# Patient Record
Sex: FEMALE | Race: WHITE | NOT HISPANIC OR LATINO | Employment: OTHER | URBAN - METROPOLITAN AREA
[De-identification: names, ages, dates, MRNs, and addresses within clinical notes are randomized per-mention and may not be internally consistent; named-entity substitution may affect disease eponyms.]

---

## 2017-06-27 ENCOUNTER — RECORDS - HEALTHEAST (OUTPATIENT)
Dept: LAB | Facility: CLINIC | Age: 72
End: 2017-06-27

## 2017-06-27 LAB
CHOLEST SERPL-MCNC: 183 MG/DL
FASTING STATUS PATIENT QL REPORTED: YES
HDLC SERPL-MCNC: 54 MG/DL
LDLC SERPL CALC-MCNC: 100 MG/DL
TRIGL SERPL-MCNC: 144 MG/DL

## 2018-11-08 ENCOUNTER — TRANSFERRED RECORDS (OUTPATIENT)
Dept: HEALTH INFORMATION MANAGEMENT | Facility: CLINIC | Age: 73
End: 2018-11-08

## 2018-11-21 ENCOUNTER — APPOINTMENT (OUTPATIENT)
Dept: GENERAL RADIOLOGY | Facility: CLINIC | Age: 73
DRG: 326 | End: 2018-11-21
Attending: PHYSICIAN ASSISTANT
Payer: MEDICARE

## 2018-11-21 ENCOUNTER — ANESTHESIA (OUTPATIENT)
Dept: SURGERY | Facility: CLINIC | Age: 73
DRG: 326 | End: 2018-11-21
Payer: MEDICARE

## 2018-11-21 ENCOUNTER — APPOINTMENT (OUTPATIENT)
Dept: ULTRASOUND IMAGING | Facility: CLINIC | Age: 73
DRG: 326 | End: 2018-11-21
Attending: THORACIC SURGERY (CARDIOTHORACIC VASCULAR SURGERY)
Payer: MEDICARE

## 2018-11-21 ENCOUNTER — ANESTHESIA EVENT (OUTPATIENT)
Dept: SURGERY | Facility: CLINIC | Age: 73
DRG: 326 | End: 2018-11-21
Payer: MEDICARE

## 2018-11-21 ENCOUNTER — APPOINTMENT (OUTPATIENT)
Dept: GENERAL RADIOLOGY | Facility: CLINIC | Age: 73
DRG: 326 | End: 2018-11-21
Attending: THORACIC SURGERY (CARDIOTHORACIC VASCULAR SURGERY)
Payer: MEDICARE

## 2018-11-21 ENCOUNTER — HOSPITAL ENCOUNTER (INPATIENT)
Facility: CLINIC | Age: 73
LOS: 16 days | Discharge: NURSING, REHAB OR LONG-TERM CARE | DRG: 326 | End: 2018-12-07
Attending: THORACIC SURGERY (CARDIOTHORACIC VASCULAR SURGERY) | Admitting: THORACIC SURGERY (CARDIOTHORACIC VASCULAR SURGERY)
Payer: MEDICARE

## 2018-11-21 DIAGNOSIS — J96.91 RESPIRATORY FAILURE WITH HYPOXIA, UNSPECIFIED CHRONICITY (H): ICD-10-CM

## 2018-11-21 DIAGNOSIS — E03.9 HYPOTHYROIDISM, UNSPECIFIED TYPE: ICD-10-CM

## 2018-11-21 DIAGNOSIS — I48.0 PAROXYSMAL ATRIAL FIBRILLATION (H): ICD-10-CM

## 2018-11-21 DIAGNOSIS — K22.3 ESOPHAGEAL PERFORATION: Primary | ICD-10-CM

## 2018-11-21 PROBLEM — J96.90 RESPIRATORY FAILURE (H): Status: ACTIVE | Noted: 2018-11-21

## 2018-11-21 LAB
ABO + RH BLD: NORMAL
ABO + RH BLD: NORMAL
ALBUMIN SERPL-MCNC: 2.2 G/DL (ref 3.4–5)
ALBUMIN SERPL-MCNC: 2.2 G/DL (ref 3.4–5)
ALBUMIN UR-MCNC: 100 MG/DL
ALP SERPL-CCNC: 100 U/L (ref 40–150)
ALP SERPL-CCNC: 78 U/L (ref 40–150)
ALT SERPL W P-5'-P-CCNC: 106 U/L (ref 0–50)
ALT SERPL W P-5'-P-CCNC: 35 U/L (ref 0–50)
ANION GAP SERPL CALCULATED.3IONS-SCNC: 8 MMOL/L (ref 3–14)
ANION GAP SERPL CALCULATED.3IONS-SCNC: 8 MMOL/L (ref 3–14)
APPEARANCE UR: ABNORMAL
AST SERPL W P-5'-P-CCNC: 21 U/L (ref 0–45)
AST SERPL W P-5'-P-CCNC: 238 U/L (ref 0–45)
BASE DEFICIT BLDA-SCNC: 0.8 MMOL/L
BASE DEFICIT BLDA-SCNC: 1.9 MMOL/L
BASE DEFICIT BLDA-SCNC: 2.8 MMOL/L
BASE EXCESS BLDA CALC-SCNC: 3.2 MMOL/L
BILIRUB SERPL-MCNC: 1 MG/DL (ref 0.2–1.3)
BILIRUB SERPL-MCNC: 1.3 MG/DL (ref 0.2–1.3)
BILIRUB UR QL STRIP: NEGATIVE
BLD GP AB SCN SERPL QL: NORMAL
BLD PROD TYP BPU: NORMAL
BLD UNIT ID BPU: 0
BLOOD BANK CMNT PATIENT-IMP: NORMAL
BLOOD PRODUCT CODE: NORMAL
BPU ID: NORMAL
BUN SERPL-MCNC: 32 MG/DL (ref 7–30)
BUN SERPL-MCNC: 38 MG/DL (ref 7–30)
CA-I BLD-MCNC: 4.2 MG/DL (ref 4.4–5.2)
CA-I BLD-MCNC: 4.7 MG/DL (ref 4.4–5.2)
CALCIUM SERPL-MCNC: 7.5 MG/DL (ref 8.5–10.1)
CALCIUM SERPL-MCNC: 7.8 MG/DL (ref 8.5–10.1)
CHLORIDE SERPL-SCNC: 109 MMOL/L (ref 94–109)
CHLORIDE SERPL-SCNC: 110 MMOL/L (ref 94–109)
CO2 SERPL-SCNC: 23 MMOL/L (ref 20–32)
CO2 SERPL-SCNC: 28 MMOL/L (ref 20–32)
COLOR UR AUTO: YELLOW
CREAT SERPL-MCNC: 0.93 MG/DL (ref 0.52–1.04)
CREAT SERPL-MCNC: 1.25 MG/DL (ref 0.52–1.04)
ERYTHROCYTE [DISTWIDTH] IN BLOOD BY AUTOMATED COUNT: 15.9 % (ref 10–15)
ERYTHROCYTE [DISTWIDTH] IN BLOOD BY AUTOMATED COUNT: 16.1 % (ref 10–15)
GFR SERPL CREATININE-BSD FRML MDRD: 42 ML/MIN/1.7M2
GFR SERPL CREATININE-BSD FRML MDRD: 59 ML/MIN/1.7M2
GLUCOSE BLD-MCNC: 126 MG/DL (ref 70–99)
GLUCOSE BLD-MCNC: 155 MG/DL (ref 70–99)
GLUCOSE BLDC GLUCOMTR-MCNC: 101 MG/DL (ref 70–99)
GLUCOSE BLDC GLUCOMTR-MCNC: 109 MG/DL (ref 70–99)
GLUCOSE BLDC GLUCOMTR-MCNC: 147 MG/DL (ref 70–99)
GLUCOSE SERPL-MCNC: 148 MG/DL (ref 70–99)
GLUCOSE SERPL-MCNC: 93 MG/DL (ref 70–99)
GLUCOSE UR STRIP-MCNC: NEGATIVE MG/DL
HCO3 BLD-SCNC: 23 MMOL/L (ref 21–28)
HCO3 BLD-SCNC: 23 MMOL/L (ref 21–28)
HCO3 BLD-SCNC: 25 MMOL/L (ref 21–28)
HCO3 BLD-SCNC: 27 MMOL/L (ref 21–28)
HCT VFR BLD AUTO: 27.5 % (ref 35–47)
HCT VFR BLD AUTO: 31.4 % (ref 35–47)
HGB BLD-MCNC: 10 G/DL (ref 11.7–15.7)
HGB BLD-MCNC: 8.8 G/DL (ref 11.7–15.7)
HGB BLD-MCNC: 9.3 G/DL (ref 11.7–15.7)
HGB BLD-MCNC: 9.8 G/DL (ref 11.7–15.7)
HGB UR QL STRIP: ABNORMAL
INR PPP: 1.51 (ref 0.86–1.14)
INR PPP: 1.59 (ref 0.86–1.14)
INTERPRETATION ECG - MUSE: NORMAL
KETONES UR STRIP-MCNC: 10 MG/DL
LACTATE BLD-SCNC: 1.1 MMOL/L (ref 0.7–2)
LACTATE BLD-SCNC: 1.1 MMOL/L (ref 0.7–2)
LEUKOCYTE ESTERASE UR QL STRIP: ABNORMAL
MAGNESIUM SERPL-MCNC: 2.8 MG/DL (ref 1.6–2.3)
MAGNESIUM SERPL-MCNC: 2.9 MG/DL (ref 1.6–2.3)
MAGNESIUM SERPL-MCNC: 3 MG/DL (ref 1.6–2.3)
MCH RBC QN AUTO: 31.7 PG (ref 26.5–33)
MCH RBC QN AUTO: 31.9 PG (ref 26.5–33)
MCHC RBC AUTO-ENTMCNC: 31.8 G/DL (ref 31.5–36.5)
MCHC RBC AUTO-ENTMCNC: 32 G/DL (ref 31.5–36.5)
MCV RBC AUTO: 100 FL (ref 78–100)
MCV RBC AUTO: 99 FL (ref 78–100)
MRSA DNA SPEC QL NAA+PROBE: NEGATIVE
MUCOUS THREADS #/AREA URNS LPF: PRESENT /LPF
NITRATE UR QL: NEGATIVE
NUM BPU REQUESTED: 4
O2/TOTAL GAS SETTING VFR VENT: 60 %
O2/TOTAL GAS SETTING VFR VENT: 66 %
O2/TOTAL GAS SETTING VFR VENT: 90 %
O2/TOTAL GAS SETTING VFR VENT: ABNORMAL %
OXYHGB MFR BLD: 93 % (ref 92–100)
PCO2 BLD: 39 MM HG (ref 35–45)
PCO2 BLD: 40 MM HG (ref 35–45)
PCO2 BLD: 43 MM HG (ref 35–45)
PCO2 BLD: 46 MM HG (ref 35–45)
PH BLD: 7.32 PH (ref 7.35–7.45)
PH BLD: 7.37 PH (ref 7.35–7.45)
PH BLD: 7.38 PH (ref 7.35–7.45)
PH BLD: 7.45 PH (ref 7.35–7.45)
PH UR STRIP: 6 PH (ref 5–7)
PHOSPHATE SERPL-MCNC: 2.8 MG/DL (ref 2.5–4.5)
PHOSPHATE SERPL-MCNC: 3 MG/DL (ref 2.5–4.5)
PLATELET # BLD AUTO: 331 10E9/L (ref 150–450)
PLATELET # BLD AUTO: 375 10E9/L (ref 150–450)
PO2 BLD: 117 MM HG (ref 80–105)
PO2 BLD: 64 MM HG (ref 80–105)
PO2 BLD: 76 MM HG (ref 80–105)
PO2 BLD: 86 MM HG (ref 80–105)
POTASSIUM BLD-SCNC: 3.2 MMOL/L (ref 3.4–5.3)
POTASSIUM BLD-SCNC: 3.9 MMOL/L (ref 3.4–5.3)
POTASSIUM SERPL-SCNC: 3.4 MMOL/L (ref 3.4–5.3)
POTASSIUM SERPL-SCNC: 3.7 MMOL/L (ref 3.4–5.3)
PROT SERPL-MCNC: 5.5 G/DL (ref 6.8–8.8)
PROT SERPL-MCNC: 6.6 G/DL (ref 6.8–8.8)
RBC # BLD AUTO: 2.78 10E12/L (ref 3.8–5.2)
RBC # BLD AUTO: 3.13 10E12/L (ref 3.8–5.2)
RBC #/AREA URNS AUTO: >182 /HPF (ref 0–2)
SODIUM BLD-SCNC: 142 MMOL/L (ref 133–144)
SODIUM BLD-SCNC: 147 MMOL/L (ref 133–144)
SODIUM SERPL-SCNC: 141 MMOL/L (ref 133–144)
SODIUM SERPL-SCNC: 145 MMOL/L (ref 133–144)
SOURCE: ABNORMAL
SP GR UR STRIP: 1.03 (ref 1–1.03)
SPECIMEN EXP DATE BLD: NORMAL
SPECIMEN SOURCE: NORMAL
TRANS CELLS #/AREA URNS HPF: <1 /HPF (ref 0–1)
TRANSFUSION STATUS PATIENT QL: NORMAL
URATE CRY #/AREA URNS HPF: ABNORMAL /HPF
UROBILINOGEN UR STRIP-MCNC: NORMAL MG/DL (ref 0–2)
VANCOMYCIN SERPL-MCNC: 20.3 MG/L
WBC # BLD AUTO: 20.8 10E9/L (ref 4–11)
WBC # BLD AUTO: 28.4 10E9/L (ref 4–11)
WBC #/AREA URNS AUTO: 84 /HPF (ref 0–5)

## 2018-11-21 PROCEDURE — 27210794 ZZH OR GENERAL SUPPLY STERILE: Performed by: THORACIC SURGERY (CARDIOTHORACIC VASCULAR SURGERY)

## 2018-11-21 PROCEDURE — 94002 VENT MGMT INPAT INIT DAY: CPT

## 2018-11-21 PROCEDURE — 84100 ASSAY OF PHOSPHORUS: CPT | Performed by: STUDENT IN AN ORGANIZED HEALTH CARE EDUCATION/TRAINING PROGRAM

## 2018-11-21 PROCEDURE — P9016 RBC LEUKOCYTES REDUCED: HCPCS | Performed by: STUDENT IN AN ORGANIZED HEALTH CARE EDUCATION/TRAINING PROGRAM

## 2018-11-21 PROCEDURE — 83605 ASSAY OF LACTIC ACID: CPT | Performed by: STUDENT IN AN ORGANIZED HEALTH CARE EDUCATION/TRAINING PROGRAM

## 2018-11-21 PROCEDURE — 40000983 ZZH STATISTIC HFNC ADULT NON-CPAP

## 2018-11-21 PROCEDURE — 25000125 ZZHC RX 250: Performed by: NURSE ANESTHETIST, CERTIFIED REGISTERED

## 2018-11-21 PROCEDURE — 40000014 ZZH STATISTIC ARTERIAL MONITORING DAILY

## 2018-11-21 PROCEDURE — 82803 BLOOD GASES ANY COMBINATION: CPT | Performed by: STUDENT IN AN ORGANIZED HEALTH CARE EDUCATION/TRAINING PROGRAM

## 2018-11-21 PROCEDURE — 37000008 ZZH ANESTHESIA TECHNICAL FEE, 1ST 30 MIN: Performed by: THORACIC SURGERY (CARDIOTHORACIC VASCULAR SURGERY)

## 2018-11-21 PROCEDURE — 25000125 ZZHC RX 250

## 2018-11-21 PROCEDURE — 88304 TISSUE EXAM BY PATHOLOGIST: CPT | Performed by: THORACIC SURGERY (CARDIOTHORACIC VASCULAR SURGERY)

## 2018-11-21 PROCEDURE — 25000128 H RX IP 250 OP 636: Performed by: STUDENT IN AN ORGANIZED HEALTH CARE EDUCATION/TRAINING PROGRAM

## 2018-11-21 PROCEDURE — 71045 X-RAY EXAM CHEST 1 VIEW: CPT

## 2018-11-21 PROCEDURE — 84100 ASSAY OF PHOSPHORUS: CPT | Performed by: PHYSICIAN ASSISTANT

## 2018-11-21 PROCEDURE — 82947 ASSAY GLUCOSE BLOOD QUANT: CPT | Performed by: THORACIC SURGERY (CARDIOTHORACIC VASCULAR SURGERY)

## 2018-11-21 PROCEDURE — 0BQT0ZZ REPAIR DIAPHRAGM, OPEN APPROACH: ICD-10-PCS | Performed by: THORACIC SURGERY (CARDIOTHORACIC VASCULAR SURGERY)

## 2018-11-21 PROCEDURE — 0DT40ZZ RESECTION OF ESOPHAGOGASTRIC JUNCTION, OPEN APPROACH: ICD-10-PCS | Performed by: THORACIC SURGERY (CARDIOTHORACIC VASCULAR SURGERY)

## 2018-11-21 PROCEDURE — 0BPT0JZ REMOVAL OF SYNTHETIC SUBSTITUTE FROM DIAPHRAGM, OPEN APPROACH: ICD-10-PCS | Performed by: THORACIC SURGERY (CARDIOTHORACIC VASCULAR SURGERY)

## 2018-11-21 PROCEDURE — 0W9C0ZZ DRAINAGE OF MEDIASTINUM, OPEN APPROACH: ICD-10-PCS | Performed by: THORACIC SURGERY (CARDIOTHORACIC VASCULAR SURGERY)

## 2018-11-21 PROCEDURE — 83605 ASSAY OF LACTIC ACID: CPT | Performed by: PHYSICIAN ASSISTANT

## 2018-11-21 PROCEDURE — 93970 EXTREMITY STUDY: CPT

## 2018-11-21 PROCEDURE — 88305 TISSUE EXAM BY PATHOLOGIST: CPT | Performed by: THORACIC SURGERY (CARDIOTHORACIC VASCULAR SURGERY)

## 2018-11-21 PROCEDURE — 36000062 ZZH SURGERY LEVEL 4 1ST 30 MIN - UMMC: Performed by: THORACIC SURGERY (CARDIOTHORACIC VASCULAR SURGERY)

## 2018-11-21 PROCEDURE — 86901 BLOOD TYPING SEROLOGIC RH(D): CPT | Performed by: STUDENT IN AN ORGANIZED HEALTH CARE EDUCATION/TRAINING PROGRAM

## 2018-11-21 PROCEDURE — 36415 COLL VENOUS BLD VENIPUNCTURE: CPT | Performed by: THORACIC SURGERY (CARDIOTHORACIC VASCULAR SURGERY)

## 2018-11-21 PROCEDURE — 40000275 ZZH STATISTIC RCP TIME EA 10 MIN

## 2018-11-21 PROCEDURE — 82803 BLOOD GASES ANY COMBINATION: CPT | Performed by: THORACIC SURGERY (CARDIOTHORACIC VASCULAR SURGERY)

## 2018-11-21 PROCEDURE — 25000128 H RX IP 250 OP 636: Performed by: SURGERY

## 2018-11-21 PROCEDURE — 83735 ASSAY OF MAGNESIUM: CPT | Performed by: PHYSICIAN ASSISTANT

## 2018-11-21 PROCEDURE — 85027 COMPLETE CBC AUTOMATED: CPT | Performed by: STUDENT IN AN ORGANIZED HEALTH CARE EDUCATION/TRAINING PROGRAM

## 2018-11-21 PROCEDURE — P9041 ALBUMIN (HUMAN),5%, 50ML: HCPCS | Performed by: NURSE ANESTHETIST, CERTIFIED REGISTERED

## 2018-11-21 PROCEDURE — 84295 ASSAY OF SERUM SODIUM: CPT | Performed by: THORACIC SURGERY (CARDIOTHORACIC VASCULAR SURGERY)

## 2018-11-21 PROCEDURE — 86923 COMPATIBILITY TEST ELECTRIC: CPT | Performed by: STUDENT IN AN ORGANIZED HEALTH CARE EDUCATION/TRAINING PROGRAM

## 2018-11-21 PROCEDURE — 25000128 H RX IP 250 OP 636: Performed by: ANESTHESIOLOGY

## 2018-11-21 PROCEDURE — 3E043XZ INTRODUCTION OF VASOPRESSOR INTO CENTRAL VEIN, PERCUTANEOUS APPROACH: ICD-10-PCS | Performed by: THORACIC SURGERY (CARDIOTHORACIC VASCULAR SURGERY)

## 2018-11-21 PROCEDURE — 82330 ASSAY OF CALCIUM: CPT | Performed by: THORACIC SURGERY (CARDIOTHORACIC VASCULAR SURGERY)

## 2018-11-21 PROCEDURE — 25000128 H RX IP 250 OP 636: Performed by: NURSE ANESTHETIST, CERTIFIED REGISTERED

## 2018-11-21 PROCEDURE — C9113 INJ PANTOPRAZOLE SODIUM, VIA: HCPCS | Performed by: PHYSICIAN ASSISTANT

## 2018-11-21 PROCEDURE — 82805 BLOOD GASES W/O2 SATURATION: CPT | Performed by: PHYSICIAN ASSISTANT

## 2018-11-21 PROCEDURE — 82947 ASSAY GLUCOSE BLOOD QUANT: CPT | Performed by: ANESTHESIOLOGY

## 2018-11-21 PROCEDURE — 88307 TISSUE EXAM BY PATHOLOGIST: CPT | Performed by: THORACIC SURGERY (CARDIOTHORACIC VASCULAR SURGERY)

## 2018-11-21 PROCEDURE — 82803 BLOOD GASES ANY COMBINATION: CPT | Performed by: ANESTHESIOLOGY

## 2018-11-21 PROCEDURE — 80202 ASSAY OF VANCOMYCIN: CPT | Performed by: THORACIC SURGERY (CARDIOTHORACIC VASCULAR SURGERY)

## 2018-11-21 PROCEDURE — 84295 ASSAY OF SERUM SODIUM: CPT | Performed by: ANESTHESIOLOGY

## 2018-11-21 PROCEDURE — 37000009 ZZH ANESTHESIA TECHNICAL FEE, EACH ADDTL 15 MIN: Performed by: THORACIC SURGERY (CARDIOTHORACIC VASCULAR SURGERY)

## 2018-11-21 PROCEDURE — 87640 STAPH A DNA AMP PROBE: CPT | Performed by: PHYSICIAN ASSISTANT

## 2018-11-21 PROCEDURE — 0D150Z4 BYPASS ESOPHAGUS TO CUTANEOUS, OPEN APPROACH: ICD-10-PCS | Performed by: THORACIC SURGERY (CARDIOTHORACIC VASCULAR SURGERY)

## 2018-11-21 PROCEDURE — 84132 ASSAY OF SERUM POTASSIUM: CPT | Performed by: ANESTHESIOLOGY

## 2018-11-21 PROCEDURE — 40000986 XR CHEST PORT 1 VW

## 2018-11-21 PROCEDURE — 87641 MR-STAPH DNA AMP PROBE: CPT | Performed by: PHYSICIAN ASSISTANT

## 2018-11-21 PROCEDURE — 83735 ASSAY OF MAGNESIUM: CPT | Performed by: THORACIC SURGERY (CARDIOTHORACIC VASCULAR SURGERY)

## 2018-11-21 PROCEDURE — 86850 RBC ANTIBODY SCREEN: CPT | Performed by: STUDENT IN AN ORGANIZED HEALTH CARE EDUCATION/TRAINING PROGRAM

## 2018-11-21 PROCEDURE — 85610 PROTHROMBIN TIME: CPT | Performed by: STUDENT IN AN ORGANIZED HEALTH CARE EDUCATION/TRAINING PROGRAM

## 2018-11-21 PROCEDURE — 25000128 H RX IP 250 OP 636: Performed by: THORACIC SURGERY (CARDIOTHORACIC VASCULAR SURGERY)

## 2018-11-21 PROCEDURE — 80053 COMPREHEN METABOLIC PANEL: CPT | Performed by: PHYSICIAN ASSISTANT

## 2018-11-21 PROCEDURE — 85610 PROTHROMBIN TIME: CPT | Performed by: PHYSICIAN ASSISTANT

## 2018-11-21 PROCEDURE — 25000125 ZZHC RX 250: Performed by: STUDENT IN AN ORGANIZED HEALTH CARE EDUCATION/TRAINING PROGRAM

## 2018-11-21 PROCEDURE — 0DJ08ZZ INSPECTION OF UPPER INTESTINAL TRACT, VIA NATURAL OR ARTIFICIAL OPENING ENDOSCOPIC: ICD-10-PCS | Performed by: THORACIC SURGERY (CARDIOTHORACIC VASCULAR SURGERY)

## 2018-11-21 PROCEDURE — 84132 ASSAY OF SERUM POTASSIUM: CPT | Performed by: THORACIC SURGERY (CARDIOTHORACIC VASCULAR SURGERY)

## 2018-11-21 PROCEDURE — 86900 BLOOD TYPING SEROLOGIC ABO: CPT | Performed by: STUDENT IN AN ORGANIZED HEALTH CARE EDUCATION/TRAINING PROGRAM

## 2018-11-21 PROCEDURE — 88302 TISSUE EXAM BY PATHOLOGIST: CPT | Performed by: THORACIC SURGERY (CARDIOTHORACIC VASCULAR SURGERY)

## 2018-11-21 PROCEDURE — 40000171 ZZH STATISTIC PRE-PROCEDURE ASSESSMENT III: Performed by: THORACIC SURGERY (CARDIOTHORACIC VASCULAR SURGERY)

## 2018-11-21 PROCEDURE — 20000004 ZZH R&B ICU UMMC

## 2018-11-21 PROCEDURE — 36600 WITHDRAWAL OF ARTERIAL BLOOD: CPT

## 2018-11-21 PROCEDURE — 82330 ASSAY OF CALCIUM: CPT | Performed by: ANESTHESIOLOGY

## 2018-11-21 PROCEDURE — 81001 URINALYSIS AUTO W/SCOPE: CPT | Performed by: STUDENT IN AN ORGANIZED HEALTH CARE EDUCATION/TRAINING PROGRAM

## 2018-11-21 PROCEDURE — 40000141 ZZH STATISTIC PERIPHERAL IV START W/O US GUIDANCE

## 2018-11-21 PROCEDURE — 25000125 ZZHC RX 250: Performed by: ANESTHESIOLOGY

## 2018-11-21 PROCEDURE — 25000128 H RX IP 250 OP 636: Performed by: PHYSICIAN ASSISTANT

## 2018-11-21 PROCEDURE — 0DHA0UZ INSERTION OF FEEDING DEVICE INTO JEJUNUM, OPEN APPROACH: ICD-10-PCS | Performed by: THORACIC SURGERY (CARDIOTHORACIC VASCULAR SURGERY)

## 2018-11-21 PROCEDURE — 40000047 ZZH STATISTIC CTO2 CONT OXYGEN TECH TIME EA 90 MIN

## 2018-11-21 PROCEDURE — 36415 COLL VENOUS BLD VENIPUNCTURE: CPT | Performed by: STUDENT IN AN ORGANIZED HEALTH CARE EDUCATION/TRAINING PROGRAM

## 2018-11-21 PROCEDURE — 80053 COMPREHEN METABOLIC PANEL: CPT | Performed by: STUDENT IN AN ORGANIZED HEALTH CARE EDUCATION/TRAINING PROGRAM

## 2018-11-21 PROCEDURE — 93010 ELECTROCARDIOGRAM REPORT: CPT | Performed by: INTERNAL MEDICINE

## 2018-11-21 PROCEDURE — 85027 COMPLETE CBC AUTOMATED: CPT | Performed by: PHYSICIAN ASSISTANT

## 2018-11-21 PROCEDURE — 00000146 ZZHCL STATISTIC GLUCOSE BY METER IP

## 2018-11-21 PROCEDURE — 93005 ELECTROCARDIOGRAM TRACING: CPT

## 2018-11-21 PROCEDURE — 83735 ASSAY OF MAGNESIUM: CPT | Performed by: STUDENT IN AN ORGANIZED HEALTH CARE EDUCATION/TRAINING PROGRAM

## 2018-11-21 PROCEDURE — 36000064 ZZH SURGERY LEVEL 4 EA 15 ADDTL MIN - UMMC: Performed by: THORACIC SURGERY (CARDIOTHORACIC VASCULAR SURGERY)

## 2018-11-21 PROCEDURE — 99291 CRITICAL CARE FIRST HOUR: CPT | Performed by: PHYSICIAN ASSISTANT

## 2018-11-21 PROCEDURE — 25000128 H RX IP 250 OP 636

## 2018-11-21 RX ORDER — FLUCONAZOLE 2 MG/ML
400 INJECTION, SOLUTION INTRAVENOUS EVERY 24 HOURS
Status: DISCONTINUED | OUTPATIENT
Start: 2018-11-21 | End: 2018-11-21

## 2018-11-21 RX ORDER — POTASSIUM CL/LIDO/0.9 % NACL 10MEQ/0.1L
10 INTRAVENOUS SOLUTION, PIGGYBACK (ML) INTRAVENOUS
Status: DISCONTINUED | OUTPATIENT
Start: 2018-11-21 | End: 2018-11-21

## 2018-11-21 RX ORDER — PIPERACILLIN SODIUM, TAZOBACTAM SODIUM 3; .375 G/15ML; G/15ML
3.38 INJECTION, POWDER, LYOPHILIZED, FOR SOLUTION INTRAVENOUS ONCE
Status: COMPLETED | OUTPATIENT
Start: 2018-11-21 | End: 2018-11-21

## 2018-11-21 RX ORDER — SODIUM CHLORIDE, SODIUM LACTATE, POTASSIUM CHLORIDE, CALCIUM CHLORIDE 600; 310; 30; 20 MG/100ML; MG/100ML; MG/100ML; MG/100ML
INJECTION, SOLUTION INTRAVENOUS CONTINUOUS PRN
Status: DISCONTINUED | OUTPATIENT
Start: 2018-11-21 | End: 2018-11-21

## 2018-11-21 RX ORDER — AMOXICILLIN 250 MG
2 CAPSULE ORAL 2 TIMES DAILY
Status: DISCONTINUED | OUTPATIENT
Start: 2018-11-21 | End: 2018-11-24

## 2018-11-21 RX ORDER — FLUCONAZOLE 2 MG/ML
200 INJECTION, SOLUTION INTRAVENOUS EVERY 24 HOURS
Status: COMPLETED | OUTPATIENT
Start: 2018-11-22 | End: 2018-11-25

## 2018-11-21 RX ORDER — PROPOFOL 10 MG/ML
5-75 INJECTION, EMULSION INTRAVENOUS CONTINUOUS
Status: DISCONTINUED | OUTPATIENT
Start: 2018-11-21 | End: 2018-11-22

## 2018-11-21 RX ORDER — ACETAMINOPHEN 650 MG/1
650 SUPPOSITORY RECTAL EVERY 6 HOURS PRN
Status: DISCONTINUED | OUTPATIENT
Start: 2018-11-21 | End: 2018-11-25

## 2018-11-21 RX ORDER — POTASSIUM CHLORIDE 7.45 MG/ML
10 INJECTION INTRAVENOUS
Status: DISCONTINUED | OUTPATIENT
Start: 2018-11-21 | End: 2018-12-07 | Stop reason: HOSPADM

## 2018-11-21 RX ORDER — POTASSIUM CHLORIDE 29.8 MG/ML
20 INJECTION INTRAVENOUS
Status: DISCONTINUED | OUTPATIENT
Start: 2018-11-21 | End: 2018-12-07 | Stop reason: HOSPADM

## 2018-11-21 RX ORDER — PIPERACILLIN SODIUM, TAZOBACTAM SODIUM 3; .375 G/15ML; G/15ML
3.38 INJECTION, POWDER, LYOPHILIZED, FOR SOLUTION INTRAVENOUS EVERY 6 HOURS
Status: DISCONTINUED | OUTPATIENT
Start: 2018-11-21 | End: 2018-11-21

## 2018-11-21 RX ORDER — PROPOFOL 10 MG/ML
INJECTION, EMULSION INTRAVENOUS PRN
Status: DISCONTINUED | OUTPATIENT
Start: 2018-11-21 | End: 2018-11-21

## 2018-11-21 RX ORDER — SODIUM CHLORIDE 9 MG/ML
INJECTION, SOLUTION INTRAVENOUS
Status: COMPLETED
Start: 2018-11-21 | End: 2018-11-21

## 2018-11-21 RX ORDER — GLYCOPYRROLATE 0.2 MG/ML
INJECTION, SOLUTION INTRAMUSCULAR; INTRAVENOUS PRN
Status: DISCONTINUED | OUTPATIENT
Start: 2018-11-21 | End: 2018-11-21

## 2018-11-21 RX ORDER — PROCHLORPERAZINE MALEATE 5 MG
5 TABLET ORAL EVERY 6 HOURS PRN
Status: DISCONTINUED | OUTPATIENT
Start: 2018-11-21 | End: 2018-11-21

## 2018-11-21 RX ORDER — HALOPERIDOL 5 MG/ML
2 INJECTION INTRAMUSCULAR ONCE
Status: COMPLETED | OUTPATIENT
Start: 2018-11-21 | End: 2018-11-21

## 2018-11-21 RX ORDER — VANCOMYCIN HYDROCHLORIDE 1 G/200ML
1000 INJECTION, SOLUTION INTRAVENOUS ONCE
Status: COMPLETED | OUTPATIENT
Start: 2018-11-21 | End: 2018-11-21

## 2018-11-21 RX ORDER — PROPOFOL 10 MG/ML
10-20 INJECTION, EMULSION INTRAVENOUS EVERY 30 MIN PRN
Status: DISCONTINUED | OUTPATIENT
Start: 2018-11-21 | End: 2018-11-22

## 2018-11-21 RX ORDER — ONDANSETRON 2 MG/ML
4 INJECTION INTRAMUSCULAR; INTRAVENOUS EVERY 6 HOURS PRN
Status: DISCONTINUED | OUTPATIENT
Start: 2018-11-21 | End: 2018-11-23

## 2018-11-21 RX ORDER — METOPROLOL TARTRATE 1 MG/ML
5 INJECTION, SOLUTION INTRAVENOUS EVERY 6 HOURS
Status: DISCONTINUED | OUTPATIENT
Start: 2018-11-21 | End: 2018-11-21

## 2018-11-21 RX ORDER — HYDROMORPHONE HYDROCHLORIDE 1 MG/ML
.3-.5 INJECTION, SOLUTION INTRAMUSCULAR; INTRAVENOUS; SUBCUTANEOUS
Status: DISCONTINUED | OUTPATIENT
Start: 2018-11-21 | End: 2018-11-23

## 2018-11-21 RX ORDER — CEFAZOLIN SODIUM 1 G/3ML
1 INJECTION, POWDER, FOR SOLUTION INTRAMUSCULAR; INTRAVENOUS SEE ADMIN INSTRUCTIONS
Status: DISCONTINUED | OUTPATIENT
Start: 2018-11-21 | End: 2018-11-21 | Stop reason: HOSPADM

## 2018-11-21 RX ORDER — POTASSIUM CHLORIDE 29.8 MG/ML
INJECTION INTRAVENOUS PRN
Status: DISCONTINUED | OUTPATIENT
Start: 2018-11-21 | End: 2018-11-21

## 2018-11-21 RX ORDER — CALCIUM CHLORIDE 100 MG/ML
INJECTION INTRAVENOUS; INTRAVENTRICULAR PRN
Status: DISCONTINUED | OUTPATIENT
Start: 2018-11-21 | End: 2018-11-21

## 2018-11-21 RX ORDER — VANCOMYCIN HYDROCHLORIDE 1 G/200ML
1000 INJECTION, SOLUTION INTRAVENOUS EVERY 12 HOURS
Status: DISCONTINUED | OUTPATIENT
Start: 2018-11-21 | End: 2018-11-21

## 2018-11-21 RX ORDER — PROCHLORPERAZINE 25 MG
12.5 SUPPOSITORY, RECTAL RECTAL EVERY 12 HOURS PRN
Status: DISCONTINUED | OUTPATIENT
Start: 2018-11-21 | End: 2018-11-21

## 2018-11-21 RX ORDER — AMOXICILLIN 250 MG
1 CAPSULE ORAL 2 TIMES DAILY
Status: DISCONTINUED | OUTPATIENT
Start: 2018-11-21 | End: 2018-11-24

## 2018-11-21 RX ORDER — METOCLOPRAMIDE 5 MG/1
5 TABLET ORAL EVERY 6 HOURS PRN
Status: DISCONTINUED | OUTPATIENT
Start: 2018-11-21 | End: 2018-11-21

## 2018-11-21 RX ORDER — FLUCONAZOLE 2 MG/ML
400 INJECTION, SOLUTION INTRAVENOUS ONCE
Status: COMPLETED | OUTPATIENT
Start: 2018-11-21 | End: 2018-11-21

## 2018-11-21 RX ORDER — METOPROLOL TARTRATE 1 MG/ML
5 INJECTION, SOLUTION INTRAVENOUS EVERY 5 MIN PRN
Status: DISCONTINUED | OUTPATIENT
Start: 2018-11-21 | End: 2018-11-22

## 2018-11-21 RX ORDER — POTASSIUM CHLORIDE 750 MG/1
20-40 TABLET, EXTENDED RELEASE ORAL
Status: DISCONTINUED | OUTPATIENT
Start: 2018-11-21 | End: 2018-12-07 | Stop reason: HOSPADM

## 2018-11-21 RX ORDER — SODIUM CHLORIDE 9 MG/ML
INJECTION, SOLUTION INTRAVENOUS CONTINUOUS PRN
Status: DISCONTINUED | OUTPATIENT
Start: 2018-11-21 | End: 2018-11-21

## 2018-11-21 RX ORDER — OXYCODONE HYDROCHLORIDE 5 MG/1
5-10 TABLET ORAL
Status: DISCONTINUED | OUTPATIENT
Start: 2018-11-21 | End: 2018-11-21

## 2018-11-21 RX ORDER — MAGNESIUM SULFATE HEPTAHYDRATE 40 MG/ML
4 INJECTION, SOLUTION INTRAVENOUS EVERY 4 HOURS PRN
Status: DISCONTINUED | OUTPATIENT
Start: 2018-11-21 | End: 2018-12-07 | Stop reason: HOSPADM

## 2018-11-21 RX ORDER — CEFAZOLIN SODIUM 2 G/100ML
2 INJECTION, SOLUTION INTRAVENOUS
Status: DISCONTINUED | OUTPATIENT
Start: 2018-11-21 | End: 2018-11-21 | Stop reason: HOSPADM

## 2018-11-21 RX ORDER — ALBUMIN, HUMAN INJ 5% 5 %
SOLUTION INTRAVENOUS CONTINUOUS PRN
Status: DISCONTINUED | OUTPATIENT
Start: 2018-11-21 | End: 2018-11-21

## 2018-11-21 RX ORDER — NALOXONE HYDROCHLORIDE 0.4 MG/ML
.1-.4 INJECTION, SOLUTION INTRAMUSCULAR; INTRAVENOUS; SUBCUTANEOUS
Status: DISCONTINUED | OUTPATIENT
Start: 2018-11-21 | End: 2018-11-21

## 2018-11-21 RX ORDER — LIDOCAINE HYDROCHLORIDE 20 MG/ML
INJECTION, SOLUTION INFILTRATION; PERINEURAL PRN
Status: DISCONTINUED | OUTPATIENT
Start: 2018-11-21 | End: 2018-11-21

## 2018-11-21 RX ORDER — SODIUM CHLORIDE 9 MG/ML
INJECTION, SOLUTION INTRAVENOUS CONTINUOUS
Status: DISCONTINUED | OUTPATIENT
Start: 2018-11-21 | End: 2018-11-22

## 2018-11-21 RX ORDER — ONDANSETRON 2 MG/ML
4 INJECTION INTRAMUSCULAR; INTRAVENOUS EVERY 6 HOURS PRN
Status: DISCONTINUED | OUTPATIENT
Start: 2018-11-21 | End: 2018-12-07 | Stop reason: HOSPADM

## 2018-11-21 RX ORDER — METOPROLOL TARTRATE 1 MG/ML
5 INJECTION, SOLUTION INTRAVENOUS EVERY 5 MIN PRN
Status: DISCONTINUED | OUTPATIENT
Start: 2018-11-21 | End: 2018-11-21

## 2018-11-21 RX ORDER — FENTANYL CITRATE 50 UG/ML
INJECTION, SOLUTION INTRAMUSCULAR; INTRAVENOUS PRN
Status: DISCONTINUED | OUTPATIENT
Start: 2018-11-21 | End: 2018-11-21

## 2018-11-21 RX ORDER — PIPERACILLIN SODIUM, TAZOBACTAM SODIUM 3; .375 G/15ML; G/15ML
3.38 INJECTION, POWDER, LYOPHILIZED, FOR SOLUTION INTRAVENOUS EVERY 6 HOURS
Status: DISPENSED | OUTPATIENT
Start: 2018-11-21 | End: 2018-11-25

## 2018-11-21 RX ORDER — NALOXONE HYDROCHLORIDE 0.4 MG/ML
.1-.4 INJECTION, SOLUTION INTRAMUSCULAR; INTRAVENOUS; SUBCUTANEOUS
Status: DISCONTINUED | OUTPATIENT
Start: 2018-11-21 | End: 2018-11-23

## 2018-11-21 RX ORDER — ONDANSETRON 4 MG/1
4 TABLET, ORALLY DISINTEGRATING ORAL EVERY 6 HOURS PRN
Status: DISCONTINUED | OUTPATIENT
Start: 2018-11-21 | End: 2018-11-21

## 2018-11-21 RX ORDER — ACETAMINOPHEN 325 MG/1
650 TABLET ORAL EVERY 4 HOURS PRN
Status: DISCONTINUED | OUTPATIENT
Start: 2018-11-21 | End: 2018-11-21

## 2018-11-21 RX ORDER — ONDANSETRON 2 MG/ML
INJECTION INTRAMUSCULAR; INTRAVENOUS PRN
Status: DISCONTINUED | OUTPATIENT
Start: 2018-11-21 | End: 2018-11-21

## 2018-11-21 RX ORDER — POTASSIUM CHLORIDE 1.5 G/1.58G
20-40 POWDER, FOR SOLUTION ORAL
Status: DISCONTINUED | OUTPATIENT
Start: 2018-11-21 | End: 2018-12-07 | Stop reason: HOSPADM

## 2018-11-21 RX ORDER — ONDANSETRON 4 MG/1
4 TABLET, ORALLY DISINTEGRATING ORAL EVERY 6 HOURS PRN
Status: DISCONTINUED | OUTPATIENT
Start: 2018-11-21 | End: 2018-12-07 | Stop reason: HOSPADM

## 2018-11-21 RX ORDER — PROPOFOL 10 MG/ML
INJECTION, EMULSION INTRAVENOUS CONTINUOUS PRN
Status: DISCONTINUED | OUTPATIENT
Start: 2018-11-21 | End: 2018-11-21

## 2018-11-21 RX ORDER — ALBUTEROL SULFATE 90 UG/1
6 AEROSOL, METERED RESPIRATORY (INHALATION) EVERY 4 HOURS PRN
Status: DISCONTINUED | OUTPATIENT
Start: 2018-11-21 | End: 2018-12-07 | Stop reason: HOSPADM

## 2018-11-21 RX ORDER — HALOPERIDOL 5 MG/ML
1 INJECTION INTRAMUSCULAR ONCE
Status: COMPLETED | OUTPATIENT
Start: 2018-11-21 | End: 2018-11-21

## 2018-11-21 RX ORDER — METOCLOPRAMIDE HYDROCHLORIDE 5 MG/ML
5 INJECTION INTRAMUSCULAR; INTRAVENOUS EVERY 6 HOURS PRN
Status: DISCONTINUED | OUTPATIENT
Start: 2018-11-21 | End: 2018-11-21

## 2018-11-21 RX ORDER — METOPROLOL TARTRATE 1 MG/ML
INJECTION, SOLUTION INTRAVENOUS
Status: COMPLETED
Start: 2018-11-21 | End: 2018-11-21

## 2018-11-21 RX ADMIN — AMIODARONE HYDROCHLORIDE 1 MG/MIN: 50 INJECTION, SOLUTION INTRAVENOUS at 08:59

## 2018-11-21 RX ADMIN — PHENYLEPHRINE HYDROCHLORIDE 0.5 MCG/KG/MIN: 10 INJECTION, SOLUTION INTRAMUSCULAR; INTRAVENOUS; SUBCUTANEOUS at 11:58

## 2018-11-21 RX ADMIN — VASOPRESSIN 1 UNITS: 20 INJECTION, SOLUTION INTRAMUSCULAR; SUBCUTANEOUS at 15:17

## 2018-11-21 RX ADMIN — Medication 2 G: at 15:00

## 2018-11-21 RX ADMIN — PROPOFOL 50 MG: 10 INJECTION, EMULSION INTRAVENOUS at 16:47

## 2018-11-21 RX ADMIN — AMIODARONE HYDROCHLORIDE: 50 INJECTION, SOLUTION INTRAVENOUS at 13:15

## 2018-11-21 RX ADMIN — PHENYLEPHRINE HYDROCHLORIDE 200 MCG: 10 INJECTION, SOLUTION INTRAMUSCULAR; INTRAVENOUS; SUBCUTANEOUS at 11:56

## 2018-11-21 RX ADMIN — FENTANYL CITRATE 50 MCG: 50 INJECTION, SOLUTION INTRAMUSCULAR; INTRAVENOUS at 15:03

## 2018-11-21 RX ADMIN — Medication 100 MCG/HR: at 19:00

## 2018-11-21 RX ADMIN — VASOPRESSIN 2 UNITS: 20 INJECTION, SOLUTION INTRAMUSCULAR; SUBCUTANEOUS at 11:31

## 2018-11-21 RX ADMIN — METOPROLOL TARTRATE 5 MG: 1 INJECTION, SOLUTION INTRAVENOUS at 02:52

## 2018-11-21 RX ADMIN — VASOPRESSIN 1 UNITS: 20 INJECTION, SOLUTION INTRAMUSCULAR; SUBCUTANEOUS at 14:00

## 2018-11-21 RX ADMIN — NOREPINEPHRINE BITARTRATE 12.8 MCG: 1 INJECTION INTRAVENOUS at 16:42

## 2018-11-21 RX ADMIN — NOREPINEPHRINE BITARTRATE 0.03 MCG/KG/MIN: 1 INJECTION INTRAVENOUS at 14:42

## 2018-11-21 RX ADMIN — ROCURONIUM BROMIDE 30 MG: 10 INJECTION INTRAVENOUS at 12:25

## 2018-11-21 RX ADMIN — METOPROLOL TARTRATE 5 MG: 1 INJECTION, SOLUTION INTRAVENOUS at 05:20

## 2018-11-21 RX ADMIN — PHENYLEPHRINE HYDROCHLORIDE 200 MCG: 10 INJECTION, SOLUTION INTRAMUSCULAR; INTRAVENOUS; SUBCUTANEOUS at 16:38

## 2018-11-21 RX ADMIN — CALCIUM CHLORIDE 0.5 G: 100 INJECTION, SOLUTION INTRAVENOUS at 14:00

## 2018-11-21 RX ADMIN — PHENYLEPHRINE HYDROCHLORIDE 200 MCG: 10 INJECTION, SOLUTION INTRAMUSCULAR; INTRAVENOUS; SUBCUTANEOUS at 11:28

## 2018-11-21 RX ADMIN — PHENYLEPHRINE HYDROCHLORIDE 200 MCG: 10 INJECTION, SOLUTION INTRAMUSCULAR; INTRAVENOUS; SUBCUTANEOUS at 11:20

## 2018-11-21 RX ADMIN — NOREPINEPHRINE BITARTRATE 12.8 MCG: 1 INJECTION INTRAVENOUS at 16:40

## 2018-11-21 RX ADMIN — VASOPRESSIN 1 UNITS: 20 INJECTION, SOLUTION INTRAMUSCULAR; SUBCUTANEOUS at 14:28

## 2018-11-21 RX ADMIN — Medication 100 MG: at 11:14

## 2018-11-21 RX ADMIN — GLYCOPYRROLATE 0.2 MG: 0.2 INJECTION, SOLUTION INTRAMUSCULAR; INTRAVENOUS at 16:22

## 2018-11-21 RX ADMIN — FENTANYL CITRATE 50 MCG: 50 INJECTION, SOLUTION INTRAMUSCULAR; INTRAVENOUS at 15:07

## 2018-11-21 RX ADMIN — VASOPRESSIN 1 UNITS: 20 INJECTION, SOLUTION INTRAMUSCULAR; SUBCUTANEOUS at 14:19

## 2018-11-21 RX ADMIN — SODIUM CHLORIDE: 9 INJECTION, SOLUTION INTRAVENOUS at 15:49

## 2018-11-21 RX ADMIN — AMIODARONE HYDROCHLORIDE 1 MG/MIN: 50 INJECTION, SOLUTION INTRAVENOUS at 11:00

## 2018-11-21 RX ADMIN — VASOPRESSIN 1 UNITS/HR: 20 INJECTION INTRAVENOUS at 13:40

## 2018-11-21 RX ADMIN — VASOPRESSIN 1 UNITS: 20 INJECTION, SOLUTION INTRAMUSCULAR; SUBCUTANEOUS at 13:12

## 2018-11-21 RX ADMIN — ROCURONIUM BROMIDE 10 MG: 10 INJECTION INTRAVENOUS at 15:47

## 2018-11-21 RX ADMIN — PROPOFOL 130 MG: 10 INJECTION, EMULSION INTRAVENOUS at 11:14

## 2018-11-21 RX ADMIN — HALOPERIDOL LACTATE 2 MG: 5 INJECTION, SOLUTION INTRAMUSCULAR at 05:20

## 2018-11-21 RX ADMIN — PIPERACILLIN SODIUM,TAZOBACTAM SODIUM 3.38 G: 3; .375 INJECTION, POWDER, FOR SOLUTION INTRAVENOUS at 05:19

## 2018-11-21 RX ADMIN — SODIUM CHLORIDE, POTASSIUM CHLORIDE, SODIUM LACTATE AND CALCIUM CHLORIDE: 600; 310; 30; 20 INJECTION, SOLUTION INTRAVENOUS at 13:30

## 2018-11-21 RX ADMIN — ROCURONIUM BROMIDE 20 MG: 10 INJECTION INTRAVENOUS at 14:53

## 2018-11-21 RX ADMIN — SODIUM CHLORIDE, POTASSIUM CHLORIDE, SODIUM LACTATE AND CALCIUM CHLORIDE: 600; 310; 30; 20 INJECTION, SOLUTION INTRAVENOUS at 11:00

## 2018-11-21 RX ADMIN — LIDOCAINE HYDROCHLORIDE 100 MG: 20 INJECTION, SOLUTION INFILTRATION; PERINEURAL at 11:14

## 2018-11-21 RX ADMIN — SODIUM CHLORIDE 500 ML: 9 INJECTION, SOLUTION INTRAVENOUS at 21:35

## 2018-11-21 RX ADMIN — ALBUMIN HUMAN: 0.05 INJECTION, SOLUTION INTRAVENOUS at 14:32

## 2018-11-21 RX ADMIN — ROCURONIUM BROMIDE 20 MG: 10 INJECTION INTRAVENOUS at 13:17

## 2018-11-21 RX ADMIN — PIPERACILLIN SODIUM,TAZOBACTAM SODIUM 3.38 G: 3; .375 INJECTION, POWDER, FOR SOLUTION INTRAVENOUS at 11:52

## 2018-11-21 RX ADMIN — ONDANSETRON 4 MG: 2 INJECTION INTRAMUSCULAR; INTRAVENOUS at 15:54

## 2018-11-21 RX ADMIN — PIPERACILLIN SODIUM,TAZOBACTAM SODIUM 3.38 G: 3; .375 INJECTION, POWDER, FOR SOLUTION INTRAVENOUS at 19:40

## 2018-11-21 RX ADMIN — FLUCONAZOLE 400 MG: 2 INJECTION, SOLUTION INTRAVENOUS at 08:13

## 2018-11-21 RX ADMIN — ROCURONIUM BROMIDE 50 MG: 10 INJECTION INTRAVENOUS at 11:21

## 2018-11-21 RX ADMIN — AMIODARONE HYDROCHLORIDE 0.5 MG/MIN: 50 INJECTION, SOLUTION INTRAVENOUS at 18:00

## 2018-11-21 RX ADMIN — VANCOMYCIN HYDROCHLORIDE 1000 MG: 1 INJECTION, SOLUTION INTRAVENOUS at 06:35

## 2018-11-21 RX ADMIN — FENTANYL CITRATE 100 MCG: 50 INJECTION, SOLUTION INTRAMUSCULAR; INTRAVENOUS at 11:14

## 2018-11-21 RX ADMIN — VASOPRESSIN 2.4 UNITS/HR: 20 INJECTION INTRAVENOUS at 23:17

## 2018-11-21 RX ADMIN — POTASSIUM CHLORIDE 20 MEQ: 29.8 INJECTION, SOLUTION INTRAVENOUS at 21:25

## 2018-11-21 RX ADMIN — POTASSIUM CHLORIDE 20 MEQ: 400 INJECTION, SOLUTION INTRAVENOUS at 14:55

## 2018-11-21 RX ADMIN — PROPOFOL 75 MCG/KG/MIN: 10 INJECTION, EMULSION INTRAVENOUS at 16:47

## 2018-11-21 RX ADMIN — SODIUM CHLORIDE: 9 INJECTION, SOLUTION INTRAVENOUS at 03:06

## 2018-11-21 RX ADMIN — CALCIUM CHLORIDE 0.5 G: 100 INJECTION, SOLUTION INTRAVENOUS at 13:42

## 2018-11-21 RX ADMIN — SODIUM CHLORIDE: 9 INJECTION, SOLUTION INTRAVENOUS at 11:40

## 2018-11-21 RX ADMIN — PANTOPRAZOLE SODIUM 40 MG: 40 INJECTION, POWDER, FOR SOLUTION INTRAVENOUS at 19:33

## 2018-11-21 RX ADMIN — NOREPINEPHRINE BITARTRATE 6.4 MCG: 1 INJECTION INTRAVENOUS at 16:52

## 2018-11-21 RX ADMIN — ROCURONIUM BROMIDE 20 MG: 10 INJECTION INTRAVENOUS at 14:02

## 2018-11-21 RX ADMIN — HALOPERIDOL LACTATE 1 MG: 5 INJECTION, SOLUTION INTRAMUSCULAR at 03:53

## 2018-11-21 RX ADMIN — VASOPRESSIN 1 UNITS: 20 INJECTION, SOLUTION INTRAMUSCULAR; SUBCUTANEOUS at 12:55

## 2018-11-21 RX ADMIN — ALBUMIN HUMAN: 0.05 INJECTION, SOLUTION INTRAVENOUS at 16:30

## 2018-11-21 ASSESSMENT — ACTIVITIES OF DAILY LIVING (ADL)
ADLS_ACUITY_SCORE: 17
ADLS_ACUITY_SCORE: 21
ADLS_ACUITY_SCORE: 21

## 2018-11-21 ASSESSMENT — ENCOUNTER SYMPTOMS: DYSRHYTHMIAS: 1

## 2018-11-21 NOTE — ANESTHESIA PREPROCEDURE EVALUATION
Anesthesia Pre-Procedure Evaluation    Patient: Myrtle Vaz   MRN:     [id number] Gender:   female   Age:    73 year old :      1945        Preoperative Diagnosis: Possible Esophageal Perforation    Procedure(s):  Laparoscopic Redo Hiatal Hernia Repair      No past medical history on file.   No past surgical history on file.       Anesthesia Evaluation     . Pt has had prior anesthetic.     No history of anesthetic complications          ROS/MED HX    ENT/Pulmonary:     (+), . Other pulmonary disease .    Neurologic:  - neg neurologic ROS     Cardiovascular:     (+) ----. : . . . :. dysrhythmias a-fib, .       METS/Exercise Tolerance:     Hematologic:     (+) Anemia, -      Musculoskeletal:         GI/Hepatic:     (+) hiatal hernia,       Renal/Genitourinary:     (+) chronic renal disease, type: ARF,       Endo:         Psychiatric:  - neg psychiatric ROS       Infectious Disease:         Malignancy:         Other:                         PHYSICAL EXAM:   Mental Status/Neuro: Mental status: Disoriented   Airway: Facies: Feasible  Mallampati: Not Assessed  Mouth/Opening: Not Assessed  TM distance: Not Assessed  Neck ROM: Not Assessed   Respiratory: Auscultation: Decreased BS     Resp. Rate: Normal     Resp. Effort: Normal      CV: Rhythm: Regular  Heart: Normal Sounds   Comments:      Dental: Normal                  Lab Results   Component Value Date    WBC 28.4 (H) 2018    HGB 10.0 (L) 2018    HCT 31.4 (L) 2018     2018     (H) 2018    POTASSIUM 3.4 2018    CHLORIDE 109 2018    CO2 28 2018    BUN 38 (H) 2018    CR 1.25 (H) 2018    GLC 93 2018    JOSE 7.8 (L) 2018    PHOS 2.8 2018    MAG 2.9 (H) 2018    ALBUMIN 2.2 (L) 2018    PROTTOTAL 6.6 (L) 2018    ALT 35 2018    AST 21 2018    ALKPHOS 100 2018    BILITOTAL 1.0 2018    INR 1.51 (H) 2018       Preop Vitals  BP Readings  "from Last 3 Encounters:   11/21/18 117/58    Pulse Readings from Last 3 Encounters:   No data found for Pulse      Resp Readings from Last 3 Encounters:   11/21/18 20    SpO2 Readings from Last 3 Encounters:   11/21/18 93%      Temp Readings from Last 1 Encounters:   11/21/18 37.2  C (99  F) (Axillary)    Ht Readings from Last 1 Encounters:   11/21/18 1.651 m (5' 5\")      Wt Readings from Last 1 Encounters:   11/21/18 89.5 kg (197 lb 5 oz)    Estimated body mass index is 32.83 kg/(m^2) as calculated from the following:    Height as of this encounter: 1.651 m (5' 5\").    Weight as of this encounter: 89.5 kg (197 lb 5 oz).     LDA:  Peripheral IV 11/20/18 Right Lower forearm (Active)   Site Assessment WDL 11/21/2018  6:00 AM   Line Status Infusing 11/21/2018  6:00 AM   Phlebitis Scale 0-->no symptoms 11/21/2018  4:00 AM   Infiltration Scale 0 11/21/2018  4:00 AM   Extravasation? No 11/21/2018  4:00 AM   Number of days:1       Peripheral IV 11/21/18 Right;Anterior Lower forearm (Active)   Number of days:0       Chest Tube Left Left Anterior (Active)   Site Assessment UTV 11/21/2018  4:00 AM   Suction -20 cm H2O 11/21/2018  4:00 AM   Chest Tube Airleak No 11/21/2018  4:00 AM   Drainage Description Serosanguinous 11/21/2018  4:00 AM   Dressing Status Normal: Clean, Dry & Intact 11/21/2018  4:00 AM   Dressing Change Due 11/21/18 11/21/2018  4:00 AM   Dressing Intervention Gauze;Transparent 11/21/2018  4:00 AM   Patency Intervention Tip/Tilt 11/21/2018  4:00 AM   Chest Tube Clamps at Bedside present 11/21/2018  4:00 AM   Container Amount 410 11/21/2018  8:49 AM   Output (ml) 40 ml 11/21/2018  8:49 AM   Number of days:1       Chest Tube Right Mediastinal (Active)   Site Assessment UTV 11/21/2018  4:00 AM   Suction -20 cm H2O 11/21/2018  4:00 AM   Chest Tube Airleak No 11/21/2018  4:00 AM   Drainage Description Serosanguinous 11/21/2018  4:00 AM   Dressing Status Normal: Clean, Dry & Intact 11/21/2018  4:00 AM   Dressing " Change Due 11/21/18 11/21/2018  4:00 AM   Dressing Intervention Gauze;Transparent 11/21/2018  4:00 AM   Patency Intervention Tip/Tilt 11/21/2018  4:00 AM   Chest Tube Clamps at Bedside present 11/21/2018  4:00 AM   Container Amount 690 11/21/2018  8:49 AM   Output (ml) 0 ml 11/21/2018  8:49 AM   Number of days:1       Chest Tube Right Pleural (Active)   Site Assessment UTV 11/21/2018  4:00 AM   Suction -20 cm H2O 11/21/2018  4:00 AM   Chest Tube Airleak No 11/21/2018  4:00 AM   Drainage Description Serosanguinous 11/21/2018  4:00 AM   Dressing Status Normal: Clean, Dry & Intact 11/21/2018  4:00 AM   Dressing Change Due 11/21/18 11/21/2018  4:00 AM   Dressing Intervention Gauze;Transparent 11/21/2018  4:00 AM   Patency Intervention Tip/Tilt 11/21/2018  4:00 AM   Chest Tube Clamps at Bedside present 11/21/2018  4:00 AM   Container Amount 875 11/21/2018  8:49 AM   Output (ml) 25 ml 11/21/2018  8:49 AM   Number of days:1       Gastrostomy/Enterostomy Gastrostomy (Active)   Site Description Unable to Visualize 11/21/2018  4:00 AM   Site care cleansed with soap and water;button rotated 1/4 turn 11/21/2018  1:00 AM   Drainage Appearance Brown;Green;Purulent 11/21/2018  4:00 AM   Status - Gastrostomy Open to gravity drainage 11/21/2018  4:00 AM   Dressing Status Normal: Clean, Dry & Intact 11/21/2018  4:00 AM   Dressing Change Due 11/21/18 11/21/2018  4:00 AM   Output (ml) 30 ml 11/21/2018  8:49 AM   Number of days:1       Urethral Catheter (Active)   Tube Description Positional 11/21/2018  4:00 AM   Catheter Care Catheter wipes 11/21/2018  1:00 AM   Collection Container Standard;Patent 11/21/2018  4:00 AM   Securement Method Securing device (Describe) 11/21/2018  4:00 AM   Urine Output 150 mL 11/21/2018  8:00 AM   Number of days:1            Assessment:   ASA SCORE: 3    NPO Status: > 6 hours since completed [employer]   Documentation: H&P complete; Preop Testing complete; Consents complete   Proceeding: Proceed without  further delay  Tobacco Use:  NO Active use of Tobacco/UNKNOWN Tobacco use status     Plan:   Anes. Type:  General   Pre-Induction: Midazolam IV   Induction:  IV (Standard)   Airway: Oral ETT   Access/Monitoring: PIV; 2nd PIV; A-Line   Maintenance: Balanced   Emergence: Procedure Site   Logistics: Observation/Admission     Postop Pain/Sedation Strategy:  Standard-Options: Opioids PRN     PONV Management:  Adult Risk Factors: Female, Non-Smoker, Postop Opioids  Prevention: Ondansetron; Dexamethasone     CONSENT: Direct conversation   Plan and risks discussed with: Patient   Blood Products: Consented (ALL Blood Products)                         Iggy Barrow MD

## 2018-11-21 NOTE — ANESTHESIA POSTPROCEDURE EVALUATION
Anesthesia POST Procedure Evaluation    Patient: Myrtle Vaz   MRN:     [id number] Gender:   female   Age:    73 year old :      1945        Preoperative Diagnosis: Possible Esophageal Perforation    Procedure(s):  Midline laparotomy, Trans-hiatal esophagogasterectomy, gastrostomy, J-tube placement, G-tube placement, bilateral pleural chest tube placement, mediastinal abcess drainage, spit fistula creation, Esophagastrodueodenoscopy  COMBINED ESOPHAGOSCOPY, GASTROSCOPY, DUODENOSCOPY (EGD)   Postop Comments: No value filed.       Anesthesia Type:  General    Reportable Event: YES     PAIN:        Airway/Resp.: Uneventful perioperative course   Sign Out Status: Airway Device presnt     Airway Device: ETT                 Reason: Hemodynamic Instability     CV: Reportable or Non-Standard event     Events: Refractory HypOtension; Dysrrhythmia     Interventions: Pressors; Defibrillation; Antiarrhythmics   Sign Out status: CV Support within EXPECTED Parameters     Disposition:   Sign Out in:  ICU  Disposition:  ICU  Recovery Course: Recovery in ICU     Comments/Narrative:  Patient had persisent intraoperative hypotension requiring multiple vasopressors.  Also had an episode of AFib with RVR (h/o of recent AFib) despite being on amiodarone infusion.  DCCV x4 with eventual spontaneous conversion to NSR.  Patient intubated and sedated; unable to assess pain, PONV, mental status.               Last Anesthesia Record Vitals:  [facility] VITALS  2018 1648 - 2018 1747      2018             Pulse: 71    ART BP: 115/62    Temp: 34.7  C (94.4  F)    SpO2: 100 %    Resp Rate (set): 14    EKG: Sinus rhythm          Transported to ICU on full monitors and controlled ventilation.  Vital signs at time of transfer:  HR 72  /67 ()  SpO2 100%    Supported hemodynamically with 0.05 mcg/kg/min NE and 1.2 units/h vasopressin.  Sedated with propofol 30 mcg/kg/min.  Amiodarone at 1 mg/h.    Full report to ICU  team.      Electronically Signed By: Alejandrina Lees MD, November 21, 2018, 5:47 PM

## 2018-11-21 NOTE — PROVIDER NOTIFICATION
Time of notification: 7:45 AM  Provider notified: Ramandeep Dang  Patient status:  Temp:  [98.2  F (36.8  C)] 98.2  F (36.8  C)  Heart Rate:  [] 101  Resp:  [20] 20  BP: (114-149)/(54-89) 149/71  FiO2 (%):  [80 %] 80 %  SpO2:  [92 %-95 %] 95 %  Orders received:     Patient pulled off NC, rapid desat.  Awaiting orders

## 2018-11-21 NOTE — BRIEF OP NOTE
[facility], [place]    Brief Operative Note    Pre-operative diagnosis: Possible Esophageal Perforation      Recent giant hiatal hernia repair with mesh      Sepsis      New onset A fib      Acute kidney failure   Post-operative diagnosis Distal esophageal perforation      Purulent mediastinitis      Septic shock       Respiratory failure      Acute kidney failure      New onset A fib      Intermittent SVT requiring DCCV x 4     Procedure:   EGD      Laparotomy      Takedown of Nissen fundoplication      Takedown of cruroplasty       Prosthetic mesh explantation      Takedown of gastrostomy tube      Drainage of mediastinal abscess      Excision of hiatal hernia sac      Transhiatal esophagogastrectomy       Closure of esophageal hiatus       Gastrostomy      Jejunostomy      Bilateral pleural tube placement       Left neck incision and cervical esophagostomy             Surgeon: Surgeon(s) and Role:     * Temitope Bullock MD - Primary     * Nicky Chun MD - Resident - Assisting     * Ally Ybarra MD - Assisting  Anesthesia: General   Estimated blood loss: 150 ml  Drains:       32 Fr straight argyle tube to right pleural space, posteroapical.       32 Fr straight argyle tube to left pleural space, posteroapical.       19 Fr brendan drain to posterior mediastinal abscess cavity.       20 Fr BETSEY Gastrostomy tube       16 Fr Jejunostomy tube   Specimens:   ID Type Source Tests Collected by Time Destination   A : DIstal Esophagus and stomach  Tissue Esophagus, Distal SURGICAL PATHOLOGY EXAM Temitope Bullock MD 11/21/2018  2:00 PM    B : Hernia Sac Tissue Other SURGICAL PATHOLOGY EXAM Temitope Bullock MD 11/21/2018  2:21 PM    C : Mesh Other (specify in comments) Other SURGICAL PATHOLOGY EXAM Temitope Bullock MD 11/21/2018  2:36 PM    D : Distal Esophagus  Tissue Esophagus, Distal SURGICAL PATHOLOGY EXAM Temitope Bullock MD 11/21/2018  4:36  PM      Findings:   Distal esophageal perforation at 40 cm from incisors.   Complications: Sustained SVT with hemodynamic instability at the end of the operation requiring DDCV x 4 with conversion to NSR.   Implants: None.

## 2018-11-21 NOTE — PHARMACY-VANCOMYCIN DOSING SERVICE
Pharmacy Vancomycin Initial Note  Date of Service 2018  Patient's  1945  73 year old, female    Indication: Postoperative Infection    Current estimated CrCl = Estimated Creatinine Clearance: 45 mL/min (based on Cr of 1.25).    Creatinine for last 3 days  2018:  1:41 AM Creatinine 1.25 mg/dL    Recent Vancomycin Level(s) for last 3 days  2018:  2:16 AM Vancomycin Level 20.3 mg/L      Vancomycin IV Administrations (past 72 hours)                   vancomycin (VANCOCIN) 1000 mg in dextrose 5% 200 mL PREMIX (mg) 1,000 mg New Bag 18 0635                Nephrotoxins and other renal medications (Future)    Start     Dose/Rate Route Frequency Ordered Stop    18 1100  piperacillin-tazobactam (ZOSYN) 3.375 g vial to attach to  mL bag      3.375 g  over 30 Minutes Intravenous EVERY 6 HOURS 18 0757      18 0502  vancomycin place jon - receiving intermittent dosing      1 each Intravenous SEE ADMIN INSTRUCTIONS 18 0505            Contrast Orders - past 72 hours     None                Plan:  1.  Please note patient was started on vancomycin at outside hospital. She was initiated on vancomycin 1000 mg IV Q12H on . Prior to transfer to [facility], she received two doses ( @ 2200 and  @ 1108). Upon admission here, she also received vancomycin 1000 mg IV today @ 0635 and an order for intermittent vancomycin dosing was placed. Given patients age and renal function, would usually recommend Q24H dosing, but as patient has already received a couple doses, will leave current order for intermittent dosing, and will check a vancomycin level tomorrow with morning labs, which will be ~ 24 hour level to confirm Q24H dosing is appropriate. Based on level, will then consider scheduled dosing.   2.  Goal Trough Level: 10-15 mg/L   3. Serum creatinine levels will be ordered daily for the first week of therapy and at least twice weekly for subsequent weeks.    4.  [place] method utilized to dose vancomycin therapy: Method 2    Chayo Manzo, PharmD, BCPS  Pager [phone]

## 2018-11-21 NOTE — H&P
Thoracic Surgery Admission History and Physical     Myrtle Vaz MRN# [id number]   YOB: 1945 Age: 73 year old      Date of Admission:  11/21/2018    CC: Shortness of breath    Assessment: 73 year old female transferred from [facility] where she presented with respiratory failure thought to be secondary to large hiatal hernia, now with continued respiratory failure s/p laparoscopic hiatal hernia repair with mesh reinforcement, laparoscopic nissen fundoplication, and laparoscopic gastrostomy tube placement, which was complicated by pleural effusions and possible esophageal leak, now s/p placement of chest tubes x3, course of vancomycin and zosyn, and successful extubation. Transferred for definitive management per Dr. Albarado.     On evaluation she is delirious with hallucinations and some aggression. She refuses beside ultrasound and cannot be transported for abdominal x ray. She was given haldol for agitation. Additionally she is in and out of Afib, and has received a total of 10 mg of metoprolol.       Plan:    - Haldol for agitation. Gave 1 mg, now giving another 2 mg, if giving additional dose, look for reversible causes of delirium.   - Continue VPM  - Afib with RVR (short runs), pressures in 130s systolic. Gave 5 mg metoprolol, converted to sinus, now back in A fib, re-dosing with metoprolol.   - If systolics 90-100s, would start amio after 3rd dose metoprolol   - If becomes unstable, cardioversion and ICU transfer  - Continue high flow nasal cannula   - Follow up ABG, hypoxia may be contributing to delirium   - If O2 needs increase, plan to transfer to ICU with low threshold for intubation   - If requires intubation, would need to be RSI as she is high aspiration risk  - Strict NPO  - Keep G tube to gravity  - NS at 100   - Replacing lytes  - Duplex of bilateral lower extremities and abdominal xray when more cooperative  - Currently unable to consent for procedures, daughter states that the  patient has always been her own decision-maker; daughter plans to be at the hospital around 0800 in anticipation of surgery  - Code status: per discussion with daughter, the patient is full code    HPI: Ms Vaz is a 72 yo woman with a longstanding history of cough productive of clear / white sputum who was seen on 11/8 at [facility] for dyspnea.   She was found to have a giant paraesophageal hiatal hernia, and subsequently underwent workup for the same with EGD and esophagram.   She was then taken to the OR on 11/15 for repair of this hernia. Intraoperatively she was found to have a Type 4 hiatal hernia in the posterior mediastinum containing the entirety of the transverse colon, the majority of the omentum and the transverse mesocolon, and the stomach.   Post-operatively she was extubated, however, she developed further respiratory distress, hypoxemia, and leukocytosis, as well as large fluid collection in the area of the hiatal hernia. Imaging revealed a dilated esophagus, extraluminal contrast within the chest, and re-herniation of the fundus of the stomach into the mediastinum.   She was transferred to the [facility] for revision of the repair.     Today she complains of subcostal abdominal pain, however denies chest pain. She is unable to provide detailed history, and is oriented to self only.       Past Medical History:  No past medical history on file.    Past Surgical History:  No past surgical history on file.    Allergies:   Allergies not on file    Medications:    No current facility-administered medications on file prior to encounter.   No current outpatient prescriptions on file prior to encounter.    Medications prior to Admission:  No prescriptions prior to admission.       Social History:  Social History     Social History     Marital status:      Spouse name: N/A     Number of children: N/A     Years of education: N/A     Occupational History     Not on file.     Social  History Main Topics     Smoking status: Not on file     Smokeless tobacco: Not on file     Alcohol use Not on file     Drug use: Not on file     Sexual activity: Not on file     Other Topics Concern     Not on file     Social History Narrative       Family History:  No family history on file.    ROS:  The remainder of the complete ROS was negative unless noted in the HPI.    Exam:  Vital signs:  Temp: 98.2  F (36.8  C) Temp src: Oral BP: 133/63   Heart Rate: 100   SpO2: 93 % O2 Device: High Flow Nasal Cannula (HFNC) Oxygen Delivery: Other (Comments) (30 LPM)        General: Oriented to self only, hallucinations about people on the ceiling  Resp: tachypneic on NFNC at 80% FiO  Cardiac: Paroxysmal Afib  Abdomen: Soft, nontender, nondistended. G tube site c/d/i; chest tube sites (2 right, 1 left) c/d/i with no air leak  Extremities: No LE edema or obvious joint abnormalities  Skin: Warm and dry, some bruising    Labs:  WBC 28.4, Hgb 10, hematocrit 31.4, Cr 1.25, K 3.4, Mg 2.9, INR 1.51    Imaging:  CXR 11/21: Read pending: enlarged cardiac silhouette, right sided opacities  AXR 11/21: not completed  Duplex US Bilateral lower extremities 11/21: not completed    EKG:  Sinus tachycardia     Ramandeep Dang MD       11/21/2018   1:38 AM  General Surgery PGY-1  Pager: [phone]    Discussed with Dr. Adamson on 11/21/2018

## 2018-11-21 NOTE — IP AVS SNAPSHOT
[address] [address]    Phone:  [phone]                                       After Visit Summary   11/21/2018    Myrtle Vaz    MRN: [id number]           After Visit Summary Signature Page     I have received my discharge instructions, and my questions have been answered. I have discussed any challenges I see with this plan with the nurse or doctor.    ..........................................................................................................................................  Patient/Patient Representative Signature      ..........................................................................................................................................  Patient Representative Print Name and Relationship to Patient    ..................................................               ................................................  Date                                   Time    ..........................................................................................................................................  Reviewed by Signature/Title    ...................................................              ..............................................  Date                                               Time          22EPIC Rev 08/18

## 2018-11-21 NOTE — H&P
SURGICAL ICU PROGRESS NOTE  November 21, 2018        Date of Service (when I saw the patient): 11/21/2018    ASSESSMENT:  Myrtle Vaz is a 73 year old female who was admitted on 11/21/2018 to [facility]. She was transferred from [facility] where she presented with respiratory failure thought to be secondary to large hiatal hernia and underwent s/p laparoscopic hiatal hernia repair with mesh reinforcement, laparoscopic nissen fundoplication, and laparoscopic gastrostomy tube placement, which was complicated by pleural effusions and possible esophageal leak,  s/p placement of chest tubes x3. Taken to OR with thoracic and found to have perforation at distal esophagus at  juncton and underwent s/p Midline laparotomy, Trans-hiatal esophagogasterectomy, gastrostomy, J-tube placement, G-tube placement, bilateral pleural chest tube placement, mediastinal abcess drainage, spit fistula creation, Esophagastrodueodenoscopy, intraoperative course complicated by SVT required intraoperative cardioversion x3.      PLAN:    Neurological:  # acute post operative pain   - Monitor neurological status. Notify the MD for any acute changes in exam.  - Pain: Fentanyl infusion   - Sedation: propofolol     Pulmonary:   # acute post operative vent support   - VENT: keep intubated. PST in am when meets criteria   - Recheck ABG   - Ventilatory bundle. HOB elevation   - Supplemental oxygen to keep saturation above 92 %.    Cardiovascular:    # Atrial fibrillation, unstable, intraoperative cardioversion x3   - hx of a-fib previously on metoprolol, now on amiodarone infusion   - hold Metoprolol     # s/p septic shock  - Vasopressin and Norepi gtt     Gastroenterology/Nutrition:  # S/p hiatal hernia complicated by distal esophageal leak now s/p Midline laparotomy, trans-hiatal esophagogasterectomy, gastrostomy, J-tube placement, G-tube placement, bilateral pleural chest tube placement, mediastinal abcess drainage, spit fistula creation,  esophagastrodueodenoscopy   # s/p G tube   # s/P J tube  - NPO--strict   - G and J tubes to be placed to gravity per DR Albarado   - hold feeds per thoracic     Fluids/Electrolytes:   # hypokalemia  - cont for IV fluid hydration  - hold feeds  - electrolyte replacement protocol.     Renal:  # no issues   - Urine output is . Will continue to monitor intake and output continue with lopez for now     Endocrine:  # no issus   - No management indication.  Sliding scale for glucose management if needed.     ID/Antibiotics:  # mediastinal abscess   Continue with Vanco/Zosy and Micafungin  -  Heme:     # Acute blood loss anemia   - Hemoglobin 9.8  Transfuse if hgb <7.0 or signs/symptoms of hypoperfusion. Monitor and trend.     Musculoskeletal:  # weakness and deconditioning of deconditioning   - Physical and occupational therapy consult     General Cares/Prophylaxis:    DVT Prophylaxis: Defer to primary service, hold for now   GI Prophylaxis: Protonix   Restraints: Restraints for medical healing needed: NO    Lines/ tubes/ drains:  -  PIV's  - central line   - ANGELO drain   - chest tube x3   - lopez   - arterial line     Disposition:  - Surgical ICU.     Patient seen, findings and plan discussed with surgical ICU staff, Dr. Nagel .    Billing:  I spent 60 minutes bedside and on the inpatient unit today managing the critical care of Myrtle Vaz in relation to the issues listed in this note.      Siva Kasper  - - - - - - - - - - - - - - - - - - - - - - - - - - - - - - - - - - - - - - - - - - - - - -   HISTORY PRESENTING ILLNESS:  chelita Vaz is a 73 year old female who was admitted on 11/21/2018 to [facility]. She was transferred from [facility] where she presented with respiratory failure thought to be secondary to large hiatal hernia and underwent s/p laparoscopic hiatal hernia repair with mesh reinforcement, laparoscopic nissen fundoplication, and laparoscopic gastrostomy tube placement, which was complicated by pleural  effusions and possible esophageal leak,  s/p placement of chest tubes x3 who is now s/p Midline laparotomy, Trans-hiatal esophagogasterectomy, gastrostomy, J-tube placement, G-tube placement, bilateral pleural chest tube placement, mediastinal abcess drainage, spit fistula creation, Esophagastrodueodenoscopy today with thoracic.     She was reported to have uncontrolled atrial fibrillation with metoprolol. She was noted to be in SVT intraoperatively and required cardioverson x3 with restoratation of HR and Blood pressures. She is admitted to the ICU intubated and sedated. ROS not able to be performed.     REVIEW OF SYSTEMS: 10 point ROS neg other than the symptoms noted above in the HPI.    PAST MEDICAL HISTORY:   History reviewed. No pertinent past medical history.    SURGICAL HISTORY:   History reviewed. No pertinent surgical history. in Epic   - recent laparoscopic Hiatal hernia repair with mesh reinforcement     SOCIAL HISTORY no family available but per [facility].   Social History     Social History     Marital status:      Spouse name: N/A     Number of children: N/A     Years of education: N/A     Social History Main Topics     Smoking status: None     Smokeless tobacco: None     Alcohol use None     Drug use: None     Sexual activity: Not Asked       FAMILY HISTORY: No bleeding/clotting disorders nor problems with anesthesia.     ALLERGIES:   No Known Allergies    MEDICATIONS:  No PTA medications in Epic. Will attempt to locate record via Care everywhere     PHYSICAL EXAMINATION:  Temp:  [98  F (36.7  C)-99  F (37.2  C)] 98.1  F (36.7  C)  Pulse:  [80-90] 80  Heart Rate:  [] 67  Resp:  [14-20] (P) 14  BP: ()/(52-89) 97/52  MAP:  [69 mmHg-95 mmHg] 69 mmHg  Arterial Line BP: ()/(54-67) 94/54  FiO2 (%):  [60 %-100 %] (P) 60 %  SpO2:  [92 %-100 %] 100 %     General: sedated. No response to voice   HEENT: OP clear--ETT present and secured.   NECK: Left upper chest spit fistula--red blood  tinged secretions in bag   Neuro: sedated and intubated   Pulm/Resp: Clear breath sounds bilaterally without rhonchi, crackles or wheezes, breath sounds decreased at bases  Chest: Bilateral chest tubes present. Left chest tube with intermittent air leak--thin serosanguinous fluid drainage, right chest tube with thin serosanguinous fluid.   ANGELO drain with bloody serosanguinous drainage.   CV: RRR, S1, S2.   Monitor:  NSR HR 60's.   Abdomen: Soft, non-distended, non-tender,  no rebound tenderness or guarding, no masses. G tube and J tube currently clamped, some thin serosanguinous drainage around G tube saturating Abd gauze.   : (+) lopez catheter in place, urine yellow and clear  Incisions/Skin: incision dressed. Ecchymotic lesion on thigh bilaterally-- Left > right. Prior right chest tube sites dressed--thin serous oozing on dressing.   Extremities: no peripheral edema, moving all extremities, peripheral pulses intact, calves soft, extremities well perfused. Toes warm and well perfused.     LABS: Reviewed.   Arterial Blood Gases     Recent Labs  Lab 11/21/18  1615 11/21/18  1323 11/21/18  0600   PH 7.32* 7.37 7.45   PCO2 46* 43 40   PO2 117* 76* 64*   HCO3 23 25 27     Complete Blood Count     Recent Labs  Lab 11/21/18  1615 11/21/18  1323 11/21/18  0141   WBC  --   --  28.4*   HGB 9.8* 9.3* 10.0*   PLT  --   --  375     Basic Metabolic Panel    Recent Labs  Lab 11/21/18  1615 11/21/18  1323 11/21/18  0141    147* 145*   POTASSIUM 3.9 3.2* 3.4   CHLORIDE  --   --  109   CO2  --   --  28   BUN  --   --  38*   CR  --   --  1.25*   * 126* 93     Liver Function Tests    Recent Labs  Lab 11/21/18  0141   AST 21   ALT 35   ALKPHOS 100   BILITOTAL 1.0   ALBUMIN 2.2*   INR 1.51*     Pancreatic Enzymes  No lab results found in last 7 days.  Coagulation Profile    Recent Labs  Lab 11/21/18  0141   INR 1.51*       IMAGING:  Recent Results (from the past 24 hour(s))   XR Chest Port 1 View    Narrative    EXAM: XR  CHEST PORT 1 VW  11/21/2018 3:02 AM      HISTORY: shortness of breath;     COMPARISON: None    FINDINGS: Single AP view of the chest. 2 right basilar, 1 left basilar  chest tube. Partially visualized hyperdensities in the abdomen, likely  contrast in the bowel. No pneumothorax.    Moderate left and trace right pleural effusions. Lower right  paravertebral opacity is favored to represent a loculated pleural  effusion. Basilar predominant opacities (left much greater than  right).      Impression    IMPRESSION: Moderate left and small right pleural effusions, with  loculated effusion on the right. Bibasilar opacities (left much  greater than right), likely compression atelectasis with superimposed  pulmonary edema. No pneumothorax. Adequately positioned chest tubes.    I have personally reviewed the examination and initial interpretation  and I agree with the findings.    SHAD ROSARIO MD

## 2018-11-21 NOTE — PROGRESS NOTES
Transfer  Transferred to: OR  Via: stretcher  Reason for transfer: Exploratory laparotomy ordered.   Family: Went down with patient  Belongings: Packed and sent with pt  Chart: Delivered with pt to next unit  Medications: Meds sent to new unit with pt  Report given to: OR nurse  Pt status:   Temp: 98.9  F (37.2  C) Temp src: Oral BP: 135/64 Pulse: 80 Heart Rate: 71 Resp: 19 SpO2: 96 % O2 Device: High Flow Nasal Cannula (HFNC) Oxygen Delivery: 15 LPM

## 2018-11-21 NOTE — PLAN OF CARE
Problem: Patient Care Overview  Goal: Plan of Care/Patient Progress Review  PT / 6B - Cancel.  PT orders received and acknowledged.  Plans for return to OR today.  PT evaluation re-schedule.

## 2018-11-21 NOTE — IP AVS SNAPSHOT
` ` Patient Information     Patient Name Sex     Myrtle Vaz ([id number]) Female 1945       Room Bed    62 6238-01      Patient Demographics     Address Phone    [address]  [facility] [address] [phone] (Home)      Patient Ethnicity & Race     Ethnic Group Patient Race    American White      Emergency Contact(s)     Name Relation Home Work Mobile    GELY MON Daughter [phone]    PHIL VAZ Spouse [phone]        Documents on File        Status Date Received Description       Documents for the Patient    Consent for EHR Access Received 18     Insurance Card       Patient ID       [facility] Specified Other       Privacy Notice - Luther Received 18     Consent for Services - Hospital and Clinic Received 18     HIE Auth Received 18        Documents for the Encounter    CMS IM for Patient Signature Received 18     Photograph   Intraop EGD    Monitoring Device Output  18 INITIAL MONITORING STRIPS    Transfer Form  18 RETURN TRANSFER AGREEMENT    Monitoring Device Output  18 MONITORING STRIPS      Admission Information     Attending Provider Admitting Provider Admission Type Admission Date/Time    Ally Ybarra MD Brunsvold, Melissa Elizabeth, MD Urgent 18  0052    Discharge Date Hospital Service Auth/Cert Status Service Area     Thoracic Surgery Incomplete [facility]    Unit Room/Bed Admission Status       UU [id number] 6238/6238-01 Admission (Confirmed)       Admission     Complaint    paraesophageal hernia, Respiratory failure (H), Possible Esophageal Perforation , Esophageal perforation      Hospital Account     Name Acct ID Class Status Primary Coverage    Myrtle Vaz [id number] Inpatient Open MEDICARE - MEDICARE FOR HB SUPPLEMENT            Guarantor Account (for Hospital Account #[id number])     Name Relation to Pt Service Area Active? Acct Type    Myrtle Vaz Self FCS Yes Personal/Family    Address Phone           [address] [place] [address] [phone](H)              Coverage Information (for Hospital Account #[id number])     1. MEDICARE/MEDICARE FOR HB SUPPLEMENT     F/O Payor/Plan Precert #    MEDICARE/MEDICARE FOR HB SUPPLEMENT     Subscriber Subscriber #    Myrtle Vaz [id number]    Address Phone    ATT[employer] CLAIMS  [address]  [place] [address] [phone]          2. BCBS/BCBS PLATINUM BLUE     F/O Payor/Plan Precert #    BCBS/BCBS PLATINUM BLUE     Subscriber Subscriber #    MilindMyrtle segura [id number]    Address Phone    [address] [address]  [place], [place] [id number] [phone]

## 2018-11-21 NOTE — PROGRESS NOTES
Admission          11/21/2018 12:52 AM  -----------------------------------------------------------  Reason for admission: Evaluate need for surgery  Primary team notified of pt arrival.  Admitted from: [facility]  Via: EMS on 15L oxiplus mask  Accompanied by: Self, spoke with daughter Haydee via phone   Belongings: Placed in closet.   Admission Profile: Unable to complete at this time as patient is only oriented to self.   Teaching: orientation to unit and call light- call light within reach, call don't fall, use of console, meal times, when to call for the RN, and enforced importance of safety   Access: PIV x 2, R and L  Telemetry:Placed on pt  Ht./Wt.: In process  2 RN Skin Assessment Completed By: Claire OCHOA RN and Vanda REAVES RN  Pt status:    Temp:  [98.2  F (36.8  C)] 98.2  F (36.8  C)  Heart Rate:  [100-104] 104  BP: (114-133)/(63-89) 127/81  FiO2 (%):  [80 %] 80 %  SpO2:  [92 %-93 %] 93 %

## 2018-11-21 NOTE — IP AVS SNAPSHOT
MRN:[id number]                      After Visit Summary   11/21/2018    Myrtle Vaz    MRN: [id number]           Thank you!     Thank you for choosing [facility] for your care. Our goal is always to provide you with excellent care. Hearing back from our patients is one way we can continue to improve our services. Please take a few minutes to complete the written survey that you may receive in the mail after you visit with us. Thank you!        Patient Information     Date Of Birth          1945        Designated Caregiver       Most Recent Value    Caregiver    Will someone help with your care after discharge? no      About your hospital stay     You were admitted on:  November 21, 2018 You last received care in the:  [address] [facility]    You were discharged on:  December 7, 2018        Reason for your hospital stay       Esophageal perforation, s/p exploratory laparotomy with pharyngostomy                  Who to Call     For medical emergencies, please call 911.  For non-urgent questions about your medical care, please call your primary care provider or clinic, [phone]  For questions related to your surgery, please call your surgery clinic        Attending Provider     Provider Specialty    Temitope Bullock MD Thoracic Surgery (Cardiothoracic Vascular Surgery)    Denise Nagel MD Surgery    Ally Ybarra MD Thoracic Diseases       Primary Care Provider Office Phone # Fax #    Joey Caballero [phone]      After Care Instructions     Activity - Up ad jose de jesus           Additional Discharge Instructions       THORACIC SURGERY DISCHARGE INSTRUCTIONS    DIET: Clears for comfort, tube feeds per tube feeding instructions    If your plans upon discharge include prolonged periods of sitting (i.e a lengthy car or plane ride), it is highly beneficial to get up and walk at least once per hour to help prevent swelling and blood clots.     You may remove  "chest tube dressing 48 hours after tube removal and bandage the site at your own discretion thereafter.  Small amounts of leakage are normal for 2-3 days after removal.  Feel free to call with questions.    You may get incision wet 2 days after operation. Do not submerge, soak, or scrub incision or swim until seen in follow-up.    Take incentive spirometer home for continued frequent use    Activity as tolerated    Stay hydrated. Take over the counter fiber (metamucil or benefiber) and stool softeners (Miralax, docusate or senna) if becoming constipated.     Call for fever greater than 101.5, chills, increased size of incision, red skin around incision, vision changes, muscle strength changes, sensation changes, shortness of breath, or other concerns.    No driving while taking narcotic pain medication.    Transition to ibuprofen or tylenol/acetaminophen for pain control. Do not take tylenol/acetaminophen and acetaminophen containing narcotic (e.g., percocet or vicodin) at the same time. If you have known ulcer problems, or kidney trouble (elevated creatinine) do not take the ibuprofen.    In emergencies, call 911    For other Questions or Concerns;   A.) During weekday working hours (Monday through Friday 8am to 4:30pm)   call [phone] and ask to speak to a clinical nurse specialist.     B.) At nights (after 4:30pm), on weekends, or if urgent call [phone] and   tell the  \"I would like to page job code [phone], the thoracic surgery   fellow on call, please.\"    G-J TUBE INSTRUCTIONS:    G TUBE: For tube feeds  -You should vent or temporarily put your tube to gravity bag (or basin) drainage if you experience nausea or bloating.  This will help to prevent reflux and vomiting.   If you are uncertain how to do this, please ask your nurse for instruction.    JTUBE: For tube feedings    Skin care:  -Change the gauze dressing around your tube daily.  -Check for redness and swelling in the area where " the tube goes into your skin.   -Keep the skin around your tube dry and with a split gauze under the flange. If the hole where the tube enters your body is draining fluid, you may need to put barrier cream or antibiotic cream on the skin around your tube after you are done cleaning it. Cover the area with bandages, and call your caregiver.   -If there are no stitches holding your tube in place on your skin, gently turn the tube. This may decrease pressure on your skin, and help prevent an infection. Ask your caregiver if you should turn your tube, and how to do it correctly.     Flushes:  -Flush both the G and J tube ports with 30cc of water twice daily to ensure they do not become plugged  -After medications or tube feedings, make sure to flush with water immediately after use to prevent the tube from plugging    J TUBE INSTRUCTIONS:    Flush your feeding tube with 30cc of water;  1. After medication administration through the feeding tube  2. Before and after tube feeds  3. Every 8 hours while awake if you have not used the tube during that time      -If possible take medications by mouth or as solution via j tube (Do not put crushed pills through the tube if possible)     -Change the gauze around your tube daily or more often if soiled    -KEEP A FLEXI-TRACK (or other tube retention device) on your tube at all times to prevent incidental removal.            Advance Diet as Tolerated       Follow this diet upon discharge: Orders Placed This Encounter      Adult Formula Drip Feeding: Continuous Isosource 1.5; Gastrostomy; Goal Rate: 50; mL/hr; Medication - Tube Feeding Flush Frequency: At least 15-30 mL water before and after medication administration and with tube clogging; Amount to Send (Nutritio...      Clear Liquid Diet            Daily weights       Call Provider for weight gain of more than 2 pounds per day or 5 pounds per week.            Fall precautions           General info for SNF       Length of Stay  "Estimate: Short Term Care: Estimated # of Days <30  Condition at Discharge: Stable  Level of care:skilled   Rehabilitation Potential: Good  Admission H&P remains valid and up-to-date: Yes  Recent Chemotherapy: N/A  Use Nursing Home Standing Orders: Yes            Glucose monitor nursing POCT       Before meals and at bedtime            IV access       Peripheral IV.            Intake and output       Every shift            Mantoux instructions       Give two-step Mantoux (PPD) Per Facility Policy Yes            Oxygen - Nasal cannula       4-6 Lpm by nasal cannula to keep O2 sats 92% or greater.            Wound care       Site:   Midline abdominal wound  Instructions:  Staples should be removed at thoracic surgery discretion, steri strips in place to be left until they fall off spontaneously.            Wound care (specify)       Site:   Left chest spit fistula  Instructions:  SPIT FISTULA: Wound care      Order Comments: Plan: Esophagostomy site pouch change twice a week T/F, may empty pouch as often as needed and reattach.    Please check pouch at least once every shift (every 8 hours) and assess for pouch change.  Needed supplies  Supplies Needed  Grazyna 8931 (1pc 44 mm)  Wafer- (# [id number])  High output pouch- (#[id number])  2\" Barrier ring- (#[id number])     1. Gather all supplies and remove old pouch gently.  2. Cleanse peristomal skin with warm water and soft wash cloth or gauze and dry thoroughly.  3. Cut the wafer to fit to stoma or use given pattern and apply centering the stoma.  4. Attach the pouch  5. Massage around it for better adherence                  Follow-up Appointments     Follow Up ([facility])       Follow up with Dr. Ybarra , at [facility], within 1 month  to evaluate after surgery. The following labs/tests are recommended: Chest X-ray, 2 views. Please follow up every 6 months thereafter with CT chest for the next 24 months and then yearly for repeat CT chest.    Appointments on " [place] and/or [facility] (with [facility] or [facility] provider or service). Call [phone] if you haven't heard regarding these appointments within 7 days of discharge.                  Your next 10 appointments already scheduled     Jesse 10, 2019  9:30 AM CST   (Arrive by 9:15 AM)   Return Visit with Ally SAWYER MD   [facility] ([facility])    [address] [place] [address]  [address]  [facility] [address] [phone]              Additional Services     Nutrition Services Adult IP Consult       Reason:  Patient post esophagectomy with spit fistula, on tube feeds            Occupational Therapy Adult Consult       Evaluate and treat as clinically indicated.    Reason:  Weakness/deconditioning            Physical Therapy Adult Consult       Evaluate and treat as clinically indicated.    Reason:  Weakness/deconditioning                  Further instructions from your care team       Isosource 1.5 @ goal 50 ml/hr (1200 ml/day) to provide 1800 kcals (28 kcal/kg/day), 82 g PRO (1.3 g/kg/day), 912 ml free H2O, 211 g CHO and 18 g Fiber daily.  + 2 packets Prosource: 1880 (29), 104 g pro (1.6)  OR   + 4 packets Beneprotein: 1900 (29), 106 g pro (1.6)    If, TCU would like to cycle TF, recommend: 75 ml/hr x 16 hours to provide the same provision as above.     For 100% fluids, recommend: 912 ml free water from TF, 150 ml Q4 ( 900 ml) for a total of 1812 ml free water daily        Pending Results     No orders found from 11/19/2018 to 11/22/2018.            Statement of Approval     Ordered          12/07/18 1215  I have reviewed and agree with all the recommendations and orders detailed in this document.  EFFECTIVE NOW     Approved and electronically signed by:  Gita Hoffman MD             Admission Information     Date & Time Provider Department Dept. Phone    11/21/2018 Ally Ybarra MD [address] [facility] [place] [phone]      Your Vitals Were     Blood Pressure Pulse  "Temperature Respirations Height Weight    124/64 (BP Location: Right arm) 64 98.8  F (37.1  C) (Oral) 18 1.651 m (5' 5\") 93.8 kg (206 lb 11.2 oz)    Pulse Oximetry BMI (Body Mass Index)                95% 34.4 kg/m2          C[facility]aile Information     [facility] lets you send messages to your doctor, view your test results, renew your prescriptions, schedule appointments and more. To sign up, go to [email or web address] . Click on \"Log in\" on the left side of the screen, which will take you to the Welcome page. Then click on \"Sign up Now\" on the right side of the page.     You will be asked to enter the access code listed below, as well as some personal information. Please follow the directions to create your username and password.     Your access code is: [email or web address]  Expires: 3/7/2019  1:33 PM     Your access code will  in 90 days. If you need help or a new code, please call your [facility] clinic or [phone].        Care EveryWhere ID     This is your Care EveryWhere ID. This could be used by other organizations to access your [facility] medical records  [id number]        Equal Access to Services     MUKESH DUKES AH: Neris montgomery Soabiola, waaxda luqadaha, qaybta kaalmada adeegyada, rustam atkinson. So [facility] [phone].    ATENCIÓN: Si habla español, tiene a watkins disposición servicios gratuitos de asistencia lingüística. Llame al [phone].    We comply with applicable federal civil rights laws and [place] laws. We do not discriminate on the basis of race, color, national origin, age, disability, sex, sexual orientation, or gender identity.               Review of your medicines      START taking        Dose / Directions    acetaminophen 32 mg/mL liquid   Commonly known as:  TYLENOL        Dose:  975 mg   30.45 mLs (975 mg) by Per J Tube route every 8 hours   Quantity:  300 mL   Refills:  0       albuterol 108 (90 Base) MCG/ACT inhaler   Commonly known as:  PROAIR " HFA/PROVENTIL HFA/VENTOLIN HFA        Dose:  6 puff   Inhale 6 puffs into the lungs every 4 hours as needed for shortness of breath / dyspnea   Refills:  0       amiodarone 20 mg/mL Susp   Commonly known as:  CORDARONE   Used for:  Paroxysmal atrial fibrillation (H)        Dose:  200 mg   10 mLs (200 mg) by Per J Tube route 2 times daily   Refills:  0       bacitracin 500 UNIT/GM Oint        Apply topically 2 times daily   Refills:  0       bisacodyl 10 MG suppository   Commonly known as:  DULCOLAX        Dose:  10 mg   Place 1 suppository (10 mg) rectally daily as needed for constipation   Quantity:  30 suppository   Refills:  0       gabapentin 250 MG/5ML solution   Commonly known as:  NEURONTIN        Dose:  300 mg   Take 6 mLs (300 mg) by mouth 3 times daily   Quantity:  450 mL   Refills:  0       levothyroxine 25 mcg/mL Susp   Commonly known as:  SYNTHROID   Used for:  Hypothyroidism, unspecified type        Dose:  150 mcg   6 mLs (150 mcg) by Per J Tube route every morning (before breakfast)   Refills:  0       Lidocaine 4 % Patch   Commonly known as:  LIDOCARE        Dose:  1-3 patch   Place 1-3 patches onto the skin every 24 hours   Refills:  0       methocarbamol 500 MG tablet   Commonly known as:  ROBAXIN        Dose:  500 mg   Take 1 tablet (500 mg) by mouth 3 times daily   Quantity:  120 tablet   Refills:  0       multivitamins w/minerals liquid        Dose:  15 mL   15 mLs by Per Feeding Tube route daily   Refills:  0       naloxone 0.4 MG/ML injection   Commonly known as:  NARCAN        Dose:  0.1-0.4 mg   Inject 0.25-1 mLs (0.1-0.4 mg) into the vein as needed for opioid reversal   Quantity:  0.5 mL   Refills:  0       ondansetron 4 MG ODT tab   Commonly known as:  ZOFRAN-ODT        Dose:  4 mg   Take 1 tablet (4 mg) by mouth every 6 hours as needed for nausea or vomiting   Quantity:  120 tablet   Refills:  0       oxyCODONE 5 MG/5ML solution   Commonly known as:  ROXICODONE        Dose:  5-10 mg    Take 5-10 mLs (5-10 mg) by mouth every 4 hours as needed for moderate to severe pain   Quantity:  30 mL   Refills:  0       polyethylene glycol packet   Commonly known as:  MIRALAX/GLYCOLAX        Dose:  17 g   17 g by Per J Tube route 2 times daily as needed for constipation   Quantity:  7 packet   Refills:  0       protein modular        Dose:  2 packet   2 packets by Per Feeding Tube route daily   Refills:  0       QUEtiapine 25 MG tablet   Commonly known as:  SEROquel        Dose:  25 mg   Take 1 tablet (25 mg) by mouth nightly as needed (Agitation)   Quantity:  60 tablet   Refills:  0       sennosides 8.8 MG/5ML syrup   Commonly known as:  SENOKOT        Dose:  10 mL   10 mLs by Per J Tube route nightly as needed for constipation   Quantity:  105 mL   Refills:  0            Where to get your medicines      Some of these will need a paper prescription and others can be bought over the counter. Ask your nurse if you have questions.     Bring a paper prescription for each of these medications     oxyCODONE 5 MG/5ML solution       You don't need a prescription for these medications     acetaminophen 32 mg/mL liquid    albuterol 108 (90 Base) MCG/ACT inhaler    amiodarone 20 mg/mL Susp    bacitracin 500 UNIT/GM Oint    bisacodyl 10 MG suppository    gabapentin 250 MG/5ML solution    levothyroxine 25 mcg/mL Susp    Lidocaine 4 % Patch    methocarbamol 500 MG tablet    multivitamins w/minerals liquid    naloxone 0.4 MG/ML injection    ondansetron 4 MG ODT tab    polyethylene glycol packet    protein modular    QUEtiapine 25 MG tablet    sennosides 8.8 MG/5ML syrup                Protect others around you: Learn how to safely use, store and throw away your medicines at www.disposemymeds.org.        Information about OPIOIDS     PRESCRIPTION OPIOIDS: WHAT YOU NEED TO KNOW   We gave you an opioid (narcotic) pain medicine. It is important to manage your pain, but opioids are not always the best choice. You should first  try all the other options your care team gave you. Take this medicine for as short a time (and as few doses) as possible.    Some activities can increase your pain, such as bandage changes or therapy sessions. It may help to take your pain medicine 30 to 60 minutes before these activities. Reduce your stress by getting enough sleep, working on hobbies you enjoy and practicing relaxation or meditation. Talk to your care team about ways to manage your pain beyond prescription opioids.    These medicines have risks:    DO NOT drive when on new or higher doses of pain medicine. These medicines can affect your alertness and reaction times, and you could be arrested for driving under the influence (DUI). If you need to use opioids long-term, talk to your care team about driving.    DO NOT operate heavy machinery    DO NOT do any other dangerous activities while taking these medicines.    DO NOT drink any alcohol while taking these medicines.     If the opioid prescribed includes acetaminophen, DO NOT take with any other medicines that contain acetaminophen. Read all labels carefully. Look for the word  acetaminophen  or  Tylenol.  Ask your pharmacist if you have questions or are unsure.    You can get addicted to pain medicines, especially if you have a history of addiction (chemical, alcohol or substance dependence). Talk to your care team about ways to reduce this risk.    All opioids tend to cause constipation. Drink plenty of water and eat foods that have a lot of fiber, such as fruits, vegetables, prune juice, apple juice and high-fiber cereal. Take a laxative (Miralax, milk of magnesia, Colace, Senna) if you don t move your bowels at least every other day. Other side effects include upset stomach, sleepiness, dizziness, throwing up, tolerance (needing more of the medicine to have the same effect), physical dependence and slowed breathing.    Store your pills in a secure place, locked if possible. We will not replace  any lost or stolen medicine. If you don t finish your medicine, please throw away (dispose) as directed by your pharmacist. The [place] Pollution Control Agency has more information about safe disposal: https://www.pca.state.[place]/living-green/managing-unwanted-medications             Medication List: This is a list of all your medications and when to take them. Check marks below indicate your daily home schedule. Keep this list as a reference.      Medications           Morning Afternoon Evening Bedtime As Needed    acetaminophen 32 mg/mL liquid   Commonly known as:  TYLENOL   30.45 mLs (975 mg) by Per J Tube route every 8 hours   Last time this was given:  975 mg on 12/7/2018  1:03 PM                                albuterol 108 (90 Base) MCG/ACT inhaler   Commonly known as:  PROAIR HFA/PROVENTIL HFA/VENTOLIN HFA   Inhale 6 puffs into the lungs every 4 hours as needed for shortness of breath / dyspnea                                amiodarone 20 mg/mL Susp   Commonly known as:  CORDARONE   10 mLs (200 mg) by Per J Tube route 2 times daily   Last time this was given:  200 mg on 12/7/2018  7:56 AM                                bacitracin 500 UNIT/GM Oint   Apply topically 2 times daily   Last time this was given:  12/7/2018  7:57 AM                                bisacodyl 10 MG suppository   Commonly known as:  DULCOLAX   Place 1 suppository (10 mg) rectally daily as needed for constipation   Last time this was given:  10 mg on 12/4/2018  2:48 PM                                gabapentin 250 MG/5ML solution   Commonly known as:  NEURONTIN   Take 6 mLs (300 mg) by mouth 3 times daily   Last time this was given:  300 mg on 12/7/2018  1:09 PM                                levothyroxine 25 mcg/mL Susp   Commonly known as:  SYNTHROID   6 mLs (150 mcg) by Per J Tube route every morning (before breakfast)   Last time this was given:  150 mcg on 12/7/2018  7:56 AM                                Lidocaine 4 % Patch    Commonly known as:  LIDOCARE   Place 1-3 patches onto the skin every 24 hours   Last time this was given:  1 patch on 12/7/2018  7:55 AM                                methocarbamol 500 MG tablet   Commonly known as:  ROBAXIN   Take 1 tablet (500 mg) by mouth 3 times daily   Last time this was given:  500 mg on 12/7/2018  1:09 PM                                multivitamins w/minerals liquid   15 mLs by Per Feeding Tube route daily   Last time this was given:  15 mLs on 12/7/2018  7:55 AM                                naloxone 0.4 MG/ML injection   Commonly known as:  NARCAN   Inject 0.25-1 mLs (0.1-0.4 mg) into the vein as needed for opioid reversal                                ondansetron 4 MG ODT tab   Commonly known as:  ZOFRAN-ODT   Take 1 tablet (4 mg) by mouth every 6 hours as needed for nausea or vomiting                                oxyCODONE 5 MG/5ML solution   Commonly known as:  ROXICODONE   Take 5-10 mLs (5-10 mg) by mouth every 4 hours as needed for moderate to severe pain   Last time this was given:  5 mg on 12/6/2018  1:18 PM                                polyethylene glycol packet   Commonly known as:  MIRALAX/GLYCOLAX   17 g by Per J Tube route 2 times daily as needed for constipation   Last time this was given:  17 g on 12/4/2018 11:07 PM                                protein modular   2 packets by Per Feeding Tube route daily   Last time this was given:  2 packets on 12/7/2018  8:03 AM                                QUEtiapine 25 MG tablet   Commonly known as:  SEROquel   Take 1 tablet (25 mg) by mouth nightly as needed (Agitation)   Last time this was given:  50 mg on 11/28/2018 10:31 PM                                sennosides 8.8 MG/5ML syrup   Commonly known as:  SENOKOT   10 mLs by Per J Tube route nightly as needed for constipation   Last time this was given:  10 mLs on 12/4/2018 11:07 PM

## 2018-11-21 NOTE — ANESTHESIA PROCEDURE NOTES
Arterial Line Procedure Note  Staff:     Anesthesiologist:  RAMAN LOMBARDO    Arterial line performed by resident/CRNA in presence of a teaching physician    Location: In OR After Induction  Procedure Start/Stop Times:     patient identified, IV checked, site marked, risks and benefits discussed, informed consent, monitors and equipment checked, pre-op evaluation and at physician/surgeon's request      Correct Patient: Yes      Correct Position: Yes      Correct Site: Yes      Correct Procedure: Yes      Correct Laterality:  Yes    Site Marked:  Yes  Line Placement:     Procedure:  Arterial Line    Insertion Site:  Radial    Insertion laterality:  Left    Skin Prep: Chloraprep      Patient Prep: mask, sterile gloves, hat and hand hygiene      Local skin infiltration:  None    Ultrasound Guided?: No      Catheter size:  20 gauge, 12 cm    Cath secured with: suture      Dressing:  Tegaderm    Complications:  None obvious    Arterial waveform: Yes      IBP within 10% of NIBP: Yes

## 2018-11-21 NOTE — PLAN OF CARE
Problem: Patient Care Overview  Goal: Plan of Care/Patient Progress Review  OT: cancel, pt in OR

## 2018-11-21 NOTE — PLAN OF CARE
Problem: Patient Care Overview  Goal: Plan of Care/Patient Progress Review  Outcome: Declining  Neuro: Oriented to self. Combative and agitated. Attendant in room. Has received 3mg of haldol total.   Cardiac: SR/ST with HR in 90s-110s. Short runs of A-fib with RVR, HR up into 180s. 10mg of IV metoprolol given total. Last run of A-fib noted at 0445. BPs 110-130s/50-70s. Afebrile.   Respiratory: HFNC at 80% FiO2, 30LPM sating > 92%. Desaturates to 70s very quickly with removal of oxygen. ABG gas obtained. Crackles in bases. Does endorse SOB at rest. Infrequent, fair, dry cough.   GI/: Mora catheter with marginal output of lynn colored urine. No BM this shift. +BS but patient unable to report if passing gas.   Diet/Appetite: NPO, no exceptions. Requests for water, swabs offered but patient refused.   Activity: Bed rest.   Pain: Denies. Did mention some abdominal discomfort at one point but has denied since pt's delirium has intensified.   Skin: Blanchable redness on sacrum. Maceration and redness around G-tube site. Bruising on L side outlined.   LDAs: R and L PIV. R PIV infusing NS at 100 ml/hour. G-tube to gravity drainage. R CT x 2 and L CT X 1, all to -20 suction. Mora.     Continue with POC. Notify primary team with changes.   Claire Benitez RN

## 2018-11-21 NOTE — PROGRESS NOTES
Attempted to draw ABG per MD order. Patient refusing and told me to get out of her room. RN bedside. RT will continue to follow.

## 2018-11-21 NOTE — IP AVS SNAPSHOT
Myrtle Vaz #[id number] (CSN:[id number])  (73 year old F)  (Adm: 18)     [id number]               [address] [facility] BANK: [phone]            Patient Demographics     Patient Name Sex          Age SSN Address Phone    Myrtle Vaz Female 1945 (73 year old) [id number] [facility] [id number] [phone] (Home)      Emergency Contact(s)     Name Relation Home Work Mobile    GELY MON Daughter [phone]    PHIL VAZ Spouse [phone]        Admission Information     Attending Provider Admitting Provider Admission Type Admission Date/Time    Ally Ybarra MD Brunsvold, Denise Chandler MD Urgent 18  0052    Discharge Date Hospital Service Auth/Cert Status Service Area     Thoracic Surgery [facility]    Unit Room/Bed Admission Status       UU [id number] Admission (Confirmed)       Admission     Complaint    paraesophageal hernia, Respiratory failure (H), Possible Esophageal Perforation , Esophageal perforation      Hospital Account     Name Acct ID Class Status Primary Coverage    Myrtle Vaz [id number] Inpatient Open MEDICARE - MEDICARE FOR HB SUPPLEMENT            Guarantor Account (for Hospital Account #[id number])     Name Relation to Pt Service Area Active? Acct Type    Myrtle Vaz Self FCS Yes Personal/Family    Address Phone          [address] [place], [place] [address] [phone](H)              Coverage Information (for Hospital Account #[id number])     1. MEDICARE/MEDICARE FOR HB SUPPLEMENT     F/O Payor/Plan Precert #    MEDICARE/MEDICARE FOR HB SUPPLEMENT     Subscriber Subscriber #    Myrtle Vaz [id number]    Address Phone    ATTN CLAIMS  [address]  [facility] [place] [address] [phone]          2. BCBS/BCBS PLATINUM BLUE     F/O Payor/Plan Precert #    BCBS/BCBS PLATINUM BLUE     Subscriber Subscriber #    Myrtle Vaz [id number]    Address Phone    [address] [address]  [place] [address]  "[phone]                                                      INTERAGENCY TRANSFER FORM - PHYSICIAN ORDERS   11/21/2018                       [address] [facility] BANK: [phone]            Attending Provider: Ally Ybarra MD     Allergies:  No Known Allergies    Infection:  None   Service:  THORACIC ARY    Ht:  1.651 m (5' 5\")   Wt:  93.8 kg (206 lb 11.2 oz)   Admission Wt:  89.5 kg (197 lb 5 oz)    BMI:  34.4 kg/m 2   BSA:  2.07 m 2            ED Clinical Impression     Diagnosis Description Comment Added By Time Added    Esophageal perforation [K22.3] Esophageal perforation [K22.3]  Gita Hoffman MD 12/6/2018  4:11 PM    Respiratory failure with hypoxia, unspecified chronicity (H) [J96.91] Respiratory failure with hypoxia, unspecified chronicity (H) [J96.91]  Gita Hoffman MD 12/6/2018  4:12 PM    Paroxysmal atrial fibrillation (H) [I48.0] Paroxysmal atrial fibrillation (H) [I48.0]  Gita Hoffman MD 12/6/2018  4:13 PM    Hypothyroidism, unspecified type [E03.9] Hypothyroidism, unspecified type [E03.9]  Gita Hoffman MD 12/6/2018  4:15 PM      Hospital Problems as of 12/7/2018              Priority Class Noted POA    Respiratory failure (H) Medium  11/21/2018 Yes    Esophageal perforation Medium  11/21/2018 Yes      Non-Hospital Problems as of 12/7/2018     None      Code Status History     Date Active Date Inactive Code Status Order ID Comments User Context    12/6/2018  4:46 PM  Full Code [id number]  Gita Hoffman MD Outpatient    11/21/2018  6:04 PM 12/6/2018  4:46 PM Full Code [id number]  Her-Siva Krause PA-C Inpatient    11/21/2018  1:32 AM 11/21/2018  6:04 PM Full Code [id number] Please discuss with family. Full code documented in [facility] paperwork, however, patient states she has additional directives. Ramandeep Dang MD Inpatient      Current Code Status     Date Active Code Status Order ID Comments User Context       Prior      Summary of Visit     Reason for your hospital stay    "    Esophageal perforation, s/p exploratory laparotomy with pharyngostomy                Medication Review      START taking        Dose / Directions Comments    acetaminophen 32 mg/mL liquid   Commonly known as:  TYLENOL        Dose:  975 mg   30.45 mLs (975 mg) by Per J Tube route every 8 hours   Quantity:  300 mL   Refills:  0        albuterol 108 (90 Base) MCG/ACT inhaler   Commonly known as:  PROAIR HFA/PROVENTIL HFA/VENTOLIN HFA        Dose:  6 puff   Inhale 6 puffs into the lungs every 4 hours as needed for shortness of breath / dyspnea   Refills:  0        amiodarone 20 mg/mL Susp   Commonly known as:  CORDARONE   Used for:  Paroxysmal atrial fibrillation (H)        Dose:  200 mg   10 mLs (200 mg) by Per J Tube route 2 times daily   Refills:  0        bacitracin 500 UNIT/GM Oint        Apply topically 2 times daily   Refills:  0        bisacodyl 10 MG suppository   Commonly known as:  DULCOLAX        Dose:  10 mg   Place 1 suppository (10 mg) rectally daily as needed for constipation   Quantity:  30 suppository   Refills:  0        gabapentin 250 MG/5ML solution   Commonly known as:  NEURONTIN        Dose:  300 mg   Take 6 mLs (300 mg) by mouth 3 times daily   Quantity:  450 mL   Refills:  0        levothyroxine 25 mcg/mL Susp   Commonly known as:  SYNTHROID   Used for:  Hypothyroidism, unspecified type        Dose:  150 mcg   6 mLs (150 mcg) by Per J Tube route every morning (before breakfast)   Refills:  0        Lidocaine 4 % Patch   Commonly known as:  LIDOCARE        Dose:  1-3 patch   Place 1-3 patches onto the skin every 24 hours   Refills:  0        methocarbamol 500 MG tablet   Commonly known as:  ROBAXIN        Dose:  500 mg   Take 1 tablet (500 mg) by mouth 3 times daily   Quantity:  120 tablet   Refills:  0        multivitamins w/minerals liquid        Dose:  15 mL   15 mLs by Per Feeding Tube route daily   Refills:  0        naloxone 0.4 MG/ML injection   Commonly known as:  NARCAN        Dose:   0.1-0.4 mg   Inject 0.25-1 mLs (0.1-0.4 mg) into the vein as needed for opioid reversal   Quantity:  0.5 mL   Refills:  0        ondansetron 4 MG ODT tab   Commonly known as:  ZOFRAN-ODT        Dose:  4 mg   Take 1 tablet (4 mg) by mouth every 6 hours as needed for nausea or vomiting   Quantity:  120 tablet   Refills:  0        oxyCODONE 5 MG/5ML solution   Commonly known as:  ROXICODONE        Dose:  5-10 mg   Take 5-10 mLs (5-10 mg) by mouth every 4 hours as needed for moderate to severe pain   Quantity:  30 mL   Refills:  0        polyethylene glycol packet   Commonly known as:  MIRALAX/GLYCOLAX        Dose:  17 g   17 g by Per J Tube route 2 times daily as needed for constipation   Quantity:  7 packet   Refills:  0        protein modular        Dose:  2 packet   2 packets by Per Feeding Tube route daily   Refills:  0        QUEtiapine 25 MG tablet   Commonly known as:  SEROquel        Dose:  25 mg   Take 1 tablet (25 mg) by mouth nightly as needed (Agitation)   Quantity:  60 tablet   Refills:  0        sennosides 8.8 MG/5ML syrup   Commonly known as:  SENOKOT        Dose:  10 mL   10 mLs by Per J Tube route nightly as needed for constipation   Quantity:  105 mL   Refills:  0                After Care     Activity - Up ad jose de jesus           Additional Discharge Instructions       THORACIC SURGERY DISCHARGE INSTRUCTIONS    DIET: Clears for comfort, tube feeds per tube feeding instructions    If your plans upon discharge include prolonged periods of sitting (i.e a lengthy car or plane ride), it is highly beneficial to get up and walk at least once per hour to help prevent swelling and blood clots.     You may remove chest tube dressing 48 hours after tube removal and bandage the site at your own discretion thereafter.  Small amounts of leakage are normal for 2-3 days after removal.  Feel free to call with questions.    You may get incision wet 2 days after operation. Do not submerge, soak, or scrub incision or swim  "until seen in follow-up.    Take incentive spirometer home for continued frequent use    Activity as tolerated    Stay hydrated. Take over the counter fiber (metamucil or benefiber) and stool softeners (Miralax, docusate or senna) if becoming constipated.     Call for fever greater than 101.5, chills, increased size of incision, red skin around incision, vision changes, muscle strength changes, sensation changes, shortness of breath, or other concerns.    No driving while taking narcotic pain medication.    Transition to ibuprofen or tylenol/acetaminophen for pain control. Do not take tylenol/acetaminophen and acetaminophen containing narcotic (e.g., percocet or vicodin) at the same time. If you have known ulcer problems, or kidney trouble (elevated creatinine) do not take the ibuprofen.    In emergencies, call 911    For other Questions or Concerns;   A.) During weekday working hours (Monday through Friday 8am to 4:30pm)   call [phone] and ask to speak to a clinical nurse specialist.     B.) At nights (after 4:30pm), on weekends, or if urgent call [phone] and   tell the  \"I would like to page job code [phone], the thoracic surgery   fellow on call, please.\"    G-J TUBE INSTRUCTIONS:    G TUBE: For tube feeds  -You should vent or temporarily put your tube to gravity bag (or basin) drainage if you experience nausea or bloating.  This will help to prevent reflux and vomiting.   If you are uncertain how to do this, please ask your nurse for instruction.    HotelogixTUBE: For tube feedings    Skin care:  -Change the gauze dressing around your tube daily.  -Check for redness and swelling in the area where the tube goes into your skin.   -Keep the skin around your tube dry and with a split gauze under the flange. If the hole where the tube enters your body is draining fluid, you may need to put barrier cream or antibiotic cream on the skin around your tube after you are done cleaning it. Cover the area with " bandages, and call your caregiver.   -If there are no stitches holding your tube in place on your skin, gently turn the tube. This may decrease pressure on your skin, and help prevent an infection. Ask your caregiver if you should turn your tube, and how to do it correctly.     Flushes:  -Flush both the G and J tube ports with 30cc of water twice daily to ensure they do not become plugged  -After medications or tube feedings, make sure to flush with water immediately after use to prevent the tube from plugging    J TUBE INSTRUCTIONS:    Flush your feeding tube with 30cc of water;  1. After medication administration through the feeding tube  2. Before and after tube feeds  3. Every 8 hours while awake if you have not used the tube during that time      -If possible take medications by mouth or as solution via j tube (Do not put crushed pills through the tube if possible)     -Change the gauze around your tube daily or more often if soiled    -KEEP A FLEXI-TRACK (or other tube retention device) on your tube at all times to prevent incidental removal.       Advance Diet as Tolerated       Follow this diet upon discharge: Orders Placed This Encounter      Adult Formula Drip Feeding: Continuous Isosource 1.5; Gastrostomy; Goal Rate: 50; mL/hr; Medication - Tube Feeding Flush Frequency: At least 15-30 mL water before and after medication administration and with tube clogging; Amount to Send (Nutritio...      Clear Liquid Diet   Sips for comfort       Daily weights       Call Provider for weight gain of more than 2 pounds per day or 5 pounds per week.       Fall precautions           General info for SNF       Length of Stay Estimate: Short Term Care: Estimated # of Days <30  Condition at Discharge: Stable  Level of care:skilled   Rehabilitation Potential: Good  Admission H&P remains valid and up-to-date: Yes  Recent Chemotherapy: N/A  Use Nursing Home Standing Orders: Yes       Glucose monitor nursing POCT       Before  "meals and at bedtime       IV access       Peripheral IV.       Intake and output       Every shift       Mantoux instructions       Give two-step Mantoux (PPD) Per Facility Policy Yes       Wound care       Site:   Midline abdominal wound  Instructions:  Staples should be removed at thoracic surgery discretion, steri strips in place to be left until they fall off spontaneously.       Wound care (specify)       Site:   Left chest spit fistula  Instructions:  SPIT FISTULA: Wound care      Order Comments: Plan: Esophagostomy site pouch change twice a week T/F, may empty pouch as often as needed and reattach.    Please check pouch at least once every shift (every 8 hours) and assess for pouch change.  Needed supplies  Supplies Needed  Como 8931 (1pc 44 mm)  Wafer- (# [id number])  High output pouch- (#[id number])  2\" Barrier ring- (#[id number])     1. Gather all supplies and remove old pouch gently.  2. Cleanse peristomal skin with warm water and soft wash cloth or gauze and dry thoroughly.  3. Cut the wafer to fit to stoma or use given pattern and apply centering the stoma.  4. Attach the pouch  5. Massage around it for better adherence               Further instructions from your care team       Isosource 1.5 @ goal 50 ml/hr (1200 ml/day) to provide 1800 kcals (28 kcal/kg/day), 82 g PRO (1.3 g/kg/day), 912 ml free H2O, 211 g CHO and 18 g Fiber daily.  + 2 packets Prosource: 1880 (29), 104 g pro (1.6)  OR   + 4 packets Beneprotein: 1900 (29), 106 g pro (1.6)    If, TCU would like to cycle TF, recommend: 75 ml/hr x 16 hours to provide the same provision as above.     For 100% fluids, recommend: 912 ml free water from TF, 150 ml Q4 ( 900 ml) for a total of 1812 ml free water daily        Procedures     Oxygen - Nasal cannula       4-6 Lpm by nasal cannula to keep O2 sats 92% or greater.             Referrals     Nutrition Services Adult IP Consult       Reason:  Patient post esophagectomy with spit fistula, on tube feeds       " "Occupational Therapy Adult Consult       Evaluate and treat as clinically indicated.    Reason:  Weakness/deconditioning       Physical Therapy Adult Consult       Evaluate and treat as clinically indicated.    Reason:  Weakness/deconditioning             Follow-Up Appointment Instructions     Follow Up ([facility])       Follow up with Dr. Ybarra , at [facility], within 1 month  to evaluate after surgery. The following labs/tests are recommended: Chest X-ray, 2 views. Please follow up every 6 months thereafter with CT chest for the next 24 months and then yearly for repeat CT chest.    Appointments on [place] and/or [facility] (with [facility] or [facility] provider or service). Call [phone] if you haven't heard regarding these appointments within 7 days of discharge.             Your next 10 appointments already scheduled     Jesse 10, 2019  9:30 AM CST   (Arrive by 9:15 AM)   Return Visit with Ally SAWYER MD   [facility] ([facility])    [address] [place] [address]  [address] [place] [address] [phone]              Statement of Approval     Ordered          12/07/18 1215  I have reviewed and agree with all the recommendations and orders detailed in this document.  EFFECTIVE NOW     Approved and electronically signed by:  Gita Hoffman MD                                                 INTERAGENCY TRANSFER FORM - NURSING   11/21/2018                       [address] [facility] BANK: [phone]            Attending Provider: Ally Ybarra MD     Allergies:  No Known Allergies    Infection:  None   Service:  THORACIC ARY    Ht:  1.651 m (5' 5\")   Wt:  93.8 kg (206 lb 11.2 oz)   Admission Wt:  89.5 kg (197 lb 5 oz)    BMI:  34.4 kg/m 2   BSA:  2.07 m 2            Advance Directives        Scanned docmt in ACP Activity?           No scanned doc        Immunizations     Name Date      Influenza (High Dose) 3 valent vaccine defer-12/07/18     " Deferral:         ASSESSMENT     Discharge Profile Flowsheet     DISCHARGE NEEDS ASSESSMENT     Inspection of bony prominences  Full 12/07/18 1132    Equipment Currently Used at Home  cane, straight;shower chair;grab bar (reacher) 11/28/18 1552   Full except areas not inspected   Spine;Hip, left;Hip, right;Buttock, left;Buttock, right;Sacrum;Coccyx 12/07/18 0418    Transportation Available  car 11/28/18 1552   Inspection under devices  Full 12/07/18 1132    GASTROINTESTINAL (ADULT,PEDIATRIC,OB)     Not Inspected under devices  IV/art line hub 12/07/18 0418    GI WDL  ex 12/07/18 1132   Skin WDL  ex 12/07/18 1132    Abdominal Appearance  contour irregular;obese 12/07/18 1132   Skin Color/Characteristics  bruised (ecchymotic) 12/07/18 1132    All Quadrants Bowel Sounds  audible and active in all quadrants 12/07/18 1010   Skin Temperature  warm 12/07/18 0418    LUQ Bowel Sounds  -- 12/06/18 2032   Skin Moisture  dry 12/07/18 0418    RUQ Bowel Sounds  -- 12/06/18 2032   Skin Elasticity  quick return to original state 12/07/18 0111    LLQ Bowel Sounds  -- 12/06/18 2032   Skin Integrity  drain/device(s);incision(s) 12/07/18 1132    RLQ Bowel Sounds  -- 12/06/18 2032   SAFETY      Last Bowel Movement  12/07/18 12/07/18 1010   Safety WDL  WDL 12/07/18 1132    GI Signs/Symptoms  diarrhea 12/07/18 1132   Safety Factors  bed in low position;wheels locked;call light in reach;upper side rails raised x 2;ID band on 12/07/18 1132    Passing flatus  yes 12/07/18 1132   Airway Safety Measures  all equipment/monitors on and audible 12/07/18 1010    COMMUNICATION ASSESSMENT     Safety Equipment  oxygen flowmeter 12/07/18 1010    Patient's communication style  spoken language (English or Bilingual) 11/26/18 0725   All Alarms  alarm(s) activated and audible 12/07/18 1010    SKIN                        Assessment WDL (Within Defined Limits) Definitions           Safety WDL     Effective: 09/28/15    Row Information: <b>WDL  "Definition:</b> Bed in low position, wheels locked; call light in reach; upper side rails up x 2; ID band on<br> <font color=\"gray\"><i>Item=AS safety wdl>>List=AS safety wdl>>Version=F14</i></font>      Skin WDL     Effective: 09/28/15    Row Information: <b>WDL Definition:</b> Warm; dry; intact; elastic; without discoloration; pressure points without redness<br> <font color=\"gray\"><i>Item=AS skin wdl>>List=AS skin wdl>>Version=F14</i></font>      Vitals     Vital Signs Flowsheet     QUICK ADDS     Side Effects Monitoring: Respiratory Quality  R 12/06/18 2021    Quick Adds  Measurements 12/05/18 0624   Side Effects Monitoring: Respiratory Depth  N 12/06/18 2021    VITAL SIGNS     Side Effects Monitoring: Sedation Level  1 12/06/18 2021    Temp  98.8  F (37.1  C) 12/07/18 1547   CY COMA SCALE      Temp src  Oral 12/07/18 1547   Best Eye Response  4-->(E4) spontaneous 12/06/18 2021    Resp  18 12/07/18 1547   Best Motor Response  6-->(M6) obeys commands 12/06/18 2021    Pulse  64 12/06/18 1551   Best Verbal Response  5-->(V5) oriented 12/06/18 2021    Heart Rate  72 12/07/18 1547   Cy Coma Scale Score  15 12/06/18 2021    Pulse/Heart Rate Source  Monitor 12/06/18 1551   Assessment Qualifiers  patient not sedated/intubated 11/30/18 1320    BP  124/64 12/07/18 1547   HEIGHT AND WEIGHT      BP Location  Right arm 12/07/18 1547   Height  1.651 m (5' 5\") 11/21/18 0552    OXYGEN THERAPY     Weight  93.8 kg (206 lb 11.2 oz) 12/06/18 0103    SpO2  95 % 12/07/18 1547   Weight Method  Standing scale 12/06/18 0103    O2 Device  Nasal cannula 12/07/18 1547   BSA (Calculated - sq m)  2.03 11/21/18 0552    FiO2 (%)  30 % 12/03/18 0949   BMI (Calculated)  32.9 11/21/18 0552    Oxygen Delivery  5 LPM 12/07/18 1547   POSITIONING      Suction Occurrance  1 11/27/18 1038   Body Position  supine 12/07/18 1132    PAIN/COMFORT     Head of Bed (HOB)  HOB at 60-90 degrees 12/07/18 1132    Patient Currently in Pain  denies 12/07/18 " 1126   Positioning/Transfer Devices  pillows;in use 12/07/18 1132    Preferred Pain Scale  CAPA (Clinically Aligned Pain Assessment) ([facility] Adults Only) 12/06/18 2021   Chair  Upright in chair 12/06/18 1058    Pain Location  Abdomen 12/06/18 2021   DAILY CARE      Pain Orientation  Lower;Mid 12/06/18 2021   Activity Management  activity adjusted per tolerance 12/07/18 1132    Pain Descriptors  Pressure;Discomfort 12/06/18 2021   Activity Assistance Provided  assistance, 1 person 12/07/18 1132    Pain Intervention(s)  Repositioned 12/06/18 2021   Assistive Device Utilized  walker 12/07/18 1132    Response to Interventions  Decrease in pain 12/06/18 2021   ECG      CLINICALLY ALIGNED PAIN ASSESSMENT (CAPA) ([facility] ADULTS ONLY)     ECG Rhythm  Sinus rhythm 12/07/18 1126    Comfort  negligible pain 12/07/18 0402   Ectopy  None 12/06/18 1208    Change in Pain  getting better 12/06/18 2021   Ectopy Frequency  Occasional 11/27/18 1603    Pain Control  partially effective 12/06/18 2021   Lead Monitored  Lead I 12/06/18 1208    Functioning  can do most things, but pain gets in the way of some 12/06/18 2021   Equipment  electrodes changed 11/27/18 1603    Sleep  normal sleep 12/06/18 1208   Rhythm Comment  ST Segment Normal 11/23/18 1848    CRITICAL-CARE PAIN OBSERVATION TOOL (CPOT)     POINT OF CARE TESTING      Facial Expression  0 11/27/18 0909   Puncture Site  fingertip 12/06/18 2021    Body Movements  1 11/27/18 0909   Bedside Glucose (mg/dl )   87 mg/dl 12/06/18 2021    Compliance w/ventilator (intubated patients)  Extubated 11/27/18 0909   DRUG CALCULATION WEIGHT      Vocalization (extubated patients)  Intubated 11/27/18 0909   Drug Calculation Weight  89.5 kg (197 lb 5 oz) 11/21/18 1806    Muscle Tension  0 11/27/18 0909   PAIN ASSESSMENT/NONVERBAL ICU (ADULT)      Total  1 11/27/18 0909   Assessment Indicator  Post intervention 11/30/18 0540    ANALGESIA SIDE EFFECTS  MONITORING                   Patient Lines/Drains/Airways Status    Active LINES/DRAINS/AIRWAYS     Name: Placement date: Placement time: Site: Days: Last dressing change:    Open Drain Left Chest  11/21/18   1628   Chest   15     Gastrostomy/Enterostomy Gastrostomy LUQ 1 20 fr 11/21/18   1458   LUQ   16     Gastrostomy/Enterostomy Jejunostomy LLQ 2 16 fr 11/21/18   1458   LLQ   16     Incision/Surgical Site 11/21/18 Abdomen 11/21/18   1352    16     Incision/Surgical Site 11/21/18 Left Neck 11/21/18   1711    15     Incision/Surgical Site 12/06/18 Left;Upper;Lateral Chest 12/06/18   0800    1     Incision/Surgical Site 12/06/18 Right;Upper;Midline Chest 12/06/18   0800    1             Patient Lines/Drains/Airways Status    Active PICC/CVC     None            Intake/Output Detail Report     Date Intake               Output             Net    Shift P.O. I.V. Other NG/GT IV Piggyback TPN Colloid Enteral Total Urine Emesis/NG output Drains Other Stool Blood Chest Tube Total       Ivis 12/06/18 0700 - 12/06/18 1459 0 0 -- 650 -- -- -- 0 650 -- -- 125 -- -- -- -- 125 525    Noc 12/06/18 1500 - 12/06/18 2359 30 -- -- 520 -- -- -- 100 650 400 -- 120 -- -- -- -- 520 130    Day 12/07/18 0000 - 12/07/18 0659 -- -- -- 315 -- -- -- 350 665 -- -- -- -- -- -- -- -- 665    Ivis 12/07/18 0700 - 12/07/18 1459 100 -- -- 240 -- -- -- 400 740 -- -- 165 -- -- -- -- 165 575    Noc 12/07/18 1500 - 12/07/18 2359 -- -- -- -- -- -- -- 50 50 -- -- -- -- -- -- -- -- 50      Last Void/BM       Most Recent Value    Urine Occurrence 1 at 12/06/2018 1945    Stool Occurrence 1 at 12/06/2018 1700      Case Management/Discharge Planning     Case Management/Discharge Planning Flowsheet     LIVING ENVIRONMENT     MH/BH CAREGIVER      Lives With  alone 11/28/18 1552   Filed Complexity Screen Score  1 11/21/18 0816    Living Arrangements  -- ([facility]) 11/28/18 0742   ABUSE RISK SCREEN      COPING/STRESS     QUESTION TO PATIENT:  Has a member of your  family or a partner(now or in the past) intimidated, hurt, manipulated, or controlled you in any way?  patient declined to answer or is unable to answer 11/26/18 0928    Major Change/Loss/Stressor  hospitalization 11/26/18 0726   QUESTION TO PATIENT: Do you feel safe going back to the place where you are living?  patient declined to answer or is unable to answer 11/26/18 0928    DISCHARGE PLANNING     OBSERVATION: Is there reason to believe there has been maltreatment of a vulnerable adult (ie. Physical/Sexual/Emotional abuse, self neglect, lack of adequate food, shelter, medical care, or financial exploitation)?  no 11/26/18 0928    Transportation Available  car 11/28/18 1552   OTHER      FINAL RESOURCES     Are you depressed or being treated for depression?  No 11/26/18 0926    Equipment Currently Used at Home  cane, straight;shower chair;grab bar (reacher) 11/28/18 1552                       [address] [facility] BANK: [phone]            Medication Administration Report for Myrtle Vaz as of 12/07/18 1614   Legend:    Given Hold Not Given Due Canceled Entry Other Actions    Time Time (Time) Time  Time-Action       Inactive    Active    Linked        Medications 12/01/18 12/02/18 12/03/18 12/04/18 12/05/18 12/06/18 12/07/18    acetaminophen (TYLENOL) solution 975 mg  Dose: 975 mg  Freq: EVERY 8 HOURS Route: PER J TUBE  Start: 12/02/18 1800   Admin Instructions: Maximum acetaminophen dose from all sources= 75 mg/kg/day not to exceed 4 grams/day.    Admin. Amount: 975 mg = 30.45 mL Conc: 32 mg/mL  Last Admin: 12/07/18 1303  Dispense Loc: [facility] ADS 6B1  Volume: 30.4688 mL      1729 (975 mg)-Given        0116 (975 mg)-Given       1214 (975 mg)-Given       1758 (975 mg)-Given        0318 (975 mg)-Given       1259 (975 mg)-Given       2307 (975 mg)-Given        0549 (975 mg)-Given       1119 (975 mg)-Given       1908 (975 mg)-Given        0313 (975 mg)-Given       1212 (975 mg)-Given       1909 (975 mg)-Given         0351 (975 mg)-Given       1303 (975 mg)-Given       [ ] 2000           albuterol (PROAIR HFA/PROVENTIL HFA/VENTOLIN HFA) 108 (90 Base) MCG/ACT inhaler 6 puff  Dose: 6 puff  Freq: EVERY 4 HOURS PRN Route: IN  PRN Reason: shortness of breath / dyspnea  Start: 11/21/18 1803   Admin Instructions: Discontinue after extubation.    Admin. Amount: 6 puff  Dispense Loc: [facility]               amiodarone (CORDARONE) suspension 200 mg  Dose: 200 mg  Freq: 2 TIMES DAILY Route: PER J TUBE  Start: 11/24/18 1030   Admin Instructions: Avoid grapefruit juice during oral amiodarone treatment. Shake well.    Admin. Amount: 200 mg = 10 mL Conc: 20 mg/mL  Last Admin: 12/07/18 0756  Dispense Loc: [facility]  Volume: 10 mL     1013 (200 mg)-Given       2017 (200 mg)-Given        0917 (200 mg)-Given       2213 (200 mg)-Given        0922 (200 mg)-Given       2116 (200 mg)-Given        0859 (200 mg)-Given       2308 (200 mg)-Given        1015 (200 mg)-Given       1908 (200 mg)-Given        0853 (200 mg)-Given       1908 (200 mg)-Given        0756 (200 mg)-Given       [ ] 2000           bacitracin ointment  Freq: 2 TIMES DAILY Route: Top  Start: 11/22/18 0930   Admin Instructions: Apply to neck    Last Admin: 12/07/18 0757  Dispense Loc: [facility] Floor Stock     1015 ( )-Given       2017 ( )-Given        0918 ( )-Given       2222 ( )-Given        0950 ( )-Given       2117 ( )-Given        0911 ( )-Given       2309 ( )-Given        1016 ( )-Given       1909 ( )-Given        1029 ( )-Given       1911 ( )-Given        0757 ( )-Given       [ ] 2000           bisacodyl (DULCOLAX) Suppository 10 mg  Dose: 10 mg  Freq: DAILY PRN Route: RE  PRN Reason: constipation  Start: 11/22/18 1523   Admin. Amount: 1 suppository (1 × 10 mg suppository)  Dispense Loc: [facility] ADS 6B1               dextrose 10 % 1,000 mL infusion  Freq: CONTINUOUS PRN Route: IV  PRN Comment: Hypoglycemia prevention  Start: 12/06/18 1442   Admin Instructions:  For Hypoglycemia Prevention for patients on long-acting subcutaneous basal insulin (Glargine, Detemir, NPH) or continuous insulin infusion. Whenever nutrition support is held or interrupted:   1) Infuse IV D10W at nutrition support rate  2) Notify provider for further instructions    Dispense Loc: [facility] Floor Stock  Volume: 1,000 mL   Mixture Administration Information:   Medication Type Amount   dextrose 10 % SOLN Base 1,000 mL                       dextrose 10 % 1,000 mL infusion  Freq: CONTINUOUS PRN Route: IV  PRN Comment:    Start: 11/24/18 1237   Admin Instructions: For Hypoglycemia Prevention for patients on long-acting subcutaneous basal insulin (Glargine, Degludec, Detemir, NPH) or continuous insulin infusion. Whenever nutrition support is held or interrupted:  1) Infuse IV D10W at nutrition support rate   2) Notify provider for further instructions    Dispense Loc: [facility] Floor Stock  Volume: 1,000 mL   Mixture Administration Information:   Medication Type Amount   dextrose 10 % SOLN Base 1,000 mL                       enoxaparin (LOVENOX) injection 40 mg  Dose: 40 mg  Freq: EVERY 24 HOURS Route: SC  Start: 11/28/18 1400   Admin. Amount: 40 mg = 0.4 mL Conc: 40 mg/0.4 mL  Last Admin: 12/06/18 1608  Dispense Loc: [facility] ADS 6B1  Volume: 0.4 mL     1519 (40 mg)-Given        1449 (40 mg)-Given        1455 (40 mg)-Given        1443 (40 mg)-Given        1706 (40 mg)-Given        1608 (40 mg)-Given        [ ] 1600           gabapentin (NEURONTIN) solution 300 mg  Dose: 300 mg  Freq: 3 TIMES DAILY Route: PO  Start: 12/06/18 0900   Admin. Amount: 300 mg = 6 mL Conc: 50 mg/mL  Last Admin: 12/07/18 1309  Dispense Loc: [facility]  Volume: 6 mL          1028 (300 mg)-Given       1501 (300 mg)-Given       2049 (300 mg)-Given        0755 (300 mg)-Given       1309 (300 mg)-Given       [ ] 2000           glucose gel 15-30 g  Dose: 15-30 g  Freq: EVERY 15 MIN PRN Route: PO  PRN Reason: low blood  sugar  Start: 12/06/18 1701   Admin Instructions: Give 15 g for BG 51 to 69 mg/dL IF patient is conscious and able to swallow. Give 30 g for BG less than or equal to 50 mg/dL IF patient is conscious and able to swallow. Do NOT give glucose gel via enteral tube.  IF patient has enteral tube: give apple juice 120 mL (4 oz or 15 g of CHO) via enteral tube for BG 51 to 69 mg/dL.  Give apple juice 240 mL (8 oz or 30 g of CHO) via enteral tube for BG less than or equal to 50 mg/dL.    ~Oral gel is preferable for conscious and able to swallow patient.   ~IF gel unavailable or patient refuses may provide apple juice 120 mL (4 oz or 15 g of CHO). Document juice on I and O flowsheet.    Admin. Amount: 15-30 g  Dispense Loc: [facility] ADS 6B1  Volume: 75 mL              Or  dextrose 50 % injection 25-50 mL  Dose: 25-50 mL  Freq: EVERY 15 MIN PRN Route: IV  PRN Reason: low blood sugar  Start: 12/06/18 1701   Admin Instructions: Use if have IV access, BG less than 70 mg/dL and meet dose criteria below:  Dose if conscious and alert (or disorientated) and NPO = 25 mL  Dose if unconscious / not alert = 50 mL  Vesicant. For ordered doses up to 25 g, give IV Push undiluted. Give each 5g over 1 minute.    Admin. Amount: 25-50 mL  Dispense Loc: [facility] ADS 6B1  Infused Over: 1-5 Minutes  Volume: 50 mL              Or  glucagon injection 1 mg  Dose: 1 mg  Freq: EVERY 15 MIN PRN Route: SC  PRN Reason: low blood sugar  PRN Comment: May repeat x 1 only  Start: 12/06/18 1701   Admin Instructions: May give SQ or IM. ONLY use glucagon IF patient has NO IV access AND is UNABLE to swallow AND blood glucose is LESS than or EQUAL to 50 mg/dL.  If ordered IV, give IV Push over 1 minute. Reconstitute with 1mL sterile water.    Admin. Amount: 1 mg  Dispense Loc: [facility] ADS 6B1               influenza Vac Split High-Dose (FLUZONE) injection 0.5 mL  Dose: 0.5 mL  Freq: PRIOR TO DISCHARGE Route: IM  Start: 11/27/18 1000   Admin Instructions: Administer  when afebrile (less than 100.4F) x 24 hours, or Prior to Discharge.  If not administering when scheduled , change the due time by following the instructions in the reference link below.  If patient refuses vaccine, chart as Vaccine Refused.    Admin. Amount: 0.5 mL  Dispense Loc: [facility]  Administrations Remainin  Volume: 0.5 mL           (1612)-Vaccine Refused           levothyroxine (SYNTHROID) suspension 150 mcg  Dose: 150 mcg  Freq: EVERY MORNING BEFORE BREAKFAST Route: PER J TUBE  Start: 18   Admin Instructions: Separate oral administration of iron- or calcium-containing products and levothyroxine by at least 4 hours.    Admin. Amount: 150 mcg = 6 mL Conc: 25 mcg/mL  Last Admin: 18  Dispense Loc: [facility]  Volume: 6 mL     1013 (150 mcg)-Given        0916 (150 mcg)-Given        0921 (150 mcg)-Given        0859 (150 mcg)-Given        1015 (150 mcg)-Given        0853 (150 mcg)-Given        0756 (150 mcg)-Given           Lidocaine (LIDOCARE) 4 % Patch 1-3 patch  Dose: 1-3 patch  Freq: EVERY 24 HOURS 0800 Route: TD  Start: 18   Admin Instructions: Apply patch(s) to chest and back . To prevent lidocaine toxicity, patient should be patch free for 12 hrs daily. Patches may be cut to smaller size prior to removing release liner.  NEVER APPLY HEAT OVER PATCH which increases absorption and risk of local anesthetic toxicity. Do not apply over area where liposomal bupivacaine was injected for 96 hours post injection.    Admin. Amount: 1-3 patch  Last Admin: 18  Dispense Loc: [facility] ADS 6B1  Infused Over: 12 Hours     (1017)-Not Given        (0801)-Not Given        (25)-Not Given        (912)-Not Given       (7)-Not Given        (0754)-Not Given        (900)-Not Given        0755 (1 patch)-Given [C]           lidocaine patch in PLACE  Freq: EVERY 8 HOURS Route: TD  Start: 18 08   Admin Instructions: Chart every shift, confirming that  patch is still in place on patient (no barcode scan needed). See patch order for dose information.  NEVER APPLY HEAT OVER PATCH which will increase absorption and may lead to risk of local anesthetic toxicity. Do not apply over area where liposomal bupivacaine injected for 96 hours.    Last Admin: 12/07/18 0757  Dispense Loc: [facility]                          2319 ( )-Negative                                                                  (2334)-Not Given                              (0859)-Not Given                      0757 ( )-Patch in Place       [ ] 1600           lidocaine patch REMOVAL  Freq: EVERY 24 HOURS 2000 Route: TD  Start: 11/28/18 2000   Admin Instructions: Patient should have a 12 hour patch free interval    Dispense Loc: [facility]     2018 ( )-Patch Removed                        (2310)-Not Given                        [ ] 2000           magnesium sulfate 2 g in water intermittent infusion  Dose: 2 g  Freq: DAILY PRN Route: IV  PRN Reason: magnesium supplementation  Start: 11/28/18 0458   Admin Instructions: For Serum Mg++ 1.6 - 2 mg/dL  Give 2 g and recheck magnesium level next AM.    Admin. Amount: 2 g = 50 mL Conc: 0.04 g/mL  Last Admin: 11/28/18 0558  Dispense Loc: [facility]  Infused Over: 60 Minutes  Volume: 50 mL               magnesium sulfate 4 g in 100 mL sterile water (premade)  Dose: 4 g  Freq: EVERY 4 HOURS PRN Route: IV  PRN Reason: magnesium supplementation  Start: 11/21/18 1728   Admin Instructions: For serum Mg++ less than 1.6 mg/dL  Give 4 g and recheck magnesium level 2 hours after dose, and next AM.    Admin. Amount: 4 g = 100 mL Conc: 4 g/100 mL  Dispense Loc: [facility] ADS 6B1  Infused Over: 120 Minutes  Volume: 100 mL               methocarbamol (ROBAXIN) tablet 500 mg  Dose: 500 mg  Freq: 3 TIMES DAILY Route: PO  Start: 11/28/18 0800   Admin. Amount: 1 tablet (1 × 500 mg tablet)  Last Admin: 12/07/18 1309  Dispense Loc: [facility] ADS 6B1      1012 (500 mg)-Given       1518 (500 mg)-Given       2017 (500 mg)-Given        0916 (500 mg)-Given       1449 (500 mg)-Given       2212 (500 mg)-Given        0923 (500 mg)-Given       1455 (500 mg)-Given       2116 (500 mg)-Given        0900 (500 mg)-Given       1435 (500 mg)-Given       2308 (500 mg)-Given        1015 (500 mg)-Given       1705 (500 mg)-Given       1908 (500 mg)-Given        0853 (500 mg)-Given       1501 (500 mg)-Given       1908 (500 mg)-Given        0756 (500 mg)-Given       1309 (500 mg)-Given       [ ] 2000           multivitamins with minerals (CERTAVITE/CEROVITE) liquid 15 mL  Dose: 15 mL  Freq: DAILY Route: PER FEEDING   Start: 11/27/18 0800   Admin. Amount: 15 mL  Last Admin: 12/07/18 0755  Dispense Loc: [facility] ADS 6B1  Volume: 15 mL     1013 (15 mL)-Given        0917 (15 mL)-Given        0922 (15 mL)-Given        0900 (15 mL)-Given        1015 (15 mL)-Given        0853 (15 mL)-Given        0755 (15 mL)-Given           naloxone (NARCAN) injection 0.1-0.4 mg  Dose: 0.1-0.4 mg  Freq: EVERY 2 MIN PRN Route: IV  PRN Reason: opioid reversal  Start: 11/22/18 1042   Admin Instructions: For respiratory rate LESS than or EQUAL to 8.  Partial reversal dose:  0.1 mg titrated q 2 minutes for Analgesia Side Effects Monitoring Sedation Level of 3 (frequently drowsy, arousable, drifts to sleep during conversation).Full reversal dose:  0.4 mg bolus for Analgesia Side Effects Monitoring Sedation Level of 4 (somnolent, minimal or no response to stimulation).  For ordered IV doses 0.1-2mg give IVP. Give each 0.4mg over 15 seconds in emergency situations. For non-emergent situations further dilute in 9mL of NS to facilitate titration of response.    Admin. Amount: 0.1-0.4 mg = 0.25-1 mL Conc: 0.4 mg/mL  Dispense Loc: [facility] ADS 6B1  Volume: 1 mL               ondansetron (ZOFRAN-ODT) ODT tab 4 mg  Dose: 4 mg  Freq: EVERY 6 HOURS PRN Route: PO  PRN Reasons: nausea,vomiting  Start: 11/21/18 1801   Admin  Instructions: This is Step 1 of nausea and vomiting management.  If nausea not resolved in 15 minutes, go to Step 2 prochlorperazine (COMPAZINE). Do not push through foil backing. Peel back foil and gently remove. Place on tongue immediately. Administration with liquid unnecessary  With dry hands, peel back foil backing and gently remove tablet; do not push oral disintegrating tablet through foil backing; administer immediately on tongue and oral disintegrating tablet dissolves in seconds; then swallow with saliva; liquid not required.    Admin. Amount: 1 tablet (1 × 4 mg tablet)  Dispense Loc: [facility] ADS 6B1              Or  ondansetron (ZOFRAN) injection 4 mg  Dose: 4 mg  Freq: EVERY 6 HOURS PRN Route: IV  PRN Reasons: nausea,vomiting  Start: 11/21/18 1801   Admin Instructions: This is Step 1 of nausea and vomiting management.  If nausea not resolved in 15 minutes, go to Step 2 prochlorperazine (COMPAZINE).  Irritant. For ordered IV doses 0.1-4 mg, give IV Push undiluted over 2-5 minutes.    Admin. Amount: 4 mg = 2 mL Conc: 4 mg/2 mL  Dispense Loc: [facility] ADS 6B1  Infused Over: 2-5 Minutes  Volume: 2 mL               oxyCODONE (ROXICODONE) solution 5-10 mg  Dose: 5-10 mg  Freq: EVERY 4 HOURS PRN Route: PO  PRN Reason: moderate to severe pain  Start: 11/28/18 0703   Admin. Amount: 5-10 mg = 5-10 mL Conc: 1 mg/mL  Last Admin: 12/06/18 1318  Dispense Loc: [facility] ADS 6B1  Volume: 10 mL     0040 (10 mg)-Given       0537 (10 mg)-Given       2159 (5 mg)-Given        0903 (5 mg)-Given       1728 (10 mg)-Given       2224 (5 mg)-Given        0316 (5 mg)-Given       1714 (5 mg)-Given       2348 (5 mg)-Given        0507 (5 mg)-Given       0900 (5 mg)-Given       1435 (5 mg)-Given        0028 (5 mg)-Given       0550 (5 mg)-Given       1909 (5 mg)-Given       2343 (5 mg)-Given        0852 (5 mg)-Given       1318 (5 mg)-Given            pink lady enema (COMPOUNDED: docusate, magnesium citrate, mineral oil, sodium  phosphate)  Dose: 286 mL  Freq: DAILY PRN Route: RE  PRN Reason: constipation  Start: 12/04/18 0644   Admin Instructions: If no stool after suppository, try enema.    Admin. Amount: 286 mL  Dispense Loc: [facility]  Volume: 286 mL               polyethylene glycol (MIRALAX/GLYCOLAX) Packet 17 g  Dose: 17 g  Freq: 2 TIMES DAILY PRN Route: PER J TUBE  PRN Reason: constipation  Start: 11/26/18 0803   Admin Instructions: 1 Packet = 17 grams. Mixed prescribed dose in 8 ounces of water. Follow with 8 oz. of water.    Admin. Amount: 17 g  Last Admin: 12/04/18 2307  Dispense Loc: [facility] ADS 6B1        2307 (17 g)-Given              potassium chloride (KLOR-CON) Packet 20-40 mEq  Dose: 20-40 mEq  Freq: EVERY 2 HOURS PRN Route: ORAL OR FEED  PRN Reason: potassium supplementation  Start: 11/21/18 1728   Admin Instructions: Use if unable to tolerate tablets.    If Serum K+ 3.4-4.0, dose = 20 mEq x1. Recheck K+ level the next AM.  If Serum K+ 3.0-3.3, dose = 60 mEq po total dose (40 mEq x 1 followed in 2 hours by 20 mEq X1). Recheck K+ level 4 hours after dose and the next AM.  If Serum K+ 2.5-2.9, dose = 80 mEq po total dose (40 mEq Q2H x2). Recheck K+ level 4 hours after dose and the next AM.  If Serum K+ less than 2.5, See IV order.  Dissolve packet contents in 4-8 ounces of cold water or juice.    Admin. Amount: 20-40 mEq  Last Admin: 12/07/18 1303  Dispense Loc: [facility] ADS 6B1     1014 (20 mEq)-Given        0916 (20 mEq)-Given        1714 (20 mEq)-Given        1438 (20 mEq)-Given         1212 (20 mEq)-Given [C]        1303 (20 mEq)-Given           potassium chloride 10 mEq in 100 mL sterile water intermittent infusion (premix)  Dose: 10 mEq  Freq: EVERY 1 HOUR PRN Route: IV  PRN Reason: potassium supplementation  Start: 11/21/18 1728   Admin Instructions: Infuse via PERIPHERAL LINE or CENTRAL LINE. Use for central line replacement if patient weight less than 65 kg, if patient is on TPN with high potassium  content or if unit does not stock 20 mEq bags.  If Serum K+ 3.4-4.0, dose = 10 mEq/hr x2 doses. Recheck K+ level the next AM.  If Serum K+ 3.0-3.3, dose = 10 mEq/hr x4 doses (40 mEq IV total dose). Recheck K+ level 2 hours after dose and the next AM.  If Serum K+ less than 3.0, dose = 10 mEq/hr x6 doses (60 mEq IV total dose). Recheck K+ level 2 hours after dose and the next AM.    Admin. Amount: 10 mEq = 100 mL Conc: 10 mEq/100 mL  Dispense Loc: [facility] ADS 6B1  Infused Over: 60 Minutes  Volume: 100 mL               potassium chloride 20 mEq in 50 mL intermittent infusion  Dose: 20 mEq  Freq: EVERY 1 HOUR PRN Route: IV  PRN Reason: potassium supplementation  Last Dose: 20 mEq (11/21/18 2125)  Start: 11/21/18 1728   Admin Instructions: Infuse via CENTRAL LINE Only.  May need EKG if less than 65 kg or on TPN - Max rate is 0.3 mEq/kg/hr for patients not on EKG monitoring.    If Serum K+ 3.4-4.0, dose = 20 mEq/hr x1 doses. Recheck K+ level the next AM.  If Serum K+ 3.0-3.3, dose = 20 mEq/hr x2 doses (40 mEq IV total dose).  Recheck K+ level 2 hours after dose and the next AM.  If Serum K+ less than 3.0, dose = 20 mEq/hr x3 doses (60 mEq IV total dose). Recheck K+ level 2 hours after dose and the next AM.    Admin. Amount: 20 mEq = 50 mL Conc: 20 mEq/50 mL  Last Admin: 11/28/18 0449  Dispense Loc: [facility] ADS 6B1  Volume: 50 mL               potassium chloride SA (K-DUR/KLOR-CON M) CR tablet 20-40 mEq  Dose: 20-40 mEq  Freq: EVERY 2 HOURS PRN Route: PO  PRN Reason: potassium supplementation  Start: 11/21/18 1728   Admin Instructions: Use if able to take PO.   If Serum K+ 3.4-4.0, dose = 20 mEq x1. Recheck K+ level the next AM.  If Serum K+ 3.0-3.3, dose = 60 mEq po total dose (40 mEq x1 followed in 2 hours by 20 mEq x1). Recheck K+ level 4 hours after dose and the next AM.  If Serum K+ 2.5-2.9, dose = 80 mEq po total dose (40 mEq Q2H x2). Recheck K+ level 4 hours after dose and the next AM.  If Serum K+ less than 2.5,  See IV order.  DO NOT CRUSH.    Admin. Amount: 2-4 tablet (2-4 × 10 mEq tablet)  Dispense Loc: [facility] ADS 6B1               potassium phosphate 10 mmol in D5W 250 mL intermittent infusion  Dose: 10 mmol  Freq: DAILY PRN Route: IV  PRN Reason: phosphorous supplementation  Start: 11/21/18 1728   Admin Instructions: For serum phosphorus level 2.5-2.7  Do not infuse Phosphorus in the same line as TPN.   Give 10 mmol and recheck phosphorus level the next AM.  Each mmol of phosphate provides 1.47 mEq of Potassium. Multiply the patient's phosphate dose by 1.47 to determine the amount of potassium in this dose.    Admin. Amount: 10 mmol  Last Admin: 11/28/18 0741  Dispense Loc: [facility]  Infused Over: 4 Hours  Volume: 250 mL   Mixture Administration Information:   Medication Type Amount   potassium phosphate 3 MMOLE/ML SOLN Medications 10 mmol   D5W 5 % SOLN Base 250 mL                       potassium phosphate 15 mmol in D5W 250 mL intermittent infusion  Dose: 15 mmol  Freq: DAILY PRN Route: IV  PRN Reason: phosphorous supplementation  Start: 11/21/18 1728   Admin Instructions: For serum phosphorus level 2.0-2.4  Do not infuse Phosphorus in the same line as TPN.   Give 15 mmol and recheck phosphorus level next AM.  Each mmol of phosphate provides 1.47 mEq of Potassium. Multiply the patient's phosphate dose by 1.47 to determine the amount of potassium in this dose.    Admin. Amount: 15 mmol  Last Admin: 11/25/18 0803  Dispense Loc: [facility]  Infused Over: 4 Hours  Volume: 250 mL   Mixture Administration Information:   Medication Type Amount   potassium phosphate 3 MMOLE/ML SOLN Medications 15 mmol   D5W 5 % SOLN Base 250 mL                       potassium phosphate 20 mmol in D5W 500 mL intermittent infusion  Dose: 20 mmol  Freq: EVERY 6 HOURS PRN Route: IV  PRN Reason: phosphorous supplementation  Start: 11/21/18 1728   Admin Instructions: For serum phosphorus level 1.1-1.9  For peripheral  line  Do not infuse Phosphorus in the same line as TPN.   Give 20 mmol and recheck phosphorus level 2 hours after dose and next AM. Repeat if necessary.  Each mmol of phosphate provides 1.47 mEq of Potassium. Multiply the patient's phosphate dose by 1.47 to determine the amount of potassium in this dose.    Admin. Amount: 20 mmol  Dispense Loc: [facility]  Infused Over: 4 Hours  Volume: 500 mL   Mixture Administration Information:   Medication Type Amount   potassium phosphate 3 MMOLE/ML SOLN Medications 20 mmol   D5W 5 % SOLN Base 500 mL                       potassium phosphate 25 mmol in D5W 500 mL intermittent infusion  Dose: 25 mmol  Freq: EVERY 8 HOURS PRN Route: IV  PRN Reason: phosphorous supplementation  Start: 11/21/18 1728   Admin Instructions: For serum phosphorus level less than 1.1  Do not infuse Phosphorus in the same line as TPN.   Give 25 mmol and recheck phosphorus level 2 hours after dose and next AM.  Each mmol of phosphate provides 1.47 mEq of Potassium. Multiply the patient's phosphate dose by 1.47 to determine the amount of potassium in this dose.    Admin. Amount: 25 mmol  Dispense Loc: [facility]  Infused Over: 6 Hours  Volume: 500 mL   Mixture Administration Information:   Medication Type Amount   potassium phosphate 3 MMOLE/ML SOLN Medications 25 mmol   D5W 5 % SOLN Base 500 mL                       protein modular (ProSource No Carb) 2 packet  Dose: 2 packet  Freq: DAILY Route: PER FEEDING   Start: 12/06/18 1445   Admin Instructions: Infuse via syringe down feeding tube. Flush feeding tube with 15-30 mL of water after administration. Do not mix with other medications.  No mixing or dilution is required. SUPPLIED BY NUTRITION DEPARTMENT.    Admin. Amount: 2 packet  Last Admin: 12/07/18 0803  Dispense Loc: [facility] Floor Stock          1910 (2 packet)-Given [C]        0803 (2 packet)-Given           QUEtiapine (SEROquel) tablet 25 mg  Dose: 25 mg  Freq: AT BEDTIME PRN  Route: PO  PRN Comment: Agitation  Start: 18 0833   Admin. Amount: 1 tablet (1 × 25 mg tablet)  Dispense Loc: [facility] ADS 6B1               sennosides (SENOKOT) syrup 10 mL  Dose: 10 mL  Freq: AT BEDTIME PRN Route: PER J TUBE  PRN Reason: constipation  Start: 18 0803   Admin. Amount: 10 mL  Last Admin: 18 230  Dispense Loc: [facility] ADS 6B1  Volume: 10 mL        2307 (10 mL)-Given              sodium chloride (PF) 0.9% PF flush 3 mL  Dose: 3 mL  Freq: EVERY 8 HOURS Route: IK  Start: 18 1515   Admin Instructions: And Q1H PRN, to lock peripheral IV dormant line.    Admin. Amount: 3 mL  Last Admin: 18 075  Dispense Loc: [facility] Floor Stock  Volume: 3 mL     0040 (3 mL)-Given       1016 (3 mL)-Given       1537 (3 mL)-Given        0000 (3 mL)-Given       0919 (3 mL)-Given       1730 (3 mL)-Given        0055 (3 mL)-Given       0923 (3 mL)-Given       1718 (3 mL)-Given       2348 (3 mL)-Given        0913 (3 mL)-Given       1715 (3 mL)-Given       2311 (3 mL)-Given        1016 (3 mL)-Given       1706 (3 mL)-Given       2345 (3 mL)-Given        1028 (3 mL)-Given       1608 (3 mL)-Given       2319 (3 mL)-Given        0757 (3 mL)-Given       (1612)-Not Given          Completed Medications  Medications 18         Dose: 50 mL  Freq: ONCE Route: IV  Start: 18   End: 18   Admin Instructions: Vesicant. For ordered doses up to 25 g, give IV Push undiluted. Give each 5g over 1 minute.    Admin. Amount: 50 mL  Last Admin: 18  Dispense Loc: [facility] ADS 6B1  Infused Over: 1-5 Minutes  Administrations Remainin  Volume: 50 mL          1654 (50 mL)-Given              Dose: 1 tablet  Freq: 2 TIMES DAILY Route: PO  Indications of Use: URINARY TRACT INFECTION  Start: 18 0200   End: 18 101   Admin. Amount: 1 tablet  Last Admin: 18 101  Dispense Loc: [facility] ADS 6B1  Administrations Remainin        0310 (1 tablet)-Given       0923 (1 tablet)-Given       2116 (1 tablet)-Given        0859 (1 tablet)-Given       2309 (1 tablet)-Given        1015 (1 tablet)-Given            Discontinued Medications  Medications 12/01/18 12/02/18 12/03/18 12/04/18 12/05/18 12/06/18 12/07/18         Dose: 2 packet  Freq: 3 TIMES DAILY Route: PER FEEDING   Start: 11/30/18 1400   End: 12/06/18 1444   Admin Instructions: Mix each packet with 60 mL water before administering. SUPPLIED BY NUTRITION DEPARTMENT.    Admin. Amount: 2 packet  Last Admin: 12/06/18 0900  Dispense Loc: [facility] Floor Stock     (1015)-Not Given       1516 (2 packet)-Given       2017 (2 packet)-Given        0918 (2 packet)-Given       1450 (2 packet)-Given       2214 (2 packet)-Given        0925 (2 packet)-Given       1455 (2 packet)-Given       2116 (2 packet)-Given        0916 (2 packet)-Given       1440 (2 packet)-Given       2310 (2 packet)-Given        1017 (2 packet)-Given       1706 (2 packet)-Given       1909 (2 packet)-Given        0900 (2 packet)-Given       1444-Med Discontinued  (1458)-Not Given              Dose: 20 mg  Freq: DAILY Route: IV  Start: 12/04/18 0800   End: 12/05/18 0902   Admin Instructions: For ordered IV doses 1-40 mg, give IV Push undiluted over 1-2 minutes.    Admin. Amount: 20 mg = 2 mL Conc: 10 mg/mL  Last Admin: 12/05/18 1018  Dispense Loc: [facility] ADS 6B1  Infused Over: 1-2 Minutes  Volume: 2 mL        0900 (20 mg)-Given        0902-Med Discontinued  1018 (20 mg)-Given               Dose: 300 mg  Freq: 3 TIMES DAILY Route: PO  Start: 11/29/18 1400   End: 12/06/18 0845   Admin. Amount: 1 capsule (1 × 300 mg capsule)  Last Admin: 12/05/18 1908  Dispense Loc: [facility] ADS 6B1     1014 (300 mg)-Given       1518 (300 mg)-Given       2017 (300 mg)-Given        0917 (300 mg)-Given       1449 (300 mg)-Given       2212 (300 mg)-Given        0923 (300 mg)-Given       1455 (300 mg)-Given       2116 (300 mg)-Given        0900 (300  mg)-Given       1435 (300 mg)-Given       2309 (300 mg)-Given        1015 (300 mg)-Given       1706 (300 mg)-Given       1908 (300 mg)-Given               0845-Med Discontinued          Dose: 0.3-0.5 mg  Freq: EVERY 2 HOURS PRN Route: IV  PRN Reason: moderate to severe pain  Start: 11/28/18 0702   End: 12/05/18 1402   Admin Instructions: For ordered IV doses 0.1-4 mg give IV Push undiluted. Administer each 2mg over 2-5 minutes.    Admin. Amount: 0.3-0.5 mg  Last Admin: 11/30/18 1226  Dispense Loc: [facility] ADS 6B1         1402-Med Discontinued                  INTERAGENCY TRANSFER FORM - NOTES (H&P, Discharge Summary, Consults, Procedures, Therapies)   11/21/2018                       UNIT 6B [facility] BANK: [phone]               History & Physicals      H&P by Siva Kasper PA-C at 11/21/2018  5:29 PM     Author:  Siva Kasper PA-C Service:  Surgical ICU Author Type:  Physician Assistant    Filed:  11/22/2018 12:33 PM Date of Service:  11/21/2018  5:29 PM Creation Time:  11/21/2018  5:29 PM    Status:  Attested :  Siva Kasper PA-C (Physician Assistant)    Cosigner:  Denise Nagel MD at 12/3/2018  4:12 PM        Attestation signed by Denise Nagel MD at 12/3/2018  4:12 PM        I saw and examined this patient.  I agree with the assessment and plan in the note below.    Denise Nagel MD  Associate Professor of Surgery  Pager [phone]                                   SURGICAL ICU PROGRESS NOTE  November 21, 2018        Date of Service (when I saw the patient): 11/21/2018    ASSESSMENT:  Myrtle Vaz is a 73 year old female who was admitted on 11/21/2018[KH1.1] to [facility]. She was transferred from [facility] where she presented with respiratory failure thought to be secondary to large hiatal hernia and underwent s/p laparoscopic hiatal hernia repair with mesh reinforcement, laparoscopic nissen fundoplication, and laparoscopic gastrostomy tube placement,  which was complicated by pleural effusions and possible esophageal leak,  s/p placement of chest tubes x3. Taken to OR with thoracic and found to have perforation at distal esophagus at GE juncton and underwent s/p Midline laparotomy, Trans-hiatal esophagogasterectomy, gastrostomy, J-tube placement, G-tube placement, bilateral pleural chest tube placement, mediastinal abcess drainage, spit fistula creation, Esophagastrodueodenoscopy, intraoperative course complicated by SVT required intraoperative cardioversion x3.      PLAN:    Neurological:  # acute post operative pain   - Monitor neurological status. Notify the MD for any acute changes in exam.  - Pain: Fentanyl infusion   - Sedation: propofolol     Pulmonary:   # acute post operative vent support   - VENT: keep intubated. PST in am when meets criteria   - Recheck ABG   - Ventilatory bundle. HOB elevation   - Supplemental oxygen to keep saturation above 92 %.    Cardiovascular:    # Atrial fibrillation, unstable, intraoperative cardioversion x3   - hx of a-fib previously on metoprolol, now on amiodarone infusion   - hold Metoprolol     # s/p septic shock  - Vasopressin and Norepi gtt     Gastroenterology/Nutrition:  # S/p hiatal hernia complicated by distal esophageal leak now s/p Midline laparotomy, trans-hiatal esophagogasterectomy, gastrostomy, J-tube placement, G-tube placement, bilateral pleural chest tube placement, mediastinal abcess drainage, spit fistula creation, esophagastrodueodenoscopy   # s/p G tube   # s/P J tube  - NPO--strict   - G and J tubes to be placed to gravity per DR Albarado   - hold feeds per thoracic     Fluids/Electrolytes:   # hypokalemia  - cont for IV fluid hydration  - hold feeds  - electrolyte replacement protocol.     Renal:  # no issues   - Urine output is . Will continue to monitor intake and output continue with lopez for now     Endocrine:  # no issus   - No management indication.  Sliding scale for glucose management if needed.      ID/Antibiotics:  # mediastinal abscess   Continue with Vanco/Zosy and Micafungin  -  Heme:     # Acute blood loss anemia   - Hemoglobin 9.8  Transfuse if hgb <7.0 or signs/symptoms of hypoperfusion. Monitor and trend.     Musculoskeletal:  # weakness and deconditioning of deconditioning   - Physical and occupational therapy consult     General Cares/Prophylaxis:    DVT Prophylaxis: Defer to primary service, hold for now   GI Prophylaxis: Protonix   Restraints: Restraints for medical healing needed: NO    Lines/ tubes/ drains:  -  PIV's  - central line   - ANGELO drain   - chest tube x3   - lopez   - arterial line     Disposition:  - Surgical ICU.     Patient seen, findings and plan discussed with surgical ICU staff, Dr. Nagel .    Billing:  I spent 60 minutes bedside and on the inpatient unit today managing the critical care of Myrtle Vaz in relation to the issues listed in this note.[KH1.2]      Siva Kasper[KH1.3]  - - - - - - - - - - - - - - - - - - - - - - - - - - - - - - - - - - - - - - - - - - - - - -   HISTORY PRESENTING ILLNESS:[KH1.2]  chelita Vaz is a 73 year old female who was admitted on 11/21/2018[KH1.1] to [facility]. She was transferred from [facility] where she presented with respiratory failure thought to be secondary to large hiatal hernia and underwent s/p laparoscopic hiatal hernia repair with mesh reinforcement, laparoscopic nissen fundoplication, and laparoscopic gastrostomy tube placement, which was complicated by pleural effusions and possible esophageal leak,  s/p placement of chest tubes x3 who is now s/p Midline laparotomy, Trans-hiatal esophagogasterectomy, gastrostomy, J-tube placement, G-tube placement, bilateral pleural chest tube placement, mediastinal abcess drainage, spit fistula creation, Esophagastrodueodenoscopy today with thoracic.     She was reported to have uncontrolled atrial fibrillation with metoprolol. She was noted to be in SVT intraoperatively and required  cardioverson x3 with restoratation of HR and Blood pressures. She is admitted to the ICU intubated and sedated. ROS not able to be performed.     REVIEW OF SYSTEMS: 10 point ROS neg other than the symptoms noted above in the HPI.    PAST MEDICAL HISTORY:[KH1.2]   History reviewed. No pertinent past medical history.[KH1.3]    SURGICAL HISTORY:[KH1.2]   History reviewed. No pertinent surgical history.[KH1.3] in Epic   - recent laparoscopic Hiatal hernia repair with mesh reinforcement     SOCIAL HISTORY no family available but per [facility].   Social History     Social History     Marital status:      Spouse name: N/A     Number of children: N/A     Years of education: N/A     Social History Main Topics     Smoking status: None     Smokeless tobacco: None     Alcohol use None     Drug use: None     Sexual activity: Not Asked       FAMILY HISTORY: No bleeding/clotting disorders nor problems with anesthesia.     ALLERGIES:[KH1.2]   No Known Allergies[KH1.3]    MEDICATIONS:  No PTA medications in Epic. Will attempt to locate record via Care everywhere     PHYSICAL EXAMINATION:[KH1.2]  Temp:  [98  F (36.7  C)-99  F (37.2  C)] 98.1  F (36.7  C)  Pulse:  [80-90] 80  Heart Rate:  [] 67  Resp:  [14-20] (P) 14  BP: ()/(52-89) 97/52  MAP:  [69 mmHg-95 mmHg] 69 mmHg  Arterial Line BP: ()/(54-67) 94/54  FiO2 (%):  [60 %-100 %] (P) 60 %  SpO2:  [92 %-100 %] 100 %[KH1.3]     General: sedated. No response to voice   HEENT: OP clear--ETT present and secured.   NECK: Left upper chest spit fistula--red blood tinged secretions in bag   Neuro: sedated and intubated   Pulm/Resp: Clear breath sounds bilaterally without rhonchi, crackles or wheezes, breath sounds decreased at bases  Chest: Bilateral chest tubes present. Left chest tube with intermittent air leak--thin serosanguinous fluid drainage, right chest tube with thin serosanguinous fluid.   ANGELO drain with bloody serosanguinous drainage.   CV: RRR, S1, S2.    Monitor:  NSR HR 60's.   Abdomen: Soft, non-distended, non-tender,  no rebound tenderness or guarding, no masses. G tube and J tube currently clamped, some thin serosanguinous drainage around G tube saturating Abd gauze.   : (+) lopez catheter in place, urine yellow and clear  Incisions/Skin: incision dressed. Ecchymotic lesion on thigh bilaterally-- Left > right. Prior right chest tube sites dressed--thin serous oozing on dressing.   Extremities: no peripheral edema, moving all extremities, peripheral pulses intact, calves soft, extremities well perfused. Toes warm and well perfused.     LABS: Reviewed.   Arterial Blood Gases[KH1.2]     Recent Labs  Lab 11/21/18  1615 11/21/18  1323 11/21/18  0600   PH 7.32* 7.37 7.45   PCO2 46* 43 40   PO2 117* 76* 64*   HCO3 23 25 27[KH1.3]     Complete Blood Count[KH1.2]     Recent Labs  Lab 11/21/18  1615 11/21/18  1323 11/21/18  0141   WBC  --   --  28.4*   HGB 9.8* 9.3* 10.0*   PLT  --   --  375[KH1.3]     Basic Metabolic Panel[KH1.2]    Recent Labs  Lab 11/21/18  1615 11/21/18  1323 11/21/18  0141    147* 145*   POTASSIUM 3.9 3.2* 3.4   CHLORIDE  --   --  109   CO2  --   --  28   BUN  --   --  38*   CR  --   --  1.25*   * 126* 93[KH1.3]     Liver Function Tests[KH1.2]    Recent Labs  Lab 11/21/18  0141   AST 21   ALT 35   ALKPHOS 100   BILITOTAL 1.0   ALBUMIN 2.2*   INR 1.51*[KH1.3]     Pancreatic Enzymes[KH1.2]  No lab results found in last 7 days.[KH1.3]  Coagulation Profile[KH1.2]    Recent Labs  Lab 11/21/18  0141   INR 1.51*[KH1.3]       IMAGING:[KH1.2]  Recent Results (from the past 24 hour(s))   XR Chest Port 1 View    Narrative    EXAM: XR CHEST PORT 1 VW  11/21/2018 3:02 AM      HISTORY: shortness of breath;     COMPARISON: None    FINDINGS: Single AP view of the chest. 2 right basilar, 1 left basilar  chest tube. Partially visualized hyperdensities in the abdomen, likely  contrast in the bowel. No pneumothorax.    Moderate left and trace right  pleural effusions. Lower right  paravertebral opacity is favored to represent a loculated pleural  effusion. Basilar predominant opacities (left much greater than  right).      Impression    IMPRESSION: Moderate left and small right pleural effusions, with  loculated effusion on the right. Bibasilar opacities (left much  greater than right), likely compression atelectasis with superimposed  pulmonary edema. No pneumothorax. Adequately positioned chest tubes.    I have personally reviewed the examination and initial interpretation  and I agree with the findings.    SHAD ROSARIO MD[KH1.3]                  Revision History        User Key Date/Time User Provider Type Action    > [N/A] 11/22/2018 12:33 PM Siva Kasper PA-C Physician Assistant Sign     KH1.3 11/21/2018  6:30 PM Siva Kasper PA-C Physician Assistant Sign     KH1.2 11/21/2018  6:05 PM Siva Kasper PA-C Physician Assistant      KH1.1 11/21/2018  5:29 PM Siva Kasper PA-C Physician Assistant             H&P by Ramandeep Dang MD at 11/21/2018  1:37 AM     Author:  Ramandeep Dang MD Service:  Surgery Author Type:  Resident    Filed:  11/21/2018  5:28 AM Date of Service:  11/21/2018  1:37 AM Creation Time:  11/21/2018  1:37 AM    Status:  Attested :  Ramandeep Dang MD (Resident)    Cosigner:  Temitope Bullock MD at 11/25/2018 12:45 PM        Attestation signed by Temitope Bullock MD at 11/25/2018 12:45 PM        STAFF ADDENDUM:  I saw and evaluated Ms. Vaz and agree with the resident s findings and plan of care as documented in the resident s note and edited by me, as applicable.      In summary, Ms. Vaz id a 73 ying old female with history of lap HH repair at OSH transferred for further care. She appears somnolent, delirious and hypoxic which is all concerning despite relative stability. Will take her to the OR urgently for exploration.   I spent a total of 45 minutes with Ms. Vaz, 35 of which were spent in counseling and  coordination of care. The patient had all questions answered and was in agreement with the plan.  Temitope Randle MD                                   Thoracic Surgery Admission History and Physical     Myrtle Vaz MRN# [id number]   YOB: 1945 Age: 73 year old      Date of Admission:  11/21/2018    CC: Shortness of breath    Assessment: 73 year old female[MA1.1] transferred[MA1.2] from [facility][MA1.1] where she presented with respiratory failure thought to be secondary to large hiatal hernia, now with continued[MA1.2] respiratory failure s/p[MA1.1] laparoscopic hiatal hernia repair with mesh reinforcement, laparoscopic nissen fundoplication, and laparoscopic gastrostomy tube placement, which was complicated by pleural effusions and possible esophageal leak, now s/p placement of chest tubes x3, course of vancomycin and zosyn, and successful extubation. Transferred for definitive management per Dr. Albarado.     On evaluation she is delirious with hallucinations and some aggression. She refuses beside ultrasound and cannot be transported for abdominal x ray. She was given haldol for agitation. Additionally she is in and out of Afib, and has received a total of 10 mg of metoprolol.[MA1.2]       Plan:[MA1.1]    - Haldol for agitation. Gave 1 mg,[MA1.3] now giving another 2 mg, if giving additional dose,[MA1.2] look for reversible causes of delirium.   - Continue VPM  - Afib with RVR (short runs), pressures in 130s systolic. Gave[MA1.3] 5[MA1.2] mg metoprolol,[MA1.3] converted to sinus, now back in A fib, re-dosing with metoprolol.[MA1.2]   - If systolics 90-100s, would start amio[MA1.3] after 3rd dose metoprolol[MA1.2]   - If becomes unstable, cardioversion and ICU transfer  - Continue high flow nasal cannula   - Follow up ABG, hypoxia may be contributing to delirium   - If O2 needs increase, plan to transfer to ICU with low threshold for intubation   - If requires intubation, would need to be  RSI as she is high aspiration risk  - Strict NPO  - Keep G tube to gravity  - NS at 100   - Replacing lytes  - Duplex of bilateral lower extremities and abdominal xray when more cooperative  - Currently unable to consent for procedures, daughter states that the patient has always been her own decision-maker; daughter plans to be at the hospital around 0800 in anticipation of surgery  - Code status: per discussion with daughter, the patient is full code[MA1.3]    HPI: Ms Vaz is a 74 yo woman with a longstanding history of cough productive of clear / white sputum who was seen on 11/8 at [facility] for[MA1.1] dyspnea[MA1.2].   She was found to have a giant paraesophageal hiatal hernia, and subsequently underwent workup for the same with EGD and esophagram.   She was then taken to the OR on 11/15 for repair of this hernia. Intraoperatively she was found to have a Type 4 hiatal hernia in the posterior mediastinum containing the entirety of the transverse colon, the majority of the omentum and the transverse mesocolon, and the stomach[MA1.1].   Post-operatively she was extubated, however, she developed further respiratory distress, hypoxemia, and leukocytosis, as well as large fluid collection in the area of the hiatal hernia. Imaging revealed a dilated esophagus, extraluminal contrast within the chest, and re-herniation of the fundus of the stomach into the mediastinum.   She was transferred to the [facility] for revision of the repair.     Today she complains of subcostal abdominal pain, however denies chest pain. She is unable to provide detailed history, and is oriented to self only.[MA1.2]       Past Medical History:[MA1.1]  No past medical history on file.[MA1.4]    Past Surgical History:[MA1.1]  No past surgical history on file.[MA1.4]    Allergies:[MA1.1]   Allergies not on file[MA1.4]    Medications:[MA1.1]    No current facility-administered medications on file prior to encounter.   No  current outpatient prescriptions on file prior to encounter.[MA1.4]    Medications prior to Admission:[MA1.1]  No prescriptions prior to admission.[MA1.5]       Social History:[MA1.1]  Social History     Social History     Marital status:      Spouse name: N/A     Number of children: N/A     Years of education: N/A     Occupational History     Not on file.     Social History Main Topics     Smoking status: Not on file     Smokeless tobacco: Not on file     Alcohol use Not on file     Drug use: Not on file     Sexual activity: Not on file     Other Topics Concern     Not on file     Social History Narrative[MA1.4]       Family History:[MA1.1]  No family history on file.[MA1.4]    ROS:  The remainder of the complete ROS was negative unless noted in the HPI.    Exam:[MA1.1]  Vital signs:[MA1.6]  Temp: 98.2  F (36.8  C) Temp src: Oral BP: 133/63   Heart Rate: 100   SpO2: 93 % O2 Device: High Flow Nasal Cannula (HFNC) Oxygen Delivery: Other (Comments) (30 LPM)[MA1.7]        General:[MA1.1] Oriented to self only, hallucinations about people on the ceiling[MA1.2]  Resp:[MA1.1] tachypneic on NFNC at 80% FiO[MA1.2]  Cardiac:[MA1.1] Paroxysmal Afib[MA1.2]  Abdomen: Soft, nontender, nondistended.[MA1.1] G tube site c/d/i; chest tube sites (2 right, 1 left) c/d/i with no air leak[MA1.2]  Extremities: No LE edema or obvious joint abnormalities  Skin: Warm and dry,[MA1.1] some bruising[MA1.2]    Labs:[MA1.1]  WBC 28.4, Hgb 10, hematocrit 31.4, Cr 1.25, K 3.4, Mg 2.9, INR 1.51[MA1.6]    Imaging:[MA1.1]  CXR 11/21:[MA1.6] Read pending: enlarged cardiac silhouette, right sided opacities[MA1.2]  AXR 11/21:[MA1.6] not completed[MA1.2]  Duplex US Bilateral lower extremities 11/21:[MA1.6] not completed[MA1.2]    EKG:[MA1.1]  Sinus tachycardia[MA1.6]     Ramandeep aDng MD       11/21/2018   1:38 AM  General Surgery PGY-1  Pager: [phone][MA1.1]    D[MA1.6]iscussed with[MA1.1] Dr. Adamson[MA1.6] on[MA1.1]  2018[MA1.6]         Revision History        User Key Date/Time User Provider Type Action    > MA1.5 2018  5:28 AM Ramandeep Dang MD Resident Sign     MA1.2 2018  5:05 AM Ramandeep Dang MD Resident      MA1.3 2018  4:10 AM Ramandeep Dang MD Resident      MA1.7 2018  3:12 AM Ramandeep Dang MD Resident      MA1.4 2018  3:10 AM Ramandeep Dang MD Resident      MA1.6 2018  3:09 AM Ramandeep Dang MD Resident      MA1.1 2018  1:37 AM Ramandeep Dang MD Resident                      Discharge Summaries      Discharge Summaries by iGta Hoffman MD at 2018 12:28 PM     Author:  Gita Hoffman MD Service:  Cardiothoracic Surgery Author Type:  Resident    Filed:  2018  3:32 PM Date of Service:  2018 12:28 PM Creation Time:  2018  9:51 AM    Status:  Cosign Needed :  Gita Hoffman MD (Resident)    Cosign Required:  Yes             NAME:[AK1.1] Myrtle Vaz[AK1.2]   MRN:[AK1.1] [id number][AK1.2]   :[AK1.1] 1945[AK1.2]      DATE OF ADMISSION:[AK1.1] 2018[AK1.2]  DATE OF DISCHARGE:[AK1.1]  2018[BJ1.1]    PREOPERATIVE DIAGNOSES:[AK1.1]   - Possible esophageal perforation  - Recent giant hiatal hernia repair with mesh   - Sepsis   - New onset Afib   - Acute kidney failure     Postoperative diagnoses:  - Distal esophageal perforation  - Purulent mediastinitis  - Septic shock   - Respiratory failure  - Acute kidney failure  - New onset A fib  - Intermittent SVT requiring DCCV x 4[AK1.2]               PROCEDURES PERFORMED:[AK1.1]   - EGD  - Laparotomy  - Takedown of Nissen fundoplication  - Takedown of cruroplasty   - Prosthetic mesh explantation  - Takedown of gastrostomy tube  - Drainage of mediastinal abscess  - Excision of hiatal hernia sac  - Transhiatal esophagogastrectomy   - Closure of esophageal hiatus   - Gastrostomy  - Jejunostomy  - Bilateral pleural tube placement   - Left neck incision and cervical  esophagostomy[AK1.2]     PATHOLOGY RESULTS:[AK1.1]   SPECIMENS:   A: Distal esophagus and stomach   B: Hernia sac   C: Mesh   D: Esophagus, distal     FINAL DIAGNOSIS:   A. DISTAL ESOPHAGUS AND STOMACH, ESOPHAGOGASTRECTOMY:   - Gastro-esophageal perforation site with ischemic and regenerative   changes   - Surgical margins features viable, unremarkable tissue   - Negative for malignancy     B. HERNIA SAC AND STOMACH, HERNIORRHAPHY AND PARTIAL GASTRECTOMY:   - Benign fibroadipose tissue with associated granulation tissue   - Stomach with no significant histologic abnormality   - Negative for malignancy     C. Soft tissue attached to mesh, excision:   - Benign fibroadipose tissue with associated granulation tissue   - Negative for malignancy     D. Esophagus, distal, segmental resection:   - Acute inflammation with focal ulceration   - No fungal organisms identified on routine stains   - Negative for malignancy[AK1.3]     CULTURE RESULTS:[AK1.1]   Urine culture: E. Coli >100,000 col/mL  Blood cx: Negative   Endotracheal sputum: Candida albicans - light growth   C.diff PCR: negative[AK1.3]     INTRAOPERATIVE COMPLICATIONS:[AK1.1] SVT requiring cardioversion x 4 at the end of the case.[AK1.3]     POSTOPERATIVE COMPLICATIONS:[AK1.1] s/p septic shock requiring vasopressin and norepinephrine gtt, respiratory failure, acute kidney failure, new onset SVT.[AK1.3]     DRAINS/TUBES PRESENT AT DISCHARGE:[AK1.1] Spitz fistula,J-tube, G-tube[AK1.3] staples[BJ1.1]      HOSPITAL COURSE:[AK1.1]   She was seen on 11/8 at [facility] for dyspnea. She was found to have a giant paraesophageal hiatal hernia and underwent workup for the same with EGD and esophagram. She was taken to OR on 11/15 for repair of the hernia. Intraoperatively she was found to have a Type 4 hiatal hernia in the posterior mediastinum containing the entirety of the transverse colon, the majority of the omentum and the transverse mesocolon, and the stomach.  She underwent laparoscopic hiatal hernia repair with mesh reinforcement, laparoscopic nissen fundoplication, and laparoscopic gastrostomy tube placement, which was complicated by pleural effusions and possible esophageal leak, with placement of chest tubes x3, course of vancomycin and zosyn. Following extubation she developed further respiratory distress, hypoxemia, and leukocytosis, as well as large fluid collection in the area of the hiatal hernia. Imaging revealed a dilated esophagus, extraluminal contrast within the chest, and re-herniation of the fundus of the stomach into the mediastinum. She was transferred to the [facility] for revision of the repair.[AK1.2]     She[AK1.3] underwent the above-named procedures.[AK1.1] P[AK1.3]ostoperatively was transferred to the[AK1.1] SICU.[AK1.3] The remainder of[AK1.1] her[AK1.3] course was[AK1.1] complicated by the above-named complications/postoperative diagnoses[AK1.3].[AK1.1] Given that she is in discontinuity[BJ1.1] Prior to discharge,[AK1.1] her[AK1.3] pain was controlled well,[AK1.1] she[AK1.3] was[AK1.1] evaluated by physical therapy who determined she is below baseline for mobility and ADLs and would benefit from additonal PT in order to increase strength, balance, activity tolerance and functional mobility with recommended discharge to TCU. She was discharge to[AK1.3] TCU[BJ1.1] in stable condition with spit fistula, J-tube, G-tube[AK1.3], and staples in midline abdominal wound[BJ1.1] in place.[AK1.3] She will also discharge on tube feeds and oxygen (at 4 LPM by nasal canula at the time of discharge).[BJ1.1]      DISCHARGE EXAM:[AK1.1]   Gen: NAD, sleeping comfortably in bed. Wakes for exam  Pulm: Nonlabored breathing on 4 LPM nasal canula  CV: Extremities wwp  Chest: incison with staples and steri strips with slight s/s drainage, c/d/i. Inferior aspect of wound packed with some gauze. Spit fistula with saliva in bag.   Abd: Abdomen soft,  "appropriately tender, G and J tube in place[BJ1.1]       DISCHARGE INSTRUCTIONS:[AK1.1]  THORACIC SURGERY DISCHARGE INSTRUCTIONS    DIET:[BJ1.1] Clears for comfort, tube feeds per tube feeding instructions[BJ1.2]    If your plans upon discharge include prolonged periods of sitting (i.e a lengthy car or plane ride), it is highly beneficial to get up and walk at least once per hour to help prevent swelling and blood clots.     You may remove chest tube dressing 48 hours after tube removal and bandage the site at your own discretion thereafter.  Small amounts of leakage are normal for 2-3 days after removal.  Feel free to call with questions.    You may get incision wet 2 days after operation. Do not submerge, soak, or scrub incision or swim until seen in follow-up.    Take incentive spirometer home for continued frequent use    Activity as tolerated    Stay hydrated. Take over the counter fiber (metamucil or benefiber) and stool softeners (Miralax, docusate or senna) if becoming constipated.     Call for fever greater than 101.5, chills, increased size of incision, red skin around incision, vision changes, muscle strength changes, sensation changes, shortness of breath, or other concerns.    No driving while taking narcotic pain medication.    Transition to ibuprofen or tylenol/acetaminophen for pain control. Do not take tylenol/acetaminophen and acetaminophen containing narcotic (e.g., percocet or vicodin) at the same time. If you have known ulcer problems, or kidney trouble (elevated creatinine) do not take the ibuprofen.    In emergencies, call 911    For other Questions or Concerns;   A.) During weekday working hours (Monday through Friday 8am to 4:30pm)   call [phone] and ask to speak to a clinical nurse specialist.     B.) At nights (after 4:30pm), on weekends, or if urgent call [phone] and   tell the  \"I would like to page job code [id number], the thoracic surgery   fellow on call, " "please.\"    G-J TUBE INSTRUCTIONS:    G TUBE: For tube feeds  -You should vent or temporarily put your tube to gravity bag (or basin) drainage if you experience nausea or bloating.  This will help to prevent reflux and vomiting.   If you are uncertain how to do this, please ask your nurse for instruction.    JTUBE: For tube feedings    Skin care:  -Change the gauze dressing around your tube daily.  -Check for redness and swelling in the area where the tube goes into your skin.   -Keep the skin around your tube dry and with a split gauze under the flange. If the hole where the tube enters your body is draining fluid, you may need to put barrier cream or antibiotic cream on the skin around your tube after you are done cleaning it. Cover the area with bandages, and call your caregiver.   -If there are no stitches holding your tube in place on your skin, gently turn the tube. This may decrease pressure on your skin, and help prevent an infection. Ask your caregiver if you should turn your tube, and how to do it correctly.     Flushes:  -Flush both the G and J tube ports with 30cc of water twice daily to ensure they do not become plugged  -After medications or tube feedings, make sure to flush with water immediately after use to prevent the tube from plugging    J TUBE INSTRUCTIONS:    Flush your feeding tube with 30cc of water;  1. After medication administration through the feeding tube  2. Before and after tube feeds  3. Every 8 hours while awake if you have not used the tube during that time      -If possible take medications by mouth or as solution via j tube (Do not put crushed pills through the tube if possible)     -Change the gauze around your tube daily or more often if soiled    -KEEP A FLEXI-TRACK (or other tube retention device) on your tube at all times to prevent incidental removal.    SPIT FISTULA INSTRUCTIONS:  Site:   Left chest spit fistula  Instructions:  SPIT FISTULA: Wound care      Order " "Comments: Plan: Esophagostomy site pouch change twice a week T/F, may empty pouch as often as needed and reattach.    Please check pouch at least once every shift (every 8 hours) and assess for pouch change.  Needed supplies  Supplies Needed  Grazyna[BJ1.1] 8931[BJ1.3] ([BJ1.1]1[BJ1.3]pc 44 mm)  Wafer- (# [id number])  High output pouch- (#[id number])  2\" Barrier ring- (#[id number])     1. Gather all supplies and remove old pouch gently.  2. Cleanse peristomal skin with warm water and soft wash cloth or gauze and dry thoroughly.  3. Cut the wafer to fit to stoma or use given pattern and apply centering the stoma.  4. Attach the pouch  5. Massage around it for better adherence    WOUND CARE INSTRUCTIONS  Site:   Midline abdominal wound  Instructions:  Staples should be removed at thoracic surgery discretion, steri strips in place to be left until they fall off spontaneously.      FOLLOW UP:  Follow up with Dr. Ybarra , at [facility], within 1 month  to evaluate after surgery. The following labs/tests are recommended:   - Chest X-ray, 2 views.   - Please follow up every 6 months thereafter with CT chest for the next 24 months and then yearly for repeat CT chest.    Appointments on [facility] and/or [facility] (with [facility] or [facility] provider or service). Call [phone] if you haven't heard regarding these appointments within 7 days of discharge.[BJ1.1]      DISCHARGE MEDICATIONS:[AK1.1]   Current Discharge Medication List      START taking these medications    Details   acetaminophen (TYLENOL) 32 mg/mL liquid 30.45 mLs (975 mg) by Per J Tube route every 8 hours  Qty: 300 mL    Associated Diagnoses: Esophageal perforation      albuterol (PROAIR HFA/PROVENTIL HFA/VENTOLIN HFA) 108 (90 Base) MCG/ACT inhaler Inhale 6 puffs into the lungs every 4 hours as needed for shortness of breath / dyspnea    Associated Diagnoses: Respiratory failure with hypoxia, unspecified chronicity (H)      amiodarone (CORDARONE) 20 mg/mL " SUSP 10 mLs (200 mg) by Per J Tube route 2 times daily    Associated Diagnoses: Paroxysmal atrial fibrillation (H)      bacitracin 500 UNIT/GM OINT Apply topically 2 times daily    Associated Diagnoses: Esophageal perforation      bisacodyl (DULCOLAX) 10 MG suppository Place 1 suppository (10 mg) rectally daily as needed for constipation  Qty: 30 suppository    Associated Diagnoses: Esophageal perforation      gabapentin (NEURONTIN) 250 MG/5ML solution Take 6 mLs (300 mg) by mouth 3 times daily  Qty: 450 mL    Associated Diagnoses: Esophageal perforation      levothyroxine (SYNTHROID) 25 mcg/mL SUSP 6 mLs (150 mcg) by Per J Tube route every morning (before breakfast)    Associated Diagnoses: Hypothyroidism, unspecified type      Lidocaine (LIDOCARE) 4 % Patch Place 1-3 patches onto the skin every 24 hours    Associated Diagnoses: Esophageal perforation      methocarbamol (ROBAXIN) 500 MG tablet Take 1 tablet (500 mg) by mouth 3 times daily  Qty: 120 tablet    Associated Diagnoses: Esophageal perforation      multivitamins w/minerals (CERTAVITE) liquid 15 mLs by Per Feeding Tube route daily    Associated Diagnoses: Esophageal perforation      naloxone (NARCAN) 0.4 MG/ML injection Inject 0.25-1 mLs (0.1-0.4 mg) into the vein as needed for opioid reversal  Qty: 0.5 mL    Associated Diagnoses: Esophageal perforation      ondansetron (ZOFRAN-ODT) 4 MG ODT tab Take 1 tablet (4 mg) by mouth every 6 hours as needed for nausea or vomiting  Qty: 120 tablet    Associated Diagnoses: Esophageal perforation      oxyCODONE (ROXICODONE) 5 MG/5ML solution Take 5-10 mLs (5-10 mg) by mouth every 4 hours as needed for moderate to severe pain  Qty: 30 mL, Refills: 0    Associated Diagnoses: Esophageal perforation      polyethylene glycol (MIRALAX/GLYCOLAX) packet 17 g by Per J Tube route 2 times daily as needed for constipation  Qty: 7 packet    Associated Diagnoses: Esophageal perforation      protein modular (PROSOURCE NO CARB) 2  packets by Per Feeding Tube route daily    Associated Diagnoses: Esophageal perforation      QUEtiapine (SEROQUEL) 25 MG tablet Take 1 tablet (25 mg) by mouth nightly as needed (Agitation)  Qty: 60 tablet    Associated Diagnoses: Esophageal perforation      sennosides (SENOKOT) 8.8 MG/5ML syrup 10 mLs by Per J Tube route nightly as needed for constipation  Qty: 105 mL    Associated Diagnoses: Esophageal perforation[BJ1.2]               Patient was seen and discussed with the thoracic surgery fellow, Dr. Simons, on the day of discharged. He communicated the findings and the plan to the attending physician, who was in agreement.    Gita Hoffman MD  General Surgery, PGY-1[BJ1.1]           Revision History        User Key Date/Time User Provider Type Action    > BJ1.2 12/7/2018  3:32 PM Gita Hoffman MD Resident Sign     BJ1.3 12/7/2018  2:23 PM Gita Hoffman MD Resident Sign     [N/A] 12/7/2018 12:28 PM Gita Hoffman MD Resident Sign     BJ1.1 12/7/2018 12:27 PM Gita Hoffman MD Resident Sign     AK1.3 12/5/2018  4:42 PM Eliseo Andrade Medical Student Share     AK1.2 12/5/2018 10:15 AM Eliseo Andrade Medical Student Share     AK1.1 12/5/2018  9:51 AM Eliseo Andrade Medical Student                      Consult Notes      Consults by Niels Mcdaniels MD at 12/3/2018  4:23 PM     Author:  Niels Mcdaniels MD Service:  Neurology Author Type:  Resident    Filed:  12/3/2018  4:48 PM Date of Service:  12/3/2018  4:23 PM Creation Time:  12/3/2018  4:23 PM    Status:  Attested :  Niels Mcdaniels MD (Resident)    Cosigner:  Cliff Brandt MD at 12/5/2018  3:26 PM        Attestation signed by Cliff Brandt MD at 12/5/2018  3:26 PM        Attestation:  Physician Attestation   I, Cliff Brandt, saw this patient with the resident and agree with the resident/fellow's findings and plan of care as documented in the note.      I personally reviewed vital signs, medications,  labs and imaging.    Key findings: patient symptoms and clinical presentation not consistent with a stroke. 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 Observe.     Cliff Brandt MD  Date of Service (when I saw the patient): 12/3/18                               [facility] [place]      Neurology Stroke Consult    Patient Name: Myrtle Vaz  : 1945 MRN#: [id number]    STROKE DATA    Stroke Code:  Stroke code not activated.  Time patient seen:[MG1.1]  2018[MG1.2] 1640  Last known normal (pt's baseline):[MG1.1]  2018[MG1.2] today 1530    TPA treatment:  Not given due to minor / isolated / quickly resolving stroke symptoms.     National Institutes of Health Stroke Scale (at presentation)  NIHSS done at:  time patient seen      Score    Level of consciousness:  (0)   Alert, keenly responsive     LOC questions:  (0)   Answers both questions correctly    LOC commands:  (0)   Performs both tasks correctly    Best gaze:  (0)   Normal    Visual:  (0)   No visual loss    Facial palsy:  (0)   Normal symmetrical movements    Motor arm (left):  (1)   Drift, questionable though    Motor arm (right):  (0)   No drift    Motor leg (left):  (0)   No drift    Motor leg (right):  (0)   No drift    Limb ataxia:  (0)   Absent    Sensory:  (0)   Normal- no sensory loss    Best language:  (0)   Normal- no aphasia    Dysarthria:  (0)   Normal    Extinction and inattention:  (0)   No abnormality        NIHSS Total Score:  1           ASSESSMENT & RECOMMENDATIONS     73F patient w hospitalized on  with s/p transhiatal esophagectomy with spit fistula, G tube, J tube on  for perforated esophagus after type 4 peraesophageal hernia repair, associated w complicated course including atrial fibrillation, septic shock, hypokalemia, mediastinal abscess, weakness and deconditioning, Acute blood loss anemia. Stroke team consulted for concern of LUE drift started today on  1530. It seems the patient has segmental myoclonus secondary to metabolic/infection(UTI, elevated wbc) process. unlikely to to be stroke. Will keep watching from neuro stands point.        Impression: metabolic myoclonus    Recommendations:  -treat infection (UTI).   -stroke team will continue to follow  -no need for imaging now.     The patient will be managed by the primary  team and  followed by the Stroke Consult service.    HPI  73F patient w hospitalized on 11/21 with s/p transhiatal esophagectomy with spit fistula, G tube, J tube on 11/21 for perforated esophagus after type 4 peraesophageal hernia repair, associated w complicated course including atrial fibrillation, septic shock, hypokalemia, mediastinal abscess, weakness and deconditioning, Acute blood loss anemia. Stroke team consulted for concern of LUE drift started today on 1530.     Upon seeing the patient, she has myoclonus/astrexis/may be drift(hard to tell as she has myoclonus). She did not notice new weakness/numbness or tingling in her hands or legs. Reviewing her chart, she has elevated wbc, UA strongly +ve for UTI. The patient hemodynamics are stable.       Pertinent Past Medical/Surgical History[MG1.1]  History reviewed. No pertinent past medical history.    Past Surgical History:   Procedure Laterality Date     ESOPHAGOSCOPY, GASTROSCOPY, DUODENOSCOPY (EGD), COMBINED N/A 11/21/2018    Procedure: COMBINED ESOPHAGOSCOPY, GASTROSCOPY, DUODENOSCOPY (EGD);  Surgeon: Temitope Bullock MD;  Location: [facility] OR     LAPAROSCOPIC HERNIORRHAPHY HIATAL N/A 11/21/2018    Procedure: Midline laparotomy, Trans-hiatal esophagogasterectomy, gastrostomy, J-tube placement, G-tube placement, bilateral pleural chest tube placement, mediastinal abcess drainage, spit fistula creation, Esophagastrodueodenoscopy;  Surgeon: Temitope Bullock MD;  Location: [facility] OR[MG1.2]       Medications: I have reviewed this patient's current medications.    Allergies: All  allergies reviewed and addressed.    Family History: This patient has no significant family history.    Social History: I have reviewed this patient's social history.      ROS:  The 10 point Review of Systems is negative other than noted in the HPI    PHYSICAL EXAMINATION  Vital Signs:  B/P: 104/58,  T: 98.6,  P: 88,  R: 18    General:  patient lying in bed without any acute distress    HEENT:  normocephalic/atraumatic  Cardio:  RRR  Pulmonary:  no respiratory distress  Abdomen:  soft, non-tender  Extremities:  no edema  Skin:  intact     Neurologic  Mental Status:  fully alert, attentive and oriented, follows commands, speech clear and fluent  Cranial Nerves:  visual fields intact, PERRL, EOMI with normal smooth pursuit, facial sensation intact and symmetric, facial movements symmetric, hearing not formally tested but intact to conversation, palate elevation symmetric and uvula midline, no dysarthria, shoulder shrug strong bilaterally, tongue protrusion midline  Motor:  no abnormal movements, normal tone throughout, normal muscle bulk, normal and symmetric rapid finger tapping, able to move all limbs spontaneously, strength 5/5 throughout upper and lower extremities  Reflexes:  2+ and symmetric throughout  Sensory:  intact/symmetric to light touch and pin prick throughout upper and lower extremities  Coordination:  FNF and HS intact without dysmetria  Station/Gait:  unable to test    Labs  Labs and Imaging reviewed and used in developing the plan; pertinent results included.[MG1.1]     Lab Results   Component Value Date    GLC 98 12/03/2018[MG1.2]       The patient was discussed with the Fellow, Dr. Lafleur.  The staff is Dr. Brandt.    Niels Mcdaniels  Neurology resident   Pager: [phone][MG1.1]       Revision History        User Key Date/Time User Provider Type Action    > MG1.2 12/3/2018  4:48 PM Niels Mcdaniels MD Resident Sign     MG1.1 12/3/2018  4:23 PM Niels Mcdaniels MD Resident             Consults by  Ousmane Ramos MD at 11/27/2018  5:00 PM     Author:  Ousmane Ramos MD Service:  Anesthesiology Author Type:  Resident    Filed:  11/27/2018  6:28 PM Date of Service:  11/27/2018  5:00 PM Creation Time:  11/27/2018  6:25 PM    Status:  Attested :  Ousmane Ramos MD (Resident)    Cosigner:  Gustavo Land MD at 11/28/2018  6:43 AM         Consult Orders:    1. [facility] Adult IP Consult: Patient to be seen: Routine - within 24 hours; Call back #: [id number] SICU ASCOM PHONE; Post operative pain. Evaluate for block.; Consultant may enter orders: Yes [[id number]] ordered by Tami Matos MD at 11/27/18 1632           Attestation signed by Gustavo Land MD at 11/28/2018  6:43 AM        Physician Attestation   I agree with the information in this note.    Gustavo Land                                   Enter note in consult tab; click [facility] Consult box to link w/ order  [facility] Consultation  DATE OF CONSULT VISIT: 11/27/2018    REASON FOR PAIN CONSULTATION: subacute pain      Please See detailed consultation note dated 11/27/2018 by Dr. Martinez regarding the plan for Ms. Vaz's pain management. We will plan to follow this patient while she remains in the hospital and will re-evaluate her needs daily.     Thank you for involving our team in the care of this patient.     Plan discussed with Dr. Gustavo Land, staff Anesthesiologist.     Ousmane Ramos MD  [facility]  [phone][SN1.1]     Revision History        User Key Date/Time User Provider Type Action    > SN1.1 11/27/2018  6:28 PM Ousmane Ramos MD Resident Sign                     Progress Notes - Physician (Notes for yesterday and today)      Progress Notes by Sia Gamino MD at 12/7/2018  9:43 AM     Author:  Sia Gamino MD Service:  Thoracic Surgery Author Type:  Resident    Filed:  12/7/2018  9:50 AM Date of Service:  12/7/2018  9:43 AM Creation Time:  12/7/2018  9:43 AM     Status:  Signed :  Sia Gamino MD (Resident)         Thoracic Surgery Progress Note    Pt: Myrtle Vaz  MRN: [id number][JH1.1]   12/07/2018[JH1.2]    S: No acute events overnight. Pain severe but stable from previous, controlled with medications. Sleeping comfortably. Voiding independently. +BMx4. Tolerated bolus tube feeds, no n/v.[JH1.1]     Temp:  [97.6  F (36.4  C)-98.3  F (36.8  C)] 98.1  F (36.7  C)  Pulse:  [64] 64  Heart Rate:  [59-77] 77  Resp:  [16-20] 16  BP: (108-124)/(52-72) 124/54  SpO2:  [90 %-95 %] 92 %    I/O last 3 completed shifts:  In: 1965 [P.O.:30; NG/GT:1485]  Out: 645 [Urine:400; Drains:245]    Resp: 16[JH1.2]    Physical Exam:  Gen: NAD, sleeping comfortably in bed. Wakes for exam  Pulm: Nonlabored breathing on 4 LPM nasal canula  CV: Extremities wwp  Chest: incison with staples and steri strips with slight s/s drainage, c/d/i. Spit fistula with saliva in bag.   Abd: Abdomen soft, appropriately tender, G and J tube in place    Labs:[JH1.1]     Recent Labs  Lab 12/07/18  0441 12/06/18  0458 12/05/18  0522   WBC 7.9 9.5 9.2   HGB 8.5* 8.9* 8.5*   * 887* 879*       Recent Labs  Lab 12/07/18  0441 12/06/18  0458 12/05/18  0522 12/04/18  0441    140 142 144   POTASSIUM 3.9 3.9 4.0 3.7   CHLORIDE 103 102 104 104   CO2 29 30 35* 32   BUN 37* 43* 46* 50*   CR 0.82 0.96 0.96 0.99   * 92 85 105*   JOSE 7.4* 7.8* 7.5* 7.4*   MAG 2.3  --  2.4* 2.3   PHOS 2.6  --  4.2 4.4[JH1.2]     CT chest 12/6: IMPRESSION:   1. No significant change in small bilateral pleural effusions with  subjacent atelectasis. Fluid seen extending along the posterior  mediastinum on prior has resolved.  2. Resolved pneumothoraces since prior.  3. Layering sludge and/or gallstones in the gallbladder.    AM CXR pending     A/P: 73F s/p transhiatal esophagectomy with spit fistula, G tube, J tube on 11/21 for perforated esophagus after type 4 peraesophageal hernia repair.  Leukocytosis from unknown etiology,  normalized. Patient feeling well today aside from pain, AVSS, labs stable.     -Potentially will need tap of pleural effusion  -Wean O2 as tolerated  -PO pain meds   -Bolus tube feeds, managed per nutrition - continue tube feeds through G tube  -1 L/day free water divided q4h  -encourage ambulation  -Social work putting in referral for TCU that will accept spit fistula, discharge today/tomorrow pending TCU bed availability    Plan to be discussed with staff.  Sia Gamino MD  Surgery PGY1[JH1.1]      Revision History        User Key Date/Time User Provider Type Action    > JH1.2 12/7/2018  9:50 AM Sia Gamino MD Resident Sign     JH1.1 12/7/2018  9:43 AM Sia Gamino MD Resident             Progress Notes by Gita Hoffman MD at 12/6/2018  6:31 AM     Author:  Gita Hoffman MD Service:  Thoracic Surgery Author Type:  Resident    Filed:  12/6/2018 11:28 AM Date of Service:  12/6/2018  6:31 AM Creation Time:  12/6/2018  6:31 AM    Status:  Attested :  Gita Hoffman MD (Resident)    Cosigner:  Temitope Bullock MD at 12/7/2018  1:20 AM        Attestation signed by Temitope Bullock MD at 12/7/2018  1:20 AM        STAFF ADDENDUM:  I saw and evaluated Ms. Vaz and agree with the resident s findings and plan of care as documented in the resident s note and edited by me, as applicable.      Doing well. Chest CT showed minimal bilateral effusions, no need for thoracentesis. Pending TCU placement for strengthening. Continue TF at goal and PT.   Temitope Randle mD                                   Thoracic Surgery Progress Note    Pt: Myrtle Vaz  MRN: [id number][JH1.1]   12/06/2018[JH1.2]    S: No acute events overnight. Pain[JH1.1] severe but stable from previous, controlled with medications[BJ1.1].[JH1.1] Did not sleep well.[BJ1.1] Voiding independently. +BMx2.[JH1.1] Tolerated bolus tube feeds changed yesterday without issue. Concerns from RN about excessive gaping of chest tube wounds.  No additional concerns.[BJ1.1]    Temp:  [97.4  F (36.3  C)-99.1  F (37.3  C)] 97.9  F (36.6  C)  Heart Rate:  [59-80] 71  Resp:  [18] 18  BP: (108-124)/(58-74) 123/64  SpO2:  [92 %-100 %] 92 %    I/O last 3 completed shifts:  In: 2140 [P.O.:120; NG/GT:910]  Out: 2150 [Urine:1150; Drains:550; Other:450]    Resp: 18[JH1.2]    Physical Exam:  Gen: NAD, sleeping comfortably in bed. Wakes for exam  Pulm: Nonlabored breathing on 4 LPM nasal canula  CV: Regular rate and rhythm.  Chest: incison c/d/i[JH1.1] with staples in place[BJ1.1], spit fistula with saliva in bag, drain dressing with minimal s/s drainage[JH1.1]. Steri strips placed on chest tube wound at bedside[BJ1.1]  Abd: Abdomen soft, appropriately tender, G and J tube in place    Labs:[JH1.1]     Recent Labs  Lab 12/06/18  0458 12/05/18  0522 12/04/18  0441   WBC 9.5 9.2 11.3*   HGB 8.9* 8.5* 8.2*   * 879* 948*[JH1.2]       Recent Labs  Lab 12/06/18  0458 12/05/18  0522 12/04/18  0441 12/03/18  0451    142 144 142   POTASSIUM 3.9 4.0 3.7 3.6   CHLORIDE 102 104 104 104   CO2 30 35* 32 32   BUN 43* 46* 50* 46*   CR 0.96 0.96 0.99 0.98   GLC 92 85 105* 98   JOSE 7.8* 7.5* 7.4* 7.5*   MAG  --  2.4* 2.3 2.2   PHOS  --  4.2 4.4 4.1[JH1.3]     AM XR chest 2 views:  IMPRESSION:  Bibasilar pleural effusions appear more loculated today with  associated bibasilar atelectasis versus consolidation    AM CT Chest w/o contrast pending.[BJ1.1]      A/P: 73F s/p transhiatal esophagectomy with spit fistula, G tube, J tube on 11/21 for perforated esophagus after type 4 peraesophageal hernia repair.  Leukocytosis from unknown etiology[JH1.1], inormalized over the past 2 days[BJ1.1]. Patient feeling well today aside from pain, AVSS, labs stable.[JH1.1] Going for CT chest today to evaluate pleural effusion.    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 Simons[BJ1.1], will discuss with staff.[JH1.1]    Gita Hoffman MD  General Surgery PGY-1[BJ1.1]     Revision History        User Key Date/Time User Provider Type Action    > BJ1.1 2018 11:28 AM Gita Hoffman MD Resident Sign     JH1.3 2018  6:33 AM Sia Gamino MD Resident Share     JH1.2 2018  6:32 AM Sia Gamino MD Resident      JH1.1 2018  6:31 AM Sia Gamino MD Resident                      Procedure Notes      Procedures by Nicky Chun MD at 2018  6:15 PM     Author:  Nicky Chun MD Service:  Thoracic Surgery Author Type:  Resident    Filed:  2018  6:21 PM Date of Service:  2018  6:15 PM Creation Time:  2018  6:15 PM    Status:  Signed :  Nicky Chun MD (Resident)     Procedures:    1. BRONCHOSCOPY [[id number] (Custom)]               THORACIC SURGERY BEDSIDE PROCEDURE   NAME:  Myrtle Vaz   MRN:  [id number]   :  1945     Diagnosis:  Respiratory failure   Procedure: Flexible bronchoscopy     Specimen: none    Premedication: Pt intubated and sedated prior to procedure  Procedure Meds: 1% topical lidocaine solution at the kelin    Procedure: The patient was positioned supine. The bronchoscope was passed into the distal trachea via ET tube without difficulty.  Bilateral tracheobronchial trees were inspected closely to the level of the subsegmental bronchi. Secretions were found to be  more prominent in LLL. These were evacuated without difficulty. The procedure was completed and the patient tolerated the procedure well without  complications.      Dr. Per Funes was present throughout the entire procedure.      Nicky Park MD PGY3  General Surgery[SS1.1]               Revision History        User Key Date/Time User Provider Type Action    > SS1.1 11/26/2018  6:21 PM Nicky Chun MD Resident Sign            Procedures by George Mcmillan MD at 11/24/2018  2:33 PM     Author:  George Mcmillan MD Service:  Surgical ICU Author Type:  Resident    Filed:  11/25/2018 11:44 AM Date of Service:  11/24/2018  2:33 PM Creation Time:  11/24/2018  2:33 PM    Status:  Cosign Needed :  George Mcmillan MD (Resident)    Cosign Required:  Yes         Procedures:    1. BRONCHOSCOPY [[id number] (Custom)]                 ICU BRONCHOSCOPY    Procedure(s):    Therapeutic suctioniong    Indication:  Hypoxemia    Procedure Details:     The bronchoscope was inserted through the mouth via ETT.    Airway Examination:  A complete airway examination was performed from the distal trachea to the subsegmental level in each lobe of both lungs.  Pertinent findings include: Thick secretions left greater than right                                                                                                                                                                              Therapeutic suctioning was performed.  Specimens were not sent to the lab.    Any disposable equipment was visually inspected and deemed to be intact immediately post procedure.        ==========================================================    Attending of Record: Dr. Albarado    Trainee(s) Present: Dr. Guidry, Dr. Mcmillan    Medications:    8 ml 4% lidocaine (intratracheal)   2  mg of IV versed  100 mcg of fentanyl    Sedation Time: Total sedation time was 15 minutes of continuous bedside 1:1 monitoring.     Time Out:  Performed by verifying the correct patient, correct procedure, and ensuring that all equipment / support staff are present.      The patient's medical record has been reviewed.  The indication for the procedure was reviewed.  The necessary history and physical examination was performed and reviewed.  The risks, benefits and alternatives of the procedure were discussed with the the patient's representative in detail and questions were answered to the best of my ability.  Verbal consent was obtained by Dr. Albarado.  Written consent was not obtained.  This procedure was  emergent / urgent.  The proposed procedure and the patient's identification were verified prior to the procedure by the physician and the nurse, respiratory therapist.    Bronchoscopy Risk Assessment Guidelines:      A. Patient symptoms to consider when assessing pulmonary TB risk are:    I. Cough greater than 3 weeks; and fever, hemoptysis, pleuritic chest    pain, weight loss greater than 10 lbs, night sweats, fatigue, infiltrates on    upper lobes or superior segments of lower lobes, cavitation on chest    x-ray.   B. Patient risk factors to consider when assessing pulmonary TB risk are:    I. Exposure to known TB case, foreign-born persons (within 5 years of    arrival to [place]), residence in a crowded setting (correctional facility,     long-term care center, etc.), persons with HIV or immunosuppression.    A Tuberculosis risk assessment was performed:   This patient has NO KNOWN RISK of Tuberculosis (proceed with bronchoscopy)    The procedure was performed in a negative airflow room: Yes    The patient was assessed for the adequacy for the procedure and to receive medications.     Mental Status:  RASS -1  Airway examination:  Intubated  Pulmonary:  Decreased breathsounds in bilateral bases  CV:  RRR, no murmurs or gallops      Immediately before administration of medications the patient was re-assessed for adequacy to receive sedatives including the heart rate, respiratory rate, mental status, oxygen saturation, blood pressure and adequacy of pulmonary ventilation. These  "same parameters were continuously monitored throughout the procedure.      George Mcmillan  Anesthesiology Resident, PGY-2   [facility]   [phone]  11/24/20182:38 PM[FJ1.1]         Revision History        User Key Date/Time User Provider Type Action    > [N/A] 11/25/2018 11:44 AM George Mcmillan MD Resident Sign     FJ1.1 11/24/2018  2:38 PM George Mcmillan MD Resident Sign                  Progress Notes - Therapies (Notes from 12/04/18 through 12/07/18)     No notes of this type exist for this encounter.                                          INTERAGENCY TRANSFER FORM - LAB / IMAGING / EKG / EMG RESULTS   11/21/2018                       [address] [facility] BANK: [phone]            Unresulted Labs (24h ago through future)    Start       Ordered    12/03/18 0400  Comprehensive metabolic panel  (Every Monday)  EVERY MONDAY,   Routine      11/29/18 2135    12/03/18 0400  INR  (Every Monday)  EVERY MONDAY,   Routine      11/29/18 2135    12/03/18 0400  Magnesium  (Every Monday)  EVERY MONDAY,   Routine      11/29/18 2135    12/03/18 0400  Phosphorus  (Every Monday)  EVERY MONDAY,   Routine      11/29/18 2135    12/03/18 0400  Prealbumin  (Every Monday)  EVERY MONDAY,   Routine      11/29/18 2135    11/30/18 0400  Magnesium  (EVERY WED/FRI  - AM Draw)  EVERY W,FR,   Routine      11/29/18 2135    11/30/18 0400  Phosphorus  (EVERY WED/FRI  - AM Draw)  EVERY W,FR,   Routine      11/29/18 2135    Unscheduled  Blood gas arterial with oxyhemoglobin (1200)  CONDITIONAL (SPECIFY),   Routine      11/29/18 2135    Unscheduled  Magnesium  (Magnesium Replacement - Adult - \"High\" - Replacement for all levels less than or equal to 2 mg/dL)  CONDITIONAL (SPECIFY),   Routine     Comments:  Obtain Magnesium Level for these conditions:  *IF no magnesium result within 24 hrs before initiation of order set, draw magnesium level with next lab collect.    *2 HOURS AFTER last magnesium replacement " "dose when magnesium replacement given for level less than 1.6  *Next morning after magnesium dose.     Repeat Magnesium Replacement if necessary.    11/29/18 2135    Unscheduled  Phosphorus  (POTASSIUM Phosphate - \"High\" - Replacement for all levels less than 2.8 mg/dL )  CONDITIONAL (SPECIFY),   Routine     Comments:  Obtain Phosphorus Level for these conditions:  *IF no phosphorus result within 24 hrs before initiation of order set, draw phosphorus level with next lab collect.    *2 HOURS AFTER last phosphorus replacement dose when phosphorus replacement given for level less than 2.0  *Next morning after phosphorus dose.     Repeat Phosphorus Replacement if necessary.    11/29/18 2135    Unscheduled  Potassium  (Potassium Replacement - \"High\" - Replacement for all levels less than 4.1 mmol/L - UU,UR,UA,RH,SH,PH,WY )  CONDITIONAL (SPECIFY),   Routine     Comments:  Obtain Potassium Level for these conditions:  *IF no potassium result within 24 hrs before initiation of order set, draw potassium level with next lab collect.    *2 HOURS AFTER last IV potassium replacement dose and 4 hours after an oral replacement dose when potassium replacement given for level less than 3.4.  *Next morning after potassium dose.     Repeat Potassium Replacement if necessary.    11/29/18 2135         Lab Results - 3 Days      Magnesium [[id number]]  Resulted: 12/07/18 0543, Result status: Final result    Ordering provider: Denise Nagel MD  12/06/18 2200 Resulting lab: [facility]    Specimen Information    Type Source Collected On   Blood  12/07/18 0441          Components       Value Reference Range Flag Lab   Magnesium 2.3 1.6 - 2.3 mg/dL  51            Phosphorus [[id number]]  Resulted: 12/07/18 0543, Result status: Final result    Ordering provider: Denise Nagel MD  12/06/18 2200 Resulting lab: [facility]    Specimen Information    Type " Source Collected On   Blood  12/07/18 0441          Components       Value Reference Range Flag Lab   Phosphorus 2.6 2.5 - 4.5 mg/dL  51            Basic metabolic panel [[id number]] (Abnormal)  Resulted: 12/07/18 0543, Result status: Final result    Ordering provider: Sia Gamino MD  12/06/18 2200 Resulting lab: [facility]    Specimen Information    Type Source Collected On   Blood  12/07/18 0441          Components       Value Reference Range Flag Lab   Sodium 140 133 - 144 mmol/L  51   Potassium 3.9 3.4 - 5.3 mmol/L  51   Chloride 103 94 - 109 mmol/L  51   Carbon Dioxide 29 20 - 32 mmol/L  51   Anion Gap 9 3 - 14 mmol/L  51   Glucose 138 70 - 99 mg/dL H 51   Urea Nitrogen 37 7 - 30 mg/dL H 51   Creatinine 0.82 0.52 - 1.04 mg/dL  51   GFR Estimate 69 >60 mL/min/1.7m2  51   Comment:  Non  GFR Calc   GFR Estimate If Black 83 >60 mL/min/1.7m2  51   Comment:  African American GFR Calc   Calcium 7.4 8.5 - 10.1 mg/dL L 51            CBC with platelets [[id number]] (Abnormal)  Resulted: 12/07/18 0530, Result status: Final result    Ordering provider: Gita Hoffman MD  12/06/18 2200 Resulting lab: [facility]    Specimen Information    Type Source Collected On   Blood  12/07/18 0441          Components       Value Reference Range Flag Lab   WBC 7.9 4.0 - 11.0 10e9/L  51   RBC Count 2.67 3.8 - 5.2 10e12/L L 51   Hemoglobin 8.5 11.7 - 15.7 g/dL L 51   Hematocrit 28.2 35.0 - 47.0 % L 51    78 - 100 fl H 51   MCH 31.8 26.5 - 33.0 pg  51   MCHC 30.1 31.5 - 36.5 g/dL L 51   RDW 20.9 10.0 - 15.0 % H 51   Platelet Count 843 150 - 450 10e9/L H 51            Glucose by meter [[id number]]  Resulted: 12/06/18 2350, Result status: Final result    Ordering provider: Denise Nagel MD  12/06/18 6528 Resulting lab: POINT OF CARE TEST, GLUCOSE    Specimen Information    Type Source Collected On     12/06/18 5618          Components        Value Reference Range Flag Lab   Glucose 94 70 - 99 mg/dL  170            Glucose by meter [[id number]]  Resulted: 12/06/18 2007, Result status: Final result    Ordering provider: Denise Nagel MD  12/06/18 1956 Resulting lab: POINT OF CARE TEST, GLUCOSE    Specimen Information    Type Source Collected On     12/06/18 1956          Components       Value Reference Range Flag Lab   Glucose 87 70 - 99 mg/dL  170            Glucose by meter [[id number]] (Abnormal)  Resulted: 12/06/18 1727, Result status: Final result    Ordering provider: Denise Nagel MD  12/06/18 1715 Resulting lab: POINT OF CARE TEST, GLUCOSE    Specimen Information    Type Source Collected On     12/06/18 1715          Components       Value Reference Range Flag Lab   Glucose 150 70 - 99 mg/dL H 170            Glucose by meter [[id number]] (Abnormal)  Resulted: 12/06/18 1651, Result status: Final result    Ordering provider: Denise Nagel MD  12/06/18 1639 Resulting lab: POINT OF CARE TEST, GLUCOSE    Specimen Information    Type Source Collected On     12/06/18 1639          Components       Value Reference Range Flag Lab   Glucose 63 70 - 99 mg/dL L 170            Glucose by meter [[id number]] (Abnormal)  Resulted: 12/06/18 0933, Result status: Final result    Ordering provider: Denise Nagel MD  12/06/18 0921 Resulting lab: POINT OF CARE TEST, GLUCOSE    Specimen Information    Type Source Collected On     12/06/18 0921          Components       Value Reference Range Flag Lab   Glucose 112 70 - 99 mg/dL H 170            Basic metabolic panel [[id number]] (Abnormal)  Resulted: 12/06/18 0556, Result status: Final result    Ordering provider: Sia Gamino MD  12/05/18 2200 Resulting lab: [facility]    Specimen Information    Type Source Collected On   Blood  12/06/18 0458          Components       Value Reference Range Flag Lab   Sodium 140 133 - 144  mmol/L  51   Potassium 3.9 3.4 - 5.3 mmol/L  51   Chloride 102 94 - 109 mmol/L  51   Carbon Dioxide 30 20 - 32 mmol/L  51   Anion Gap 8 3 - 14 mmol/L  51   Glucose 92 70 - 99 mg/dL  51   Urea Nitrogen 43 7 - 30 mg/dL H 51   Creatinine 0.96 0.52 - 1.04 mg/dL  51   GFR Estimate 57 >60 mL/min/1.7m2 L 51   Comment:  Non  GFR Calc   GFR Estimate If Black 69 >60 mL/min/1.7m2  51   Comment:  African American GFR Calc   Calcium 7.8 8.5 - 10.1 mg/dL L 51            CBC with platelets [[id number]] (Abnormal)  Resulted: 12/06/18 0540, Result status: Final result    Ordering provider: Gita Hoffman MD  12/05/18 2200 Resulting lab: [facility]    Specimen Information    Type Source Collected On   Blood  12/06/18 0458          Components       Value Reference Range Flag Lab   WBC 9.5 4.0 - 11.0 10e9/L  51   RBC Count 2.82 3.8 - 5.2 10e12/L L 51   Hemoglobin 8.9 11.7 - 15.7 g/dL L 51   Hematocrit 29.7 35.0 - 47.0 % L 51    78 - 100 fl H 51   MCH 31.6 26.5 - 33.0 pg  51   MCHC 30.0 31.5 - 36.5 g/dL L 51   RDW 20.3 10.0 - 15.0 % H 51   Platelet Count 887 150 - 450 10e9/L H 51            Magnesium [[id number]] (Abnormal)  Resulted: 12/05/18 0605, Result status: Final result    Ordering provider: Denise Nagel MD  12/04/18 2200 Resulting lab: [facility]    Specimen Information    Type Source Collected On   Blood  12/05/18 0522          Components       Value Reference Range Flag Lab   Magnesium 2.4 1.6 - 2.3 mg/dL H 51            Phosphorus [[id number]]  Resulted: 12/05/18 0605, Result status: Final result    Ordering provider: Denise Nagel MD  12/04/18 2200 Resulting lab: [facility]    Specimen Information    Type Source Collected On   Blood  12/05/18 0522          Components       Value Reference Range Flag Lab   Phosphorus 4.2 2.5 - 4.5 mg/dL  51            Basic metabolic panel  [[id number]] (Abnormal)  Resulted: 12/05/18 0605, Result status: Final result    Ordering provider: Sia Gamino MD  12/04/18 2200 Resulting lab: [facility]    Specimen Information    Type Source Collected On   Blood  12/05/18 0522          Components       Value Reference Range Flag Lab   Sodium 142 133 - 144 mmol/L  51   Potassium 4.0 3.4 - 5.3 mmol/L  51   Chloride 104 94 - 109 mmol/L  51   Carbon Dioxide 35 20 - 32 mmol/L H 51   Anion Gap 4 3 - 14 mmol/L  51   Glucose 85 70 - 99 mg/dL  51   Urea Nitrogen 46 7 - 30 mg/dL H 51   Creatinine 0.96 0.52 - 1.04 mg/dL  51   GFR Estimate 57 >60 mL/min/1.7m2 L 51   Comment:  Non  GFR Calc   GFR Estimate If Black 69 >60 mL/min/1.7m2  51   Comment:  African American GFR Calc   Calcium 7.5 8.5 - 10.1 mg/dL L 51            CBC with platelets [[id number]] (Abnormal)  Resulted: 12/05/18 0546, Result status: Final result    Ordering provider: Gita Hoffman MD  12/04/18 2200 Resulting lab: [facility]    Specimen Information    Type Source Collected On   Blood  12/05/18 0522          Components       Value Reference Range Flag Lab   WBC 9.2 4.0 - 11.0 10e9/L  51   RBC Count 2.71 3.8 - 5.2 10e12/L L 51   Hemoglobin 8.5 11.7 - 15.7 g/dL L 51   Hematocrit 28.6 35.0 - 47.0 % L 51    78 - 100 fl H 51   MCH 31.4 26.5 - 33.0 pg  51   MCHC 29.7 31.5 - 36.5 g/dL L 51   RDW 20.3 10.0 - 15.0 % H 51   Platelet Count 879 150 - 450 10e9/L H 51            Urine Culture Aerobic Bacterial [[id number]] (Abnormal)  Resulted: 12/04/18 2043, Result status: Final result    Ordering provider: Denise Nagel MD  12/02/18 2326 Resulting lab: [facility]    Specimen Information    Type Source Collected On   Midstream Urine  12/02/18 2326          Components       Value Reference Range Flag Lab   Specimen Description Midstream Urine      Special Requests Specimen received in  preservative   75   Culture Micro --  A 75   Result:         >100,000 colonies/mL  Escherichia coli              Magnesium [[id number]]  Resulted: 12/04/18 1324, Result status: Final result    Ordering provider: Sia Gamino MD  12/04/18 0441 Resulting lab: [facility]    Specimen Information    Type Source Collected On     12/04/18 0441          Components       Value Reference Range Flag Lab   Magnesium 2.3 1.6 - 2.3 mg/dL  51            Phosphorus [[id number]]  Resulted: 12/04/18 1324, Result status: Final result    Ordering provider: Sia Gamino MD  12/04/18 0441 Resulting lab: [facility]    Specimen Information    Type Source Collected On     12/04/18 0441          Components       Value Reference Range Flag Lab   Phosphorus 4.4 2.5 - 4.5 mg/dL  51            Basic metabolic panel [[id number]] (Abnormal)  Resulted: 12/04/18 0529, Result status: Final result    Ordering provider: Sia Gamino MD  12/03/18 2200 Resulting lab: [facility]    Specimen Information    Type Source Collected On   Blood  12/04/18 0441          Components       Value Reference Range Flag Lab   Sodium 144 133 - 144 mmol/L  51   Potassium 3.7 3.4 - 5.3 mmol/L  51   Chloride 104 94 - 109 mmol/L  51   Carbon Dioxide 32 20 - 32 mmol/L  51   Anion Gap 7 3 - 14 mmol/L  51   Glucose 105 70 - 99 mg/dL H 51   Urea Nitrogen 50 7 - 30 mg/dL H 51   Creatinine 0.99 0.52 - 1.04 mg/dL  51   GFR Estimate 55 >60 mL/min/1.7m2 L 51   Comment:  Non  GFR Calc   GFR Estimate If Black 67 >60 mL/min/1.7m2  51   Comment:  African American GFR Calc   Calcium 7.4 8.5 - 10.1 mg/dL L 51            CBC with platelets [[id number]] (Abnormal)  Resulted: 12/04/18 0510, Result status: Final result    Ordering provider: Gita Hoffman MD  12/03/18 2200 Resulting lab: [facility]    Specimen Information    Type Source Collected On    Blood  12/04/18 0441          Components       Value Reference Range Flag Lab   WBC 11.3 4.0 - 11.0 10e9/L H 51   RBC Count 2.61 3.8 - 5.2 10e12/L L 51   Hemoglobin 8.2 11.7 - 15.7 g/dL L 51   Hematocrit 27.2 35.0 - 47.0 % L 51    78 - 100 fl H 51   MCH 31.4 26.5 - 33.0 pg  51   MCHC 30.1 31.5 - 36.5 g/dL L 51   RDW 19.9 10.0 - 15.0 % H 51   Platelet Count 948 150 - 450 10e9/L H 51            Testing Performed By     Lab - Abbreviation Name Director Address Valid Date Range    51 - Unknown [facility] Unknown [address] [address] 12/31/14 1010 - Present    75 - Unknown [facility] Unknown [address] [facility] [address] 01/15/15 1019 - Present    170 - Unknown POINT OF CARE TEST, GLUCOSE Unknown Unknown 10/31/11 1114 - Present               Imaging Results - 3 Days      XR Chest 2 Views [[id number]]  Resulted: 12/07/18 1210, Result status: Final result    Ordering provider: Sia Gamino MD  12/07/18 0008 Resulted by: Addie Adams MD Dolan, Robert J, MD    Performed: 12/07/18 0842 - 12/07/18 0848 Resulting lab: RADIOLOGY RESULTS    Narrative:       EXAM:  XR CHEST 2 VW    INDICATION: chest tube interval eval;     COMPARISON:  Chest CT dated 12/6/2018, x-ray 12/6/2018    FINDINGS:  PA and lateral views of the chest. Changes from esophagogastrectomy  with clips projecting over the left upper chest. Clips also projecting  over the right upper quadrant. Colon underneath of the right  hemidiaphragm. Bilateral pleural effusion with loculated component and  associated compressive atelectasis. Trachea is midline.  Cardiomediastinal silhouette is stable.      Impression:       IMPRESSION:  Bibasilar pleural effusions with loculated component and associated  bibasilar atelectasis versus consolidation.    I have personally reviewed the examination and initial interpretation  and I agree with the findings.    ADDIE ADAMS MD       CT Chest w/o Contrast [[id number]]  Resulted: 12/06/18 1434, Result status: Final result    Ordering provider: Guero Sebastian PA-C  12/06/18 1009 Resulted by: Addie Stuart MD Smith, Christopher Aaron, DO    Performed: 12/06/18 1050 - 12/06/18 1105 Resulting lab: RADIOLOGY RESULTS    Narrative:       EXAMINATION: CT CHEST W/O CONTRAST  12/6/2018 11:05 AM      CLINICAL HISTORY: Bilateral pleural effusions.    COMPARISON: CT chest/abdomen/pelvis 11/26/2018.    TECHNIQUE: CT imaging obtained through the chest without intravenous  contrast. Coronal and axial MIP reformatted images obtained.    FINDINGS:  Endotracheal tube is been removed. Tracheal bronchial tree is  otherwise clear. Pneumothoraces seen on prior examination have  resolved. Small bilateral pleural effusions with subjacent compressive  atelectasis not significantly changed. Mild paraseptal emphysema of  the upper lobes.    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 Fluid seen extending along the  posterior mediastinum on prior examination has resolved.    Heart size is not enlarged. Moderate coronary artery calcium. Mild  calcifications of the aortic valve. No pericardial effusion. Right IJ  catheter has been removed.    Bones and soft tissues: No suspicious bone findings.     Partially imaged upper abdomen: Limited. Partially visualized  percutaneous nephrostomy tube in the stomach. Layering debris  gallbladder. Concentrated foci of oral contrast seen in the large  bowel. Extensive fatty contents seen in the transverse and right  hemicolon. Splenectomy.      Impression:       IMPRESSION:   1. No significant change in small bilateral pleural effusions with  subjacent atelectasis. Fluid seen extending along the posterior  mediastinum on prior has resolved.  2. Resolved pneumothoraces since prior.  3. Layering sludge and/or gallstones in the gallbladder.    I have personally  reviewed the examination and initial interpretation  and I agree with the findings.    ADDIE STUART MD      XR Chest 2 Views [[id number]]  Resulted: 12/06/18 1124, Result status: Final result    Ordering provider: Sia Gamino MD  12/06/18 0011 Resulted by: Addie Stuart MD Dolan, Robert J, MD    Performed: 12/06/18 0956 - 12/06/18 1001 Resulting lab: RADIOLOGY RESULTS    Narrative:       EXAM:  XR CHEST 2 VW    INDICATION: chest tube interval eval;     COMPARISON:  12/5/2018    FINDINGS:  PA and lateral views of the chest. Changes from esophagogastrectomy  with clips projecting over the area spit fistula. Clips also project  in the abdomen with barium densities projecting over the hepatic  flexure of the colon. Bibasilar pleural effusions with some loculated  component and with associated opacities. Trachea is midline      Impression:       IMPRESSION:  Bibasilar pleural effusions appear more loculated today with  associated bibasilar atelectasis versus consolidation    I have personally reviewed the examination and initial interpretation  and I agree with the findings.    ADDIE STUART MD      XR Chest 2 Views [[id number]]  Resulted: 12/05/18 0913, Result status: Final result    Ordering provider: Sia Gamino MD  12/05/18 0012 Resulted by: Madai Matthew MD    Performed: 12/05/18 0838 - 12/05/18 0849 Resulting lab: RADIOLOGY RESULTS    Narrative:       Exam: XR CHEST 2 VW, 12/5/2018 8:49 AM    Indication: chest tube interval eval     Comparison: Chest x-ray 12/4/2018    Findings:   Low lung volumes. Heart size unchanged. Stable small bilateral pleural  effusions with bibasilar opacities. No pneumothorax.      Impression:       Impression: Stable small bilateral pleural effusions with bibasilar  atelectasis versus consolidation.    MADAI MATTHEW MD      XR Chest 2 Views [[id number]]  Resulted: 12/04/18 1312, Result status: Final result    Ordering provider: Sia Gamino MD  12/04/18 0009 Resulted  by: Addie Stuart MD Dolan, Robert J, MD    Performed: 18 0820 - 18 0839 Resulting lab: RADIOLOGY RESULTS    Narrative:       EXAM:  XR CHEST 2 VW    INDICATION: chest tube interval eval;  history of laparoscopic hiatal  hernia repair on 2018 and partial esophagogastrectomy and spit  fistula creation    COMPARISON:  20/3/2018, 2018, CT 2020    FINDINGS:  PA and lateral views of the chest. No chest tube is seen on this  imaging. Staple line over the left upper neck with spit fistula.  Cardiomediastinal silhouette is stable in appearance. Small bibasilar  pleural effusions with loculated component and adjacent streaky  opacities. No pneumothorax. Upper abdomen is unremarkable. Small  amount of barium within the hepatic flexure of the colon.      Impression:       IMPRESSION:  Stable appearance of mild to moderate left-sided pleural effusion with  associated atelectasis. Right streaky atelectasis as well.    I have personally reviewed the examination and initial interpretation  and I agree with the findings.    ADDIE STUART MD      Testing Performed By     Lab - Abbreviation Name Director Address Valid Date Range    104 - Rad Rslts RADIOLOGY RESULTS Unknown Unknown 05 1553 - Present               ECG/EMG Results      ECHO COMPLETE WITH OPTISON [[id number]]  Resulted: 18 1541, Result status: Edited Result - FINAL    Ordering provider: Jose Matos MD  18 1046 Resulted by: Gladys Torres MD    Performed: 18 1557 - 18 1605 Resulting lab: RADIOLOGY RESULTS    Narrative:       [id number]  [id number]  [id number]  [id number]^LORRAINE^JOSE^JEAN PAUL           [facility]  Echocardiography Laboratory  [address] [place] [id number]     Name: CANDICE LOYA  MRN: [id number]  : 1945  Study Date: 2018 03:41 PM  Age: 73 yrs  Gender: Female  Patient Location: [facility]  Reason For Study: Afib  Ordering Physician: LORRAINE  SADE  Referring Physician: KEYONA MCKENZIE  Performed By: Ayo Albarado [facility]     BSA: 2.0 m2  Height: 65 in  Weight: 204 lb  BP: 105/60 mmHg  _____________________________________________________________________________  __        Procedure  Complete Portable Echo Adult. Contrast Optison. Technically difficult study.  Poor acoustic windows. Optison (NDC #4570-5827-98) given intravenously.  Patient was given 6 ml mixture of 3 ml Optison and 6 ml saline. 3 ml wasted.  _____________________________________________________________________________  __        Interpretation Summary  Left ventricular function, chamber size, wall motion, and wall thickness are  normal.The EF is 60-65%  Limited views of the right ventricle that appears with normal function.  No significant valve dysfunction detected.  _____________________________________________________________________________  __        Left Ventricle  Left ventricular function, chamber size, wall motion, and wall thickness are  normal.The EF is 60-65%. Grade I or early diastolic dysfunction.     Right Ventricle  The right ventricle is normal size. Global right ventricular function is  normal. The right ventricle is not well visualized.     Atria  Both atria appear normal.     Mitral Valve  Trace mitral insufficiency is present.        Aortic Valve  Mild aortic valve sclerosis is present. No aortic regurgitation is present.     Tricuspid Valve  Trace tricuspid insufficiency is present. The peak velocity of the tricuspid  regurgitant jet is not obtainable. Pulmonary artery systolic pressure cannot  be assessed.     Pulmonic Valve  Trace pulmonic insufficiency is present.     Vessels  The aorta root is normal. Ascending aorta with normal diameter at 3.2 cm.  Unable to assess mean RA pressure given the patient is on a ventilator.     Pericardium  No pericardial effusion is present.      _____________________________________________________________________________  __  MMode/2D Measurements & Calculations  Ao root diam: 3.5 cm  asc Aorta Diam: 3.2 cm  LVOT diam: 1.8 cm  LVOT area: 2.5 cm2           Doppler Measurements & Calculations  MV E max lucrecia: 53.4 cm/sec  MV A max lucrecia: 111.0 cm/sec  MV E/A: 0.48  MV dec slope: 237.0 cm/sec2  E/E' av.5  Lateral E/e': 8.9  Medial E/e': 10.0     _____________________________________________________________________________  __           Report approved by: Karan Rodríguez 2018 08:37 PM       1    Type Source Collected On     18 1541          View Image (below)        Echocardiogram Complete [[id number]]  Resulted: 18 1558, Result status: In process    Ordering provider: Tami Matos MD  18 1046 Performed: 18 1557 - 18 1557    Resulting lab: RADIANT                   Encounter-Level Documents:     There are no encounter-level documents.      Order-Level Documents:     There are no order-level documents.

## 2018-11-21 NOTE — OR NURSING
Pt has three family members in preop with her, pt is very agitated, noncooperative, confused intermittently, chest tubes x3 patent no s/s leak all to -20 cm suction, L pleural w/440c drainage noted into collection chamber; mediatinal CT w/ 700cc fluid noted in collection chamber; R CT noted w/ 910cc fluid noted in collection chamber, clamps placed at bedside, )2 presently 96% on high flow nasal cannula, Amiodarone @ lmg/min(60ml/hr)

## 2018-11-21 NOTE — ANESTHESIA CARE TRANSFER NOTE
Patient: Myrtle Vaz    Procedure(s):  Midline laparotomy, Trans-hiatal esophagogasterectomy, gastrostomy, J-tube placement, G-tube placement, bilateral pleural chest tube placement, mediastinal abcess drainage, spit fistula creation, Esophagastrodueodenoscopy  COMBINED ESOPHAGOSCOPY, GASTROSCOPY, DUODENOSCOPY (EGD)    Diagnosis: Possible Esophageal Perforation   Diagnosis Additional Information: No value filed.    Anesthesia Type:   No value filed.     Note:  Airway :ETT and Ventilator  Patient transferred to:ICU  Comments: To ICU, placed on vent, Lines and gtts reviewed with RN, report givenICU Handoff: Call for PAUSE to initiate/utilize ICU HANDOFF, Identified Patient, Identified Responsible Provider, Reviewed the Pertinent Medical History, Discussed Surgical Course, Reviewed Intra-OP Anesthesia Management and Issues during Anesthesia, Set Expectations for Post Procedure Period and Allowed Opportunity for Questions and Acknowledgement of Understanding      Vitals: (Last set prior to Anesthesia Care Transfer)    CRNA VITALS  11/21/2018 1648 - 11/21/2018 1732      11/21/2018             EKG: Sinus rhythm                Electronically Signed By: NARCISO Pemberton CRNA  November 21, 2018  5:32 PM

## 2018-11-21 NOTE — OR NURSING
Report to SUSI Terrazas, pt lying on bed, family at bedside, VSS, waiting to go in for her procedure, pedro UE restraints in place, HOB elevated appz 15 degress, no resp distress noted at this time

## 2018-11-22 ENCOUNTER — APPOINTMENT (OUTPATIENT)
Dept: GENERAL RADIOLOGY | Facility: CLINIC | Age: 73
DRG: 326 | End: 2018-11-22
Attending: PHYSICIAN ASSISTANT
Payer: MEDICARE

## 2018-11-22 ENCOUNTER — APPOINTMENT (OUTPATIENT)
Dept: PHYSICAL THERAPY | Facility: CLINIC | Age: 73
DRG: 326 | End: 2018-11-22
Attending: THORACIC SURGERY (CARDIOTHORACIC VASCULAR SURGERY)
Payer: MEDICARE

## 2018-11-22 LAB
ANION GAP SERPL CALCULATED.3IONS-SCNC: 6 MMOL/L (ref 3–14)
ANION GAP SERPL CALCULATED.3IONS-SCNC: 7 MMOL/L (ref 3–14)
BASE DEFICIT BLDA-SCNC: 0.8 MMOL/L
BUN SERPL-MCNC: 31 MG/DL (ref 7–30)
BUN SERPL-MCNC: 33 MG/DL (ref 7–30)
CALCIUM SERPL-MCNC: 7.4 MG/DL (ref 8.5–10.1)
CALCIUM SERPL-MCNC: 7.4 MG/DL (ref 8.5–10.1)
CHLORIDE SERPL-SCNC: 112 MMOL/L (ref 94–109)
CHLORIDE SERPL-SCNC: 114 MMOL/L (ref 94–109)
CO2 SERPL-SCNC: 24 MMOL/L (ref 20–32)
CO2 SERPL-SCNC: 25 MMOL/L (ref 20–32)
CREAT SERPL-MCNC: 1.03 MG/DL (ref 0.52–1.04)
CREAT SERPL-MCNC: 1.1 MG/DL (ref 0.52–1.04)
ERYTHROCYTE [DISTWIDTH] IN BLOOD BY AUTOMATED COUNT: 15.8 % (ref 10–15)
ERYTHROCYTE [DISTWIDTH] IN BLOOD BY AUTOMATED COUNT: 15.8 % (ref 10–15)
GFR SERPL CREATININE-BSD FRML MDRD: 49 ML/MIN/1.7M2
GFR SERPL CREATININE-BSD FRML MDRD: 53 ML/MIN/1.7M2
GLUCOSE BLDC GLUCOMTR-MCNC: 116 MG/DL (ref 70–99)
GLUCOSE SERPL-MCNC: 100 MG/DL (ref 70–99)
GLUCOSE SERPL-MCNC: 128 MG/DL (ref 70–99)
HCO3 BLD-SCNC: 25 MMOL/L (ref 21–28)
HCT VFR BLD AUTO: 26.4 % (ref 35–47)
HCT VFR BLD AUTO: 28.5 % (ref 35–47)
HGB BLD-MCNC: 8.4 G/DL (ref 11.7–15.7)
HGB BLD-MCNC: 9.1 G/DL (ref 11.7–15.7)
INR PPP: 1.57 (ref 0.86–1.14)
MAGNESIUM SERPL-MCNC: 2.4 MG/DL (ref 1.6–2.3)
MAGNESIUM SERPL-MCNC: 2.4 MG/DL (ref 1.6–2.3)
MCH RBC QN AUTO: 31.1 PG (ref 26.5–33)
MCH RBC QN AUTO: 31.4 PG (ref 26.5–33)
MCHC RBC AUTO-ENTMCNC: 31.8 G/DL (ref 31.5–36.5)
MCHC RBC AUTO-ENTMCNC: 31.9 G/DL (ref 31.5–36.5)
MCV RBC AUTO: 98 FL (ref 78–100)
MCV RBC AUTO: 98 FL (ref 78–100)
O2/TOTAL GAS SETTING VFR VENT: ABNORMAL %
PCO2 BLD: 45 MM HG (ref 35–45)
PH BLD: 7.35 PH (ref 7.35–7.45)
PHOSPHATE SERPL-MCNC: 2.3 MG/DL (ref 2.5–4.5)
PHOSPHATE SERPL-MCNC: 2.8 MG/DL (ref 2.5–4.5)
PLATELET # BLD AUTO: 381 10E9/L (ref 150–450)
PLATELET # BLD AUTO: 387 10E9/L (ref 150–450)
PO2 BLD: 78 MM HG (ref 80–105)
POTASSIUM SERPL-SCNC: 3.9 MMOL/L (ref 3.4–5.3)
POTASSIUM SERPL-SCNC: 4.1 MMOL/L (ref 3.4–5.3)
RBC # BLD AUTO: 2.7 10E12/L (ref 3.8–5.2)
RBC # BLD AUTO: 2.9 10E12/L (ref 3.8–5.2)
SODIUM SERPL-SCNC: 143 MMOL/L (ref 133–144)
SODIUM SERPL-SCNC: 146 MMOL/L (ref 133–144)
VANCOMYCIN SERPL-MCNC: 11.2 MG/L
WBC # BLD AUTO: 19.7 10E9/L (ref 4–11)
WBC # BLD AUTO: 21.3 10E9/L (ref 4–11)

## 2018-11-22 PROCEDURE — C9113 INJ PANTOPRAZOLE SODIUM, VIA: HCPCS | Performed by: PHYSICIAN ASSISTANT

## 2018-11-22 PROCEDURE — 83735 ASSAY OF MAGNESIUM: CPT | Performed by: PHYSICIAN ASSISTANT

## 2018-11-22 PROCEDURE — 71045 X-RAY EXAM CHEST 1 VIEW: CPT

## 2018-11-22 PROCEDURE — 80202 ASSAY OF VANCOMYCIN: CPT | Performed by: STUDENT IN AN ORGANIZED HEALTH CARE EDUCATION/TRAINING PROGRAM

## 2018-11-22 PROCEDURE — 84100 ASSAY OF PHOSPHORUS: CPT | Performed by: STUDENT IN AN ORGANIZED HEALTH CARE EDUCATION/TRAINING PROGRAM

## 2018-11-22 PROCEDURE — 40000014 ZZH STATISTIC ARTERIAL MONITORING DAILY

## 2018-11-22 PROCEDURE — 85027 COMPLETE CBC AUTOMATED: CPT | Performed by: PHYSICIAN ASSISTANT

## 2018-11-22 PROCEDURE — 97530 THERAPEUTIC ACTIVITIES: CPT | Mod: GP

## 2018-11-22 PROCEDURE — 25000128 H RX IP 250 OP 636: Performed by: STUDENT IN AN ORGANIZED HEALTH CARE EDUCATION/TRAINING PROGRAM

## 2018-11-22 PROCEDURE — 25000128 H RX IP 250 OP 636

## 2018-11-22 PROCEDURE — 40000193 ZZH STATISTIC PT WARD VISIT

## 2018-11-22 PROCEDURE — 20000004 ZZH R&B ICU UMMC

## 2018-11-22 PROCEDURE — 25000128 H RX IP 250 OP 636: Performed by: THORACIC SURGERY (CARDIOTHORACIC VASCULAR SURGERY)

## 2018-11-22 PROCEDURE — 99291 CRITICAL CARE FIRST HOUR: CPT | Performed by: PHYSICIAN ASSISTANT

## 2018-11-22 PROCEDURE — 94003 VENT MGMT INPAT SUBQ DAY: CPT

## 2018-11-22 PROCEDURE — 85027 COMPLETE CBC AUTOMATED: CPT | Performed by: STUDENT IN AN ORGANIZED HEALTH CARE EDUCATION/TRAINING PROGRAM

## 2018-11-22 PROCEDURE — 25000125 ZZHC RX 250

## 2018-11-22 PROCEDURE — 84100 ASSAY OF PHOSPHORUS: CPT | Performed by: PHYSICIAN ASSISTANT

## 2018-11-22 PROCEDURE — 25000125 ZZHC RX 250: Performed by: PHYSICIAN ASSISTANT

## 2018-11-22 PROCEDURE — 97110 THERAPEUTIC EXERCISES: CPT | Mod: GP

## 2018-11-22 PROCEDURE — A9270 NON-COVERED ITEM OR SERVICE: HCPCS | Performed by: PHYSICIAN ASSISTANT

## 2018-11-22 PROCEDURE — 25000132 ZZH RX MED GY IP 250 OP 250 PS 637: Performed by: PHYSICIAN ASSISTANT

## 2018-11-22 PROCEDURE — 82803 BLOOD GASES ANY COMBINATION: CPT | Performed by: SURGERY

## 2018-11-22 PROCEDURE — 25000128 H RX IP 250 OP 636: Performed by: PHYSICIAN ASSISTANT

## 2018-11-22 PROCEDURE — 40000275 ZZH STATISTIC RCP TIME EA 10 MIN

## 2018-11-22 PROCEDURE — 80048 BASIC METABOLIC PNL TOTAL CA: CPT | Performed by: PHYSICIAN ASSISTANT

## 2018-11-22 PROCEDURE — 97162 PT EVAL MOD COMPLEX 30 MIN: CPT | Mod: GP

## 2018-11-22 PROCEDURE — 00000146 ZZHCL STATISTIC GLUCOSE BY METER IP

## 2018-11-22 PROCEDURE — 25000128 H RX IP 250 OP 636: Performed by: SURGERY

## 2018-11-22 PROCEDURE — 85610 PROTHROMBIN TIME: CPT | Performed by: PHYSICIAN ASSISTANT

## 2018-11-22 PROCEDURE — 83735 ASSAY OF MAGNESIUM: CPT | Performed by: STUDENT IN AN ORGANIZED HEALTH CARE EDUCATION/TRAINING PROGRAM

## 2018-11-22 PROCEDURE — 80048 BASIC METABOLIC PNL TOTAL CA: CPT | Performed by: STUDENT IN AN ORGANIZED HEALTH CARE EDUCATION/TRAINING PROGRAM

## 2018-11-22 PROCEDURE — 93010 ELECTROCARDIOGRAM REPORT: CPT | Performed by: INTERNAL MEDICINE

## 2018-11-22 RX ORDER — PHYTONADIONE 1 MG/.5ML
5 INJECTION, EMULSION INTRAMUSCULAR; INTRAVENOUS; SUBCUTANEOUS ONCE
Status: DISCONTINUED | OUTPATIENT
Start: 2018-11-22 | End: 2018-11-22

## 2018-11-22 RX ORDER — SODIUM CHLORIDE, SODIUM LACTATE, POTASSIUM CHLORIDE, CALCIUM CHLORIDE 600; 310; 30; 20 MG/100ML; MG/100ML; MG/100ML; MG/100ML
INJECTION, SOLUTION INTRAVENOUS
Status: COMPLETED
Start: 2018-11-22 | End: 2018-11-22

## 2018-11-22 RX ORDER — CEFAZOLIN SODIUM 1 G/50ML
1250 SOLUTION INTRAVENOUS EVERY 24 HOURS
Status: DISCONTINUED | OUTPATIENT
Start: 2018-11-22 | End: 2018-11-22

## 2018-11-22 RX ORDER — SODIUM CHLORIDE, SODIUM LACTATE, POTASSIUM CHLORIDE, CALCIUM CHLORIDE 600; 310; 30; 20 MG/100ML; MG/100ML; MG/100ML; MG/100ML
INJECTION, SOLUTION INTRAVENOUS CONTINUOUS
Status: DISCONTINUED | OUTPATIENT
Start: 2018-11-22 | End: 2018-11-28

## 2018-11-22 RX ORDER — ALBUMIN, HUMAN INJ 5% 5 %
12.5 SOLUTION INTRAVENOUS ONCE
Status: DISCONTINUED | OUTPATIENT
Start: 2018-11-22 | End: 2018-11-22

## 2018-11-22 RX ORDER — NALOXONE HYDROCHLORIDE 0.4 MG/ML
.1-.4 INJECTION, SOLUTION INTRAMUSCULAR; INTRAVENOUS; SUBCUTANEOUS
Status: DISCONTINUED | OUTPATIENT
Start: 2018-11-22 | End: 2018-12-07 | Stop reason: HOSPADM

## 2018-11-22 RX ORDER — GINSENG 100 MG
CAPSULE ORAL 2 TIMES DAILY
Status: DISCONTINUED | OUTPATIENT
Start: 2018-11-22 | End: 2018-12-07 | Stop reason: HOSPADM

## 2018-11-22 RX ORDER — BISACODYL 10 MG
10 SUPPOSITORY, RECTAL RECTAL DAILY PRN
Status: DISCONTINUED | OUTPATIENT
Start: 2018-11-22 | End: 2018-12-07 | Stop reason: HOSPADM

## 2018-11-22 RX ORDER — BISACODYL 10 MG
10 SUPPOSITORY, RECTAL RECTAL DAILY
Status: DISCONTINUED | OUTPATIENT
Start: 2018-11-22 | End: 2018-11-29

## 2018-11-22 RX ADMIN — SODIUM CHLORIDE: 9 INJECTION, SOLUTION INTRAVENOUS at 04:06

## 2018-11-22 RX ADMIN — AMIODARONE HYDROCHLORIDE 150 MG: 1.5 INJECTION, SOLUTION INTRAVENOUS at 20:45

## 2018-11-22 RX ADMIN — SODIUM CHLORIDE, POTASSIUM CHLORIDE, SODIUM LACTATE AND CALCIUM CHLORIDE: 600; 310; 30; 20 INJECTION, SOLUTION INTRAVENOUS at 22:43

## 2018-11-22 RX ADMIN — PIPERACILLIN SODIUM,TAZOBACTAM SODIUM 3.38 G: 3; .375 INJECTION, POWDER, FOR SOLUTION INTRAVENOUS at 14:33

## 2018-11-22 RX ADMIN — SODIUM CHLORIDE, POTASSIUM CHLORIDE, SODIUM LACTATE AND CALCIUM CHLORIDE 500 ML: 600; 310; 30; 20 INJECTION, SOLUTION INTRAVENOUS at 06:30

## 2018-11-22 RX ADMIN — FLUCONAZOLE 200 MG: 2 INJECTION, SOLUTION INTRAVENOUS at 10:59

## 2018-11-22 RX ADMIN — PANTOPRAZOLE SODIUM 40 MG: 40 INJECTION, POWDER, FOR SOLUTION INTRAVENOUS at 08:43

## 2018-11-22 RX ADMIN — DOCUSATE SODIUM 286 ML: 50 LIQUID ORAL at 18:43

## 2018-11-22 RX ADMIN — BACITRACIN: 500 OINTMENT TOPICAL at 11:01

## 2018-11-22 RX ADMIN — SODIUM CHLORIDE, POTASSIUM CHLORIDE, SODIUM LACTATE AND CALCIUM CHLORIDE: 600; 310; 30; 20 INJECTION, SOLUTION INTRAVENOUS at 09:46

## 2018-11-22 RX ADMIN — PIPERACILLIN SODIUM,TAZOBACTAM SODIUM 3.38 G: 3; .375 INJECTION, POWDER, FOR SOLUTION INTRAVENOUS at 19:52

## 2018-11-22 RX ADMIN — POTASSIUM CHLORIDE 20 MEQ: 29.8 INJECTION, SOLUTION INTRAVENOUS at 03:49

## 2018-11-22 RX ADMIN — ENOXAPARIN SODIUM 40 MG: 40 INJECTION SUBCUTANEOUS at 14:33

## 2018-11-22 RX ADMIN — Medication 0.3 MG: at 11:46

## 2018-11-22 RX ADMIN — PHYTONADIONE 5 MG: 10 INJECTION, EMULSION INTRAMUSCULAR; INTRAVENOUS; SUBCUTANEOUS at 01:05

## 2018-11-22 RX ADMIN — AMIODARONE HYDROCHLORIDE 0.5 MG/MIN: 50 INJECTION, SOLUTION INTRAVENOUS at 01:39

## 2018-11-22 RX ADMIN — PROPOFOL 10 MCG/KG/MIN: 10 INJECTION, EMULSION INTRAVENOUS at 01:39

## 2018-11-22 RX ADMIN — POTASSIUM PHOSPHATE, MONOBASIC AND POTASSIUM PHOSPHATE, DIBASIC 15 MMOL: 224; 236 INJECTION, SOLUTION INTRAVENOUS at 22:46

## 2018-11-22 RX ADMIN — PIPERACILLIN SODIUM,TAZOBACTAM SODIUM 3.38 G: 3; .375 INJECTION, POWDER, FOR SOLUTION INTRAVENOUS at 01:40

## 2018-11-22 RX ADMIN — Medication: at 15:33

## 2018-11-22 RX ADMIN — BACITRACIN: 500 OINTMENT TOPICAL at 20:00

## 2018-11-22 RX ADMIN — Medication 25 MCG/HR: at 09:45

## 2018-11-22 RX ADMIN — PIPERACILLIN SODIUM,TAZOBACTAM SODIUM 3.38 G: 3; .375 INJECTION, POWDER, FOR SOLUTION INTRAVENOUS at 08:43

## 2018-11-22 RX ADMIN — AMIODARONE HYDROCHLORIDE 1 MG/MIN: 50 INJECTION, SOLUTION INTRAVENOUS at 21:16

## 2018-11-22 RX ADMIN — VANCOMYCIN HYDROCHLORIDE 1250 MG: 10 INJECTION, POWDER, LYOPHILIZED, FOR SOLUTION INTRAVENOUS at 09:42

## 2018-11-22 ASSESSMENT — ACTIVITIES OF DAILY LIVING (ADL)
ADLS_ACUITY_SCORE: 17
ADLS_ACUITY_SCORE: 16
ADLS_ACUITY_SCORE: 17
ADLS_ACUITY_SCORE: 14
ADLS_ACUITY_SCORE: 19
ADLS_ACUITY_SCORE: 17

## 2018-11-22 NOTE — PLAN OF CARE
Problem: Patient Care Overview  Goal: Plan of Care/Patient Progress Review  OT 4C: Evaluation orders received. Cancel today as pt extubated this am, then seeing PT this pm

## 2018-11-22 NOTE — PROGRESS NOTES
" 11/22/18 1553   Quick Adds   Type of Visit Initial PT Evaluation   Living Environment   Lives With alone   Living Arrangements other (see comments)  (Townhouse.)   Home Accessibility stairs to enter home;bed and bath on same level  (Pt has walk-in shower with GB and shower chair.)   Number of Stairs to Enter Home 1  (No HR.)   Number of Stairs Within Home 0   Transportation Available family or friend will provide   Living Environment Comment Pt reports having family in the area that are able to provide PRN assist.   Self-Care   Usual Activity Tolerance moderate   Current Activity Tolerance poor   Regular Exercise no   Equipment Currently Used at Home grab bar;shower chair   Functional Level Prior   Ambulation 0-->independent   Transferring 0-->independent   Toileting 0-->independent   Bathing 1-->assistive equipment   Dressing 0-->independent   Eating 0-->independent   Communication 0-->understands/communicates without difficulty   Swallowing 0-->swallows foods/liquids without difficulty   Fall history within last six months no   Which of the above functional risks had a recent onset or change? ambulation;transferring   Prior Functional Level Comment Pt reports IND PLOF. Pt does not use AD for ambulation. Pt states she typically only ambulates short distances at baseline. Pt utilizes GB and shower chair for bathing. Reports IND with dressing, cooking, and cleaning. Pt reports she was driving prior to admission.   General Information   Onset of Illness/Injury or Date of Surgery - Date 11/21/18   Referring Physician Her-Siva Krause PA-C   Patient/Family Goals Statement Pt does not state a goal during PT session.   Pertinent History of Current Problem (include personal factors and/or comorbidities that impact the POC) Per chart \"Myrtle Vaz is a 73 year old female who was admitted on 11/21/2018 to [facility]. She [facility] where she presented with respiratory failure thought to be secondary to " "large hiatal hernia and underwent s/p laparoscopic hiatal hernia repair with mesh reinforcement, laparoscopic nissen fundoplication, and laparoscopic gastrostomy tube placement, which was complicated by pleural effusions and possible esophageal leak,  s/p placement of chest tubes x3. Taken to OR with thoracic and found to have perforation at distal esophagus at GE juncton and underwent s/p Midline laparotomy, Trans-hiatal esophagogasterectomy, gastrostomy, J-tube placement, G-tube placement, bilateral pleural chest tube placement, mediastinal abcess drainage, spit fistula creation, Esophagastrodueodenoscopy, intraoperative course complicated by SVT required intraoperative cardioversion x3\".   Precautions/Limitations fall precautions;abdominal precautions;oxygen therapy device and L/min  (4 L O2 via NC.)   General Observations Upon arrival, pt supine in bed and agreeable to PT session.   General Info Comments Activity - up ad jose de jesus.   Cognitive Status Examination   Orientation person;time  (Pt able to state year.)   Level of Consciousness alert;confused   Follows Commands and Answers Questions able to follow single-step instructions   Personal Safety and Judgment impaired   Pain Assessment   Patient Currently in Pain Yes, see Vital Sign flowsheet  (Abdominal pain.)   Posture    Posture Forward head position;Protracted shoulders;Kyphosis   Range of Motion (ROM)   ROM Comment B LE ROM WFL based on functional mobility assessment.   Strength   Strength Comments B LE strength grossly at least 3+/5 based on functional mobility assessment. MMT not formally performed 2/2 pain.   Bed Mobility   Bed Mobility Comments Pt completes supine > sit with ModA.    Transfer Skills   Transfer Comments Pt transfers sit <> stand with Phil of 2. Pt performs stand-pivot transfer from EOB > recliner with ModA of 2, pt with difficulty picking up feet and advancing LEs.    Gait   Gait Comments Not assessed.   Balance   Balance Comments Pt " "requires CGA for static sitting balance at EOB. Pt demonstrates static standing balance with Phil of 2, requires cues for upright posture.    Sensory Examination   Sensory Perception Comments Pt reports baseline numbness in B great toes.   General Therapy Interventions   Planned Therapy Interventions balance training;bed mobility training;gait training;ROM;strengthening;transfer training;home program guidelines;progressive activity/exercise   Clinical Impression   Criteria for Skilled Therapeutic Intervention yes, treatment indicated   PT Diagnosis Impaired functional mobility   Influenced by the following impairments LE weakness, impaired balance, decreased activity tolerance, pain, post-op precautions   Functional limitations due to impairments Bed mobility, transfers, gait, stairs   Clinical Presentation Evolving/Changing   Clinical Presentation Rationale Clinical judgement   Clinical Decision Making (Complexity) Moderate complexity   Therapy Frequency` (6x/week)   Predicted Duration of Therapy Intervention (days/wks) 3 weeks   Anticipated Equipment Needs at Discharge (TBD with further therapy)   Anticipated Discharge Disposition Transitional Care Facility   Risk & Benefits of therapy have been explained Yes   Patient, Family & other staff in agreement with plan of care Yes   [facility] [employer] TM \"6 Clicks\"   2016, Trustees of [facility], under license to [employer] (formerly [employer]).  All rights reserved.   6 Clicks Short Forms Basic Mobility Inpatient Short Form   [facility] AM-PAC  \"6 Clicks\" V.2 Basic Mobility Inpatient Short Form   1. Turning from your back to your side while in a flat bed without using bedrails? 2 - A Lot   2. Moving from lying on your back to sitting on the side of a flat bed without using bedrails? 2 - A Lot   3. Moving to and from a bed to a chair (including a wheelchair)? 2 - A Lot   4. Standing up from a chair using your arms (e.g., wheelchair, or bedside chair)? 2 - A Lot   5. To " walk in hospital room? 1 - Total   6. Climbing 3-5 steps with a railing? 1 - Total   Basic Mobility Raw Score (Score out of 24.Lower scores equate to lower levels of function) 10   Total Evaluation Time   Total Evaluation Time (Minutes) 8

## 2018-11-22 NOTE — PLAN OF CARE
Problem: Patient Care Overview  Goal: Plan of Care/Patient Progress Review  Discharge Planner PT  - 4A - PT evaluation completed, treatment initiated.  Patient plan for discharge: Not stated.  Current status: Pt completes supine > sit with ModA. Pt transfers sit <> stand with Phil of 2. Pt demonstrates static standing balance with Phil of 2, requires cues for upright posture. Pt performs stand-pivot transfer from EOB > recliner with ModA of 2, pt with difficulty picking up feet and advancing LEs. Pt limited by fatigue/pain this date. Pt on 4 L O2 via NC with VSS.  Barriers to return to prior living situation: Medical status, post-op precautions, fatigue, pain, level of assist, LE weakness, impaired balance, decreased activity tolerance, cognition, lives alone.  Recommendations for discharge: TCU.  Rationale for recommendations: Current level of function. Pt below baseline and will require continued skilled therapy to maximize IND/safety with functional mobility.

## 2018-11-22 NOTE — PLAN OF CARE
Problem: Surgery Nonspecified (Adult)  Goal: Signs and Symptoms of Listed Potential Problems Will be Absent, Minimized or Managed (Surgery Nonspecified)  Signs and symptoms of listed potential problems will be absent, minimized or managed by discharge/transition of care (reference Surgery Nonspecified (Adult) CPG).   Outcome: Improving  Pt sedated on propofol; titrated down to 10; fentanyl 100mcg/hr; opens eyes to voice; follows commands but slow to respond; pupils 3mm round/reactive/brisk. NSR 70s; amio at 0.5; MAPs >65 on levo at 0.06; vaso off since 1am. Low-grade fever this AM 99 - 100; MD aware. FI02 decreased to 40%; sats 98 - 100%; no ABG needed per team. Scant secretions. RR 14 - 15 on vent. Midline abdominal incision intact; scant drainage outlined. Spit fistula C/D/I; no output. CT x2 with scant serosanguinous output. ANGELO drain - bright red blood 40cc q4h x2; MD at bedside to assess; Hgb stable this AM at 9.1. G and J to gravity; no output. Strict NPO. Mora with ok UOP 40 - 60cc q2h; 500cc LR bolus. K replacement x2. Will continue to assess and notify MD of any changes in exam.

## 2018-11-22 NOTE — PLAN OF CARE
Problem: Patient Care Overview  Goal: Plan of Care/Patient Progress Review  Outcome: No Change  D/I: patient admitted to the Surgical/Neuro ICU around 1730 direct from OR    Neuro- unresponsive, PERRL, no movement to stimuli. Sedated with Propofol.  CV- NSR HR 60s, Amio gtt continues. BP 90s-120s/50s-70s MAPs 60s-80s -Levophed titrated to maintian MAP >65, Vaso at straight rate of 2.4u/hr.  Pulm- lungs clear and diminished bases. Vented via ETT, CMV/500/14/60%/6peep.   GI- G and J tubes both to gravity drainage. NPO.  - lopez in place, 20cc output -continue to monitor.  Skin- abdominal and left neck incisions covered with OR dressing -no drainage noted. Left flank Bruising -marked (present on admission per report).   Gtts- Levophed @ 0.06mcg/kg/min. Vasopressin @ 2.4units/hr. Propofol @ 30mcg/kg/hr. Amio @ 0.5mg/min. NS @ 100cc/hr  Labs- ABG Results 7.38/39/86/23 on 60% fiO2.   Pain- fentanyl gtt started, no nonverbal signs of pain at this time.  Activity- bedrest. PT/OT ordered  Drains- ANGELO with 45cc sanguinous output  -bulb not holding suction (MD aware). Jtube to gravity. Gtube to gravity. Spit fistula with no output. Lopez catheter in place.  Social- daughter and son updated at bedside,  See flow sheets for further details and assessments.  A: stable.  P: continue to monitor, notify MD of significant changes.

## 2018-11-22 NOTE — PROGRESS NOTES
SURGICAL ICU PROGRESS NOTE  November 22, 2018        Date of Service (when I saw the patient): 11/22/2018    ASSESSMENT:  Myrtle Vaz is a 73 year old female who was admitted on 11/21/2018 to [facility]. She was transferred from [facility] where she presented with respiratory failure thought to be secondary to large hiatal hernia and underwent s/p laparoscopic hiatal hernia repair with mesh reinforcement, laparoscopic nissen fundoplication, and laparoscopic gastrostomy tube placement, which was complicated by pleural effusions and possible esophageal leak,  s/p placement of chest tubes x3. Taken to OR with thoracic and found to have perforation at distal esophagus at  juncton and underwent s/p Midline laparotomy, Trans-hiatal esophagogasterectomy, gastrostomy, J-tube placement, G-tube placement, bilateral pleural chest tube placement, mediastinal abcess drainage, spit fistula creation, Esophagastrodueodenoscopy, intraoperative course complicated by SVT required intraoperative cardioversion x3.      CHANGES and MAJOR THINGS TODAY:   - Discuss with primary tube feeding  - Discontinue vancomycin  - extubated after PST trial   -?Restart PTA levothyroxine and use of J tube for medications only     Neurological:  # acute post operative pain   # delirium   - Monitor neurological status. Notify the MD for any acute changes in exam.  - pt reported to have delirium prior to transfer from OSH. Continue efforts with delirium prevention and re-orientation   - Pain:  Will discontinue Fentanyl infusion and start Dilaudid PCA if able to use    - Sedation: propofolol--now off      Pulmonary:   # acute post operative vent support, extubated 11/22/18  - PST this am and pt extubated.   - Supplemental oxygen to keep saturation above 92 %.     Cardiovascular:    # Atrial fibrillation, unstable, intraoperative cardioversion x3   - hx on metoprolol during this admission,-OSH records reviewed. Appears metoprolol not PTA medication,  will discharge infusion   # s/p septic shock-- off Vaso,  Weaning off Norepi   # hx of hypertension- hold home diuretic   # hx of hyperlipidemia- home home statin      Gastroenterology/Nutrition:  # S/p hiatal hernia complicated by distal esophageal leak now s/p Midline laparotomy, trans-hiatal esophagogasterectomy, gastrostomy, J-tube placement, G-tube placement, bilateral pleural chest tube placement, mediastinal abcess drainage, spit fistula creation, esophagastrodueodenoscopy   # s/p G tube   # s/P J tube  - G and J tubes to be placed to gravity per DR Albarado   - hold feeds per thoracic--awaiting staff decision regarding J tube for medications.      Fluids/Electrolytes:   # hypokalemia  - cont for IV fluid hydration  - hold feeds  - electrolyte replacement protocol.      Renal:  # no issues   - Urine output is . Will continue to monitor intake and output continue with lopez for now      Endocrine:  # hypothyroidism   - Home synthroid 150mcg--on hold unitl J tube can be used  - no indication for Sliding scale for glucose management if needed.      ID/Antibiotics:  # mediastinal abscess   Continue with Vanco/Zosy and Micafungin per Dr Albarado   -  Could discontinue  vancomycin     Heme:     # Acute blood loss anemia   #Leukocytosis   - WBC count improving. Now 19.7 ( 28.4)   - Hemoglobin 9.1 today,  Transfuse if hgb <7.0 or signs/symptoms of hypoperfusion. Monitor and trend.      Musculoskeletal:  # weakness and deconditioning of deconditioning   - Physical and occupational therapy consult      General Cares/Prophylaxis:    DVT Prophylaxis: okay for Lovenox today   GI Prophylaxis: Protonix   Restraints: Restraints for medical healing needed: NO     Lines/ tubes/ drains:  -  PIV's  - central line   - ANGELO drain   - chest tube x3   - lopez   - arterial line      Disposition:  - Surgical ICU.      Time spent on this Encounter   Billing:  I spent  45 minutes bedside and on the inpatient unit today managing the care of Myrtle  Milind in relation to the issues listed in this note.      Siva Kasper    ====================================  INTERVAL HISTORY:  Course reviewed. Weaned off pressors overnight. Follows commands to voice when sedation off this am. On repeat assessment during SBT, pt   nearly self extubated when I came to room to assess for readiness to extubate, pulling good VT and RR mid teens to low 20's, pt extubated.  Reports pain controlled, wants to go home. Denies trouble breathing or dyspnea.     OBJECTIVE:   1. VITAL SIGNS:   Temp:  [98  F (36.7  C)-100.2  F (37.9  C)] 99.9  F (37.7  C)  Pulse:  [80-90] 80  Heart Rate:  [63-77] 70  Resp:  [14-19] 14  BP: ()/(52-74) 122/65  MAP:  [60 mmHg-95 mmHg] 75 mmHg  Arterial Line BP: ()/(35-67) 117/53  FiO2 (%):  [40 %-100 %] 40 %  SpO2:  [96 %-100 %] 98 %  Ventilation Mode: CMV/AC  (Continuous Mandatory Ventilation/ Assist Control)  FiO2 (%): 40 %  Rate Set (breaths/minute): 14 breaths/min  Tidal Volume Set (mL): 450 mL  PEEP (cm H2O): 5 cmH2O  Oxygen Concentration (%): 40 %  Resp: 14    2. INTAKE/ OUTPUT:   I/O last 3 completed shifts:  In: 7314.89 [I.V.:6314.89; IV Piggyback:500]  Out: 1184 [Urine:710; Emesis/NG output:30; Drains:195; Blood:150; Chest Tube:99]    3. PHYSICAL EXAMINATION:  General: sitting in bed, awake, alert, follows commands   HEENT: pupils 3mm and equal. OP clear.   Neuro: follows commands correctly. Re-directly, able to tell me year and name (after extubation)   Pulm/Resp: Clear breath sounds bilaterally without rhonchi, crackles or wheeze.  Chest: Right chest tube to suction, no air leak. Left chest with (+) leak.    CV: RRR, S1, S2. Monitor: NSR   Abdomen: Soft, non-distended,  G to gravity with serosanguinous drainage in tubing, nothing in bag, J tube to gravity, no   : (+)  lopez catheter in place, urine yellow and clear  Incisions/Skin: incision dressed, some shadowing on dressing.    MSK/Extremities: no peripheral edema, moving all  extremities, peripheral pulses intact, calves soft and non-tender, extremities well perfused. Brisk cap refill     4. INVESTIGATIONS:   Arterial Blood Gases     Recent Labs  Lab 11/21/18 1831 11/21/18 1615 11/21/18  1323 11/21/18  0600   PH 7.38 7.32* 7.37 7.45   PCO2 39 46* 43 40   PO2 86 117* 76* 64*   HCO3 23 23 25 27     Complete Blood Count     Recent Labs  Lab 11/22/18 0213 11/21/18 1831 11/21/18 1615 11/21/18  1323 11/21/18  0141   WBC 19.7* 20.8*  --   --  28.4*   HGB 9.1* 8.8* 9.8* 9.3* 10.0*    331  --   --  375     Basic Metabolic Panel    Recent Labs  Lab 11/22/18 0213 11/21/18 1831 11/21/18 1615 11/21/18  1323 11/21/18  0141    141 142 147* 145*   POTASSIUM 3.9 3.7 3.9 3.2* 3.4   CHLORIDE 112* 110*  --   --  109   CO2 25 23  --   --  28   BUN 33* 32*  --   --  38*   CR 1.03 0.93  --   --  1.25*   * 148* 155* 126* 93     Liver Function Tests    Recent Labs  Lab 11/22/18 0213 11/21/18 2209 11/21/18 1831 11/21/18  0141   AST  --   --  238* 21   ALT  --   --  106* 35   ALKPHOS  --   --  78 100   BILITOTAL  --   --  1.3 1.0   ALBUMIN  --   --  2.2* 2.2*   INR 1.57* 1.59*  --  1.51*     Pancreatic Enzymes  No lab results found in last 7 days.  Coagulation Profile    Recent Labs  Lab 11/22/18 0213 11/21/18 2209 11/21/18  0141   INR 1.57* 1.59* 1.51*         5. RADIOLOGY:   Recent Results (from the past 24 hour(s))   XR Chest Port 1 View    Narrative    EXAM: XR CHEST PORT 1 VW  11/21/2018 6:36 PM     HISTORY:  ETT and line postioning;     COMPARISON: Chest radiograph dated earlier same day    FINDINGS: A single AP view of the chest is obtained. Endotracheal tube  tip projects over the midthoracic trachea. Postoperative changes of  transhiatal esophagogastrectomy New bilateral predominantly chest  tubes with removal of previously visualized bilateral pigtail chest  tubes. Mediastinal drain. Right IJ central venous catheter tip  projects over the mid SVC. New staples project over  the left lower  neck/upper chest.    The patient is rotated right. Trachea is midline. Cardiac silhouette  is within normal limits. Small bilateral pleural effusions with  loculated appearance on the right, decreased from prior. No  appreciable pneumothorax. Patchy perihilar and left greater than right  basilar opacities. Hyperdensities in the right upper quadrant are  again noted, favored to represent contrast within bowel.       Impression    IMPRESSION:   1. Endotracheal tube tip projects over the midthoracic trachea.  Postoperative changes of esophagogastrectomy with new large bore  bilateral chest tubes and mediastinal drain.  2. Loculated right and small left pleural effusions, decreased from  prior with perihilar and bibasilar opacities, likely atelectasis.    I have personally reviewed the examination and initial interpretation  and I agree with the findings.    SHAD ROSARIO MD   US Lower Extremity Venous Duplex Bilateral    Narrative    EXAMINATION: DOPPLER VENOUS ULTRASOUND OF BILATERAL LOWER EXTREMITIES,  11/21/2018 11:52 PM     COMPARISON: None.    HISTORY: calf swelling, tenderness;     TECHNIQUE:  Gray-scale evaluation with compression, spectral flow and  color Doppler assessment of the deep venous system of both legs from  groin to knee, and then at the ankles.    FINDINGS:  In both lower extremities, the common femoral, femoral, popliteal and  posterior tibial veins demonstrate normal compressibility and blood  flow.      Impression    IMPRESSION:  No evidence of deep venous thrombosis in either lower extremity.    I have personally reviewed the examination and initial interpretation  and I agree with the findings.    CATALINO MCMAHAN MD   XR Chest Port 1 View    Narrative    EXAM: XR CHEST PORT 1 VW  11/22/2018 1:20 AM      HISTORY: ETT positioning and Chest tubes;     COMPARISON: 11/21/2018    FINDINGS: Stable support devices. Density of the right upper quadrant,  likely bowel contrast. Decreased  left pneumothorax. No right  pneumothorax. Decreased left pleural effusion, stable presumed  loculated right lower mediastinal pleural effusion. Improved basilar  predominant airspace opacities.       Impression    IMPRESSION:   1. Decreased left pneumothorax, minimal on the current exam. Stable  support devices including left chest tube.  2. Decreased basilar predominant airspace opacities, which are favored  to be atelectatic in the setting of low lung volumes.  3. Decreased left pleural effusion with stable presumed loculated  right lower mediastinal pleural effusion.    I have personally reviewed the examination and initial interpretation  and I agree with the findings.    CATALINO MCMAHAN MD       =========================================

## 2018-11-22 NOTE — PROGRESS NOTES
[facility], [place]  Procedure Note          Extubation:       Myrtle Vaz  MRN# [id number]   November 22, 2018, 10:45 AM         Patient extubated at: November 22, 2018, 10:45 AM   Supplemental Oxygen: Via nasal cannula at 4 liters per minute   Cough: The cough is strong   Secretion Mode: PRN suction with assistance   Secretion Amount: Small amount, moderately thick and tan in color   Respiratory Exam:: Breath sounds: good aeration     Location: bilaterally   Skin Exam:: Patient color: pink   Patient Status: Currently appears comfortable   Arterial Blood Gasses: pH Arterial (pH)   Date Value   11/21/2018 7.38     pO2 Arterial (mm Hg)   Date Value   11/21/2018 86     pCO2 Arterial (mm Hg)   Date Value   11/21/2018 39     Bicarbonate Arterial (mmol/L)   Date Value   11/21/2018 23            Recorded by Maria Alejandra Lechuga

## 2018-11-22 NOTE — PROVIDER NOTIFICATION
Notified SICU noc of urine output 45cc over last 2 hrs and CVP 6. Intervention to obtain AM labs early. No fluids at this time; ok with 20cc/hr of urine. Will continue to assess.

## 2018-11-22 NOTE — PROVIDER NOTIFICATION
SICU notified of UOP 30cc over last 2 hours; CVP 5; and unable to wean levo below 0.06. Order for LR bolus.

## 2018-11-22 NOTE — PHARMACY-VANCOMYCIN DOSING SERVICE
Pharmacy Vancomycin Note  Date of Service 2018  Patient's  1945   73 year old, female    Indication: Postoperative Infection  Goal Trough Level: 15-20 mg/L  Day of Therapy: 4  Current Vancomycin regimen: intermittent dosing    Current estimated CrCl = Estimated Creatinine Clearance: 55.6 mL/min (based on Cr of 1.03).    Creatinine for last 3 days  2018:  1:41 AM Creatinine 1.25 mg/dL;  6:31 PM Creatinine 0.93 mg/dL  2018:  2:13 AM Creatinine 1.03 mg/dL    Recent Vancomycin Levels (past 3 days)  2018:  2:16 AM Vancomycin Level 20.3 mg/L  2018:  5:22 AM Vancomycin Level 11.2 mg/L    Vancomycin IV Administrations (past 72 hours)                   vancomycin (VANCOCIN) 1000 mg in dextrose 5% 200 mL PREMIX (mg) 1,000 mg New Bag 18 0635                Nephrotoxins and other renal medications (Future)    Start     Dose/Rate Route Frequency Ordered Stop    18 0715  vancomycin (VANCOCIN) 1,250 mg in sodium chloride 0.9 % 250 mL intermittent infusion      1,250 mg  over 90 Minutes Intravenous EVERY 24 HOURS 18 0707      18 1815  norepinephrine (LEVOPHED) 16 mg in D5W 250 mL infusion      0.03-0.4 mcg/kg/min × 89.5 kg (Dosing Weight)  2.5-33.6 mL/hr  Intravenous CONTINUOUS 18 1804      18 1815  vasopressin (VASOSTRICT) 40 Units in D5W 40 mL infusion      2.4 Units/hr  2.4 mL/hr  Intravenous CONTINUOUS 18 1804      18 1100  piperacillin-tazobactam (ZOSYN) 3.375 g vial to attach to  mL bag      3.375 g  over 30 Minutes Intravenous EVERY 6 HOURS 18 0757               Contrast Orders - past 72 hours     None          Interpretation of levels and current regimen:  Trough level is  Subtherapeutic    Has serum creatinine changed > 50% in last 72 hours: No    Urine output:  diminished urine output    Renal Function: Improving    Plan:  1.  Increase Dose to vancomycin 1250mg IV q24 hours  2.  Pharmacy will check trough levels as  appropriate in 1-3 Days.    3. Serum creatinine levels will be ordered daily for the first week of therapy and at least twice weekly for subsequent weeks.      Rúal Lopez, PharmD, BCPS        .

## 2018-11-22 NOTE — PROGRESS NOTES
"Thoracic Surgery Progress Note  11/22/2018    Transferred to SICU from operating room after SVT requiring cardioversion x3. On amiodarone gtt. Overnight on 2 pressors, weaned vaso, now just levo. Extubated this AM! Remains with some agitated delirium. On vanc, diflucan and zosyn.     /65 (BP Location: Right arm)  Pulse 80  Temp 99.7  F (37.6  C)  Resp 14  Ht 1.651 m (5' 5\")  Wt 92.7 kg (204 lb 5.9 oz)  SpO2 94%  BMI 34.01 kg/m2    PE  NAD  Alert, not oriented  4L NC     Labs   WBC 19.7  Hg 9.1  hematocrit 28.5  INR 1.57    Imaging:   CXR 11/22  IMPRESSION:   1. Decreased left pneumothorax, minimal on the current exam. Stable  support devices including left chest tube.  2. Decreased basilar predominant airspace opacities, which are favored  to be atelectatic in the setting of low lung volumes.  3. Decreased left pleural effusion with stable presumed loculated  right lower mediastinal pleural effusion.    A/P: 73F POD1 s/p transhiatal esophagectomy with spit fistula, G tube, J tube for perforated esophagus after type 4 peraesophageal repair. Extubated, on minimal pressors.     -continue amiodarone gtt, pressors as needed  -chest tubes to -20 suction  -ok for lovenox with normalized creatinine  -continue vanc, zosyn and diflucan for upper GI contamination  -G tube and J tube to gravity until return of bowel function  -recommend enema today and tomorrow, large stool burden intraoperatively  -start TPN today  -will plan on swallow study as early as tomorrow so she can have ice chips for comfort    Seen with Dr. Per Funes.    Nicky Park MD PGY3  General Surgery        "

## 2018-11-23 ENCOUNTER — APPOINTMENT (OUTPATIENT)
Dept: GENERAL RADIOLOGY | Facility: CLINIC | Age: 73
DRG: 326 | End: 2018-11-23
Attending: THORACIC SURGERY (CARDIOTHORACIC VASCULAR SURGERY)
Payer: MEDICARE

## 2018-11-23 ENCOUNTER — ANESTHESIA (OUTPATIENT)
Dept: INTENSIVE CARE | Facility: CLINIC | Age: 73
DRG: 326 | End: 2018-11-23
Payer: MEDICARE

## 2018-11-23 ENCOUNTER — APPOINTMENT (OUTPATIENT)
Dept: PHYSICAL THERAPY | Facility: CLINIC | Age: 73
DRG: 326 | End: 2018-11-23
Attending: THORACIC SURGERY (CARDIOTHORACIC VASCULAR SURGERY)
Payer: MEDICARE

## 2018-11-23 ENCOUNTER — APPOINTMENT (OUTPATIENT)
Dept: GENERAL RADIOLOGY | Facility: CLINIC | Age: 73
DRG: 326 | End: 2018-11-23
Attending: PHYSICIAN ASSISTANT
Payer: MEDICARE

## 2018-11-23 ENCOUNTER — APPOINTMENT (OUTPATIENT)
Dept: CARDIOLOGY | Facility: CLINIC | Age: 73
DRG: 326 | End: 2018-11-23
Attending: THORACIC SURGERY (CARDIOTHORACIC VASCULAR SURGERY)
Payer: MEDICARE

## 2018-11-23 ENCOUNTER — ANESTHESIA EVENT (OUTPATIENT)
Dept: INTENSIVE CARE | Facility: CLINIC | Age: 73
DRG: 326 | End: 2018-11-23
Payer: MEDICARE

## 2018-11-23 LAB
ANION GAP SERPL CALCULATED.3IONS-SCNC: 6 MMOL/L (ref 3–14)
ANION GAP SERPL CALCULATED.3IONS-SCNC: 7 MMOL/L (ref 3–14)
BASE DEFICIT BLDA-SCNC: 2.5 MMOL/L
BASE DEFICIT BLDA-SCNC: 3.1 MMOL/L
BUN SERPL-MCNC: 28 MG/DL (ref 7–30)
BUN SERPL-MCNC: 29 MG/DL (ref 7–30)
CA-I BLD-MCNC: 4 MG/DL (ref 4.4–5.2)
CALCIUM SERPL-MCNC: 7.1 MG/DL (ref 8.5–10.1)
CALCIUM SERPL-MCNC: 7.3 MG/DL (ref 8.5–10.1)
CHLORIDE SERPL-SCNC: 113 MMOL/L (ref 94–109)
CHLORIDE SERPL-SCNC: 114 MMOL/L (ref 94–109)
CO2 SERPL-SCNC: 26 MMOL/L (ref 20–32)
CO2 SERPL-SCNC: 26 MMOL/L (ref 20–32)
CREAT SERPL-MCNC: 1.07 MG/DL (ref 0.52–1.04)
CREAT SERPL-MCNC: 1.11 MG/DL (ref 0.52–1.04)
ERYTHROCYTE [DISTWIDTH] IN BLOOD BY AUTOMATED COUNT: 16.1 % (ref 10–15)
GFR SERPL CREATININE-BSD FRML MDRD: 48 ML/MIN/1.7M2
GFR SERPL CREATININE-BSD FRML MDRD: 50 ML/MIN/1.7M2
GLUCOSE BLDC GLUCOMTR-MCNC: 112 MG/DL (ref 70–99)
GLUCOSE SERPL-MCNC: 106 MG/DL (ref 70–99)
GLUCOSE SERPL-MCNC: 115 MG/DL (ref 70–99)
HCO3 BLD-SCNC: 23 MMOL/L (ref 21–28)
HCO3 BLD-SCNC: 24 MMOL/L (ref 21–28)
HCT VFR BLD AUTO: 25.8 % (ref 35–47)
HGB BLD-MCNC: 8.1 G/DL (ref 11.7–15.7)
INR PPP: 1.31 (ref 0.86–1.14)
MAGNESIUM SERPL-MCNC: 2.3 MG/DL (ref 1.6–2.3)
MAGNESIUM SERPL-MCNC: 2.4 MG/DL (ref 1.6–2.3)
MCH RBC QN AUTO: 31.2 PG (ref 26.5–33)
MCHC RBC AUTO-ENTMCNC: 31.4 G/DL (ref 31.5–36.5)
MCV RBC AUTO: 99 FL (ref 78–100)
O2/TOTAL GAS SETTING VFR VENT: 60 %
O2/TOTAL GAS SETTING VFR VENT: ABNORMAL %
OXYHGB MFR BLD: 92 % (ref 92–100)
PCO2 BLD: 45 MM HG (ref 35–45)
PCO2 BLD: 49 MM HG (ref 35–45)
PH BLD: 7.3 PH (ref 7.35–7.45)
PH BLD: 7.31 PH (ref 7.35–7.45)
PHOSPHATE SERPL-MCNC: 3.2 MG/DL (ref 2.5–4.5)
PHOSPHATE SERPL-MCNC: 3.9 MG/DL (ref 2.5–4.5)
PLATELET # BLD AUTO: 385 10E9/L (ref 150–450)
PO2 BLD: 47 MM HG (ref 80–105)
PO2 BLD: 73 MM HG (ref 80–105)
POTASSIUM SERPL-SCNC: 4.1 MMOL/L (ref 3.4–5.3)
POTASSIUM SERPL-SCNC: 4.2 MMOL/L (ref 3.4–5.3)
RBC # BLD AUTO: 2.6 10E12/L (ref 3.8–5.2)
SODIUM SERPL-SCNC: 146 MMOL/L (ref 133–144)
SODIUM SERPL-SCNC: 146 MMOL/L (ref 133–144)
TROPONIN I SERPL-MCNC: <0.015 UG/L (ref 0–0.04)
WBC # BLD AUTO: 20.1 10E9/L (ref 4–11)

## 2018-11-23 PROCEDURE — 71045 X-RAY EXAM CHEST 1 VIEW: CPT

## 2018-11-23 PROCEDURE — 80048 BASIC METABOLIC PNL TOTAL CA: CPT | Performed by: PHYSICIAN ASSISTANT

## 2018-11-23 PROCEDURE — 94002 VENT MGMT INPAT INIT DAY: CPT

## 2018-11-23 PROCEDURE — 40000193 ZZH STATISTIC PT WARD VISIT

## 2018-11-23 PROCEDURE — 25000128 H RX IP 250 OP 636: Performed by: PHYSICIAN ASSISTANT

## 2018-11-23 PROCEDURE — 99291 CRITICAL CARE FIRST HOUR: CPT | Performed by: PHYSICIAN ASSISTANT

## 2018-11-23 PROCEDURE — 40000275 ZZH STATISTIC RCP TIME EA 10 MIN

## 2018-11-23 PROCEDURE — 25000125 ZZHC RX 250: Performed by: STUDENT IN AN ORGANIZED HEALTH CARE EDUCATION/TRAINING PROGRAM

## 2018-11-23 PROCEDURE — 25000128 H RX IP 250 OP 636: Performed by: THORACIC SURGERY (CARDIOTHORACIC VASCULAR SURGERY)

## 2018-11-23 PROCEDURE — 25000125 ZZHC RX 250

## 2018-11-23 PROCEDURE — 25000128 H RX IP 250 OP 636: Performed by: STUDENT IN AN ORGANIZED HEALTH CARE EDUCATION/TRAINING PROGRAM

## 2018-11-23 PROCEDURE — 40000671 ZZH STATISTIC ANESTHESIA CASE

## 2018-11-23 PROCEDURE — 82330 ASSAY OF CALCIUM: CPT | Performed by: STUDENT IN AN ORGANIZED HEALTH CARE EDUCATION/TRAINING PROGRAM

## 2018-11-23 PROCEDURE — 97530 THERAPEUTIC ACTIVITIES: CPT | Mod: GP

## 2018-11-23 PROCEDURE — 25000128 H RX IP 250 OP 636

## 2018-11-23 PROCEDURE — 00000146 ZZHCL STATISTIC GLUCOSE BY METER IP

## 2018-11-23 PROCEDURE — 84100 ASSAY OF PHOSPHORUS: CPT | Performed by: PHYSICIAN ASSISTANT

## 2018-11-23 PROCEDURE — C9113 INJ PANTOPRAZOLE SODIUM, VIA: HCPCS | Performed by: PHYSICIAN ASSISTANT

## 2018-11-23 PROCEDURE — 25000128 H RX IP 250 OP 636: Performed by: SURGERY

## 2018-11-23 PROCEDURE — A9270 NON-COVERED ITEM OR SERVICE: HCPCS | Performed by: STUDENT IN AN ORGANIZED HEALTH CARE EDUCATION/TRAINING PROGRAM

## 2018-11-23 PROCEDURE — 82805 BLOOD GASES W/O2 SATURATION: CPT

## 2018-11-23 PROCEDURE — 93010 ELECTROCARDIOGRAM REPORT: CPT | Performed by: INTERNAL MEDICINE

## 2018-11-23 PROCEDURE — 25000125 ZZHC RX 250: Performed by: NURSE ANESTHETIST, CERTIFIED REGISTERED

## 2018-11-23 PROCEDURE — 93306 TTE W/DOPPLER COMPLETE: CPT | Mod: 26 | Performed by: INTERNAL MEDICINE

## 2018-11-23 PROCEDURE — 5A1945Z RESPIRATORY VENTILATION, 24-96 CONSECUTIVE HOURS: ICD-10-PCS | Performed by: THORACIC SURGERY (CARDIOTHORACIC VASCULAR SURGERY)

## 2018-11-23 PROCEDURE — 93306 TTE W/DOPPLER COMPLETE: CPT

## 2018-11-23 PROCEDURE — 40000014 ZZH STATISTIC ARTERIAL MONITORING DAILY

## 2018-11-23 PROCEDURE — 84484 ASSAY OF TROPONIN QUANT: CPT | Performed by: PHYSICIAN ASSISTANT

## 2018-11-23 PROCEDURE — 85027 COMPLETE CBC AUTOMATED: CPT | Performed by: PHYSICIAN ASSISTANT

## 2018-11-23 PROCEDURE — 20000004 ZZH R&B ICU UMMC

## 2018-11-23 PROCEDURE — 82803 BLOOD GASES ANY COMBINATION: CPT | Performed by: STUDENT IN AN ORGANIZED HEALTH CARE EDUCATION/TRAINING PROGRAM

## 2018-11-23 PROCEDURE — 25000128 H RX IP 250 OP 636: Performed by: NURSE ANESTHETIST, CERTIFIED REGISTERED

## 2018-11-23 PROCEDURE — 25000132 ZZH RX MED GY IP 250 OP 250 PS 637: Performed by: STUDENT IN AN ORGANIZED HEALTH CARE EDUCATION/TRAINING PROGRAM

## 2018-11-23 PROCEDURE — 25500064 ZZH RX 255 OP 636: Performed by: INTERNAL MEDICINE

## 2018-11-23 PROCEDURE — 40000986 XR CHEST PORT 1 VW

## 2018-11-23 PROCEDURE — 25000132 ZZH RX MED GY IP 250 OP 250 PS 637: Performed by: PHYSICIAN ASSISTANT

## 2018-11-23 PROCEDURE — 5A2204Z RESTORATION OF CARDIAC RHYTHM, SINGLE: ICD-10-PCS | Performed by: THORACIC SURGERY (CARDIOTHORACIC VASCULAR SURGERY)

## 2018-11-23 PROCEDURE — A9270 NON-COVERED ITEM OR SERVICE: HCPCS | Performed by: PHYSICIAN ASSISTANT

## 2018-11-23 PROCEDURE — 93005 ELECTROCARDIOGRAM TRACING: CPT

## 2018-11-23 PROCEDURE — 85610 PROTHROMBIN TIME: CPT | Performed by: PHYSICIAN ASSISTANT

## 2018-11-23 PROCEDURE — 83735 ASSAY OF MAGNESIUM: CPT | Performed by: PHYSICIAN ASSISTANT

## 2018-11-23 RX ORDER — PROPOFOL 10 MG/ML
INJECTION, EMULSION INTRAVENOUS PRN
Status: DISCONTINUED | OUTPATIENT
Start: 2018-11-23 | End: 2018-11-23

## 2018-11-23 RX ORDER — SODIUM CHLORIDE 9 MG/ML
INJECTION, SOLUTION INTRAVENOUS
Status: DISCONTINUED
Start: 2018-11-23 | End: 2018-11-30 | Stop reason: HOSPADM

## 2018-11-23 RX ORDER — FENTANYL CITRATE 50 UG/ML
100 INJECTION, SOLUTION INTRAMUSCULAR; INTRAVENOUS
Status: DISCONTINUED | OUTPATIENT
Start: 2018-11-23 | End: 2018-11-23

## 2018-11-23 RX ORDER — FENTANYL CITRATE 50 UG/ML
100 INJECTION, SOLUTION INTRAMUSCULAR; INTRAVENOUS ONCE
Status: COMPLETED | OUTPATIENT
Start: 2018-11-23 | End: 2018-11-23

## 2018-11-23 RX ORDER — FENTANYL CITRATE 50 UG/ML
INJECTION, SOLUTION INTRAMUSCULAR; INTRAVENOUS
Status: COMPLETED
Start: 2018-11-23 | End: 2018-11-23

## 2018-11-23 RX ORDER — FENTANYL CITRATE 50 UG/ML
25-50 INJECTION, SOLUTION INTRAMUSCULAR; INTRAVENOUS
Status: DISCONTINUED | OUTPATIENT
Start: 2018-11-23 | End: 2018-11-27

## 2018-11-23 RX ORDER — MAGNESIUM SULFATE HEPTAHYDRATE 40 MG/ML
2 INJECTION, SOLUTION INTRAVENOUS ONCE
Status: COMPLETED | OUTPATIENT
Start: 2018-11-23 | End: 2018-11-23

## 2018-11-23 RX ORDER — DEXMEDETOMIDINE HYDROCHLORIDE 4 UG/ML
0.2-0.7 INJECTION, SOLUTION INTRAVENOUS CONTINUOUS
Status: DISCONTINUED | OUTPATIENT
Start: 2018-11-23 | End: 2018-11-26

## 2018-11-23 RX ORDER — CALCIUM GLUCONATE 94 MG/ML
2 INJECTION, SOLUTION INTRAVENOUS ONCE
Status: DISCONTINUED | OUTPATIENT
Start: 2018-11-23 | End: 2018-11-23

## 2018-11-23 RX ADMIN — FENTANYL CITRATE 100 MCG: 50 INJECTION, SOLUTION INTRAMUSCULAR; INTRAVENOUS at 13:55

## 2018-11-23 RX ADMIN — ROCURONIUM BROMIDE 100 MG: 10 INJECTION INTRAVENOUS at 13:58

## 2018-11-23 RX ADMIN — FENTANYL CITRATE 100 MCG: 50 INJECTION, SOLUTION INTRAMUSCULAR; INTRAVENOUS at 14:00

## 2018-11-23 RX ADMIN — SENNOSIDES AND DOCUSATE SODIUM 2 TABLET: 8.6; 5 TABLET ORAL at 20:30

## 2018-11-23 RX ADMIN — BACITRACIN: 500 OINTMENT TOPICAL at 21:02

## 2018-11-23 RX ADMIN — Medication 75 MCG/HR: at 14:20

## 2018-11-23 RX ADMIN — HUMAN ALBUMIN MICROSPHERES AND PERFLUTREN 6 ML: 10; .22 INJECTION, SOLUTION INTRAVENOUS at 16:00

## 2018-11-23 RX ADMIN — PANTOPRAZOLE SODIUM 40 MG: 40 INJECTION, POWDER, FOR SOLUTION INTRAVENOUS at 08:54

## 2018-11-23 RX ADMIN — DEXMEDETOMIDINE HYDROCHLORIDE 0.2 MCG/KG/HR: 400 INJECTION INTRAVENOUS at 19:51

## 2018-11-23 RX ADMIN — PIPERACILLIN SODIUM,TAZOBACTAM SODIUM 3.38 G: 3; .375 INJECTION, POWDER, FOR SOLUTION INTRAVENOUS at 08:54

## 2018-11-23 RX ADMIN — AMIODARONE HYDROCHLORIDE 150 MG: 1.5 INJECTION, SOLUTION INTRAVENOUS at 12:56

## 2018-11-23 RX ADMIN — BISACODYL 10 MG: 10 SUPPOSITORY RECTAL at 08:53

## 2018-11-23 RX ADMIN — FLUCONAZOLE 200 MG: 2 INJECTION, SOLUTION INTRAVENOUS at 10:03

## 2018-11-23 RX ADMIN — SODIUM CHLORIDE, POTASSIUM CHLORIDE, SODIUM LACTATE AND CALCIUM CHLORIDE: 600; 310; 30; 20 INJECTION, SOLUTION INTRAVENOUS at 20:08

## 2018-11-23 RX ADMIN — BACITRACIN: 500 OINTMENT TOPICAL at 08:54

## 2018-11-23 RX ADMIN — MAGNESIUM SULFATE 2 G: 2 INJECTION INTRAVENOUS at 13:12

## 2018-11-23 RX ADMIN — FENTANYL CITRATE 100 MCG: 50 INJECTION INTRAMUSCULAR; INTRAVENOUS at 13:55

## 2018-11-23 RX ADMIN — SODIUM CHLORIDE, POTASSIUM CHLORIDE, SODIUM LACTATE AND CALCIUM CHLORIDE 500 ML: 600; 310; 30; 20 INJECTION, SOLUTION INTRAVENOUS at 11:30

## 2018-11-23 RX ADMIN — PROPOFOL 40 MG: 10 INJECTION, EMULSION INTRAVENOUS at 13:58

## 2018-11-23 RX ADMIN — NOREPINEPHRINE BITARTRATE 0.09 MCG/KG/MIN: 1 INJECTION INTRAVENOUS at 21:21

## 2018-11-23 RX ADMIN — ENOXAPARIN SODIUM 40 MG: 40 INJECTION SUBCUTANEOUS at 14:47

## 2018-11-23 RX ADMIN — CALCIUM GLUCONATE 2 G: 98 INJECTION, SOLUTION INTRAVENOUS at 14:00

## 2018-11-23 RX ADMIN — PIPERACILLIN SODIUM,TAZOBACTAM SODIUM 3.38 G: 3; .375 INJECTION, POWDER, FOR SOLUTION INTRAVENOUS at 14:47

## 2018-11-23 RX ADMIN — PIPERACILLIN SODIUM,TAZOBACTAM SODIUM 3.38 G: 3; .375 INJECTION, POWDER, FOR SOLUTION INTRAVENOUS at 20:30

## 2018-11-23 RX ADMIN — PIPERACILLIN SODIUM,TAZOBACTAM SODIUM 3.38 G: 3; .375 INJECTION, POWDER, FOR SOLUTION INTRAVENOUS at 02:21

## 2018-11-23 ASSESSMENT — ACTIVITIES OF DAILY LIVING (ADL)
ADLS_ACUITY_SCORE: 16
ADLS_ACUITY_SCORE: 18
ADLS_ACUITY_SCORE: 16

## 2018-11-23 ASSESSMENT — PAIN DESCRIPTION - DESCRIPTORS
DESCRIPTORS: SHARP
DESCRIPTORS: SHARP

## 2018-11-23 NOTE — PROGRESS NOTES
Night Shift [id number]    D/I:    Neuro- Ox4, but intermittent confusion with conversation.  Dilaudid PCA for pain.    CV-  Around 2030 HR increased to 120's-160's, irregular rhythm noted, MAP decreased to low to mid 50's (still on levophed gtt and levophed gtt increasead),  Dr. Robert Graham notified, 12 lead EKG ordered and performed, labs sent and electrolytes replaced, amiodarone bolus ordered and given, and amiodarone gtt orderd and started.  By 2200 converted to NSR.  Throughout the night occassional to frequently PACs and PVCs noted and all electrolytes are in range.    A:  Good pain control with PCA, patient denied need for more pain meds.  Patient assisted with turning in bed.      Able to titrate levophed gtt down once back in NSR. Janet morgan, following NIBP, and daytime SICU team notified.    All labs reviewed.      P:  Continue to monitor.  Talked with SICU daytime team about using GT and JT today.  Per SICU they will talk with Thoracic team.  If able to use GT and JT then pain meds can be switched to oral and bowel meds can be started.  See flowsheet for details.

## 2018-11-23 NOTE — PLAN OF CARE
Problem: Surgery Nonspecified (Adult)  Goal: Signs and Symptoms of Listed Potential Problems Will be Absent, Minimized or Managed (Surgery Nonspecified)  Signs and symptoms of listed potential problems will be absent, minimized or managed by discharge/transition of care (reference Surgery Nonspecified (Adult) CPG).   Outcome: No Change  See flowsheets and nurse note for details.

## 2018-11-23 NOTE — PROGRESS NOTES
"Thoracic Surgery Progress Note  11/23/2018    Delirium a little better, more awake today. On 4L oxymask with good saturations. Getting bolused for hypotension this afternoon. On levophed. Afib today requiring restarting of amiodarone gtt. NPO. No bowel movement after Pink Lady Enema. Got up to chair.     BP 92/46  Pulse 80  Temp 98.4  F (36.9  C) (Axillary)  Resp 11  Ht 1.651 m (5' 5\")  Wt 92.7 kg (204 lb 5.9 oz)  SpO2 96%  BMI 34.01 kg/m2    ,510 since midnight  ANGELO 485, 125 since midnight  Chest tube left:  PE  NAD  4L oxymask  RRR  Spit fistula viable  J tube and G tube to gravity  Chest tubes to -20 suction, no air leak  ANGELO to bulb suction s/s output    Labs   WBC 20.1 from 21.3  Hg 8.1 from 8.4  Hematocrit 26.4 from 25.8    Imaging:   CXR 11/23   IMPRESSION:   1. Interval extubation. Slight retraction of left chest tube, part of  the side hole of which projects over the chest wall. Suspect trace  left apical pneumothorax, decreased since prior.  2. Stable position of right chest tube and right IJ central venous  catheter.  3. Stable to minimally decreased bibasilar opacities, likely  atelectatic.  4. Trace bilateral pleural effusions.    A/P: 73F POD2 s/p transhiatal esophagectomy with spit fistula, G tube, J tube for perforated esophagus after type 4 peraesophageal hernia repair. Extubated, on minimal pressors.    -appreciate SICU cares  -continue amiodarone gtt, pressors as needed  -follow up ECHO today  -chest tubes to waterseal  -continue zosyn and diflucan for upper GI contamination  -G tube and J tube to gravity   -daily enema for large stool burden   -follow up prealbumin   -follow up swallow study today for ice chips for comfort   -appreciate PT/OT/SW, likely TCU when discharges    Seen with attending Dr. Per Funes.    Nicky Park MD PGY3  General Surgery      Addendum:   Patient went into Vtach and had to be reintubated. Now requiring more pressors. Please keep Zoll pads on pt. " Continue amiodarone.

## 2018-11-23 NOTE — ANESTHESIA PROCEDURE NOTES
ANESTHESIOLOGY RESIDENT/CRNA INTUBATION NOTE  Indication for intubation: respiratory insufficiency, hemodynamic instability, altered level of consciousness, airway protection.  Provider Ordering Intubation: PRAFUL REVELES  History regarding the most recent potassium obtained: Yes  History regarding renal failure obtained: Yes  History of presence or absence of CVA/stroke was obtained: Yes  History of presence or absence of NM disorder obtained: Yes  Post Intubation: No apparent complications, Sedation to be ordered by primary/ICU team, Primary/ICU team to review CXR, Vent settings by primary/ICU team, ETT secured and Report given to primary nurse and/or team

## 2018-11-23 NOTE — ANESTHESIA CARE TRANSFER NOTE
Patient: Myrtle Vaz    * No procedures listed *    Diagnosis: * No pre-op diagnosis entered *  Diagnosis Additional Information: No value filed.    Anesthesia Type:   No value filed.     Note:  Airway :ETT and Ventilator  Patient transferred to:ICU  ICU Handoff: Call for PAUSE to initiate/utilize ICU HANDOFF, Identified Patient, Identified Responsible Provider, Reviewed the Pertinent Medical History, Discussed Surgical Course, Reviewed Intra-OP Anesthesia Management and Issues during Anesthesia, Set Expectations for Post Procedure Period and Allowed Opportunity for Questions and Acknowledgement of Understanding      Vitals: (Last set prior to Anesthesia Care Transfer)    CRNA VITALS  11/23/2018 1337 - 11/23/2018 1407      11/23/2018             ART BP: (!)  88/66    Ht Rate: 78    SpO2: 100 %                Electronically Signed By: NARCISO Sanders CRNA  November 23, 2018  2:07 PM

## 2018-11-23 NOTE — PROGRESS NOTES
D: pt intubated with a 7.0 ETT secured 23 @ the lip  I: pt placed on a drager ventilator with settings per M.D.  A: color change with ETCO2. Bilateral breath sounds  P; cont to monitor

## 2018-11-23 NOTE — PROGRESS NOTES
SURGICAL ICU PROGRESS NOTE  November 23, 2018        Date of Service (when I saw the patient): 11/23/2018    ASSESSMENT:  Myrtle Vaz is a 73 year old female who was admitted on 11/21/2018 to [facility]. She was transferred from [facility] where she presented with respiratory failure thought to be secondary to large hiatal hernia and underwent s/p laparoscopic hiatal hernia repair with mesh reinforcement, laparoscopic nissen fundoplication, and laparoscopic gastrostomy tube placement, which was complicated by pleural effusions and possible esophageal leak,  s/p placement of chest tubes x3. Taken to OR with thoracic and found to have perforation at distal esophagus at  juncton and underwent s/p Midline laparotomy, Trans-hiatal esophagogasterectomy, gastrostomy, J-tube placement, G-tube placement, bilateral pleural chest tube placement, mediastinal abcess drainage, spit fistula creation, Esophagastrodueodenoscopy, intraoperative course complicated by SVT required intraoperative cardioversion x3.      CHANGES and MAJOR THINGS TODAY:   - Bowel regimen--would like to give meds from above if able.   - Discuss use of J tube for feeding   - discontinue PCA dilaudid; transitioning to fentanyl  IV pushes (25-50 mcg) as pt not using appropriately.   - Oral amiodarone if Ok to use J tube; run out of amiodarone.   -  ml IV  bolus   - Stop antibiotics 4 days total   - Echocardiogram     ADDENDUM 14:00: Pt went into A fib with RVR and associated hypotension and AMS. ABG obtained and found to have acidosis and low PO2 Pt cardioverted at beside, sedated with fentanyl. Pt intubated for airway protection given AMS and hypoxia     Neurological:  # acute post operative pain   # delirium   - Monitor neurological status. Notify the MD for any acute changes in exam.  - pt reported to have delirium prior to transfer from OSH. Continue efforts with delirium prevention and re-orientation   - Pain:  Will discontinue Fentanyl  infusion and start Dilaudid PCA if able to use    - Sedation: propofolol--now off      Pulmonary:   # acute post operative vent support, extubated 11/22/18  - PST this am and pt extubated.   - Supplemental oxygen to keep saturation above 92 %.     Cardiovascular:    # Atrial fibrillation, unstable, intraoperative cardioversion x3   - hx on metoprolol during this admission, OSH records reviewed. Appears metoprolol not PTA medication, cont with amiodarone infusion for now   # s/p septic shock-- off Vaso,  Weaning off Norepi   # hx of hypertension- hold home diuretic   # hx of hyperlipidemia- home home statin      Gastroenterology/Nutrition:  # S/p hiatal hernia complicated by distal esophageal leak now s/p Midline laparotomy, trans-hiatal esophagogasterectomy, gastrostomy, J-tube placement, G-tube placement, bilateral pleural chest tube placement, mediastinal abcess drainage, spit fistula creation, esophagastrodueodenoscopy   # s/p G tube   # s/P J tube  - G and J tubes to be placed to gravity per DR Albarado   - hold feeds per thoracic--awaiting staff decision regarding J tube for medications.      Fluids/Electrolytes:   # hypokalemia  - cont for IV fluid hydration  - hold feeds per thoracic   - electrolyte replacement protocol.      Renal:  # HANG,   -  Will continue to monitor intake and output continue with lopez for now      Endocrine:  # hypothyroidism   - Home synthroid 150mcg--on hold unitl J tube can be used  - no indication for sliding scale,  for glucose management if needed.      ID/Antibiotics:  # mediastinal pleural effusion   Continue with Zosyn and  and Micafungin per Dr Albarado   Vancomycin d/phani 11/22/18    Heme:     # Acute blood loss anemia   #Leukocytosis   - WBC count improving. Now 19.7 ( 28.4)   - Hemoglobin 8.1today,  Transfuse if hgb <7.0 or signs/symptoms of hypoperfusion. Monitor and trend.      Musculoskeletal:  # weakness and deconditioning of deconditioning   - Physical and occupational therapy  consult      General Cares/Prophylaxis:    DVT Prophylaxis: Lovenox 40mg subcutaneous    GI Prophylaxis: Protonix   Restraints: Restraints for medical healing needed: NO     Lines/ tubes/ drains:  -  PIV's  - central line   - ANGELO drain   - chest tube x3   - lopez   - arterial line      Disposition:  - Surgical ICU.      Time spent on this Encounter   Billing:  I spent  45 minutes bedside and on the inpatient unit today managing the care of Myrtle Vaz in relation to the issues listed in this note.      Siva Kasper    ====================================  INTERVAL HISTORY:  Course reviewed. Went back into A fib overnight and now back on amiodarone infusion. This am answers orientation correctly and consistently. Follows commands when asked.     OBJECTIVE:   1. VITAL SIGNS:   Temp:  [97.5  F (36.4  C)-98.4  F (36.9  C)] 98.4  F (36.9  C)  Heart Rate:  [] 80  Resp:  [0-32] 32  BP: ()/(46-90) 122/89  MAP:  [54 mmHg-112 mmHg] 82 mmHg  Arterial Line BP: ()/() 96/72  FiO2 (%):  [60 %] 60 %  SpO2:  [86 %-100 %] 100 %  Ventilation Mode: CMV/AC  (Continuous Mandatory Ventilation/ Assist Control)  FiO2 (%): 60 %  Rate Set (breaths/minute): 14 breaths/min  Tidal Volume Set (mL): 400 mL  PEEP (cm H2O): 5 cmH2O  Pressure Support (cm H2O): 7 cmH2O  Oxygen Concentration (%): 100 %  Resp: 32    2. INTAKE/ OUTPUT:   I/O last 3 completed shifts:  In: 2658.47 [I.V.:2658.47]  Out: 1742 [Urine:735; Emesis/NG output:175; Drains:290; Chest Tube:542]    3. PHYSICAL EXAMINATION:  General: sitting in bed, awake, alert, follows commands   HEENT: pupils 3mm and equal. OP clear.   Neuro: follows commands correctly  Pulm/Resp: Clear breath sounds bilaterally without rhonchi, crackles or wheeze.  Chest: Right chest tube to suction, no air leak. Left chest with (+) intermittent air leak.    CV: RRR, S1, S2. Monitor: a-fib intermittent A-fib  Abdomen: Soft, non-distended,  G to gravity with dark bilious  drainage in tubing,  nothing in bag, J tube to gravity.   : (+)  lopez catheter in place, urine yellow and clear  Incisions/Skin: incision without tenderness, swelling or redness.   MSK/Extremities: no peripheral edema, moving all extremities, peripheral pulses intact, calves soft and non-tender, extremities well perfused. Brisk cap refill     4. INVESTIGATIONS:   Arterial Blood Gases     Recent Labs  Lab 11/23/18  1533 11/23/18  1245 11/22/18 2033 11/21/18 1831   PH 7.31* 7.30* 7.35 7.38   PCO2 45 49* 45 39   PO2 73* 47* 78* 86   HCO3 23 24 25 23     Complete Blood Count     Recent Labs  Lab 11/23/18  0341 11/22/18 2033 11/22/18 0213 11/21/18 1831   WBC 20.1* 21.3* 19.7* 20.8*   HGB 8.1* 8.4* 9.1* 8.8*    381 387 331     Basic Metabolic Panel    Recent Labs  Lab 11/23/18  1245 11/23/18 0341 11/22/18 2033 11/22/18 0213   * 146* 146* 143   POTASSIUM 4.1 4.2 4.1 3.9   CHLORIDE 113* 114* 114* 112*   CO2 26 26 24 25   BUN 28 29 31* 33*   CR 1.11* 1.07* 1.10* 1.03   * 115* 100* 128*     Liver Function Tests    Recent Labs  Lab 11/23/18 0341 11/22/18 0213 11/21/18 2209 11/21/18 1831 11/21/18 0141   AST  --   --   --  238* 21   ALT  --   --   --  106* 35   ALKPHOS  --   --   --  78 100   BILITOTAL  --   --   --  1.3 1.0   ALBUMIN  --   --   --  2.2* 2.2*   INR 1.31* 1.57* 1.59*  --  1.51*     Pancreatic Enzymes  No lab results found in last 7 days.  Coagulation Profile    Recent Labs  Lab 11/23/18  0341 11/22/18 0213 11/21/18 2209 11/21/18 0141   INR 1.31* 1.57* 1.59* 1.51*         5. RADIOLOGY:   Recent Results (from the past 24 hour(s))   XR Chest Port 1 View    Narrative    EXAM: XR CHEST PORT 1 VW  11/23/2018 12:50 AM      HISTORY: ETT positioning and Chest tubes;     COMPARISON: 11/22/2018    FINDINGS: Interval extubation. Right IJ approach central venous  catheter with tip over mid SVC. Right basilar chest tube, similar to  prior left chest tube and mediastinal/pericardial drain is similar to  prior.  Possible trace left apical pneumothorax.    The sidehole of left basilar chest tube projects over chest wall. This  has retracted since 11/21/2018.     Bibasilar opacities, minimally increased since prior. Redemonstration  of possible loculated effusion right paramediastinal pleura. Trace  bilateral pleural effusions. Mediastinal versus pericardial drain.      Impression    IMPRESSION:   1. Interval extubation. Slight retraction of left chest tube, part of  the side hole of which projects over the chest wall. Suspect trace  left apical pneumothorax, decreased since prior.  2. Stable position of right chest tube and right IJ central venous  catheter.  3. Stable to minimally decreased bibasilar opacities, likely  atelectatic.  4. Trace bilateral pleural effusions.    Finding of retracted left chest tube communicated to Dr. Robert Graham by  Cholo Burnette on 11/23/2018 at 4:05 AM.      I have personally reviewed the examination and initial interpretation  and I agree with the findings.    MARY HORN MD   XR Chest Port 1 View    Narrative    EXAM:  XR CHEST PORT 1 VW    INDICATION: intubation;  patient with possible esophageal perforation,  giant hiatal hernia status post laparoscopic, purulent likely  mediastinitis    COMPARISON:  Same-day x-ray, 11/22/2018, 11/21/2018    FINDINGS:  AP view of the chest. 2 of the ET tube projects 4 cm from the kelin.  Right IJ catheter projects in distal SVC. Right-sided chest tube in  unchanged position. Left-sided chest tube with sidehole projecting  just inside chest wall. Mediastinal versus pericardial drain. Left  upper chest is obscured by pad, but likely small left-sided  pneumothorax. Bibasilar opacities have increased since prior exam.  Left-sided pleural effusion and basilar opacities have also enlarged  since prior exam. Likely contrast material in bilateral costophrenic  recesses.       Impression    IMPRESSION:  1. Interval intubation with endotracheal tube approximately 4  cm from  the kelin.  2. Stable position of the right sided chest tube. Left-sided chest  tube has been intervally advanced with sidehole now just inside of the  chest wall. Left pneumothorax is poorly visualized due to overlying  pad but pneumothorax is suspected  3. Bibasilar opacities have increased, left greater than right, with  increased left-sided left-sided pleural effusion.    I have personally reviewed the examination and initial interpretation  and I agree with the findings.    YULI ADAMS MD       =========================================

## 2018-11-23 NOTE — PLAN OF CARE
Problem: Patient Care Overview  Goal: Plan of Care/Patient Progress Review  Outcome: Improving  Neuro: Pt alert oriented to self and time. Confused to place and situation. Pt also states she has had hallucinations today. Pain controlled with dilaudid PCA.   Cardio: SR, MAP>65 with levophed, afebrile.  Resp: Extubated this AM to 4L NC. Productive cough, using oral suction.  GI: NPO, spit fistula with minimal output. Constipated- enema ordered.  : Mora with adequate urine output.  Skin: Multiple incision sites CDI, CT dressing changed, ANGELO to bulb suction, J to gravity, G to gravity.  Plan: Continue monitoring, wean pressor as able. Notify MD with changes in patient condition.

## 2018-11-23 NOTE — PLAN OF CARE
Problem: Patient Care Overview  Goal: Plan of Care/Patient Progress Review  SLP: Cancel - SLP orders received. Per RN, pt now intubated and not medically appropriate for swallow evaluation. SLP to f/u for evaluation on 11/24 as appropriate.

## 2018-11-23 NOTE — PLAN OF CARE
Problem: Patient Care Overview  Goal: Plan of Care/Patient Progress Review    OT-4a: Pt intubated this PM, and not appropriate for OT, will hold OT eval and initiate when more able to participate in therapy, PT to follow.

## 2018-11-23 NOTE — OP NOTE
Procedure Date: 11/21/2018      PREOPERATIVE DIAGNOSES:   1.  Possible esophageal perforation.   2.  Giant hiatal hernia, status post laparoscopic repair with mesh.   3.  Sepsis.   4.  New onset atrial fibrillation.   5.  Acute kidney failure.      POSTOPERATIVE DIAGNOSES:   1.  Distal esophageal perforation.   2.  Giant hiatal hernia, status post laparoscopic repair with mesh.   3.  Purulent mediastinitis.   4.  Septic shock.   5.  New onset atrial fibrillation.   6.  Intermittent supraventricular tachycardia.   7.  Acute kidney failure.   8.  Respiratory failure.      PROCEDURES PERFORMED:   1.  Esophagogastroduodenoscopy.   2.  Midline laparotomy.   3.  Takedown of Nissen fundoplication.   4.  Takedown of cruroplasty.   5.  Prosthetic mesh explantation.   6.  Takedown of gastrostomy tube.   7.  Transhiatal drainage of mediastinal abscess.   8.  Excision of hiatal hernia sac.   9.  Transhiatal partial esophagogastrectomy.   10.  Closure of esophageal hiatus.   11.  Gastrostomy tube.   12.  Jejunostomy tube.   13.  Bilateral pleural tube.   14.  Left neck incision and cervical esophagostomy.      ANESTHESIA:  General endotracheal anesthesia with single lumen endotracheal tube.      ATTENDING SURGEON:  Temitope Funes MD      ASSISTANTS:   1.  Ally Ybarra MD.  Given the high complexity of the case and since no qualified resident was available.  Dr. Ybarra's assistance was required during the laparotomy part of this procedure.    2.  Nicky Park MD, general Surgery resident      INDICATIONS:  Myrtle Vaz is a 73-year-old female patient who was transferred from an outside hospital for further care.  She had a laparoscopic repair of a giant hiatal hernia, a prosthetic mesh placement and a fundoplication 6 days ago.  In the postoperative period, she became delirious and hypoxic.  A CT scan was obtained which revealed a very large fluid collection in the left chest due to the concern for possible early  reherniation, the patient was transferred to the hospital for operative intervention due to the concern for possible esophageal perforation, the patient is being brought to the operating room in an urgent fashion.  The need for the procedure, the alternatives, risks and benefits were extensively discussed with the patient's family.  We talked about the best case scenario being just early reherniation requiring a redo hiatal hernia repair, but we also discussed the concern of possible esophageal perforation or necrosis of the distal esophagus or the stomach requiring resection and intestinal discontinuity with a cervical esophagostomy.  We talked about the postoperative course expecting at least 1-2 weeks in the hospital and a few days in the Intensive Care Unit.  We discussed the risk of bleeding, infection, damage to the internal organs as well as the complications from anesthesia including cardiac arrhythmia, thromboembolic events and even death.  Informed consent was obtained and they agreed to proceed.      OPERATIVE FINDINGS:  The patient had a distal esophageal perforation approximately 40 cm from the incisors just above the GE junction.  The proximal resection margin was at 35 cm from the incisors.  Distal resection margin was in the mid gastric body (approximately 15 cm left of stomach).  The stomach was not to be tubularized and the left gastric artery was not divided.  The gastrostomy tube was placed at the tip of the stomach.      DESCRIPTION OF PROCEDURE IN DETAIL:  The patient was taken to the operating room, placed in a supine position.  All pressure points were adequately padded.  General endotracheal anesthesia was induced using RSI for the high risk of aspiration. a right IJ central line, radial arterial line and Mora catheter were placed by Anesthesia for intraoperative monitoring.  The patient was positioned supine with her arms out and a footboard.  She was prepped from the neck down to the mid  thighs using ChloraPrep and draped in a normal sterile fashion.  A formal timeout was carried out confirming the name of the patient and the correct procedure.  She had SCDs in place and functioning prior to induction of anesthesia.  All instruments were in the room, all questions were answered from the staff.      We started by performing an esophagogastroduodenoscopy.  The adult gastroscope was introduced into the oropharynx and with gentle insufflation using CO2 and pressure the scope was advanced through the esophagus into the stomach, navigated through the hiatal hernia and into the duodenum.  We then slowly pulled back the scope, examining the mucosa.  The duodenum appeared normal.  The proximal part of the stomach appeared to have severe ischemic changes.  In the distal part of the esophagus, we found a 1-2 cm esophageal perforation as well as ischemic changes in the esophagus.  The scope was advanced through the esophageal perforation and we encountered the abscess cavity in the left chest.  It appeared as if the patient had perforated into the old hernia sac.  At this point it appeared clear to us that we would need to proceed with a laparotomy, resection of the necrotic portion of the intestine and leave the patient in discontinuity.  The nurses updated the family and we proceeded to perform a midline laparotomy.      A generous midline laparotomy was performed from the xiphoid to just below the umbilicus.  An Omni retractor was brought to the field for wide operative exposure.  The falciform ligament was taken down.  The left lateral lobe of the liver was retracted.  We took down the patient's gastrostomy tube.  On initial inspection, the patient had moderate inflammatory changes at the hiatus, but no evidence of gross contamination or bile enteric fluid in the peritoneal cavity.       The patient had the gastrohepatic ligament already divided as well as the short gastric vessels.  The cruroplasty  appeared to be intact with a Bio-A U-shaped mesh in place.  First, we took down the fundoplication.  Next, the prosthetic mesh was removed from the esophageal hiatus cutting and the stitches.  The patient also had absorbable tacks fixating the mesh. This part of the dissection was extremely challenging given the patient's body habitus, limited exposure and moderate inflammatory changes at the hiatus.  Once we removed the mesh in its entirety, we took down the stitches approximating the howard of the diaphragm. as we performed this, we had a gush of purulent material coming from the posterior mediastinum.  This was suctioned out.  We then spent a fair amount of time draining the purulent material from the posterior mediastinum and identifying the distal portion of the esophagus, which was then bluntly circumferentially dissected out. Again, this part of the operation was very difficult and significantly increased the complexity of the procedure.      We found at the site of the perforation, which appeared to be just above the GE junction.  At this point, I called my partner, Dr. Ybarra to assist me with performing an esophagoscopy to determine esophageal length and determine the proximal margin of resection. We found that the perforation was about 40 cm from the incisors.  Dr. Ybarra then scrubbed in and helped me with the dissection of the hiatus.  First we removed most of the patient's old hernia sac that was still retained in the posterior mediastinum.  Once the hernia sac was reduced, we proceeded to divide the esophagus.  We used a green load of the contour stapler to staple off the esophagus just above the perforation.      Next, we entered the left pleural space through the esophageal hiatus.  We used at least 4 liters of warmed saline mixed with Techni-Care to washout the left pleural space as well as the posterior mediastinum.  Next, we placed bilateral pleural tubes and secured them to the skin.  A 19-[place] citizen Alek  drain was placed in the posterior mediastinum at the site of the mediastinal abscess.  The esophageal hiatus was closed with interrupted horizontal mattress stitches using 0 Ethibond pledgeted.  We ensured that the Alek drain was free after placing the stitches.      Next, we inspected the stomach, it appeared as if the proximal half of the stomach was severely ischemic.  We determined that the distal margin of resection would be in the mid gastric body.  We left approximately 15 cm of stomach behind.  We used multiple green loads of the Merritt stapler to divide the stomach. we did not tubularize the gastric remnant or divide the left gastric artery.  The staple line was oversewn with multiple interrupted 3-0 silk stitches.  The old gastrostomy tube site was also resected using a load of the Merritt stapler and a new gastrostomy site was repositioned at the tip of the gastric remnant.  A pursestring was placed around the gastrostomy tube to avoid leak.  Next, the ligament of Treitz was found, 40 cm distal to this, we placed a jejunostomy tube.  The jejunostomy tube was Witzel'd with a 2 cm tunnel.  Again, we placed a pursestring around the jejunostomy tube to avoid postoperative leak and we placed an anti-torsion stitch.  Hemostasis was then confirmed.  The peritoneal cavity was copiously irrigated with normal saline mixed with Techni-Care and the abdominal viscera was reduced to their normal anatomic position. The laparotomy incision was closed using 0 looped PDS in a running fashion for the fascia and staples for the skin.      After this was completed, we directed our attention towards the neck.  For this part also Dr. Ybarra assisted with the left neck incision.  This was made at the base of the neck, one fingerbreadth above the suprasternal notch.  We divided the platysma.  The sternocleidomastoid was retracted laterally.  We clearly identified the carotid sheath as well as the trachea and the tracheoesophageal  groove.  A Kitner was used to retract the airway to the right.  We clearly identified the recurrent laryngeal nerve, which was preserved throughout the case.  The esophagus was then gently delivered through the wound.  We used a sponge stick to bluntly dissect circumferentially the esophagus.  Once it was freed from the mediastinal attachments, it came through the neck wound without any problems.  We had excellent esophageal length which was well below the angle of Jimi.  The esophagus was then tunneled subcutaneously to a left upper chest incision. the esophageal staple line was then cut and the esophagostomy was matured with multiple interrupted 3-0 Vicryl stitches.  A wound manager was applied to the esophagostomy.  The left neck was copiously irrigated with normal saline.  Staples were used to close the left neck incision.      Throughout the procedure, the patient required minimal to moderate doses of vasopressors using vasopressin and norepinephrine.  At the end of the case, the patient went into sustained supraventricular tachycardia and became hemodynamically unstable with MAPS in the 50s.  She already had Zoll pads on and she underwent synchronized cardioversion x 4 with conversion into normal sinus rhythm.  She became hemodynamically stable again.      All instrument and sponge counts were correct at the end of the procedure.      COMPLICATIONS: Supraventricular tachycardia requiring cardioversion x 4 at the end of the case.      ESTIMATED BLOOD LOSS:  150 mL.      BLOOD PRODUCTS:  None.      URINE OUTPUT:  250 mL.      SPECIMENS:   1.  Distal esophagus.   2.  Esophagogastrectomy.   3.  Hiatal hernia sac.   4.  Prosthetic mesh.      IMPLANTS:  None.      DRAINS:   1.  A 32-[place] straight [place] tube to the right pleural space, posterior apical.   2.  A 32-[place] straight [place] tube to left pleural space, posterior apical.   3.  A 19-[place] Alek drain to posterior mediastinum.   4.  A 20-Kittitian  gastrostomy tube.   5.  A 16-[place] jejunostomy tube.      DISPOSITION:  To surgical intensive care unit in critical condition.         GARCÍA QIU MD             D: 2018   T: 2018   MT: DILSHAD      Name:     CANDICE LOYA   MRN:      [id number]        Account:        [id number]   :      1945           Procedure Date: 2018      Document: [id number]

## 2018-11-23 NOTE — PLAN OF CARE
Problem: Patient Care Overview  Goal: Plan of Care/Patient Progress Review  PT 4A Discharge Planner PT   Patient plan for discharge: Disoriented, does not know  Current status: A&O x2, HR variable with increased frequency of PVC's with standing with max HR 90bpm.  Heavy assist x1 pivot to chair with LE's buckling slightly throughout.    Barriers to return to prior living situation: Dependence for mobility, medical status  Recommendations for discharge: TCU  Rationale for recommendations: Pt with cognitive, strength, balance deficits all limiting functional (I).  Given cardiopulm status requiring reintubation later this date after PT session, anticipate LOS increasing and further deconditioning.  TCU to address these deficits.       Entered by: Johanna Link 11/23/2018 3:41 PM

## 2018-11-23 NOTE — PROGRESS NOTES
CLINICAL NUTRITION SERVICES - ASSESSMENT NOTE     Nutrition Prescription    RECOMMENDATIONS FOR MDs/PROVIDERS TO ORDER:  - Total daily fluids/adjustments per MD   - Nutrition POC? Diet vs Initiate EN via J-tube once medically appropriate/per thoracic.  - Diet per SLP recommendations for safe PO/diet for comfort per consult.     Malnutrition Status:    - Unable to determine due to pt busy with other cares     Recommendations already ordered by Registered Dietitian (RD):  - none currently while NPO and nutrition POC still pending.     Future/Additional Recommendations:  - Initiation of EN via current access/J-tube?    - IF EN initiates, recommend: Impact Peptide @ goal 50 ml/hr (1200 ml/day) to provide 1800 kcals (28 kcal/kg/day), 113 g PRO (1.7 gm/kg/day), 924 ml free H2O, 77 g Fat (50% from MCTs), 168 g CHO and no Fiber daily.  - Initiate @ 10 ml/hr and advance by 10 ml q8hr as tolerated  - Do not start or advance until lytes (Mg++,K+) WNL and phos>1.9   - Recommend 30-60 ml q4hr fluid flushes for tube patency. Additional fluids and/or adjustments per MD.    - Order multivitamin/mineral (15 ml/day via FT) to help ensure micronutrient needs being met with suspected hypermetabolic demands and potential interruptions to TF infusions.  - Order baseline CRP and weekly monitoring thereafter to assess utility of immune-modulating regimen.         REASON FOR ASSESSMENT  Myrtle Vaz is a/an 73 year old female assessed by the dietitian for Nutrition Risk Monitoring: NPO x 2 days in ICU with noted gastrostomy, J-tube placement on 11/21/18.    Medical history: Pt transferred from [facility] where she presented with respiratory failure thought to be secondary to large hiatal hernia and underwent s/p laparoscopic hiatal hernia repair with mesh reinforcement, laparoscopic nissen fundoplication, and laparoscopic gastrostomy tube placement, which was complicated by pleural effusions and possible esophageal leak,  s/p  "placement of chest tubes x3. Taken to OR with thoracic and found to have perforation at distal esophagus at GE juncton and underwent s/p Midline laparotomy, Trans-hiatal esophagogasterectomy, gastrostomy, J-tube placement, G-tube placement, bilateral pleural chest tube placement, mediastinal abcess drainage, spit fistula creation,Esophagastrodueodenoscopy, intraoperative course complicated by SVT required intraoperative cardioversion x3.     NUTRITION HISTORY  Assess as able. Pt with cares upon visit.     CURRENT NUTRITION ORDERS  Diet: NPO  G and J tubes to gravity.   Per chart/MD note: hold feeds per thoracic--awaiting staff decision regarding J tube for medications.       LABS  Labs reviewed  Na+ 146 (H)  Creatinine: 1.07 (H)  Magnesium: 2.4 (H)  ALT: 106 (H)  AST: 238 (H)    MEDICATIONS  Medications reviewed  Dulcolax  Senokot   Lactated ringers @ 75 ml/hr     ANTHROPOMETRICS  Height: 165.1 cm (5' 5\") 65\"  Most Recent Weight: 92.7 kg (204 lb 5.9 oz)    IBW: 57 kg  BMI: Obesity Grade I BMI 30-34.9  Weight History:   Wt Readings from Last 9 Encounters:   11/22/18 92.7 kg (204 lb 5.9 oz)     Dosing Weight: 65 kg (adjusted using driest weight of 90 kg and IBW of 57 kg)     ASSESSED NUTRITION NEEDS  Estimated Energy Needs: 8762-6832 kcals/day (25 -30 kcals/kg)  Justification: maintenance   Estimated Protein Needs:  grams protein/day (1.5 - 2 grams of pro/kg)  Justification: Increased needs   Estimated Fluid Needs: 1913-1708 mL/day (25 - 30 mL/kg)   Justification: Maintenance and Per provider pending fluid status    PHYSICAL FINDINGS  See malnutrition section below.  Gastrostomy and J-tube per chart.      MALNUTRITION  % Intake: Unable to assess  % Weight Loss: Unable to assess  Subcutaneous Fat Loss: Unable to assess  Muscle Loss: Unable to assess  Fluid Accumulation/Edema: Unable to assess  Malnutrition Diagnosis: Unable to determine due to pt busy with other cares     NUTRITION DIAGNOSIS  Inadequate " protein-energy intake related to NPO as evidenced by pt meeting 0% kcal/PRO needs at present.     INTERVENTIONS  Implementation  Nutrition Education: Unable to complete due to pt with cares.   Enteral Nutrition - recommendations made pending pt POC/ability to start feeds per thoracic.      Goals  Diet adv v nutrition support within 2-3 days.     Monitoring/Evaluation  Progress toward goals will be monitored and evaluated per protocol.     Jonathan Buenrostro RD, LD, CNSC  Unit Pager: [phone]

## 2018-11-24 ENCOUNTER — APPOINTMENT (OUTPATIENT)
Dept: GENERAL RADIOLOGY | Facility: CLINIC | Age: 73
DRG: 326 | End: 2018-11-24
Attending: THORACIC SURGERY (CARDIOTHORACIC VASCULAR SURGERY)
Payer: MEDICARE

## 2018-11-24 ENCOUNTER — APPOINTMENT (OUTPATIENT)
Dept: GENERAL RADIOLOGY | Facility: CLINIC | Age: 73
DRG: 326 | End: 2018-11-24
Attending: PHYSICIAN ASSISTANT
Payer: MEDICARE

## 2018-11-24 LAB
ABO + RH BLD: NORMAL
ABO + RH BLD: NORMAL
ANION GAP SERPL CALCULATED.3IONS-SCNC: 9 MMOL/L (ref 3–14)
BASE EXCESS BLDA CALC-SCNC: 0.4 MMOL/L
BLD GP AB SCN SERPL QL: NORMAL
BLOOD BANK CMNT PATIENT-IMP: NORMAL
BUN SERPL-MCNC: 24 MG/DL (ref 7–30)
CALCIUM SERPL-MCNC: 7.8 MG/DL (ref 8.5–10.1)
CHLORIDE SERPL-SCNC: 114 MMOL/L (ref 94–109)
CO2 SERPL-SCNC: 24 MMOL/L (ref 20–32)
CREAT SERPL-MCNC: 0.96 MG/DL (ref 0.52–1.04)
ERYTHROCYTE [DISTWIDTH] IN BLOOD BY AUTOMATED COUNT: 16 % (ref 10–15)
GFR SERPL CREATININE-BSD FRML MDRD: 57 ML/MIN/1.7M2
GLUCOSE SERPL-MCNC: 90 MG/DL (ref 70–99)
HCO3 BLD-SCNC: 26 MMOL/L (ref 21–28)
HCT VFR BLD AUTO: 24.3 % (ref 35–47)
HGB BLD-MCNC: 7.8 G/DL (ref 11.7–15.7)
INR PPP: 1.18 (ref 0.86–1.14)
MAGNESIUM SERPL-MCNC: 2.4 MG/DL (ref 1.6–2.3)
MCH RBC QN AUTO: 31.2 PG (ref 26.5–33)
MCHC RBC AUTO-ENTMCNC: 32.1 G/DL (ref 31.5–36.5)
MCV RBC AUTO: 97 FL (ref 78–100)
O2/TOTAL GAS SETTING VFR VENT: 40 %
OXYHGB MFR BLD: 94 % (ref 92–100)
PCO2 BLD: 44 MM HG (ref 35–45)
PH BLD: 7.38 PH (ref 7.35–7.45)
PHOSPHATE SERPL-MCNC: 3.3 MG/DL (ref 2.5–4.5)
PLATELET # BLD AUTO: 365 10E9/L (ref 150–450)
PO2 BLD: 77 MM HG (ref 80–105)
POTASSIUM SERPL-SCNC: 3.8 MMOL/L (ref 3.4–5.3)
RBC # BLD AUTO: 2.5 10E12/L (ref 3.8–5.2)
SODIUM SERPL-SCNC: 146 MMOL/L (ref 133–144)
SPECIMEN EXP DATE BLD: NORMAL
TROPONIN I SERPL-MCNC: <0.015 UG/L (ref 0–0.04)
WBC # BLD AUTO: 18.9 10E9/L (ref 4–11)

## 2018-11-24 PROCEDURE — 25000132 ZZH RX MED GY IP 250 OP 250 PS 637: Performed by: PHYSICIAN ASSISTANT

## 2018-11-24 PROCEDURE — 84134 ASSAY OF PREALBUMIN: CPT

## 2018-11-24 PROCEDURE — 85610 PROTHROMBIN TIME: CPT

## 2018-11-24 PROCEDURE — 25000125 ZZHC RX 250

## 2018-11-24 PROCEDURE — 85027 COMPLETE CBC AUTOMATED: CPT

## 2018-11-24 PROCEDURE — 80048 BASIC METABOLIC PNL TOTAL CA: CPT

## 2018-11-24 PROCEDURE — 83735 ASSAY OF MAGNESIUM: CPT

## 2018-11-24 PROCEDURE — 84484 ASSAY OF TROPONIN QUANT: CPT

## 2018-11-24 PROCEDURE — 25000125 ZZHC RX 250: Performed by: SURGERY

## 2018-11-24 PROCEDURE — 0B9J8ZZ DRAINAGE OF LEFT LOWER LUNG LOBE, VIA NATURAL OR ARTIFICIAL OPENING ENDOSCOPIC: ICD-10-PCS | Performed by: THORACIC SURGERY (CARDIOTHORACIC VASCULAR SURGERY)

## 2018-11-24 PROCEDURE — 25000128 H RX IP 250 OP 636

## 2018-11-24 PROCEDURE — 99291 CRITICAL CARE FIRST HOUR: CPT | Performed by: PHYSICIAN ASSISTANT

## 2018-11-24 PROCEDURE — 40000141 ZZH STATISTIC PERIPHERAL IV START W/O US GUIDANCE

## 2018-11-24 PROCEDURE — 40000275 ZZH STATISTIC RCP TIME EA 10 MIN

## 2018-11-24 PROCEDURE — 71045 X-RAY EXAM CHEST 1 VIEW: CPT

## 2018-11-24 PROCEDURE — 82805 BLOOD GASES W/O2 SATURATION: CPT

## 2018-11-24 PROCEDURE — 40000986 XR CHEST PORT 1 VW

## 2018-11-24 PROCEDURE — C9113 INJ PANTOPRAZOLE SODIUM, VIA: HCPCS | Performed by: PHYSICIAN ASSISTANT

## 2018-11-24 PROCEDURE — 27210437 ZZH NUTRITION PRODUCT SEMIELEM INTERMED LITER

## 2018-11-24 PROCEDURE — A9270 NON-COVERED ITEM OR SERVICE: HCPCS | Performed by: PHYSICIAN ASSISTANT

## 2018-11-24 PROCEDURE — 84100 ASSAY OF PHOSPHORUS: CPT

## 2018-11-24 PROCEDURE — 86900 BLOOD TYPING SEROLOGIC ABO: CPT | Performed by: SURGERY

## 2018-11-24 PROCEDURE — A9270 NON-COVERED ITEM OR SERVICE: HCPCS

## 2018-11-24 PROCEDURE — 25000128 H RX IP 250 OP 636: Performed by: STUDENT IN AN ORGANIZED HEALTH CARE EDUCATION/TRAINING PROGRAM

## 2018-11-24 PROCEDURE — 25000132 ZZH RX MED GY IP 250 OP 250 PS 637: Performed by: STUDENT IN AN ORGANIZED HEALTH CARE EDUCATION/TRAINING PROGRAM

## 2018-11-24 PROCEDURE — 31622 DX BRONCHOSCOPE/WASH: CPT

## 2018-11-24 PROCEDURE — 86850 RBC ANTIBODY SCREEN: CPT | Performed by: SURGERY

## 2018-11-24 PROCEDURE — 86901 BLOOD TYPING SEROLOGIC RH(D): CPT | Performed by: SURGERY

## 2018-11-24 PROCEDURE — 3E0436Z INTRODUCTION OF NUTRITIONAL SUBSTANCE INTO CENTRAL VEIN, PERCUTANEOUS APPROACH: ICD-10-PCS | Performed by: THORACIC SURGERY (CARDIOTHORACIC VASCULAR SURGERY)

## 2018-11-24 PROCEDURE — 25000128 H RX IP 250 OP 636: Performed by: PHYSICIAN ASSISTANT

## 2018-11-24 PROCEDURE — 94003 VENT MGMT INPAT SUBQ DAY: CPT

## 2018-11-24 PROCEDURE — 40000014 ZZH STATISTIC ARTERIAL MONITORING DAILY

## 2018-11-24 PROCEDURE — 25000132 ZZH RX MED GY IP 250 OP 250 PS 637

## 2018-11-24 PROCEDURE — A9270 NON-COVERED ITEM OR SERVICE: HCPCS | Performed by: STUDENT IN AN ORGANIZED HEALTH CARE EDUCATION/TRAINING PROGRAM

## 2018-11-24 PROCEDURE — 20000004 ZZH R&B ICU UMMC

## 2018-11-24 RX ORDER — LIDOCAINE HYDROCHLORIDE 10 MG/ML
INJECTION, SOLUTION INFILTRATION; PERINEURAL
Status: COMPLETED
Start: 2018-11-24 | End: 2018-11-24

## 2018-11-24 RX ORDER — FENTANYL CITRATE 50 UG/ML
INJECTION, SOLUTION INTRAMUSCULAR; INTRAVENOUS
Status: COMPLETED
Start: 2018-11-24 | End: 2018-11-24

## 2018-11-24 RX ORDER — FENTANYL CITRATE 50 UG/ML
100 INJECTION, SOLUTION INTRAMUSCULAR; INTRAVENOUS ONCE
Status: COMPLETED | OUTPATIENT
Start: 2018-11-24 | End: 2018-11-24

## 2018-11-24 RX ORDER — POLYETHYLENE GLYCOL 3350 17 G/17G
17 POWDER, FOR SOLUTION ORAL DAILY
Status: DISCONTINUED | OUTPATIENT
Start: 2018-11-24 | End: 2018-11-25

## 2018-11-24 RX ADMIN — PIPERACILLIN SODIUM,TAZOBACTAM SODIUM 3.38 G: 3; .375 INJECTION, POWDER, FOR SOLUTION INTRAVENOUS at 14:59

## 2018-11-24 RX ADMIN — MIDAZOLAM 2 MG: 1 INJECTION INTRAMUSCULAR; INTRAVENOUS at 14:44

## 2018-11-24 RX ADMIN — FENTANYL CITRATE 100 MCG: 50 INJECTION INTRAMUSCULAR; INTRAVENOUS at 14:51

## 2018-11-24 RX ADMIN — Medication 100 MCG/HR: at 09:30

## 2018-11-24 RX ADMIN — DOCUSATE SODIUM 100 MG: 50 LIQUID ORAL at 20:06

## 2018-11-24 RX ADMIN — PANTOPRAZOLE SODIUM 40 MG: 40 INJECTION, POWDER, FOR SOLUTION INTRAVENOUS at 08:33

## 2018-11-24 RX ADMIN — POTASSIUM CHLORIDE 20 MEQ: 29.8 INJECTION, SOLUTION INTRAVENOUS at 06:20

## 2018-11-24 RX ADMIN — DEXMEDETOMIDINE HYDROCHLORIDE 0.4 MCG/KG/HR: 400 INJECTION INTRAVENOUS at 18:45

## 2018-11-24 RX ADMIN — DEXMEDETOMIDINE HYDROCHLORIDE 0.5 MCG/KG/HR: 400 INJECTION INTRAVENOUS at 08:34

## 2018-11-24 RX ADMIN — PIPERACILLIN SODIUM,TAZOBACTAM SODIUM 3.38 G: 3; .375 INJECTION, POWDER, FOR SOLUTION INTRAVENOUS at 01:55

## 2018-11-24 RX ADMIN — LEVOTHYROXINE SODIUM 150 MCG: 300 TABLET ORAL at 11:55

## 2018-11-24 RX ADMIN — PIPERACILLIN SODIUM,TAZOBACTAM SODIUM 3.38 G: 3; .375 INJECTION, POWDER, FOR SOLUTION INTRAVENOUS at 08:33

## 2018-11-24 RX ADMIN — Medication 200 MG: at 11:55

## 2018-11-24 RX ADMIN — CALCIUM GLUCONATE: 98 INJECTION, SOLUTION INTRAVENOUS at 20:24

## 2018-11-24 RX ADMIN — SODIUM CHLORIDE, POTASSIUM CHLORIDE, SODIUM LACTATE AND CALCIUM CHLORIDE: 600; 310; 30; 20 INJECTION, SOLUTION INTRAVENOUS at 09:18

## 2018-11-24 RX ADMIN — BACITRACIN: 500 OINTMENT TOPICAL at 20:39

## 2018-11-24 RX ADMIN — Medication 200 MG: at 20:07

## 2018-11-24 RX ADMIN — LIDOCAINE HYDROCHLORIDE 9 ML: 10 INJECTION, SOLUTION INFILTRATION; PERINEURAL at 15:21

## 2018-11-24 RX ADMIN — FENTANYL CITRATE 100 MCG: 50 INJECTION, SOLUTION INTRAMUSCULAR; INTRAVENOUS at 14:51

## 2018-11-24 RX ADMIN — SENNOSIDES AND DOCUSATE SODIUM 1 TABLET: 8.6; 5 TABLET ORAL at 08:34

## 2018-11-24 RX ADMIN — BISACODYL 10 MG: 10 SUPPOSITORY RECTAL at 08:33

## 2018-11-24 RX ADMIN — ENOXAPARIN SODIUM 40 MG: 40 INJECTION SUBCUTANEOUS at 14:59

## 2018-11-24 RX ADMIN — PIPERACILLIN SODIUM,TAZOBACTAM SODIUM 3.38 G: 3; .375 INJECTION, POWDER, FOR SOLUTION INTRAVENOUS at 20:14

## 2018-11-24 RX ADMIN — BACITRACIN: 500 OINTMENT TOPICAL at 09:34

## 2018-11-24 RX ADMIN — FLUCONAZOLE 200 MG: 2 INJECTION, SOLUTION INTRAVENOUS at 09:20

## 2018-11-24 RX ADMIN — SENNOSIDES A AND B 10 ML: 415.36 LIQUID ORAL at 20:06

## 2018-11-24 ASSESSMENT — ACTIVITIES OF DAILY LIVING (ADL)
ADLS_ACUITY_SCORE: 16
ADLS_ACUITY_SCORE: 16
ADLS_ACUITY_SCORE: 18
ADLS_ACUITY_SCORE: 16

## 2018-11-24 NOTE — PLAN OF CARE
Problem: Patient Care Overview  Goal: Plan of Care/Patient Progress Review  SLP: Cancel - Pt remains intubated; not appropriate for ST evaluation. Please reconsult when pt is extubated and medically appropriate. SLP to sign off.

## 2018-11-24 NOTE — PROGRESS NOTES
"Bronchoscopy Risk Assessment Guidelines      A. Patient symptoms to consider when assessing pulmonary TB risk are:    I. Cough greater than 3 weeks; and fever, hemoptysis, pleuritic chest    pain, weight loss greater than 10 lbs, night sweats, fatigue, infiltrates on    upper lobes or superior segments of lower lobes, cavitation on chest    x-ray.   B. Patient risk factors to consider when assessing pulmonary TB risk are:    I. Exposure to known TB case, foreign-born persons (within 5 years of    arrival to [place]), residence in a crowded setting (correctional facility,     long-term care center, etc.), persons with HIV or immunosuppression.    Patients with symptoms and risk factors should generally be considered \"suspect risk\" and bronchoscopies should be performed in airborne precautions.    This patient has NO KNOWN RISK of Tuberculosis (proceed with bronchoscopy)    Specimens sent: no  Complications: None  Scope used: #[id number] Adult  Attending Physician: Dr. Shona PARRISH, RT on 11/24/2018 at 3:41 PM  "

## 2018-11-24 NOTE — PROGRESS NOTES
Thoracic Surgery Progress Note - 11/24/18  Pt: Myrtle Vaz  MRN: [id number]     24 hr evens:   -Went into rapid A fib with RVR and hemodyanmically stable (thought to be related to hypoxia/hypercarbia)   -Required DCCV x 1 (unsuccessful) and spontaneously converted upon bag mask ventilation prior to re-intubation.       Temp:  [97.2  F (36.2  C)-100.5  F (38.1  C)] 97.2  F (36.2  C)  Heart Rate:  [] 52  Resp:  [0-45] 10  BP: ()/(47-97) 99/83  MAP:  [53 mmHg-198 mmHg] 61 mmHg  Arterial Line BP: ()/() 70/53  FiO2 (%):  [40 %-70 %] 45 %  SpO2:  [84 %-100 %] 99 %    I/O last 3 completed shifts:  In: 2506.89 [I.V.:2481.89; NG/GT:25]  Out: 1650 [Urine:885; Emesis/NG output:75; Drains:170; Chest Tube:520]    Ventilation Mode: CMV/AC  (Continuous Mandatory Ventilation/ Assist Control)  FiO2 (%): 45 %  Rate Set (breaths/minute): 15 breaths/min  Tidal Volume Set (mL): 420 mL  PEEP (cm H2O): 5 cmH2O  Oxygen Concentration (%): 45 %  Resp: 10    Physical Exam:  Gen: INtubated and sedated, following commands.  Pulm: Mechanically ventilated. Bilateral breath sounds.  CV: Regular rate and rhythm.  Chest: CT output appears serosanguinous.   - Incision:  C/D/I.  Abd: Abdomen soft, non-tender, non-distended    Labs reviewed in full; available in Epic.  Results for orders placed or performed during the hospital encounter of 11/21/18 (from the past 24 hour(s))   EKG 12-lead, complete   Result Value Ref Range    Interpretation ECG Click View Image link to view waveform and result    XR Chest Port 1 View    Narrative    EXAM:  XR CHEST PORT 1 VW    INDICATION: intubation;  patient with possible esophageal perforation,  giant hiatal hernia status post laparoscopic, purulent likely  mediastinitis    COMPARISON:  Same-day x-ray, 11/22/2018, 11/21/2018    FINDINGS:  AP view of the chest. 2 of the ET tube projects 4 cm from the kelin.  Right IJ catheter projects in distal SVC. Right-sided chest tube in  unchanged  position. Left-sided chest tube with sidehole projecting  just inside chest wall. Mediastinal versus pericardial drain. Left  upper chest is obscured by pad, but likely small left-sided  pneumothorax. Bibasilar opacities have increased since prior exam.  Left-sided pleural effusion and basilar opacities have also enlarged  since prior exam. Likely contrast material in bilateral costophrenic  recesses.       Impression    IMPRESSION:  1. Interval intubation with endotracheal tube approximately 4 cm from  the kelin.  2. Stable position of the right sided chest tube. Left-sided chest  tube has been intervally advanced with sidehole now just inside of the  chest wall. Left pneumothorax is poorly visualized due to overlying  pad but pneumothorax is suspected  3. Bibasilar opacities have increased, left greater than right, with  increased left-sided left-sided pleural effusion.    I have personally reviewed the examination and initial interpretation  and I agree with the findings.    YULI ADAMS MD   Blood gas arterial with oxyhemoglobin (1200)   Result Value Ref Range    pH Arterial 7.31 (L) 7.35 - 7.45 pH    pCO2 Arterial 45 35 - 45 mm Hg    pO2 Arterial 73 (L) 80 - 105 mm Hg    Bicarbonate Arterial 23 21 - 28 mmol/L    FIO2 60     Oxyhemoglobin Arterial 92 92 - 100 %    Base Deficit Art 3.1 mmol/L   ECHO COMPLETE WITH OPTISON    Narrative    [id number]  [id number]  [id number]  [id number]^LORRAINE^JOSE^M           [facility],[place]  Echocardiography Laboratory  [address] [place] [id number]     Name: CANDICE LOYA  MRN: [id number]  : 1945  Study Date: 2018 03:41 PM  Age: 73 yrs  Gender: Female  Patient Location: [facility]  Reason For Study: Afib  Ordering Physician: JOSE PETERS  Referring Physician: KEYONA MCKENZIE  Performed By: Ayo Albarado RDCS     BSA: 2.0 m2  Height: 65 in  Weight: 204 lb  BP: 105/60  mmHg  _____________________________________________________________________________  __        Procedure  Complete Portable Echo Adult. Contrast Optison. Technically difficult study.  Poor acoustic windows. Optison (NDC #9961-3211-10) given intravenously.  Patient was given 6 ml mixture of 3 ml Optison and 6 ml saline. 3 ml wasted.  _____________________________________________________________________________  __        Interpretation Summary  Left ventricular function, chamber size, wall motion, and wall thickness are  normal.The EF is 60-65%  Limited views of the right ventricle that appears with normal function.  No significant valve dysfunction detected.  _____________________________________________________________________________  __        Left Ventricle  Left ventricular function, chamber size, wall motion, and wall thickness are  normal.The EF is 60-65%. Grade I or early diastolic dysfunction.     Right Ventricle  The right ventricle is normal size. Global right ventricular function is  normal. The right ventricle is not well visualized.     Atria  Both atria appear normal.     Mitral Valve  Trace mitral insufficiency is present.        Aortic Valve  Mild aortic valve sclerosis is present. No aortic regurgitation is present.     Tricuspid Valve  Trace tricuspid insufficiency is present. The peak velocity of the tricuspid  regurgitant jet is not obtainable. Pulmonary artery systolic pressure cannot  be assessed.     Pulmonic Valve  Trace pulmonic insufficiency is present.     Vessels  The aorta root is normal. Ascending aorta with normal diameter at 3.2 cm.  Unable to assess mean RA pressure given the patient is on a ventilator.     Pericardium  No pericardial effusion is present.     _____________________________________________________________________________  __  MMode/2D Measurements & Calculations  Ao root diam: 3.5 cm  asc Aorta Diam: 3.2 cm  LVOT diam: 1.8 cm  LVOT area: 2.5 cm2           Doppler  Measurements & Calculations  MV E max lucrecia: 53.4 cm/sec  MV A max lucrecia: 111.0 cm/sec  MV E/A: 0.48  MV dec slope: 237.0 cm/sec2  E/E' av.5  Lateral E/e': 8.9  Medial E/e': 10.0     _____________________________________________________________________________  __           Report approved by: Karan Rodríguez 2018 08:37 PM      XR Chest Port 1 View    Narrative    EXAM: XR CHEST PORT 1 VW  2018 2:08 AM      HISTORY: ETT positioning and Chest tubes;     COMPARISON: 2018    FINDINGS: Endotracheal tube, right IJ central venous catheter, right  chest tube are in stable position. Interval retraction of left chest  tube, with sidehole projecting over the bony chest wall.    Pericardial/mediastinal drain is similar to prior. Radiopacity in the  right upper quadrant, likely bowel contrast.    No pneumothorax.    Improved aeration of the lung bases since prior.      Impression    IMPRESSION:   1. Interval retraction of left chest tube, sidehole projecting over  bony chest wall, stable other support devices.  2. Interval improvement of aeration of the lung bases. No other  significant change.    Findings of retracted left chest tube conveyed to Dr. Latham by Cholo Burnette on 2018 at 3:30 AM.    I have personally reviewed the examination and initial interpretation  and I agree with the findings.    MOON RENTERIA MD   Troponin I   Result Value Ref Range    Troponin I ES <0.015 0.000 - 0.045 ug/L   CBC with platelets   Result Value Ref Range    WBC 18.9 (H) 4.0 - 11.0 10e9/L    RBC Count 2.50 (L) 3.8 - 5.2 10e12/L    Hemoglobin 7.8 (L) 11.7 - 15.7 g/dL    Hematocrit 24.3 (L) 35.0 - 47.0 %    MCV 97 78 - 100 fl    MCH 31.2 26.5 - 33.0 pg    MCHC 32.1 31.5 - 36.5 g/dL    RDW 16.0 (H) 10.0 - 15.0 %    Platelet Count 365 150 - 450 10e9/L   Basic metabolic panel   Result Value Ref Range    Sodium 146 (H) 133 - 144 mmol/L    Potassium 3.8 3.4 - 5.3 mmol/L    Chloride 114 (H) 94 - 109 mmol/L     Carbon Dioxide 24 20 - 32 mmol/L    Anion Gap 9 3 - 14 mmol/L    Glucose 90 70 - 99 mg/dL    Urea Nitrogen 24 7 - 30 mg/dL    Creatinine 0.96 0.52 - 1.04 mg/dL    GFR Estimate 57 (L) >60 mL/min/1.7m2    GFR Estimate If Black 70 >60 mL/min/1.7m2    Calcium 7.8 (L) 8.5 - 10.1 mg/dL   INR   Result Value Ref Range    INR 1.18 (H) 0.86 - 1.14   Type and Screen (AM Draw)   Result Value Ref Range    ABO A     RH(D) Neg     Antibody Screen Neg     Test Valid Only At          [facility]    Specimen Expires 11/27/2018    Magnesium   Result Value Ref Range    Magnesium 2.4 (H) 1.6 - 2.3 mg/dL   Phosphorus   Result Value Ref Range    Phosphorus 3.3 2.5 - 4.5 mg/dL   Blood gas arterial with oxyhemoglobin (1200)   Result Value Ref Range    pH Arterial 7.38 7.35 - 7.45 pH    pCO2 Arterial 44 35 - 45 mm Hg    pO2 Arterial 77 (L) 80 - 105 mm Hg    Bicarbonate Arterial 26 21 - 28 mmol/L    FIO2 40.0     Oxyhemoglobin Arterial 94 92 - 100 %    Base Excess Art 0.4 mmol/L       A/P: 73F POD2 s/p transhiatal esophagectomy with spit fistula, G tube, J tube for perforated esophagus after type 4 peraesophageal hernia repair.      -Acute pain: Fentanyl @ 50 mcg.   -Agitated delirium: Precedex gtt  -Hypoxic hypercarbic respiratory failure: On A/C. Wean as tolerated to CPAP. Bronch today for bibasilar atelectasis on CXR.   -New onset paroxysmal A fib: Now on NSR, low CHADS score. Continue Amiodarone until discharge.   -Septic shock: On Norepinephrine. Wean for MAP>65. Echo without RWMA. Normal RV/LV function (on norepi).   -Purulent mediastinitis after esophageal perforation: On Zosyn Diflucan day 4/7. Will trend CRP. Leukocytosis improving.   -Acute blood loss anemia: Holding on transfusion unless Hb <7 or persistent hypoxia.   -Hypothyroidism: On home levothyroxine  -Fluid overload: Holding diuresis given vasopressor requirement.   -GI prophylaxis: PPI (still some stomach left)  -Bilateral  pleural effusions: Chest tubes to waterseal.    -Severe malnutrition: Start TPN and trickle feeds at 10 ml/hr (no advance). G-tube to gravity.   -Severe constipation: Daily enema.   -ICU status.     Temitope Randle MD

## 2018-11-24 NOTE — PROGRESS NOTES
SURGICAL ICU PROGRESS NOTE  November 24, 2018        Date of Service (when I saw the patient): 11/24/2018    ASSESSMENT:  Myrtle Vaz is a 73 year old female who was admitted on 11/21/2018 to [facility]. She was transferred from [facility] where she presented with respiratory failure thought to be secondary to large hiatal hernia and underwent s/p laparoscopic hiatal hernia repair with mesh reinforcement, laparoscopic nissen fundoplication, and laparoscopic gastrostomy tube placement, which was complicated by pleural effusions and possible esophageal leak,  s/p placement of chest tubes x3. Taken to OR with thoracic and found to have perforation at distal esophagus at  juncton and underwent s/p Midline laparotomy, Trans-hiatal esophagogasterectomy, gastrostomy, J-tube placement, G-tube placement, bilateral pleural chest tube placement, mediastinal abcess drainage, spit fistula creation, Esophagastrodueodenoscopy, intraoperative course complicated by SVT required intraoperative cardioversion x3. Reintubated on 11/23/18 for AMS, A-fib with RVR s/p cardioversiondue to hypoxia     CHANGES and MAJOR THINGS TODAY:   Decrease peep to 5  J tube with trickle feeds and start water flushes   Start TPN   Restart home medications via J tube.   change IV medications to enteral form     PLAN:    Neurological:  # acute post operative pain   # delirium   - Monitor neurological status. Notify the MD for any acute changes in exam.  - pt reported to have delirium prior to transfer from OSH. Continue efforts with delirium prevention and re-orientation   - Pain: fentanyl infusion for pain   - Sedation: precedex infusion       Pulmonary:   # acute hypoxic resp failure. extubated 11/22/18 re intubated 11/23/18  - Re intubated on 11/23.   - decrease PEEP to 5 today. No need to check ABG to titrate Fio2 needs.   - PST today   - Supplemental oxygen to keep saturation above 92 %.      Cardiovascular:    # Atrial fibrillation, unstable,  intraoperative cardioversion x3   - hx on metoprolol during this admission, OSH records reviewed. Appears metoprolol not PTA medication, cont with amiodarone infusion for now   # distubtive shock-- off Vaso, wean Norepi as able.   # hx of hypertension- hold home diuretic   # hx of hyperlipidemia- resume home  statin       Gastroenterology/Nutrition:  # S/p hiatal hernia complicated by distal esophageal leak now s/p Midline laparotomy, trans-hiatal esophagogasterectomy, gastrostomy, J-tube placement, G-tube placement, bilateral pleural chest tube placement, mediastinal abcess drainage, spit fistula creation, esophagastrodueodenoscopy   # s/p G tube   # s/ J tube  # protein-calorie deficit malnutrition   - G  To gravity   - Start feeds via J tube--trickle only.   - start TPN today as pt without nutrition for several days now.       Fluids/Electrolytes:   # hypernatremia  - Na 146-- should improve with enteral free water today.   - cont for IV fluid hydration but will decrease rate   - electrolyte replacement protocol.   - Keep K > 4.0. And Mag > 2.0 given recurrent atrial fibrillation       Renal:  # HANG, resolving   -  Will continue to monitor intake and output continue with lopez for now       Endocrine:  # hypothyroidism   - Home synthroid 150mcg resumed   - no indication for sliding scale,  for glucose management if needed.       ID/Antibiotics:  # mediastinal pleural effusion   Continue with Zosyn and  and Micafungin  x4 daysper Dr Albarado   Vancomycin d/phani 11/22/18     Heme:     # Acute blood loss anemia   #Leukocytosis   - WBC count improving. 18.9  - monitor hgbTransfuse if hgb <7.0 or signs/symptoms of hypoperfusion. Monitor and trend.       Musculoskeletal:  # weakness and deconditioning of deconditioning   - Physical and occupational therapy consult       General Cares/Prophylaxis:    DVT Prophylaxis: Lovenox 40mg subcutaneous    GI Prophylaxis: Protonix   Restraints: Restraints for medical healing needed:  Yes--reaching for ETT tube and other lines       Lines/ tubes/ drains:  - PIV's  - central line   - ANGELO drain x1  - chest tube x2  - lopez   - arterial line       Disposition:  - Surgical ICU.       Time spent on this Encounter   Billing:  I spent  45 minutes bedside and on the inpatient unit today managing the care of Myrtle Vaz in relation to the issues listed in this note.        Siva Kasper     ====================================  INTERVAL HISTORY:  Course reviewed. No acute events overnight. Pt awake this am, propped up in bed attempting to write notes, intermittently reaching for ETT tube, rolls eyes when re-directed. Shrugs shoulder when asked if she felt short of breath. Denies pain.     OBJECTIVE:   1. VITAL SIGNS:   Temp:  [97.2  F (36.2  C)-100.5  F (38.1  C)] 97.2  F (36.2  C)  Heart Rate:  [] 52  Resp:  [0-45] 10  BP: ()/(47-97) 99/83  MAP:  [53 mmHg-198 mmHg] 61 mmHg  Arterial Line BP: ()/() 70/53  FiO2 (%):  [40 %-70 %] 45 %  SpO2:  [84 %-100 %] 99 %  Ventilation Mode: CMV/AC  (Continuous Mandatory Ventilation/ Assist Control)  FiO2 (%): 45 %  Rate Set (breaths/minute): 15 breaths/min  Tidal Volume Set (mL): 420 mL  PEEP (cm H2O): 5 cmH2O  Oxygen Concentration (%): 45 %  Resp: 10    2. INTAKE/ OUTPUT:   I/O last 3 completed shifts:  In: 2506.89 [I.V.:2481.89; NG/GT:25]  Out: 1650 [Urine:885; Emesis/NG output:75; Drains:170; Chest Tube:520]    3. PHYSICAL EXAMINATION:  General: laying in bed, awake, NAD.   HEENT: Pupils equal. ETT present and secured.   Neuro: alert, follows commands. HARRIS.   Pulm/Resp: Clear breath sounds bilaterally without rhonchi, crackles or wheezes. Chest tube x2, serous fluid drainage in tubing.   Chest: spit fitstula present with overlaying ostomy collection bag--pink foamy secretions   CV: RRR, S1, S2. Monitor: NSR to bradycardia   Abdomen: G tube with dark green output, J tube clamped, no TTP on examination   : (+) lopez catheter in place, urine  yellow and clear  Incisions/Skin: skin warm and dry, neck incision dressed. Abdominal/sternal incision without redness warmth or drainage   MSK/Extremities: no peripheral edema, but some trace edema in posterior buttock and thighs.  moving all extremities, peripheral pulses intact, calves soft and non-tender, extremities well perfused.     4. INVESTIGATIONS:   Arterial Blood Gases     Recent Labs  Lab 11/24/18  0344 11/23/18  1533 11/23/18  1245 11/22/18 2033   PH 7.38 7.31* 7.30* 7.35   PCO2 44 45 49* 45   PO2 77* 73* 47* 78*   HCO3 26 23 24 25     Complete Blood Count     Recent Labs  Lab 11/24/18 0340 11/23/18 0341 11/22/18 2033 11/22/18 0213   WBC 18.9* 20.1* 21.3* 19.7*   HGB 7.8* 8.1* 8.4* 9.1*    385 381 387     Basic Metabolic Panel    Recent Labs  Lab 11/24/18  0340 11/23/18  1245 11/23/18 0341 11/22/18 2033   * 146* 146* 146*   POTASSIUM 3.8 4.1 4.2 4.1   CHLORIDE 114* 113* 114* 114*   CO2 24 26 26 24   BUN 24 28 29 31*   CR 0.96 1.11* 1.07* 1.10*   GLC 90 106* 115* 100*     Liver Function Tests    Recent Labs  Lab 11/24/18 0340 11/23/18  0341 11/22/18 0213 11/21/18 2209 11/21/18  1831 11/21/18  0141   AST  --   --   --   --  238* 21   ALT  --   --   --   --  106* 35   ALKPHOS  --   --   --   --  78 100   BILITOTAL  --   --   --   --  1.3 1.0   ALBUMIN  --   --   --   --  2.2* 2.2*   INR 1.18* 1.31* 1.57* 1.59*  --  1.51*     Pancreatic Enzymes  No lab results found in last 7 days.  Coagulation Profile    Recent Labs  Lab 11/24/18  0340 11/23/18  0341 11/22/18  0213 11/21/18  2209   INR 1.18* 1.31* 1.57* 1.59*         5. RADIOLOGY:   Recent Results (from the past 24 hour(s))   XR Chest Port 1 View    Narrative    EXAM:  XR CHEST PORT 1 VW    INDICATION: intubation;  patient with possible esophageal perforation,  giant hiatal hernia status post laparoscopic, purulent likely  mediastinitis    COMPARISON:  Same-day x-ray, 11/22/2018, 11/21/2018    FINDINGS:  AP view of the chest. 2 of  the ET tube projects 4 cm from the kelin.  Right IJ catheter projects in distal SVC. Right-sided chest tube in  unchanged position. Left-sided chest tube with sidehole projecting  just inside chest wall. Mediastinal versus pericardial drain. Left  upper chest is obscured by pad, but likely small left-sided  pneumothorax. Bibasilar opacities have increased since prior exam.  Left-sided pleural effusion and basilar opacities have also enlarged  since prior exam. Likely contrast material in bilateral costophrenic  recesses.       Impression    IMPRESSION:  1. Interval intubation with endotracheal tube approximately 4 cm from  the kelin.  2. Stable position of the right sided chest tube. Left-sided chest  tube has been intervally advanced with sidehole now just inside of the  chest wall. Left pneumothorax is poorly visualized due to overlying  pad but pneumothorax is suspected  3. Bibasilar opacities have increased, left greater than right, with  increased left-sided left-sided pleural effusion.    I have personally reviewed the examination and initial interpretation  and I agree with the findings.    YULI ADAMS MD   XR Chest Port 1 View    Narrative    EXAM: XR CHEST PORT 1 VW  11/24/2018 2:08 AM      HISTORY: ETT positioning and Chest tubes;     COMPARISON: 11/23/2018    FINDINGS: Endotracheal tube, right IJ central venous catheter, right  chest tube are in stable position. Interval retraction of left chest  tube, with sidehole projecting over the bony chest wall.    Pericardial/mediastinal drain is similar to prior. Radiopacity in the  right upper quadrant, likely bowel contrast.    No pneumothorax.    Improved aeration of the lung bases since prior.      Impression    IMPRESSION:   1. Interval retraction of left chest tube, sidehole projecting over  bony chest wall, stable other support devices.  2. Interval improvement of aeration of the lung bases. No other  significant change.    Findings of retracted left  chest tube conveyed to Dr. Latham by Cholo Burnette on 11/24/2018 at 3:30 AM.    I have personally reviewed the examination and initial interpretation  and I agree with the findings.    MOON RENTERIA MD       =========================================

## 2018-11-24 NOTE — PLAN OF CARE
Problem: Patient Care Overview  Goal: Plan of Care/Patient Progress Review  Outcome: No Change  S; Beginning shift agitated with unsecured mitts. MD at bedside. Started on precedex and titrated for comfort. Later pt removed mitts and slapped siderails for oral care request. No attempt to self-extubate and nodded yes to query not to pull if left mitts off. Doesn't use call light when wants sx, just claps hands. Writes appropriate notes. Just smears BM. No audible bowel sounds. Grimaces and splints abd with sx, otherwise rest quietly between cares.  B: S/p GI OR.  A: No change.  R: Titrate drips as needed. Resume mitts if becomes inappropriate. Suction as needed. Consider trickle feeds.

## 2018-11-24 NOTE — PROGRESS NOTES
At approximately 1235 the patient converted out of NSR with frequent PVC's to atrial fibrillation with RVR. Patient's BP initially remained stable, but dropped, shortly, by 30 mmHg and Levophed was subsequently titrated up. SICU team was notified and Amiodarone bolus was given, with no change in HR or BP. SICU team fellow, Bill Cee MD arrived at bedside with resident and PA and decision was made to cardiovert patient. Crash cart was retrieved and patient was connected to defibrillator pads. Patient was given 100mcg of fentanyl for sedation and was cardioverted at 120 joules. Patient initially converted back into NSR, but quickly reconverted back into Afib. SICU team made the decision to intubate and prepare for a second cardioversion. Patient was intubated at approximately 1400 and quickly converted back in to NSR. Decision was made by SICU team not to attempt a second cardioversion and see how the patient does.     Will continue to monitor for safety and comfort.    Milton Gruber RN

## 2018-11-24 NOTE — PLAN OF CARE
Problem: Patient Care Overview  Goal: Plan of Care/Patient Progress Review  Outcome: Improving  Neuro: Pt pupils ERR/slugglish. LUIS A orientation status d/t ETT. RASS -1 to -2 currently sedated on 0.2 of Precedex and 50 mcg/hr of Fentanyl. Pt was extremely agitated this morning and pulling @ ETT and tubes-was put in mitt restraints and she was able to easily get these off, pt was then put in wrist restraints.   Cardiac/Hemodynamics: Pt has remained bradycardic throughout the shift in the 50s, weaning off of Precedex as tolerated. MAP goal > 65, currently on Levophed @0.03, unable to shut off pt has dropped her MAPs to the low 50s every time this was trialed. Pt transitioned to J-tube Amiodarone. A-line waveform is still dampened sx would like to keep this in place until the AM. CVP: 6.  LR running @ 42 mL/hr.  Respiratory: Underwent bronchoscopy today for washings to aid in goal of extubation tomorrow; received 100 mcg of Fent/2mg of Versed. CMV settings: 40%/15/420/5. R & L CTs to waterseal w/ minimal outpt.   GI/: Spit fistula w/ minimal outpt, G-tube to gravity, started TF @ 10 mL/hr w/ no increase into J-tube. Will start TPN tonight.     Plan: Start TPN, continue TF, continue to wean Levophed as able, reassess restraints as able, goal is to extubate tomorrow.     Problem: Restraint for Non-Violent/Non-Self-Destructive Behavior  Goal: Prevent/Manage Potential Problems  Maintain safety of patient and others during period of restraint.  Promote psychological and physical wellbeing.  Prevent injury to skin and involved body parts.  Promote nutrition, hydration, and elimination.   Outcome: No Change  Patient placed in restraints @ 0915 for extreme agitation and pulling @ lines/ETT. Attempted to redirect many times and pt grabbed hold of ETT. Placed in bilateral wrist restraints. Will continue to reassess.

## 2018-11-24 NOTE — PROCEDURES
ICU BRONCHOSCOPY    Procedure(s):    Therapeutic suctioniong    Indication:  Hypoxemia    Procedure Details:     The bronchoscope was inserted through the mouth via ETT.    Airway Examination:  A complete airway examination was performed from the distal trachea to the subsegmental level in each lobe of both lungs.  Pertinent findings include: Thick secretions left greater than right                                                                                                                                                                              Therapeutic suctioning was performed.  Specimens were not sent to the lab.    Any disposable equipment was visually inspected and deemed to be intact immediately post procedure.        ==========================================================    Attending of Record: Dr. Albarado    Trainee(s) Present: Dr. Guidry, Dr. Mcmillan    Medications:    8 ml 4% lidocaine (intratracheal)   2  mg of IV versed  100 mcg of fentanyl    Sedation Time: Total sedation time was 15 minutes of continuous bedside 1:1 monitoring.     Time Out:  Performed by verifying the correct patient, correct procedure, and ensuring that all equipment / support staff are present.     The patient's medical record has been reviewed.  The indication for the procedure was reviewed.  The necessary history and physical examination was performed and reviewed.  The risks, benefits and alternatives of the procedure were discussed with the the patient's representative in detail and questions were answered to the best of my ability.  Verbal consent was obtained by Dr. Albarado.  Written consent was not obtained.  This procedure was  emergent / urgent.  The proposed procedure and the patient's identification were verified prior to the procedure by the physician and the nurse, respiratory therapist.    Bronchoscopy Risk Assessment Guidelines:      A. Patient symptoms to consider when assessing pulmonary TB risk  are:    I. Cough greater than 3 weeks; and fever, hemoptysis, pleuritic chest    pain, weight loss greater than 10 lbs, night sweats, fatigue, infiltrates on    upper lobes or superior segments of lower lobes, cavitation on chest    x-ray.   B. Patient risk factors to consider when assessing pulmonary TB risk are:    I. Exposure to known TB case, foreign-born persons (within 5 years of    arrival to [place]), residence in a crowded setting (correctional facility,     long-term care center, etc.), persons with HIV or immunosuppression.    A Tuberculosis risk assessment was performed:   This patient has NO KNOWN RISK of Tuberculosis (proceed with bronchoscopy)    The procedure was performed in a negative airflow room: Yes    The patient was assessed for the adequacy for the procedure and to receive medications.     Mental Status:  RASS -1  Airway examination:  Intubated  Pulmonary:  Decreased breathsounds in bilateral bases  CV:  RRR, no murmurs or gallops      Immediately before administration of medications the patient was re-assessed for adequacy to receive sedatives including the heart rate, respiratory rate, mental status, oxygen saturation, blood pressure and adequacy of pulmonary ventilation. These same parameters were continuously monitored throughout the procedure.      George Mcmillan  Anesthesiology Resident, PGY-2   [facility]   [phone]  11/24/20182:38 PM

## 2018-11-24 NOTE — PLAN OF CARE
Problem: Patient Care Overview  Goal: Plan of Care/Patient Progress Review  Outcome: Declining  Patient condition declined during today's day shift. See progress note by RN for update.    Neuro: Currently sedated, moving extremities ad jose de jesus, pupils sluggish at 3mm bilaterally, LUIS A remainder of neuro assessment.  CV: NSR with stable BP's.   Pulm: ETT with FiO2 of 70%, lung sounds coarse this afternoon after intubation. FiO2 titrated per ABG PaO2.  GI: Strict NPO per thoracic surgery. BM x1 this morning.  : Mora catheter in place.  IV/Gtt: Right internal jugular CVC. Levophed and fentanyl at charted rates.    Will continue to monitor for safety and comfort.    Milton Gruber RN

## 2018-11-24 NOTE — PROGRESS NOTES
"CLINICAL NUTRITION SERVICES -- Brief Progress Note - F/u for Provider consult:   #(Pharmacy/Nutrition to start & manage TPN) , Indication: Prolonged NPO  #Registered Dietitian to Assess and Order TF per Medical Nutrition Therapy Recommendations:     Nutrition Prescription    RECOMMENDATIONS FOR MDs/PROVIDERS TO ORDER:  --Monitor Lyte with TPN start and advancement , replace as needed   --Avoid IV dextrose from MIVF with start of TPN, decrease or d/c  IVF with TPN start per MD discretion.   --Continue with Trickle TF per MD discretion.      Recommendations already ordered by Registered Dietitian (RD):  1. Sent pharmacy TPN change order for the following:    --Access: PICC  --Dosing wt: 65 kg (adjusted using admit wt of 89.5 kg and IBW of 57 kg)  --Goal PN @ 55 mL/hr (1320 mL total) with 140 grams dextrose, 117 grams AA and 250 mL lipids x 5 days per week  --Refeeding precaution:  If Mg++/K+ /Phos normal, begin PN with dextrose 110 gm /day (GIR:1.2). Once pt receives ~100% of initial continuous PN volume with K+/Mg++/Phos  WNL, advance PN dextrose by 30 gm to goal dextrose of 140 gm/day.      - Goal TPN Provides:1300 kcals/day (20 kcal/kg/day), 1.8 gm PRO/kg/day, 140 gm CHO/day, GIR:1.5 mg/kg/min and 27 % kcals from Fat.      2. Pharmacy please Order a TG prior to starting IV lipids for baseline TG.    3. No changes to current TFs order set.     Future/Additional Recommendations:  - Repeat LFTs/TG every Monday to assess for TPN tolerance  - Monitor daily BUN/Cr to assess for need to decrease AA provision   - Monitor daily wt to assess for volume status.   --Pending ability to advance TFs >> trickle feeds, if need to increase kcal provision, could run lipids x 6 days per week ( 31% fat kcal).        Progress towards previous nutrition POC (see previous 11/23 Assessment for details)     NEW FINDINGS   --Chart review:   Re-Intubated yesterday (11/23)  Per MD note, \" Pt went into A fib with RVR and associated hypotension " "and AMS. ABG obtained and found to have acidosis and low PO2 Pt cardioverted at beside, sedated with fentanyl. Propofol off. Pt intubated for airway protection given AMS and hypoxia\".    Diet: Kept NPO status since admit  ( 4 days NPO).     Enteral Nutrition: Ordered by MD this am ( ): Per previous RD recommendations:  --Dosing Weight: 65 kg (adjusted using admit wt of 89.5 kg and IBW of 57 kg)   --Access: G tube and J tube in place  ( Using J-tube for feeding, G-tube to gravity)  --Formula: Impact peptide 1.5 @ 10 ml/hr trickle feed ( no advancement).    TPN: Plan to start TPN for nutrition maintenance along with trickle TFs per discussion with RN.     -GI: Stool x1 today      -Labs:K+: 3.8, Mg++: 2.4(H), Phos: 3.3  BUN: 24, Cr: 0.96, B (N)   LFT's on admit : : Alk phos: 78(N), ALT: 106 (H), AST: 238(H), TG: N/A  Ketones: 10 ( admit)    Kamila Shields RD/LD  Weekend / holiday Pager [phone]          "

## 2018-11-25 ENCOUNTER — APPOINTMENT (OUTPATIENT)
Dept: GENERAL RADIOLOGY | Facility: CLINIC | Age: 73
DRG: 326 | End: 2018-11-25
Attending: PHYSICIAN ASSISTANT
Payer: MEDICARE

## 2018-11-25 ENCOUNTER — APPOINTMENT (OUTPATIENT)
Dept: PHYSICAL THERAPY | Facility: CLINIC | Age: 73
DRG: 326 | End: 2018-11-25
Attending: THORACIC SURGERY (CARDIOTHORACIC VASCULAR SURGERY)
Payer: MEDICARE

## 2018-11-25 LAB
ALBUMIN SERPL-MCNC: 1.3 G/DL (ref 3.4–5)
ALBUMIN UR-MCNC: 10 MG/DL
ALP SERPL-CCNC: 78 U/L (ref 40–150)
ALT SERPL W P-5'-P-CCNC: 29 U/L (ref 0–50)
ANION GAP SERPL CALCULATED.3IONS-SCNC: 7 MMOL/L (ref 3–14)
APPEARANCE UR: ABNORMAL
AST SERPL W P-5'-P-CCNC: 26 U/L (ref 0–45)
BACTERIA #/AREA URNS HPF: ABNORMAL /HPF
BASE EXCESS BLDA CALC-SCNC: 1.9 MMOL/L
BILIRUB DIRECT SERPL-MCNC: 0.2 MG/DL (ref 0–0.2)
BILIRUB SERPL-MCNC: 0.3 MG/DL (ref 0.2–1.3)
BILIRUB UR QL STRIP: NEGATIVE
BUN SERPL-MCNC: 26 MG/DL (ref 7–30)
CALCIUM SERPL-MCNC: 7.1 MG/DL (ref 8.5–10.1)
CHLORIDE SERPL-SCNC: 116 MMOL/L (ref 94–109)
CO2 SERPL-SCNC: 24 MMOL/L (ref 20–32)
COLOR UR AUTO: YELLOW
CREAT SERPL-MCNC: 1.02 MG/DL (ref 0.52–1.04)
CRP SERPL-MCNC: 160 MG/L (ref 0–8)
ERYTHROCYTE [DISTWIDTH] IN BLOOD BY AUTOMATED COUNT: 15.9 % (ref 10–15)
GFR SERPL CREATININE-BSD FRML MDRD: 53 ML/MIN/1.7M2
GLUCOSE BLDC GLUCOMTR-MCNC: 117 MG/DL (ref 70–99)
GLUCOSE BLDC GLUCOMTR-MCNC: 128 MG/DL (ref 70–99)
GLUCOSE SERPL-MCNC: 139 MG/DL (ref 70–99)
GLUCOSE UR STRIP-MCNC: NEGATIVE MG/DL
GRAM STN SPEC: NORMAL
GRAM STN SPEC: NORMAL
HCO3 BLD-SCNC: 26 MMOL/L (ref 21–28)
HCT VFR BLD AUTO: 23.5 % (ref 35–47)
HGB BLD-MCNC: 7.6 G/DL (ref 11.7–15.7)
HGB UR QL STRIP: NEGATIVE
INR PPP: 1.27 (ref 0.86–1.14)
KETONES UR STRIP-MCNC: NEGATIVE MG/DL
LEUKOCYTE ESTERASE UR QL STRIP: NEGATIVE
MAGNESIUM SERPL-MCNC: 2.1 MG/DL (ref 1.6–2.3)
MCH RBC QN AUTO: 31.4 PG (ref 26.5–33)
MCHC RBC AUTO-ENTMCNC: 32.3 G/DL (ref 31.5–36.5)
MCV RBC AUTO: 97 FL (ref 78–100)
MUCOUS THREADS #/AREA URNS LPF: PRESENT /LPF
NITRATE UR QL: NEGATIVE
O2/TOTAL GAS SETTING VFR VENT: 35 %
OXYHGB MFR BLD: 94 % (ref 92–100)
PCO2 BLD: 38 MM HG (ref 35–45)
PH BLD: 7.45 PH (ref 7.35–7.45)
PH UR STRIP: 5.5 PH (ref 5–7)
PHOSPHATE SERPL-MCNC: 2.4 MG/DL (ref 2.5–4.5)
PLATELET # BLD AUTO: 391 10E9/L (ref 150–450)
PO2 BLD: 85 MM HG (ref 80–105)
POTASSIUM SERPL-SCNC: 3.6 MMOL/L (ref 3.4–5.3)
PROT SERPL-MCNC: 4.7 G/DL (ref 6.8–8.8)
RBC # BLD AUTO: 2.42 10E12/L (ref 3.8–5.2)
RBC #/AREA URNS AUTO: 4 /HPF (ref 0–2)
SODIUM SERPL-SCNC: 147 MMOL/L (ref 133–144)
SOURCE: ABNORMAL
SP GR UR STRIP: 1.02 (ref 1–1.03)
SPECIMEN SOURCE: NORMAL
SQUAMOUS #/AREA URNS AUTO: <1 /HPF (ref 0–1)
UROBILINOGEN UR STRIP-MCNC: NORMAL MG/DL (ref 0–2)
WBC # BLD AUTO: 15.7 10E9/L (ref 4–11)
WBC #/AREA URNS AUTO: 1 /HPF (ref 0–5)

## 2018-11-25 PROCEDURE — 00000146 ZZHCL STATISTIC GLUCOSE BY METER IP

## 2018-11-25 PROCEDURE — A9270 NON-COVERED ITEM OR SERVICE: HCPCS | Performed by: PHYSICIAN ASSISTANT

## 2018-11-25 PROCEDURE — 25000132 ZZH RX MED GY IP 250 OP 250 PS 637

## 2018-11-25 PROCEDURE — A9270 NON-COVERED ITEM OR SERVICE: HCPCS

## 2018-11-25 PROCEDURE — 36415 COLL VENOUS BLD VENIPUNCTURE: CPT | Performed by: PHYSICIAN ASSISTANT

## 2018-11-25 PROCEDURE — 82248 BILIRUBIN DIRECT: CPT | Performed by: SURGERY

## 2018-11-25 PROCEDURE — 86140 C-REACTIVE PROTEIN: CPT | Performed by: SURGERY

## 2018-11-25 PROCEDURE — 25000128 H RX IP 250 OP 636: Performed by: PHYSICIAN ASSISTANT

## 2018-11-25 PROCEDURE — 25000128 H RX IP 250 OP 636: Performed by: STUDENT IN AN ORGANIZED HEALTH CARE EDUCATION/TRAINING PROGRAM

## 2018-11-25 PROCEDURE — 87106 FUNGI IDENTIFICATION YEAST: CPT | Performed by: PHYSICIAN ASSISTANT

## 2018-11-25 PROCEDURE — 87205 SMEAR GRAM STAIN: CPT | Performed by: PHYSICIAN ASSISTANT

## 2018-11-25 PROCEDURE — 85610 PROTHROMBIN TIME: CPT | Performed by: SURGERY

## 2018-11-25 PROCEDURE — 71045 X-RAY EXAM CHEST 1 VIEW: CPT

## 2018-11-25 PROCEDURE — 87070 CULTURE OTHR SPECIMN AEROBIC: CPT | Performed by: PHYSICIAN ASSISTANT

## 2018-11-25 PROCEDURE — 20000004 ZZH R&B ICU UMMC

## 2018-11-25 PROCEDURE — 83735 ASSAY OF MAGNESIUM: CPT | Performed by: SURGERY

## 2018-11-25 PROCEDURE — 85027 COMPLETE CBC AUTOMATED: CPT | Performed by: SURGERY

## 2018-11-25 PROCEDURE — 99291 CRITICAL CARE FIRST HOUR: CPT | Performed by: PHYSICIAN ASSISTANT

## 2018-11-25 PROCEDURE — 40000275 ZZH STATISTIC RCP TIME EA 10 MIN

## 2018-11-25 PROCEDURE — 94003 VENT MGMT INPAT SUBQ DAY: CPT

## 2018-11-25 PROCEDURE — 25000125 ZZHC RX 250

## 2018-11-25 PROCEDURE — 82805 BLOOD GASES W/O2 SATURATION: CPT

## 2018-11-25 PROCEDURE — 40000014 ZZH STATISTIC ARTERIAL MONITORING DAILY

## 2018-11-25 PROCEDURE — 31622 DX BRONCHOSCOPE/WASH: CPT

## 2018-11-25 PROCEDURE — 25000125 ZZHC RX 250: Performed by: PHYSICIAN ASSISTANT

## 2018-11-25 PROCEDURE — 40000556 ZZH STATISTIC PERIPHERAL IV START W US GUIDANCE

## 2018-11-25 PROCEDURE — 25000128 H RX IP 250 OP 636: Performed by: THORACIC SURGERY (CARDIOTHORACIC VASCULAR SURGERY)

## 2018-11-25 PROCEDURE — 97530 THERAPEUTIC ACTIVITIES: CPT | Mod: GP

## 2018-11-25 PROCEDURE — 84100 ASSAY OF PHOSPHORUS: CPT | Performed by: SURGERY

## 2018-11-25 PROCEDURE — 81001 URINALYSIS AUTO W/SCOPE: CPT | Performed by: PHYSICIAN ASSISTANT

## 2018-11-25 PROCEDURE — C9113 INJ PANTOPRAZOLE SODIUM, VIA: HCPCS | Performed by: THORACIC SURGERY (CARDIOTHORACIC VASCULAR SURGERY)

## 2018-11-25 PROCEDURE — 80053 COMPREHEN METABOLIC PANEL: CPT | Performed by: SURGERY

## 2018-11-25 PROCEDURE — 25000125 ZZHC RX 250: Performed by: SURGERY

## 2018-11-25 PROCEDURE — 97110 THERAPEUTIC EXERCISES: CPT | Mod: GP

## 2018-11-25 PROCEDURE — 87040 BLOOD CULTURE FOR BACTERIA: CPT | Performed by: PHYSICIAN ASSISTANT

## 2018-11-25 PROCEDURE — 40000193 ZZH STATISTIC PT WARD VISIT

## 2018-11-25 PROCEDURE — 25000132 ZZH RX MED GY IP 250 OP 250 PS 637: Performed by: PHYSICIAN ASSISTANT

## 2018-11-25 RX ORDER — QUETIAPINE FUMARATE 50 MG/1
50 TABLET, FILM COATED ORAL
Status: DISCONTINUED | OUTPATIENT
Start: 2018-11-25 | End: 2018-11-26

## 2018-11-25 RX ORDER — POLYETHYLENE GLYCOL 3350 17 G/17G
17 POWDER, FOR SOLUTION ORAL 2 TIMES DAILY
Status: DISCONTINUED | OUTPATIENT
Start: 2018-11-25 | End: 2018-11-25

## 2018-11-25 RX ORDER — QUETIAPINE FUMARATE 50 MG/1
50 TABLET, FILM COATED ORAL 2 TIMES DAILY
Status: DISCONTINUED | OUTPATIENT
Start: 2018-11-25 | End: 2018-11-26

## 2018-11-25 RX ORDER — POLYETHYLENE GLYCOL 3350 17 G/17G
17 POWDER, FOR SOLUTION ORAL 2 TIMES DAILY
Status: DISCONTINUED | OUTPATIENT
Start: 2018-11-25 | End: 2018-11-26

## 2018-11-25 RX ADMIN — Medication 75 MCG/HR: at 07:55

## 2018-11-25 RX ADMIN — LEVOTHYROXINE SODIUM 150 MCG: 300 TABLET ORAL at 08:26

## 2018-11-25 RX ADMIN — SENNOSIDES A AND B 10 ML: 415.36 LIQUID ORAL at 20:28

## 2018-11-25 RX ADMIN — POLYETHYLENE GLYCOL 3350 17 G: 17 POWDER, FOR SOLUTION ORAL at 08:26

## 2018-11-25 RX ADMIN — BISACODYL 10 MG: 10 SUPPOSITORY RECTAL at 08:06

## 2018-11-25 RX ADMIN — DOCUSATE SODIUM 100 MG: 50 LIQUID ORAL at 20:26

## 2018-11-25 RX ADMIN — PANTOPRAZOLE SODIUM 40 MG: 40 INJECTION, POWDER, FOR SOLUTION INTRAVENOUS at 07:57

## 2018-11-25 RX ADMIN — PIPERACILLIN SODIUM,TAZOBACTAM SODIUM 3.38 G: 3; .375 INJECTION, POWDER, FOR SOLUTION INTRAVENOUS at 02:09

## 2018-11-25 RX ADMIN — POLYETHYLENE GLYCOL 3350 17 G: 17 POWDER, FOR SOLUTION ORAL at 20:26

## 2018-11-25 RX ADMIN — ACETAMINOPHEN 650 MG: 325 SOLUTION ORAL at 18:51

## 2018-11-25 RX ADMIN — ENOXAPARIN SODIUM 40 MG: 40 INJECTION SUBCUTANEOUS at 15:29

## 2018-11-25 RX ADMIN — FLUCONAZOLE 200 MG: 2 INJECTION, SOLUTION INTRAVENOUS at 10:36

## 2018-11-25 RX ADMIN — BACITRACIN: 500 OINTMENT TOPICAL at 11:07

## 2018-11-25 RX ADMIN — Medication 200 MG: at 08:27

## 2018-11-25 RX ADMIN — SODIUM CHLORIDE, POTASSIUM CHLORIDE, SODIUM LACTATE AND CALCIUM CHLORIDE: 600; 310; 30; 20 INJECTION, SOLUTION INTRAVENOUS at 06:13

## 2018-11-25 RX ADMIN — PIPERACILLIN SODIUM,TAZOBACTAM SODIUM 3.38 G: 3; .375 INJECTION, POWDER, FOR SOLUTION INTRAVENOUS at 15:28

## 2018-11-25 RX ADMIN — SENNOSIDES A AND B 10 ML: 415.36 LIQUID ORAL at 08:27

## 2018-11-25 RX ADMIN — DOCUSATE SODIUM 100 MG: 50 LIQUID ORAL at 08:26

## 2018-11-25 RX ADMIN — DEXMEDETOMIDINE HYDROCHLORIDE 0.7 MCG/KG/HR: 400 INJECTION INTRAVENOUS at 17:49

## 2018-11-25 RX ADMIN — ACETAMINOPHEN 650 MG: 325 SOLUTION ORAL at 10:34

## 2018-11-25 RX ADMIN — Medication 200 MG: at 20:28

## 2018-11-25 RX ADMIN — QUETIAPINE FUMARATE 50 MG: 50 TABLET ORAL at 20:26

## 2018-11-25 RX ADMIN — CALCIUM GLUCONATE: 98 INJECTION, SOLUTION INTRAVENOUS at 20:21

## 2018-11-25 RX ADMIN — POTASSIUM & SODIUM PHOSPHATES POWDER PACK 280-160-250 MG 1 PACKET: 280-160-250 PACK at 10:36

## 2018-11-25 RX ADMIN — PIPERACILLIN SODIUM,TAZOBACTAM SODIUM 3.38 G: 3; .375 INJECTION, POWDER, FOR SOLUTION INTRAVENOUS at 20:24

## 2018-11-25 RX ADMIN — POTASSIUM & SODIUM PHOSPHATES POWDER PACK 280-160-250 MG 1 PACKET: 280-160-250 PACK at 15:40

## 2018-11-25 RX ADMIN — POTASSIUM & SODIUM PHOSPHATES POWDER PACK 280-160-250 MG 1 PACKET: 280-160-250 PACK at 20:26

## 2018-11-25 RX ADMIN — DEXMEDETOMIDINE HYDROCHLORIDE 0.7 MCG/KG/HR: 400 INJECTION INTRAVENOUS at 10:54

## 2018-11-25 RX ADMIN — QUETIAPINE FUMARATE 50 MG: 50 TABLET ORAL at 10:36

## 2018-11-25 RX ADMIN — POTASSIUM CHLORIDE 20 MEQ: 29.8 INJECTION, SOLUTION INTRAVENOUS at 05:18

## 2018-11-25 RX ADMIN — BACITRACIN: 500 OINTMENT TOPICAL at 20:42

## 2018-11-25 RX ADMIN — PIPERACILLIN SODIUM,TAZOBACTAM SODIUM 3.38 G: 3; .375 INJECTION, POWDER, FOR SOLUTION INTRAVENOUS at 07:56

## 2018-11-25 RX ADMIN — DEXMEDETOMIDINE HYDROCHLORIDE 0.6 MCG/KG/HR: 400 INJECTION INTRAVENOUS at 02:38

## 2018-11-25 RX ADMIN — POTASSIUM PHOSPHATE, MONOBASIC AND POTASSIUM PHOSPHATE, DIBASIC 15 MMOL: 224; 236 INJECTION, SOLUTION INTRAVENOUS at 08:03

## 2018-11-25 ASSESSMENT — ACTIVITIES OF DAILY LIVING (ADL)
ADLS_ACUITY_SCORE: 16

## 2018-11-25 NOTE — PLAN OF CARE
Problem: Restraint for Non-Violent/Non-Self-Destructive Behavior  Goal: Prevent/Manage Potential Problems  Maintain safety of patient and others during period of restraint.  Promote psychological and physical wellbeing.  Prevent injury to skin and involved body parts.  Promote nutrition, hydration, and elimination.   Outcome: No Change  Continue soft wrist restraints to prevent from pulling at lines and tubes while intubated and sedated.

## 2018-11-25 NOTE — PLAN OF CARE
Problem: Restraint for Non-Violent/Non-Self-Destructive Behavior  Goal: Prevent/Manage Potential Problems  Maintain safety of patient and others during period of restraint.  Promote psychological and physical wellbeing.  Prevent injury to skin and involved body parts.  Promote nutrition, hydration, and elimination.   Outcome: No Change  When awakened pt points toward ET tube and makes pulling motion. Slaps mattress when told the time and that MDs aren't here yet. With turns attempts to grab and tug at tube. Despite disguising abd tubes with pillows, got restrained hand to G tube and turned stopcock so leaked onto bed. This morning found had succeeded in pulling ANGELO bulb off tubing. Ends cleansed with alcohol before reconnecting. Continue restraints until extubated for safety..

## 2018-11-25 NOTE — PLAN OF CARE
Problem: Patient Care Overview  Goal: Plan of Care/Patient Progress Review  PT 4A Discharge Planner PT   Patient plan for discharge: Pt not alert to answer  Current status: Following 25% commands with heavy stimulation.  Lift bed<chair.  VSS on VC-AC FiO2 35% PEEP 8.  Barriers to return to prior living situation: Dependence for mobility, medical status  Recommendations for discharge: Anticipate rehab, TCU  Rationale for recommendations: Pt is becoming more deconditioned, will be below baseline for mobility and ADL at discharge       Entered by: Johanna Link 11/25/2018 5:03 PM

## 2018-11-25 NOTE — PROGRESS NOTES
Thoracic Surgery Progress Note - 11/25/18  Pt: Myrtle Vaz  MRN: [id number]     24 hr events;   Febrile, pancultured.   Bronchoscopy with mucopurulent secretions and erythematous airway    Temp:  [99.7  F (37.6  C)-102  F (38.9  C)] 99.7  F (37.6  C)  Heart Rate:  [49-69] 58  Resp:  [15-25] 16  BP: ()/(41-97) 104/55  MAP:  [38 mmHg-136 mmHg] 63 mmHg  Arterial Line BP: ()/(6-109) 100/49  FiO2 (%):  [35 %-40 %] 35 %  SpO2:  [87 %-100 %] 94 %    I/O last 3 completed shifts:  In: 3147.27 [I.V.:2244.27; NG/GT:165]  Out: 1690 [Urine:1075; Emesis/NG output:100; Drains:85; Chest Tube:430]    Ventilation Mode: CMV/AC  (Continuous Mandatory Ventilation/ Assist Control)  FiO2 (%): 35 %  Rate Set (breaths/minute): 15 breaths/min  Tidal Volume Set (mL): 420 mL  PEEP (cm H2O): 5 cmH2O  Pressure Support (cm H2O): 7 cmH2O  Oxygen Concentration (%): 35 %  Resp: 16    Physical Exam:  Gen: Intubated and sedated, following commands. Delirious.   Pulm: Mechanically ventilated. Bilateral breath sounds.  CV: Regular rate and rhythm.  Chest: CT output appears serosanguinous.                             - Incision:  C/D/I.  Abd: Abdomen soft, non-tender, non-distended    Labs reviewed in full; available in Epic.  Results for orders placed or performed during the hospital encounter of 11/21/18 (from the past 24 hour(s))   XR Chest Port 1 View    Narrative    XR CHEST PORT 1 VW  11/24/2018 4:07 PM      HISTORY: post bronch cxr;     COMPARISON: Earlier same day    FINDINGS: Semiupright AP view of the chest. Endotracheal tube tip  projects over the midthoracic trachea. Right IJ central venous  catheter tip projects over the mid SVC. Bilateral chest tubes in  place. Pericardial/mediastinal drain in position similar to the  previous study. Trace bilateral apical pneumothoraces are similar to  the previous study. Lung volumes are decreased from previous study  with slightly worsened aeration of the lung bases. No new  consolidation.       Impression    IMPRESSION:   1. Essentially unchanged biapical pneumothoraces with bilateral chest  tubes in place.  2. Other support devices in place as above.  3. Decreased lung volumes with increased bibasilar atelectasis.     I have personally reviewed the examination and initial interpretation  and I agree with the findings.    YULI ADAMS MD   CBC with platelets   Result Value Ref Range    WBC 15.7 (H) 4.0 - 11.0 10e9/L    RBC Count 2.42 (L) 3.8 - 5.2 10e12/L    Hemoglobin 7.6 (L) 11.7 - 15.7 g/dL    Hematocrit 23.5 (L) 35.0 - 47.0 %    MCV 97 78 - 100 fl    MCH 31.4 26.5 - 33.0 pg    MCHC 32.3 31.5 - 36.5 g/dL    RDW 15.9 (H) 10.0 - 15.0 %    Platelet Count 391 150 - 450 10e9/L   INR   Result Value Ref Range    INR 1.27 (H) 0.86 - 1.14   Comprehensive metabolic panel   Result Value Ref Range    Sodium 147 (H) 133 - 144 mmol/L    Potassium 3.6 3.4 - 5.3 mmol/L    Chloride 116 (H) 94 - 109 mmol/L    Carbon Dioxide 24 20 - 32 mmol/L    Anion Gap 7 3 - 14 mmol/L    Glucose 139 (H) 70 - 99 mg/dL    Urea Nitrogen 26 7 - 30 mg/dL    Creatinine 1.02 0.52 - 1.04 mg/dL    GFR Estimate 53 (L) >60 mL/min/1.7m2    GFR Estimate If Black 64 >60 mL/min/1.7m2    Calcium 7.1 (L) 8.5 - 10.1 mg/dL    Bilirubin Total 0.3 0.2 - 1.3 mg/dL    Albumin 1.3 (L) 3.4 - 5.0 g/dL    Protein Total 4.7 (L) 6.8 - 8.8 g/dL    Alkaline Phosphatase 78 40 - 150 U/L    ALT 29 0 - 50 U/L    AST 26 0 - 45 U/L   Magnesium   Result Value Ref Range    Magnesium 2.1 1.6 - 2.3 mg/dL   Phosphorus   Result Value Ref Range    Phosphorus 2.4 (L) 2.5 - 4.5 mg/dL   Bilirubin direct   Result Value Ref Range    Bilirubin Direct 0.2 0.0 - 0.2 mg/dL   CRP inflammation   Result Value Ref Range    CRP Inflammation 160.0 (H) 0.0 - 8.0 mg/L   Blood gas arterial with oxyhemoglobin (1200)   Result Value Ref Range    pH Arterial 7.45 7.35 - 7.45 pH    pCO2 Arterial 38 35 - 45 mm Hg    pO2 Arterial 85 80 - 105 mm Hg    Bicarbonate Arterial 26 21 - 28 mmol/L    FIO2  35.0     Oxyhemoglobin Arterial 94 92 - 100 %    Base Excess Art 1.9 mmol/L   UA with Microscopic reflex to Culture   Result Value Ref Range    Color Urine Yellow     Appearance Urine Slightly Cloudy     Glucose Urine Negative NEG^Negative mg/dL    Bilirubin Urine Negative NEG^Negative    Ketones Urine Negative NEG^Negative mg/dL    Specific Gravity Urine 1.021 1.003 - 1.035    Blood Urine Negative NEG^Negative    pH Urine 5.5 5.0 - 7.0 pH    Protein Albumin Urine 10 (A) NEG^Negative mg/dL    Urobilinogen mg/dL Normal 0.0 - 2.0 mg/dL    Nitrite Urine Negative NEG^Negative    Leukocyte Esterase Urine Negative NEG^Negative    Source Catheterized Urine     WBC Urine 1 0 - 5 /HPF    RBC Urine 4 (H) 0 - 2 /HPF    Bacteria Urine Few (A) NEG^Negative /HPF    Squamous Epithelial /HPF Urine <1 0 - 1 /HPF    Mucous Urine Present (A) NEG^Negative /LPF   Sputum Culture Aerobic Bacterial   Result Value Ref Range    Specimen Description Sputum Endotracheal     Culture Micro PENDING    Gram stain   Result Value Ref Range    Specimen Description Sputum Endotracheal     Gram Stain >25 PMNs/low power field     Gram Stain No organisms seen    XR Chest Port 1 View    Narrative    Exam: Chest x-ray, 1 view, 11/25/2018.    COMPARISON: 11/24/2018.    HISTORY: Chest tubes and endotracheal tube.    FINDINGS: AP view of the chest was obtained. Endotracheal tube with  its tip projecting 4.9 cm above the kelin. Bilateral chest tubes and  mediastinal drain in place. Right IJ central line with its tip  projecting over the low SVC. Skin staples projecting over the left  upper chest. Stable hyperdense opacity projecting over the right upper  abdominal quadrant. Stable cardiomediastinal silhouette. Prominent  aortic knob with calcifications. Bilateral pleural effusions, left  greater than right. Bilateral lower lobes and left retrocardiac  opacities. Pulmonary vascular congestion. No appreciable pneumothorax.      Impression    IMPRESSION:  1.  Small bilateral pleural effusions, left greater than right with  overlying atelectasis versus consolidation.  2. Pulmonary vascular congestion, slightly increased from the prior  study.  3. No appreciable pneumothorax. Support devices as described above.    MOON RENTERIA MD   Blood culture   Result Value Ref Range    Specimen Description Blood Right Hand     Special Requests Received in aerobic bottle only     Culture Micro No growth after 2 hours    Blood culture   Result Value Ref Range    Specimen Description Blood Left Hand     Special Requests Received in aerobic bottle only     Culture Micro No growth after 2 hours    Glucose by meter   Result Value Ref Range    Glucose 128 (H) 70 - 99 mg/dL       A/P: 73F POD2 s/p transhiatal esophagectomy with spit fistula, G tube, J tube for perforated esophagus after type 4 peraesophageal hernia repair.       -Acute pain: Fentanyl gtt.   -Agitated delirium: Precedex gtt. ICU to add seroquel.   -Hypoxic hypercarbic respiratory failure/ possible HAP: On A/C. Wean as tolerated to CPAP. Bronch today to clear secretions.   -New onset paroxysmal A fib: Now on NSR, low CHADS score. Continue Amiodarone until discharge.   -Septic shock: Wean Norepinephrine for MAP>65. Echo without RWMA. Normal RV/LV function.   -Purulent mediastinitis after esophageal perforation: On Zosyn Diflucan day 5/7.  . Leukocytosis improving.   -Acute blood loss anemia: Holding on transfusion unless Hb <7 or persistent hypoxia.   -Hypothyroidism: On home levothyroxine  -Fluid overload: Holding diuresis given vasopressor requirement.   -GI prophylaxis: PPI (still some stomach left)  -Bilateral pleural effusions: Chest tubes to waterseal.   -Severe malnutrition: Continue TPN, advance tube feeds to goal. G-tube to gravity.   -Severe constipation: Miralax BID. Dulcolax supp and enema PRN.  -ICU status.      Temitope Randle MD

## 2018-11-25 NOTE — PROGRESS NOTES
SURGICAL ICU PROGRESS NOTE  November 25, 2018        Date of Service (when I saw the patient): 11/25/2018    ASSESSMENT: Myrtle Vaz is a 73 year old female who was admitted on 11/21/2018 to [facility]. She was transferred from [facility] where she presented with respiratory failure thought to be secondary to large hiatal hernia and underwent s/p laparoscopic hiatal hernia repair with mesh reinforcement, laparoscopic nissen fundoplication, and laparoscopic gastrostomy tube placement, which was complicated by pleural effusions and possible esophageal leak,  s/p placement of chest tubes x3. Taken to OR with thoracic and found to have perforation at distal esophagus at  juncton and underwent s/p Midline laparotomy, Trans-hiatal esophagogasterectomy, gastrostomy, J-tube placement, G-tube placement, bilateral pleural chest tube placement, mediastinal abcess drainage, spit fistula creation, Esophagastrodueodenoscopy, intraoperative course complicated by SVT required intraoperative cardioversion x3. Reintubated on 11/23/18 for AMS, A-fib with RVR s/p cardioversiondue to hypoxia        CHANGES and MAJOR THINGS TODAY:   Add seroquel for agitation   Add scheduled tylenol for pain adjunct   Fever workup for temp of 102F--send BC, urine and sputum   PST as able today to optimize respiratory status for extubation   Increase tube feeds towards goal rate per protocol per discussion with Dr Albarado   Continue with TPN at 1/2 rate to avoid overfeeding (spoke with Pharm D--will decrease dextrose in TPN)   Bronchoscopy today per thoracic     PLAN:  # acute post operative pain   # delirium   - Monitor neurological status. Notify the MD for any acute changes in exam.  - pt reported to have delirium prior to transfer from OSH. Continue efforts with delirium prevention and re-orientation   - add seroquel   - add scheduled tylenol   - Pain: fentanyl infusion for pain   - Sedation: precedex infusion       Pulmonary:   # acute hypoxic resp  failure. extubated 11/22/18 re intubated 11/23/18  - Re intubated on 11/23. Will likley need to extbate to high flow of BIPAP (okay with Dr Albarado if bipap needed--just vent ostomy bag over spit fistula)    - Supplemental oxygen to keep saturation above 92 %.      Cardiovascular:    # Atrial fibrillation, unstable, intraoperative cardioversion x3   - hx on metoprolol during this admission, OSH records reviewed. Appears metoprolol not PTA medication, cont with amiodarone infusion for now   # distubtive shock-- off Vaso, wean Norepi as able.   # hx of hypertension- hold home diuretic while needing pressors   # hx of hyperlipidemia- resume home  statin       Gastroenterology/Nutrition:  # S/p hiatal hernia complicated by distal esophageal leak now s/p Midline laparotomy, trans-hiatal esophagogasterectomy, gastrostomy, J-tube placement, G-tube placement, bilateral pleural chest tube placement, mediastinal abcess drainage, spit fistula creation, esophagastrodueodenoscopy   # s/p G tube   # s/ J tube  # protein-calorie deficit malnutrition   - G  To gravity. Increase feeds to goal today   - 11/25/18-- TPN started, will decrease rate to 1/2 today to avoid overfeeding as we are increasing feeds today   - s/p enemas x3 days now with liquid BM's. Will back off. Daily suppository. Bowel medications: senna/colace/miralax     Fluids/Electrolytes:   # hypernatremia  # hypophosphatemia   - Na 147- -increase free water via J tube   - add schedule neutra phos x24 hours   - TKO IVF.   - electrolyte replacement protocol.   - Keep K > 4.0. And Mag > 2.0 given recurrent atrial fibrillation       Renal:  # HANG, resolving   -  Will continue to monitor intake and output continue with lopez for now       Endocrine:  # hypothyroidism   - Home synthroid 150mcg resumed   - no indication for sliding scale      ID/Antibiotics:  # mediastinal pleural effusion   C-ontinue with Zosyn and  and Micafungin  x4 daysper Dr Albarado   Vancomycin d/phani  11/22/18      Heme:     # Acute blood loss anemia   #Leukocytosis, improving   # fever   - WBC count improving, 15.7K today,   - fever spike this am--send pan cultures. Await bronch specimen from yesterday.   - monitor hgb. Transfuse if hgb <7.0 or signs/symptoms of hypoperfusion. Monitor and trend.       Musculoskeletal:  # weakness and deconditioning of deconditioning   - Physical and occupational therapy consult       General Cares/Prophylaxis:    DVT Prophylaxis: Lovenox 40mg subcutaneous    GI Prophylaxis: Protonix   Restraints: Restraints for medical healing needed: Yes--reaching for ETT tube and other lines       Lines/ tubes/ drains:  - PIV's  - central line   - ANGELO drain x1  - chest tube x2  - lopez   - arterial line       Disposition:  - Surgical ICU.       Time spent on this Encounter   Billing:  I spent  45 minutes bedside and on the inpatient unit today managing the care of Myrtle Vaz in relation to the issues listed in this note.          Siva Kasper PA-C    ====================================  INTERVAL HISTORY:  Course reviewed.  Pt reported to be agitated and restless overnight, pulling at times and disconnecting bulbs from drains. This am, temp to 102F. Patient intermittently restless and agitated at times. Does follow command correctly and consisently when asked.       OBJECTIVE:   1. VITAL SIGNS:   Temp:  [97.2  F (36.2  C)-100.8  F (38.2  C)] 100.8  F (38.2  C)  Heart Rate:  [49-69] 57  Resp:  [9-45] 19  BP: ()/(41-85) 108/53  MAP:  [38 mmHg-136 mmHg] 53 mmHg  Arterial Line BP: ()/() 78/41  FiO2 (%):  [35 %-45 %] 35 %  SpO2:  [84 %-100 %] 96 %  Ventilation Mode: CMV/AC  (Continuous Mandatory Ventilation/ Assist Control)  FiO2 (%): 35 %  Rate Set (breaths/minute): 15 breaths/min  Tidal Volume Set (mL): 420 mL  PEEP (cm H2O): 5 cmH2O  Oxygen Concentration (%): 35 %  Resp: 19    2. INTAKE/ OUTPUT:   I/O last 3 completed shifts:  In: 2697.85 [I.V.:2249.85; NG/GT:165]  Out: 1375  [Urine:840; Emesis/NG output:125; Drains:120; Chest Tube:290]    3. PHYSICAL EXAMINATION:  General: laying in bed, arouses to name when called. Follows commands.intermittently restless at times   HEENT: pupils equal. ETT present and secured.  Left neck incision dressed, staples intact  Neuro: Arouses to name when called. HARRIS. (+) delirium   Pulm/Resp: Clear breath sounds bilaterally without rhonchi, crackles or wheezes, Lungs clear.  Chest tube x2, no air leak--serous fluid noted.   Chest: spit fistula with pink tinged secretions.   CV: RRR, S1, S2, no murmurs, rubs or gallops.   Abdomen: abdominal incision without redness, warmth or drainage. Mediastinal drain with thin pinkish serous drainage. G tube to gravity drainage with dark bilious output. J tube with feeds infusing   : (+) lopez catheter in place, urine yellow and clear  MSK/Extremities: HARRIS.  peripheral edema, moving all extremities, peripheral pulses intact, calves soft and non-tender, extremities well perfused.     4. INVESTIGATIONS:   Arterial Blood Gases     Recent Labs  Lab 11/25/18 0411 11/24/18  0344 11/23/18  1533 11/23/18  1245   PH 7.45 7.38 7.31* 7.30*   PCO2 38 44 45 49*   PO2 85 77* 73* 47*   HCO3 26 26 23 24     Complete Blood Count     Recent Labs  Lab 11/25/18 0411 11/24/18 0340 11/23/18  0341 11/22/18  2033   WBC 15.7* 18.9* 20.1* 21.3*   HGB 7.6* 7.8* 8.1* 8.4*    365 385 381     Basic Metabolic Panel    Recent Labs  Lab 11/25/18 0411 11/24/18  0340 11/23/18  1245 11/23/18  0341   * 146* 146* 146*   POTASSIUM 3.6 3.8 4.1 4.2   CHLORIDE 116* 114* 113* 114*   CO2 24 24 26 26   BUN 26 24 28 29   CR 1.02 0.96 1.11* 1.07*   * 90 106* 115*     Liver Function Tests    Recent Labs  Lab 11/25/18 0411 11/24/18  0340 11/23/18  0341 11/22/18  0213  11/21/18  1831 11/21/18  0141   AST 26  --   --   --   --  238* 21   ALT 29  --   --   --   --  106* 35   ALKPHOS 78  --   --   --   --  78 100   BILITOTAL 0.3  --   --   --   --   1.3 1.0   ALBUMIN 1.3*  --   --   --   --  2.2* 2.2*   INR 1.27* 1.18* 1.31* 1.57*  < >  --  1.51*   < > = values in this interval not displayed.  Pancreatic Enzymes  No lab results found in last 7 days.  Coagulation Profile    Recent Labs  Lab 11/25/18  0411 11/24/18  0340 11/23/18  0341 11/22/18  0213   INR 1.27* 1.18* 1.31* 1.57*         5. RADIOLOGY:   Recent Results (from the past 24 hour(s))   XR Chest Port 1 View    Narrative    XR CHEST PORT 1 VW  11/24/2018 4:07 PM      HISTORY: post bronch cxr;     COMPARISON: Earlier same day    FINDINGS: Semiupright AP view of the chest. Endotracheal tube tip  projects over the midthoracic trachea. Right IJ central venous  catheter tip projects over the mid SVC. Bilateral chest tubes in  place. Pericardial/mediastinal drain in position similar to the  previous study. Trace bilateral apical pneumothoraces are similar to  the previous study. Lung volumes are decreased from previous study  with slightly worsened aeration of the lung bases. No new  consolidation.      Impression    IMPRESSION:   1. Essentially unchanged biapical pneumothoraces with bilateral chest  tubes in place.  2. Other support devices in place as above.  3. Decreased lung volumes with increased bibasilar atelectasis.     I have personally reviewed the examination and initial interpretation  and I agree with the findings.    YULI ADAMS MD       =========================================

## 2018-11-25 NOTE — PROGRESS NOTES
I spoke with pt's daughter, Haydee on the phone regarding the bronchoscopy procedure.  I explained to Haydee that we try our best to obtain consent and talk to family first before doing procedures however it was urgent. Typically we cannot back consent.  I explained to Haydee that we had tried to reach her prior to doing this procedure but the bronchoscopy was emergent and the patient, her mother, needed this procedure to help clear secretions due to low oxygen levels and was she is not able to clear secretions. She expressed that she did not have her cell phone with her for a period of time and had missed our calls.  Patient's daughter expressed understanding of the emergent need for this procedure and voiced appreciation that we able to clear secretions and improve her mother's respiratory status. She expressed that should she not have her phone on her or is unable to answer calls, she is  agreeable to life saving measures and procedures for her mother.

## 2018-11-25 NOTE — PLAN OF CARE
Problem: Patient Care Overview  Goal: Plan of Care/Patient Progress Review  Outcome: Improving  S: Have been able wean levophed some despite needing increase precedex for agitation/attempted tube pulling. Does have low grade T (max 100.8) and WBC remains elevated. Rests between cares, but becomes agitated when awakened. Grimaces and splints abd when repositioned. K+/phos low.  B: Respiratory failure/GI OR.  A: Improving.  R: Wean levo as able. Anticipate need for precedex/fentanyl with pressure support trial or go to rapid extubation. Replace phosphorus as IV compatibilities allow.

## 2018-11-26 ENCOUNTER — APPOINTMENT (OUTPATIENT)
Dept: GENERAL RADIOLOGY | Facility: CLINIC | Age: 73
DRG: 326 | End: 2018-11-26
Attending: PHYSICIAN ASSISTANT
Payer: MEDICARE

## 2018-11-26 ENCOUNTER — APPOINTMENT (OUTPATIENT)
Dept: CT IMAGING | Facility: CLINIC | Age: 73
DRG: 326 | End: 2018-11-26
Attending: NURSE PRACTITIONER
Payer: MEDICARE

## 2018-11-26 LAB
ANION GAP SERPL CALCULATED.3IONS-SCNC: 6 MMOL/L (ref 3–14)
BASE EXCESS BLDA CALC-SCNC: 1.3 MMOL/L
BUN SERPL-MCNC: 30 MG/DL (ref 7–30)
CALCIUM SERPL-MCNC: 6.9 MG/DL (ref 8.5–10.1)
CHLORIDE SERPL-SCNC: 114 MMOL/L (ref 94–109)
CO2 SERPL-SCNC: 26 MMOL/L (ref 20–32)
COPATH REPORT: NORMAL
CREAT SERPL-MCNC: 0.86 MG/DL (ref 0.52–1.04)
ERYTHROCYTE [DISTWIDTH] IN BLOOD BY AUTOMATED COUNT: 16.4 % (ref 10–15)
GFR SERPL CREATININE-BSD FRML MDRD: 65 ML/MIN/1.7M2
GLUCOSE SERPL-MCNC: 137 MG/DL (ref 70–99)
HCO3 BLD-SCNC: 26 MMOL/L (ref 21–28)
HCT VFR BLD AUTO: 24.4 % (ref 35–47)
HGB BLD-MCNC: 7.8 G/DL (ref 11.7–15.7)
INR PPP: 1.22 (ref 0.86–1.14)
INTERPRETATION ECG - MUSE: NORMAL
INTERPRETATION ECG - MUSE: NORMAL
MAGNESIUM SERPL-MCNC: 2.1 MG/DL (ref 1.6–2.3)
MCH RBC QN AUTO: 31.6 PG (ref 26.5–33)
MCHC RBC AUTO-ENTMCNC: 32 G/DL (ref 31.5–36.5)
MCV RBC AUTO: 99 FL (ref 78–100)
O2/TOTAL GAS SETTING VFR VENT: 40 %
OXYHGB MFR BLD: 95 % (ref 92–100)
PCO2 BLD: 38 MM HG (ref 35–45)
PH BLD: 7.44 PH (ref 7.35–7.45)
PHOSPHATE SERPL-MCNC: 3 MG/DL (ref 2.5–4.5)
PLATELET # BLD AUTO: 433 10E9/L (ref 150–450)
PO2 BLD: 78 MM HG (ref 80–105)
POTASSIUM SERPL-SCNC: 3.6 MMOL/L (ref 3.4–5.3)
PREALB SERPL IA-MCNC: 4 MG/DL (ref 15–45)
PREALB SERPL IA-MCNC: 5 MG/DL (ref 15–45)
RBC # BLD AUTO: 2.47 10E12/L (ref 3.8–5.2)
SODIUM SERPL-SCNC: 146 MMOL/L (ref 133–144)
TRIGL SERPL-MCNC: 161 MG/DL
WBC # BLD AUTO: 18.1 10E9/L (ref 4–11)

## 2018-11-26 PROCEDURE — 40000014 ZZH STATISTIC ARTERIAL MONITORING DAILY

## 2018-11-26 PROCEDURE — 27210437 ZZH NUTRITION PRODUCT SEMIELEM INTERMED LITER

## 2018-11-26 PROCEDURE — 25000125 ZZHC RX 250: Performed by: NURSE PRACTITIONER

## 2018-11-26 PROCEDURE — 80048 BASIC METABOLIC PNL TOTAL CA: CPT | Performed by: PHYSICIAN ASSISTANT

## 2018-11-26 PROCEDURE — 40000281 ZZH STATISTIC TRANSPORT TIME EA 15 MIN

## 2018-11-26 PROCEDURE — 25000128 H RX IP 250 OP 636: Performed by: SURGERY

## 2018-11-26 PROCEDURE — 82805 BLOOD GASES W/O2 SATURATION: CPT

## 2018-11-26 PROCEDURE — 25000125 ZZHC RX 250

## 2018-11-26 PROCEDURE — A9270 NON-COVERED ITEM OR SERVICE: HCPCS | Performed by: NURSE PRACTITIONER

## 2018-11-26 PROCEDURE — 85610 PROTHROMBIN TIME: CPT | Performed by: PHYSICIAN ASSISTANT

## 2018-11-26 PROCEDURE — C9113 INJ PANTOPRAZOLE SODIUM, VIA: HCPCS | Performed by: THORACIC SURGERY (CARDIOTHORACIC VASCULAR SURGERY)

## 2018-11-26 PROCEDURE — 84134 ASSAY OF PREALBUMIN: CPT | Performed by: PHYSICIAN ASSISTANT

## 2018-11-26 PROCEDURE — 84100 ASSAY OF PHOSPHORUS: CPT | Performed by: PHYSICIAN ASSISTANT

## 2018-11-26 PROCEDURE — 83735 ASSAY OF MAGNESIUM: CPT | Performed by: PHYSICIAN ASSISTANT

## 2018-11-26 PROCEDURE — 94003 VENT MGMT INPAT SUBQ DAY: CPT

## 2018-11-26 PROCEDURE — 25000128 H RX IP 250 OP 636: Performed by: RADIOLOGY

## 2018-11-26 PROCEDURE — 25000128 H RX IP 250 OP 636: Performed by: THORACIC SURGERY (CARDIOTHORACIC VASCULAR SURGERY)

## 2018-11-26 PROCEDURE — 40000275 ZZH STATISTIC RCP TIME EA 10 MIN

## 2018-11-26 PROCEDURE — 74177 CT ABD & PELVIS W/CONTRAST: CPT

## 2018-11-26 PROCEDURE — A9270 NON-COVERED ITEM OR SERVICE: HCPCS | Performed by: PHYSICIAN ASSISTANT

## 2018-11-26 PROCEDURE — 0B9J8ZZ DRAINAGE OF LEFT LOWER LUNG LOBE, VIA NATURAL OR ARTIFICIAL OPENING ENDOSCOPIC: ICD-10-PCS | Performed by: THORACIC SURGERY (CARDIOTHORACIC VASCULAR SURGERY)

## 2018-11-26 PROCEDURE — 25000128 H RX IP 250 OP 636: Performed by: PHYSICIAN ASSISTANT

## 2018-11-26 PROCEDURE — 20000004 ZZH R&B ICU UMMC

## 2018-11-26 PROCEDURE — 25000132 ZZH RX MED GY IP 250 OP 250 PS 637

## 2018-11-26 PROCEDURE — 25000128 H RX IP 250 OP 636: Performed by: STUDENT IN AN ORGANIZED HEALTH CARE EDUCATION/TRAINING PROGRAM

## 2018-11-26 PROCEDURE — A9270 NON-COVERED ITEM OR SERVICE: HCPCS

## 2018-11-26 PROCEDURE — 27211261 ZZH BRONCHOSCOPE, DISPOSABLE

## 2018-11-26 PROCEDURE — 25000132 ZZH RX MED GY IP 250 OP 250 PS 637: Performed by: NURSE PRACTITIONER

## 2018-11-26 PROCEDURE — 25000132 ZZH RX MED GY IP 250 OP 250 PS 637: Performed by: PHYSICIAN ASSISTANT

## 2018-11-26 PROCEDURE — 71045 X-RAY EXAM CHEST 1 VIEW: CPT

## 2018-11-26 PROCEDURE — 31622 DX BRONCHOSCOPE/WASH: CPT

## 2018-11-26 PROCEDURE — 85027 COMPLETE CBC AUTOMATED: CPT | Performed by: PHYSICIAN ASSISTANT

## 2018-11-26 PROCEDURE — 99291 CRITICAL CARE FIRST HOUR: CPT | Performed by: NURSE PRACTITIONER

## 2018-11-26 PROCEDURE — 84478 ASSAY OF TRIGLYCERIDES: CPT | Performed by: PHYSICIAN ASSISTANT

## 2018-11-26 PROCEDURE — 25000125 ZZHC RX 250: Performed by: RADIOLOGY

## 2018-11-26 PROCEDURE — 25000125 ZZHC RX 250: Performed by: STUDENT IN AN ORGANIZED HEALTH CARE EDUCATION/TRAINING PROGRAM

## 2018-11-26 RX ORDER — POLYETHYLENE GLYCOL 3350 17 G/17G
17 POWDER, FOR SOLUTION ORAL 2 TIMES DAILY PRN
Status: DISCONTINUED | OUTPATIENT
Start: 2018-11-26 | End: 2018-12-07 | Stop reason: HOSPADM

## 2018-11-26 RX ORDER — QUETIAPINE FUMARATE 50 MG/1
50 TABLET, FILM COATED ORAL 2 TIMES DAILY
Status: DISCONTINUED | OUTPATIENT
Start: 2018-11-26 | End: 2018-11-28

## 2018-11-26 RX ORDER — IOPAMIDOL 755 MG/ML
135 INJECTION, SOLUTION INTRAVASCULAR ONCE
Status: COMPLETED | OUTPATIENT
Start: 2018-11-26 | End: 2018-11-26

## 2018-11-26 RX ORDER — LIDOCAINE HYDROCHLORIDE 10 MG/ML
INJECTION, SOLUTION INFILTRATION; PERINEURAL
Status: DISCONTINUED
Start: 2018-11-26 | End: 2018-11-30 | Stop reason: HOSPADM

## 2018-11-26 RX ORDER — DEXMEDETOMIDINE HYDROCHLORIDE 4 UG/ML
.2-1.2 INJECTION, SOLUTION INTRAVENOUS CONTINUOUS
Status: DISCONTINUED | OUTPATIENT
Start: 2018-11-26 | End: 2018-11-27

## 2018-11-26 RX ADMIN — PANTOPRAZOLE SODIUM 40 MG: 40 INJECTION, POWDER, FOR SOLUTION INTRAVENOUS at 07:40

## 2018-11-26 RX ADMIN — DEXMEDETOMIDINE HYDROCHLORIDE 0.9 MCG/KG/HR: 400 INJECTION INTRAVENOUS at 12:32

## 2018-11-26 RX ADMIN — Medication 200 MG: at 07:42

## 2018-11-26 RX ADMIN — QUETIAPINE FUMARATE 50 MG: 50 TABLET ORAL at 15:32

## 2018-11-26 RX ADMIN — ACETAMINOPHEN 650 MG: 325 SOLUTION ORAL at 17:28

## 2018-11-26 RX ADMIN — POTASSIUM CHLORIDE 20 MEQ: 29.8 INJECTION, SOLUTION INTRAVENOUS at 05:18

## 2018-11-26 RX ADMIN — DEXMEDETOMIDINE HYDROCHLORIDE 0.7 MCG/KG/HR: 400 INJECTION INTRAVENOUS at 08:15

## 2018-11-26 RX ADMIN — NOREPINEPHRINE BITARTRATE 0.07 MCG/KG/MIN: 1 INJECTION INTRAVENOUS at 04:32

## 2018-11-26 RX ADMIN — Medication 200 MG: at 20:23

## 2018-11-26 RX ADMIN — DEXMEDETOMIDINE HYDROCHLORIDE 0.9 MCG/KG/HR: 400 INJECTION INTRAVENOUS at 17:28

## 2018-11-26 RX ADMIN — LEVOTHYROXINE SODIUM 150 MCG: 300 TABLET ORAL at 07:42

## 2018-11-26 RX ADMIN — DEXMEDETOMIDINE HYDROCHLORIDE 0.7 MCG/KG/HR: 400 INJECTION INTRAVENOUS at 00:31

## 2018-11-26 RX ADMIN — IOPAMIDOL 135 ML: 755 INJECTION, SOLUTION INTRAVENOUS at 14:23

## 2018-11-26 RX ADMIN — QUETIAPINE FUMARATE 75 MG: 50 TABLET ORAL at 22:10

## 2018-11-26 RX ADMIN — SODIUM CHLORIDE, PRESERVATIVE FREE 84 ML: 5 INJECTION INTRAVENOUS at 14:23

## 2018-11-26 RX ADMIN — DEXMEDETOMIDINE HYDROCHLORIDE 0.7 MCG/KG/HR: 400 INJECTION INTRAVENOUS at 07:56

## 2018-11-26 RX ADMIN — BACITRACIN: 500 OINTMENT TOPICAL at 20:20

## 2018-11-26 RX ADMIN — Medication 75 MCG/HR: at 04:15

## 2018-11-26 RX ADMIN — ACETAMINOPHEN 650 MG: 325 SOLUTION ORAL at 10:15

## 2018-11-26 RX ADMIN — BACITRACIN: 500 OINTMENT TOPICAL at 07:42

## 2018-11-26 RX ADMIN — ENOXAPARIN SODIUM 40 MG: 40 INJECTION SUBCUTANEOUS at 13:51

## 2018-11-26 RX ADMIN — QUETIAPINE FUMARATE 50 MG: 50 TABLET ORAL at 07:40

## 2018-11-26 RX ADMIN — ACETAMINOPHEN 650 MG: 325 SOLUTION ORAL at 02:12

## 2018-11-26 RX ADMIN — DEXMEDETOMIDINE HYDROCHLORIDE 1 MCG/KG/HR: 400 INJECTION INTRAVENOUS at 22:08

## 2018-11-26 RX ADMIN — POTASSIUM & SODIUM PHOSPHATES POWDER PACK 280-160-250 MG 1 PACKET: 280-160-250 PACK at 07:40

## 2018-11-26 ASSESSMENT — ACTIVITIES OF DAILY LIVING (ADL)
ADLS_ACUITY_SCORE: 16
WHICH_OF_THE_ABOVE_FUNCTIONAL_RISKS_HAD_A_RECENT_ONSET_OR_CHANGE?: AMBULATION;TRANSFERRING
SWALLOWING: 0-->SWALLOWS FOODS/LIQUIDS WITHOUT DIFFICULTY
ADLS_ACUITY_SCORE: 18
BATHING: 1-->ASSISTIVE EQUIPMENT
RETIRED_COMMUNICATION: 0-->UNDERSTANDS/COMMUNICATES WITHOUT DIFFICULTY
ADLS_ACUITY_SCORE: 15
TRANSFERRING: 0-->INDEPENDENT
ADLS_ACUITY_SCORE: 16
FALL_HISTORY_WITHIN_LAST_SIX_MONTHS: NO
AMBULATION: 0-->INDEPENDENT
COGNITION: 1 - ATTENTION OR MEMORY DEFICITS
TOILETING: 0-->INDEPENDENT
ADLS_ACUITY_SCORE: 15
ADLS_ACUITY_SCORE: 15
DRESS: 0-->INDEPENDENT
RETIRED_EATING: 0-->INDEPENDENT

## 2018-11-26 NOTE — PLAN OF CARE
Problem: Patient Care Overview  Goal: Plan of Care/Patient Progress Review  Outcome: Declining  S: Requiring higher dose of levophed to maintain MAP tonight. WBC rising. Afebrile with scheduled tylenol. Was minimally arouseable after Seroquel, now opens eyes with slightest noise on max precedex. When off for daily holiday became extremely agitated within a few minutes. Pulling at restraints, pointing towards ET tube, slapping mitts on bed when told tube had to stay in. Pressure supported for 1 hr last evening. Tolerated well. Pt had copious liquid stool continuous 8280-1292. Was unable keep anal area clean long enough to place pouch. Dignishield placed and containing stool.  B: Sepsis/GI OR.  A: Declining.  R: Continue sedation until extubated. Attempt to switch rectal tube to pouch later. Hold bowel meds. Monitor cultures. Check with SICU re antibiotic coverage. Titrate levophed per orders.

## 2018-11-26 NOTE — PROGRESS NOTES
Thoracic Surgery Progress Note - 11/25/18  Pt: Myrtle Vaz  MRN: [id number]     24 hr events;   Afebrile. Sedated, on CPAP. On levo 0.07mcg/kg/min, precedex, fentanyl in AM.     Temp:  [98.6  F (37  C)-99.8  F (37.7  C)] 98.8  F (37.1  C)  Heart Rate:  [54-67] 64  Resp:  [14-22] 15  BP: ()/(41-97) 98/54  MAP:  [46 mmHg-182 mmHg] 57 mmHg  Arterial Line BP: ()/(6-178) 74/48  FiO2 (%):  [35 %-45 %] 45 %  SpO2:  [89 %-100 %] 94 %    I/O last 3 completed shifts:  In: 4401.04 [I.V.:2071.04; NG/GT:800]  Out: 1847 [Urine:1065; Emesis/NG output:30; Drains:127; Stool:150; Chest Tube:475]    Ventilation Mode: CPAP/PS  (Continuous positive airway pressure with Pressure Support)  FiO2 (%): 45 %  Rate Set (breaths/minute): 15 breaths/min  Tidal Volume Set (mL): 420 mL  PEEP (cm H2O): 5 cmH2O  Pressure Support (cm H2O): 7 cmH2O  Oxygen Concentration (%): 40 %  Resp: 15    Physical Exam:  Gen: NAD, sedated.  Pulm: On CPAP, nonlabored breathing   CV: Regular rate and rhythm.  Chest: CT output appears serosanguinous.                             - Incision:  C/D/I.  Abd: Abdomen soft, non-tender, non-distended    Labs reviewed in full; available in Epic.  Results for orders placed or performed during the hospital encounter of 11/21/18 (from the past 24 hour(s))   Glucose by meter   Result Value Ref Range    Glucose 117 (H) 70 - 99 mg/dL   XR Chest Port 1 View    Narrative    Exam: XR CHEST PORT 1 VW, 11/26/2018 1:26 AM    Indication: ETT positioning and Chest tubes;     Comparison: Plain film on 11/24/2016.     Findings:   AP single view of the chest.Tip of the endotracheal tube projects over  the midthoracic trachea. Right-sided IJ tip projects over the mid SVC.  Bilateral chest tubes in place without significant pneumothorax. Trace  right and small to moderate left-sided pleural effusion.  Cardiomediastinal silhouette is unchanged. Pulmonary vasculature is  indistinct. Bibasilar opacity, unchanged. Stable  hyperdensity  projecting over the right upper abdominal quadrant.      Impression    Impression:   1. Bibasilar opacity, atelectasis versus consolidation.  2. Pulmonary edema with trace right and mild left pleural effusion.  3. Bilateral chest tubes in place without significant pneumothorax.    I have personally reviewed the examination and initial interpretation  and I agree with the findings.    MARY HORN MD   Blood gas arterial with oxyhemoglobin (1200)   Result Value Ref Range    pH Arterial 7.44 7.35 - 7.45 pH    pCO2 Arterial 38 35 - 45 mm Hg    pO2 Arterial 78 (L) 80 - 105 mm Hg    Bicarbonate Arterial 26 21 - 28 mmol/L    FIO2 40.0     Oxyhemoglobin Arterial 95 92 - 100 %    Base Excess Art 1.3 mmol/L   CBC with platelets   Result Value Ref Range    WBC 18.1 (H) 4.0 - 11.0 10e9/L    RBC Count 2.47 (L) 3.8 - 5.2 10e12/L    Hemoglobin 7.8 (L) 11.7 - 15.7 g/dL    Hematocrit 24.4 (L) 35.0 - 47.0 %    MCV 99 78 - 100 fl    MCH 31.6 26.5 - 33.0 pg    MCHC 32.0 31.5 - 36.5 g/dL    RDW 16.4 (H) 10.0 - 15.0 %    Platelet Count 433 150 - 450 10e9/L   Basic metabolic panel   Result Value Ref Range    Sodium 146 (H) 133 - 144 mmol/L    Potassium 3.6 3.4 - 5.3 mmol/L    Chloride 114 (H) 94 - 109 mmol/L    Carbon Dioxide 26 20 - 32 mmol/L    Anion Gap 6 3 - 14 mmol/L    Glucose 137 (H) 70 - 99 mg/dL    Urea Nitrogen 30 7 - 30 mg/dL    Creatinine 0.86 0.52 - 1.04 mg/dL    GFR Estimate 65 >60 mL/min/1.7m2    GFR Estimate If Black 79 >60 mL/min/1.7m2    Calcium 6.9 (L) 8.5 - 10.1 mg/dL   INR   Result Value Ref Range    INR 1.22 (H) 0.86 - 1.14   Magnesium   Result Value Ref Range    Magnesium 2.1 1.6 - 2.3 mg/dL   Phosphorus   Result Value Ref Range    Phosphorus 3.0 2.5 - 4.5 mg/dL   Triglycerides   Result Value Ref Range    Triglycerides 161 (H) <150 mg/dL       A/P: 73F s/p transhiatal esophagectomy with spit fistula, G tube, J tube on 11/21 for perforated esophagus after type 4 peraesophageal hernia repair.        -Acute pain: Fentanyl gtt.   -Agitated delirium: Precedex gtt. ICU to add seroquel.   -Hypoxic hypercarbic respiratory failure/ possible HAP: On A/C. Wean as tolerated to CPAP.  -New onset paroxysmal A fib: Now on NSR, low CHADS score. Continue Amiodarone until discharge.   -Septic shock: Wean Norepinephrine for MAP>65. Echo without RWMA. Normal RV/LV function.   -Purulent mediastinitis after esophageal perforation: Completed Zosyn, Diflucan (11/22-25) per SICU. . Leukocytosis 18.1 (15.7)  -Acute blood loss anemia: Holding on transfusion unless Hb <7 or persistent hypoxia.   -Hypothyroidism: On home levothyroxine  -Fluid overload: Holding diuresis given vasopressor requirement.   -GI prophylaxis: PPI (still some stomach left)  -Bilateral pleural effusions: Chest tubes to waterseal.   -Severe malnutrition: TF @ goal 40. Wean TPN. G-tube to gravity.   -Severe constipation: Miralax BID. Dulcolax supp and enema PRN.  -ICU status.      Seen and discussed with Staff, Dr. Per Gamino MD  Surgery PGY1

## 2018-11-26 NOTE — PROGRESS NOTES
NUTRITION SERVICES - BRIEF NOTE    Pt currently on TF advancing towards goal of 50 mL/hr, currently @ 40 mL/hr. Pt's TPN rate was cut in half yesterday and will be run until bag runs out to discontinue later today.    Implementations  -Order multivitamin/mineral (15 ml/day via FT) to help ensure micronutrient needs being met with suspected hypermetabolic demands and potential interruptions to TF infusions now the PN (previously providing micronutrients) will be discontinued today.    Monitoring  Nutrition will continue to follow and monitor per POC (see 11/23 RD note)    Paulina Cash, RD, LD  SICU RD Pgr: [phone]

## 2018-11-26 NOTE — PLAN OF CARE
Problem: Restraint for Non-Violent/Non-Self-Destructive Behavior  Goal: Prevent/Manage Potential Problems  Maintain safety of patient and others during period of restraint.  Promote psychological and physical wellbeing.  Prevent injury to skin and involved body parts.  Promote nutrition, hydration, and elimination.   Outcome: No Change  Flails and tries pull at Et tube when aroused for turns despite max precedex. Continue until extubated or calm.

## 2018-11-26 NOTE — PLAN OF CARE
Problem: Patient Care Overview  Goal: Plan of Care/Patient Progress Review  Outcome: No Change  Neuro: Sedated, follows commands to squeeze hands and wiggles toes, Tmax 102 F axillary (pan cultured)  Resp:Pressure supported for about 2 hours this am, fi02 35%, peep 5, sputum culture sent, bronched, 2 chest tubes to waterseal  Cardio: Sinus merritt to sinus rhythm with PACs, levophed to keep MAPs>65  GI: 1 liquid BM, bowel regimen given, advancing TF now 10 ml q 8 hours, g-tube to gravity, J tube feeds  : Mora, adequate output  Access: PIV, right internal jugular CVC, left arterial line (positional, dampened to appropriate waveforms)   Drips: Levophed (titrating off of cuff pressure), TPN, TKO, precedex, fentanyl, LR  Up to chair with lift for 2 hours  1 ANGELO drain and spit fistula.  P: Continue current cares. Notify SICU with concerns.

## 2018-11-26 NOTE — PLAN OF CARE
Problem: Patient Care Overview  Goal: Plan of Care/Patient Progress Review  Outcome: No Change  Neuro: Sedated, follows commands to squeeze hands and wiggles toes. PERRL wi  Resp: Intubated. Pressure supported for about 8 hours today, tolerated well, fi02 40%, 7/5. Found new large mucus plug, Thoracic completed bedside bronch this evening. Hope to extubate tomorrow.  Cardio: Afebrile, cultures pending from yesterday. Sinus merritt to sinus rhythm, levophed titrated to keep MAPs>65, attempting to wean.  GI: Liquid brown stool, rectal tube in place, leaking at site. Hold all bowel meds. J tube infusing TF @goal of 50ml/hr with Q2hr 40ml FWF. G-tube to gravity, bile, green output.  : Mora, adequate output.  Access: PIV SL, R IJ CVC, left arterial line (positional, dampened)   Drips: Levophed (titrating off of cuff pressure), TPN (when done can discontinue), TKO, precedex, fentanyl, LR.  Up to chair with lift for 2 hours  Skin: Pale. 1 Abd ANGELO drain (dressing changed Q4hrs) and spit fistula. Bilateral x2 chest tubes to water seal. L neck incision staples, small amount serosang drainage. Abd incision stapled, CDI, TIFFANIE. Small tear on coccyx, mepilex placed. Generalized bruising.  Chest and Abd CT completed this afternoon, showing large mucus plug and some abd fluid. No new orders, Thoracic to order for the mucus plug.     P: Wean levophed as able. Seroquel orders slightly changed to help with ICU delirium, assess for mental clearing. Monitor resp status for readiness to extubate. Continue current cares. Notify SICU with concerns.

## 2018-11-26 NOTE — PROGRESS NOTES
SURGICAL ICU PROGRESS NOTE  November 25, 2018        Date of Service (when I saw the patient): 11/26/2018    ASSESSMENT: Myrtle Vaz is a 73 year old female who was admitted on 11/21/2018 to [facility]. She was transferred from [facility] where she presented with respiratory failure thought to be secondary to large hiatal hernia and underwent s/p laparoscopic hiatal hernia repair with mesh reinforcement, laparoscopic nissen fundoplication, and laparoscopic gastrostomy tube placement, which was complicated by pleural effusions and possible esophageal leak,  s/p placement of chest tubes x3. Taken to OR with thoracic and found to have perforation at distal esophagus at  juncton and underwent s/p Midline laparotomy, Trans-hiatal esophagogasterectomy, gastrostomy, J-tube placement, G-tube placement, bilateral pleural chest tube placement, mediastinal abcess drainage, spit fistula creation, Esophagastrodueodenoscopy, intraoperative course complicated by SVT required intraoperative cardioversion x3. Reintubated on 11/23/18 for AMS, A-fib with RVR s/p cardioversiondue to hypoxia        CHANGES and MAJOR THINGS TODAY:   - PST today; extubate if tolerates.   - Wean levophed as tolerated   - Once TF at goal, discontinue TPN once bag is empty   - Holding bowel regimen   - Increase Seroquel dosing   - Repeat CT today   - Increase free water flush     PLAN:  # acute post operative pain   # delirium   - Monitor neurological status. Notify the MD for any acute changes in exam.  - pt reported to have delirium prior to transfer from OSH. Continue efforts with delirium prevention and re-orientation   - Increase to Seroquel 50 mg AM & afternoon. 75 mg at HS   - Scheduled tylenol   - Pain: fentanyl infusion for pain   - Sedation: precedex infusion, wean as able      Pulmonary:   # acute hypoxic resp failure. extubated 11/22/18 re intubated 11/23/18  - Re intubated on 11/23. Issa vargas need to extbate to high flow of BIPAP (okay  with Dr Albarado if bipap needed--just vent ostomy bag over spit fistula)   -PST as able, mentation likely will be the limiting factor    -VAP bundle   -Supplemental oxygen to keep saturation above 92 %.  -Chest tube management per CVTS       Cardiovascular:    # Atrial fibrillation, unstable, intraoperative cardioversion x3   - hx on metoprolol during this admission, OSH records reviewed. Appears metoprolol not PTA medication, cont with amiodarone PO.  -Repeat EKG in AM given increase in Seroquel dosing   # distubtive shock-- off Vaso, wean Norepi as able.   # hx of hypertension- hold home diuretic while needing pressors   # hx of hyperlipidemia- resume home  statin       Gastroenterology/Nutrition:  # S/p hiatal hernia complicated by distal esophageal leak now s/p Midline laparotomy, trans-hiatal esophagogasterectomy, gastrostomy, J-tube placement, G-tube placement, bilateral pleural chest tube placement, mediastinal abcess drainage, spit fistula creation, esophagastrodueodenoscopy   # s/p G tube   # s/ J tube  # protein-calorie deficit malnutrition   - G tube to gravity.   - J-tube- feeds -increase feeds to goal today   - Stop TPN   - s/p enemas x3 days now with liquid BM's. Hold bowel regimen given frequency of stools     Fluids/Electrolytes:   # hypernatremia  # hypophosphatemia   - Na 147- -increase free water via J tube to 40 ml q 2hours   -TKO IVF.   -electrolyte replacement protocol.   -Keep K > 4.0. & Mag > 2.0 given recurrent atrial fibrillation       Renal:  # HANG, resolving   - Will continue to monitor intake and output continue with lopez for now       Endocrine:  # hypothyroidism   -Home synthroid 150mcg resumed   -no indication for sliding scale      ID/Antibiotics:  #Leukocytosis 18.1 today   # mediastinal pleural effusion   Zosyn and  and Micafungin completed 4 day on 11/25  Vancomycin d/phani 11/22/18  -Pan cultured 11/25- NGTD   -Repeat Chest/ABD/Pelvis with contrast today to assess mediastinal  abscess.        Heme:     # Acute blood loss anemia   - monitor hgb. Transfuse if hgb <7.0 or signs/symptoms of hypoperfusion. Monitor and trend.       Musculoskeletal:  # weakness and deconditioning of deconditioning   - Physical and occupational therapy consult       General Cares/Prophylaxis:    DVT Prophylaxis: Lovenox 40mg subcutaneous    GI Prophylaxis: Protonix   Restraints: Restraints for medical healing needed: Yes--reaching for ETT tube and other lines       Lines/ tubes/ drains:  - PIV's  - central line   - ANGELO drain x1  - chest tube x2  - lopez   - arterial line       Disposition:  - Surgical ICU.       Time spent on this Encounter   Billing:  I spent  45 minutes bedside and on the inpatient unit today managing the care of Myrtle Vaz in relation to the issues listed in this note.          Ayo Magana, NP-C     ====================================  INTERVAL HISTORY:  Course reviewed.  Pt reported again to be agitated, restless overnight, and pulling at lines when Precedex off. Rn reported blood tinges, sputum like drainage from incision when turing. On assessment there was no drainage present.       OBJECTIVE:   1. VITAL SIGNS:   Temp:  [98.6  F (37  C)-99.8  F (37.7  C)] 98.8  F (37.1  C)  Heart Rate:  [54-67] 60  Resp:  [14-19] 17  BP: ()/(41-97) 97/49  MAP:  [46 mmHg-182 mmHg] 61 mmHg  Arterial Line BP: ()/(6-178) 81/50  FiO2 (%):  [35 %-45 %] 45 %  SpO2:  [89 %-100 %] 95 %  Ventilation Mode: CMV/AC  (Continuous Mandatory Ventilation/ Assist Control)  FiO2 (%): 45 %  Rate Set (breaths/minute): 15 breaths/min  Tidal Volume Set (mL): 420 mL  PEEP (cm H2O): 5 cmH2O  Pressure Support (cm H2O): 7 cmH2O  Oxygen Concentration (%): 45 %  Resp: 17    2. INTAKE/ OUTPUT:   I/O last 3 completed shifts:  In: 4401.04 [I.V.:2071.04; NG/GT:800]  Out: 1847 [Urine:1065; Emesis/NG output:30; Drains:127; Stool:150; Chest Tube:475]    3. PHYSICAL EXAMINATION:  General: laying in bed, arouses to name when  called. Follows commands. Calm  HEENT: pupils equal. ETT present and secured.  Left neck incision dressed, staples intact  Neuro: Arouses to name when called. HARRIS. (+) delirium   Pulm/Resp: Clear breath sounds bilaterally without rhonchi, crackles or wheezes, Lungs clear.  Chest tube x2, no air leak--seroussang  fluid noted.   Chest: spit fistula with red tinged secretions.   CV: RRR, S1, S2, no murmurs, rubs or gallops.   Abdomen: abdominal incision without redness, warmth or drainage. Mediastinal drain with thin pinkish serous drainage. G tube to gravity drainage with dark bilious output. J tube with feeds infusing   : (+) lopez catheter in place, urine yellow and clear  MSK/Extremities: HARRIS.  peripheral edema, moving all extremities, peripheral pulses intact, calves soft and non-tender, extremities well perfused.     4. INVESTIGATIONS:   Arterial Blood Gases     Recent Labs  Lab 11/26/18 0345 11/25/18 0411 11/24/18  0344 11/23/18  1533   PH 7.44 7.45 7.38 7.31*   PCO2 38 38 44 45   PO2 78* 85 77* 73*   HCO3 26 26 26 23     Complete Blood Count     Recent Labs  Lab 11/26/18 0346 11/25/18  0411 11/24/18  0340 11/23/18  0341   WBC 18.1* 15.7* 18.9* 20.1*   HGB 7.8* 7.6* 7.8* 8.1*    391 365 385     Basic Metabolic Panel    Recent Labs  Lab 11/26/18  0346 11/25/18  0411 11/24/18  0340 11/23/18  1245   * 147* 146* 146*   POTASSIUM 3.6 3.6 3.8 4.1   CHLORIDE 114* 116* 114* 113*   CO2 26 24 24 26   BUN 30 26 24 28   CR 0.86 1.02 0.96 1.11*   * 139* 90 106*     Liver Function Tests    Recent Labs  Lab 11/26/18  0346 11/25/18  0411 11/24/18  0340 11/23/18  0341  11/21/18  1831 11/21/18  0141   AST  --  26  --   --   --  238* 21   ALT  --  29  --   --   --  106* 35   ALKPHOS  --  78  --   --   --  78 100   BILITOTAL  --  0.3  --   --   --  1.3 1.0   ALBUMIN  --  1.3*  --   --   --  2.2* 2.2*   INR 1.22* 1.27* 1.18* 1.31*  < >  --  1.51*   < > = values in this interval not displayed.  Pancreatic  Enzymes  No lab results found in last 7 days.  Coagulation Profile    Recent Labs  Lab 11/26/18  0346 11/25/18  0411 11/24/18  0340 11/23/18  0341   INR 1.22* 1.27* 1.18* 1.31*         5. RADIOLOGY:   Recent Results (from the past 24 hour(s))   XR Chest Port 1 View    Narrative    Exam: Chest x-ray, 1 view, 11/25/2018.    COMPARISON: 11/24/2018.    HISTORY: Chest tubes and endotracheal tube.    FINDINGS: AP view of the chest was obtained. Endotracheal tube with  its tip projecting 4.9 cm above the kelin. Bilateral chest tubes and  mediastinal drain in place. Right IJ central line with its tip  projecting over the low SVC. Skin staples projecting over the left  upper chest. Stable hyperdense opacity projecting over the right upper  abdominal quadrant. Stable cardiomediastinal silhouette. Prominent  aortic knob with calcifications. Bilateral pleural effusions, left  greater than right. Bilateral lower lobes and left retrocardiac  opacities. Pulmonary vascular congestion. No appreciable pneumothorax.      Impression    IMPRESSION:  1. Small bilateral pleural effusions, left greater than right with  overlying atelectasis versus consolidation.  2. Pulmonary vascular congestion, slightly increased from the prior  study.  3. No appreciable pneumothorax. Support devices as described above.    MOON RENTERIA MD   XR Chest Port 1 View    Narrative    Exam: XR CHEST PORT 1 VW, 11/26/2018 1:26 AM    Indication: ETT positioning and Chest tubes;     Comparison: Plain film on 11/24/2016.     Findings:   AP single view of the chest.Tip of the endotracheal tube projects over  the midthoracic trachea. Right-sided IJ tip projects over the mid SVC.  Bilateral chest tubes in place without significant pneumothorax. Trace  right and small to moderate left-sided pleural effusion.  Cardiomediastinal silhouette is unchanged. Pulmonary vasculature is  indistinct. Bibasilar opacity, unchanged. Stable hyperdensity  projecting over the  right upper abdominal quadrant.      Impression    Impression:   1. Bibasilar opacity, atelectasis versus consolidation.  2. Pulmonary edema with trace right and mild left pleural effusion.  3. Bilateral chest tubes in place without significant pneumothorax.    I have personally reviewed the examination and initial interpretation  and I agree with the findings.    MARY HORN MD       =========================================

## 2018-11-27 ENCOUNTER — APPOINTMENT (OUTPATIENT)
Dept: GENERAL RADIOLOGY | Facility: CLINIC | Age: 73
DRG: 326 | End: 2018-11-27
Attending: PHYSICIAN ASSISTANT
Payer: MEDICARE

## 2018-11-27 ENCOUNTER — APPOINTMENT (OUTPATIENT)
Dept: PHYSICAL THERAPY | Facility: CLINIC | Age: 73
DRG: 326 | End: 2018-11-27
Attending: THORACIC SURGERY (CARDIOTHORACIC VASCULAR SURGERY)
Payer: MEDICARE

## 2018-11-27 LAB
ABO + RH BLD: NORMAL
ABO + RH BLD: NORMAL
ANION GAP SERPL CALCULATED.3IONS-SCNC: 8 MMOL/L (ref 3–14)
BACTERIA SPEC CULT: ABNORMAL
BLD GP AB SCN SERPL QL: NORMAL
BLOOD BANK CMNT PATIENT-IMP: NORMAL
BUN SERPL-MCNC: 32 MG/DL (ref 7–30)
C DIFF TOX B STL QL: NEGATIVE
CA-I BLD-MCNC: 4.2 MG/DL (ref 4.4–5.2)
CALCIUM SERPL-MCNC: 7.1 MG/DL (ref 8.5–10.1)
CHLORIDE SERPL-SCNC: 114 MMOL/L (ref 94–109)
CO2 SERPL-SCNC: 23 MMOL/L (ref 20–32)
CREAT SERPL-MCNC: 0.77 MG/DL (ref 0.52–1.04)
ERYTHROCYTE [DISTWIDTH] IN BLOOD BY AUTOMATED COUNT: 17.4 % (ref 10–15)
GFR SERPL CREATININE-BSD FRML MDRD: 73 ML/MIN/1.7M2
GLUCOSE BLDC GLUCOMTR-MCNC: 129 MG/DL (ref 70–99)
GLUCOSE SERPL-MCNC: 130 MG/DL (ref 70–99)
HCT VFR BLD AUTO: 25.1 % (ref 35–47)
HGB BLD-MCNC: 8.1 G/DL (ref 11.7–15.7)
INR PPP: 1.18 (ref 0.86–1.14)
MAGNESIUM SERPL-MCNC: 2.1 MG/DL (ref 1.6–2.3)
MCH RBC QN AUTO: 31.8 PG (ref 26.5–33)
MCHC RBC AUTO-ENTMCNC: 32.3 G/DL (ref 31.5–36.5)
MCV RBC AUTO: 98 FL (ref 78–100)
PHOSPHATE SERPL-MCNC: 2.8 MG/DL (ref 2.5–4.5)
PLATELET # BLD AUTO: 503 10E9/L (ref 150–450)
POTASSIUM SERPL-SCNC: 4 MMOL/L (ref 3.4–5.3)
RBC # BLD AUTO: 2.55 10E12/L (ref 3.8–5.2)
SODIUM SERPL-SCNC: 145 MMOL/L (ref 133–144)
SPECIMEN EXP DATE BLD: NORMAL
SPECIMEN SOURCE: ABNORMAL
SPECIMEN SOURCE: NORMAL
WBC # BLD AUTO: 19.7 10E9/L (ref 4–11)

## 2018-11-27 PROCEDURE — 87493 C DIFF AMPLIFIED PROBE: CPT | Performed by: NURSE PRACTITIONER

## 2018-11-27 PROCEDURE — 94003 VENT MGMT INPAT SUBQ DAY: CPT

## 2018-11-27 PROCEDURE — A9270 NON-COVERED ITEM OR SERVICE: HCPCS | Performed by: NURSE PRACTITIONER

## 2018-11-27 PROCEDURE — 00000146 ZZHCL STATISTIC GLUCOSE BY METER IP

## 2018-11-27 PROCEDURE — 86901 BLOOD TYPING SEROLOGIC RH(D): CPT | Performed by: STUDENT IN AN ORGANIZED HEALTH CARE EDUCATION/TRAINING PROGRAM

## 2018-11-27 PROCEDURE — 71045 X-RAY EXAM CHEST 1 VIEW: CPT

## 2018-11-27 PROCEDURE — 25000125 ZZHC RX 250

## 2018-11-27 PROCEDURE — 93010 ELECTROCARDIOGRAM REPORT: CPT | Performed by: INTERNAL MEDICINE

## 2018-11-27 PROCEDURE — 86900 BLOOD TYPING SEROLOGIC ABO: CPT | Performed by: STUDENT IN AN ORGANIZED HEALTH CARE EDUCATION/TRAINING PROGRAM

## 2018-11-27 PROCEDURE — 99291 CRITICAL CARE FIRST HOUR: CPT | Performed by: NURSE PRACTITIONER

## 2018-11-27 PROCEDURE — 25000128 H RX IP 250 OP 636: Performed by: STUDENT IN AN ORGANIZED HEALTH CARE EDUCATION/TRAINING PROGRAM

## 2018-11-27 PROCEDURE — 86850 RBC ANTIBODY SCREEN: CPT | Performed by: STUDENT IN AN ORGANIZED HEALTH CARE EDUCATION/TRAINING PROGRAM

## 2018-11-27 PROCEDURE — A9270 NON-COVERED ITEM OR SERVICE: HCPCS

## 2018-11-27 PROCEDURE — 25000128 H RX IP 250 OP 636: Performed by: PHYSICIAN ASSISTANT

## 2018-11-27 PROCEDURE — 82330 ASSAY OF CALCIUM: CPT | Performed by: STUDENT IN AN ORGANIZED HEALTH CARE EDUCATION/TRAINING PROGRAM

## 2018-11-27 PROCEDURE — 31622 DX BRONCHOSCOPE/WASH: CPT

## 2018-11-27 PROCEDURE — A9270 NON-COVERED ITEM OR SERVICE: HCPCS | Performed by: PHYSICIAN ASSISTANT

## 2018-11-27 PROCEDURE — 97530 THERAPEUTIC ACTIVITIES: CPT | Mod: GP

## 2018-11-27 PROCEDURE — 20000004 ZZH R&B ICU UMMC

## 2018-11-27 PROCEDURE — 25000128 H RX IP 250 OP 636: Performed by: NURSE PRACTITIONER

## 2018-11-27 PROCEDURE — 25000132 ZZH RX MED GY IP 250 OP 250 PS 637

## 2018-11-27 PROCEDURE — 93005 ELECTROCARDIOGRAM TRACING: CPT

## 2018-11-27 PROCEDURE — 27210437 ZZH NUTRITION PRODUCT SEMIELEM INTERMED LITER

## 2018-11-27 PROCEDURE — 25000132 ZZH RX MED GY IP 250 OP 250 PS 637: Performed by: NURSE PRACTITIONER

## 2018-11-27 PROCEDURE — 40000193 ZZH STATISTIC PT WARD VISIT

## 2018-11-27 PROCEDURE — 83735 ASSAY OF MAGNESIUM: CPT | Performed by: PHYSICIAN ASSISTANT

## 2018-11-27 PROCEDURE — 40000014 ZZH STATISTIC ARTERIAL MONITORING DAILY

## 2018-11-27 PROCEDURE — 85027 COMPLETE CBC AUTOMATED: CPT | Performed by: PHYSICIAN ASSISTANT

## 2018-11-27 PROCEDURE — 40000275 ZZH STATISTIC RCP TIME EA 10 MIN

## 2018-11-27 PROCEDURE — 84100 ASSAY OF PHOSPHORUS: CPT | Performed by: PHYSICIAN ASSISTANT

## 2018-11-27 PROCEDURE — 94640 AIRWAY INHALATION TREATMENT: CPT

## 2018-11-27 PROCEDURE — 85610 PROTHROMBIN TIME: CPT | Performed by: PHYSICIAN ASSISTANT

## 2018-11-27 PROCEDURE — 80048 BASIC METABOLIC PNL TOTAL CA: CPT | Performed by: PHYSICIAN ASSISTANT

## 2018-11-27 PROCEDURE — 25000132 ZZH RX MED GY IP 250 OP 250 PS 637: Performed by: PHYSICIAN ASSISTANT

## 2018-11-27 RX ORDER — HYDROMORPHONE HYDROCHLORIDE 1 MG/ML
.3-.5 INJECTION, SOLUTION INTRAMUSCULAR; INTRAVENOUS; SUBCUTANEOUS
Status: DISCONTINUED | OUTPATIENT
Start: 2018-11-27 | End: 2018-11-27

## 2018-11-27 RX ORDER — FENTANYL CITRATE 50 UG/ML
25-50 INJECTION, SOLUTION INTRAMUSCULAR; INTRAVENOUS
Status: DISCONTINUED | OUTPATIENT
Start: 2018-11-27 | End: 2018-11-28

## 2018-11-27 RX ORDER — ALBUTEROL SULFATE 0.83 MG/ML
SOLUTION RESPIRATORY (INHALATION)
Status: COMPLETED
Start: 2018-11-27 | End: 2018-11-27

## 2018-11-27 RX ORDER — OXYCODONE HCL 5 MG/5 ML
5 SOLUTION, ORAL ORAL EVERY 4 HOURS PRN
Status: DISCONTINUED | OUTPATIENT
Start: 2018-11-27 | End: 2018-11-28

## 2018-11-27 RX ORDER — OXYCODONE HYDROCHLORIDE 5 MG/1
5-10 TABLET ORAL
Status: DISCONTINUED | OUTPATIENT
Start: 2018-11-27 | End: 2018-11-27

## 2018-11-27 RX ORDER — LIDOCAINE HYDROCHLORIDE 10 MG/ML
INJECTION, SOLUTION INFILTRATION; PERINEURAL
Status: COMPLETED
Start: 2018-11-27 | End: 2018-11-27

## 2018-11-27 RX ADMIN — ACETAMINOPHEN 650 MG: 325 SOLUTION ORAL at 02:07

## 2018-11-27 RX ADMIN — Medication 15 ML: at 09:47

## 2018-11-27 RX ADMIN — QUETIAPINE FUMARATE 50 MG: 50 TABLET ORAL at 09:49

## 2018-11-27 RX ADMIN — BACITRACIN: 500 OINTMENT TOPICAL at 09:49

## 2018-11-27 RX ADMIN — ENOXAPARIN SODIUM 40 MG: 40 INJECTION SUBCUTANEOUS at 14:01

## 2018-11-27 RX ADMIN — Medication 200 MG: at 08:00

## 2018-11-27 RX ADMIN — ACETAMINOPHEN 650 MG: 325 SOLUTION ORAL at 09:41

## 2018-11-27 RX ADMIN — QUETIAPINE FUMARATE 75 MG: 50 TABLET ORAL at 22:20

## 2018-11-27 RX ADMIN — Medication 75 MCG/HR: at 00:41

## 2018-11-27 RX ADMIN — OXYCODONE HYDROCHLORIDE 5 MG: 5 SOLUTION ORAL at 20:15

## 2018-11-27 RX ADMIN — FENTANYL CITRATE 25 MCG: 50 INJECTION INTRAMUSCULAR; INTRAVENOUS at 14:35

## 2018-11-27 RX ADMIN — OXYCODONE HYDROCHLORIDE 5 MG: 5 TABLET ORAL at 16:04

## 2018-11-27 RX ADMIN — SODIUM CHLORIDE, POTASSIUM CHLORIDE, SODIUM LACTATE AND CALCIUM CHLORIDE: 600; 310; 30; 20 INJECTION, SOLUTION INTRAVENOUS at 22:20

## 2018-11-27 RX ADMIN — LIDOCAINE HYDROCHLORIDE 5 ML: 10 INJECTION, SOLUTION INFILTRATION; PERINEURAL at 14:02

## 2018-11-27 RX ADMIN — Medication 200 MG: at 20:15

## 2018-11-27 RX ADMIN — ACETAMINOPHEN 650 MG: 325 SOLUTION ORAL at 18:22

## 2018-11-27 RX ADMIN — BACITRACIN: 500 OINTMENT TOPICAL at 20:17

## 2018-11-27 RX ADMIN — ALBUTEROL SULFATE 2.5 MG: 2.5 SOLUTION RESPIRATORY (INHALATION) at 11:36

## 2018-11-27 RX ADMIN — POTASSIUM CHLORIDE 20 MEQ: 29.8 INJECTION, SOLUTION INTRAVENOUS at 06:05

## 2018-11-27 RX ADMIN — Medication 0.3 MG: at 13:29

## 2018-11-27 RX ADMIN — OXYCODONE HYDROCHLORIDE 5 MG: 5 TABLET ORAL at 14:00

## 2018-11-27 RX ADMIN — OXYCODONE HYDROCHLORIDE 5 MG: 5 TABLET ORAL at 11:43

## 2018-11-27 RX ADMIN — QUETIAPINE FUMARATE 50 MG: 50 TABLET ORAL at 15:37

## 2018-11-27 RX ADMIN — DEXMEDETOMIDINE HYDROCHLORIDE 1 MCG/KG/HR: 400 INJECTION INTRAVENOUS at 03:24

## 2018-11-27 RX ADMIN — Medication 0.2 MG: at 13:41

## 2018-11-27 RX ADMIN — LEVOTHYROXINE SODIUM 150 MCG: 300 TABLET ORAL at 09:47

## 2018-11-27 RX ADMIN — FENTANYL CITRATE 25 MCG: 50 INJECTION INTRAMUSCULAR; INTRAVENOUS at 15:20

## 2018-11-27 ASSESSMENT — PAIN DESCRIPTION - DESCRIPTORS
DESCRIPTORS: SORE
DESCRIPTORS: ACHING

## 2018-11-27 ASSESSMENT — ACTIVITIES OF DAILY LIVING (ADL)
ADLS_ACUITY_SCORE: 15

## 2018-11-27 NOTE — PROGRESS NOTES
"Bronchoscopy Risk Assessment Guidelines      A. Patient symptoms to consider when assessing pulmonary TB risk are:    I. Cough greater than 3 weeks; and fever, hemoptysis, pleuritic chest    pain, weight loss greater than 10 lbs, night sweats, fatigue, infiltrates on    upper lobes or superior segments of lower lobes, cavitation on chest    x-ray.   B. Patient risk factors to consider when assessing pulmonary TB risk are:    I. Exposure to known TB case, foreign-born persons (within 5 years of    arrival to [place]), residence in a crowded setting (correctional facility,     long-term care center, etc.), persons with HIV or immunosuppression.    Patients with symptoms and risk factors should generally be considered \"suspect risk\" and bronchoscopies should be performed in airborne precautions.    This patient has NO KNOWN RISK of Tuberculosis (proceed with bronchoscopy)    Specimens sent: no  Complications: None  Scope used: Disposable, Regular  Attending Physician: Dr. Temitope Sebastian, RT on 11/26/2018 at 6:29 PM  "

## 2018-11-27 NOTE — PLAN OF CARE
Problem: Patient Care Overview  Goal: Plan of Care/Patient Progress Review  Discharge Planner PT 4A  Patient plan for discharge: Not discussed this date  Current status: FiO2 35% via HFNC, O2 sats 98%. Pt mod A x 2 for forward trunk lean in recliner, min A for unsupported sitting balance x 2 minutes. Max A x 2 with B knee block for sit>stand from recliner, clearing ~3-4 inches. Limited by pain and fatigue, ceiling lift chair>bed, max A x 2 plus verbal and tactile cueing for participation for rolling side to side in bed.  Barriers to return to prior living situation: Medical status, dec mobility  Recommendations for discharge: TCU  Rationale for recommendations: Below baseline functional mobility, will benefit from skilled OT and PT to regain IND with mobility and ADLs.       Entered by: Tal Cid 11/27/2018 4:08 PM

## 2018-11-27 NOTE — PROCEDURES
THORACIC SURGERY BEDSIDE PROCEDURE   NAME:  Myrtle Vaz   MRN:  [id number]   :  1945     Diagnosis:  Respiratory failure   Procedure: Flexible bronchoscopy     Specimen: none    Premedication: Pt intubated and sedated prior to procedure  Procedure Meds: 1% topical lidocaine solution at the kelin    Procedure: The patient was positioned supine. The bronchoscope was passed into the distal trachea via ET tube without difficulty.  Bilateral tracheobronchial trees were inspected closely to the level of the subsegmental bronchi. Secretions were found to be  more prominent in LLL. These were evacuated without difficulty. The procedure was completed and the patient tolerated the procedure well without complications.      Dr. Per Funes was present throughout the entire procedure.      Nicky Park MD PGY3  General Surgery

## 2018-11-27 NOTE — PROGRESS NOTES
"Bronchoscopy Risk Assessment Guidelines      A. Patient symptoms to consider when assessing pulmonary TB risk are:    I. Cough greater than 3 weeks; and fever, hemoptysis, pleuritic chest    pain, weight loss greater than 10 lbs, night sweats, fatigue, infiltrates on    upper lobes or superior segments of lower lobes, cavitation on chest    x-ray.   B. Patient risk factors to consider when assessing pulmonary TB risk are:    I. Exposure to known TB case, foreign-born persons (within 5 years of    arrival to [place]), residence in a crowded setting (correctional facility,     long-term care center, etc.), persons with HIV or immunosuppression.    Patients with symptoms and risk factors should generally be considered \"suspect risk\" and bronchoscopies should be performed in airborne precautions.    This patient has no known risk of Tuberculosis (proceed with bronchoscopy).    Specimens sent: no  Complications: None  Scope used: Disposable-Regular  Attending Physician: Dr. Temitope Sebastian, RT on 11/27/2018 at 10:33 AM  "

## 2018-11-27 NOTE — PLAN OF CARE
Problem: Patient Care Overview  Goal: Plan of Care/Patient Progress Review  Outcome: No Change    Neuro: Pt alert/lethargic; remains sedated on Precedex and Fentanyl. Pt follows commands and moves all extremities. Pt had periods of anxiety where she was hitting her mitts on the bed rails; Precedex gtt increased which helped relieve anxiety. Remains positive for ICU delirium. Afebrile.     Cardiac: SR. HR 60s-70s. MAPs>65. Remains on Levophed gtt (titrating off cuff pressures).    Respiratory: On CMV settings (RR 15,, PEEP 5) overnight at 40%. Lung sounds clear or coarse. Small to moderate amount of thin, white secretions present in ETT.    GI/: Loose stools continue; rectal tube remains in place with leaking at site. Urine output 50-75ml/hr. J tube infusing with tube feedings at 50l/hr. G tube to gravity with a small amount of green/bile output present.    Incisons/Drains: Left neck incision staples CDI/TIFFANIE. Abdominal incision staples CDI/TIFFANIE. Right abdominal ANGELO with 5-7ml/hr of serosanguinous output present; dressing changed ~Q4hrs d/t leaking at site. Left spit fistula appliance CDI with scant amount of serosanguinous output present. Bilateral chest tubes to water seal with 10-20ml/hr output present.     Lines: R IJx3 with Precedex, Fentanyl, LR, and Levophed infusing. PIVx1. Left arterial line waveform dampened.     No family present at bedside overnight.  Continue to monitor and update MD as needed. Continue plan of care.

## 2018-11-27 NOTE — PROGRESS NOTES
SURGICAL ICU PROGRESS NOTE  November 25, 2018        Date of Service (when I saw the patient): 11/27/2018    ASSESSMENT: Myrtle Vaz is a 73 year old female who was admitted on 11/21/2018 to [facility]. She was transferred from [facility] where she presented with respiratory failure thought to be secondary to large hiatal hernia and underwent s/p laparoscopic hiatal hernia repair with mesh reinforcement, laparoscopic nissen fundoplication, and laparoscopic gastrostomy tube placement, which was complicated by pleural effusions and possible esophageal leak,  s/p placement of chest tubes x3. Taken to OR with thoracic and found to have perforation at distal esophagus at  juncton and underwent s/p Midline laparotomy, Trans-hiatal esophagogasterectomy, gastrostomy, J-tube placement, G-tube placement, bilateral pleural chest tube placement, mediastinal abcess drainage, spit fistula creation, Esophagastrodueodenoscopy, intraoperative course complicated by SVT required intraoperative cardioversion x3. Reintubated on 11/23/18 for AMS, A-fib with RVR s/p cardioversiondue to hypoxia        CHANGES and MAJOR THINGS TODAY:   - Extubate  - Wean levophed as tolerated   - Stop TPN, TF to goal   - Holding bowel regimen       PLAN:  # acute post operative pain   # delirium   - Monitor neurological status. Notify the MD for any acute changes in exam.  - pt reported to have delirium prior to transfer from OSH. Continue efforts with delirium prevention and re-orientation   - Seroquel 50 mg AM & afternoon. 75 mg at HS   - Pain: PRN oxycodone, PRN hydromorphone, tylenol   - Sedation: stop precedex once extubated       Pulmonary:   # acute hypoxic resp failure. extubated 11/22/18 re intubated 11/23/18  -Reintubated on 11/23.  -Extubated 11/37.   -Continue high flow for added PEEP   -Supplemental oxygen to keep saturation above 92 %.  -Chest tube management per CVTS   -Bronch yesterday for LLL mucus plug.       Cardiovascular:    #  Atrial fibrillation, unstable, intraoperative cardioversion x3   - hx on metoprolol during this admission, OSH records reviewed. Appears metoprolol not PTA medication, cont with amiodarone PO.  -Repeat EKG stable. QTc 410  # distubtive shock-- off Vaso, wean Norepi as able. Currently off   # hx of hypertension- hold home diuretic while needing pressors   # hx of hyperlipidemia- resume home  statin       Gastroenterology/Nutrition:  # S/p hiatal hernia complicated by distal esophageal leak now s/p Midline laparotomy, trans-hiatal esophagogasterectomy, gastrostomy, J-tube placement, G-tube placement, bilateral pleural chest tube placement, mediastinal abcess drainage, spit fistula creation, esophagastrodueodenoscopy   # s/p G tube   # s/ J tube  # protein-calorie deficit malnutrition   - G tube to gravity.   - J-tube- feeds  - Stop TPN   - s/p enemas x3 days now with liquid BM's. Hold bowel regimen given frequency of stools       Fluids/Electrolytes:   # hypernatremia- improved   # hypophosphatemia- resolved   -Na 145. Continue free water via J tube to 40 ml q 2hours   -TKO IVF.   -electrolyte replacement protocol.   -Keep K > 4.0. & Mag > 2.0 given recurrent atrial fibrillation       Renal:  # HANG, resolving   - Will continue to monitor intake and output continue with lopez for now       Endocrine:  #hypothyroidism   -Home synthroid 150mcg resumed   -no indication for sliding scale      ID/Antibiotics:  #Leukocytosis 19.1 today   # mediastinal pleural effusion   Zosyn and  and Micafungin completed 4 day on 11/25  Vancomycin d/phani 11/22/18  -Pan cultured 11/25- NGTD   -Repeat Chest/ABD/Pelvis showing fluid collection along esophagectomy site, does not appear infectious  -Thoracic ordered C.diff PCR. Will await results prior to starting ABX+  - If spikes fever again, would pan culture and start ABX       Heme:     # Acute blood loss anemia   - monitor hgb. Transfuse if hgb <7.0 or signs/symptoms of hypoperfusion.  Monitor and trend.       Musculoskeletal:  # weakness and deconditioning of deconditioning   - Physical and occupational therapy consult       General Cares/Prophylaxis:    DVT Prophylaxis: Lovenox 40mg subcutaneous    GI Prophylaxis: Protonix   Restraints: Restraints for medical healing needed: Yes--reaching for ETT tube and other lines       Lines/ tubes/ drains:  - PIV's  - central line   - ANGELO drain x1  - chest tube x2  -G tube  -J tube       Disposition:  - Surgical ICU.       Time spent on this Encounter   Billing:  I spent  45 minutes bedside and on the inpatient unit today managing the care of Myrtle Vaz in relation to the issues listed in this note.          Ayo Magana, NP-CAROLYN     ====================================  INTERVAL HISTORY:  Course reviewed.  Pt reported again to be agitated, restless overnight, and pulling at lines when Precedex off. Rn reported blood tinges, sputum like drainage from incision when turing. On assessment there was no drainage present.       OBJECTIVE:   1. VITAL SIGNS:   Temp:  [96.8  F (36  C)-99.6  F (37.6  C)] 97.9  F (36.6  C)  Heart Rate:  [57-95] 78  Resp:  [11-26] 13  BP: ()/() 134/67  MAP:  [52 mmHg-75 mmHg] 66 mmHg  Arterial Line BP: (64-94)/(45-65) 84/55  FiO2 (%):  [35 %-45 %] 45 %  SpO2:  [86 %-100 %] 94 %  Ventilation Mode: CPAP/PS  (Continuous positive airway pressure with Pressure Support)  FiO2 (%): 45 %  Rate Set (breaths/minute): 15 breaths/min  Tidal Volume Set (mL): 420 mL  PEEP (cm H2O): 5 cmH2O  Pressure Support (cm H2O): 7 cmH2O  Oxygen Concentration (%): 40 %  Resp: 13    2. INTAKE/ OUTPUT:   I/O last 3 completed shifts:  In: 3696.58 [I.V.:1661.58; NG/GT:825]  Out: 2558 [Urine:1600; Emesis/NG output:37; Drains:171; Stool:150; Chest Tube:600]    3. PHYSICAL EXAMINATION:  General: laying in bed, arouses to name when called. Follows commands. Calm  HEENT: pupils equal. ETT present and secured.  Left neck incision dressed, staples intact  Neuro:  Arouses to name when called. HARRIS. (+) delirium   Pulm/Resp: Clear breath sounds bilaterally without rhonchi, crackles or wheezes, Lungs clear.  Chest tube x2, no air leak--seroussang  fluid noted.   Chest: spit fistula with red tinged secretions.   CV: RRR, S1, S2, no murmurs, rubs or gallops.   Abdomen: abdominal incision without redness, warmth or drainage. Mediastinal drain with thin pinkish serous drainage. G tube to gravity drainage with dark bilious output. J tube with feeds infusing   : (+) lopez catheter in place, urine yellow and clear  MSK/Extremities: HARRIS.  peripheral edema, moving all extremities, peripheral pulses intact, calves soft and non-tender, extremities well perfused.     4. INVESTIGATIONS:   Arterial Blood Gases     Recent Labs  Lab 11/26/18 0345 11/25/18 0411 11/24/18  0344 11/23/18  1533   PH 7.44 7.45 7.38 7.31*   PCO2 38 38 44 45   PO2 78* 85 77* 73*   HCO3 26 26 26 23     Complete Blood Count     Recent Labs  Lab 11/27/18 0351 11/26/18 0346 11/25/18  0411 11/24/18  0340   WBC 19.7* 18.1* 15.7* 18.9*   HGB 8.1* 7.8* 7.6* 7.8*   * 433 391 365     Basic Metabolic Panel    Recent Labs  Lab 11/27/18 0351 11/26/18 0346 11/25/18  0411 11/24/18  0340   * 146* 147* 146*   POTASSIUM 4.0 3.6 3.6 3.8   CHLORIDE 114* 114* 116* 114*   CO2 23 26 24 24   BUN 32* 30 26 24   CR 0.77 0.86 1.02 0.96   * 137* 139* 90     Liver Function Tests    Recent Labs  Lab 11/27/18 0351 11/26/18 0346 11/25/18  0411 11/24/18  0340  11/21/18  1831 11/21/18  0141   AST  --   --  26  --   --  238* 21   ALT  --   --  29  --   --  106* 35   ALKPHOS  --   --  78  --   --  78 100   BILITOTAL  --   --  0.3  --   --  1.3 1.0   ALBUMIN  --   --  1.3*  --   --  2.2* 2.2*   INR 1.18* 1.22* 1.27* 1.18*  < >  --  1.51*   < > = values in this interval not displayed.  Pancreatic Enzymes  No lab results found in last 7 days.  Coagulation Profile    Recent Labs  Lab 11/27/18  0351 11/26/18  0346 11/25/18  0411  11/24/18  0340   INR 1.18* 1.22* 1.27* 1.18*         5. RADIOLOGY:   Recent Results (from the past 24 hour(s))   XR Chest Port 1 View    Narrative    Exam: XR CHEST PORT 1 VW, 11/27/2018 5:19 AM    Indication: ETT positioning and Chest tubes;     Comparison: CT CAP 11/26/2018    Findings:   Tip of the endotracheal tube projects over the midthoracic trachea.  Right-sided IJ central venous catheter makes a slight tortuous turn  with its tip projects over the mid SVC. Bilateral chest tube in place.  The sidehole of the left sided chest tube projects between the ribs  and chest wall soft tissue. Postsurgical changes of  esophagogastrectomy. Percutaneous gastrotomy.    There is bibasilar opacity with left greater than right. Mild to  moderate left pleural effusion. Mild bilateral apical pneumothorax,  left greater than right. Transposition of ascending colon lateral to  the hepatic flexure.      Impression    Impression:   1. Small bilateral apical pneumothorax. The right sided chest tube  appears in stable position. The sidehole of the left-sided chest tube  projects at the junction of ribs and chest wall muscle/soft tissue.  2. There is bibasilar opacity with left greater than right,  atelectasis versus infection  3. Mild to moderate size left-sided pleural effusion    I have personally reviewed the examination and initial interpretation  and I agree with the findings.    MARY HORN MD       =========================================

## 2018-11-27 NOTE — PROGRESS NOTES
Pt extubated without problems.  Pt placed on HFNC 40%, flow 30 lpm.  BS decreased on the R but clear t/o.  Pt h as fair NPC.

## 2018-11-27 NOTE — PROGRESS NOTES
Thoracic Surgery Progress Note - 11/25/18  Pt: Myrtle Vaz  MRN: [id number]     24 hr events;   Afebrile.  NAOE.  On pressure support this AM.  Weaning levo.     Temp:  [96.8  F (36  C)-99.6  F (37.6  C)] 97.9  F (36.6  C)  Heart Rate:  [57-95] 68  Resp:  [11-26] 18  BP: ()/() 112/61  MAP:  [52 mmHg-75 mmHg] 66 mmHg  Arterial Line BP: (64-94)/(45-65) 84/55  FiO2 (%):  [35 %-45 %] 35 %  SpO2:  [86 %-100 %] 93 %    I/O last 3 completed shifts:  In: 4026.37 [I.V.:1731.37; NG/GT:705]  Out: 2718 [Urine:1580; Emesis/NG output:32; Drains:176; Stool:350; Chest Tube:580]    Ventilation Mode: CPAP/PS  (Continuous positive airway pressure with Pressure Support)  FiO2 (%): 35 %  Rate Set (breaths/minute): 15 breaths/min  Tidal Volume Set (mL): 420 mL  PEEP (cm H2O): 5 cmH2O  Pressure Support (cm H2O): 7 cmH2O  Oxygen Concentration (%): 40 %  Resp: 18    Physical Exam:  Gen: NAD, sedated.  Pulm: On CPAP, nonlabored breathing   CV: Regular rate and rhythm.  Chest: CT output appears serosanguinous.                             - Incision:  C/D/I.  Abd: Abdomen soft, non-tender, non-distended    Labs reviewed in full; available in Epic.    A/P: 73F s/p transhiatal esophagectomy with spit fistula, G tube, J tube on 11/21 for perforated esophagus after type 4 peraesophageal hernia repair.  WBC uptrending from unknown etiology.      -Send Cdiff this PM  -Continue TFs  -Antibiotics per discussion with thoracic and ICU attendings  -Extubation and remainder of cares per SICU     Guero Sebastian PA-C  P: [phone]

## 2018-11-27 NOTE — PROGRESS NOTES
S: I was asked to visit with the patient regarding her pain control.  She is a 74 y/o F who presented on 11-21-18 for laparotomy, trans hiatal gastrostomy, bilateral chest tube placement, mediastinal abscess washout, and spit fistula creation from perforated esophagus.  She reports that the majority of her pain is located in her back, followed by abdominal, chest, and neck pain along the area of the operation.  She reports that the pain is worse with coughing.  Intraoperatively she had issues with SVT requiring cardioversion.  Post operatively she has had issues with afib with RVR, hypotension, and acute respiratory failure requiring re-intubated.  Extubated today.  Norepi was weaned today.    O: Gen: Resting in bed with high flow nasal cannula in nose.  Awake, oriented.  Resp: Normal RR, but shallow breaths. Coarse lung sounds.  CV: RRR.    Abdomen: Stapled incision line from above umbilicus to mid sternum.  Gastrostomy present over left abdomen/chest.  MSK: Moving all extremities. No edema present.    A/P: 74 y/o F who is POD #6 for laparotomy, trans hiatal gastrostomy, bilateral chest tube placement, mediastinal abscess washout, and spit fistula creation from perforated esophagus who is now in subacute pain.   - Will discuss with RAPS team tomorrow regarding catheter placement, given the affected area it is unlikely we will be able to completely cover with one catheter.  Catheter placement will have to be placed at least 12 hours after last lovenox dose, 2 am or later.   - Would recommend chronic pain consult in am as this is more likely subacute pain rather than acute pain.  - Recommend 975 mg q6h acetaminophen, total of 4 grams daily   - Recommend oxycodone 5-10 mg q4h   - Recommend avoiding usage of fentanyl and transitioning to hydromorphone PRN now that patient is extubated  - Can restart dexmedetomidine up to 0.5 mcg/kg for additional pain control    Patient was discussed with staff anesthesiologist.       Everett Martinez  CA-2/PGY-3

## 2018-11-28 ENCOUNTER — APPOINTMENT (OUTPATIENT)
Dept: SPEECH THERAPY | Facility: CLINIC | Age: 73
DRG: 326 | End: 2018-11-28
Attending: NURSE PRACTITIONER
Payer: MEDICARE

## 2018-11-28 ENCOUNTER — APPOINTMENT (OUTPATIENT)
Dept: OCCUPATIONAL THERAPY | Facility: CLINIC | Age: 73
DRG: 326 | End: 2018-11-28
Attending: THORACIC SURGERY (CARDIOTHORACIC VASCULAR SURGERY)
Payer: MEDICARE

## 2018-11-28 ENCOUNTER — APPOINTMENT (OUTPATIENT)
Dept: PHYSICAL THERAPY | Facility: CLINIC | Age: 73
DRG: 326 | End: 2018-11-28
Attending: THORACIC SURGERY (CARDIOTHORACIC VASCULAR SURGERY)
Payer: MEDICARE

## 2018-11-28 LAB
ANION GAP SERPL CALCULATED.3IONS-SCNC: 5 MMOL/L (ref 3–14)
BASE EXCESS BLDV CALC-SCNC: 2.5 MMOL/L
BUN SERPL-MCNC: 31 MG/DL (ref 7–30)
CALCIUM SERPL-MCNC: 7 MG/DL (ref 8.5–10.1)
CHLORIDE SERPL-SCNC: 112 MMOL/L (ref 94–109)
CO2 SERPL-SCNC: 27 MMOL/L (ref 20–32)
CREAT SERPL-MCNC: 0.75 MG/DL (ref 0.52–1.04)
ERYTHROCYTE [DISTWIDTH] IN BLOOD BY AUTOMATED COUNT: 17.8 % (ref 10–15)
GFR SERPL CREATININE-BSD FRML MDRD: 75 ML/MIN/1.7M2
GLUCOSE SERPL-MCNC: 116 MG/DL (ref 70–99)
HCO3 BLDV-SCNC: 28 MMOL/L (ref 21–28)
HCT VFR BLD AUTO: 23.4 % (ref 35–47)
HGB BLD-MCNC: 7.4 G/DL (ref 11.7–15.7)
INR PPP: 1.17 (ref 0.86–1.14)
INTERPRETATION ECG - MUSE: NORMAL
MAGNESIUM SERPL-MCNC: 2 MG/DL (ref 1.6–2.3)
MCH RBC QN AUTO: 31.8 PG (ref 26.5–33)
MCHC RBC AUTO-ENTMCNC: 31.6 G/DL (ref 31.5–36.5)
MCV RBC AUTO: 100 FL (ref 78–100)
O2/TOTAL GAS SETTING VFR VENT: 50 %
PCO2 BLDV: 47 MM HG (ref 40–50)
PH BLDV: 7.38 PH (ref 7.32–7.43)
PHOSPHATE SERPL-MCNC: 2.6 MG/DL (ref 2.5–4.5)
PLATELET # BLD AUTO: 589 10E9/L (ref 150–450)
PO2 BLDV: 29 MM HG (ref 25–47)
POTASSIUM SERPL-SCNC: 3.9 MMOL/L (ref 3.4–5.3)
RBC # BLD AUTO: 2.33 10E12/L (ref 3.8–5.2)
SODIUM SERPL-SCNC: 144 MMOL/L (ref 133–144)
WBC # BLD AUTO: 18.2 10E9/L (ref 4–11)

## 2018-11-28 PROCEDURE — 94799 UNLISTED PULMONARY SVC/PX: CPT

## 2018-11-28 PROCEDURE — 25000128 H RX IP 250 OP 636: Performed by: NURSE PRACTITIONER

## 2018-11-28 PROCEDURE — 25000128 H RX IP 250 OP 636: Performed by: STUDENT IN AN ORGANIZED HEALTH CARE EDUCATION/TRAINING PROGRAM

## 2018-11-28 PROCEDURE — 25000132 ZZH RX MED GY IP 250 OP 250 PS 637: Performed by: PHYSICIAN ASSISTANT

## 2018-11-28 PROCEDURE — 85610 PROTHROMBIN TIME: CPT | Performed by: PHYSICIAN ASSISTANT

## 2018-11-28 PROCEDURE — A9270 NON-COVERED ITEM OR SERVICE: HCPCS | Performed by: NURSE PRACTITIONER

## 2018-11-28 PROCEDURE — 25000132 ZZH RX MED GY IP 250 OP 250 PS 637

## 2018-11-28 PROCEDURE — 40000225 ZZH STATISTIC SLP WARD VISIT

## 2018-11-28 PROCEDURE — 97530 THERAPEUTIC ACTIVITIES: CPT | Mod: GP

## 2018-11-28 PROCEDURE — 97166 OT EVAL MOD COMPLEX 45 MIN: CPT | Mod: GO | Performed by: OCCUPATIONAL THERAPIST

## 2018-11-28 PROCEDURE — 85027 COMPLETE CBC AUTOMATED: CPT | Performed by: PHYSICIAN ASSISTANT

## 2018-11-28 PROCEDURE — 40000275 ZZH STATISTIC RCP TIME EA 10 MIN

## 2018-11-28 PROCEDURE — 40000133 ZZH STATISTIC OT WARD VISIT: Performed by: OCCUPATIONAL THERAPIST

## 2018-11-28 PROCEDURE — 40000014 ZZH STATISTIC ARTERIAL MONITORING DAILY

## 2018-11-28 PROCEDURE — 80048 BASIC METABOLIC PNL TOTAL CA: CPT | Performed by: PHYSICIAN ASSISTANT

## 2018-11-28 PROCEDURE — 25000132 ZZH RX MED GY IP 250 OP 250 PS 637: Performed by: NURSE PRACTITIONER

## 2018-11-28 PROCEDURE — 25000128 H RX IP 250 OP 636: Performed by: SURGERY

## 2018-11-28 PROCEDURE — 25000128 H RX IP 250 OP 636: Performed by: PHYSICIAN ASSISTANT

## 2018-11-28 PROCEDURE — 84100 ASSAY OF PHOSPHORUS: CPT | Performed by: PHYSICIAN ASSISTANT

## 2018-11-28 PROCEDURE — 40000193 ZZH STATISTIC PT WARD VISIT

## 2018-11-28 PROCEDURE — 27210437 ZZH NUTRITION PRODUCT SEMIELEM INTERMED LITER

## 2018-11-28 PROCEDURE — 25000125 ZZHC RX 250: Performed by: PHYSICIAN ASSISTANT

## 2018-11-28 PROCEDURE — A9270 NON-COVERED ITEM OR SERVICE: HCPCS

## 2018-11-28 PROCEDURE — 83735 ASSAY OF MAGNESIUM: CPT | Performed by: PHYSICIAN ASSISTANT

## 2018-11-28 PROCEDURE — 92526 ORAL FUNCTION THERAPY: CPT | Mod: GN

## 2018-11-28 PROCEDURE — 99291 CRITICAL CARE FIRST HOUR: CPT | Performed by: NURSE PRACTITIONER

## 2018-11-28 PROCEDURE — 92610 EVALUATE SWALLOWING FUNCTION: CPT | Mod: GN

## 2018-11-28 PROCEDURE — A9270 NON-COVERED ITEM OR SERVICE: HCPCS | Performed by: PHYSICIAN ASSISTANT

## 2018-11-28 PROCEDURE — 82803 BLOOD GASES ANY COMBINATION: CPT | Performed by: STUDENT IN AN ORGANIZED HEALTH CARE EDUCATION/TRAINING PROGRAM

## 2018-11-28 PROCEDURE — 20000004 ZZH R&B ICU UMMC

## 2018-11-28 PROCEDURE — 97535 SELF CARE MNGMENT TRAINING: CPT | Mod: GO | Performed by: OCCUPATIONAL THERAPIST

## 2018-11-28 RX ORDER — QUETIAPINE FUMARATE 50 MG/1
50 TABLET, FILM COATED ORAL AT BEDTIME
Status: DISCONTINUED | OUTPATIENT
Start: 2018-11-28 | End: 2018-11-29

## 2018-11-28 RX ORDER — FUROSEMIDE 10 MG/ML
20 INJECTION INTRAMUSCULAR; INTRAVENOUS ONCE
Status: COMPLETED | OUTPATIENT
Start: 2018-11-28 | End: 2018-11-28

## 2018-11-28 RX ORDER — LIDOCAINE 4 G/G
1-3 PATCH TOPICAL
Status: DISCONTINUED | OUTPATIENT
Start: 2018-11-28 | End: 2018-12-07 | Stop reason: HOSPADM

## 2018-11-28 RX ORDER — QUETIAPINE FUMARATE 25 MG/1
25 TABLET, FILM COATED ORAL 2 TIMES DAILY
Status: DISCONTINUED | OUTPATIENT
Start: 2018-11-28 | End: 2018-11-29

## 2018-11-28 RX ORDER — OXYCODONE HCL 5 MG/5 ML
5-10 SOLUTION, ORAL ORAL EVERY 4 HOURS PRN
Status: DISCONTINUED | OUTPATIENT
Start: 2018-11-28 | End: 2018-12-07 | Stop reason: HOSPADM

## 2018-11-28 RX ORDER — GABAPENTIN 100 MG/1
100 CAPSULE ORAL 3 TIMES DAILY
Status: DISCONTINUED | OUTPATIENT
Start: 2018-11-28 | End: 2018-11-29

## 2018-11-28 RX ORDER — METHOCARBAMOL 500 MG/1
500 TABLET, FILM COATED ORAL 3 TIMES DAILY
Status: DISCONTINUED | OUTPATIENT
Start: 2018-11-28 | End: 2018-12-07 | Stop reason: HOSPADM

## 2018-11-28 RX ORDER — HYDROMORPHONE HYDROCHLORIDE 1 MG/ML
.3-.5 INJECTION, SOLUTION INTRAMUSCULAR; INTRAVENOUS; SUBCUTANEOUS
Status: DISCONTINUED | OUTPATIENT
Start: 2018-11-28 | End: 2018-12-05

## 2018-11-28 RX ORDER — MAGNESIUM SULFATE HEPTAHYDRATE 40 MG/ML
2 INJECTION, SOLUTION INTRAVENOUS DAILY PRN
Status: DISCONTINUED | OUTPATIENT
Start: 2018-11-28 | End: 2018-12-07 | Stop reason: HOSPADM

## 2018-11-28 RX ADMIN — Medication 15 ML: at 07:48

## 2018-11-28 RX ADMIN — QUETIAPINE 50 MG: 50 TABLET ORAL at 22:31

## 2018-11-28 RX ADMIN — METHOCARBAMOL TABLETS 500 MG: 500 TABLET, COATED ORAL at 13:37

## 2018-11-28 RX ADMIN — QUETIAPINE FUMARATE 25 MG: 25 TABLET ORAL at 16:40

## 2018-11-28 RX ADMIN — FENTANYL CITRATE 25 MCG: 50 INJECTION INTRAMUSCULAR; INTRAVENOUS at 02:06

## 2018-11-28 RX ADMIN — OXYCODONE HYDROCHLORIDE 5 MG: 5 SOLUTION ORAL at 00:11

## 2018-11-28 RX ADMIN — ACETAMINOPHEN 975 MG: 325 SOLUTION ORAL at 07:48

## 2018-11-28 RX ADMIN — MAGNESIUM SULFATE 2 G: 2 INJECTION INTRAVENOUS at 05:58

## 2018-11-28 RX ADMIN — OXYCODONE HYDROCHLORIDE 10 MG: 5 SOLUTION ORAL at 13:35

## 2018-11-28 RX ADMIN — Medication 0.5 MG: at 07:39

## 2018-11-28 RX ADMIN — Medication 200 MG: at 19:59

## 2018-11-28 RX ADMIN — ACETAMINOPHEN 650 MG: 325 SOLUTION ORAL at 02:04

## 2018-11-28 RX ADMIN — LEVOTHYROXINE SODIUM 150 MCG: 300 TABLET ORAL at 07:50

## 2018-11-28 RX ADMIN — BACITRACIN: 500 OINTMENT TOPICAL at 08:04

## 2018-11-28 RX ADMIN — LIDOCAINE 2 PATCH: 560 PATCH PERCUTANEOUS; TOPICAL; TRANSDERMAL at 08:01

## 2018-11-28 RX ADMIN — METHOCARBAMOL TABLETS 500 MG: 500 TABLET, COATED ORAL at 07:49

## 2018-11-28 RX ADMIN — ENOXAPARIN SODIUM 40 MG: 40 INJECTION SUBCUTANEOUS at 13:37

## 2018-11-28 RX ADMIN — METHOCARBAMOL TABLETS 500 MG: 500 TABLET, COATED ORAL at 19:59

## 2018-11-28 RX ADMIN — Medication 200 MG: at 07:49

## 2018-11-28 RX ADMIN — BACITRACIN: 500 OINTMENT TOPICAL at 20:05

## 2018-11-28 RX ADMIN — GABAPENTIN 100 MG: 100 CAPSULE ORAL at 19:59

## 2018-11-28 RX ADMIN — ACETAMINOPHEN 975 MG: 325 SOLUTION ORAL at 13:37

## 2018-11-28 RX ADMIN — FUROSEMIDE 20 MG: 10 INJECTION, SOLUTION INTRAVENOUS at 11:21

## 2018-11-28 RX ADMIN — OXYCODONE HYDROCHLORIDE 5 MG: 5 SOLUTION ORAL at 04:55

## 2018-11-28 RX ADMIN — ACETAMINOPHEN 975 MG: 325 SOLUTION ORAL at 19:58

## 2018-11-28 RX ADMIN — POTASSIUM PHOSPHATE, MONOBASIC AND POTASSIUM PHOSPHATE, DIBASIC 10 MMOL: 224; 236 INJECTION, SOLUTION INTRAVENOUS at 07:41

## 2018-11-28 RX ADMIN — Medication 0.5 MG: at 15:37

## 2018-11-28 RX ADMIN — POTASSIUM CHLORIDE 20 MEQ: 29.8 INJECTION, SOLUTION INTRAVENOUS at 04:49

## 2018-11-28 RX ADMIN — GABAPENTIN 100 MG: 100 CAPSULE ORAL at 07:48

## 2018-11-28 RX ADMIN — QUETIAPINE FUMARATE 50 MG: 50 TABLET ORAL at 07:48

## 2018-11-28 RX ADMIN — OXYCODONE HYDROCHLORIDE 10 MG: 5 SOLUTION ORAL at 08:57

## 2018-11-28 RX ADMIN — GABAPENTIN 100 MG: 100 CAPSULE ORAL at 13:37

## 2018-11-28 ASSESSMENT — ACTIVITIES OF DAILY LIVING (ADL)
ADLS_ACUITY_SCORE: 17
ADLS_ACUITY_SCORE: 14
ADLS_ACUITY_SCORE: 16
ADLS_ACUITY_SCORE: 14
ADLS_ACUITY_SCORE: 14
ADLS_ACUITY_SCORE: 17

## 2018-11-28 ASSESSMENT — PAIN DESCRIPTION - DESCRIPTORS
DESCRIPTORS: PATIENT UNABLE TO DESCRIBE
DESCRIPTORS: SORE
DESCRIPTORS: ACHING;CONSTANT

## 2018-11-28 NOTE — PLAN OF CARE
Problem: Patient Care Overview  Goal: Plan of Care/Patient Progress Review  Outcome: Improving  D/I/A:    Neuro: Ox4, but still anxious and frustrated. Afebrile. Pain hard to control-back and abdomen. Frustrated See FS  Resp: Extubated around 0930 after bronch. On HFNC 40-50%. Using oral suction. Continue to monitor.   CV: Sinus. Levo off around 1400. BP soft around 1800.-team aware, but increase in SBP and MAP.  GI/: BS+. BM+. Cdiff negative. See FS.   Skin: Intact and blotchy.Turned q2hr. Up to chair.       P: Continue to monitor. Contact SICU with changes.

## 2018-11-28 NOTE — PLAN OF CARE
Problem: Patient Care Overview  Goal: Plan of Care/Patient Progress Review  Discharge Planner SLP   Patient plan for discharge: unknown  Current status: Clinical swallow evaluation completed per MD order. Pt presents with mild oral-pharyngeal dysphagia in the setting of generalized weakness/fatigue, but swallow function grossly intact for ice chips and small sips of thin liquids. No overt s/sx of aspiration observed. From swallow standpoint, ok for ice chips and small sips of thin liquids. Will defer to Thoracic team for appropriateness of PO diet for pleasure and further advance as medically appropriate  Barriers to return to prior living situation: medical complexity  Recommendations for discharge: will defer to medical team  Rationale for recommendations: swallow function not impacting discharge disposition       Entered by: Lilian Zimmerman 11/28/2018 11:00 AM

## 2018-11-28 NOTE — PROGRESS NOTES
SURGICAL ICU PROGRESS NOTE          Date of Service (when I saw the patient): 11/28/2018    ASSESSMENT: Mrytle Vaz is a 73 year old female who was admitted on 11/21/2018 to [facility]. She was transferred from [facility] where she presented with respiratory failure thought to be secondary to large hiatal hernia and underwent s/p laparoscopic hiatal hernia repair with mesh reinforcement, laparoscopic nissen fundoplication, and laparoscopic gastrostomy tube placement, which was complicated by pleural effusions and possible esophageal leak,  s/p placement of chest tubes x3. Taken to OR with thoracic and found to have perforation at distal esophagus at GE juncton and underwent s/p Midline laparotomy, Trans-hiatal esophagogasterectomy, gastrostomy, J-tube placement, G-tube placement, bilateral pleural chest tube placement, mediastinal abcess drainage, spit fistula creation, Esophagastrodueodenoscopy, intraoperative course complicated by SVT required intraoperative cardioversion x3. Reintubated on 11/23/18 for AMS, A-fib with RVR s/p cardioversiondue to hypoxia        CHANGES and MAJOR THINGS TODAY:   - Schedule Tylenol, Robaxin, and lidoderm patches  - Stop fentanyl PRN, switch to IV hydromorphone  - Increase oxycodone dosing   - Decreasing seroquel  - RAPS consult for block placement. Resume Lovenox post block placement or if block not able to be done   - SLP consult, swallow for comfort only    - Aggressive pulmonary hygiene   - IV lasix 20 mg once  - Stopping IVF    PLAN:  # acute post operative pain   # delirium   - Monitor neurological status. Notify the MD for any acute changes in exam.  - pt reported to have delirium prior to transfer from OSH. Continue efforts with delirium prevention and re-orientation   - Seroquel 25 mg AM & afternoon. 50 mg at HS   - Pain: PRN oxycodone, PRN hydromorphone, tylenol. Lidoderm patches, Robaxin          Pulmonary:   # acute hypoxic resp failure. extubated 11/22/18 re intubated  11/23/18  -Reintubated on 11/23.  -Extubated 11/27.   -Continue high flow NC for added PEEP   -Supplemental oxygen to keep saturation above 92 %.  -Chest tube management per CVTS         Cardiovascular:    # Atrial fibrillation, unstable, intraoperative cardioversion x3   - hx on metoprolol during this admission, OSH records reviewed. Appears metoprolol not PTA medication, cont with amiodarone PO.  -Repeat EKG stable. QTc 410  # distubtive shock-- Off all pressors   # hx of hypertension- hold home diuretic, resume as needed. Will give one time dose of 20 mg this AM   # hx of hyperlipidemia- resume home  statin       Gastroenterology/Nutrition:  # S/p hiatal hernia complicated by distal esophageal leak now s/p Midline laparotomy, trans-hiatal esophagogasterectomy, gastrostomy, J-tube placement, G-tube placement, bilateral pleural chest tube placement, mediastinal abcess drainage, spit fistula creation, esophagastrodueodenoscopy   # s/p G tube   # s/ J tube  # protein-calorie deficit malnutrition   - G tube to gravity.   - J-tube- feeds. Now at goal   -SLP consult for swallow eval. Ice chips ok for comfort if passed   - s/p enemas x3 days now with liquid BM's. Hold bowel regimen given frequency of stools. C. Diff negative       Fluids/Electrolytes:   # hypernatremia- improved   # hypophosphatemia- resolved   -Na 137. Continue free water via J tube to 40 ml q 2hours   -TKO IVF.   -electrolyte replacement protocol.   -Keep K > 4.0. & Mag > 2.0 given recurrent atrial fibrillation       Renal:  # HANG, resolving   - Will continue to monitor intake and output continue with lopez for now       Endocrine:  #hypothyroidism   -Home synthroid 150mcg resumed   -no indication for sliding scale      ID/Antibiotics:  #Leukocytosis 18.2 today, downtrending    # mediastinal pleural effusion   Zosyn and  and Micafungin completed 4 day on 11/25  Vancomycin d/phani 11/22/18  -Pan cultured 11/25- growing candida only   -Repeat  Chest/ABD/Pelvis showing fluid collection along esophagectomy site, does not appear infectious  - C.diff negative.   - If spikes fever again, would pan culture and start ABX       Heme:     # Acute blood loss anemia   - monitor hgb. Transfuse if hgb <7.0 or signs/symptoms of hypoperfusion. Monitor and trend.       Musculoskeletal:  # weakness and deconditioning of deconditioning   - Physical and occupational therapy consult       General Cares/Prophylaxis:    DVT Prophylaxis: Lovenox 40mg subcutaneous    GI Prophylaxis: Protonix   Restraints: Restraints for medical healing needed: Yes--reaching for ETT tube and other lines       Lines/ tubes/ drains:  - PIV's  - central line   - ANGELO drain x1  - chest tube x2  -G tube  -J tube       Disposition:  - Surgical ICU.       Time spent on this Encounter   Billing:  I spent  45 minutes bedside and on the inpatient unit today managing the care of Myrtle Vaz in relation to the issues listed in this note.          Ayo Magana, NP-C     ====================================  INTERVAL HISTORY:  Course reviewed. No acute events overnight. Pain continues but discussed plan with pt who is agreeable       OBJECTIVE:   1. VITAL SIGNS:   Temp:  [96.8  F (36  C)-98.6  F (37  C)] 97.2  F (36.2  C)  Heart Rate:  [] 82  Resp:  [8-38] 20  BP: ()/() 132/61  FiO2 (%):  [35 %-50 %] 50 %  SpO2:  [86 %-100 %] 95 %  Ventilation Mode: CPAP/PS  (Continuous positive airway pressure with Pressure Support)  FiO2 (%): 50 %  Rate Set (breaths/minute): 15 breaths/min  Tidal Volume Set (mL): 420 mL  PEEP (cm H2O): 5 cmH2O  Pressure Support (cm H2O): 7 cmH2O  Oxygen Concentration (%): 40 %  Resp: 20    2. INTAKE/ OUTPUT:   I/O last 3 completed shifts:  In: 3242.02 [I.V.:1162.02; NG/GT:880]  Out: 2390 [Urine:1415; Emesis/NG output:110; Drains:275; Chest Tube:590]    3. PHYSICAL EXAMINATION:  General: Awake, alert, NAD   HEENT: pupils equal.   Left neck incision dressed, staples  intact  Neuro: A/O x 4. No focal deficits noted   Pulm/Resp: Clear breath sounds bilaterally without rhonchi, crackles or wheezes, Lungs clear.  Chest tube x2, no air leak--seroussang  fluid noted.   Chest: spit fistula with red tinged secretions.   CV: RRR, S1, S2, no murmurs, rubs or gallops.   Abdomen: abdominal incision without redness, warmth or drainage. Mediastinal drain with thin pinkish serous drainage. G tube to gravity drainage with dark bilious output. J tube with feeds infusing   : (+) lopez catheter in place, urine yellow and clear  MSK/Extremities: HARRIS.  peripheral edema, moving all extremities, peripheral pulses intact, calves soft and non-tender, extremities well perfused.     4. INVESTIGATIONS:   Arterial Blood Gases     Recent Labs  Lab 11/26/18 0345 11/25/18 0411 11/24/18 0344 11/23/18  1533   PH 7.44 7.45 7.38 7.31*   PCO2 38 38 44 45   PO2 78* 85 77* 73*   HCO3 26 26 26 23     Complete Blood Count     Recent Labs  Lab 11/28/18 0338 11/27/18 0351 11/26/18 0346 11/25/18  0411   WBC 18.2* 19.7* 18.1* 15.7*   HGB 7.4* 8.1* 7.8* 7.6*   * 503* 433 391     Basic Metabolic Panel    Recent Labs  Lab 11/28/18 0338 11/27/18 0351 11/26/18 0346 11/25/18  0411    145* 146* 147*   POTASSIUM 3.9 4.0 3.6 3.6   CHLORIDE 112* 114* 114* 116*   CO2 27 23 26 24   BUN 31* 32* 30 26   CR 0.75 0.77 0.86 1.02   * 130* 137* 139*     Liver Function Tests    Recent Labs  Lab 11/28/18 0338 11/27/18 0351 11/26/18 0346 11/25/18  0411  11/21/18  1831   AST  --   --   --  26  --  238*   ALT  --   --   --  29  --  106*   ALKPHOS  --   --   --  78  --  78   BILITOTAL  --   --   --  0.3  --  1.3   ALBUMIN  --   --   --  1.3*  --  2.2*   INR 1.17* 1.18* 1.22* 1.27*  < >  --    < > = values in this interval not displayed.  Pancreatic Enzymes  No lab results found in last 7 days.  Coagulation Profile    Recent Labs  Lab 11/28/18  0338 11/27/18  0351 11/26/18  0346 11/25/18  0411   INR 1.17* 1.18*  1.22* 1.27*           =========================================

## 2018-11-28 NOTE — PROGRESS NOTES
11/28/18 1053   General Information   Onset Date 11/21/18   Start of Care Date 11/28/18   Referring Physician Simón Magana NP   Patient Profile Review/OT: Additional Occupational Profile Info See Profile for full history and prior level of function   Patient/Family Goals Statement Pt wants to drink water   Swallowing Evaluation Bedside swallow evaluation   Behaviorial Observations WFL (within functional limits)  (drowsy, but follows commands appropriately)   Mode of current nutrition NPO   Respiratory Status O2 Supply   Type of O2 supply (high flow nasal cannula)   Comments Orders received for swallow evaluation. Pt admitted on 11/21/2018 to [facility]. She was transferred from [facility] where she presented with respiratory failure thought to be secondary to large hiatal hernia and underwent s/p laparoscopic hiatal hernia repair with mesh reinforcement, laparoscopic nissen fundoplication, and laparoscopic gastrostomy tube placement, which was complicated by pleural effusions and possible esophageal leak,  s/p placement of chest tubes x3. Taken to OR with thoracic and found to have perforation at distal esophagus at GE juncton and underwent s/p Midline laparotomy, Trans-hiatal esophagogasterectomy, gastrostomy, J-tube placement, G-tube placement, bilateral pleural chest tube placement, mediastinal abcess drainage, spit fistula creation, Esophagastrodueodenoscopy, intraoperative course complicated by SVT required intraoperative cardioversion x3. Reintubated on 11/23/18 for AMS, A-fib with RVR s/p cardioversiondue to hypoxia    Clinical Swallow Evaluation   Oral Musculature generally intact   Structural Abnormalities none present   Dentition present and adequate   Mucosal Quality good   Mandibular Strength and Mobility intact   Oral Labial Strength and Mobility WFL   Lingual Strength and Mobility WFL   Velar Elevation intact   Buccal Strength and Mobility intact   Laryngeal Function Cough;Throat  clear;Swallow;Voicing initiated;Dry swallow palpated   Additional Documentation Yes   Swallow Eval   Feeding Assistance set up only required   Clinical Swallow Eval: Thin Liquid Texture Trial   Mode of Presentation, Thin Liquids spoon;cup;self-fed;fed by clinician   Volume of Liquid or Food Presented ice chips x5; small sips of water x4   Oral Phase of Swallow WFL   Pharyngeal Phase of Swallow intact  (no overt s/sx of aspiration observed)   Diagnostic Statement swallow appears functional for thin liquids. Draining through spit fistula appropriately   Swallow Compensations   Swallow Compensations Pacing;Reduce amounts   Esophageal Phase of Swallow   Esophageal comments spit fistula in place   General Therapy Interventions   Planned Therapy Interventions Dysphagia Treatment   Dysphagia treatment Oropharyngeal exercise training;Modified diet education;Instruction of safe swallow strategies   Swallow Eval: Clinical Impressions   Skilled Criteria for Therapy Intervention Skilled criteria met.  Treatment indicated.   Functional Assessment Scale (FAS) 5   Treatment Diagnosis mild dysphagia   Diet texture recommendations Clear liquid  (as appropriate per Thoracic team)   Recommended Feeding/Eating Techniques maintain upright posture during/after eating for 30 mins   Demonstrates Need for Referral to Another Service occupational therapy;physical therapy;respiratory therapy;dietitian   Therapy Frequency 3 times/wk   Predicted Duration of Therapy Intervention (days/wks) 1 week   Anticipated Discharge Disposition (Will defer to PT/OT)   Risks and Benefits of Treatment have been explained. Yes   Patient, family and/or staff in agreement with Plan of Care Yes   Clinical Impression Comments Clinical swallow evaluation completed per MD order. Pt presents with mild oral-pharyngeal dysphagia in the setting of generalized weakness/fatigue, but swallow function grossly intact for ice chips and small sips of thin liquids. No overt s/sx  of aspiration observed. From swallow standpoint, ok for ice chips and small sips of thin liquids. Will defer to Thoracic team for appropriateness of PO diet for pleasure and further advance as medically appropriate   Total Evaluation Time   Total Evaluation Time (Minutes) 10

## 2018-11-28 NOTE — PLAN OF CARE
Problem: Patient Care Overview  Goal: Plan of Care/Patient Progress Review  Discharge Planner PT   Patient plan for discharge: TCU  Current status: patient needing min-mod A x2 sup to sitting EOB and sit <> standing. Patient limited by pain and lightheaded during activity. Attempted stand pivot transfer bed to chair but unable to safely complete d/t lightheadedness. Utilized overhead lift for transfer bed to chair. 60% FiO2 HFNC, desatted to 87-88% x2 during standing but resolves with PLB.  Barriers to return to prior living situation: medical stability, level of assist  Recommendations for discharge: TCU  Rationale for recommendations: pt would benefit from TCU to progress activity tolerance, strength, balance for increased indep with functional mobility       Entered by: Melba Posada 11/28/2018 1:28 PM

## 2018-11-28 NOTE — PROGRESS NOTES
11/28/18 1548   Quick Adds   Type of Visit Initial Occupational Therapy Evaluation   Living Environment   Lives With alone   Living Arrangements ([facility])   Home Accessibility stairs to enter home;grab bars present (bathtub);grab bars present (toilet)  (all needs met on main floor, walk in shower)   Number of Stairs to Enter Home 1   Number of Stairs Within Home 0   Transportation Available car   Living Environment Comment Pt's children live nearby and may be able to assist at d/c   Self-Care   Usual Activity Tolerance moderate   Current Activity Tolerance poor   Regular Exercise no   Equipment Currently Used at Home cane, straight;shower chair;grab bar  (reacher)   Activity/Exercise/Self-Care Comment Is not very active, can ambulate in the grocery store, uses cane occasionally.   Functional Level Prior   Ambulation 0-->independent   Transferring 0-->independent   Toileting 1-->assistive equipment   Bathing 1-->assistive equipment   Dressing 0-->independent   Cognition 1 - attention or memory deficits   Fall history within last six months yes   Number of times patient has fallen within last six months 1  (fell down 2 stairs in the summer)   Prior Functional Level Comment Pt normally I in most ADL/IADLs. She has assist 1x/month to clean the floors. She drives, completes all other housework, but has not been cooking as much as of late after her  passed away in May.    General Information   Referring Physician Her-Siva Krause PA-C   Patient/Family Goals Statement Return to OF   Additional Occupational Profile Info/Pertinent History of Current Problem POD #6 for laparotomy, trans hiatal gastrostomy, bilateral chest tube placement, mediastinal abscess washout, and spit fistula creation from perforated esophagus    Precautions/Limitations fall precautions;abdominal precautions   Cognitive Status Examination   Cognitive Comment Groggy, tangential, distractible. Some STM issues at baseline per pt.    Visual  Perception   Visual Acuity Wears bifocals   Range of Motion (ROM)   ROM Comment WFL for B UEs   Strength   Strength Comments NT   Hand Strength   Hand Strength Comments WFL grossly for B hands   Transfer Skill: Bed to Chair/Chair to Bed   Level of [place]: Bed to Chair dependent (less than 25% patients effort)   Physical Assist/Nonphysical Assist: Bed to Chair set-up required;supervision;verbal cues;nonverbal cues (demo/gestures);2 persons   Assistive Device - Transfer Skill Bed to Chair Chair to Bed Rehab Eval (lift)   Grooming   Level of [place]: Grooming minimum assist (75% patients effort)   Physical Assist/Nonphysical Assist: Grooming set-up required;supervision;verbal cues;nonverbal cues (demo/gestures);1 person assist   Clinical Impression   Criteria for Skilled Therapeutic Interventions Met yes, treatment indicated   OT Diagnosis Decreased I in ADLs due to deconditioning   Assessment of Occupational Performance 5 or more Performance Deficits   Identified Performance Deficits dressing, toileting, bathing, g/h, functional endurance, cognition   Clinical Decision Making (Complexity) Moderate complexity   Therapy Frequency (6x/week)   Predicted Duration of Therapy Intervention (days/wks) 2 weeks   Anticipated Discharge Disposition Transitional Care Facility   Risks and Benefits of Treatment have been explained. Yes   Patient, Family & other staff in agreement with plan of care Yes   Total Evaluation Time   Total Evaluation Time (Minutes) 15

## 2018-11-28 NOTE — CONSULTS
Enter note in consult tab; click Regional Anesthesia Consult box to link w/ order  Regional Anesthesia Pain Service Consultation  DATE OF CONSULT VISIT: 11/27/2018    REASON FOR PAIN CONSULTATION: subacute pain      Please See detailed consultation note dated 11/27/2018 by Dr. Martinez regarding the plan for Ms. Vaz's pain management. We will plan to follow this patient while she remains in the hospital and will re-evaluate her needs daily.     Thank you for involving our team in the care of this patient.     Plan discussed with Dr. Gustavo Land, staff Anesthesiologist.     Ousmane Ramos MD  CA3  [phone]

## 2018-11-28 NOTE — PLAN OF CARE
Problem: Restraint for Non-Violent/Non-Self-Destructive Behavior  Goal: Prevent/Manage Potential Problems  Maintain safety of patient and others during period of restraint.  Promote psychological and physical wellbeing.  Prevent injury to skin and involved body parts.  Promote nutrition, hydration, and elimination.   Outcome: No Change  Pt continues to pull at lines and ETT. Pt unable to be redirected and remains in restraints.

## 2018-11-28 NOTE — PLAN OF CARE
Problem: Patient Care Overview  Goal: Plan of Care/Patient Progress Review  Outcome: No Change    Shift Summary [id number]:    Neuro: Pt A&Ox4. Moves all extremities and follows commands. Pt c/o back and abdominal pain - PRN Oxycodone given Q4hrs with minimal decrease in pain, PRN Fentanyl 25mcg given x1 which was effective in decreasing pain. Hot packs applied to back which was effective in decreasing pain.    Cardiac: SR. HR 70s-90s. MAPs>65.     Respiratory: On high flow nasal cannula at 50%. Lung sounds with coarse bases. Pt reports feeling SOB which has remained unchanged. Pt coughing up a small amount of tan, thick secretions.    GI/: Urine output 50-60ml/hr. J tube infusing with tube feeding at 50ml/hr. G tube to gravity with small amount of green/bile output present. Pt had 1 loose stool. Rectal tube removed at 0600 per order for not enough output.     Incisons/Drains: Abdominal incision staples CDI/TIFFANIE. Left neck incision staples CDI/TIFFANIE. Left spit fistula appliance CDI with scant amount of serosanguinous output present. Right abdominal ANGELO with 10-15ml/hr of serosanguinous output present; dressing changed ~Q8hrs d/t leaking at site. Bilateral chest tubes to water seal with 5-25ml/hr of serous output present from right tube and 5-15ml/hr of serosanguinous output present from left tube.     Lines: R IJx3. PIVx1. LR infusing at 30ml/hr.    No family present at bedside overnight.  Continue to monitor and update MD as needed. Continue plan of care.    Problem: Cardiac Disease Comorbidity  Goal: Cardiac Disease  Patient comorbidity will be monitored for signs and symptoms of Cardiac Disease.  Problems will be absent, minimized or managed by discharge/transition of care.   Outcome: No Change  Pt has remained in sinus rhythm.

## 2018-11-28 NOTE — PROGRESS NOTES
Thoracic Surgery Progress Note    Pt: Myrtle Vaz  MRN: [id number]   11/28/2018      24 hr events;   Off levo. Tolerated extubation, weaned to high flow at 50%. Tolerating tube feeds.  Having bowel function. C diff negative. Remains with elevated white count, afebrile. Not on abx.     Temp:  [97.1  F (36.2  C)-98.6  F (37  C)] 97.7  F (36.5  C)  Heart Rate:  [] 80  Resp:  [8-38] 26  BP: ()/(45-97) 122/78  FiO2 (%):  [40 %-70 %] 50 %  SpO2:  [90 %-99 %] 97 %    I/O last 3 completed shifts:  In: 3242.02 [I.V.:1162.02; NG/GT:880]  Out: 2390 [Urine:1415; Emesis/NG output:110; Drains:275; Chest Tube:590]    Ventilation Mode: CPAP/PS  (Continuous positive airway pressure with Pressure Support)  FiO2 (%): 50 %  Rate Set (breaths/minute): 15 breaths/min  Tidal Volume Set (mL): 420 mL  PEEP (cm H2O): 5 cmH2O  Pressure Support (cm H2O): 7 cmH2O  Oxygen Concentration (%): 40 %  Resp: 26    Physical Exam:  Gen: NAD, sedated.  Pulm: On CPAP, nonlabored breathing   CV: Regular rate and rhythm.  Chest: CT output appears serosanguinous.                             - Incision:  C/D/I.  Abd: Abdomen soft, non-tender, non-distended    Labs reviewed in full; available in Epic.    A/P: 73F s/p transhiatal esophagectomy with spit fistula, G tube, J tube on 11/21 for perforated esophagus after type 4 peraesophageal hernia repair.  Leukocytosis from unknown etiology.    -appreciate SICU cares with high oxygen requirements    -Continue TFs, clears for comfort, ok per speech  -continue chest tubes to waterseal  -Antibiotics per discussion with thoracic and ICU attendings  -encourage ambulation  -ultimately to TCU    Seen with fellow who discussed with staff.     Nicky Park MD PGY3  General Surgery

## 2018-11-28 NOTE — PLAN OF CARE
Problem: Restraint for Non-Violent/Non-Self-Destructive Behavior  Goal: Prevent/Manage Potential Problems  Maintain safety of patient and others during period of restraint.  Promote psychological and physical wellbeing.  Prevent injury to skin and involved body parts.  Promote nutrition, hydration, and elimination.   Outcome: Improving  Pt extubated at 0930. Pt not pulling at lines and drains. Restraints discontinued at 1033.

## 2018-11-28 NOTE — PLAN OF CARE
Problem: Patient Care Overview  Goal: Plan of Care/Patient Progress Review    Discharge Planner OT   Patient plan for discharge: rehab  Current status: Max A for supine>sit, tolerating EOB sitting for 10+ min with CGA, performing simple G/H with min A. Lightheaded in sitting, spO2 stable in mid 90s on HFNC 50% FiO2, and VSS otherwise. Dependent lift to recliner at end of session. Distractible, groggy, and tangential this session. Is not very active at baseline, is normally I in most ADL/IADLs though, and may have some assist from family available at discharge.  Barriers to return to prior living situation: decreased ADL independence and activity tolerance  Recommendations for discharge: TCU  Rationale for recommendations: increase functional endurance and ADL independence at rehab       Entered by: Mayco Gutierrez 11/28/2018 4:02 PM

## 2018-11-29 ENCOUNTER — APPOINTMENT (OUTPATIENT)
Dept: GENERAL RADIOLOGY | Facility: CLINIC | Age: 73
DRG: 326 | End: 2018-11-29
Attending: PHYSICIAN ASSISTANT
Payer: MEDICARE

## 2018-11-29 ENCOUNTER — APPOINTMENT (OUTPATIENT)
Dept: OCCUPATIONAL THERAPY | Facility: CLINIC | Age: 73
DRG: 326 | End: 2018-11-29
Attending: THORACIC SURGERY (CARDIOTHORACIC VASCULAR SURGERY)
Payer: MEDICARE

## 2018-11-29 ENCOUNTER — APPOINTMENT (OUTPATIENT)
Dept: PHYSICAL THERAPY | Facility: CLINIC | Age: 73
DRG: 326 | End: 2018-11-29
Attending: THORACIC SURGERY (CARDIOTHORACIC VASCULAR SURGERY)
Payer: MEDICARE

## 2018-11-29 LAB
ANION GAP SERPL CALCULATED.3IONS-SCNC: 4 MMOL/L (ref 3–14)
BUN SERPL-MCNC: 38 MG/DL (ref 7–30)
CALCIUM SERPL-MCNC: 6.9 MG/DL (ref 8.5–10.1)
CHLORIDE SERPL-SCNC: 112 MMOL/L (ref 94–109)
CO2 SERPL-SCNC: 27 MMOL/L (ref 20–32)
CREAT SERPL-MCNC: 0.81 MG/DL (ref 0.52–1.04)
ERYTHROCYTE [DISTWIDTH] IN BLOOD BY AUTOMATED COUNT: 18.3 % (ref 10–15)
GFR SERPL CREATININE-BSD FRML MDRD: 69 ML/MIN/1.7M2
GLUCOSE SERPL-MCNC: 106 MG/DL (ref 70–99)
HCT VFR BLD AUTO: 24.4 % (ref 35–47)
HGB BLD-MCNC: 7.5 G/DL (ref 11.7–15.7)
INR PPP: 1.12 (ref 0.86–1.14)
MAGNESIUM SERPL-MCNC: 2.4 MG/DL (ref 1.6–2.3)
MCH RBC QN AUTO: 31.3 PG (ref 26.5–33)
MCHC RBC AUTO-ENTMCNC: 30.7 G/DL (ref 31.5–36.5)
MCV RBC AUTO: 102 FL (ref 78–100)
PHOSPHATE SERPL-MCNC: 3.3 MG/DL (ref 2.5–4.5)
PLATELET # BLD AUTO: 691 10E9/L (ref 150–450)
POTASSIUM SERPL-SCNC: 4.2 MMOL/L (ref 3.4–5.3)
RBC # BLD AUTO: 2.4 10E12/L (ref 3.8–5.2)
SODIUM SERPL-SCNC: 143 MMOL/L (ref 133–144)
WBC # BLD AUTO: 15.7 10E9/L (ref 4–11)

## 2018-11-29 PROCEDURE — 99233 SBSQ HOSP IP/OBS HIGH 50: CPT | Performed by: NURSE PRACTITIONER

## 2018-11-29 PROCEDURE — 97530 THERAPEUTIC ACTIVITIES: CPT | Mod: GO

## 2018-11-29 PROCEDURE — 40000133 ZZH STATISTIC OT WARD VISIT

## 2018-11-29 PROCEDURE — A9270 NON-COVERED ITEM OR SERVICE: HCPCS | Performed by: PHYSICIAN ASSISTANT

## 2018-11-29 PROCEDURE — 97110 THERAPEUTIC EXERCISES: CPT | Mod: GO

## 2018-11-29 PROCEDURE — 25000132 ZZH RX MED GY IP 250 OP 250 PS 637: Mod: GY | Performed by: NURSE PRACTITIONER

## 2018-11-29 PROCEDURE — 25000132 ZZH RX MED GY IP 250 OP 250 PS 637: Performed by: PHYSICIAN ASSISTANT

## 2018-11-29 PROCEDURE — 25000128 H RX IP 250 OP 636: Performed by: NURSE PRACTITIONER

## 2018-11-29 PROCEDURE — 83735 ASSAY OF MAGNESIUM: CPT | Performed by: PHYSICIAN ASSISTANT

## 2018-11-29 PROCEDURE — A9270 NON-COVERED ITEM OR SERVICE: HCPCS

## 2018-11-29 PROCEDURE — 85027 COMPLETE CBC AUTOMATED: CPT | Performed by: PHYSICIAN ASSISTANT

## 2018-11-29 PROCEDURE — 20000004 ZZH R&B ICU UMMC

## 2018-11-29 PROCEDURE — 27210437 ZZH NUTRITION PRODUCT SEMIELEM INTERMED LITER

## 2018-11-29 PROCEDURE — 71045 X-RAY EXAM CHEST 1 VIEW: CPT

## 2018-11-29 PROCEDURE — 97530 THERAPEUTIC ACTIVITIES: CPT | Mod: GP

## 2018-11-29 PROCEDURE — 97116 GAIT TRAINING THERAPY: CPT | Mod: GP

## 2018-11-29 PROCEDURE — 40000193 ZZH STATISTIC PT WARD VISIT

## 2018-11-29 PROCEDURE — 25000132 ZZH RX MED GY IP 250 OP 250 PS 637

## 2018-11-29 PROCEDURE — 84100 ASSAY OF PHOSPHORUS: CPT | Performed by: PHYSICIAN ASSISTANT

## 2018-11-29 PROCEDURE — A9270 NON-COVERED ITEM OR SERVICE: HCPCS | Mod: GY | Performed by: NURSE PRACTITIONER

## 2018-11-29 PROCEDURE — 85610 PROTHROMBIN TIME: CPT | Performed by: PHYSICIAN ASSISTANT

## 2018-11-29 PROCEDURE — 40000141 ZZH STATISTIC PERIPHERAL IV START W/O US GUIDANCE

## 2018-11-29 PROCEDURE — 80048 BASIC METABOLIC PNL TOTAL CA: CPT | Performed by: PHYSICIAN ASSISTANT

## 2018-11-29 RX ORDER — QUETIAPINE FUMARATE 25 MG/1
25 TABLET, FILM COATED ORAL
Status: DISCONTINUED | OUTPATIENT
Start: 2018-11-29 | End: 2018-12-07 | Stop reason: HOSPADM

## 2018-11-29 RX ORDER — QUETIAPINE FUMARATE 25 MG/1
25 TABLET, FILM COATED ORAL AT BEDTIME
Status: DISCONTINUED | OUTPATIENT
Start: 2018-11-29 | End: 2018-11-29

## 2018-11-29 RX ORDER — GABAPENTIN 300 MG/1
300 CAPSULE ORAL 3 TIMES DAILY
Status: DISCONTINUED | OUTPATIENT
Start: 2018-11-29 | End: 2018-12-06

## 2018-11-29 RX ORDER — FUROSEMIDE 10 MG/ML
20 INJECTION INTRAMUSCULAR; INTRAVENOUS ONCE
Status: COMPLETED | OUTPATIENT
Start: 2018-11-29 | End: 2018-11-29

## 2018-11-29 RX ORDER — GUAR GUM
1 PACKET (EA) ORAL 4 TIMES DAILY
Status: DISCONTINUED | OUTPATIENT
Start: 2018-11-29 | End: 2018-11-30

## 2018-11-29 RX ADMIN — OXYCODONE HYDROCHLORIDE 10 MG: 5 SOLUTION ORAL at 18:15

## 2018-11-29 RX ADMIN — ACETAMINOPHEN 975 MG: 325 SOLUTION ORAL at 14:07

## 2018-11-29 RX ADMIN — OXYCODONE HYDROCHLORIDE 10 MG: 5 SOLUTION ORAL at 09:36

## 2018-11-29 RX ADMIN — Medication 1 PACKET: at 12:39

## 2018-11-29 RX ADMIN — BACITRACIN: 500 OINTMENT TOPICAL at 20:13

## 2018-11-29 RX ADMIN — GABAPENTIN 300 MG: 300 CAPSULE ORAL at 20:10

## 2018-11-29 RX ADMIN — Medication 1 PACKET: at 16:55

## 2018-11-29 RX ADMIN — Medication 1 PACKET: at 20:13

## 2018-11-29 RX ADMIN — OXYCODONE HYDROCHLORIDE 10 MG: 5 SOLUTION ORAL at 14:08

## 2018-11-29 RX ADMIN — Medication 0.5 MG: at 17:09

## 2018-11-29 RX ADMIN — Medication 0.5 MG: at 12:58

## 2018-11-29 RX ADMIN — ACETAMINOPHEN 975 MG: 325 SOLUTION ORAL at 07:42

## 2018-11-29 RX ADMIN — FUROSEMIDE 20 MG: 10 INJECTION, SOLUTION INTRAVENOUS at 09:20

## 2018-11-29 RX ADMIN — GABAPENTIN 300 MG: 300 CAPSULE ORAL at 14:08

## 2018-11-29 RX ADMIN — METHOCARBAMOL TABLETS 500 MG: 500 TABLET, COATED ORAL at 14:08

## 2018-11-29 RX ADMIN — ENOXAPARIN SODIUM 40 MG: 40 INJECTION SUBCUTANEOUS at 14:08

## 2018-11-29 RX ADMIN — Medication 15 ML: at 07:42

## 2018-11-29 RX ADMIN — Medication 200 MG: at 20:17

## 2018-11-29 RX ADMIN — LIDOCAINE 2 PATCH: 560 PATCH PERCUTANEOUS; TOPICAL; TRANSDERMAL at 07:41

## 2018-11-29 RX ADMIN — Medication 200 MG: at 07:43

## 2018-11-29 RX ADMIN — LEVOTHYROXINE SODIUM 150 MCG: 300 TABLET ORAL at 07:43

## 2018-11-29 RX ADMIN — METHOCARBAMOL TABLETS 500 MG: 500 TABLET, COATED ORAL at 20:10

## 2018-11-29 RX ADMIN — GABAPENTIN 100 MG: 100 CAPSULE ORAL at 07:42

## 2018-11-29 RX ADMIN — BACITRACIN: 500 OINTMENT TOPICAL at 09:43

## 2018-11-29 RX ADMIN — METHOCARBAMOL TABLETS 500 MG: 500 TABLET, COATED ORAL at 07:42

## 2018-11-29 RX ADMIN — OXYCODONE HYDROCHLORIDE 10 MG: 5 SOLUTION ORAL at 00:24

## 2018-11-29 RX ADMIN — ACETAMINOPHEN 975 MG: 325 SOLUTION ORAL at 20:10

## 2018-11-29 RX ADMIN — ACETAMINOPHEN 975 MG: 325 SOLUTION ORAL at 02:28

## 2018-11-29 RX ADMIN — OXYCODONE HYDROCHLORIDE 10 MG: 5 SOLUTION ORAL at 04:26

## 2018-11-29 ASSESSMENT — ACTIVITIES OF DAILY LIVING (ADL)
ADLS_ACUITY_SCORE: 15
ADLS_ACUITY_SCORE: 15
ADLS_ACUITY_SCORE: 17

## 2018-11-29 ASSESSMENT — PAIN DESCRIPTION - DESCRIPTORS: DESCRIPTORS: PRESSURE

## 2018-11-29 NOTE — PLAN OF CARE
Problem: Patient Care Overview  Goal: Plan of Care/Patient Progress Review  Outcome: No Change    Shift Summary [id number]:     Neuro: Pt alert at times and lethargic at times. Pt oriented x4. Pt c/o abdominal pain - PRN Oxycodone 10mg given x2.     Cardiac: SR. HR 60s-70s. MAPs>65.     Respiratory: On high flow nasal cannula at 50%. Lung sounds clear with diminished bases.     GI/: Pt unable to void; at ~2300, pt bladder scanned and straight cath'd for 375ml of urine. Loose stools x2. J-tube to tube feeds at 50ml/hr. G-tube to gravity with minimal output.     Incisons: L neck incision CDI/TIFFANIE. Abdominal incision CDI/TIFFANIE. Left spit fistula appliance CDI with scant amount of serosanguinous output present. Right abdominal ANGELO with minimal serosanguinous drainage present; dressing changed once d/t leaking at site. Bilateral chest tubes to water seal with minimal output present.    Lines: R IJ x3. PIVx1.    Continue to monitor and update MD as needed. Continue plan of care.

## 2018-11-29 NOTE — PROGRESS NOTES
NUTRITION SERVICES - BRIEF NOTE    Per SICU rounds, pt having high stool output. Team would like to start fiber.    Implementations  Order 1 packet Nutrisource fiber QID to provide an additional 12 g soluble fiber    Monitoring  Nutrition will continue to follow and monitor per POC (see 11/23 RD note)    Paulina Cash, RD, LD  SICU RD Pgr: [phone]

## 2018-11-29 NOTE — PROGRESS NOTES
STAFF ADDENDUM:  I saw and evaluated Ms. Vaz and agree with the resident s findings and plan of care as documented in the resident s note and edited by me, as applicable.      In summary, Ms. Vaz is progressing well. WBC trending down. Still on HFNC  Plan   wean HFNC as tolearted  Diurese  continue TF    Ally Ybarra MD

## 2018-11-29 NOTE — PROGRESS NOTES
Thoracic Surgery Progress Note    Pt: Myrtle Vaz  MRN: [id number]   11/29/2018      24 hr events;   Remains high flow at 50%. Tolerating tube feeds.  Having bowel function. White count trending down.     Temp:  [97  F (36.1  C)-97.7  F (36.5  C)] 97.5  F (36.4  C)  Heart Rate:  [60-83] 63  Resp:  [7-38] 9  BP: ()/(46-78) 121/58  FiO2 (%):  [45 %-55 %] 50 %  SpO2:  [87 %-100 %] 95 %    I/O last 3 completed shifts:  In: 2582 [P.O.:65; I.V.:532; NG/GT:785]  Out: 1985 [Urine:1335; Emesis/NG output:115; Drains:265; Chest Tube:270]    FiO2 (%): 50 %  Resp: 9    Physical Exam:  Gen: NAD, sedated.  Pulm: On CPAP, nonlabored breathing   CV: Regular rate and rhythm.  Chest: CT output appears serosanguinous.                             - Incision:  C/D/I.  Abd: Abdomen soft, non-tender, non-distended    Labs reviewed in full; available in Epic  WBC 15.7 from 18.2    A/P: 73F s/p transhiatal esophagectomy with spit fistula, G tube, J tube on 11/21 for perforated esophagus after type 4 peraesophageal hernia repair.  Leukocytosis from unknown etiology, trending down.     -appreciate SICU cares with high oxygen requirements    -Continue TFs, clears for comfort   -lasix per SICU  -continue chest tubes to waterseal  -Antibiotics per discussion with thoracic and ICU attendings  -encourage ambulation  -ultimately to TCU    Seen with fellow who discussed with staff.     Nicky Park MD PGY3  General Surgery

## 2018-11-29 NOTE — PLAN OF CARE
Problem: Patient Care Overview  Goal: Plan of Care/Patient Progress Review  4A  Discharge Planner PT   Patient plan for discharge: discussed but did not specify preference  Current status: Min A supine > Sit with HOB raised, Sit <> stand with min A x 2 and FWW, Pt continues to be limited by dizziness but able to perform close 90 degree pivot transfers with FWW and CGA-min A x 2 with cues for safety and hand placement. Increased difficulty with min-mod A x 2 for sit <> stand from chair compared to taller surfaces. On HFNC at 45% FiO2 with SpO2 >90% when able to get accurate SpO2 reading. Recommend pt complete frequent ankle pumps while supine and up in chair.  Barriers to return to prior living situation: impaired functional mobility due to impaired strength, balance, edema, activity tolerance and pain  Recommendations for discharge: TCU  Rationale for recommendations: Impaired functional mobility limited by above deficits.        Entered by: Aixa Benitez 11/29/2018 5:50 PM

## 2018-11-29 NOTE — PLAN OF CARE
Problem: Patient Care Overview  Goal: Plan of Care/Patient Progress Review  Discharge Planner OT   Patient plan for discharge: TCU  Current status: Pt incontinent of BM, rolled side to side with max A and dependent for brief change and gonzalez cares while supine. Supine > EOB with step by step v/c for technique and mod A for managing UB. Pt sat up at EOB ~8 minutes with SBA. Stood from EOB with min-mod A x 1, unsteady on feet and fatiguing quickly. Pt stood x 4 during tx with A x 1. Pt lifted to chair, completed 4 AROM UE ex x 10 reps. VSS on 55% HFNC though pt dizzy at times.   Barriers to return to prior living situation: Medical status, post surgical precautions, deconditioning/decreased ADL I   Recommendations for discharge: TCU  Rationale for recommendations: To address above deficits and facilitate return to functional baseline.        Entered by: Blanca Billings 11/29/2018 9:39 AM     OT 4A

## 2018-11-29 NOTE — PLAN OF CARE
Problem: Patient Care Overview  Goal: Plan of Care/Patient Progress Review  Outcome: No Change  Neuro: Disoriented to time, lethargic this evening from pain meds/seroquel  Resp: Hi-flow NC fio2 50%, unable to perform incentive spirometer  Cardio: Sinus rhythm, hemodynamically stable, 2 chest tubes to waterseal.  GI: 2 smears, G-tube, J-tube, TF goals  : Mora removed this am, lasix given, unable to void, bladder scanned and straight cathed around 1400, passed swallow eval  Skin: ANGELO, spit fisutla  Access: internal jugular cvc, piv  Drips: TKO  Phosphorus replaced per protocol.  Up in chair x2 with lift.  P: Continue current cares.  Notify SICU with concerns.

## 2018-11-29 NOTE — PROGRESS NOTES
SURGICAL ICU PROGRESS NOTE          Date of Service (when I saw the patient): 11/29/2018    ASSESSMENT: Myrtle Vaz is a 73 year old female who was admitted on 11/21/2018 to [facility]. She was transferred from [facility] where she presented with respiratory failure thought to be secondary to large hiatal hernia and underwent s/p laparoscopic hiatal hernia repair with mesh reinforcement, laparoscopic nissen fundoplication, and laparoscopic gastrostomy tube placement, which was complicated by pleural effusions and possible esophageal leak,  s/p placement of chest tubes x3. Taken to OR with thoracic and found to have perforation at distal esophagus at GE juncton and underwent s/p Midline laparotomy, Trans-hiatal esophagogasterectomy, gastrostomy, J-tube placement, G-tube placement, bilateral pleural chest tube placement, mediastinal abcess drainage, spit fistula creation, Esophagastrodueodenoscopy, intraoperative course complicated by SVT required intraoperative cardioversion x3. Reintubated on 11/23/18 for AMS, A-fib with RVR s/p cardioversiondue to hypoxia        CHANGES and MAJOR THINGS TODAY:   Increased gabapentin to 300 TID  Seroquel changed to PRN at bedtime   Lasix 20 mg IV once  Add bulking stool agents  Sat O2 goals > 92%  Wean HFNC to FiO2 <50% as able   Possible transfer to floor      PLAN:  # acute post operative pain   # delirium   - Monitor neurological status. Notify the MD for any acute changes in exam.  - pt reported to have delirium prior to transfer from OSH. Continue efforts with delirium prevention and re-orientation   - Seroquel 25 mg PRN  at HS   - Pain: PRN oxycodone, PRN hydromorphone, tylenol. Lidoderm patches, Robaxin, gabapentin          Pulmonary:   # acute hypoxic resp failure. extubated 11/22/18 re intubated 11/23/18  -Extubated 11/27.   -Continue high flow NC for added PEEP   -Supplemental oxygen to keep saturation above 92 %.  -Chest tube management per CVTS. Currently on waterseal           Cardiovascular:    # Atrial fibrillation, unstable, intraoperative cardioversion x3   - hx on metoprolol during this admission, OSH records reviewed. Appears metoprolol not PTA medication, cont with amiodarone PO.  -Repeat EKG stable. QTc 410  # distubtive shock-- Off all pressors   # hx of hypertension- hold home diuretic, resume as needed. Will give one time dose of 20 mg again this AM   # hx of hyperlipidemia- resume home  statin       Gastroenterology/Nutrition:  # S/p hiatal hernia complicated by distal esophageal leak now s/p Midline laparotomy, trans-hiatal esophagogasterectomy, gastrostomy, J-tube placement, G-tube placement, bilateral pleural chest tube placement, mediastinal abcess drainage, spit fistula creation, esophagastrodueodenoscopy   # s/p G tube   # s/ J tube  # protein-calorie deficit malnutrition   - G tube to gravity.   - J-tube- feeds. Now at goal   -SLP consult for swallow eval. Ice chips ok for comfort if passed   - s/p enemas x3 days now with liquid BM's. Hold bowel regimen given frequency of stools. C. Diff negative       Fluids/Electrolytes:   # hypernatremia- improved   # hypophosphatemia- resolved   -Na 143. Continue free water via J tube to 40 ml q 2hours   -TKO IVF.   -electrolyte replacement protocol.   -Keep K > 4.0. & Mag > 2.0 given recurrent atrial fibrillation       Renal:  # HANG, resolving   - Will continue to monitor intake and output continue with lopez for now       Endocrine:  #hypothyroidism   -Home synthroid 150mcg resumed   -no indication for sliding scale      ID/Antibiotics:  #Leukocytosis 18.2 today, downtrending    # mediastinal pleural effusion   Zosyn and  and Micafungin completed 4 day on 11/25  Vancomycin d/phani 11/22/18  -Pan cultured 11/25- growing candida only   -Repeat Chest/ABD/Pelvis showing fluid collection along esophagectomy site, does not appear infectious  - C.diff negative.         Heme:     # Acute blood loss anemia   - monitor hgb. Transfuse  if hgb <7.0 or signs/symptoms of hypoperfusion. Monitor and trend.       Musculoskeletal:  # weakness and deconditioning of deconditioning   - Physical and occupational therapy consult       General Cares/Prophylaxis:    DVT Prophylaxis: Lovenox 40mg subcutaneous    GI Prophylaxis: Protonix   Restraints: Restraints for medical healing needed: Yes--reaching for ETT tube and other lines       Lines/ tubes/ drains:  - PIV's  - central line   - ANGELO drain x1  - chest tube x2  -G tube  -J tube       Disposition:  - Surgical ICU.       Time spent on this Encounter   Billing:  I spent  45 minutes bedside and on the inpatient unit today managing the care of Myrtle Vaz in relation to the issues listed in this note.          Ayo Magana, NP-CAROLYN     ====================================  INTERVAL HISTORY:  Course reviewed. No acute events overnight. Pain improved. Tearful and concerned surrounding current events. Slight SOB noted.       OBJECTIVE:   1. VITAL SIGNS:   Temp:  [97  F (36.1  C)-97.7  F (36.5  C)] 97.4  F (36.3  C)  Heart Rate:  [60-83] 74  Resp:  [7-26] 14  BP: ()/(46-78) 114/50  FiO2 (%):  [50 %-60 %] 50 %  SpO2:  [87 %-98 %] 94 %  FiO2 (%): 50 % (30 LPM)  Resp: 14    2. INTAKE/ OUTPUT:   I/O last 3 completed shifts:  In: 2582 [P.O.:65; I.V.:532; NG/GT:785]  Out: 1985 [Urine:1335; Emesis/NG output:115; Drains:265; Chest Tube:270]    3. PHYSICAL EXAMINATION:  General: Awake, alert, NAD   HEENT: pupils equal.   Left neck incision dressed, staples intact  Neuro: A/O x 4. No focal deficits noted   Pulm/Resp: Crackles noted, mild insp wheeze breath  Chest tube x2, no air leak--seroussang  fluid noted.   Chest: spit fistula with red tinged secretions.   CV: RRR, S1, S2, no murmurs, rubs or gallops.   Abdomen: abdominal incision without redness, warmth or drainage. Mediastinal drain with thin pinkish serous drainage. G tube to gravity drainage with dark bilious output. J tube with feeds infusing   : (+) lopez  catheter in place, urine yellow and clear  MSK/Extremities: HARRIS.  peripheral edema, moving all extremities, peripheral pulses intact, calves soft and non-tender, extremities well perfused.     4. INVESTIGATIONS:   Arterial Blood Gases     Recent Labs  Lab 11/26/18 0345 11/25/18 0411 11/24/18 0344 11/23/18  1533   PH 7.44 7.45 7.38 7.31*   PCO2 38 38 44 45   PO2 78* 85 77* 73*   HCO3 26 26 26 23     Complete Blood Count     Recent Labs  Lab 11/29/18 0344 11/28/18 0338 11/27/18 0351 11/26/18 0346   WBC 15.7* 18.2* 19.7* 18.1*   HGB 7.5* 7.4* 8.1* 7.8*   * 589* 503* 433     Basic Metabolic Panel    Recent Labs  Lab 11/29/18 0344 11/28/18 0338 11/27/18 0351 11/26/18 0346    144 145* 146*   POTASSIUM 4.2 3.9 4.0 3.6   CHLORIDE 112* 112* 114* 114*   CO2 27 27 23 26   BUN 38* 31* 32* 30   CR 0.81 0.75 0.77 0.86   * 116* 130* 137*     Liver Function Tests    Recent Labs  Lab 11/29/18 0344 11/28/18 0338 11/27/18 0351 11/26/18 0346 11/25/18 0411   AST  --   --   --   --  26   ALT  --   --   --   --  29   ALKPHOS  --   --   --   --  78   BILITOTAL  --   --   --   --  0.3   ALBUMIN  --   --   --   --  1.3*   INR 1.12 1.17* 1.18* 1.22* 1.27*     Pancreatic Enzymes  No lab results found in last 7 days.  Coagulation Profile    Recent Labs  Lab 11/29/18 0344 11/28/18 0338 11/27/18 0351 11/26/18 0346   INR 1.12 1.17* 1.18* 1.22*           =========================================

## 2018-11-30 ENCOUNTER — APPOINTMENT (OUTPATIENT)
Dept: GENERAL RADIOLOGY | Facility: CLINIC | Age: 73
DRG: 326 | End: 2018-11-30
Attending: PHYSICIAN ASSISTANT
Payer: MEDICARE

## 2018-11-30 ENCOUNTER — APPOINTMENT (OUTPATIENT)
Dept: PHYSICAL THERAPY | Facility: CLINIC | Age: 73
DRG: 326 | End: 2018-11-30
Attending: THORACIC SURGERY (CARDIOTHORACIC VASCULAR SURGERY)
Payer: MEDICARE

## 2018-11-30 ENCOUNTER — APPOINTMENT (OUTPATIENT)
Dept: SPEECH THERAPY | Facility: CLINIC | Age: 73
DRG: 326 | End: 2018-11-30
Attending: THORACIC SURGERY (CARDIOTHORACIC VASCULAR SURGERY)
Payer: MEDICARE

## 2018-11-30 ENCOUNTER — APPOINTMENT (OUTPATIENT)
Dept: OCCUPATIONAL THERAPY | Facility: CLINIC | Age: 73
DRG: 326 | End: 2018-11-30
Attending: THORACIC SURGERY (CARDIOTHORACIC VASCULAR SURGERY)
Payer: MEDICARE

## 2018-11-30 LAB
ABO + RH BLD: NORMAL
ABO + RH BLD: NORMAL
ANION GAP SERPL CALCULATED.3IONS-SCNC: 6 MMOL/L (ref 3–14)
BLD GP AB SCN SERPL QL: NORMAL
BLOOD BANK CMNT PATIENT-IMP: NORMAL
BUN SERPL-MCNC: 45 MG/DL (ref 7–30)
CALCIUM SERPL-MCNC: 7.3 MG/DL (ref 8.5–10.1)
CHLORIDE SERPL-SCNC: 108 MMOL/L (ref 94–109)
CO2 SERPL-SCNC: 29 MMOL/L (ref 20–32)
CREAT SERPL-MCNC: 0.95 MG/DL (ref 0.52–1.04)
ERYTHROCYTE [DISTWIDTH] IN BLOOD BY AUTOMATED COUNT: 19 % (ref 10–15)
GFR SERPL CREATININE-BSD FRML MDRD: 58 ML/MIN/1.7M2
GLUCOSE BLDC GLUCOMTR-MCNC: 107 MG/DL (ref 70–99)
GLUCOSE SERPL-MCNC: 90 MG/DL (ref 70–99)
HCT VFR BLD AUTO: 26.4 % (ref 35–47)
HGB BLD-MCNC: 8 G/DL (ref 11.7–15.7)
MAGNESIUM SERPL-MCNC: 2.3 MG/DL (ref 1.6–2.3)
MCH RBC QN AUTO: 31.3 PG (ref 26.5–33)
MCHC RBC AUTO-ENTMCNC: 30.3 G/DL (ref 31.5–36.5)
MCV RBC AUTO: 103 FL (ref 78–100)
PHOSPHATE SERPL-MCNC: 3.6 MG/DL (ref 2.5–4.5)
PLATELET # BLD AUTO: 788 10E9/L (ref 150–450)
POTASSIUM SERPL-SCNC: 4.5 MMOL/L (ref 3.4–5.3)
RBC # BLD AUTO: 2.56 10E12/L (ref 3.8–5.2)
SODIUM SERPL-SCNC: 142 MMOL/L (ref 133–144)
SPECIMEN EXP DATE BLD: NORMAL
WBC # BLD AUTO: 16.2 10E9/L (ref 4–11)

## 2018-11-30 PROCEDURE — 92526 ORAL FUNCTION THERAPY: CPT | Mod: GN

## 2018-11-30 PROCEDURE — 40000047 ZZH STATISTIC CTO2 CONT OXYGEN TECH TIME EA 90 MIN

## 2018-11-30 PROCEDURE — 85027 COMPLETE CBC AUTOMATED: CPT | Performed by: SURGERY

## 2018-11-30 PROCEDURE — 97535 SELF CARE MNGMENT TRAINING: CPT | Mod: GO | Performed by: OCCUPATIONAL THERAPIST

## 2018-11-30 PROCEDURE — 86900 BLOOD TYPING SEROLOGIC ABO: CPT | Performed by: STUDENT IN AN ORGANIZED HEALTH CARE EDUCATION/TRAINING PROGRAM

## 2018-11-30 PROCEDURE — 36415 COLL VENOUS BLD VENIPUNCTURE: CPT | Performed by: SURGERY

## 2018-11-30 PROCEDURE — 36415 COLL VENOUS BLD VENIPUNCTURE: CPT | Performed by: STUDENT IN AN ORGANIZED HEALTH CARE EDUCATION/TRAINING PROGRAM

## 2018-11-30 PROCEDURE — 86850 RBC ANTIBODY SCREEN: CPT | Performed by: STUDENT IN AN ORGANIZED HEALTH CARE EDUCATION/TRAINING PROGRAM

## 2018-11-30 PROCEDURE — 25000128 H RX IP 250 OP 636: Performed by: NURSE PRACTITIONER

## 2018-11-30 PROCEDURE — 84100 ASSAY OF PHOSPHORUS: CPT | Performed by: SURGERY

## 2018-11-30 PROCEDURE — 00000146 ZZHCL STATISTIC GLUCOSE BY METER IP

## 2018-11-30 PROCEDURE — 27210437 ZZH NUTRITION PRODUCT SEMIELEM INTERMED LITER

## 2018-11-30 PROCEDURE — 99233 SBSQ HOSP IP/OBS HIGH 50: CPT | Performed by: NURSE PRACTITIONER

## 2018-11-30 PROCEDURE — 40000225 ZZH STATISTIC SLP WARD VISIT

## 2018-11-30 PROCEDURE — A9270 NON-COVERED ITEM OR SERVICE: HCPCS | Mod: GY | Performed by: PHYSICIAN ASSISTANT

## 2018-11-30 PROCEDURE — 40000133 ZZH STATISTIC OT WARD VISIT: Performed by: OCCUPATIONAL THERAPIST

## 2018-11-30 PROCEDURE — A9270 NON-COVERED ITEM OR SERVICE: HCPCS | Mod: GY

## 2018-11-30 PROCEDURE — 40000193 ZZH STATISTIC PT WARD VISIT

## 2018-11-30 PROCEDURE — 80048 BASIC METABOLIC PNL TOTAL CA: CPT | Performed by: SURGERY

## 2018-11-30 PROCEDURE — 86901 BLOOD TYPING SEROLOGIC RH(D): CPT | Performed by: STUDENT IN AN ORGANIZED HEALTH CARE EDUCATION/TRAINING PROGRAM

## 2018-11-30 PROCEDURE — 25000132 ZZH RX MED GY IP 250 OP 250 PS 637: Mod: GY | Performed by: NURSE PRACTITIONER

## 2018-11-30 PROCEDURE — 97530 THERAPEUTIC ACTIVITIES: CPT | Mod: GP

## 2018-11-30 PROCEDURE — 25000132 ZZH RX MED GY IP 250 OP 250 PS 637: Mod: GY | Performed by: PHYSICIAN ASSISTANT

## 2018-11-30 PROCEDURE — 12000006 ZZH R&B IMCU INTERMEDIATE UMMC

## 2018-11-30 PROCEDURE — 83735 ASSAY OF MAGNESIUM: CPT | Performed by: SURGERY

## 2018-11-30 PROCEDURE — 71046 X-RAY EXAM CHEST 2 VIEWS: CPT

## 2018-11-30 PROCEDURE — 40000275 ZZH STATISTIC RCP TIME EA 10 MIN

## 2018-11-30 PROCEDURE — A9270 NON-COVERED ITEM OR SERVICE: HCPCS | Mod: GY | Performed by: NURSE PRACTITIONER

## 2018-11-30 PROCEDURE — 25000132 ZZH RX MED GY IP 250 OP 250 PS 637: Mod: GY

## 2018-11-30 RX ORDER — GUAR GUM
2 PACKET (EA) ORAL 3 TIMES DAILY
Status: DISCONTINUED | OUTPATIENT
Start: 2018-11-30 | End: 2018-12-06

## 2018-11-30 RX ORDER — FUROSEMIDE 10 MG/ML
20 INJECTION INTRAMUSCULAR; INTRAVENOUS ONCE
Status: COMPLETED | OUTPATIENT
Start: 2018-11-30 | End: 2018-11-30

## 2018-11-30 RX ADMIN — ACETAMINOPHEN 975 MG: 325 SOLUTION ORAL at 03:09

## 2018-11-30 RX ADMIN — OXYCODONE HYDROCHLORIDE 10 MG: 5 SOLUTION ORAL at 09:26

## 2018-11-30 RX ADMIN — GABAPENTIN 300 MG: 300 CAPSULE ORAL at 14:27

## 2018-11-30 RX ADMIN — OXYCODONE HYDROCHLORIDE 10 MG: 5 SOLUTION ORAL at 18:25

## 2018-11-30 RX ADMIN — Medication 2 PACKET: at 20:20

## 2018-11-30 RX ADMIN — Medication 2 PACKET: at 14:28

## 2018-11-30 RX ADMIN — ENOXAPARIN SODIUM 40 MG: 40 INJECTION SUBCUTANEOUS at 14:28

## 2018-11-30 RX ADMIN — ACETAMINOPHEN 975 MG: 325 SOLUTION ORAL at 14:27

## 2018-11-30 RX ADMIN — FUROSEMIDE 20 MG: 10 INJECTION, SOLUTION INTRAVENOUS at 09:25

## 2018-11-30 RX ADMIN — GABAPENTIN 300 MG: 300 CAPSULE ORAL at 20:21

## 2018-11-30 RX ADMIN — Medication 15 ML: at 07:57

## 2018-11-30 RX ADMIN — Medication 200 MG: at 07:57

## 2018-11-30 RX ADMIN — OXYCODONE HYDROCHLORIDE 10 MG: 5 SOLUTION ORAL at 14:27

## 2018-11-30 RX ADMIN — Medication 0.5 MG: at 12:26

## 2018-11-30 RX ADMIN — OXYCODONE HYDROCHLORIDE 10 MG: 5 SOLUTION ORAL at 04:53

## 2018-11-30 RX ADMIN — OXYCODONE HYDROCHLORIDE 10 MG: 5 SOLUTION ORAL at 00:34

## 2018-11-30 RX ADMIN — METHOCARBAMOL TABLETS 500 MG: 500 TABLET, COATED ORAL at 20:19

## 2018-11-30 RX ADMIN — BACITRACIN: 500 OINTMENT TOPICAL at 08:09

## 2018-11-30 RX ADMIN — METHOCARBAMOL TABLETS 500 MG: 500 TABLET, COATED ORAL at 14:27

## 2018-11-30 RX ADMIN — LIDOCAINE 2 PATCH: 560 PATCH PERCUTANEOUS; TOPICAL; TRANSDERMAL at 07:58

## 2018-11-30 RX ADMIN — GABAPENTIN 300 MG: 300 CAPSULE ORAL at 07:57

## 2018-11-30 RX ADMIN — METHOCARBAMOL TABLETS 500 MG: 500 TABLET, COATED ORAL at 07:56

## 2018-11-30 RX ADMIN — Medication 0.5 MG: at 07:43

## 2018-11-30 RX ADMIN — Medication 200 MG: at 20:21

## 2018-11-30 RX ADMIN — LEVOTHYROXINE SODIUM 150 MCG: 300 TABLET ORAL at 07:58

## 2018-11-30 RX ADMIN — ACETAMINOPHEN 975 MG: 325 SOLUTION ORAL at 20:22

## 2018-11-30 RX ADMIN — ACETAMINOPHEN 975 MG: 325 SOLUTION ORAL at 07:57

## 2018-11-30 RX ADMIN — Medication 1 PACKET: at 08:07

## 2018-11-30 RX ADMIN — BACITRACIN: 500 OINTMENT TOPICAL at 20:22

## 2018-11-30 ASSESSMENT — ACTIVITIES OF DAILY LIVING (ADL)
ADLS_ACUITY_SCORE: 18
ADLS_ACUITY_SCORE: 20
ADLS_ACUITY_SCORE: 18
ADLS_ACUITY_SCORE: 18
ADLS_ACUITY_SCORE: 21
ADLS_ACUITY_SCORE: 21

## 2018-11-30 ASSESSMENT — PAIN DESCRIPTION - DESCRIPTORS
DESCRIPTORS: PRESSURE
DESCRIPTORS: PATIENT UNABLE TO DESCRIBE
DESCRIPTORS: DISCOMFORT

## 2018-11-30 NOTE — PLAN OF CARE
Problem: Patient Care Overview  Goal: Plan of Care/Patient Progress Review  Discharge Planner SLP   Patient plan for discharge: none stated  Current status: SLP: Pt with functional tolerance of clear liquids. Pt remains appropriate for clear liquids from an oropharyngeal standpoint. Pt should be fully upright for all PO, take small sips/bites, and pace self. Will defer further diet advancement for pleasure to Thoracic team.   Barriers to return to prior living situation: none per ST  Recommendations for discharge: defer to team  Rationale for recommendations: swallow function not impacting discharge plans       Entered by: Siena Hurst 11/30/2018 2:28 PM

## 2018-11-30 NOTE — PLAN OF CARE
Problem: Patient Care Overview  Goal: Plan of Care/Patient Progress Review  Discharge Planner OT   Patient plan for discharge: TCU  Current status: Pt required Max A for bed mobiltiy and hygiene after Pt unknowingly had BM in bed. Mod A to stand and complete tx to BSC as well as tx to recliner. Pt c/o dizziness and feeling hot, VSS througout.   Barriers to return to prior living situation: weakness, post surgical precautions  Recommendations for discharge: TCU  Rationale for recommendations: to increase ADL I and tolerance       Entered by: Hunter Jade 11/30/2018 10:14 AM

## 2018-11-30 NOTE — PLAN OF CARE
Problem: PT General Care Plan  Goal: PT Frequency  Discharge Planner PT 4A  Patient plan for discharge: Not discussed this date  Current status: Pt min A for supine>sit, min-mod A for STS depending on surface height. SPT from bed to w/c at bedside with FWW and min A x 2. Maintaining O2 sats 95%+ on 8LPM via oxyplus mask with activity.  Barriers to return to prior living situation: Dec functional mobility due to decreased strength, endurance, activity tolerance  Recommendations for discharge: TCU  Rationale for recommendations: Functional mobility well below baseline.        Entered by: Tal Cid 11/30/2018 3:25 PM

## 2018-11-30 NOTE — PLAN OF CARE
Problem: Patient Care Overview  Goal: Plan of Care/Patient Progress Review  Outcome: Improving  Neuro: Disoriented to time  Resp: Weaned to 45% fio2 high flow NC, 1 chest tube removed, 1 remaining  Cardio: Hemodynamically stable  GI: 3 loose BMs, TF goal, J-tube, G-tube  : Straight cathed this afternoon, 1 incontinence void this evening  Access: 2 PIVs, will remove CVC  Skin: ANGELO, spit fistula  P: Continue current cares. Notify SICU with concerns.

## 2018-11-30 NOTE — PROGRESS NOTES
SURGICAL ICU PROGRESS NOTE          Date of Service (when I saw the patient): 11/30/2018    ASSESSMENT: Myrtle Vaz is a 73 year old female who was admitted on 11/21/2018 to [facility]. She was transferred from [facility] where she presented with respiratory failure thought to be secondary to large hiatal hernia and underwent s/p laparoscopic hiatal hernia repair with mesh reinforcement, laparoscopic nissen fundoplication, and laparoscopic gastrostomy tube placement, which was complicated by pleural effusions and possible esophageal leak,  s/p placement of chest tubes x3. Taken to OR with thoracic and found to have perforation at distal esophagus at  juncton and underwent s/p Midline laparotomy, Trans-hiatal esophagogastrectomy, gastrostomy, J-tube placement,, bilateral pleural chest tube placement, mediastinal abcess drainage, spit fistula creation. Intraoperative course complicated by SVT required intraoperative cardioversion x3. Reintubated on 11/23/18 for AMS, A-fib with RVR s/p cardioversion     CHANGES and MAJOR THINGS TODAY:   HFNC at 40% FiO2  Amiodarone duration per primary team  Lasix 20 mg IV today. Goal net even to negative.  Transfer to [facility]      PLAN:  # acute post operative pain   # delirium   - Monitor neurological status. Notify the MD for any acute changes in exam.  - pt reported to have delirium prior to transfer from OSH. Continue efforts with delirium prevention and re-orientation   - Seroquel 25 mg PRN  at HS   - Pain: PRN oxycodone, PRN hydromorphone, scheduled tylenol. Lidoderm patches, Robaxin, gabapentin           Pulmonary:   # Acute hypoxic resp failure- extubated 11/22/18 re intubated 11/23-11/27  -Continue high flow NC for added PEEP. FiO2 at 40%.  -Supplemental oxygen to keep saturation above 92 %.  -Chest tube management per CVTS. Currently R CT on waterseal      Cardiovascular:    # Atrial fibrillation, intraoperative cardioversion x3   - Cont with amiodarone PO. Length of  treatment per primary team.   -Repeat EKG stable. QTc 410  # distributive shock-- Resolved. Off all pressors   # hx of hypertension- hold home diuretic. Give additional 20 mg of Lasix IV x 1.  # hx of hyperlipidemia- statin currently on hold.       Gastroenterology/Nutrition:  # S/p hiatal hernia complicated by distal esophageal leak now s/p Midline laparotomy, trans-hiatal esophagogastrectomy, gastrostomy, J-tube placement, bilateral pleural chest tube placement, mediastinal abcess drainage, spit fistula creation  # protein-calorie deficit malnutrition   - G tube to gravity.   - J-tube- feeds. Now at goal   - s/p enemas x3 days now with multiple BM's. Hold bowel regimen given frequency of stools. C. Diff negative 11/27    Fluids/Electrolytes:   # hypernatremia- improved   # hypophosphatemia- resolved   -Na 142. Continue free water via J tube to 40 ml q 2hours   -TKO IVF.   -electrolyte replacement protocol.   -Keep K > 4.0. & Mag > 2.0 given recurrent atrial fibrillation       Renal:  # HANG, resolved  - Will continue to monitor intake and output   - creatinine now normalized.   - Give 20 mg of Lasix IV x1 today      Endocrine:  #hypothyroidism   -Home synthroid 150mcg resumed    -no indication for sliding scale insulin      ID/Antibiotics:  #Leukocytosis   # mediastinal pleural effusion   - Zosyn and  and Micafungin completed 4 day on 11/25. Vancomycin d/phani 11/22/18  -Pan cultured 11/25- growing candida in sputum only.   - WBC remains elevated 16. Afebrile.   - No indication for antimicrobials.   - C.diff negative.         Heme:     # Acute blood loss anemia   - monitor hgb. Transfuse if hgb <7.0 or signs/symptoms of hypoperfusion. Monitor and trend. Hemoglobin stable at 8 today.       Musculoskeletal:  # weakness and deconditioning of deconditioning   - Physical and occupational therapy consult       General Cares/Prophylaxis:    DVT Prophylaxis: Lovenox 40mg subcutaneous    GI Prophylaxis: none  Restraints: no  restraints      Lines/ tubes/ drains:  - PIV's  - central line   - ANGELO drain x1  - chest tube x1  -G tube  -J tube       Disposition:  - to floor      Time spent on this Encounter   Billing:  I spent  30 minutes bedside and on the inpatient unit today managing the care of Myrtle Vaz in relation to the issues listed in this note.          Rubi De La Mater    ====================================  INTERVAL HISTORY:  Course reviewed. No acute events overnight. Pain improved. Feels some dyspnea with exertion, not at rest. Denies abdominal pain, nausea, vomiting, chest pain.       OBJECTIVE:   1. VITAL SIGNS:   Temp:  [97.4  F (36.3  C)-98.2  F (36.8  C)] 98.2  F (36.8  C)  Heart Rate:  [60-81] 81  Resp:  [8-28] 10  BP: (102-128)/() 109/59  FiO2 (%):  [45 %-50 %] 45 %  SpO2:  [91 %-99 %] 95 %  FiO2 (%): 45 %  Resp: 10    2. INTAKE/ OUTPUT:   I/O last 3 completed shifts:  In: 2160 [NG/GT:960]  Out: 2010 [Urine:1550; Drains:305; Chest Tube:155]    3. PHYSICAL EXAMINATION:  General: Awake, alert, NAD   HEENT: pupils equal.   Left neck incision dressed, staples intact  Neuro: A/O x 4. No focal deficits noted   Pulm/Resp: Crackles noted, mild insp wheeze breath  Chest tube x1, no air leak--serosang  fluid noted.   Chest: spit fistula with red tinged secretions.   CV: RRR, S1, S2, no murmurs, rubs or gallops.   Abdomen: abdominal incision without redness, warmth or drainage. Mediastinal drain with thin pinkish serous drainage. G tube to gravity drainage with dark bilious output. J tube with feeds infusing   : no lopez in place.   MSK/Extremities: HARRIS. peripheral edema, moving all extremities, peripheral pulses intact, calves soft and non-tender, extremities well perfused.     4. INVESTIGATIONS:   Arterial Blood Gases     Recent Labs  Lab 11/26/18  0345 11/25/18  0411 11/24/18  0344 11/23/18  1533   PH 7.44 7.45 7.38 7.31*   PCO2 38 38 44 45   PO2 78* 85 77* 73*   HCO3 26 26 26 23     Complete Blood Count     Recent  Labs  Lab 11/30/18 0412 11/29/18 0344 11/28/18 0338 11/27/18 0351   WBC 16.2* 15.7* 18.2* 19.7*   HGB 8.0* 7.5* 7.4* 8.1*   * 691* 589* 503*     Basic Metabolic Panel    Recent Labs  Lab 11/30/18 0412 11/29/18 0344 11/28/18 0338 11/27/18 0351    143 144 145*   POTASSIUM 4.5 4.2 3.9 4.0   CHLORIDE 108 112* 112* 114*   CO2 29 27 27 23   BUN 45* 38* 31* 32*   CR 0.95 0.81 0.75 0.77   GLC 90 106* 116* 130*     Liver Function Tests    Recent Labs  Lab 11/29/18 0344 11/28/18 0338 11/27/18 0351 11/26/18 0346 11/25/18 0411   AST  --   --   --   --  26   ALT  --   --   --   --  29   ALKPHOS  --   --   --   --  78   BILITOTAL  --   --   --   --  0.3   ALBUMIN  --   --   --   --  1.3*   INR 1.12 1.17* 1.18* 1.22* 1.27*     Pancreatic Enzymes  No lab results found in last 7 days.  Coagulation Profile    Recent Labs  Lab 11/29/18 0344 11/28/18 0338 11/27/18 0351 11/26/18 0346   INR 1.12 1.17* 1.18* 1.22*           =========================================

## 2018-11-30 NOTE — PROGRESS NOTES
CLINICAL NUTRITION SERVICES - REASSESSMENT NOTE      Nutrition Prescription     RECOMMENDATIONS FOR MDs/PROVIDERS TO ORDER:  None     Malnutrition Status:    Non-severe malnutrition in the context of acute illness     Recommendations already ordered by Registered Dietitian (RD):  Increase NutriSource fiber to 2 pkt TID (18 g soluble fiber)     Future/Additional Recommendations:  1.  Continue Clear liquid diet, per SLP.   2.  Monitor for diet for advancement and need for ONS.      EVALUATION OF THE PROGRESS TOWARD GOALS   Diet: Clear liquid diet     Nutrition Support: Impact Peptide @ goal 50 ml/hr (1200 ml/day) to provide 1800 kcals (28 kcal/kg/day), 113 g PRO (1.7 gm/kg/day), 924 ml free H2O, 77 g Fat (50% from MCTs), 168 g CHO + one packet Nutrisource fiber QID.     Intake: 6 day infusion received average = 846 mL, 71% of goal rate to provide 1278 kcals (20 kcals/kg) and 80 g protein (1.2 g pro/kg). TF is running at goal rate now and patient tolerating.         NEW FINDINGS   Weight: Today's weight of 100.4 kg is up from admit (11/21) weight of 89.5 kg, likely related to fluid.      GI/: Patient experiencing high stool output. Fiber started 11/29 and increased today per SICU rounds. Feeding through J-tube. G-tube clamped.      Labs: BUN = 45 (H)     Meds: Lasix, Nutrisource fiber     Resp:   11/21: INTUB  11/22: EXTUB  11/23: Re-intubated   11/27: EXTUB     MALNUTRITION  % Intake: </= 50% for >/= 5 days (severe)  % Weight Loss: None noted  Subcutaneous Fat Loss: None observed  Muscle Loss: None observed  Fluid Accumulation/Edema: Mild  Malnutrition Diagnosis: Non-severe malnutrition in the context of acute illness     Previous Goals   Diet adv v nutrition support within 2-3 days.  Evaluation: Not met     Previous Nutrition Diagnosis  Inadequate protein-energy intake related to NPO as evidenced by pt meeting 0% kcal/PRO needs at present.   Evaluation: No longer applicable, nutrition diagnosis changed  below     CURRENT NUTRITION DIAGNOSIS  Inadequate protein-energy intake related to slow TF advancement/TF interruption as evidenced by 6 day average intake of 13 kcal/kg and 1.2 g PRO/kg daily.      INTERVENTIONS  Implementation  Collaboration with other providers - Increase NutriSource fiber to 2 pkt TID (18 g soluble fiber) per SICU rounds     Goals  Total avg nutritional intake to meet a minimum of 25 kcal/kg and 1.5 g PRO/kg daily (per dosing wt 65 kg).     Monitoring/Evaluation  Progress toward goals will be monitored and evaluated per protocol.     Nhi Zavaleta  Dietetic Intern

## 2018-11-30 NOTE — PLAN OF CARE
Problem: Patient Care Overview  Goal: Plan of Care/Patient Progress Review  Outcome: Improving  Neuro: Disoriented to time, afebrile  Resp: High flow NC 40%, productive cough, 1 chest tube to waterseal, 2 view chest xray obtained  Cardio: Sinus rhyhm, hemodynamically stable  GI: Tf goal, G-tube, J-tube, sips of clears, 2 stools (1 was incontinent)  : Voiding  Skin: Spit fistula, ANGELO  Access: 2 PIVs saline locked  Up to chair with 2.  P: Report given to RN on 6B. Belongings sent with pt.  Pt transferred to 6B on cardiac monitoring with RN.

## 2018-11-30 NOTE — PROGRESS NOTES
Thoracic Surgery Progress Note    Pt: Myrtle Vaz  MRN: [id number]   11/30/2018    24 hr events;   Remains high flow at 45%. Tolerating tube feeds. Voiding adequately. Having bowel function.     Temp:  [97.4  F (36.3  C)-98.2  F (36.8  C)] 97.5  F (36.4  C)  Heart Rate:  [60-81] 62  Resp:  [8-27] 15  BP: ()/() 116/65  FiO2 (%):  [40 %-50 %] 40 %  SpO2:  [91 %-97 %] 96 %    I/O last 3 completed shifts:  In: 2325 [P.O.:75; NG/GT:1050]  Out: 1635 [Urine:1050; Emesis/NG output:10; Drains:445; Chest Tube:130]    FiO2 (%): 40 %  Resp: 15    Physical Exam:  Gen: NAD, sitting in a chair comfortably  Pulm: Nonlabored breathing   CV: Regular rate and rhythm.  Chest: Right chest tube appears serosanguinous.                             - Incision:  C/D/I.  Abd: Abdomen soft, non-tender, non-distended    Labs reviewed in full; available in Epic   WBC 16.2 (15.7)    A/P: 73F s/p transhiatal esophagectomy with spit fistula, G tube, J tube on 11/21 for perforated esophagus after type 4 peraesophageal hernia repair.  Leukocytosis from unknown etiology.     -Continue TF@50, clears for comfort   -Lasix 20 BID today   -continue right chest tubes to waterseal  -No abx, monitor for now  -encourage ambulation  -Transfer to     Seen with fellow who discussed with staff.   Sia Gamino MD  Surgery PGY1

## 2018-11-30 NOTE — PROGRESS NOTES
STAFF NOTE:  I saw and evaluated Ms. Vaz and reviewed all laboratory and imaging studies.     Doing well. Less delirium. Off antibiotics, mild leukocytosis, afebrile. Continue right chest tube to water seal. Spit fistula patent. Clears for comfort. On J-tube feeds at goal. Clamp G-tube. PT/OT. Step down unit status.   Temitope Randle MD

## 2018-11-30 NOTE — PLAN OF CARE
Problem: Patient Care Overview  Goal: Plan of Care/Patient Progress Review  Outcome: Improving  Neuro: A&Ox3, disoriented to time.   Cardiac: SR. VSS. afebrile   Respiratory: SpO2 low ot mid 90s on 45% FiO2.  GI/: Adequate urine output, voided x2 this shift. BM X1  Diet/appetite: TF running through j tube at goal of 50 mL/hr with 40 mL free H2O q 2 hrs. Ice chips.  g tube to gravity with minimal output.    Activity:  Assist of 1-2 with q 2 hr TRP  Pain: At acceptable level on current regimen.  Received PRN 10 mg oxy x2 this shift  Skin: ANGELO with some leaking at the site, drsg change x1 this shift.  Chest tube with minimal output.  Neck site with staples, spit fistula.    LDA's:  PIV R and L both SL    Plan: Continue with POC. Notify primary team with changes.

## 2018-12-01 ENCOUNTER — APPOINTMENT (OUTPATIENT)
Dept: GENERAL RADIOLOGY | Facility: CLINIC | Age: 73
DRG: 326 | End: 2018-12-01
Attending: SURGERY
Payer: MEDICARE

## 2018-12-01 ENCOUNTER — APPOINTMENT (OUTPATIENT)
Dept: PHYSICAL THERAPY | Facility: CLINIC | Age: 73
DRG: 326 | End: 2018-12-01
Attending: THORACIC SURGERY (CARDIOTHORACIC VASCULAR SURGERY)
Payer: MEDICARE

## 2018-12-01 LAB
ANION GAP SERPL CALCULATED.3IONS-SCNC: 5 MMOL/L (ref 3–14)
BACTERIA SPEC CULT: NO GROWTH
BACTERIA SPEC CULT: NO GROWTH
BUN SERPL-MCNC: 46 MG/DL (ref 7–30)
CALCIUM SERPL-MCNC: 7.2 MG/DL (ref 8.5–10.1)
CHLORIDE SERPL-SCNC: 109 MMOL/L (ref 94–109)
CO2 SERPL-SCNC: 29 MMOL/L (ref 20–32)
CREAT SERPL-MCNC: 0.87 MG/DL (ref 0.52–1.04)
ERYTHROCYTE [DISTWIDTH] IN BLOOD BY AUTOMATED COUNT: 19 % (ref 10–15)
GFR SERPL CREATININE-BSD FRML MDRD: 64 ML/MIN/1.7M2
GLUCOSE BLDC GLUCOMTR-MCNC: 140 MG/DL (ref 70–99)
GLUCOSE BLDC GLUCOMTR-MCNC: 91 MG/DL (ref 70–99)
GLUCOSE BLDC GLUCOMTR-MCNC: 99 MG/DL (ref 70–99)
GLUCOSE SERPL-MCNC: 108 MG/DL (ref 70–99)
HCT VFR BLD AUTO: 25.6 % (ref 35–47)
HGB BLD-MCNC: 7.8 G/DL (ref 11.7–15.7)
Lab: NORMAL
Lab: NORMAL
MCH RBC QN AUTO: 31.3 PG (ref 26.5–33)
MCHC RBC AUTO-ENTMCNC: 30.5 G/DL (ref 31.5–36.5)
MCV RBC AUTO: 103 FL (ref 78–100)
PLATELET # BLD AUTO: 889 10E9/L (ref 150–450)
POTASSIUM SERPL-SCNC: 3.9 MMOL/L (ref 3.4–5.3)
RBC # BLD AUTO: 2.49 10E12/L (ref 3.8–5.2)
SODIUM SERPL-SCNC: 144 MMOL/L (ref 133–144)
SPECIMEN SOURCE: NORMAL
SPECIMEN SOURCE: NORMAL
WBC # BLD AUTO: 16.8 10E9/L (ref 4–11)

## 2018-12-01 PROCEDURE — 40000193 ZZH STATISTIC PT WARD VISIT

## 2018-12-01 PROCEDURE — 25000132 ZZH RX MED GY IP 250 OP 250 PS 637: Mod: GY | Performed by: NURSE PRACTITIONER

## 2018-12-01 PROCEDURE — 97530 THERAPEUTIC ACTIVITIES: CPT | Mod: GP

## 2018-12-01 PROCEDURE — 97110 THERAPEUTIC EXERCISES: CPT | Mod: GP

## 2018-12-01 PROCEDURE — 25000128 H RX IP 250 OP 636: Performed by: SURGERY

## 2018-12-01 PROCEDURE — 85027 COMPLETE CBC AUTOMATED: CPT | Performed by: STUDENT IN AN ORGANIZED HEALTH CARE EDUCATION/TRAINING PROGRAM

## 2018-12-01 PROCEDURE — 27210437 ZZH NUTRITION PRODUCT SEMIELEM INTERMED LITER

## 2018-12-01 PROCEDURE — A9270 NON-COVERED ITEM OR SERVICE: HCPCS | Mod: GY | Performed by: PHYSICIAN ASSISTANT

## 2018-12-01 PROCEDURE — 36415 COLL VENOUS BLD VENIPUNCTURE: CPT | Performed by: STUDENT IN AN ORGANIZED HEALTH CARE EDUCATION/TRAINING PROGRAM

## 2018-12-01 PROCEDURE — 25000132 ZZH RX MED GY IP 250 OP 250 PS 637: Mod: GY | Performed by: PHYSICIAN ASSISTANT

## 2018-12-01 PROCEDURE — A9270 NON-COVERED ITEM OR SERVICE: HCPCS | Mod: GY

## 2018-12-01 PROCEDURE — 40000983 ZZH STATISTIC HFNC ADULT NON-CPAP

## 2018-12-01 PROCEDURE — 00000146 ZZHCL STATISTIC GLUCOSE BY METER IP

## 2018-12-01 PROCEDURE — 25000128 H RX IP 250 OP 636: Performed by: NURSE PRACTITIONER

## 2018-12-01 PROCEDURE — 25000132 ZZH RX MED GY IP 250 OP 250 PS 637: Mod: GY

## 2018-12-01 PROCEDURE — 80048 BASIC METABOLIC PNL TOTAL CA: CPT | Performed by: STUDENT IN AN ORGANIZED HEALTH CARE EDUCATION/TRAINING PROGRAM

## 2018-12-01 PROCEDURE — 12000006 ZZH R&B IMCU INTERMEDIATE UMMC

## 2018-12-01 PROCEDURE — 71045 X-RAY EXAM CHEST 1 VIEW: CPT

## 2018-12-01 PROCEDURE — 40000047 ZZH STATISTIC CTO2 CONT OXYGEN TECH TIME EA 90 MIN

## 2018-12-01 PROCEDURE — 97116 GAIT TRAINING THERAPY: CPT | Mod: GP

## 2018-12-01 PROCEDURE — A9270 NON-COVERED ITEM OR SERVICE: HCPCS | Mod: GY | Performed by: NURSE PRACTITIONER

## 2018-12-01 RX ORDER — FUROSEMIDE 10 MG/ML
20 INJECTION INTRAMUSCULAR; INTRAVENOUS EVERY 12 HOURS
Status: DISCONTINUED | OUTPATIENT
Start: 2018-12-01 | End: 2018-12-02

## 2018-12-01 RX ADMIN — METHOCARBAMOL TABLETS 500 MG: 500 TABLET, COATED ORAL at 10:12

## 2018-12-01 RX ADMIN — ACETAMINOPHEN 975 MG: 325 SOLUTION ORAL at 20:16

## 2018-12-01 RX ADMIN — GABAPENTIN 300 MG: 300 CAPSULE ORAL at 10:14

## 2018-12-01 RX ADMIN — OXYCODONE HYDROCHLORIDE 10 MG: 5 SOLUTION ORAL at 05:37

## 2018-12-01 RX ADMIN — METHOCARBAMOL TABLETS 500 MG: 500 TABLET, COATED ORAL at 15:18

## 2018-12-01 RX ADMIN — BACITRACIN: 500 OINTMENT TOPICAL at 10:15

## 2018-12-01 RX ADMIN — FUROSEMIDE 20 MG: 10 INJECTION, SOLUTION INTRAVENOUS at 10:14

## 2018-12-01 RX ADMIN — METHOCARBAMOL TABLETS 500 MG: 500 TABLET, COATED ORAL at 20:17

## 2018-12-01 RX ADMIN — ENOXAPARIN SODIUM 40 MG: 40 INJECTION SUBCUTANEOUS at 15:19

## 2018-12-01 RX ADMIN — Medication 200 MG: at 20:17

## 2018-12-01 RX ADMIN — Medication 200 MG: at 10:13

## 2018-12-01 RX ADMIN — ACETAMINOPHEN 975 MG: 325 SOLUTION ORAL at 02:20

## 2018-12-01 RX ADMIN — GABAPENTIN 300 MG: 300 CAPSULE ORAL at 20:17

## 2018-12-01 RX ADMIN — GABAPENTIN 300 MG: 300 CAPSULE ORAL at 15:18

## 2018-12-01 RX ADMIN — POTASSIUM CHLORIDE 20 MEQ: 1.5 POWDER, FOR SOLUTION ORAL at 10:14

## 2018-12-01 RX ADMIN — Medication 15 ML: at 10:13

## 2018-12-01 RX ADMIN — ACETAMINOPHEN 975 MG: 325 SOLUTION ORAL at 15:18

## 2018-12-01 RX ADMIN — OXYCODONE HYDROCHLORIDE 5 MG: 5 SOLUTION ORAL at 21:59

## 2018-12-01 RX ADMIN — OXYCODONE HYDROCHLORIDE 10 MG: 5 SOLUTION ORAL at 00:40

## 2018-12-01 RX ADMIN — Medication 2 PACKET: at 15:16

## 2018-12-01 RX ADMIN — BACITRACIN: 500 OINTMENT TOPICAL at 20:17

## 2018-12-01 RX ADMIN — ACETAMINOPHEN 975 MG: 325 SOLUTION ORAL at 10:13

## 2018-12-01 RX ADMIN — LEVOTHYROXINE SODIUM 150 MCG: 300 TABLET ORAL at 10:13

## 2018-12-01 RX ADMIN — Medication 2 PACKET: at 20:17

## 2018-12-01 RX ADMIN — FUROSEMIDE 20 MG: 10 INJECTION, SOLUTION INTRAVENOUS at 20:16

## 2018-12-01 ASSESSMENT — ACTIVITIES OF DAILY LIVING (ADL)
ADLS_ACUITY_SCORE: 20

## 2018-12-01 ASSESSMENT — PAIN DESCRIPTION - DESCRIPTORS
DESCRIPTORS: ACHING

## 2018-12-01 NOTE — PROGRESS NOTES
Thoracic Surgery Progress Note    Pt: Myrtle Vaz  MRN: [id number]   12/01/2018    24 hr events;   Remains high flow at 40%, continued diuresis, feels better overall.    Temp:  [97.5  F (36.4  C)-98.9  F (37.2  C)] 97.7  F (36.5  C)  Heart Rate:  [57-89] 67  Resp:  [8-23] 20  BP: ()/(53-65) 116/62  FiO2 (%):  [35 %-60 %] 60 %  SpO2:  [91 %-96 %] 95 %    I/O last 3 completed shifts:  In: 2325 [P.O.:125; NG/GT:1050]  Out: 1181 [Urine:551; Emesis/NG output:10; Drains:465; Chest Tube:155]    FiO2 (%): 60 %  Resp: 20    Physical Exam:  Gen: NAD, sitting in a chair comfortably  Pulm: Nonlabored breathing   CV: Regular rate and rhythm.  Chest: Right chest tube appears serosanguinous.                             - Incision:  C/D/I.  Abd: Abdomen soft, non-tender, non-distended      A/P: 73F s/p transhiatal esophagectomy with spit fistula, G tube, J tube on 11/21 for perforated esophagus after type 4 peraesophageal hernia repair.  Leukocytosis from unknown etiology.     -Continue TF@50, clears for comfort   -Lasix 20 BID scheduled, check lytes  -remove right chest tube today  -No abx, monitor for now  -encourage ambulation    Abel Simons  Thoracic Surgery Fellow  [phone]

## 2018-12-01 NOTE — PROGRESS NOTES
STAFF ADDENDUM:  I saw and evaluated Ms. Vaz and agree with the resident s findings and plan of care as documented in the resident s note and edited by me, as applicable.      In summary, Ms. Vaz is improving gradually. We will remove right chest tube today.  The patient had all questions answered and was in agreement with the plan.  Krystian Jaime MD

## 2018-12-01 NOTE — PLAN OF CARE
"Problem: Patient Care Overview  Goal: Plan of Care/Patient Progress Review  Outcome: No Change  /59 (BP Location: Left arm)  Pulse 80  Temp 98.9  F (37.2  C) (Oral)  Resp 20  Ht 1.651 m (5' 5\")  Wt 104.7 kg (230 lb 14.4 oz)  SpO2 94%  BMI 38.42 kg/m2  HR sinus rhythm, on 40% FiO2 via HFNC. Maintaining adequate O2 sats.  Pt A&Ox4, forgetful. Resting comfortably. Using call light appropriately. Pt endorses incisional pain. Using PRN oxycodone for pain relief. Tolerating TF at goal. Incisions to abdomen and left neck TIFFANIE with staples. Left old CT site CDI. Right CT to waterseal. Minimal output. ANGELO drain minimal serosanguineous drainage. Voiding adequately, last BM yesterday. Nursing will continue to follow the POC and update the MD team with concerns.        "

## 2018-12-01 NOTE — PLAN OF CARE
Problem: Patient Care Overview  Goal: Plan of Care/Patient Progress Review  Pt. To 6B from 4A. Pt. Alert, answering orientation questions appropriately, but occasionally forgetful and a poor historian. Pt. On 30-45% HFNC, IS and acapella encouraged, pt. orally suctioning herself. Pt. Reports abdominal discomfort around drains and asks for pain meds appropriately. TF's running at goal through J-tube, G-tube clamped. ANGELO drain leaking around site-dressing changed once. Pt. Heavy assist x 2 to pivot from chair to bed-high fall risk when standing.

## 2018-12-02 ENCOUNTER — APPOINTMENT (OUTPATIENT)
Dept: GENERAL RADIOLOGY | Facility: CLINIC | Age: 73
DRG: 326 | End: 2018-12-02
Attending: THORACIC SURGERY (CARDIOTHORACIC VASCULAR SURGERY)
Payer: MEDICARE

## 2018-12-02 LAB
ALBUMIN UR-MCNC: 10 MG/DL
ANION GAP SERPL CALCULATED.3IONS-SCNC: 6 MMOL/L (ref 3–14)
APPEARANCE UR: ABNORMAL
BACTERIA #/AREA URNS HPF: ABNORMAL /HPF
BILIRUB UR QL STRIP: NEGATIVE
BUN SERPL-MCNC: 44 MG/DL (ref 7–30)
CALCIUM SERPL-MCNC: 7.4 MG/DL (ref 8.5–10.1)
CHLORIDE SERPL-SCNC: 108 MMOL/L (ref 94–109)
CO2 SERPL-SCNC: 28 MMOL/L (ref 20–32)
COLOR UR AUTO: ABNORMAL
CREAT SERPL-MCNC: 0.85 MG/DL (ref 0.52–1.04)
GFR SERPL CREATININE-BSD FRML MDRD: 66 ML/MIN/1.7M2
GLUCOSE BLDC GLUCOMTR-MCNC: 113 MG/DL (ref 70–99)
GLUCOSE SERPL-MCNC: 113 MG/DL (ref 70–99)
GLUCOSE UR STRIP-MCNC: NEGATIVE MG/DL
HGB UR QL STRIP: ABNORMAL
KETONES UR STRIP-MCNC: NEGATIVE MG/DL
LACTATE BLD-SCNC: 1.2 MMOL/L (ref 0.7–2)
LEUKOCYTE ESTERASE UR QL STRIP: ABNORMAL
MAGNESIUM SERPL-MCNC: 2.2 MG/DL (ref 1.6–2.3)
MUCOUS THREADS #/AREA URNS LPF: PRESENT /LPF
NITRATE UR QL: POSITIVE
PH UR STRIP: 5.5 PH (ref 5–7)
PHOSPHATE SERPL-MCNC: 3.2 MG/DL (ref 2.5–4.5)
POTASSIUM SERPL-SCNC: 3.7 MMOL/L (ref 3.4–5.3)
RBC #/AREA URNS AUTO: 25 /HPF (ref 0–2)
SODIUM SERPL-SCNC: 142 MMOL/L (ref 133–144)
SOURCE: ABNORMAL
SP GR UR STRIP: 1.01 (ref 1–1.03)
UROBILINOGEN UR STRIP-MCNC: NORMAL MG/DL (ref 0–2)
WBC #/AREA URNS AUTO: >182 /HPF (ref 0–5)
WBC CLUMPS #/AREA URNS HPF: PRESENT /HPF

## 2018-12-02 PROCEDURE — 81001 URINALYSIS AUTO W/SCOPE: CPT | Performed by: STUDENT IN AN ORGANIZED HEALTH CARE EDUCATION/TRAINING PROGRAM

## 2018-12-02 PROCEDURE — 25000132 ZZH RX MED GY IP 250 OP 250 PS 637: Mod: GY | Performed by: STUDENT IN AN ORGANIZED HEALTH CARE EDUCATION/TRAINING PROGRAM

## 2018-12-02 PROCEDURE — 36415 COLL VENOUS BLD VENIPUNCTURE: CPT | Performed by: STUDENT IN AN ORGANIZED HEALTH CARE EDUCATION/TRAINING PROGRAM

## 2018-12-02 PROCEDURE — 25000132 ZZH RX MED GY IP 250 OP 250 PS 637: Mod: GY | Performed by: NURSE PRACTITIONER

## 2018-12-02 PROCEDURE — A9270 NON-COVERED ITEM OR SERVICE: HCPCS | Mod: GY

## 2018-12-02 PROCEDURE — A9270 NON-COVERED ITEM OR SERVICE: HCPCS | Mod: GY | Performed by: STUDENT IN AN ORGANIZED HEALTH CARE EDUCATION/TRAINING PROGRAM

## 2018-12-02 PROCEDURE — 00000146 ZZHCL STATISTIC GLUCOSE BY METER IP

## 2018-12-02 PROCEDURE — 83735 ASSAY OF MAGNESIUM: CPT | Performed by: STUDENT IN AN ORGANIZED HEALTH CARE EDUCATION/TRAINING PROGRAM

## 2018-12-02 PROCEDURE — 83605 ASSAY OF LACTIC ACID: CPT | Performed by: STUDENT IN AN ORGANIZED HEALTH CARE EDUCATION/TRAINING PROGRAM

## 2018-12-02 PROCEDURE — 25000132 ZZH RX MED GY IP 250 OP 250 PS 637: Mod: GY

## 2018-12-02 PROCEDURE — 25000132 ZZH RX MED GY IP 250 OP 250 PS 637: Mod: GY | Performed by: PHYSICIAN ASSISTANT

## 2018-12-02 PROCEDURE — A9270 NON-COVERED ITEM OR SERVICE: HCPCS | Mod: GY | Performed by: PHYSICIAN ASSISTANT

## 2018-12-02 PROCEDURE — A9270 NON-COVERED ITEM OR SERVICE: HCPCS | Mod: GY | Performed by: NURSE PRACTITIONER

## 2018-12-02 PROCEDURE — 80048 BASIC METABOLIC PNL TOTAL CA: CPT | Performed by: STUDENT IN AN ORGANIZED HEALTH CARE EDUCATION/TRAINING PROGRAM

## 2018-12-02 PROCEDURE — 27210437 ZZH NUTRITION PRODUCT SEMIELEM INTERMED LITER

## 2018-12-02 PROCEDURE — 25000128 H RX IP 250 OP 636: Performed by: SURGERY

## 2018-12-02 PROCEDURE — 87086 URINE CULTURE/COLONY COUNT: CPT | Performed by: SURGERY

## 2018-12-02 PROCEDURE — 71046 X-RAY EXAM CHEST 2 VIEWS: CPT

## 2018-12-02 PROCEDURE — 25000128 H RX IP 250 OP 636: Performed by: NURSE PRACTITIONER

## 2018-12-02 PROCEDURE — 84100 ASSAY OF PHOSPHORUS: CPT | Performed by: STUDENT IN AN ORGANIZED HEALTH CARE EDUCATION/TRAINING PROGRAM

## 2018-12-02 PROCEDURE — 87186 SC STD MICRODIL/AGAR DIL: CPT | Performed by: SURGERY

## 2018-12-02 PROCEDURE — 87088 URINE BACTERIA CULTURE: CPT | Performed by: SURGERY

## 2018-12-02 PROCEDURE — 12000006 ZZH R&B IMCU INTERMEDIATE UMMC

## 2018-12-02 RX ORDER — FUROSEMIDE 10 MG/ML
40 INJECTION INTRAMUSCULAR; INTRAVENOUS EVERY 12 HOURS
Status: DISCONTINUED | OUTPATIENT
Start: 2018-12-02 | End: 2018-12-03

## 2018-12-02 RX ADMIN — OXYCODONE HYDROCHLORIDE 5 MG: 5 SOLUTION ORAL at 22:24

## 2018-12-02 RX ADMIN — METHOCARBAMOL TABLETS 500 MG: 500 TABLET, COATED ORAL at 14:49

## 2018-12-02 RX ADMIN — ENOXAPARIN SODIUM 40 MG: 40 INJECTION SUBCUTANEOUS at 14:49

## 2018-12-02 RX ADMIN — FUROSEMIDE 40 MG: 10 INJECTION, SOLUTION INTRAVENOUS at 22:11

## 2018-12-02 RX ADMIN — GABAPENTIN 300 MG: 300 CAPSULE ORAL at 09:17

## 2018-12-02 RX ADMIN — ACETAMINOPHEN 975 MG: 325 SOLUTION ORAL at 02:36

## 2018-12-02 RX ADMIN — Medication 200 MG: at 09:17

## 2018-12-02 RX ADMIN — OXYCODONE HYDROCHLORIDE 10 MG: 5 SOLUTION ORAL at 17:28

## 2018-12-02 RX ADMIN — METHOCARBAMOL TABLETS 500 MG: 500 TABLET, COATED ORAL at 22:12

## 2018-12-02 RX ADMIN — ACETAMINOPHEN 975 MG: 160 SOLUTION ORAL at 17:29

## 2018-12-02 RX ADMIN — FUROSEMIDE 40 MG: 10 INJECTION, SOLUTION INTRAVENOUS at 09:17

## 2018-12-02 RX ADMIN — OXYCODONE HYDROCHLORIDE 5 MG: 5 SOLUTION ORAL at 09:03

## 2018-12-02 RX ADMIN — Medication 2 PACKET: at 09:18

## 2018-12-02 RX ADMIN — GABAPENTIN 300 MG: 300 CAPSULE ORAL at 14:49

## 2018-12-02 RX ADMIN — LEVOTHYROXINE SODIUM 150 MCG: 300 TABLET ORAL at 09:16

## 2018-12-02 RX ADMIN — METHOCARBAMOL TABLETS 500 MG: 500 TABLET, COATED ORAL at 09:16

## 2018-12-02 RX ADMIN — GABAPENTIN 300 MG: 300 CAPSULE ORAL at 22:12

## 2018-12-02 RX ADMIN — Medication 2 PACKET: at 14:50

## 2018-12-02 RX ADMIN — ACETAMINOPHEN 975 MG: 325 SOLUTION ORAL at 09:18

## 2018-12-02 RX ADMIN — Medication 2 PACKET: at 22:14

## 2018-12-02 RX ADMIN — BACITRACIN: 500 OINTMENT TOPICAL at 09:18

## 2018-12-02 RX ADMIN — POTASSIUM CHLORIDE 20 MEQ: 1.5 POWDER, FOR SOLUTION ORAL at 09:16

## 2018-12-02 RX ADMIN — BACITRACIN: 500 OINTMENT TOPICAL at 22:22

## 2018-12-02 RX ADMIN — Medication 15 ML: at 09:17

## 2018-12-02 RX ADMIN — Medication 200 MG: at 22:13

## 2018-12-02 ASSESSMENT — ACTIVITIES OF DAILY LIVING (ADL)
ADLS_ACUITY_SCORE: 19
ADLS_ACUITY_SCORE: 19
ADLS_ACUITY_SCORE: 20
ADLS_ACUITY_SCORE: 19

## 2018-12-02 ASSESSMENT — PAIN DESCRIPTION - DESCRIPTORS
DESCRIPTORS: DISCOMFORT;SORE
DESCRIPTORS: CONSTANT
DESCRIPTORS: SORE
DESCRIPTORS: TIRING

## 2018-12-02 NOTE — PROGRESS NOTES
Thoracic Surgery Progress Note    Pt: Myrtle Vaz  MRN: [id number]   12/02/2018    24 hr events;   High flow from 40% down to 20% this AM. Remaining chest tube on right pulled. Continued diuresis.    Temp:  [97.8  F (36.6  C)-99.2  F (37.3  C)] 99  F (37.2  C)  Pulse:  [88] 88  Heart Rate:  [69-85] 72  Resp:  [18-20] 18  BP: (113-136)/(55-64) 129/64  FiO2 (%):  [21 %-50 %] 50 %  SpO2:  [87 %-98 %] 93 %    I/O last 3 completed shifts:  In: 1950 [NG/GT:750]  Out: 2370 [Urine:1825; Drains:475; Chest Tube:70]    FiO2 (%): 50 %  Resp: 18    Physical Exam:  Gen: NAD, sitting in a chair comfortably  Pulm: Nonlabored breathing   CV: Regular rate and rhythm.  Chest: incison c/d/i  Abd: Abdomen soft, non-tender, non-distended    Labs  WBC pending, 16.8 yesterday    A/P: 73F s/p transhiatal esophagectomy with spit fistula, G tube, J tube on 11/21 for perforated esophagus after type 4 peraesophageal hernia repair.  Leukocytosis from unknown etiology.     -PO pain meds   -Continue TF@50, clears for comfort   -Lasix 20 BID scheduled, check lytes  -No abx, monitor for now  -encourage ambulation    Seen with fellow, will discuss with staff.    Nicky Park MD PGY3  General Surgery

## 2018-12-02 NOTE — PROGRESS NOTES
STAFF ADDENDUM:  I saw and evaluated Ms. Vaz and agree with the resident s findings and plan of care as documented in the resident s note and edited by me, as applicable.      In summary, Ms. Vaz is progressing well, we are working on discharge planning this week. I explained hte long-term plan to her.  The patient had all questions answered and was in agreement with the plan.  Krystian Jaime MD

## 2018-12-02 NOTE — PLAN OF CARE
"Problem: Patient Care Overview  Goal: Plan of Care/Patient Progress Review  /62 (BP Location: Left arm)  Pulse 88  Temp 98.7  F (37.1  C) (Oral)  Resp 20  Ht 1.651 m (5' 5\")  Wt 104.7 kg (230 lb 14.4 oz)  SpO2 93%  BMI 38.42 kg/m2    Neuro: A&Ox4, forgetful. Is not using call light, bed alarm for safety. More restless this evening, was calm through the day.   Cardiac: SR. VSS, afebrile.   Respiratory: Sating mid 90s on 40-50% HFNC. Was able to wean to 21% FiO2 this morning while up in a chair.  GI/: Adequate urine output. BM X2. Using bedside commode.  Diet/appetite: Tolerating clear liquid diet, draining into spit fistula. TF @50mL/hr, tolerated well by patient.   Activity:  Assist of 1-2, up to chair and commode.  Pain: At acceptable level on current regimen. Scheduled tylenol and gabapentin.  Skin: No new deficits noted. Dressings changed on drains this AM. Right side chest tube removed by thoracic team, no drainage. ANGELO bulb has small amount of drainage around tube. Incisions are approximated with no drainage.  LDA's: PIV x2, both saline locked.   Plan: Continue with POC. Notify primary team with changes.        "

## 2018-12-02 NOTE — PROGRESS NOTES
A/Ox4, forgetful and impulsive at times. Bed alarm on for safety. Scheduled meds administered for pain, 1x dose PRN oxycodone administered. Hemodynamically stable. On HFNC 40% FiO2. TF infusing at goal of 50mls/hr via J tube, FWF 40 q2h. G tube clamped. Small amt output from spit fistula. 1x loose BM at start of shift. Up to BSC voiding adequate amts. ANGELO drain to bulb suction with minimal output.

## 2018-12-03 ENCOUNTER — APPOINTMENT (OUTPATIENT)
Dept: PHYSICAL THERAPY | Facility: CLINIC | Age: 73
DRG: 326 | End: 2018-12-03
Attending: THORACIC SURGERY (CARDIOTHORACIC VASCULAR SURGERY)
Payer: MEDICARE

## 2018-12-03 ENCOUNTER — APPOINTMENT (OUTPATIENT)
Dept: SPEECH THERAPY | Facility: CLINIC | Age: 73
DRG: 326 | End: 2018-12-03
Attending: THORACIC SURGERY (CARDIOTHORACIC VASCULAR SURGERY)
Payer: MEDICARE

## 2018-12-03 ENCOUNTER — APPOINTMENT (OUTPATIENT)
Dept: GENERAL RADIOLOGY | Facility: CLINIC | Age: 73
DRG: 326 | End: 2018-12-03
Attending: THORACIC SURGERY (CARDIOTHORACIC VASCULAR SURGERY)
Payer: MEDICARE

## 2018-12-03 LAB
ALBUMIN SERPL-MCNC: 1.6 G/DL (ref 3.4–5)
ALP SERPL-CCNC: 124 U/L (ref 40–150)
ALT SERPL W P-5'-P-CCNC: 28 U/L (ref 0–50)
ANION GAP SERPL CALCULATED.3IONS-SCNC: 6 MMOL/L (ref 3–14)
AST SERPL W P-5'-P-CCNC: 29 U/L (ref 0–45)
BILIRUB SERPL-MCNC: 0.2 MG/DL (ref 0.2–1.3)
BUN SERPL-MCNC: 46 MG/DL (ref 7–30)
CALCIUM SERPL-MCNC: 7.5 MG/DL (ref 8.5–10.1)
CHLORIDE SERPL-SCNC: 104 MMOL/L (ref 94–109)
CO2 SERPL-SCNC: 32 MMOL/L (ref 20–32)
CREAT SERPL-MCNC: 0.98 MG/DL (ref 0.52–1.04)
GFR SERPL CREATININE-BSD FRML MDRD: 56 ML/MIN/1.7M2
GLUCOSE BLDC GLUCOMTR-MCNC: 116 MG/DL (ref 70–99)
GLUCOSE SERPL-MCNC: 98 MG/DL (ref 70–99)
INR PPP: 1.07 (ref 0.86–1.14)
MAGNESIUM SERPL-MCNC: 2.2 MG/DL (ref 1.6–2.3)
PHOSPHATE SERPL-MCNC: 4.1 MG/DL (ref 2.5–4.5)
POTASSIUM SERPL-SCNC: 3.6 MMOL/L (ref 3.4–5.3)
PREALB SERPL IA-MCNC: 20 MG/DL (ref 15–45)
PROT SERPL-MCNC: 6.1 G/DL (ref 6.8–8.8)
SODIUM SERPL-SCNC: 142 MMOL/L (ref 133–144)

## 2018-12-03 PROCEDURE — 84100 ASSAY OF PHOSPHORUS: CPT | Performed by: SURGERY

## 2018-12-03 PROCEDURE — 25000132 ZZH RX MED GY IP 250 OP 250 PS 637: Mod: GY | Performed by: STUDENT IN AN ORGANIZED HEALTH CARE EDUCATION/TRAINING PROGRAM

## 2018-12-03 PROCEDURE — 25000128 H RX IP 250 OP 636: Performed by: NURSE PRACTITIONER

## 2018-12-03 PROCEDURE — A9270 NON-COVERED ITEM OR SERVICE: HCPCS | Mod: GY | Performed by: NURSE PRACTITIONER

## 2018-12-03 PROCEDURE — 97530 THERAPEUTIC ACTIVITIES: CPT | Mod: GP

## 2018-12-03 PROCEDURE — 40000225 ZZH STATISTIC SLP WARD VISIT

## 2018-12-03 PROCEDURE — 25000128 H RX IP 250 OP 636: Performed by: SURGERY

## 2018-12-03 PROCEDURE — 80053 COMPREHEN METABOLIC PANEL: CPT | Performed by: SURGERY

## 2018-12-03 PROCEDURE — 40000275 ZZH STATISTIC RCP TIME EA 10 MIN

## 2018-12-03 PROCEDURE — A9270 NON-COVERED ITEM OR SERVICE: HCPCS | Mod: GY | Performed by: STUDENT IN AN ORGANIZED HEALTH CARE EDUCATION/TRAINING PROGRAM

## 2018-12-03 PROCEDURE — A9270 NON-COVERED ITEM OR SERVICE: HCPCS | Mod: GY

## 2018-12-03 PROCEDURE — 85610 PROTHROMBIN TIME: CPT | Performed by: SURGERY

## 2018-12-03 PROCEDURE — 36415 COLL VENOUS BLD VENIPUNCTURE: CPT | Performed by: SURGERY

## 2018-12-03 PROCEDURE — 97116 GAIT TRAINING THERAPY: CPT | Mod: GP

## 2018-12-03 PROCEDURE — 25000132 ZZH RX MED GY IP 250 OP 250 PS 637: Mod: GY | Performed by: NURSE PRACTITIONER

## 2018-12-03 PROCEDURE — A9270 NON-COVERED ITEM OR SERVICE: HCPCS | Mod: GY | Performed by: PHYSICIAN ASSISTANT

## 2018-12-03 PROCEDURE — 25000132 ZZH RX MED GY IP 250 OP 250 PS 637: Mod: GY | Performed by: PHYSICIAN ASSISTANT

## 2018-12-03 PROCEDURE — 40000559 ZZH STATISTIC FAILED PERIPHERAL IV START

## 2018-12-03 PROCEDURE — 00000146 ZZHCL STATISTIC GLUCOSE BY METER IP

## 2018-12-03 PROCEDURE — 83735 ASSAY OF MAGNESIUM: CPT | Performed by: SURGERY

## 2018-12-03 PROCEDURE — 27210437 ZZH NUTRITION PRODUCT SEMIELEM INTERMED LITER

## 2018-12-03 PROCEDURE — 40000983 ZZH STATISTIC HFNC ADULT NON-CPAP

## 2018-12-03 PROCEDURE — 25000128 H RX IP 250 OP 636: Performed by: STUDENT IN AN ORGANIZED HEALTH CARE EDUCATION/TRAINING PROGRAM

## 2018-12-03 PROCEDURE — 12000006 ZZH R&B IMCU INTERMEDIATE UMMC

## 2018-12-03 PROCEDURE — 40000193 ZZH STATISTIC PT WARD VISIT

## 2018-12-03 PROCEDURE — 25000132 ZZH RX MED GY IP 250 OP 250 PS 637: Mod: GY

## 2018-12-03 PROCEDURE — 71046 X-RAY EXAM CHEST 2 VIEWS: CPT

## 2018-12-03 PROCEDURE — 40000556 ZZH STATISTIC PERIPHERAL IV START W US GUIDANCE

## 2018-12-03 PROCEDURE — 92526 ORAL FUNCTION THERAPY: CPT | Mod: GN

## 2018-12-03 PROCEDURE — 84134 ASSAY OF PREALBUMIN: CPT | Performed by: SURGERY

## 2018-12-03 RX ORDER — FUROSEMIDE 10 MG/ML
20 INJECTION INTRAMUSCULAR; INTRAVENOUS DAILY
Status: DISCONTINUED | OUTPATIENT
Start: 2018-12-03 | End: 2018-12-03

## 2018-12-03 RX ORDER — SULFAMETHOXAZOLE/TRIMETHOPRIM 800-160 MG
1 TABLET ORAL 2 TIMES DAILY
Status: COMPLETED | OUTPATIENT
Start: 2018-12-03 | End: 2018-12-05

## 2018-12-03 RX ORDER — FUROSEMIDE 10 MG/ML
20 INJECTION INTRAMUSCULAR; INTRAVENOUS EVERY 12 HOURS
Status: DISCONTINUED | OUTPATIENT
Start: 2018-12-03 | End: 2018-12-04

## 2018-12-03 RX ORDER — NITROFURANTOIN 25; 75 MG/1; MG/1
100 CAPSULE ORAL EVERY 12 HOURS SCHEDULED
Status: DISCONTINUED | OUTPATIENT
Start: 2018-12-03 | End: 2018-12-03

## 2018-12-03 RX ADMIN — Medication 200 MG: at 09:22

## 2018-12-03 RX ADMIN — SULFAMETHOXAZOLE AND TRIMETHOPRIM 1 TABLET: 800; 160 TABLET ORAL at 21:16

## 2018-12-03 RX ADMIN — BACITRACIN: 500 OINTMENT TOPICAL at 21:17

## 2018-12-03 RX ADMIN — GABAPENTIN 300 MG: 300 CAPSULE ORAL at 14:55

## 2018-12-03 RX ADMIN — Medication 2 PACKET: at 09:25

## 2018-12-03 RX ADMIN — FUROSEMIDE 20 MG: 10 INJECTION, SOLUTION INTRAVENOUS at 09:22

## 2018-12-03 RX ADMIN — METHOCARBAMOL TABLETS 500 MG: 500 TABLET, COATED ORAL at 21:16

## 2018-12-03 RX ADMIN — ACETAMINOPHEN 975 MG: 160 SOLUTION ORAL at 17:58

## 2018-12-03 RX ADMIN — OXYCODONE HYDROCHLORIDE 5 MG: 5 SOLUTION ORAL at 03:16

## 2018-12-03 RX ADMIN — METHOCARBAMOL TABLETS 500 MG: 500 TABLET, COATED ORAL at 09:23

## 2018-12-03 RX ADMIN — SULFAMETHOXAZOLE AND TRIMETHOPRIM 1 TABLET: 800; 160 TABLET ORAL at 03:10

## 2018-12-03 RX ADMIN — METHOCARBAMOL TABLETS 500 MG: 500 TABLET, COATED ORAL at 14:55

## 2018-12-03 RX ADMIN — Medication 15 ML: at 09:22

## 2018-12-03 RX ADMIN — LEVOTHYROXINE SODIUM 150 MCG: 300 TABLET ORAL at 09:21

## 2018-12-03 RX ADMIN — ENOXAPARIN SODIUM 40 MG: 40 INJECTION SUBCUTANEOUS at 14:55

## 2018-12-03 RX ADMIN — OXYCODONE HYDROCHLORIDE 5 MG: 5 SOLUTION ORAL at 17:14

## 2018-12-03 RX ADMIN — Medication 200 MG: at 21:16

## 2018-12-03 RX ADMIN — GABAPENTIN 300 MG: 300 CAPSULE ORAL at 09:23

## 2018-12-03 RX ADMIN — Medication 2 PACKET: at 21:16

## 2018-12-03 RX ADMIN — POTASSIUM CHLORIDE 20 MEQ: 1.5 POWDER, FOR SOLUTION ORAL at 17:14

## 2018-12-03 RX ADMIN — FUROSEMIDE 20 MG: 10 INJECTION, SOLUTION INTRAVENOUS at 21:17

## 2018-12-03 RX ADMIN — OXYCODONE HYDROCHLORIDE 5 MG: 5 SOLUTION ORAL at 23:48

## 2018-12-03 RX ADMIN — ACETAMINOPHEN 975 MG: 160 SOLUTION ORAL at 12:14

## 2018-12-03 RX ADMIN — BACITRACIN: 500 OINTMENT TOPICAL at 09:50

## 2018-12-03 RX ADMIN — ACETAMINOPHEN 975 MG: 160 SOLUTION ORAL at 01:16

## 2018-12-03 RX ADMIN — GABAPENTIN 300 MG: 300 CAPSULE ORAL at 21:16

## 2018-12-03 RX ADMIN — SULFAMETHOXAZOLE AND TRIMETHOPRIM 1 TABLET: 800; 160 TABLET ORAL at 09:23

## 2018-12-03 RX ADMIN — Medication 2 PACKET: at 14:55

## 2018-12-03 ASSESSMENT — ACTIVITIES OF DAILY LIVING (ADL)
ADLS_ACUITY_SCORE: 16
ADLS_ACUITY_SCORE: 15
ADLS_ACUITY_SCORE: 16
ADLS_ACUITY_SCORE: 16

## 2018-12-03 ASSESSMENT — PAIN DESCRIPTION - DESCRIPTORS
DESCRIPTORS: CONSTANT
DESCRIPTORS: ACHING

## 2018-12-03 NOTE — CONSULTS
[facility], [place]      Neurology Stroke Consult    Patient Name: Myrtle Vaz  : 1945 MRN#: [id number]    STROKE DATA    Stroke Code:  Stroke code not activated.  Time patient seen:  2018 1640  Last known normal (pt's baseline):  2018 today 1530    TPA treatment:  Not given due to minor / isolated / quickly resolving stroke symptoms.     National Institutes of Health Stroke Scale (at presentation)  NIHSS done at:  time patient seen      Score    Level of consciousness:  (0)   Alert, keenly responsive     LOC questions:  (0)   Answers both questions correctly    LOC commands:  (0)   Performs both tasks correctly    Best gaze:  (0)   Normal    Visual:  (0)   No visual loss    Facial palsy:  (0)   Normal symmetrical movements    Motor arm (left):  (1)   Drift, questionable though    Motor arm (right):  (0)   No drift    Motor leg (left):  (0)   No drift    Motor leg (right):  (0)   No drift    Limb ataxia:  (0)   Absent    Sensory:  (0)   Normal- no sensory loss    Best language:  (0)   Normal- no aphasia    Dysarthria:  (0)   Normal    Extinction and inattention:  (0)   No abnormality        NIHSS Total Score:  1           ASSESSMENT & RECOMMENDATIONS     73F patient w hospitalized on  with s/p transhiatal esophagectomy with spit fistula, G tube, J tube on  for perforated esophagus after type 4 peraesophageal hernia repair, associated w complicated course including atrial fibrillation, septic shock, hypokalemia, mediastinal abscess, weakness and deconditioning, Acute blood loss anemia. Stroke team consulted for concern of LUE drift started today on . It seems the patient has segmental myoclonus secondary to metabolic/infection(UTI, elevated wbc) process. unlikely to to be stroke. Will keep watching from neuro stands point.        Impression: metabolic myoclonus    Recommendations:  -treat infection (UTI).   -stroke team will continue to follow  -no need  for imaging now.     The patient will be managed by the primary  team and  followed by the Stroke Consult service.    HPI  73F patient w hospitalized on 11/21 with s/p transhiatal esophagectomy with spit fistula, G tube, J tube on 11/21 for perforated esophagus after type 4 peraesophageal hernia repair, associated w complicated course including atrial fibrillation, septic shock, hypokalemia, mediastinal abscess, weakness and deconditioning, Acute blood loss anemia. Stroke team consulted for concern of LUE drift started today on 1530.     Upon seeing the patient, she has myoclonus/astrexis/may be drift(hard to tell as she has myoclonus). She did not notice new weakness/numbness or tingling in her hands or legs. Reviewing her chart, she has elevated wbc, UA strongly +ve for UTI. The patient hemodynamics are stable.       Pertinent Past Medical/Surgical History  History reviewed. No pertinent past medical history.    Past Surgical History:   Procedure Laterality Date     ESOPHAGOSCOPY, GASTROSCOPY, DUODENOSCOPY (EGD), COMBINED N/A 11/21/2018    Procedure: COMBINED ESOPHAGOSCOPY, GASTROSCOPY, DUODENOSCOPY (EGD);  Surgeon: Temitope Bullock MD;  Location: [facility] OR     LAPAROSCOPIC HERNIORRHAPHY HIATAL N/A 11/21/2018    Procedure: Midline laparotomy, Trans-hiatal esophagogasterectomy, gastrostomy, J-tube placement, G-tube placement, bilateral pleural chest tube placement, mediastinal abcess drainage, spit fistula creation, Esophagastrodueodenoscopy;  Surgeon: Temitope Bullock MD;  Location: [facility] OR       Medications: I have reviewed this patient's current medications.    Allergies: All allergies reviewed and addressed.    Family History: This patient has no significant family history.    Social History: I have reviewed this patient's social history.      ROS:  The 10 point Review of Systems is negative other than noted in the HPI    PHYSICAL EXAMINATION  Vital Signs:  B/P: 104/58,  T: 98.6,  P: 88,  R:  18    General:  patient lying in bed without any acute distress    HEENT:  normocephalic/atraumatic  Cardio:  RRR  Pulmonary:  no respiratory distress  Abdomen:  soft, non-tender  Extremities:  no edema  Skin:  intact     Neurologic  Mental Status:  fully alert, attentive and oriented, follows commands, speech clear and fluent  Cranial Nerves:  visual fields intact, PERRL, EOMI with normal smooth pursuit, facial sensation intact and symmetric, facial movements symmetric, hearing not formally tested but intact to conversation, palate elevation symmetric and uvula midline, no dysarthria, shoulder shrug strong bilaterally, tongue protrusion midline  Motor:  no abnormal movements, normal tone throughout, normal muscle bulk, normal and symmetric rapid finger tapping, able to move all limbs spontaneously, strength 5/5 throughout upper and lower extremities  Reflexes:  2+ and symmetric throughout  Sensory:  intact/symmetric to light touch and pin prick throughout upper and lower extremities  Coordination:  FNF and HS intact without dysmetria  Station/Gait:  unable to test    Labs  Labs and Imaging reviewed and used in developing the plan; pertinent results included.     Lab Results   Component Value Date    GLC 98 12/03/2018       The patient was discussed with the Fellow, Dr. Lafleur.  The staff is Dr. Brandt.    [facility]  Neurology resident   Pager: [phone]

## 2018-12-03 NOTE — PLAN OF CARE
"Problem: Patient Care Overview  Goal: Plan of Care/Patient Progress Review  Time of notification: 3:30 PM  Provider notified: [facility] Team  Patient status: Pt was walking with PT when she lost control over her left arm/hand. She was not able to control it. Pt was guided back to her chair. Left arm noted to be week. Pt alert and oriented, stated that she is feeling \"off\". Thoracic came to assess patient.   Temp:  [97  F (36.1  C)-98.5  F (36.9  C)] 98.5  F (36.9  C)  Heart Rate:  [64-77] 75  Resp:  [18-22] 18  BP: (111-124)/(58-70) 112/62  FiO2 (%):  [30 %-50 %] 30 %  SpO2:  [91 %-97 %] 97 %  Orders received: Awaiting orders.         "

## 2018-12-03 NOTE — PROVIDER NOTIFICATION
Time of notification: 8:05 PM  Provider notified: Ramandeep Dang  Patient status: Pt urine now cloudy and odorous. Also, neck incision appearing more reddened and has purulent drainage.  Temp:  [97.6  F (36.4  C)-99  F (37.2  C)] 98.1  F (36.7  C)  Heart Rate:  [66-82] 66  Resp:  [18-22] 18  BP: (112-136)/(55-70) 123/59  FiO2 (%):  [35 %-50 %] 35 %  SpO2:  [93 %-97 %] 93 %  Orders received: MD assessed incision and UA/UC ordered.

## 2018-12-03 NOTE — PROGRESS NOTES
THORACIC SURGERY PROGRESS NOTE  12/3/2018  Myrtle Vaz - MRN [id number]    Thoracic surgery team paged regarding new onset left arm weakness and blurry vision noticed by RN and PT team at approximately 1330. Went to see and evaluate patient immediately. She reports some difficulties using her left arm that is new for her this afternoon.     Temp: 98.6  F (37  C) Temp src: Axillary BP: 104/58   Heart Rate: 75 Resp: 18 SpO2: 97 % O2 Device: Nasal cannula Oxygen Delivery: 3 LPM    Neuro Exam:  Mental Status: Alert and oriented to self, location, date, and time. Able to answer all questions appropriately.  Coordination: Finger-nose-finger with smooth movements, no dysmetria.  Cranial nerves: CN II-XII tested, intact and symmetric.  Strength/Motor: 5/5 strength bilateral lower extremities in all muscle groups, 5/5 strength right upper extremity in all muscle groups with 4/5 strength on wrist extension in left upper extremity, otherwise 5/5 strength. Some clonus with wrist extension bilaterally.  Sensory: Grossly intact to light touch bilateral upper and lower extremities aside from symmetric numbness of bilateral toes.  Pronator drift: present mildly on left   Gait: Deferred    A/P: 73F s/p transhiatal esophagectomy with spit fistula, G tube, J tube on 11/21 for perforated esophagus after type 4 peraesophageal hernia repair.  Leukocytosis from unknown etiology. Patient feeling well today, breathing improving, and drainage from ANGELO drain stable. Team paged about new onset asymmetric left sided deficits. Neurology consulted, who evaluated patient and provided recommendations.    - No imaging recommended at this time given mild/resolving symptoms  - Treat UTI per neurology  - Consider MRI brain if symptoms do not improve/worsen  - Consider full metabolic workup  - F/u neurology recommendations - they will continue to follow      Patient discussed with staff.    Gita Hoffman MD  General Surgery PGY-1

## 2018-12-03 NOTE — PLAN OF CARE
Problem: Patient Care Overview  Goal: Plan of Care/Patient Progress Review  Neuro: A&Ox4.   Cardiac: SR. VSS.   Respiratory: Pt weaned to 3 L NC. Pt denies shortness of breath. Pt encouraged to use incentive spirometer and acapella.   GI/: Adequate urine output. Urine noted to be green, cloudy and creamy. Urine sample sent yesterday and pt started on antibiotics.   Diet/appetite: TF at 50 ml/hr. Clear liquid for comfort  Activity:  Assist of 2, up to chair. Pt in need of heavy encouragment.  Pain: At acceptable level on current regimen.   Skin: No new deficits noted. ANGELO bulb removed at bedside by MD.   LDA's:    Plan: Continue with POC. Notify primary team with changes.

## 2018-12-03 NOTE — PROGRESS NOTES
"Thoracic Surgery Progress Note    Pt: Myrtle Vaz  MRN: [id number]   12/03/2018    S: Cross cover paged regarding malodorous urine, and UA suggestive of UTI, culture pending. Started on bactrim. No other events overnight. Patient reports feeling \"OK\" today. Thinks her breathing is fine. Pain adequately controlled. Ambulated yesterday without issue. Tolerating ice chips, hesitant to advance diet further. No additional concerns.    Temp:  [97  F (36.1  C)-98.5  F (36.9  C)] 98.5  F (36.9  C)  Heart Rate:  [64-77] 75  Resp:  [18-22] 18  BP: (111-124)/(58-70) 112/62  FiO2 (%):  [30 %-50 %] 30 %  SpO2:  [91 %-97 %] 97 %    I/O last 3 completed shifts:  In: 2105 [P.O.:100; NG/GT:855]  Out: 3645 [Urine:3125; Drains:520]    FiO2 (%): 30 %  Resp: 18    Bulb drain output: Last 3 shifts and 24 hour ouptut - 20cc/40cc/80cc (140 ml/24h)  Spit fistula output: 125cc/75cc/250cc (450 ml/24h)    Physical Exam:  Gen: NAD, sitting comfortably in bed. Calm and cooperative, alert and oriented.  Pulm: Nonlabored breathing on high flow nasal canula  CV: Regular rate and rhythm.  Chest: incison c/d/i, spit fistula with saliva in bag, bulb drain with minimal s/s output  Abd: Abdomen soft, non-tender, non-distended    Labs:  Component      Latest Ref Rng & Units 12/3/2018   Sodium      133 - 144 mmol/L 142   Potassium      3.4 - 5.3 mmol/L 3.6   Chloride      94 - 109 mmol/L 104   Carbon Dioxide      20 - 32 mmol/L 32   Anion Gap      3 - 14 mmol/L 6   Glucose      70 - 99 mg/dL 98   Urea Nitrogen      7 - 30 mg/dL 46 (H)   Creatinine      0.52 - 1.04 mg/dL 0.98   GFR Estimate      >60 mL/min/1.7m2 56 (L)   GFR Estimate If Black      >60 mL/min/1.7m2 67   Calcium      8.5 - 10.1 mg/dL 7.5 (L)   Bilirubin Total      0.2 - 1.3 mg/dL 0.2   Albumin      3.4 - 5.0 g/dL 1.6 (L)   Protein Total      6.8 - 8.8 g/dL 6.1 (L)   Alkaline Phosphatase      40 - 150 U/L 124   ALT      0 - 50 U/L 28   AST      0 - 45 U/L 29   INR      0.86 - 1.14 1.07 "   Magnesium      1.6 - 2.3 mg/dL 2.2   Phosphorus      2.5 - 4.5 mg/dL 4.1   Prealbumin      15 - 45 mg/dL 20       A/P: 73F s/p transhiatal esophagectomy with spit fistula, G tube, J tube on 11/21 for perforated esophagus after type 4 peraesophageal hernia repair.  Leukocytosis from unknown etiology. Patient feeling well today, breathing improving, and drainage from ANGELO drain stable.    -PO pain meds   -Continue TF@50, clears for comfort - continuous tube feeds for one more day, then switch to cycling tube feeds tomorrow if continues to tolerate  -Lasix 20 BID scheduled, check lytes  -No abx, monitor for now  -encourage ambulation  -Wean O2 as tolerated, decrease from high flow nasal canula to regular nasal canula  -Pull ANGELO drain today    Seen with fellow, will discuss with staff.    Gita Hoffman MD  General Surgery PGY-1

## 2018-12-03 NOTE — PLAN OF CARE
Problem: Patient Care Overview  Goal: Plan of Care/Patient Progress Review  Discharge Planner SLP   Patient plan for discharge: Did not discuss  Current status: Pt with functional tolerance of clear liquids. Pt remains appropriate for clear liquids from an oropharyngeal standpoint. Pt should be fully upright for all PO, take small sips/bites, and pace self.      Continue clear liquid diet. Pt wondering about advancing her diet - defer further diet advancement for pleasure to Thoracic team.     Barriers to return to prior living situation: None per SLP defer to medical team  Recommendations for discharge: Defer to medical team  Rationale for recommendations: SLP will follow for diet tolerance and advancement as directed by thoracic team       Entered by: Carmen Egan 12/03/2018 1:56 PM

## 2018-12-03 NOTE — PLAN OF CARE
Problem: Patient Care Overview  Goal: Plan of Care/Patient Progress Review  Discharge Planner PT   Patient plan for discharge: not discussed today 2/2 other medical concerns during session   Current status: Amb 10ftx2 with FWW and CGA-min Ax1.  During gait, pt LUE begins to slide off FWW and pt seems to demonstrate L neglect, needing vc to reposition. In Addition pt begins to lean ant and to L, which worsens as gait progresses.  Requiring min-mod A to increase safety during gait.  Pt is incontinent of urine x1 during session.   PT notified RN of possible neuro symptoms. Flat affect during session.  Supine<>sit with SBAx1.  STS x5 with min-CGAx1 and FWW following vc to push from stable bed surface.    Barriers to return to prior living situation: poor command following, A for all mobility, impaired balance   Recommendations for discharge: TCU  Rationale for recommendations: Would benefit from additional PT in order to increase functional mobility.        Entered by: Emiliano Marti 12/03/2018 3:38 PM

## 2018-12-03 NOTE — PLAN OF CARE
"Problem: Patient Care Overview  Goal: Plan of Care/Patient Progress Review  Outcome: Improving  Neuro: Alert, disoriented to time/situation periodically.    Cardiac: SR. VSS.   Respiratory: Sating 94% on 30% FiO2.  GI/: Adequate urine output. BM X1  Diet/appetite: Clear liquid diet. Tolerating TF at goal rate.  Activity:  Assist of 2, up to BSC  Pain: At acceptable level on current regimen.   Skin: MD notified concerning neck incision increased erythema/drainage, no new orders at this time. Chest tube site under left breast is covered/draining  LDA's: Output from spit fistula normal, ANGELO drain to bulb suction. G-tube clamped.    Plan: Continue with POC. Notify primary team with changes.      Problem: Breathing Pattern Ineffective (Adult)  Goal: Effective Oxygenation/Ventilation  Patient will demonstrate the desired outcomes by discharge/transition of care.  Outcome: Improving  Decreased FiO2 needs. Patient states \"I feel like I can breathe better\"      "

## 2018-12-03 NOTE — PROGRESS NOTES
CLINICAL NUTRITION SERVICES - BRIEF NOTE    Team would like to switch patients TF access from [place] to [place] and being to feed into the stomach. Patient stomach is not connected to esophagus currently, but team would like to feed the stomach as patient will tolerate this better likely once surgery is preformed to reconnect GI tract.     INTERVENTIONS  Recommendations / Nutrition Prescription  Monitor tolerance to Gtube feeds, will then transition to bolus feeds prior to discharge ( likely tomorrow 12/4)  Bolus recs:   Once tolerating continuous goal TF and approp to transition to [place] Bolus regimen, recommend do so as follows: begin first bolus with 0.5 cans (125 ml) and if tolerates after approx 4 hrs without GI complaints and/or residuals < 500 ml, rec adv each subsequent bolus by 0.5 cans (125 ml) every ~4 hrs until reach goal regimen of 2 cans per bolus. Patient will be prescribed 5 can of TF/day, therefore would recommend 2 can in am, 2 cans at lunch and 1 can in PM.  *Do not give feedings at night while pt asleep (during the day only).  *For patient to meet 100% of estimated H2O needs, would recommend: flushes of 175 ml H2O before and after each bolus 3x/day (to provide an addtl 1050 ml H2O)    Implementation  RD to change current TF access to G tube feeds, will begin @ 10ml/hr and increase by 10 ml Q 4 to goal to ensure patient tolerates.  Impact Peptide @ goal 50 ml/hr (1200 ml/day) to provide 1800 kcals (28 kcal/kg/day), 113 g PRO (1.7 gm/kg/day), 924 ml free H2O, 77 g Fat (50% from MCTs), 168 g CHO and no Fiber daily.      Collette Helton, RD, MS, LD  6B- Pager: [phone]

## 2018-12-03 NOTE — PLAN OF CARE
Problem: Patient Care Overview  Goal: Plan of Care/Patient Progress Review  OT 6B: Cancel - per discussion with physical therapist, pt with new neuro changes and MDs arrived to assess. Pt not medically appropriate.

## 2018-12-03 NOTE — PLAN OF CARE
"Problem: Patient Care Overview  Goal: Plan of Care/Patient Progress Review  Outcome: No Change  Neuro: A&Ox4. Intermittently disoriented to time, situation. Forgetful, slow to respond.   Cardiac: SR.  BLE 2+ edema. HR 60-70'sBP 111/58 (BP Location: Right arm)  Pulse 88  Temp 98.2  F (36.8  C) (Oral)  Resp 18  Ht 1.651 m (5' 5\")  Wt 101.2 kg (223 lb)  SpO2 95%  BMI 37.11 kg/m2   Respiratory: Sating >92% on 30-45% HFNC.  GI/: Adequate urine output. 1L off overnight- cloudy, yellow. UTI- MD aware- abx started. No BM this shift.  Diet/appetite: Tolerating clear liquids diet. TF at 50 (goal).  Activity:  Assist of 2, gait belt.  Pain: At acceptable level on current regimen. Prn oxy x1 for abdominal pain.  Skin: No new deficits noted. Dressing changes done 0000, CDI. L neck incision with minimal drainage.  LDA's:   Old CT sites- leaky, dressings changed  ANGELO- dressing changed, minimal output  G tube- clamped  J tube- TF at 50, meds  Spit fistula- bag changed 0000, CDI    Plan: Continue with POC. Notify primary team with changes.      "

## 2018-12-04 ENCOUNTER — APPOINTMENT (OUTPATIENT)
Dept: GENERAL RADIOLOGY | Facility: CLINIC | Age: 73
DRG: 326 | End: 2018-12-04
Attending: THORACIC SURGERY (CARDIOTHORACIC VASCULAR SURGERY)
Payer: MEDICARE

## 2018-12-04 ENCOUNTER — APPOINTMENT (OUTPATIENT)
Dept: PHYSICAL THERAPY | Facility: CLINIC | Age: 73
DRG: 326 | End: 2018-12-04
Attending: THORACIC SURGERY (CARDIOTHORACIC VASCULAR SURGERY)
Payer: MEDICARE

## 2018-12-04 LAB
ANION GAP SERPL CALCULATED.3IONS-SCNC: 7 MMOL/L (ref 3–14)
BACTERIA SPEC CULT: ABNORMAL
BUN SERPL-MCNC: 50 MG/DL (ref 7–30)
CALCIUM SERPL-MCNC: 7.4 MG/DL (ref 8.5–10.1)
CHLORIDE SERPL-SCNC: 104 MMOL/L (ref 94–109)
CO2 SERPL-SCNC: 32 MMOL/L (ref 20–32)
CREAT SERPL-MCNC: 0.99 MG/DL (ref 0.52–1.04)
ERYTHROCYTE [DISTWIDTH] IN BLOOD BY AUTOMATED COUNT: 19.9 % (ref 10–15)
GFR SERPL CREATININE-BSD FRML MDRD: 55 ML/MIN/1.7M2
GLUCOSE SERPL-MCNC: 105 MG/DL (ref 70–99)
HCT VFR BLD AUTO: 27.2 % (ref 35–47)
HGB BLD-MCNC: 8.2 G/DL (ref 11.7–15.7)
Lab: ABNORMAL
MAGNESIUM SERPL-MCNC: 2.3 MG/DL (ref 1.6–2.3)
MCH RBC QN AUTO: 31.4 PG (ref 26.5–33)
MCHC RBC AUTO-ENTMCNC: 30.1 G/DL (ref 31.5–36.5)
MCV RBC AUTO: 104 FL (ref 78–100)
PHOSPHATE SERPL-MCNC: 4.4 MG/DL (ref 2.5–4.5)
PLATELET # BLD AUTO: 948 10E9/L (ref 150–450)
POTASSIUM SERPL-SCNC: 3.7 MMOL/L (ref 3.4–5.3)
RBC # BLD AUTO: 2.61 10E12/L (ref 3.8–5.2)
SODIUM SERPL-SCNC: 144 MMOL/L (ref 133–144)
SPECIMEN SOURCE: ABNORMAL
WBC # BLD AUTO: 11.3 10E9/L (ref 4–11)

## 2018-12-04 PROCEDURE — 25000132 ZZH RX MED GY IP 250 OP 250 PS 637: Mod: GY | Performed by: NURSE PRACTITIONER

## 2018-12-04 PROCEDURE — A9270 NON-COVERED ITEM OR SERVICE: HCPCS | Mod: GY | Performed by: STUDENT IN AN ORGANIZED HEALTH CARE EDUCATION/TRAINING PROGRAM

## 2018-12-04 PROCEDURE — 25000132 ZZH RX MED GY IP 250 OP 250 PS 637: Mod: GY

## 2018-12-04 PROCEDURE — 25000128 H RX IP 250 OP 636: Performed by: NURSE PRACTITIONER

## 2018-12-04 PROCEDURE — 40000193 ZZH STATISTIC PT WARD VISIT

## 2018-12-04 PROCEDURE — A9270 NON-COVERED ITEM OR SERVICE: HCPCS | Mod: GY

## 2018-12-04 PROCEDURE — A9270 NON-COVERED ITEM OR SERVICE: HCPCS | Mod: GY | Performed by: PHYSICIAN ASSISTANT

## 2018-12-04 PROCEDURE — 27210437 ZZH NUTRITION PRODUCT SEMIELEM INTERMED LITER

## 2018-12-04 PROCEDURE — 71046 X-RAY EXAM CHEST 2 VIEWS: CPT

## 2018-12-04 PROCEDURE — 36415 COLL VENOUS BLD VENIPUNCTURE: CPT | Performed by: STUDENT IN AN ORGANIZED HEALTH CARE EDUCATION/TRAINING PROGRAM

## 2018-12-04 PROCEDURE — 25000132 ZZH RX MED GY IP 250 OP 250 PS 637: Mod: GY | Performed by: STUDENT IN AN ORGANIZED HEALTH CARE EDUCATION/TRAINING PROGRAM

## 2018-12-04 PROCEDURE — 25000132 ZZH RX MED GY IP 250 OP 250 PS 637: Mod: GY | Performed by: PHYSICIAN ASSISTANT

## 2018-12-04 PROCEDURE — 25000128 H RX IP 250 OP 636: Performed by: STUDENT IN AN ORGANIZED HEALTH CARE EDUCATION/TRAINING PROGRAM

## 2018-12-04 PROCEDURE — 84100 ASSAY OF PHOSPHORUS: CPT | Performed by: STUDENT IN AN ORGANIZED HEALTH CARE EDUCATION/TRAINING PROGRAM

## 2018-12-04 PROCEDURE — 80048 BASIC METABOLIC PNL TOTAL CA: CPT | Performed by: STUDENT IN AN ORGANIZED HEALTH CARE EDUCATION/TRAINING PROGRAM

## 2018-12-04 PROCEDURE — A9270 NON-COVERED ITEM OR SERVICE: HCPCS | Mod: GY | Performed by: NURSE PRACTITIONER

## 2018-12-04 PROCEDURE — 97530 THERAPEUTIC ACTIVITIES: CPT | Mod: GP

## 2018-12-04 PROCEDURE — 85027 COMPLETE CBC AUTOMATED: CPT | Performed by: STUDENT IN AN ORGANIZED HEALTH CARE EDUCATION/TRAINING PROGRAM

## 2018-12-04 PROCEDURE — 83735 ASSAY OF MAGNESIUM: CPT | Performed by: STUDENT IN AN ORGANIZED HEALTH CARE EDUCATION/TRAINING PROGRAM

## 2018-12-04 PROCEDURE — 12000006 ZZH R&B IMCU INTERMEDIATE UMMC

## 2018-12-04 RX ORDER — FUROSEMIDE 10 MG/ML
20 INJECTION INTRAMUSCULAR; INTRAVENOUS DAILY
Status: DISCONTINUED | OUTPATIENT
Start: 2018-12-04 | End: 2018-12-05

## 2018-12-04 RX ORDER — BISACODYL 10 MG
10 SUPPOSITORY, RECTAL RECTAL ONCE
Status: COMPLETED | OUTPATIENT
Start: 2018-12-04 | End: 2018-12-04

## 2018-12-04 RX ADMIN — Medication 15 ML: at 09:00

## 2018-12-04 RX ADMIN — BISACODYL 10 MG: 10 SUPPOSITORY RECTAL at 14:48

## 2018-12-04 RX ADMIN — FUROSEMIDE 20 MG: 10 INJECTION, SOLUTION INTRAVENOUS at 09:00

## 2018-12-04 RX ADMIN — ACETAMINOPHEN 975 MG: 160 SOLUTION ORAL at 12:59

## 2018-12-04 RX ADMIN — OXYCODONE HYDROCHLORIDE 5 MG: 5 SOLUTION ORAL at 09:00

## 2018-12-04 RX ADMIN — POLYETHYLENE GLYCOL 3350 17 G: 17 POWDER, FOR SOLUTION ORAL at 23:07

## 2018-12-04 RX ADMIN — GABAPENTIN 300 MG: 300 CAPSULE ORAL at 23:09

## 2018-12-04 RX ADMIN — ENOXAPARIN SODIUM 40 MG: 40 INJECTION SUBCUTANEOUS at 14:43

## 2018-12-04 RX ADMIN — LEVOTHYROXINE SODIUM 150 MCG: 300 TABLET ORAL at 08:59

## 2018-12-04 RX ADMIN — ACETAMINOPHEN 975 MG: 160 SOLUTION ORAL at 03:18

## 2018-12-04 RX ADMIN — POTASSIUM CHLORIDE 20 MEQ: 1.5 POWDER, FOR SOLUTION ORAL at 14:38

## 2018-12-04 RX ADMIN — Medication 2 PACKET: at 09:16

## 2018-12-04 RX ADMIN — OXYCODONE HYDROCHLORIDE 5 MG: 5 SOLUTION ORAL at 14:35

## 2018-12-04 RX ADMIN — BACITRACIN: 500 OINTMENT TOPICAL at 23:09

## 2018-12-04 RX ADMIN — METHOCARBAMOL TABLETS 500 MG: 500 TABLET, COATED ORAL at 23:08

## 2018-12-04 RX ADMIN — METHOCARBAMOL TABLETS 500 MG: 500 TABLET, COATED ORAL at 14:35

## 2018-12-04 RX ADMIN — Medication 2 PACKET: at 23:10

## 2018-12-04 RX ADMIN — GABAPENTIN 300 MG: 300 CAPSULE ORAL at 09:00

## 2018-12-04 RX ADMIN — METHOCARBAMOL TABLETS 500 MG: 500 TABLET, COATED ORAL at 09:00

## 2018-12-04 RX ADMIN — ACETAMINOPHEN 975 MG: 160 SOLUTION ORAL at 23:07

## 2018-12-04 RX ADMIN — Medication 2 PACKET: at 14:40

## 2018-12-04 RX ADMIN — Medication 200 MG: at 23:08

## 2018-12-04 RX ADMIN — BACITRACIN: 500 OINTMENT TOPICAL at 09:11

## 2018-12-04 RX ADMIN — Medication 200 MG: at 08:59

## 2018-12-04 RX ADMIN — SENNOSIDES A AND B 10 ML: 415.36 LIQUID ORAL at 23:07

## 2018-12-04 RX ADMIN — SULFAMETHOXAZOLE AND TRIMETHOPRIM 1 TABLET: 800; 160 TABLET ORAL at 23:09

## 2018-12-04 RX ADMIN — SULFAMETHOXAZOLE AND TRIMETHOPRIM 1 TABLET: 800; 160 TABLET ORAL at 08:59

## 2018-12-04 RX ADMIN — GABAPENTIN 300 MG: 300 CAPSULE ORAL at 14:35

## 2018-12-04 RX ADMIN — OXYCODONE HYDROCHLORIDE 5 MG: 5 SOLUTION ORAL at 05:07

## 2018-12-04 ASSESSMENT — ACTIVITIES OF DAILY LIVING (ADL)
ADLS_ACUITY_SCORE: 16

## 2018-12-04 ASSESSMENT — PAIN DESCRIPTION - DESCRIPTORS
DESCRIPTORS: DISCOMFORT
DESCRIPTORS: ACHING
DESCRIPTORS: DISCOMFORT

## 2018-12-04 NOTE — PROGRESS NOTES
"Thoracic Surgery Progress Note    Pt: Myrtle Vaz  MRN: [id number]   12/04/2018    S: No acute events overnight. Neurology saw yesterday for left arm weakness with low suspicion for stroke, no imaging or other interventions recommended. They will continue to follow. Patient feels \"tired.\" Complains of abdominal bloating. Pain well controlled overnight. No additional complaints.    Temp:  [97.9  F (36.6  C)-98.6  F (37  C)] 98.1  F (36.7  C)  Heart Rate:  [62-75] 71  Resp:  [14-18] 16  BP: (104-124)/(58-64) 124/64  FiO2 (%):  [30 %] 30 %  SpO2:  [93 %-97 %] 93 %    I/O last 3 completed shifts:  In: 1900 [NG/GT:750]  Out: 1710 [Urine:1600; Drains:110]    FiO2 (%): 30 %  Resp: 16    Bulb drain removed  Spit fistula output: 50cc/50cc/- (100 ml/24h)    Physical Exam:  Gen: NAD, sleeping comfortably in bed. Wakes for exam  Pulm: Nonlabored breathing on 3 LPM nasal canula  CV: Regular rate and rhythm.  Chest: incison c/d/i, spit fistula with saliva in bag, drain dressing with minimal s/s drainage  Abd: Abdomen soft, appropriately tender, mildly distended    Labs:  Component      Latest Ref Rng & Units 12/4/2018   Sodium      133 - 144 mmol/L 144   Potassium      3.4 - 5.3 mmol/L 3.7   Chloride      94 - 109 mmol/L 104   Carbon Dioxide      20 - 32 mmol/L 32   Anion Gap      3 - 14 mmol/L 7   Glucose      70 - 99 mg/dL 105 (H)   Urea Nitrogen      7 - 30 mg/dL 50 (H)   Creatinine      0.52 - 1.04 mg/dL 0.99   GFR Estimate      >60 mL/min/1.7m2 55 (L)   GFR Estimate If Black      >60 mL/min/1.7m2 67   Calcium      8.5 - 10.1 mg/dL 7.4 (L)   WBC      4.0 - 11.0 10e9/L 11.3 (H)   RBC Count      3.8 - 5.2 10e12/L 2.61 (L)   Hemoglobin      11.7 - 15.7 g/dL 8.2 (L)   Hematocrit      35.0 - 47.0 % 27.2 (L)   MCV      78 - 100 fl 104 (H)   MCH      26.5 - 33.0 pg 31.4   MCHC      31.5 - 36.5 g/dL 30.1 (L)   RDW      10.0 - 15.0 % 19.9 (H)   Platelet Count      150 - 450 10e9/L 948 (H)       Imaging: AM CXR pending    A/P: 73F s/p " transhiatal esophagectomy with spit fistula, G tube, J tube on 11/21 for perforated esophagus after type 4 peraesophageal hernia repair.  Leukocytosis from unknown etiology. Patient feeling well today, breathing improving, ANGELO drain removed yesterday. Focal left arm weakness yesterday, improving, neurology consulted with minimal suspicion for stroke, no further imaging recommended at this time. Patient otherwise well aside from minimal abdominal bloating, Labs and vital signs stable.    -PO pain meds   -Continue TF@50, clears for comfort - switch to cycled TF via G tube today  -Lasix 20 BID scheduled - switch to daily, check lytes  -encourage ambulation  -Wean O2 as tolerated, decrease from high flow nasal canula to regular nasal canula  -Pull ANGELO drain today  -Continue to appreciate neurology recs - consider brain MRI if neurologic deficits worsen/change, continue bactrim for UTI  -Remove every other staple from abdomen  -Suppository for BM, enema by noon if no BM  -Consider aspirin for elevated platelets  -1 L/day free water divided q4h    Seen with fellow, will discuss with staff.    Gita Hoffman MD  General Surgery PGY-1

## 2018-12-04 NOTE — PLAN OF CARE
"Problem: Patient Care Overview  Goal: Plan of Care/Patient Progress Review  Outcome: No Change  /61 (BP Location: Right arm)  Pulse 88  Temp 97.9  F (36.6  C) (Oral)  Resp 14  Ht 1.651 m (5' 5\")  Wt 100.1 kg (220 lb 9.6 oz)  SpO2 97%  BMI 36.71 kg/m2    VSS. Afebrile. Alert and oriented x 4 with slight forgetfulness. 3L NC. 5mg PRN Oxy given twice for abdominal pain. G-tube clamped. J-tube with TF's at goal of 50 ml/hr. Good UOP per commode. Up 1 assist. Repositions self in bed. Neuro's unchanged. No new left sided deficits seen yesterday. Spit fistula in place. Continue to monitor.      "

## 2018-12-04 NOTE — PLAN OF CARE
Problem: Patient Care Overview  Goal: Plan of Care/Patient Progress Review  Outcome: No Change  Neuro: A&Ox4. Did c/o blurry vision that started around 1530 with accompanying LUE weakness and asterixis like movements, MDs to bedside to assess. Neuro team consulted. CT scan ordered but canceled.    Cardiac: SR. VSS.   Respiratory: Sating >96% on 3L NC.  GI/: Adequate urine output via BSC, odorous. Incontinent x1. No BM this shift.   Diet/appetite: Clear liquid diet for comfort. Spit fistula changed x1 for leaking. TF at goal rate of 50 mL/hr.   Activity:  Assist of 1-2, up to chair and attempted to walk in bolanos, but weakness present.   Pain: c/o lower abd pain. Oxycodone given x1 with effectiveness.   Skin: Left neck with staples, not approximated. Cleansed and bacitracin applied per orders. Old chest tube site with dressing. Old ANGELO drain with dressing.   LDA's: L PIV leaking, request placed for new PIV.     Plan: K of 3.6 replaced, recheck tomorrow AM. Continue to monitor neuro status closely. Continue with POC. Notify primary team with changes.      Problem: Cardiac Disease Comorbidity  Goal: Cardiac Disease  Patient comorbidity will be monitored for signs and symptoms of Cardiac Disease.  Problems will be absent, minimized or managed by discharge/transition of care.   Outcome: No Change  HR stable, scheduled Amiodarone given.

## 2018-12-04 NOTE — PROGRESS NOTES
CLINICAL NUTRITION SERVICES     Nutrition Prescription    RECOMMENDATIONS FOR MDs/PROVIDERS TO ORDER:  1. Bolus recs (Rec try this option first):   Once tolerating continuous goal TF and approp to transition to [place] Bolus regimen, recommend do so as follows: Turn TFs off for 1 hr before starting bolus feeds, then begin first bolus with 0.5 cans (125 ml) and if tolerates after approx 4 hrs without GI complaints and/or residuals < 500 ml, rec adv each subsequent bolus by 0.5 cans (125 ml) every ~4 hrs until reach goal regimen of 2 cans per bolus. Patient will be prescribed 5 can of TF/day, therefore would recommend 2 can in am, 2 cans at lunch and 1 can in PM.  *For patient to meet 100% of estimated H2O needs, would recommend: flushes of 175 ml H2O before and after each bolus 3x/day (to provide an addtl 1050 ml H2O)    2. Cycled recs, if desired:  Increase TFs by 20 mL Q 4 hours to initial goal rate of 85 mL/hr x 14 hrs. If tolerates, cycle to 100 mL/hr x 12 hours the following day  Adjust free water flushes via feeding tube as needed, pending fluid status.     Future/Additional Recommendations:  For all recommendations, see prior nutrition notes.   Continue Nutrisource Fiber as ordered.     Diet: Small sips of thin clear liquids and ice chips. Pt with spit fistula, in discontinuity.   TFs: Impact Peptide 1.5, infusing continuously at goal rate of 50 mL/hr via G tube (as per preference of team). Pt has a J tube as well. At goal with minimal abdominal bloating. NutriSource Fiber, 2 pkts TID.   H2O flushes: 170 mL Q 4 hrs.     INTERVENTIONS:  Implementation:  Collaboration with other providers: Discussed pt with provider. Provider believes team would like either bolus or cycled feeds, but not sure which. Notified provider of nutrition note with recs for both.     Follow up/Monitoring:  Will continue to follow pt.    Melody Gallego, MS, RD, LD, [facility]     Nutrition will continue to follow. [address] RD Pgr:  [phone]

## 2018-12-05 ENCOUNTER — APPOINTMENT (OUTPATIENT)
Dept: PHYSICAL THERAPY | Facility: CLINIC | Age: 73
DRG: 326 | End: 2018-12-05
Attending: THORACIC SURGERY (CARDIOTHORACIC VASCULAR SURGERY)
Payer: MEDICARE

## 2018-12-05 ENCOUNTER — APPOINTMENT (OUTPATIENT)
Dept: SPEECH THERAPY | Facility: CLINIC | Age: 73
DRG: 326 | End: 2018-12-05
Attending: THORACIC SURGERY (CARDIOTHORACIC VASCULAR SURGERY)
Payer: MEDICARE

## 2018-12-05 ENCOUNTER — APPOINTMENT (OUTPATIENT)
Dept: GENERAL RADIOLOGY | Facility: CLINIC | Age: 73
DRG: 326 | End: 2018-12-05
Attending: THORACIC SURGERY (CARDIOTHORACIC VASCULAR SURGERY)
Payer: MEDICARE

## 2018-12-05 ENCOUNTER — APPOINTMENT (OUTPATIENT)
Dept: OCCUPATIONAL THERAPY | Facility: CLINIC | Age: 73
DRG: 326 | End: 2018-12-05
Attending: THORACIC SURGERY (CARDIOTHORACIC VASCULAR SURGERY)
Payer: MEDICARE

## 2018-12-05 LAB
ANION GAP SERPL CALCULATED.3IONS-SCNC: 4 MMOL/L (ref 3–14)
BUN SERPL-MCNC: 46 MG/DL (ref 7–30)
CALCIUM SERPL-MCNC: 7.5 MG/DL (ref 8.5–10.1)
CHLORIDE SERPL-SCNC: 104 MMOL/L (ref 94–109)
CO2 SERPL-SCNC: 35 MMOL/L (ref 20–32)
CREAT SERPL-MCNC: 0.96 MG/DL (ref 0.52–1.04)
ERYTHROCYTE [DISTWIDTH] IN BLOOD BY AUTOMATED COUNT: 20.3 % (ref 10–15)
GFR SERPL CREATININE-BSD FRML MDRD: 57 ML/MIN/1.7M2
GLUCOSE SERPL-MCNC: 85 MG/DL (ref 70–99)
HCT VFR BLD AUTO: 28.6 % (ref 35–47)
HGB BLD-MCNC: 8.5 G/DL (ref 11.7–15.7)
MAGNESIUM SERPL-MCNC: 2.4 MG/DL (ref 1.6–2.3)
MCH RBC QN AUTO: 31.4 PG (ref 26.5–33)
MCHC RBC AUTO-ENTMCNC: 29.7 G/DL (ref 31.5–36.5)
MCV RBC AUTO: 106 FL (ref 78–100)
PHOSPHATE SERPL-MCNC: 4.2 MG/DL (ref 2.5–4.5)
PLATELET # BLD AUTO: 879 10E9/L (ref 150–450)
POTASSIUM SERPL-SCNC: 4 MMOL/L (ref 3.4–5.3)
RBC # BLD AUTO: 2.71 10E12/L (ref 3.8–5.2)
SODIUM SERPL-SCNC: 142 MMOL/L (ref 133–144)
WBC # BLD AUTO: 9.2 10E9/L (ref 4–11)

## 2018-12-05 PROCEDURE — 83735 ASSAY OF MAGNESIUM: CPT | Performed by: SURGERY

## 2018-12-05 PROCEDURE — 97530 THERAPEUTIC ACTIVITIES: CPT | Mod: GP | Performed by: REHABILITATION PRACTITIONER

## 2018-12-05 PROCEDURE — 27210437 ZZH NUTRITION PRODUCT SEMIELEM INTERMED LITER

## 2018-12-05 PROCEDURE — 36415 COLL VENOUS BLD VENIPUNCTURE: CPT | Performed by: SURGERY

## 2018-12-05 PROCEDURE — 97116 GAIT TRAINING THERAPY: CPT | Mod: GP | Performed by: REHABILITATION PRACTITIONER

## 2018-12-05 PROCEDURE — A9270 NON-COVERED ITEM OR SERVICE: HCPCS | Mod: GY | Performed by: STUDENT IN AN ORGANIZED HEALTH CARE EDUCATION/TRAINING PROGRAM

## 2018-12-05 PROCEDURE — 25000128 H RX IP 250 OP 636: Performed by: STUDENT IN AN ORGANIZED HEALTH CARE EDUCATION/TRAINING PROGRAM

## 2018-12-05 PROCEDURE — A9270 NON-COVERED ITEM OR SERVICE: HCPCS | Mod: GY | Performed by: NURSE PRACTITIONER

## 2018-12-05 PROCEDURE — 40000193 ZZH STATISTIC PT WARD VISIT: Performed by: REHABILITATION PRACTITIONER

## 2018-12-05 PROCEDURE — 92526 ORAL FUNCTION THERAPY: CPT | Mod: GN

## 2018-12-05 PROCEDURE — 25000128 H RX IP 250 OP 636: Performed by: NURSE PRACTITIONER

## 2018-12-05 PROCEDURE — 25000132 ZZH RX MED GY IP 250 OP 250 PS 637: Mod: GY | Performed by: NURSE PRACTITIONER

## 2018-12-05 PROCEDURE — 25000132 ZZH RX MED GY IP 250 OP 250 PS 637: Mod: GY | Performed by: STUDENT IN AN ORGANIZED HEALTH CARE EDUCATION/TRAINING PROGRAM

## 2018-12-05 PROCEDURE — 27210436 ZZH NUTRITION PRODUCT SEMIELEM INTERMED CAN

## 2018-12-05 PROCEDURE — 12000006 ZZH R&B IMCU INTERMEDIATE UMMC

## 2018-12-05 PROCEDURE — A9270 NON-COVERED ITEM OR SERVICE: HCPCS | Mod: GY

## 2018-12-05 PROCEDURE — 85027 COMPLETE CBC AUTOMATED: CPT | Performed by: SURGERY

## 2018-12-05 PROCEDURE — 40000133 ZZH STATISTIC OT WARD VISIT: Performed by: OCCUPATIONAL THERAPIST

## 2018-12-05 PROCEDURE — 40000225 ZZH STATISTIC SLP WARD VISIT

## 2018-12-05 PROCEDURE — 71046 X-RAY EXAM CHEST 2 VIEWS: CPT

## 2018-12-05 PROCEDURE — 25000132 ZZH RX MED GY IP 250 OP 250 PS 637: Mod: GY | Performed by: PHYSICIAN ASSISTANT

## 2018-12-05 PROCEDURE — 25000132 ZZH RX MED GY IP 250 OP 250 PS 637: Mod: GY

## 2018-12-05 PROCEDURE — A9270 NON-COVERED ITEM OR SERVICE: HCPCS | Mod: GY | Performed by: PHYSICIAN ASSISTANT

## 2018-12-05 PROCEDURE — 97535 SELF CARE MNGMENT TRAINING: CPT | Mod: GO | Performed by: OCCUPATIONAL THERAPIST

## 2018-12-05 PROCEDURE — 80048 BASIC METABOLIC PNL TOTAL CA: CPT | Performed by: SURGERY

## 2018-12-05 PROCEDURE — 84100 ASSAY OF PHOSPHORUS: CPT | Performed by: SURGERY

## 2018-12-05 RX ADMIN — ACETAMINOPHEN 975 MG: 160 SOLUTION ORAL at 05:49

## 2018-12-05 RX ADMIN — METHOCARBAMOL TABLETS 500 MG: 500 TABLET, COATED ORAL at 10:15

## 2018-12-05 RX ADMIN — ACETAMINOPHEN 975 MG: 160 SOLUTION ORAL at 11:19

## 2018-12-05 RX ADMIN — OXYCODONE HYDROCHLORIDE 5 MG: 5 SOLUTION ORAL at 23:43

## 2018-12-05 RX ADMIN — Medication 15 ML: at 10:15

## 2018-12-05 RX ADMIN — GABAPENTIN 300 MG: 300 CAPSULE ORAL at 10:15

## 2018-12-05 RX ADMIN — GABAPENTIN 300 MG: 300 CAPSULE ORAL at 17:06

## 2018-12-05 RX ADMIN — GABAPENTIN 300 MG: 300 CAPSULE ORAL at 19:08

## 2018-12-05 RX ADMIN — OXYCODONE HYDROCHLORIDE 5 MG: 5 SOLUTION ORAL at 00:28

## 2018-12-05 RX ADMIN — BACITRACIN: 500 OINTMENT TOPICAL at 10:16

## 2018-12-05 RX ADMIN — ACETAMINOPHEN 975 MG: 160 SOLUTION ORAL at 19:08

## 2018-12-05 RX ADMIN — METHOCARBAMOL TABLETS 500 MG: 500 TABLET, COATED ORAL at 19:08

## 2018-12-05 RX ADMIN — METHOCARBAMOL TABLETS 500 MG: 500 TABLET, COATED ORAL at 17:05

## 2018-12-05 RX ADMIN — BACITRACIN: 500 OINTMENT TOPICAL at 19:09

## 2018-12-05 RX ADMIN — OXYCODONE HYDROCHLORIDE 5 MG: 5 SOLUTION ORAL at 19:09

## 2018-12-05 RX ADMIN — OXYCODONE HYDROCHLORIDE 5 MG: 5 SOLUTION ORAL at 05:50

## 2018-12-05 RX ADMIN — FUROSEMIDE 20 MG: 10 INJECTION, SOLUTION INTRAVENOUS at 10:18

## 2018-12-05 RX ADMIN — Medication 2 PACKET: at 17:06

## 2018-12-05 RX ADMIN — Medication 2 PACKET: at 19:09

## 2018-12-05 RX ADMIN — LEVOTHYROXINE SODIUM 150 MCG: 300 TABLET ORAL at 10:15

## 2018-12-05 RX ADMIN — Medication 200 MG: at 19:08

## 2018-12-05 RX ADMIN — ENOXAPARIN SODIUM 40 MG: 40 INJECTION SUBCUTANEOUS at 17:06

## 2018-12-05 RX ADMIN — Medication 200 MG: at 10:15

## 2018-12-05 RX ADMIN — SULFAMETHOXAZOLE AND TRIMETHOPRIM 1 TABLET: 800; 160 TABLET ORAL at 10:15

## 2018-12-05 RX ADMIN — Medication 2 PACKET: at 10:17

## 2018-12-05 ASSESSMENT — PAIN DESCRIPTION - DESCRIPTORS
DESCRIPTORS: SORE
DESCRIPTORS: SORE
DESCRIPTORS: SORE;CONSTANT

## 2018-12-05 ASSESSMENT — ACTIVITIES OF DAILY LIVING (ADL)
ADLS_ACUITY_SCORE: 16
ADLS_ACUITY_SCORE: 15
ADLS_ACUITY_SCORE: 15

## 2018-12-05 NOTE — PLAN OF CARE
Problem: Patient Care Overview  Goal: Plan of Care/Patient Progress Review  Discharge Planner SLP   Patient plan for discharge: unknown  Current status: Recommend continue clear liquid diet as tolerated for pleasure/comfort when awake/alert and sitting upright. Will defer to Thoracic team for diet advance if/when appropriate from a medical standpoint. Pt with poor understanding of current anatomy s/p esophagectomy and spit fistula creation. Education provided as able within scope of practice  Barriers to return to prior living situation: TF dependence; care needs  Recommendations for discharge: inpatient rehab  Rationale for recommendations: anticipate ongoing rehab and nursing needs       Entered by: Lilian Zimmerman 12/05/2018 4:06 PM

## 2018-12-05 NOTE — DISCHARGE SUMMARY
NAME: Myrtle Vaz   MRN: [id number]   : 1945      DATE OF ADMISSION: 2018  DATE OF DISCHARGE:  2018    PREOPERATIVE DIAGNOSES:   - Possible esophageal perforation  - Recent giant hiatal hernia repair with mesh   - Sepsis   - New onset Afib   - Acute kidney failure     Postoperative diagnoses:  - Distal esophageal perforation  - Purulent mediastinitis  - Septic shock   - Respiratory failure  - Acute kidney failure  - New onset A fib  - Intermittent SVT requiring DCCV x 4               PROCEDURES PERFORMED:   - EGD  - Laparotomy  - Takedown of Nissen fundoplication  - Takedown of cruroplasty   - Prosthetic mesh explantation  - Takedown of gastrostomy tube  - Drainage of mediastinal abscess  - Excision of hiatal hernia sac  - Transhiatal esophagogastrectomy   - Closure of esophageal hiatus   - Gastrostomy  - Jejunostomy  - Bilateral pleural tube placement   - Left neck incision and cervical esophagostomy     PATHOLOGY RESULTS:   SPECIMENS:   A: Distal esophagus and stomach   B: Hernia sac   C: Mesh   D: Esophagus, distal     FINAL DIAGNOSIS:   A. DISTAL ESOPHAGUS AND STOMACH, ESOPHAGOGASTRECTOMY:   - Gastro-esophageal perforation site with ischemic and regenerative   changes   - Surgical margins features viable, unremarkable tissue   - Negative for malignancy     B. HERNIA SAC AND STOMACH, HERNIORRHAPHY AND PARTIAL GASTRECTOMY:   - Benign fibroadipose tissue with associated granulation tissue   - Stomach with no significant histologic abnormality   - Negative for malignancy     C. Soft tissue attached to mesh, excision:   - Benign fibroadipose tissue with associated granulation tissue   - Negative for malignancy     D. Esophagus, distal, segmental resection:   - Acute inflammation with focal ulceration   - No fungal organisms identified on routine stains   - Negative for malignancy     CULTURE RESULTS:   Urine culture: E. Coli >100,000 col/mL  Blood cx: Negative   Endotracheal sputum: Candida albicans  - light growth   C.diff PCR: negative     INTRAOPERATIVE COMPLICATIONS: SVT requiring cardioversion x 4 at the end of the case.     POSTOPERATIVE COMPLICATIONS: s/p septic shock requiring vasopressin and norepinephrine gtt, respiratory failure, acute kidney failure, new onset SVT.     DRAINS/TUBES PRESENT AT DISCHARGE: Spitz fistula,J-tube, G-tube staples      HOSPITAL COURSE:   She was seen on 11/8 at [facility] for dyspnea. She was found to have a giant paraesophageal hiatal hernia and underwent workup for the same with EGD and esophagram. She was taken to OR on 11/15 for repair of the hernia. Intraoperatively she was found to have a Type 4 hiatal hernia in the posterior mediastinum containing the entirety of the transverse colon, the majority of the omentum and the transverse mesocolon, and the stomach. She underwent laparoscopic hiatal hernia repair with mesh reinforcement, laparoscopic nissen fundoplication, and laparoscopic gastrostomy tube placement, which was complicated by pleural effusions and possible esophageal leak, with placement of chest tubes x3, course of vancomycin and zosyn. Following extubation she developed further respiratory distress, hypoxemia, and leukocytosis, as well as large fluid collection in the area of the hiatal hernia. Imaging revealed a dilated esophagus, extraluminal contrast within the chest, and re-herniation of the fundus of the stomach into the mediastinum. She was transferred to the [facility] for revision of the repair.     She underwent the above-named procedures. Postoperatively was transferred to the SICU. The remainder of her course was complicated by the above-named complications/postoperative diagnoses. Given that she is in discontinuity Prior to discharge, her pain was controlled well, she was evaluated by physical therapy who determined she is below baseline for mobility and ADLs and would benefit from additonal PT in order to increase strength,  balance, activity tolerance and functional mobility with recommended discharge to TCU. She was discharge to TCU in stable condition with spit fistula, J-tube, G-tube, and staples in midline abdominal wound in place. She will also discharge on tube feeds and oxygen (at 4 LPM by nasal canula at the time of discharge).      DISCHARGE EXAM:   Gen: NAD, sleeping comfortably in bed. Wakes for exam  Pulm: Nonlabored breathing on 4 LPM nasal canula  CV: Extremities wwp  Chest: incison with staples and steri strips with slight s/s drainage, c/d/i. Inferior aspect of wound packed with some gauze. Spit fistula with saliva in bag.   Abd: Abdomen soft, appropriately tender, G and J tube in place       DISCHARGE INSTRUCTIONS:  THORACIC SURGERY DISCHARGE INSTRUCTIONS    DIET: Clears for comfort, tube feeds per tube feeding instructions    If your plans upon discharge include prolonged periods of sitting (i.e a lengthy car or plane ride), it is highly beneficial to get up and walk at least once per hour to help prevent swelling and blood clots.     You may remove chest tube dressing 48 hours after tube removal and bandage the site at your own discretion thereafter.  Small amounts of leakage are normal for 2-3 days after removal.  Feel free to call with questions.    You may get incision wet 2 days after operation. Do not submerge, soak, or scrub incision or swim until seen in follow-up.    Take incentive spirometer home for continued frequent use    Activity as tolerated    Stay hydrated. Take over the counter fiber (metamucil or benefiber) and stool softeners (Miralax, docusate or senna) if becoming constipated.     Call for fever greater than 101.5, chills, increased size of incision, red skin around incision, vision changes, muscle strength changes, sensation changes, shortness of breath, or other concerns.    No driving while taking narcotic pain medication.    Transition to ibuprofen or tylenol/acetaminophen for pain control.  "Do not take tylenol/acetaminophen and acetaminophen containing narcotic (e.g., percocet or vicodin) at the same time. If you have known ulcer problems, or kidney trouble (elevated creatinine) do not take the ibuprofen.    In emergencies, call 911    For other Questions or Concerns;   A.) During weekday working hours (Monday through Friday 8am to 4:30pm)   call [phone] and ask to speak to a clinical nurse specialist.     B.) At nights (after 4:30pm), on weekends, or if urgent call [phone] and   tell the  \"I would like to page job code [phone], the thoracic surgery   fellow on call, please.\"    G-J TUBE INSTRUCTIONS:    G TUBE: For tube feeds  -You should vent or temporarily put your tube to gravity bag (or basin) drainage if you experience nausea or bloating.  This will help to prevent reflux and vomiting.   If you are uncertain how to do this, please ask your nurse for instruction.    JTUBE: For tube feedings    Skin care:  -Change the gauze dressing around your tube daily.  -Check for redness and swelling in the area where the tube goes into your skin.   -Keep the skin around your tube dry and with a split gauze under the flange. If the hole where the tube enters your body is draining fluid, you may need to put barrier cream or antibiotic cream on the skin around your tube after you are done cleaning it. Cover the area with bandages, and call your caregiver.   -If there are no stitches holding your tube in place on your skin, gently turn the tube. This may decrease pressure on your skin, and help prevent an infection. Ask your caregiver if you should turn your tube, and how to do it correctly.     Flushes:  -Flush both the G and J tube ports with 30cc of water twice daily to ensure they do not become plugged  -After medications or tube feedings, make sure to flush with water immediately after use to prevent the tube from plugging    J TUBE INSTRUCTIONS:    Flush your feeding tube with 30cc of " "water;  1. After medication administration through the feeding tube  2. Before and after tube feeds  3. Every 8 hours while awake if you have not used the tube during that time      -If possible take medications by mouth or as solution via j tube (Do not put crushed pills through the tube if possible)     -Change the gauze around your tube daily or more often if soiled    -KEEP A FLEXI-TRACK (or other tube retention device) on your tube at all times to prevent incidental removal.    SPIT FISTULA INSTRUCTIONS:  Site:   Left chest spit fistula  Instructions:  SPIT FISTULA: Wound care      Order Comments: Plan: Esophagostomy site pouch change twice a week T/F, may empty pouch as often as needed and reattach.    Please check pouch at least once every shift (every 8 hours) and assess for pouch change.  Needed supplies  Supplies Needed  Grazyna 8931 (1pc 44 mm)  Wafer- (# [id number])  High output pouch- (#[id number])  2\" Barrier ring- (#[id number])     1. Gather all supplies and remove old pouch gently.  2. Cleanse peristomal skin with warm water and soft wash cloth or gauze and dry thoroughly.  3. Cut the wafer to fit to stoma or use given pattern and apply centering the stoma.  4. Attach the pouch  5. Massage around it for better adherence    WOUND CARE INSTRUCTIONS  Site:   Midline abdominal wound  Instructions:  Staples should be removed at thoracic surgery discretion, steri strips in place to be left until they fall off spontaneously.      FOLLOW UP:  Follow up with Dr. Ybarra , at [facility], within 1 month  to evaluate after surgery. The following labs/tests are recommended:   - Chest X-ray, 2 views.   - Please follow up every 6 months thereafter with CT chest for the next 24 months and then yearly for repeat CT chest.    Appointments on [place] and/or [facility] (with [facility] or [facility] provider or service). Call [phone] if you haven't heard regarding these appointments within 7 days of " discharge.      DISCHARGE MEDICATIONS:   Current Discharge Medication List      START taking these medications    Details   acetaminophen (TYLENOL) 32 mg/mL liquid 30.45 mLs (975 mg) by Per J Tube route every 8 hours  Qty: 300 mL    Associated Diagnoses: Esophageal perforation      albuterol (PROAIR HFA/PROVENTIL HFA/VENTOLIN HFA) 108 (90 Base) MCG/ACT inhaler Inhale 6 puffs into the lungs every 4 hours as needed for shortness of breath / dyspnea    Associated Diagnoses: Respiratory failure with hypoxia, unspecified chronicity (H)      amiodarone (CORDARONE) 20 mg/mL SUSP 10 mLs (200 mg) by Per J Tube route 2 times daily    Associated Diagnoses: Paroxysmal atrial fibrillation (H)      bacitracin 500 UNIT/GM OINT Apply topically 2 times daily    Associated Diagnoses: Esophageal perforation      bisacodyl (DULCOLAX) 10 MG suppository Place 1 suppository (10 mg) rectally daily as needed for constipation  Qty: 30 suppository    Associated Diagnoses: Esophageal perforation      gabapentin (NEURONTIN) 250 MG/5ML solution Take 6 mLs (300 mg) by mouth 3 times daily  Qty: 450 mL    Associated Diagnoses: Esophageal perforation      levothyroxine (SYNTHROID) 25 mcg/mL SUSP 6 mLs (150 mcg) by Per J Tube route every morning (before breakfast)    Associated Diagnoses: Hypothyroidism, unspecified type      Lidocaine (LIDOCARE) 4 % Patch Place 1-3 patches onto the skin every 24 hours    Associated Diagnoses: Esophageal perforation      methocarbamol (ROBAXIN) 500 MG tablet Take 1 tablet (500 mg) by mouth 3 times daily  Qty: 120 tablet    Associated Diagnoses: Esophageal perforation      multivitamins w/minerals (CERTAVITE) liquid 15 mLs by Per Feeding Tube route daily    Associated Diagnoses: Esophageal perforation      naloxone (NARCAN) 0.4 MG/ML injection Inject 0.25-1 mLs (0.1-0.4 mg) into the vein as needed for opioid reversal  Qty: 0.5 mL    Associated Diagnoses: Esophageal perforation      ondansetron (ZOFRAN-ODT) 4 MG ODT  tab Take 1 tablet (4 mg) by mouth every 6 hours as needed for nausea or vomiting  Qty: 120 tablet    Associated Diagnoses: Esophageal perforation      oxyCODONE (ROXICODONE) 5 MG/5ML solution Take 5-10 mLs (5-10 mg) by mouth every 4 hours as needed for moderate to severe pain  Qty: 30 mL, Refills: 0    Associated Diagnoses: Esophageal perforation      polyethylene glycol (MIRALAX/GLYCOLAX) packet 17 g by Per J Tube route 2 times daily as needed for constipation  Qty: 7 packet    Associated Diagnoses: Esophageal perforation      protein modular (PROSOURCE NO CARB) 2 packets by Per Feeding Tube route daily    Associated Diagnoses: Esophageal perforation      QUEtiapine (SEROQUEL) 25 MG tablet Take 1 tablet (25 mg) by mouth nightly as needed (Agitation)  Qty: 60 tablet    Associated Diagnoses: Esophageal perforation      sennosides (SENOKOT) 8.8 MG/5ML syrup 10 mLs by Per J Tube route nightly as needed for constipation  Qty: 105 mL    Associated Diagnoses: Esophageal perforation               Patient was seen and discussed with the thoracic surgery fellow, Dr. Simons, on the day of discharged. He communicated the findings and the plan to the attending physician, who was in agreement.    Gita Hoffman MD  General Surgery, PGY-1

## 2018-12-05 NOTE — PLAN OF CARE
Problem: Cardiac Disease Comorbidity  Goal: Cardiac Disease  Patient comorbidity will be monitored for signs and symptoms of Cardiac Disease.  Problems will be absent, minimized or managed by discharge/transition of care.   Outcome: No Change  Sinus rhythm. B/P's are stable.     Problem: Breathing Pattern Ineffective (Adult)  Goal: Identify Related Risk Factors and Signs and Symptoms  Related risk factors and signs and symptoms are identified upon initiation of Human Response Clinical Practice Guideline (CPG).   Outcome: No Change  Neuro: A&O x4. Continues to c/o mild blurry vision. BUE weakness.     Cardiac: Sinus rhythm.                 Respiratory: On 2-6L of O2 to keep sats >88%. Increased O2 needs with activity. Using ISP and acapella with encouragement.   GI/: Good UOP. Using commode. No BM after suppository. Passing flatus.   Diet/appetite: Clear liquid diet. Tube feedings switched to G-tube per orders. Currently at 10cc/hour. Due to increase tube feeding again at 2030. J-tube is clamped. Tolerating well.   Activity: Up with 1A walker/gaitbelt. Requiring a lot of encouragement to increase activity.   Pain: Using oxycodone and scheduled tylenol for pain to abdomen and back.   Skin: Left neck incision with staples. Midline incision with staples. Old J/P site covered.   LDA's: Right PIV. J-tube clamped. G-tube with feedings infusing. Spit fistula with drainage.     Plan: Monitor O2 needs. Encourage activity and ISP use. Continue with POC.

## 2018-12-05 NOTE — PLAN OF CARE
Problem: Patient Care Overview  Goal: Plan of Care/Patient Progress Review  6B / Discharge Planner PT   Patient plan for discharge: just wants to focus on one day at a time.  Current status: sit>stand with mod A, multiple attempts. Walking in room with FWW, CGAx1, impulsive with urinary urgency/leakage with briefs donned.    VSS throughout on 3L NC.  Barriers to return to prior living situation: medical status, weakness, deconditioning, level of assist  Recommendations for discharge: TCU  Rationale for recommendations: Patient is below baseline for mobility and ADLs, would benefit from additional PT in order to increase strength, balance, activity tolerance and independence with functional mobility.

## 2018-12-05 NOTE — PLAN OF CARE
Problem: Patient Care Overview  Goal: Plan of Care/Patient Progress Review  Neuro: A&Ox4. Pt had no c/o blurred vision or weakness in upper extremities.  Per pt slept off and on throughout shift.  Cardiac: SR 70-80's. VSS.   Respiratory: Sating 3 LPM via N/C, SaO2 >93 % over night.  GI/: Adequate urine output. No BM this shift.  Pt was given Miralx and senna this shift per pt request.  Diet/appetite: On TF up to 40 ml per hourt. Goal is 50 ml.  Pt tolerating it well no c/o N/V.  Activity:  Pt is in bed resting comfortable over night.  Pain: Pt is receive Oxycodone and tylenol for pain over night.  Skin: No new deficits noted.      Plan: Continue with POC. Notify primary team with changes.

## 2018-12-05 NOTE — PROGRESS NOTES
CLINICAL NUTRITION SERVICES - BRIEF NOTE    Per team, would like to trial bolus TF today.    Patient reached goal TF @ 8AM today. Impact Peptide @ goal 50 ml/hr (1200 ml/day) to provide 1800 kcals (28 kcal/kg/day), 113 g PRO (1.7 gm/kg/day), 924 ml free H2O, 77 g Fat (50% from MCTs), 168 g CHO and no Fiber daily.    Fluids Per Team    INTERVENTIONS  Recommendations / Nutrition Prescription  Monitor tolerance to bolus TF    Implementation  Once tolerating continuous goal TF and approp to transition to [place] Bolus regimen, recommend do so as follows: Turn TFs off for 1 hr before starting bolus feeds, then begin first bolus with 0.5 cans (125 ml) and if tolerates after approx 4 hrs without GI complaints and/or residuals < 500 ml, rec adv each subsequent bolus by 0.5 cans (125 ml) every ~4 hrs until reach goal regimen of 2 cans per bolus. Patient will be prescribed 5 can of TF/day, therefore would recommend 2 can in am, 2 cans at lunch and 1 can in PM.    *For patient to meet 100% of estimated H2O needs, would recommend: flushes of 175 ml H2O before and after each bolus 3x/day (to provide an addtl 1050 ml H2O)      This regimen provides: 1250 ml, 1875 kcals ( 29), 118 g pro ( 1.8), 963 ml free water, 175 g CHO    Collette Helton, RD, MS, LD  6B- Pager: [phone]

## 2018-12-05 NOTE — PLAN OF CARE
Problem: Patient Care Overview  Goal: Plan of Care/Patient Progress Review  OT/6B:  Discharge Planner OT   Patient plan for discharge: unstated  Current status: Pt min A supine to sit EOB and transfer to chair. Pt completed seated ADLs with set-up. Pt expressing grief regarding loss of  and frustration with post op course. Stating she wish she would have known what she was going to look like after  Barriers to return to prior living situation: cognition, strength, post op precautions   Recommendations for discharge: TCU  Rationale for recommendations: to progress ADL I, cognition, mobility        Entered by: Johanna Jade 12/05/2018 3:04 PM

## 2018-12-05 NOTE — PLAN OF CARE
Problem: Patient Care Overview  Goal: Plan of Care/Patient Progress Review    6B / Discharge Planner PT   Patient plan for discharge: not discussed today  Current status: Patient endorsing fatigue and needing encouragement for participation in therapy. Completes supine<>sit with min A, sit<>stand with min A, pivots to commode with walker. Endorses increased fatigue and lightheadedness after being up to commode and requesting to return to bed, VSS throughout on 4-6L NC. LUE weakness observed during previous PT session on 12/3 appears to have resolved, patient denies any further symptoms.     Barriers to return to prior living situation: medical status, weakness, deconditioning, level of assist  Recommendations for discharge: TCU  Rationale for recommendations: Patient is below baseline for mobility and ADLs, would benefit from additional PT in order to increase strength, balance, activity tolerance and independence with functional mobility.

## 2018-12-05 NOTE — PROGRESS NOTES
Social Work: Assessment with Discharge Plan    Patient Name:  Myrtle Vaz  :  1945  Age:  73 year old  MRN:  [id number]  Risk/Complexity Score:  Filed Complexity Screen Score: 1  Completed assessment with:  Patient    Presenting Information   Reason for Referral:  Discharge plan  Date of Intake:  2018  Referral Source:  Chart Review  Decision Maker:  Patient  Alternate Decision Maker:  Daughter Haydee Mccoy (P: [phone]).  Health Care Directive:  Pt states she does have a HCD naming her daughter Haydee as primary decision maker.   Living Situation:  Pt lives alone in a town home in [place], [place].   Previous Functional Status:  Independent  Patient and family understanding of hospitalization:  Pt has a good understanding of her medical issues and plan moving forward.  Cultural/Language/Spiritual Considerations:  72 yo  female, English speaking, no cultural or spiritual considerations.    Physical Health  Reason for Admission:  s/p transhiatal esophagectomy  Services Needed/Recommended:  TCU    Mental Health/Chemical Dependency  Diagnosis:  none  Support/Services in Place:  n/a  Services Needed/Recommended:  n/a    Support System  Significant relationship at present time:  Pt is , spouse  2018. Pt has two children, daughter Haydee (P: [phone]) lives in [facility] and son Charles (P: [phone]) lives in [facility].  Family of origin is available for support:  Pt's children both work full time.  Other support available:  none  Gaps in support system:  none  Patient is caregiver to:  None     Provider Information   Primary Care Physician:  Joey Caballero   [phone]   Clinic:  [facility] [address]  / [place] [address]      :  none    Financial   Income Source:  Social Security  Financial Concerns:  none  Insurance:    Payor/Plan Subscriber Name Rel Member # Group #   MEDICARE - MEDICARE F* MYRTLE VAZ  [id number]       ATTN CLAIMS,  [address]   BCBS - BCBS PLATINUM * CANDICE LOYA  [id number]      [address] [address]       Discharge Plan   Patient and family discharge goal:  Pt is motivated to get home but agreeable to TCU recommendation.  Provided education on discharge plan:  YES  Patient agreeable to discharge plan:  YES  A list of Medicare Certified Facilities was provided to the patient and/or family to encourage patient choice. Patient's choices for facility are:  Pt would ideally like to go to a TCU near her home in [place], so SW will be calling these facilities to see if they would consider accepting a pt with a spit fistula.     1. [facility]  Ph: [phone]  -Referral given to Anita in admissions. Wait list right now is approximately 3-4 pts.    2. [facility]  Ph: [phone], Fax: [phone]  -Left VM for admissions and faxed referral via comm mgmt.    3. [facility]  Ph: [phone], Odell/Silvestre: [phone], Fax: [phone]  -Left VM for odell Rivers and faxed referral via comm mgmt.    4. [facility]  Ph: [phone], Fax: [phone]  -Left VM for odell Rivers and faxed referral via comm mgmt.    5. [facility]  Ph: [phone], Fax: [phone]  -Left VM for Ladonna in admissions and faxed referral via comm mgmt.    6. [facility]  Ph: [phone], Fax: [phone]  -Left VM for admissions and faxed referral via comm mgmt.    7. [facility]  Ph: [phone], Fax: [phone]  -Spoke to Muna in admissions and faxed referral via comm mgmt.    Will NH provide Skilled rehabilitation or complex medical:  YES  General information regarding anticipated insurance coverage and possible out of pocket cost was discussed. Patient and patient's family are aware patient may incur the cost of transportation to the facility, pending insurance payment: YES  Barriers to discharge:  Medical clearance and TCU placement.    Discharge  Recommendations   Anticipated Disposition:  TCU is recommended at discharge and pt is in agreement. Pt prefers TCU closer to her home in [place] and her son in [place], but pt is aware that TCU options are very limited d/t the spit fistula as the majority of TCUs have never had experience with spit fistula before. Pt would like referral to  TCU in case TCU near her home cannot accept. Pt continues on TF via G tube, clear liquid diet, 3L NC. ANGELO drain removed.  Transportation Needs:  TBD  Name of Transportation Company and Phone:  N/A    VINAYAK Parmar, LICSW  6B Intermediate Care Unit  Phone: [phone]  Pager: [phone]

## 2018-12-06 ENCOUNTER — APPOINTMENT (OUTPATIENT)
Dept: CT IMAGING | Facility: CLINIC | Age: 73
DRG: 326 | End: 2018-12-06
Attending: PHYSICIAN ASSISTANT
Payer: MEDICARE

## 2018-12-06 ENCOUNTER — APPOINTMENT (OUTPATIENT)
Dept: OCCUPATIONAL THERAPY | Facility: CLINIC | Age: 73
DRG: 326 | End: 2018-12-06
Attending: THORACIC SURGERY (CARDIOTHORACIC VASCULAR SURGERY)
Payer: MEDICARE

## 2018-12-06 ENCOUNTER — APPOINTMENT (OUTPATIENT)
Dept: GENERAL RADIOLOGY | Facility: CLINIC | Age: 73
DRG: 326 | End: 2018-12-06
Attending: THORACIC SURGERY (CARDIOTHORACIC VASCULAR SURGERY)
Payer: MEDICARE

## 2018-12-06 LAB
ANION GAP SERPL CALCULATED.3IONS-SCNC: 8 MMOL/L (ref 3–14)
BUN SERPL-MCNC: 43 MG/DL (ref 7–30)
CALCIUM SERPL-MCNC: 7.8 MG/DL (ref 8.5–10.1)
CHLORIDE SERPL-SCNC: 102 MMOL/L (ref 94–109)
CO2 SERPL-SCNC: 30 MMOL/L (ref 20–32)
CREAT SERPL-MCNC: 0.96 MG/DL (ref 0.52–1.04)
ERYTHROCYTE [DISTWIDTH] IN BLOOD BY AUTOMATED COUNT: 20.3 % (ref 10–15)
GFR SERPL CREATININE-BSD FRML MDRD: 57 ML/MIN/1.7M2
GLUCOSE BLDC GLUCOMTR-MCNC: 112 MG/DL (ref 70–99)
GLUCOSE BLDC GLUCOMTR-MCNC: 150 MG/DL (ref 70–99)
GLUCOSE BLDC GLUCOMTR-MCNC: 63 MG/DL (ref 70–99)
GLUCOSE BLDC GLUCOMTR-MCNC: 87 MG/DL (ref 70–99)
GLUCOSE BLDC GLUCOMTR-MCNC: 94 MG/DL (ref 70–99)
GLUCOSE SERPL-MCNC: 92 MG/DL (ref 70–99)
HCT VFR BLD AUTO: 29.7 % (ref 35–47)
HGB BLD-MCNC: 8.9 G/DL (ref 11.7–15.7)
MCH RBC QN AUTO: 31.6 PG (ref 26.5–33)
MCHC RBC AUTO-ENTMCNC: 30 G/DL (ref 31.5–36.5)
MCV RBC AUTO: 105 FL (ref 78–100)
PLATELET # BLD AUTO: 887 10E9/L (ref 150–450)
POTASSIUM SERPL-SCNC: 3.9 MMOL/L (ref 3.4–5.3)
RBC # BLD AUTO: 2.82 10E12/L (ref 3.8–5.2)
SODIUM SERPL-SCNC: 140 MMOL/L (ref 133–144)
WBC # BLD AUTO: 9.5 10E9/L (ref 4–11)

## 2018-12-06 PROCEDURE — 71046 X-RAY EXAM CHEST 2 VIEWS: CPT

## 2018-12-06 PROCEDURE — 25000132 ZZH RX MED GY IP 250 OP 250 PS 637: Mod: GY | Performed by: PHYSICIAN ASSISTANT

## 2018-12-06 PROCEDURE — 25000132 ZZH RX MED GY IP 250 OP 250 PS 637: Mod: GY | Performed by: STUDENT IN AN ORGANIZED HEALTH CARE EDUCATION/TRAINING PROGRAM

## 2018-12-06 PROCEDURE — 40000133 ZZH STATISTIC OT WARD VISIT: Performed by: OCCUPATIONAL THERAPIST

## 2018-12-06 PROCEDURE — 25000132 ZZH RX MED GY IP 250 OP 250 PS 637: Mod: GY

## 2018-12-06 PROCEDURE — 71250 CT THORAX DX C-: CPT

## 2018-12-06 PROCEDURE — 80048 BASIC METABOLIC PNL TOTAL CA: CPT | Performed by: STUDENT IN AN ORGANIZED HEALTH CARE EDUCATION/TRAINING PROGRAM

## 2018-12-06 PROCEDURE — 85027 COMPLETE CBC AUTOMATED: CPT | Performed by: STUDENT IN AN ORGANIZED HEALTH CARE EDUCATION/TRAINING PROGRAM

## 2018-12-06 PROCEDURE — 97535 SELF CARE MNGMENT TRAINING: CPT | Mod: GO | Performed by: OCCUPATIONAL THERAPIST

## 2018-12-06 PROCEDURE — 25000132 ZZH RX MED GY IP 250 OP 250 PS 637: Mod: GY | Performed by: NURSE PRACTITIONER

## 2018-12-06 PROCEDURE — A9270 NON-COVERED ITEM OR SERVICE: HCPCS | Mod: GY | Performed by: STUDENT IN AN ORGANIZED HEALTH CARE EDUCATION/TRAINING PROGRAM

## 2018-12-06 PROCEDURE — 36415 COLL VENOUS BLD VENIPUNCTURE: CPT | Performed by: STUDENT IN AN ORGANIZED HEALTH CARE EDUCATION/TRAINING PROGRAM

## 2018-12-06 PROCEDURE — A9270 NON-COVERED ITEM OR SERVICE: HCPCS | Mod: GY

## 2018-12-06 PROCEDURE — 12000006 ZZH R&B IMCU INTERMEDIATE UMMC

## 2018-12-06 PROCEDURE — A9270 NON-COVERED ITEM OR SERVICE: HCPCS | Mod: GY | Performed by: PHYSICIAN ASSISTANT

## 2018-12-06 PROCEDURE — 25000128 H RX IP 250 OP 636: Performed by: NURSE PRACTITIONER

## 2018-12-06 PROCEDURE — 27210429 ZZH NUTRITION PRODUCT INTERMEDIATE LITER

## 2018-12-06 PROCEDURE — A9270 NON-COVERED ITEM OR SERVICE: HCPCS | Mod: GY | Performed by: NURSE PRACTITIONER

## 2018-12-06 PROCEDURE — 25800025 ZZH RX 258

## 2018-12-06 PROCEDURE — 00000146 ZZHCL STATISTIC GLUCOSE BY METER IP

## 2018-12-06 RX ORDER — DEXTROSE MONOHYDRATE 25 G/50ML
25-50 INJECTION, SOLUTION INTRAVENOUS
Status: DISCONTINUED | OUTPATIENT
Start: 2018-12-06 | End: 2018-12-07 | Stop reason: HOSPADM

## 2018-12-06 RX ORDER — OXYCODONE HCL 5 MG/5 ML
5-10 SOLUTION, ORAL ORAL EVERY 4 HOURS PRN
Qty: 30 ML | Refills: 0
Start: 2018-12-06 | End: 2018-12-07

## 2018-12-06 RX ORDER — AMINO ACIDS/PROTEIN HYDROLYS 11G-40/45
2 LIQUID IN PACKET (ML) ORAL DAILY
Status: ON HOLD
Start: 2018-12-06 | End: 2019-02-06

## 2018-12-06 RX ORDER — GABAPENTIN 250 MG/5ML
300 SOLUTION ORAL 3 TIMES DAILY
Qty: 450 ML | Status: ON HOLD
Start: 2018-12-06 | End: 2019-02-06

## 2018-12-06 RX ORDER — AMINO ACIDS/PROTEIN HYDROLYS 11G-40/45
2 LIQUID IN PACKET (ML) ORAL DAILY
Status: DISCONTINUED | OUTPATIENT
Start: 2018-12-06 | End: 2018-12-07 | Stop reason: HOSPADM

## 2018-12-06 RX ORDER — LIDOCAINE 4 G/G
1-3 PATCH TOPICAL EVERY 24 HOURS
Status: ON HOLD
Start: 2018-12-06 | End: 2019-02-06

## 2018-12-06 RX ORDER — POLYETHYLENE GLYCOL 3350 17 G/17G
17 POWDER, FOR SOLUTION ORAL 2 TIMES DAILY PRN
Qty: 7 PACKET | Status: ON HOLD
Start: 2018-12-06 | End: 2019-02-06

## 2018-12-06 RX ORDER — METHOCARBAMOL 500 MG/1
500 TABLET, FILM COATED ORAL 3 TIMES DAILY
Qty: 120 TABLET | Status: ON HOLD
Start: 2018-12-06 | End: 2019-02-06

## 2018-12-06 RX ORDER — DEXTROSE MONOHYDRATE 25 G/50ML
50 INJECTION, SOLUTION INTRAVENOUS ONCE
Status: COMPLETED | OUTPATIENT
Start: 2018-12-06 | End: 2018-12-06

## 2018-12-06 RX ORDER — BISACODYL 10 MG
10 SUPPOSITORY, RECTAL RECTAL DAILY PRN
Qty: 30 SUPPOSITORY | Status: ON HOLD
Start: 2018-12-06 | End: 2019-02-06

## 2018-12-06 RX ORDER — NALOXONE HYDROCHLORIDE 0.4 MG/ML
.1-.4 INJECTION, SOLUTION INTRAMUSCULAR; INTRAVENOUS; SUBCUTANEOUS PRN
Qty: 0.5 ML | Status: ON HOLD
Start: 2018-12-06 | End: 2019-02-06

## 2018-12-06 RX ORDER — GABAPENTIN 250 MG/5ML
300 SOLUTION ORAL 3 TIMES DAILY
Status: DISCONTINUED | OUTPATIENT
Start: 2018-12-06 | End: 2018-12-07 | Stop reason: HOSPADM

## 2018-12-06 RX ORDER — ONDANSETRON 4 MG/1
4 TABLET, ORALLY DISINTEGRATING ORAL EVERY 6 HOURS PRN
Qty: 120 TABLET | Status: ON HOLD
Start: 2018-12-06 | End: 2019-05-15

## 2018-12-06 RX ORDER — NICOTINE POLACRILEX 4 MG
15-30 LOZENGE BUCCAL
Status: DISCONTINUED | OUTPATIENT
Start: 2018-12-06 | End: 2018-12-07 | Stop reason: HOSPADM

## 2018-12-06 RX ORDER — GINSENG 100 MG
CAPSULE ORAL 2 TIMES DAILY
Status: ON HOLD
Start: 2018-12-06 | End: 2019-02-06

## 2018-12-06 RX ORDER — DEXTROSE MONOHYDRATE 25 G/50ML
INJECTION, SOLUTION INTRAVENOUS
Status: COMPLETED
Start: 2018-12-06 | End: 2018-12-06

## 2018-12-06 RX ORDER — QUETIAPINE FUMARATE 25 MG/1
25 TABLET, FILM COATED ORAL
Qty: 60 TABLET | Status: ON HOLD
Start: 2018-12-06 | End: 2019-02-06

## 2018-12-06 RX ORDER — ALBUTEROL SULFATE 90 UG/1
6 AEROSOL, METERED RESPIRATORY (INHALATION) EVERY 4 HOURS PRN
Status: ON HOLD
Start: 2018-12-06 | End: 2019-05-28

## 2018-12-06 RX ADMIN — BACITRACIN: 500 OINTMENT TOPICAL at 19:11

## 2018-12-06 RX ADMIN — GABAPENTIN 300 MG: 250 SUSPENSION ORAL at 15:01

## 2018-12-06 RX ADMIN — GABAPENTIN 300 MG: 250 SUSPENSION ORAL at 20:49

## 2018-12-06 RX ADMIN — Medication 200 MG: at 19:08

## 2018-12-06 RX ADMIN — Medication 2 PACKET: at 09:00

## 2018-12-06 RX ADMIN — Medication 15 ML: at 08:53

## 2018-12-06 RX ADMIN — METHOCARBAMOL TABLETS 500 MG: 500 TABLET, COATED ORAL at 19:08

## 2018-12-06 RX ADMIN — POTASSIUM CHLORIDE 20 MEQ: 1.5 POWDER, FOR SOLUTION ORAL at 12:12

## 2018-12-06 RX ADMIN — OXYCODONE HYDROCHLORIDE 5 MG: 5 SOLUTION ORAL at 08:52

## 2018-12-06 RX ADMIN — METHOCARBAMOL TABLETS 500 MG: 500 TABLET, COATED ORAL at 15:01

## 2018-12-06 RX ADMIN — DEXTROSE MONOHYDRATE 50 ML: 25 INJECTION, SOLUTION INTRAVENOUS at 16:54

## 2018-12-06 RX ADMIN — ACETAMINOPHEN 975 MG: 160 SOLUTION ORAL at 19:09

## 2018-12-06 RX ADMIN — OXYCODONE HYDROCHLORIDE 5 MG: 5 SOLUTION ORAL at 13:18

## 2018-12-06 RX ADMIN — LEVOTHYROXINE SODIUM 150 MCG: 300 TABLET ORAL at 08:53

## 2018-12-06 RX ADMIN — Medication 2 PACKET: at 19:10

## 2018-12-06 RX ADMIN — GABAPENTIN 300 MG: 250 SUSPENSION ORAL at 10:28

## 2018-12-06 RX ADMIN — ACETAMINOPHEN 975 MG: 160 SOLUTION ORAL at 12:12

## 2018-12-06 RX ADMIN — METHOCARBAMOL TABLETS 500 MG: 500 TABLET, COATED ORAL at 08:53

## 2018-12-06 RX ADMIN — Medication 200 MG: at 08:53

## 2018-12-06 RX ADMIN — ENOXAPARIN SODIUM 40 MG: 40 INJECTION SUBCUTANEOUS at 16:08

## 2018-12-06 RX ADMIN — ACETAMINOPHEN 975 MG: 160 SOLUTION ORAL at 03:13

## 2018-12-06 RX ADMIN — BACITRACIN: 500 OINTMENT TOPICAL at 10:29

## 2018-12-06 ASSESSMENT — PAIN DESCRIPTION - DESCRIPTORS
DESCRIPTORS: PRESSURE
DESCRIPTORS: PRESSURE;DISCOMFORT
DESCRIPTORS: PRESSURE;DISCOMFORT
DESCRIPTORS: PRESSURE

## 2018-12-06 ASSESSMENT — ACTIVITIES OF DAILY LIVING (ADL)
ADLS_ACUITY_SCORE: 16
ADLS_ACUITY_SCORE: 15
ADLS_ACUITY_SCORE: 16
ADLS_ACUITY_SCORE: 16

## 2018-12-06 NOTE — PLAN OF CARE
"Problem: Patient Care Overview  Goal: Plan of Care/Patient Progress Review  /58  Pulse 88  Temp 98.4  F (36.9  C) (Oral)  Resp 18  Ht 1.651 m (5' 5\")  Wt 99.7 kg (219 lb 11.2 oz)  SpO2 97%  BMI 36.56 kg/m2    Neuro: A&Ox4, using call light appropriately today.   Cardiac: SR. VSS, afebrile.  Respiratory: Sating mid 90s on 3L NC.  GI/: Adequate urine output. BM X3  Diet/appetite: Tolerating TF at 50mL/hr. Transitioned to bolus feeds this evening, plan for TID bolus feeds during the day with 60mL water flushes before and after.   Activity:  Assist of 1, up to chair and commode. Tolerated moderately well. Working with PT and OT.  Pain: At acceptable level on current regimen. PRN oxy and scheduled tylenol with adequate pain control.  Skin: No new deficits noted. Spit fistula dressing changed x2, moderate drainage.   LDA's: G and J tube separate, dressings changed on both. J clamped, G with bolus feeds.   Plan: Continue with POC. Notify primary team with changes.        "

## 2018-12-06 NOTE — PLAN OF CARE
"Problem: Patient Care Overview  Goal: Plan of Care/Patient Progress Review  Outcome: No Change  /64 (BP Location: Left arm)  Pulse 88  Temp 97.9  F (36.6  C) (Oral)  Resp 18  Ht 1.651 m (5' 5\")  Wt 93.8 kg (206 lb 11.2 oz)  SpO2 92%  BMI 34.4 kg/m2     Neuro: Disoriented to time. Forgetful at times  Cardiac: VSS. SR. Afebrile  Respiratory: On 3-4L oxygen via NC. No respiratory distress noted.   GI/: BM x1. Adequate UOP  Diet/Appetite: Clear liquid diet. Pt received bolus feeding last evening and tolerated well. Plan to continue bolus feedings today with 60 mL FWF before and after. No nausea. G and J tube currently clamped.   Skin: No new skin deficits noted  LDA: PIV saline locked. Spit fistula  Activity: Up with assist of 1  Pain: Acceptable with current regimen. PRN oxycodone and scheduled tylenol given  Plan: Continue to monitor and follow POC. Plan to discharge to TCU once medically stable and placement becomes available.         "

## 2018-12-06 NOTE — PROGRESS NOTES
"Thoracic Surgery Progress Note    Pt: Myrtle Vaz  MRN: [id number]   12/05/2018    S: No acute events overnight. Feels ok today aside from considerable pain, stable from previous. \"I haven't had pain medications in a while.\" Reports BM today, voiding adequately. No other issues.    Temp:  [97.4  F (36.3  C)-98.5  F (36.9  C)] 98.4  F (36.9  C)  Heart Rate:  [59-89] 69  Resp:  [18] 18  BP: (108-122)/(58-73) 118/58  SpO2:  [92 %-100 %] 98 %    I/O last 3 completed shifts:  In: 1350 [P.O.:240; NG/GT:340]  Out: 1775 [Urine:900; Drains:425; Other:450]    Resp: 18    Physical Exam:  Gen: NAD, sleeping comfortably in bed. Wakes for exam  Pulm: Nonlabored breathing on 4 LPM nasal canula  CV: Regular rate and rhythm.  Chest: incison c/d/i, spit fistula with saliva in bag, drain dressing with minimal s/s drainage  Abd: Abdomen soft, appropriately tender, G and J tube in place    Labs:  Component      Latest Ref Rng & Units 12/5/2018   Sodium      133 - 144 mmol/L 142   Potassium      3.4 - 5.3 mmol/L 4.0   Chloride      94 - 109 mmol/L 104   Carbon Dioxide      20 - 32 mmol/L 35 (H)   Anion Gap      3 - 14 mmol/L 4   Glucose      70 - 99 mg/dL 85   Urea Nitrogen      7 - 30 mg/dL 46 (H)   Creatinine      0.52 - 1.04 mg/dL 0.96   GFR Estimate      >60 mL/min/1.7m2 57 (L)   GFR Estimate If Black      >60 mL/min/1.7m2 69   Calcium      8.5 - 10.1 mg/dL 7.5 (L)   WBC      4.0 - 11.0 10e9/L 9.2   RBC Count      3.8 - 5.2 10e12/L 2.71 (L)   Hemoglobin      11.7 - 15.7 g/dL 8.5 (L)   Hematocrit      35.0 - 47.0 % 28.6 (L)   MCV      78 - 100 fl 106 (H)   MCH      26.5 - 33.0 pg 31.4   MCHC      31.5 - 36.5 g/dL 29.7 (L)   RDW      10.0 - 15.0 % 20.3 (H)   Platelet Count      150 - 450 10e9/L 879 (H)   Magnesium      1.6 - 2.3 mg/dL 2.4 (H)   Phosphorus      2.5 - 4.5 mg/dL 4.2     AM CXR: Stable      A/P: 73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  Leukocytosis from unknown etiology. Patient feeling well today aside from pain, AVSS, labs stable.    -PO pain meds   -Bolus tube feeds, managed per nutrition  -encourage ambulation  -Wean O2 as tolerated  -1 L/day free water divided q4h  -Social work putting in referral for TCU that will accept spit fistula, discharge within the next few days    Seen with fellow, will discuss with staff.    Gita Hoffman MD  General Surgery PGY-1

## 2018-12-06 NOTE — PLAN OF CARE
"Problem: Patient Care Overview  Goal: Plan of Care/Patient Progress Review  PT / 6B - Patient declining therapy at this time secondary to \"feeling loaded\" following feeding and wishing to get this resolved prior to out of bed activity with therapy.  Will check back as schedule allows or rescheduled per POC.       "

## 2018-12-06 NOTE — PLAN OF CARE
Problem: Patient Care Overview  Goal: Plan of Care/Patient Progress Review  Outcome: No Change  Primary issue today was intolerance of AM bolus tube feeding (1.5 cans started at 10 am and infused over 2 hours) as it increased her bowel motility which resulted in large amounts of thin, creamy stools. RD aware and changed formula and once diarrhea decreases in frequency, evening shift RN can start continuous tube feeds. Also of note, I administered her AM medications via J-tube this morning as it was not clear in orders. When a few of her oral medications did specify which tube to administer meds in, it specified to be administered via J-tube. I subsequently administered all medications via J-tube. I am unsure if this contributed to the large stools as well? I then discussed with JENARO Ray from thoracic team and he informed me nursing is okay to give meds via G-tube. This writer verbalized understanding and wrote MAR note. Bolus feeding done via G-tube, as ordered. Only one bolus feed done today secondary to intolerance.     Natasha remains sad, withdrawn and flat affect with low motivation. Her vital signs are stable. Team removed some of her staples to midline abdominal incision and placed steri-strips to bilateral old chest tube sites which reside under each breast. Steri-strips falling off already this afternoon, I then applied new. Tolerating sips and ice chips, doesn't go overboard with PO intake. Spit fistula emptied q4h. Up in chair x2 this morning. Cognitive screening done today by OT. Team hoping for TCU placement sometime soon. Report given to oncoming RN at bedside who will assume care over the evening shift.

## 2018-12-06 NOTE — PROGRESS NOTES
Social Work Services Progress Note    Hospital Day: 16  Date of Initial Social Work Evaluation:  12/5/18  Collaborated with:  Pt, pt's daughter Haydee (P: [phone]), Dr. Hoffman with Thoracic.    Data:  Myrtle Vaz is a 74 yo female admitted to [facility] 11/21/18.    Intervention:  SW following for discharge plans.    Assessment:  TCU is recommended at discharge and pt is in agreement. Pt prefers TCU closer to her home in [place] and her son in [place], but pt is aware that TCU options are very limited d/t the spit fistula as the majority of TCUs have never had experience with spit fistula before. Pt would like referral to  TCU in case TCU near her home cannot accept. Pt continues on TF via G tube, clear liquid diet, 3L NC. ANGELO drain removed.    1. [facility]  Ph: [phone]  -Referral given to Anita in admissions. There is a current wait list.     2. [facility]  Ph: [phone], Fax: [phone]  -Pt/dtr's first choice facility. Shaylee in admissions is having the Dir of Nursing review today. Will have answer later today or tomorrow. IF accepted, there are female beds available.      3. [facility]  Ph: [phone], Liaison/Silvestre: [phone], Fax: [phone]  -Accepted, but pt's daughter now does not think she will want a referral here.     4. [facility]  Ph: [phone], Fax: [phone]     5. [facility]  Ph: [phone], Fax: [phone]  -Declined d/t medical complexity. Spoke to Ladonna in admissions.     6. [facility]  Ph: [phone], Fax: [phone]  -Left VM for admissions and faxed referral via comm mgmt.     7. [facility]  Ph: [phone], Fax: [phone]  -Accepted, but would only accept Friday or Monday if ready. Won't take over weekend d/t complexity and not having physician presence over weekend.    Plan:    Anticipated Disposition: Paged Dr. Hoffman as Thoracic team initially anticipated possible discharge  today/tomorrow. Per MD note, pt is getting chest CT today and potential tap of pleural effusion. Per RN note, pt with increased HR, and per RD, pt did not tolerate goal TF rate. MD also is waiting to read CT results. MD still states pt would be medically stable to discharge today. [facility] TCU can likely accept pt Fri 12/7. Pt/daughter's first choice is [facility] and they also might be able to accept Friday pending the Dir of Nursing's review.    Barriers to d/c plan:  Medical clearance and TCU placement.    Follow Up:  SW will continue to follow and assist as needed.    VINAYAK Parmar, Northern Light Maine Coast HospitalSW  6B Intermediate Care Unit  Phone: [phone]  Pager: [phone]

## 2018-12-06 NOTE — PROGRESS NOTES
CLINICAL NUTRITION SERVICES - BRIEF NOTE    Patient did not tolerate bolus TF today of 1.5 cans ( goal 2 cans/ bolus) over a 2 hours bolus. Highly suspect pt will not tolerate a syringe bolus at this time. Pt with abdominal distention, pain, fullness and continuous uncontrollable diarrhea.     INTERVENTIONS  Recommendations / Nutrition Prescription  Plan to change TF formula to standard at continuous. Once patient tolerates TF formula at continuous can trial bolus again of 5 cans/day ( 2 cans in the am, 2 can lunch, 1 can dinner).     If pt does not tolerate bolus, then would recommend trialing cycled TF of 75ml/hr x 16 hours. ( [pt is disconnected stomach and esophagus, therefore no concern of aspiration of formula)    Implementation  RD to trial standard formula containing insoluble and soluble fiber as follows: Isosource 1.5 @ goal 50 ml/hr (1200 ml/day) to provide 1800 kcals (28 kcal/kg/day), 82 g PRO (1.3 g/kg/day), 912 ml free H2O, 211 g CHO and 18 g Fiber daily.  + 2 packets Prosource: 1880 ( 29), 104 g pro (1.6)  RD to discontinue fiber modular       Collette Helton, RD, MS, LD  6B- Pager: [phone]

## 2018-12-06 NOTE — PLAN OF CARE
"Problem: Patient Care Overview  Goal: Plan of Care/Patient Progress Review  OT/6B:  Discharge Planner OT   Patient plan for discharge: unstated  Current status: Facilitated [facility] cognitive screen. Pt scoring 15/30 with 26 and above indicating \"normal cognition\". Pt appearing to have word finding deficits at times. When asked where she was pt stating \"I don't know, umm, I know we are not at [facility], but...\". Pt also appearing to have symptoms of depression which could be impacting cognition. Pt min A bed mobility and CGA bed to chair transfer.   Barriers to return to prior living situation: Medical status, cognition, strength, ADL I  Recommendations for discharge: TCU  Rationale for recommendations: to progress ADL I       Entered by: Johanna Jade 12/06/2018 3:29 PM           "

## 2018-12-06 NOTE — PROGRESS NOTES
Thoracic Surgery Progress Note    Pt: Myrtle Vaz  MRN: [id number]   12/06/2018    S: No acute events overnight. Pain severe but stable from previous, controlled with medications. Did not sleep well. Voiding independently. +BMx2. Tolerated bolus tube feeds changed yesterday without issue. Concerns from RN about excessive gaping of chest tube wounds. No additional concerns.    Temp:  [97.4  F (36.3  C)-99.1  F (37.3  C)] 97.9  F (36.6  C)  Heart Rate:  [59-80] 71  Resp:  [18] 18  BP: (108-124)/(58-74) 123/64  SpO2:  [92 %-100 %] 92 %    I/O last 3 completed shifts:  In: 2140 [P.O.:120; NG/GT:910]  Out: 2150 [Urine:1150; Drains:550; Other:450]    Resp: 18    Physical Exam:  Gen: NAD, sleeping comfortably in bed. Wakes for exam  Pulm: Nonlabored breathing on 4 LPM nasal canula  CV: Regular rate and rhythm.  Chest: incison c/d/i with staples in place, spit fistula with saliva in bag, drain dressing with minimal s/s drainage. Steri strips placed on chest tube wound at bedside  Abd: Abdomen soft, appropriately tender, G and J tube in place    Labs:     Recent Labs  Lab 12/06/18  0458 12/05/18  0522 12/04/18  0441   WBC 9.5 9.2 11.3*   HGB 8.9* 8.5* 8.2*   * 879* 948*       Recent Labs  Lab 12/06/18  0458 12/05/18  0522 12/04/18  0441 12/03/18  0451    142 144 142   POTASSIUM 3.9 4.0 3.7 3.6   CHLORIDE 102 104 104 104   CO2 30 35* 32 32   BUN 43* 46* 50* 46*   CR 0.96 0.96 0.99 0.98   GLC 92 85 105* 98   JOSE 7.8* 7.5* 7.4* 7.5*   MAG  --  2.4* 2.3 2.2   PHOS  --  4.2 4.4 4.1     AM XR chest 2 views:  IMPRESSION:  Bibasilar pleural effusions appear more loculated today with  associated bibasilar atelectasis versus consolidation    AM CT Chest w/o contrast pending.      A/P: 73F s/p transhiatal esophagectomy with spit fistula, G tube, J tube on 11/21 for perforated esophagus after type 4 peraesophageal hernia repair.  Leukocytosis from unknown etiology, inormalized over the past 2 days. Patient feeling well  today aside from pain, AVSS, labs stable. Going for CT chest today to evaluate pleural effusion.    -CT chest w/o contrast this AM given results of XR  -Potentially will need tap of pleural effusion  -PO pain meds   -Remove every other staple from abdomen/interspersed with steri strips  -Bolus tube feeds, managed per nutrition - continue tube feeds through G tube  -encourage ambulation  -Wean O2 as tolerated  -1 L/day free water divided q4h  -Social work putting in referral for TCU that will accept spit fistula, discharge today/tomorrow pending results of CT scan    Seen with fellow, Dr. Simons, will discuss with staff.    Gita Hoffman MD  General Surgery PGY-1

## 2018-12-06 NOTE — PROGRESS NOTES
~0700 --> during AM nurse handoff report pt appearing tachypneic with increased HR in the 130s-140s, with labored breathing.

## 2018-12-07 ENCOUNTER — APPOINTMENT (OUTPATIENT)
Dept: GENERAL RADIOLOGY | Facility: CLINIC | Age: 73
DRG: 326 | End: 2018-12-07
Attending: THORACIC SURGERY (CARDIOTHORACIC VASCULAR SURGERY)
Payer: MEDICARE

## 2018-12-07 ENCOUNTER — APPOINTMENT (OUTPATIENT)
Dept: SPEECH THERAPY | Facility: CLINIC | Age: 73
DRG: 326 | End: 2018-12-07
Attending: THORACIC SURGERY (CARDIOTHORACIC VASCULAR SURGERY)
Payer: MEDICARE

## 2018-12-07 ENCOUNTER — APPOINTMENT (OUTPATIENT)
Dept: OCCUPATIONAL THERAPY | Facility: CLINIC | Age: 73
DRG: 326 | End: 2018-12-07
Attending: THORACIC SURGERY (CARDIOTHORACIC VASCULAR SURGERY)
Payer: MEDICARE

## 2018-12-07 VITALS
SYSTOLIC BLOOD PRESSURE: 124 MMHG | WEIGHT: 206.7 LBS | DIASTOLIC BLOOD PRESSURE: 64 MMHG | HEART RATE: 64 BPM | RESPIRATION RATE: 18 BRPM | OXYGEN SATURATION: 95 % | HEIGHT: 65 IN | BODY MASS INDEX: 34.44 KG/M2 | TEMPERATURE: 98.8 F

## 2018-12-07 LAB
ANION GAP SERPL CALCULATED.3IONS-SCNC: 9 MMOL/L (ref 3–14)
BUN SERPL-MCNC: 37 MG/DL (ref 7–30)
CALCIUM SERPL-MCNC: 7.4 MG/DL (ref 8.5–10.1)
CHLORIDE SERPL-SCNC: 103 MMOL/L (ref 94–109)
CO2 SERPL-SCNC: 29 MMOL/L (ref 20–32)
CREAT SERPL-MCNC: 0.82 MG/DL (ref 0.52–1.04)
ERYTHROCYTE [DISTWIDTH] IN BLOOD BY AUTOMATED COUNT: 20.9 % (ref 10–15)
GFR SERPL CREATININE-BSD FRML MDRD: 69 ML/MIN/1.7M2
GLUCOSE SERPL-MCNC: 138 MG/DL (ref 70–99)
HCT VFR BLD AUTO: 28.2 % (ref 35–47)
HGB BLD-MCNC: 8.5 G/DL (ref 11.7–15.7)
MAGNESIUM SERPL-MCNC: 2.3 MG/DL (ref 1.6–2.3)
MCH RBC QN AUTO: 31.8 PG (ref 26.5–33)
MCHC RBC AUTO-ENTMCNC: 30.1 G/DL (ref 31.5–36.5)
MCV RBC AUTO: 106 FL (ref 78–100)
PHOSPHATE SERPL-MCNC: 2.6 MG/DL (ref 2.5–4.5)
PLATELET # BLD AUTO: 843 10E9/L (ref 150–450)
POTASSIUM SERPL-SCNC: 3.9 MMOL/L (ref 3.4–5.3)
RBC # BLD AUTO: 2.67 10E12/L (ref 3.8–5.2)
SODIUM SERPL-SCNC: 140 MMOL/L (ref 133–144)
WBC # BLD AUTO: 7.9 10E9/L (ref 4–11)

## 2018-12-07 PROCEDURE — A9270 NON-COVERED ITEM OR SERVICE: HCPCS | Mod: GY

## 2018-12-07 PROCEDURE — 25000132 ZZH RX MED GY IP 250 OP 250 PS 637: Mod: GY | Performed by: NURSE PRACTITIONER

## 2018-12-07 PROCEDURE — 25000132 ZZH RX MED GY IP 250 OP 250 PS 637: Mod: GY | Performed by: STUDENT IN AN ORGANIZED HEALTH CARE EDUCATION/TRAINING PROGRAM

## 2018-12-07 PROCEDURE — A9270 NON-COVERED ITEM OR SERVICE: HCPCS | Mod: GY | Performed by: PHYSICIAN ASSISTANT

## 2018-12-07 PROCEDURE — 36415 COLL VENOUS BLD VENIPUNCTURE: CPT | Performed by: SURGERY

## 2018-12-07 PROCEDURE — 84132 ASSAY OF SERUM POTASSIUM: CPT | Performed by: SURGERY

## 2018-12-07 PROCEDURE — 71046 X-RAY EXAM CHEST 2 VIEWS: CPT

## 2018-12-07 PROCEDURE — 80048 BASIC METABOLIC PNL TOTAL CA: CPT | Performed by: SURGERY

## 2018-12-07 PROCEDURE — 25000132 ZZH RX MED GY IP 250 OP 250 PS 637: Mod: GY | Performed by: PHYSICIAN ASSISTANT

## 2018-12-07 PROCEDURE — 40000225 ZZH STATISTIC SLP WARD VISIT

## 2018-12-07 PROCEDURE — 84100 ASSAY OF PHOSPHORUS: CPT | Performed by: SURGERY

## 2018-12-07 PROCEDURE — 40000133 ZZH STATISTIC OT WARD VISIT

## 2018-12-07 PROCEDURE — A9270 NON-COVERED ITEM OR SERVICE: HCPCS | Mod: GY | Performed by: STUDENT IN AN ORGANIZED HEALTH CARE EDUCATION/TRAINING PROGRAM

## 2018-12-07 PROCEDURE — 83735 ASSAY OF MAGNESIUM: CPT | Performed by: SURGERY

## 2018-12-07 PROCEDURE — 97535 SELF CARE MNGMENT TRAINING: CPT | Mod: GO

## 2018-12-07 PROCEDURE — A9270 NON-COVERED ITEM OR SERVICE: HCPCS | Mod: GY | Performed by: NURSE PRACTITIONER

## 2018-12-07 PROCEDURE — 27210429 ZZH NUTRITION PRODUCT INTERMEDIATE LITER

## 2018-12-07 PROCEDURE — 85027 COMPLETE CBC AUTOMATED: CPT | Performed by: SURGERY

## 2018-12-07 PROCEDURE — 25000132 ZZH RX MED GY IP 250 OP 250 PS 637: Mod: GY

## 2018-12-07 PROCEDURE — 92526 ORAL FUNCTION THERAPY: CPT | Mod: GN

## 2018-12-07 RX ORDER — OXYCODONE HCL 5 MG/5 ML
5-10 SOLUTION, ORAL ORAL EVERY 4 HOURS PRN
Qty: 30 ML | Refills: 0 | Status: ON HOLD | OUTPATIENT
Start: 2018-12-07 | End: 2019-02-06

## 2018-12-07 RX ADMIN — ACETAMINOPHEN 975 MG: 160 SOLUTION ORAL at 13:03

## 2018-12-07 RX ADMIN — METHOCARBAMOL TABLETS 500 MG: 500 TABLET, COATED ORAL at 13:09

## 2018-12-07 RX ADMIN — Medication 15 ML: at 07:55

## 2018-12-07 RX ADMIN — LIDOCAINE 1 PATCH: 560 PATCH PERCUTANEOUS; TOPICAL; TRANSDERMAL at 07:55

## 2018-12-07 RX ADMIN — ACETAMINOPHEN 975 MG: 160 SOLUTION ORAL at 03:51

## 2018-12-07 RX ADMIN — POTASSIUM CHLORIDE 20 MEQ: 1.5 POWDER, FOR SOLUTION ORAL at 13:03

## 2018-12-07 RX ADMIN — BACITRACIN: 500 OINTMENT TOPICAL at 07:57

## 2018-12-07 RX ADMIN — GABAPENTIN 300 MG: 250 SUSPENSION ORAL at 07:55

## 2018-12-07 RX ADMIN — Medication 2 PACKET: at 08:03

## 2018-12-07 RX ADMIN — GABAPENTIN 300 MG: 250 SUSPENSION ORAL at 13:09

## 2018-12-07 RX ADMIN — LEVOTHYROXINE SODIUM 150 MCG: 300 TABLET ORAL at 07:56

## 2018-12-07 RX ADMIN — Medication 200 MG: at 07:56

## 2018-12-07 RX ADMIN — METHOCARBAMOL TABLETS 500 MG: 500 TABLET, COATED ORAL at 07:56

## 2018-12-07 ASSESSMENT — ACTIVITIES OF DAILY LIVING (ADL)
ADLS_ACUITY_SCORE: 16

## 2018-12-07 NOTE — PROGRESS NOTES
CLINICAL NUTRITION SERVICES - BRIEF NOTE- discharge instructions    Isosource 1.5 @ goal 50 ml/hr (1200 ml/day) to provide 1800 kcals (28 kcal/kg/day), 82 g PRO (1.3 g/kg/day), 912 ml free H2O, 211 g CHO and 18 g Fiber daily.  + 2 packets Prosource: 1880 (29), 104 g pro (1.6)  OR   + 4 packets Beneprotein: 1900 (29), 106 g pro (1.6)    If, TCU would like to cycle TF, recommend: 75 ml/hr x 16 hours to provide the same provision as above.     For 100% fluids, recommend: 912 ml free water from TF, 150 ml Q4 ( 900 ml) for a total of 1812 ml free water daily    Collette Helton RD, MS, LD  6B- Pager: [phone]

## 2018-12-07 NOTE — PLAN OF CARE
Neuro: A&Ox4. Flat. forgetful   Cardiac: SR. VSS.   Respiratory: Sating >93% on 4-6 L NC. Encourage IS Q 1 hr  GI/: up to commode, adequate urine output. BM X2 (incontinent once)   Diet/appetite: TF at goal, chips and sips of clears  Activity:  Assist of 1 and walker, up to chair and in halls.  Pain: At acceptable level on current regimen.   Skin: No new deficits noted.  LDA's: TIGRE schroeder, JOSH TF, spit fistula     Report called to TCU, plan to leave at 1630 via [facility]     Plan: Continue with POC. Notify primary team with changes.

## 2018-12-07 NOTE — PLAN OF CARE
Problem: Patient Care Overview  Goal: Plan of Care/Patient Progress Review  Discharge Planner SLP   Patient plan for discharge: TCU  Current status: Functional tolerance of clear liquid diet for oral gratification. Assessed with sips of thin liquids; no overt s/sx of aspiration. Recommend continuation of clear liquid diet; defer to Thoracic for diet advancement when medically appropriate. SLP to follow.  Barriers to return to prior living situation: TF, medical condition  Recommendations for discharge: TCU  Rationale for recommendations: Anticipate ongoing rehab and NSG needs       Entered by: Tiffanie Hutchison 12/07/2018 10:24 AM         Speech Language Therapy Discharge Summary    Reason for therapy discharge:    Discharged to transitional care facility.    Progress towards therapy goal(s). See goals on Care Plan in [facility] electronic health record for goal details.  Goals partially met.  Barriers to achieving goals:   discharge from facility.    Therapy recommendation(s):    Continued therapy is recommended.  Rationale/Recommendations:  Clear liquid diet. Pt will require clearance from thoracic prior to diet advancement.

## 2018-12-07 NOTE — PLAN OF CARE
"Problem: Patient Care Overview  Goal: Plan of Care/Patient Progress Review  Outcome: No Change  /72 (BP Location: Right arm)  Pulse 64  Temp 98.2  F (36.8  C) (Oral)  Resp 16  Ht 1.651 m (5' 5\")  Wt 93.8 kg (206 lb 11.2 oz)  SpO2 94%  BMI 34.4 kg/m2    VSS. Afebrile. Very flat affect. Forgetful at times. 5-6L NC. Spit fistula with small output. TF's through J-tube at goal of 50 ml/hr. G-tube clamped. Abdominal dressing with slight drainage. No drainage from old CT sites. Steri-strips c/d/i. Refused repositioning but up every couple of hours to commode. 1 Very large episode of stool incontinence. Stool loose/watery. Denies pain, n/v. Continue to monitor.      "

## 2018-12-07 NOTE — PLAN OF CARE
"Problem: Patient Care Overview  Goal: Plan of Care/Patient Progress Review  Outcome: Declining  Neuro: Disoriented to time. Slow to respond, forgetful, needs reminders. 1 attempt to get out of bed and some picking and pulling at lines intermittently.   Cardiac: SR. VSS.   Respiratory: Sating 93% on 4L, desatted down to 86% to get up to the commode, placed on 5L to recover.   GI/: Adequate urine output. BM x1 this shift. Diarrhea slowed down from day shift, 1 episode of loose yellow stool.   Diet/appetite: Ice chips, sips of water. TF formula changed today, started at 25ml/hr at 2000, move to 50ml/hr continuous if tolerated by patient at 0000 on 12/7. Denies nausea.     Activity:  Assist of 1 out of bed. Up to commode X3 this shift.   Pain: Denies  Skin: MD came to see slight dehiscence on lower gonzalez umbilical abdomen, w/ serosanganinous drainage, dressing placed on top by MD, RN will continue to monitor site. Neck site also not fully approximated, scant serous drainage. Steri strips in place under breast incision sites. Bruising throughout.   LDA's:  Gtube- Continuous TF  Jtube-Clamped/irrigated  PIV SLD  Spit fistula with minimal output    /55 (BP Location: Right arm)  Pulse 64  Temp 97.8  F (36.6  C) (Oral)  Resp 20  Ht 1.651 m (5' 5\")  Wt 93.8 kg (206 lb 11.2 oz)  SpO2 93%  BMI 34.4 kg/m2    Plan: Continue with POC. Notify primary team with changes.        Problem: Cardiac Disease Comorbidity  Goal: Cardiac Disease  Patient comorbidity will be monitored for signs and symptoms of Cardiac Disease.  Problems will be absent, minimized or managed by discharge/transition of care.   Outcome: No Change  On tele, no concerns this daniel. Taking amio suspensions through g tube      "

## 2018-12-07 NOTE — PROGRESS NOTES
Social Work Services Discharge Note      Patient Name:  Myrtle Vaz     Anticipated Discharge Date: Discharge Friday 12/7/18 at 4:30pm [facility] [employer] wheelchair transport.     Discharge Disposition:    Transitional Care Unit Placement  -TCU: [facility]   [address] [place] [id number]  Main: [phone], Admissions: [phone], Fax: [phone]  -RN to RN handoff: Please call [phone] after 1pm today.     Pre-Admission Screening (PAS) online form has been completed.  The Level of Care (LOC) is: Determined  Confirmation Code is:  [id number]  Patient/caregiver informed of referral to [facility] Line for Pre-Admission Screening for skilled nursing facility (SNF) placement and to expect a phone call post discharge from SNF.     Additional Services/Equipment Arranged:   -Oxygen: Pt currently requiring 4L NC. Pt was not on O2 prior to admission. [facility] TCU uses [facility] (P: [phone], F: [phone]). The TCU will not allow pt to use one of their portable tanks so pt needs to have one delivered to the hospital. Spoke to Jerica in intake. There will be a $75 fee for the portable tank which  Resp will be calling misael Hubbard to discuss.  will send facesheet and discharge orders to  Resp.    -Transport: The TCU wants pt to arrive anytime after 2:30pm. Pt and daughter prefer [facility] (Ph: [phone]) via wheelchair. Transport set for  12/7 at 4:30pm. Writer informed pt of [employer] [facility] private pay cost: wheelchair transport- $70 base rate plus $4.50/mile. Pt's daughter Haydee would like the bill sent to her (Haydee's) home at: [address] [place] [address].     Patient / Family response to discharge plan:  Pt and daughter in agreement w/plan.     Persons notified of above discharge plan: Pt, pt's daughter Haydee (P: [phone]), Dr Hoffman  with Thoracic Surgery, Leanne 6B RN, Michelle 6B Charge RN.    -Staff Discharge Instructions:  Please fax discharge orders and signed hard scripts for any controlled substances.  Please print a packet and send with patient.     VINAYAK Parmar, [facility]  6B Intermediate Care Unit  Phone: [phone]  Pager: [phone]

## 2018-12-07 NOTE — PROVIDER NOTIFICATION
Pt stating after 170ml flushes in J tube, pt experiences extra abdominal pain and discomfort. MD would like RN to continue to give Q6 170ml flushes in J tube until further notice, mostly likely pain will improve since bolus feedings were stopped.  Per nutritionist tube feed stopped, new TF formula and rate to be hung once pain and diarrhea have mostly subsided.

## 2018-12-07 NOTE — PLAN OF CARE
Problem: Patient Care Overview  Goal: Plan of Care/Patient Progress Review  Discharge Planner OT   Patient plan for discharge: TCU   Current status: Pt with difficulty completing multi-step command and intermittent nonsensical statements. Pt mod A for sit <> stands and CGA-min A with max cues for FWW management with mobility in the room. Pt completed shower with mod-max A and max A for LB dressing. Pt limited by fatigue and SOB. Pt on 6L NC, VSS.   Barriers to return to prior living situation: deconditioning, cognitive impairments, balance deficits   Recommendations for discharge: TCU   Rationale for recommendations: to progress IND with ADLs and mobility.        Entered by: Victoria Erazo 12/07/2018 11:11 AM

## 2018-12-07 NOTE — PLAN OF CARE
Problem: Patient Care Overview  Goal: Plan of Care/Patient Progress Review    Physical Therapy Discharge Summary    Reason for therapy discharge:    Discharged to transitional care facility.    Progress towards therapy goal(s). See goals on Care Plan in [facility] electronic health record for goal details.  Goals partially met.  Barriers to achieving goals:   limited tolerance for therapy and discharge from facility.    Therapy recommendation(s):    Continued therapy is recommended.  Rationale/Recommendations:  increase strength, balance, activity tolerance and independence with functional mobility.

## 2018-12-07 NOTE — PROVIDER NOTIFICATION
Notified thoracic of pt seeming to have increased confused with some picking at lines. Abdominal incision has some new scant bloody drainage.    MD will come to see the patient  No new orders at this time, continue to monitor patient status and notify MD of any worsening changes.

## 2018-12-07 NOTE — PROGRESS NOTES
Thoracic Surgery Progress Note    Pt: Myrtle Vaz  MRN: [id number]   12/07/2018    S: No acute events overnight. Pain severe but stable from previous, controlled with medications. Sleeping comfortably. Voiding independently. +BMx4. Tolerated bolus tube feeds, no n/v.     Temp:  [97.6  F (36.4  C)-98.3  F (36.8  C)] 98.1  F (36.7  C)  Pulse:  [64] 64  Heart Rate:  [59-77] 77  Resp:  [16-20] 16  BP: (108-124)/(52-72) 124/54  SpO2:  [90 %-95 %] 92 %    I/O last 3 completed shifts:  In: 1965 [P.O.:30; NG/GT:1485]  Out: 645 [Urine:400; Drains:245]    Resp: 16    Physical Exam:  Gen: NAD, sleeping comfortably in bed. Wakes for exam  Pulm: Nonlabored breathing on 4 LPM nasal canula  CV: Extremities wwp  Chest: incison with staples and steri strips with slight s/s drainage, c/d/i. Spit fistula with saliva in bag.   Abd: Abdomen soft, appropriately tender, G and J tube in place    Labs:     Recent Labs  Lab 12/07/18  0441 12/06/18  0458 12/05/18  0522   WBC 7.9 9.5 9.2   HGB 8.5* 8.9* 8.5*   * 887* 879*       Recent Labs  Lab 12/07/18  0441 12/06/18  0458 12/05/18  0522 12/04/18  0441    140 142 144   POTASSIUM 3.9 3.9 4.0 3.7   CHLORIDE 103 102 104 104   CO2 29 30 35* 32   BUN 37* 43* 46* 50*   CR 0.82 0.96 0.96 0.99   * 92 85 105*   JOSE 7.4* 7.8* 7.5* 7.4*   MAG 2.3  --  2.4* 2.3   PHOS 2.6  --  4.2 4.4     CT chest 12/6: IMPRESSION:   1. No significant change in small bilateral pleural effusions with  subjacent atelectasis. Fluid seen extending along the posterior  mediastinum on prior has resolved.  2. Resolved pneumothoraces since prior.  3. Layering sludge and/or gallstones in the gallbladder.    AM CXR pending     A/P: 73F s/p transhiatal esophagectomy with spit fistula, G tube, J tube on 11/21 for perforated esophagus after type 4 peraesophageal hernia repair.  Leukocytosis from unknown etiology, normalized. Patient feeling well today aside from pain, AVSS, labs stable.     -Potentially will need  tap of pleural effusion  -Wean O2 as tolerated  -PO pain meds   -Bolus tube feeds, managed per nutrition - continue tube feeds through G tube  -1 L/day free water divided q4h  -encourage ambulation  -Social work putting in referral for TCU that will accept spit fistula, discharge today/tomorrow pending TCU bed availability    Plan to be discussed with staff.  Sia Gamino MD  Surgery PGY1

## 2018-12-07 NOTE — DISCHARGE INSTRUCTIONS
Isosource 1.5 @ goal 50 ml/hr (1200 ml/day) to provide 1800 kcals (28 kcal/kg/day), 82 g PRO (1.3 g/kg/day), 912 ml free H2O, 211 g CHO and 18 g Fiber daily.  + 2 packets Prosource: 1880 (29), 104 g pro (1.6)  OR   + 4 packets Beneprotein: 1900 (29), 106 g pro (1.6)    If, TCU would like to cycle TF, recommend: 75 ml/hr x 16 hours to provide the same provision as above.     For 100% fluids, recommend: 912 ml free water from TF, 150 ml Q4 ( 900 ml) for a total of 1812 ml free water daily

## 2018-12-08 NOTE — PROGRESS NOTES
Pt was d/c'd from 6B earlier in the day and at 2040 writer received phone call from Jaspal HAN at [facility] with questions regarding tube feeding for patient. Patient chart was accessed in order to review discharge instructions. Questioned whether G or J tube was for tube feeding; writer read discharge instructions stating to to use J tube for feeds. No further questions.

## 2018-12-08 NOTE — PLAN OF CARE
Problem: Patient Care Overview  Goal: Plan of Care/Patient Progress Review  Outcome: Adequate for Discharge Date Met: 12/07/18  Discharged to TCU near beginning of shift. Report given to TCU RN by previous RN. Dc'd PIV. Confirmed all belongings w pt. [facility] transport to TCU. A&O x 3, forgetful. J tube flushed just prior to transfer.

## 2018-12-10 NOTE — PLAN OF CARE
Occupational Therapy Discharge Summary    Reason for therapy discharge:    Discharged to transitional care facility.    Progress towards therapy goal(s). See goals on Care Plan in [facility] electronic health record for goal details.  Goals partially met.  Barriers to achieving goals:   limited tolerance for therapy.    Therapy recommendation(s):    Continued therapy is recommended.  Rationale/Recommendations:  Rec TCU to address deficits in balance, cognition, and deconditioning for ADLs.

## 2018-12-27 ENCOUNTER — TELEPHONE (OUTPATIENT)
Dept: SURGERY | Facility: CLINIC | Age: 73
End: 2018-12-27

## 2018-12-27 DIAGNOSIS — K22.3 ESOPHAGEAL PERFORATION: Primary | ICD-10-CM

## 2018-12-27 NOTE — TELEPHONE ENCOUNTER
I called patients' daughter, Haydee.   She had been trying to reach Dr. Temitope Albarado (who has been out of town for a few days).   Her mother has been seen recently at [facility] ER, then they attempted to transfer her back to [facility] but was diverted due to over-capacity ER here.   [place] then discharged her home with some supportive services and she is doing better, is more optimistic, etc.          They are working to get her into a TCU in the [facility].   She will call us again if any records are needed there, but feels pretty certain they have what they need now.

## 2019-01-04 ENCOUNTER — RECORDS - HEALTHEAST (OUTPATIENT)
Dept: LAB | Facility: CLINIC | Age: 74
End: 2019-01-04

## 2019-01-07 LAB
ANION GAP SERPL CALCULATED.3IONS-SCNC: 14 MMOL/L (ref 5–18)
BASOPHILS # BLD AUTO: 0 THOU/UL (ref 0–0.2)
BASOPHILS NFR BLD AUTO: 0 % (ref 0–2)
BUN SERPL-MCNC: 15 MG/DL (ref 8–28)
CALCIUM SERPL-MCNC: 8.2 MG/DL (ref 8.5–10.5)
CHLORIDE BLD-SCNC: 98 MMOL/L (ref 98–107)
CO2 SERPL-SCNC: 20 MMOL/L (ref 22–31)
CREAT SERPL-MCNC: 0.7 MG/DL (ref 0.6–1.1)
EOSINOPHIL # BLD AUTO: 0.6 THOU/UL (ref 0–0.4)
EOSINOPHIL NFR BLD AUTO: 4 % (ref 0–6)
ERYTHROCYTE [DISTWIDTH] IN BLOOD BY AUTOMATED COUNT: 18.6 % (ref 11–14.5)
GFR SERPL CREATININE-BSD FRML MDRD: >60 ML/MIN/1.73M2
GLUCOSE BLD-MCNC: 82 MG/DL (ref 70–125)
HCT VFR BLD AUTO: 35.5 % (ref 35–47)
HGB BLD-MCNC: 11 G/DL (ref 12–16)
LYMPHOCYTES # BLD AUTO: 3.7 THOU/UL (ref 0.8–4.4)
LYMPHOCYTES NFR BLD AUTO: 29 % (ref 20–40)
MCH RBC QN AUTO: 31.3 PG (ref 27–34)
MCHC RBC AUTO-ENTMCNC: 31 G/DL (ref 32–36)
MCV RBC AUTO: 101 FL (ref 80–100)
MONOCYTES # BLD AUTO: 1.2 THOU/UL (ref 0–0.9)
MONOCYTES NFR BLD AUTO: 10 % (ref 2–10)
NEUTROPHILS # BLD AUTO: 7.1 THOU/UL (ref 2–7.7)
NEUTROPHILS NFR BLD AUTO: 57 % (ref 50–70)
PLATELET # BLD AUTO: 543 THOU/UL (ref 140–440)
PMV BLD AUTO: 9.5 FL (ref 8.5–12.5)
POTASSIUM BLD-SCNC: 4.4 MMOL/L (ref 3.5–5)
RBC # BLD AUTO: 3.51 MILL/UL (ref 3.8–5.4)
SODIUM SERPL-SCNC: 132 MMOL/L (ref 136–145)
TSH SERPL DL<=0.005 MIU/L-ACNC: 29.06 UIU/ML (ref 0.3–5)
WBC: 12.8 THOU/UL (ref 4–11)

## 2019-01-08 ENCOUNTER — RECORDS - HEALTHEAST (OUTPATIENT)
Dept: LAB | Facility: CLINIC | Age: 74
End: 2019-01-08

## 2019-01-08 LAB
C DIFF TOX B STL QL: NEGATIVE
RIBOTYPE 027/NAP1/BI: NORMAL

## 2019-01-10 ENCOUNTER — ANCILLARY PROCEDURE (OUTPATIENT)
Dept: GENERAL RADIOLOGY | Facility: CLINIC | Age: 74
End: 2019-01-10
Payer: MEDICARE

## 2019-01-10 ENCOUNTER — OFFICE VISIT (OUTPATIENT)
Dept: SURGERY | Facility: CLINIC | Age: 74
End: 2019-01-10
Attending: STUDENT IN AN ORGANIZED HEALTH CARE EDUCATION/TRAINING PROGRAM
Payer: MEDICARE

## 2019-01-10 VITALS
HEART RATE: 82 BPM | DIASTOLIC BLOOD PRESSURE: 62 MMHG | SYSTOLIC BLOOD PRESSURE: 99 MMHG | BODY MASS INDEX: 31.14 KG/M2 | OXYGEN SATURATION: 91 % | TEMPERATURE: 98.2 F | WEIGHT: 187.1 LBS

## 2019-01-10 DIAGNOSIS — K44.9 HIATAL HERNIA: Primary | ICD-10-CM

## 2019-01-10 DIAGNOSIS — K22.3 ESOPHAGEAL PERFORATION: ICD-10-CM

## 2019-01-10 LAB
ALBUMIN SERPL-MCNC: 2.6 G/DL (ref 3.4–5)
PREALB SERPL IA-MCNC: 27 MG/DL (ref 15–45)

## 2019-01-10 PROCEDURE — G0463 HOSPITAL OUTPT CLINIC VISIT: HCPCS | Mod: ZF

## 2019-01-10 PROCEDURE — 36415 COLL VENOUS BLD VENIPUNCTURE: CPT

## 2019-01-10 PROCEDURE — 84134 ASSAY OF PREALBUMIN: CPT | Performed by: STUDENT IN AN ORGANIZED HEALTH CARE EDUCATION/TRAINING PROGRAM

## 2019-01-10 PROCEDURE — 82040 ASSAY OF SERUM ALBUMIN: CPT | Performed by: STUDENT IN AN ORGANIZED HEALTH CARE EDUCATION/TRAINING PROGRAM

## 2019-01-10 RX ORDER — MORPHINE SULFATE 100 MG/1
100 TABLET ORAL EVERY 4 HOURS PRN
Status: ON HOLD | COMMUNITY
End: 2019-02-06

## 2019-01-10 RX ORDER — DIPHENOXYLATE HCL/ATROPINE 2.5-.025MG
1 TABLET ORAL 4 TIMES DAILY
COMMUNITY
End: 2019-02-06

## 2019-01-10 RX ORDER — NYSTATIN 100000 U/G
CREAM TOPICAL
Status: ON HOLD | COMMUNITY
End: 2019-02-06

## 2019-01-10 RX ORDER — CITALOPRAM HYDROBROMIDE 20 MG/1
20 TABLET ORAL DAILY
Status: ON HOLD | COMMUNITY
End: 2019-02-06

## 2019-01-10 RX ORDER — POTASSIUM CHLORIDE 1500 MG/1
20 TABLET, EXTENDED RELEASE ORAL
Status: ON HOLD | COMMUNITY
End: 2019-02-06

## 2019-01-10 SDOH — HEALTH STABILITY: MENTAL HEALTH: HOW OFTEN DO YOU HAVE A DRINK CONTAINING ALCOHOL?: NEVER

## 2019-01-10 ASSESSMENT — ENCOUNTER SYMPTOMS
LIGHT-HEADEDNESS: 1
ARTHRALGIAS: 0
MYALGIAS: 1
HEMATURIA: 0
SYNCOPE: 0
HEARTBURN: 0
SKIN CHANGES: 0
HALLUCINATIONS: 1
MUSCLE WEAKNESS: 1
BLOOD IN STOOL: 0
HYPOTENSION: 1
EXERCISE INTOLERANCE: 1
POLYDIPSIA: 1
NIGHT SWEATS: 0
DYSURIA: 0
CHILLS: 1
FATIGUE: 1
SLEEP DISTURBANCES DUE TO BREATHING: 0
CONSTIPATION: 0
JAUNDICE: 0
JOINT SWELLING: 0
BACK PAIN: 0
DECREASED APPETITE: 0
NAUSEA: 1
FLANK PAIN: 0
POOR WOUND HEALING: 0
LEG PAIN: 0
HYPERTENSION: 1
STIFFNESS: 0
ABDOMINAL PAIN: 1
ORTHOPNEA: 0
PALPITATIONS: 0
BLOATING: 1
NECK PAIN: 0
BOWEL INCONTINENCE: 0
ALTERED TEMPERATURE REGULATION: 1
DIFFICULTY URINATING: 0
FEVER: 0
DIARRHEA: 1
RECTAL PAIN: 0
NAIL CHANGES: 0
INCREASED ENERGY: 1

## 2019-01-10 ASSESSMENT — PAIN SCALES - GENERAL: PAINLEVEL: MODERATE PAIN (5)

## 2019-01-10 NOTE — NURSING NOTE
"Oncology Rooming Note    January 10, 2019 9:31 AM   Myrtle Vaz is a 73 year old female who presents for:    Chief Complaint   Patient presents with     Oncology Clinic Visit     Return; Diarrhea due to malabsorption     Initial Vitals: BP 99/62   Pulse 82   Temp 98.2  F (36.8  C) (Oral)   Wt 84.9 kg (187 lb 1.6 oz)   SpO2 91%   BMI 31.14 kg/m   Estimated body mass index is 31.14 kg/m  as calculated from the following:    Height as of 11/21/18: 1.651 m (5' 5\").    Weight as of this encounter: 84.9 kg (187 lb 1.6 oz). Body surface area is 1.97 meters squared.  Moderate Pain (5) Comment: Swallowing/throat   No LMP recorded. Patient has had a hysterectomy.  Allergies reviewed: Yes  Medications reviewed: Yes    Medications: Medication refills not needed today.  Pharmacy name entered into EPIC: Data Unavailable    Clinical concerns: Patient had a fall on Monday  at the nursing home she is staying at. She fell on her tailbone, and is very concerned that she already has a history of back pain. Her pain right now is a 9 out of 10 on the pain scaletRao was notified.    10 minutes for nursing intake (face to face time)     Sujata Baez MA              "

## 2019-01-10 NOTE — LETTER
1/10/2019       RE: Myrtle Vaz  [address] [facility] [address]     Dear Colleague,    Thank you for referring your patient, Myrtle Vaz, to the [facility]. Please see a copy of my visit note below.    THORACIC SURGERY FOLLOW UP VISIT    Dear Dr. Caballero,  I saw Ms. Myrtle Vaz in follow-up today. The clinical summary follows:      PREOP DIAGNOSIS   - Possible esophageal perforation  - Recent giant hiatal hernia repair with mesh   - Sepsis   - New onset Afib   - Acute kidney failure     PROCEDURE   - EGD  - Laparotomy  - Takedown of Nissen fundoplication  - Takedown of cruroplasty   - Prosthetic mesh explantation  - Takedown of gastrostomy tube  - Drainage of mediastinal abscess  - Excision of hiatal hernia sac  - Transhiatal esophagogastrectomy   - Closure of esophageal hiatus   - Surgical Gastrostomy  - Surgical Jejunostomy  - Bilateral pleural tubes placement   - Left neck incision and cervical esophagostomy     DATE OF PROCEDURE  11/21/2018    COMPLICATIONS  Intermittent SVT requiring DCCV x 4    INTERVAL STUDIES  CXR 1/10: Pending    BMI 31.14    SAUL Kaur was discharged from the hospital to a TCU on 12/7/2018. Patient has been doing well at the TCU. Over the [place] holiday patient wanted to go on Hospice, but she then changed her mind. She is in better spirits now. She had a fall over the weekend and has some residual lower back pain, which is present as at baseline. She was evaluated and was told she is fine. Se has been tolerating TF with some loose stools and taking lomotil for that.     From a personal perspective, she is here with her daughter, Haydee. She is eager to have the esophagostomy reversed.     IMPRESSION (K44.9) Hiatal hernia  (primary encounter diagnosis)      73 year-old female with type IV hiatal hernia who underwent laparoscopic hiatal hernia repair with Nissen fundoplication at an outside hospital, complicated by esophageal perforation requiring esophagectomy  and cervical esophagostomy. Doing well post op.     PLAN  I spent a total of 40 minutes with Ms. Myrtle Vaz , more than 50% of which were spent in counseling, coordination of care, and face-to-face time. I reviewed the plan as follows:  - Obtain nutrition labs and CXR today  - Will communicate with the facility to add benefiber to her tube feeds to help with her loose stools.  - Nutritionist in TCU to help with switching the tube feeds to be done through the G-tube. G tube was flushed and was found to be patent.   - Plan to follow up in clinic in 6 weeks.   -I also counseled her today about how she was making good recovery and that if she felt the need to discuss something, then to get in touch with us. She seems to be in good spirits today     All questions were answered and the patient and present family were in agreement with the plan.  I appreciate the opportunity to participate in the care of your patient and will keep you updated.  Sincerely,  Ally Ybarra MD

## 2019-01-10 NOTE — NURSING NOTE
Blood drawn in clinic via venipuncture to back of right hand. Patient tolerated well, see flowsheets.    Sujata Baez, CMA

## 2019-01-11 ENCOUNTER — RECORDS - HEALTHEAST (OUTPATIENT)
Dept: LAB | Facility: CLINIC | Age: 74
End: 2019-01-11

## 2019-01-11 DIAGNOSIS — K44.9 HIATAL HERNIA: Primary | ICD-10-CM

## 2019-01-11 LAB
ANION GAP SERPL CALCULATED.3IONS-SCNC: 9 MMOL/L (ref 5–18)
BUN SERPL-MCNC: 19 MG/DL (ref 8–28)
CALCIUM SERPL-MCNC: 8.2 MG/DL (ref 8.5–10.5)
CHLORIDE BLD-SCNC: 100 MMOL/L (ref 98–107)
CO2 SERPL-SCNC: 22 MMOL/L (ref 22–31)
CREAT SERPL-MCNC: 0.75 MG/DL (ref 0.6–1.1)
ERYTHROCYTE [DISTWIDTH] IN BLOOD BY AUTOMATED COUNT: 18.2 % (ref 11–14.5)
GFR SERPL CREATININE-BSD FRML MDRD: >60 ML/MIN/1.73M2
GLUCOSE BLD-MCNC: 87 MG/DL (ref 70–125)
HCT VFR BLD AUTO: 32.4 % (ref 35–47)
HGB BLD-MCNC: 10.1 G/DL (ref 12–16)
MCH RBC QN AUTO: 31.1 PG (ref 27–34)
MCHC RBC AUTO-ENTMCNC: 31.2 G/DL (ref 32–36)
MCV RBC AUTO: 100 FL (ref 80–100)
PLATELET # BLD AUTO: 553 THOU/UL (ref 140–440)
PMV BLD AUTO: 9.1 FL (ref 8.5–12.5)
POTASSIUM BLD-SCNC: 4.2 MMOL/L (ref 3.5–5)
RBC # BLD AUTO: 3.25 MILL/UL (ref 3.8–5.4)
SODIUM SERPL-SCNC: 131 MMOL/L (ref 136–145)
WBC: 10.6 THOU/UL (ref 4–11)

## 2019-01-14 ENCOUNTER — RECORDS - HEALTHEAST (OUTPATIENT)
Dept: LAB | Facility: CLINIC | Age: 74
End: 2019-01-14

## 2019-01-15 LAB
ALBUMIN SERPL-MCNC: 2.4 G/DL (ref 3.5–5)
ANION GAP SERPL CALCULATED.3IONS-SCNC: 8 MMOL/L (ref 5–18)
BUN SERPL-MCNC: 19 MG/DL (ref 8–28)
CALCIUM SERPL-MCNC: 8.4 MG/DL (ref 8.5–10.5)
CHLORIDE BLD-SCNC: 104 MMOL/L (ref 98–107)
CO2 SERPL-SCNC: 21 MMOL/L (ref 22–31)
CREAT SERPL-MCNC: 0.7 MG/DL (ref 0.6–1.1)
GFR SERPL CREATININE-BSD FRML MDRD: >60 ML/MIN/1.73M2
GLUCOSE BLD-MCNC: 106 MG/DL (ref 70–125)
POTASSIUM BLD-SCNC: 4.3 MMOL/L (ref 3.5–5)
PREALB SERPL-MCNC: 24.9 MG/DL (ref 19–38)
SODIUM SERPL-SCNC: 133 MMOL/L (ref 136–145)

## 2019-01-16 ENCOUNTER — CARE COORDINATION (OUTPATIENT)
Dept: ONCOLOGY | Facility: CLINIC | Age: 74
End: 2019-01-16

## 2019-01-16 NOTE — PROGRESS NOTES
TC ryan Valdez from pt's nursing home. Call transferred to writer in error as pt sees Dr. Ally Ybarra, and writer works with Dr. Howie Ybarra. Message sent to Dr. Chapa and Kailey Olmstead as follows:    Courtney ([phone]), a dietician from 's nursing home called. The call was transferred to me in error - I work with Howie Ybarra. Courtney said that pt's daughter reported that pt was told she could advance her diet at her last appt with Sammi. It doesn't appear in the visit notes, but I wanted to pass this along just in case it was omitted in error.     If she were to advance her diet, the TCU would ( Nursing Home - Transitional) need orders faxed and they would need to be very specific, and one of the following:    Full liquid  NDD1 (puree)  NDD2 (mechanically altered)  NDD3 (dysphasia advanced)    Fax - [phone]    She was also wondering if she should have a referral for SLP for a swallow eval. Courtney's durga # is listed above if you want to call her with questions.

## 2019-01-17 ENCOUNTER — MYC MEDICAL ADVICE (OUTPATIENT)
Dept: SURGERY | Facility: CLINIC | Age: 74
End: 2019-01-17

## 2019-01-17 DIAGNOSIS — K44.9 HIATAL HERNIA: ICD-10-CM

## 2019-01-17 DIAGNOSIS — K22.3 ESOPHAGEAL PERFORATION: Primary | ICD-10-CM

## 2019-01-18 ENCOUNTER — TELEPHONE (OUTPATIENT)
Dept: SURGERY | Facility: CLINIC | Age: 74
End: 2019-01-18

## 2019-01-18 NOTE — TELEPHONE ENCOUNTER
Renata RN care coordinator called confused out speech therapy orders faxed to them yesterday. I called her back to discuss what Dr. Ybarra was asking for.  After speaking to Dr. Ybarra about this, we will leave her on clear liquid diet/sips for now and have her work with out speech therapist when she returns for follow up.  This information was left on Renata's voicemail

## 2019-01-21 ENCOUNTER — RECORDS - HEALTHEAST (OUTPATIENT)
Dept: LAB | Facility: CLINIC | Age: 74
End: 2019-01-21

## 2019-01-22 LAB
ALBUMIN SERPL-MCNC: 2.8 G/DL (ref 3.5–5)
ANION GAP SERPL CALCULATED.3IONS-SCNC: 8 MMOL/L (ref 5–18)
BUN SERPL-MCNC: 21 MG/DL (ref 8–28)
CALCIUM SERPL-MCNC: 8.7 MG/DL (ref 8.5–10.5)
CHLORIDE BLD-SCNC: 95 MMOL/L (ref 98–107)
CO2 SERPL-SCNC: 29 MMOL/L (ref 22–31)
CREAT SERPL-MCNC: 0.69 MG/DL (ref 0.6–1.1)
GFR SERPL CREATININE-BSD FRML MDRD: >60 ML/MIN/1.73M2
GLUCOSE BLD-MCNC: 70 MG/DL (ref 70–125)
MAGNESIUM SERPL-MCNC: 2.1 MG/DL (ref 1.8–2.6)
POTASSIUM BLD-SCNC: 4.1 MMOL/L (ref 3.5–5)
PREALB SERPL-MCNC: 26.1 MG/DL (ref 19–38)
SODIUM SERPL-SCNC: 132 MMOL/L (ref 136–145)

## 2019-01-28 ENCOUNTER — MYC MEDICAL ADVICE (OUTPATIENT)
Dept: SURGERY | Facility: CLINIC | Age: 74
End: 2019-01-28

## 2019-01-28 DIAGNOSIS — K44.9 HIATAL HERNIA: Primary | ICD-10-CM

## 2019-01-28 DIAGNOSIS — S11.21XA: Primary | ICD-10-CM

## 2019-01-28 DIAGNOSIS — J90 PLEURAL EFFUSION: Primary | ICD-10-CM

## 2019-01-29 ENCOUNTER — RECORDS - HEALTHEAST (OUTPATIENT)
Dept: LAB | Facility: CLINIC | Age: 74
End: 2019-01-29

## 2019-01-29 LAB
ALBUMIN SERPL-MCNC: 2.9 G/DL (ref 3.5–5)
ANION GAP SERPL CALCULATED.3IONS-SCNC: 14 MMOL/L (ref 5–18)
BUN SERPL-MCNC: 26 MG/DL (ref 8–28)
CALCIUM SERPL-MCNC: 8.3 MG/DL (ref 8.5–10.5)
CHLORIDE BLD-SCNC: 98 MMOL/L (ref 98–107)
CO2 SERPL-SCNC: 22 MMOL/L (ref 22–31)
CREAT SERPL-MCNC: 0.7 MG/DL (ref 0.6–1.1)
GFR SERPL CREATININE-BSD FRML MDRD: >60 ML/MIN/1.73M2
GLUCOSE BLD-MCNC: 126 MG/DL (ref 70–125)
POTASSIUM BLD-SCNC: 5 MMOL/L (ref 3.5–5)
PREALB SERPL-MCNC: 22.6 MG/DL (ref 19–38)
SODIUM SERPL-SCNC: 134 MMOL/L (ref 136–145)

## 2019-01-31 ENCOUNTER — RECORDS - HEALTHEAST (OUTPATIENT)
Dept: LAB | Facility: CLINIC | Age: 74
End: 2019-01-31

## 2019-01-31 LAB — TSH SERPL DL<=0.005 MIU/L-ACNC: 6.17 UIU/ML (ref 0.3–5)

## 2019-02-04 ENCOUNTER — HOSPITAL ENCOUNTER (OUTPATIENT)
Facility: CLINIC | Age: 74
Setting detail: OBSERVATION
Discharge: NURSING, REHAB OR LONG-TERM CARE | End: 2019-02-06
Attending: INTERNAL MEDICINE | Admitting: PEDIATRICS
Payer: MEDICARE

## 2019-02-04 ENCOUNTER — TELEPHONE (OUTPATIENT)
Dept: SURGERY | Facility: CLINIC | Age: 74
End: 2019-02-04

## 2019-02-04 ENCOUNTER — APPOINTMENT (OUTPATIENT)
Dept: CT IMAGING | Facility: CLINIC | Age: 74
End: 2019-02-04
Payer: MEDICARE

## 2019-02-04 DIAGNOSIS — R19.7 DIARRHEA, UNSPECIFIED TYPE: ICD-10-CM

## 2019-02-04 DIAGNOSIS — J98.51 MEDIASTINITIS: ICD-10-CM

## 2019-02-04 DIAGNOSIS — K22.3 ESOPHAGEAL PERFORATION: ICD-10-CM

## 2019-02-04 DIAGNOSIS — K52.9 COLITIS: ICD-10-CM

## 2019-02-04 DIAGNOSIS — F32.0 CURRENT MILD EPISODE OF MAJOR DEPRESSIVE DISORDER WITHOUT PRIOR EPISODE (H): Primary | ICD-10-CM

## 2019-02-04 DIAGNOSIS — Z87.891 PERSONAL HISTORY OF TOBACCO USE, PRESENTING HAZARDS TO HEALTH: ICD-10-CM

## 2019-02-04 LAB
ALBUMIN SERPL-MCNC: 2.9 G/DL (ref 3.4–5)
ALP SERPL-CCNC: 114 U/L (ref 40–150)
ALT SERPL W P-5'-P-CCNC: 16 U/L (ref 0–50)
ANION GAP SERPL CALCULATED.3IONS-SCNC: 7 MMOL/L (ref 3–14)
AST SERPL W P-5'-P-CCNC: 17 U/L (ref 0–45)
BASOPHILS # BLD AUTO: 0 10E9/L (ref 0–0.2)
BASOPHILS NFR BLD AUTO: 0.5 %
BILIRUB SERPL-MCNC: 0.2 MG/DL (ref 0.2–1.3)
BUN SERPL-MCNC: 20 MG/DL (ref 7–30)
CALCIUM SERPL-MCNC: 8.4 MG/DL (ref 8.5–10.1)
CHLORIDE SERPL-SCNC: 98 MMOL/L (ref 94–109)
CO2 SERPL-SCNC: 29 MMOL/L (ref 20–32)
CREAT SERPL-MCNC: 0.66 MG/DL (ref 0.52–1.04)
CRP SERPL-MCNC: 14 MG/L (ref 0–8)
DIFFERENTIAL METHOD BLD: ABNORMAL
EOSINOPHIL # BLD AUTO: 0.3 10E9/L (ref 0–0.7)
EOSINOPHIL NFR BLD AUTO: 3.8 %
ERYTHROCYTE [DISTWIDTH] IN BLOOD BY AUTOMATED COUNT: 16.3 % (ref 10–15)
ERYTHROCYTE [SEDIMENTATION RATE] IN BLOOD BY WESTERGREN METHOD: 84 MM/H (ref 0–30)
GFR SERPL CREATININE-BSD FRML MDRD: 88 ML/MIN/{1.73_M2}
GLUCOSE SERPL-MCNC: 78 MG/DL (ref 70–99)
HCT VFR BLD AUTO: 36.9 % (ref 35–47)
HGB BLD-MCNC: 11.5 G/DL (ref 11.7–15.7)
IMM GRANULOCYTES # BLD: 0 10E9/L (ref 0–0.4)
IMM GRANULOCYTES NFR BLD: 0.2 %
INR PPP: 1.1 (ref 0.86–1.14)
LYMPHOCYTES # BLD AUTO: 2.4 10E9/L (ref 0.8–5.3)
LYMPHOCYTES NFR BLD AUTO: 27.9 %
MCH RBC QN AUTO: 30.7 PG (ref 26.5–33)
MCHC RBC AUTO-ENTMCNC: 31.2 G/DL (ref 31.5–36.5)
MCV RBC AUTO: 98 FL (ref 78–100)
MONOCYTES # BLD AUTO: 1.1 10E9/L (ref 0–1.3)
MONOCYTES NFR BLD AUTO: 12.8 %
NEUTROPHILS # BLD AUTO: 4.7 10E9/L (ref 1.6–8.3)
NEUTROPHILS NFR BLD AUTO: 54.8 %
NRBC # BLD AUTO: 0 10*3/UL
NRBC BLD AUTO-RTO: 0 /100
PLATELET # BLD AUTO: 638 10E9/L (ref 150–450)
POTASSIUM SERPL-SCNC: 3.7 MMOL/L (ref 3.4–5.3)
PROT SERPL-MCNC: 7.2 G/DL (ref 6.8–8.8)
RBC # BLD AUTO: 3.75 10E12/L (ref 3.8–5.2)
SODIUM SERPL-SCNC: 134 MMOL/L (ref 133–144)
WBC # BLD AUTO: 8.5 10E9/L (ref 4–11)

## 2019-02-04 PROCEDURE — 85025 COMPLETE CBC W/AUTO DIFF WBC: CPT | Performed by: INTERNAL MEDICINE

## 2019-02-04 PROCEDURE — 87040 BLOOD CULTURE FOR BACTERIA: CPT | Performed by: INTERNAL MEDICINE

## 2019-02-04 PROCEDURE — 99285 EMERGENCY DEPT VISIT HI MDM: CPT | Mod: Z6 | Performed by: INTERNAL MEDICINE

## 2019-02-04 PROCEDURE — 86140 C-REACTIVE PROTEIN: CPT | Performed by: INTERNAL MEDICINE

## 2019-02-04 PROCEDURE — 74177 CT ABD & PELVIS W/CONTRAST: CPT

## 2019-02-04 PROCEDURE — 85610 PROTHROMBIN TIME: CPT | Performed by: INTERNAL MEDICINE

## 2019-02-04 PROCEDURE — 80053 COMPREHEN METABOLIC PANEL: CPT | Performed by: INTERNAL MEDICINE

## 2019-02-04 PROCEDURE — 25000128 H RX IP 250 OP 636: Performed by: RADIOLOGY

## 2019-02-04 PROCEDURE — 85652 RBC SED RATE AUTOMATED: CPT | Performed by: INTERNAL MEDICINE

## 2019-02-04 PROCEDURE — 40000141 ZZH STATISTIC PERIPHERAL IV START W/O US GUIDANCE

## 2019-02-04 PROCEDURE — 99285 EMERGENCY DEPT VISIT HI MDM: CPT | Mod: 25 | Performed by: INTERNAL MEDICINE

## 2019-02-04 PROCEDURE — 71260 CT THORAX DX C+: CPT

## 2019-02-04 RX ORDER — IOPAMIDOL 755 MG/ML
118 INJECTION, SOLUTION INTRAVASCULAR ONCE
Status: COMPLETED | OUTPATIENT
Start: 2019-02-04 | End: 2019-02-04

## 2019-02-04 RX ADMIN — IOPAMIDOL 118 ML: 755 INJECTION, SOLUTION INTRAVENOUS at 20:28

## 2019-02-04 ASSESSMENT — ENCOUNTER SYMPTOMS
FEVER: 0
ABDOMINAL PAIN: 1

## 2019-02-04 NOTE — ED PROVIDER NOTES
History     Chief Complaint   Patient presents with     Feeding Problems     HPI  Myrtle Vaz is a 73 year old female with a history of respiratory failure secondary to hiatal hernia for which she was hospitalized on 11/8/2018 and underwent a laparoscopic hiatal hernia repair, Nissen fundoplication and gastrostomy tube placement complicated by distal esophageal perforation and purulent mediastinitis and then underwent a transhiatal esophagogastrectomy, spit fistula, G and J-tube placement (11/21/2018), who presents to the emergency department for evaluation of a feeding tube problem.  Patient complains that the G and J-tube sites on her left abdomen are becoming more and more tender over the past several days with a burning sensation.  She also complains that she is unable to swallow and has to constantly spit up her saliva and believes there is something wrong with her spit fistula. Denies fever.  Patient states that she is here to rule out an abscess around her GJ tube placement and to be evaluated for possible replacement.    I have reviewed the Medications, Allergies, Past Medical and Surgical History, and Social History in the Epic system.  No past medical history on file.    Past Surgical History:   Procedure Laterality Date     ESOPHAGOSCOPY, GASTROSCOPY, DUODENOSCOPY (EGD), COMBINED N/A 11/21/2018    Procedure: COMBINED ESOPHAGOSCOPY, GASTROSCOPY, DUODENOSCOPY (EGD);  Surgeon: Temitope Bullock MD;  Location: [facility] OR     LAPAROSCOPIC HERNIORRHAPHY HIATAL N/A 11/21/2018    Procedure: Midline laparotomy, Trans-hiatal esophagogasterectomy, gastrostomy, J-tube placement, G-tube placement, bilateral pleural chest tube placement, mediastinal abcess drainage, spit fistula creation, Esophagastrodueodenoscopy;  Surgeon: Temitope Bullock MD;  Location:  OR       No family history on file.    Social History     Tobacco Use     Smoking status: Former Smoker     Years: 60.00     Types: Cigarettes      Smokeless tobacco: Never Used     Tobacco comment: patient smoked a few cigarettes a day for 60 YEARS   Substance Use Topics     Alcohol use: No     Frequency: Never       No current facility-administered medications for this encounter.      Current Outpatient Medications   Medication     acetaminophen (TYLENOL) 32 mg/mL liquid     albuterol (PROAIR HFA/PROVENTIL HFA/VENTOLIN HFA) 108 (90 Base) MCG/ACT inhaler     amiodarone (CORDARONE) 20 mg/mL SUSP     bacitracin 500 UNIT/GM OINT     bisacodyl (DULCOLAX) 10 MG suppository     citalopram (CELEXA) 20 MG tablet     diphenoxylate-atropine (LOMOTIL) 2.5-0.025 MG tablet     gabapentin (NEURONTIN) 250 MG/5ML solution     levothyroxine (SYNTHROID) 25 mcg/mL SUSP     Lidocaine (LIDOCARE) 4 % Patch     methocarbamol (ROBAXIN) 500 MG tablet     morphine (MS CONTIN) 100 MG 12 hr tablet     multivitamins w/minerals (CERTAVITE) liquid     naloxone (NARCAN) 0.4 MG/ML injection     nystatin (MYCOSTATIN) 588206 UNIT/GM external cream     ondansetron (ZOFRAN-ODT) 4 MG ODT tab     oxyCODONE (ROXICODONE) 5 MG/5ML solution     polyethylene glycol (MIRALAX/GLYCOLAX) packet     potassium chloride ER (K-TAB) 20 MEQ CR tablet     protein modular (PROSOURCE NO CARB)     QUEtiapine (SEROQUEL) 25 MG tablet     sennosides (SENOKOT) 8.8 MG/5ML syrup        Allergies   Allergen Reactions     Gabapentin Other (See Comments)       Review of Systems   Constitutional: Negative for fever.   Gastrointestinal: Positive for abdominal pain ( tenderness around GJ tube site).   All other systems reviewed and are negative.      Physical Exam   BP: 135/65  Heart Rate: 84  Temp: 98.2  F (36.8  C)  Resp: 16  Weight: 86.7 kg (191 lb 2.2 oz)  SpO2: 94 %      Physical Exam   Constitutional: No distress.   HENT:   Head: Atraumatic.   Mouth/Throat: Oropharynx is clear and moist. No oropharyngeal exudate.   Eyes: Pupils are equal, round, and reactive to light. No scleral icterus.   Neck: Neck supple. No JVD  present.   Cardiovascular: Normal rate, regular rhythm, normal heart sounds and intact distal pulses. Exam reveals no gallop and no friction rub.   No murmur heard.  Pulmonary/Chest: Effort normal and breath sounds normal. No stridor. No respiratory distress. She has no wheezes. She has no rales. She exhibits no tenderness.   Abdominal: Soft. Bowel sounds are normal. There is no hepatosplenomegaly. There is generalized tenderness. There is no rigidity, no rebound, no guarding, no CVA tenderness, no tenderness at McBurney's point and negative Sandra's sign. No hernia.       Musculoskeletal: She exhibits no edema or tenderness.   Skin: Skin is warm. No rash noted. She is not diaphoretic.       ED Course   5:33 PM  The patient was seen and examined by Shell Hutson MD in Room [address].        Procedures        Results for orders placed or performed during the hospital encounter of 02/04/19 (from the past 24 hour(s))   CBC with platelets differential     Status: Abnormal    Collection Time: 02/04/19  6:56 PM   Result Value Ref Range    WBC 8.5 4.0 - 11.0 10e9/L    RBC Count 3.75 (L) 3.8 - 5.2 10e12/L    Hemoglobin 11.5 (L) 11.7 - 15.7 g/dL    Hematocrit 36.9 35.0 - 47.0 %    MCV 98 78 - 100 fl    MCH 30.7 26.5 - 33.0 pg    MCHC 31.2 (L) 31.5 - 36.5 g/dL    RDW 16.3 (H) 10.0 - 15.0 %    Platelet Count 638 (H) 150 - 450 10e9/L    Diff Method Automated Method     % Neutrophils 54.8 %    % Lymphocytes 27.9 %    % Monocytes 12.8 %    % Eosinophils 3.8 %    % Basophils 0.5 %    % Immature Granulocytes 0.2 %    Nucleated RBCs 0 0 /100    Absolute Neutrophil 4.7 1.6 - 8.3 10e9/L    Absolute Lymphocytes 2.4 0.8 - 5.3 10e9/L    Absolute Monocytes 1.1 0.0 - 1.3 10e9/L    Absolute Eosinophils 0.3 0.0 - 0.7 10e9/L    Absolute Basophils 0.0 0.0 - 0.2 10e9/L    Abs Immature Granulocytes 0.0 0 - 0.4 10e9/L    Absolute Nucleated RBC 0.0    Comprehensive metabolic panel     Status: Abnormal    Collection Time: 02/04/19  6:56 PM   Result  Value Ref Range    Sodium 134 133 - 144 mmol/L    Potassium 3.7 3.4 - 5.3 mmol/L    Chloride 98 94 - 109 mmol/L    Carbon Dioxide 29 20 - 32 mmol/L    Anion Gap 7 3 - 14 mmol/L    Glucose 78 70 - 99 mg/dL    Urea Nitrogen 20 7 - 30 mg/dL    Creatinine 0.66 0.52 - 1.04 mg/dL    GFR Estimate 88 >60 mL/min/[1.73_m2]    GFR Estimate If Black >90 >60 mL/min/[1.73_m2]    Calcium 8.4 (L) 8.5 - 10.1 mg/dL    Bilirubin Total 0.2 0.2 - 1.3 mg/dL    Albumin 2.9 (L) 3.4 - 5.0 g/dL    Protein Total 7.2 6.8 - 8.8 g/dL    Alkaline Phosphatase 114 40 - 150 U/L    ALT 16 0 - 50 U/L    AST 17 0 - 45 U/L   INR     Status: None    Collection Time: 02/04/19  6:56 PM   Result Value Ref Range    INR 1.10 0.86 - 1.14   CRP inflammation     Status: Abnormal    Collection Time: 02/04/19  6:56 PM   Result Value Ref Range    CRP Inflammation 14.0 (H) 0.0 - 8.0 mg/L   Erythrocyte sedimentation rate auto     Status: Abnormal    Collection Time: 02/04/19  6:56 PM   Result Value Ref Range    Sed Rate 84 (H) 0 - 30 mm/h   Blood Culture ONE site     Status: None (Preliminary result)    Collection Time: 02/04/19  6:56 PM   Result Value Ref Range    Specimen Description Blood Left Hand     Special Requests Received in aerobic bottle only     Culture Micro No growth after 2 hours    CT Chest/Abdomen/Pelvis w Contrast     Status: None (Preliminary result)    Collection Time: 02/04/19  8:34 PM    Narrative    PRELIMINARY REPORT - The following report is a preliminary  interpretation.     Impression    IMPRESSION:  1. Stable postsurgical changes of esophagectomy with split fistula. No  intrathoracic abscess identified.  2. Stable positioning of percutaneous gastrotomy and jejunostomy tubes  placement. There is diffuse ascending, transverse, and proximal  descending colonic wall thickening concerning for colitis. Etiologies  include inflammation, infection, versus ischemia.  3. Colonic diverticulosis without diverticulitis.           Labs Ordered and  Resulted from Time of ED Arrival Up to the Time of Departure from the ED   CBC WITH PLATELETS DIFFERENTIAL - Abnormal; Notable for the following components:       Result Value    RBC Count 3.75 (*)     Hemoglobin 11.5 (*)     MCHC 31.2 (*)     RDW 16.3 (*)     Platelet Count 638 (*)     All other components within normal limits   COMPREHENSIVE METABOLIC PANEL - Abnormal; Notable for the following components:    Calcium 8.4 (*)     Albumin 2.9 (*)     All other components within normal limits   CRP INFLAMMATION - Abnormal; Notable for the following components:    CRP Inflammation 14.0 (*)     All other components within normal limits   ERYTHROCYTE SEDIMENTATION RATE AUTO - Abnormal; Notable for the following components:    Sed Rate 84 (*)     All other components within normal limits   INR   BLOOD CULTURE   ENTERIC BACTERIA AND VIRUS PANEL BY NOVA STOOL   CLOSTRIDIUM DIFFICILE TOXIN B            Assessments & Plan (with Medical Decision Making)  Colitis with diarrhea in the pt with recent mediastinitis/sepsis and antibiotic, CT no abscess near G or J tubes, but noted to have inflammation consistent with colitis, CT surgery consult done since highly complicated recent admission with mediastinitis-no CT surgery issues but they will recommend admission to Medicine and follow.       I have reviewed the nursing notes.    I have reviewed the findings, diagnosis, plan and need for follow up with the patient.       Medication List      There are no discharge medications for this visit.         Final diagnoses:   Colitis   Diarrhea, unspecified type   Mediastinitis-recent history of     IKrzysztof, am serving as a trained medical scribe to document services personally performed by Shell Hutson MD, based on the provider's statements to me.   IShell MD, was physically present and have reviewed and verified the accuracy of this note documented by Krzysztof Baird.    2/4/2019   [facility], [place], EMERGENCY  DEPARTMENT     Shell Hutson MD  02/05/19 0033

## 2019-02-04 NOTE — LETTER
Transition Communication Hand-off for Care Transitions to Next Level of Care Provider    Name: Myrtle Vaz  : 1945  MRN #: [id number]  Primary Care Provider: Joey Caballero     Primary Clinic: [facility] [address] [place] [address]     Reason for Hospitalization:  Mediastinitis [J98.51]  Colitis [K52.9]  Diarrhea, unspecified type [R19.7]  Admit Date/Time: 2019  4:53 PM  Discharge Date: 19  Payor Source: Payor: MEDICARE / Plan: MEDICARE / Product Type: Medicare /     Readmission Assessment Measure ([place]) Risk Score/category: Elevated    Reason for Communication Hand-off Referral: Multiple providers/specialties    Discharge Plan:  U: [facility]     Concern for non-adherence with plan of care:   Y/N No  Discharge Needs Assessment:      Already enrolled in Tele-monitoring program and name of program:  Unknown  Follow-up specialty is recommended: Yes    Follow-up plan:    Future Appointments   Date Time Provider Department Center   2019  7:00 PM Victoria Erazo, [facility]   2019  8:15 AM UC LAB [facility]   2019  8:45 AM UCXR1 [facility]   2019 10:00 AM Kailey Azul, SLP [facility]   2019 10:00 AM XR2 [facility]       Any outstanding tests or procedures:        Referrals     Future Labs/Procedures    Nutrition Services Adult IP Consult     Comments:    Recommendations already ordered by Registered Dietitian (RD):     Enteral Nutrition support: Modified home TF regimen,      Access: PEG tube   Dosing wt: 64 kg adjusted wt   Enteral regimen: Isosource 1.5, Cycled x 14 hours, decreased rate to 85 ml/hr, infuse from ( 6:00 pm - 8:00 am).      --Isosource 1.5 @ goal 85 ml/hr x 14 hours provides ~ (1190 ml/day) to provide 1785 kcals (28  kcal/kg/day), 81 gm PRO (1.3 gm/kg/day), 903 ml free H2O, 209 gm CHO and 18 gm Fiber daily     -Water flushes: Ordered feed and flush ( @ 40 ml/hr over night with TF infusion,  from 6:00 pm - 8:00 am) +  120 ml free water flushes before and after each cycle to provide for full hydration needs ( free water flushes provides 800 ml water), patient can utilize extra water flushes PRN  during the day.      --Modules: Ordered Prosource Protein Packet, once daily via G-tube. Provides additional 40 kcal and 11 gm protein).   **No need to dilute protein packet. Draw up via syringe and inject via feeding tube. Flush with 15 ml water post administration. Supplied by nutrition services**.       Occupational Therapy Adult Consult     Comments:    Evaluate and treat as clinically indicated.    Physical Therapy Adult Consult     Comments:    Evaluate and treat as clinically indicated.            VINAYAK Lee, SW  [address] Unit   Pager [phone]  Phone [phone]      AVS/Discharge Summary is the source of truth; this is a helpful guide for improved communication of patient story

## 2019-02-04 NOTE — LETTER
Health Information Management Services               Recipient:  [facility]        Sender:  VINAYAK Lee, [facility]  5A Unit   Pager [phone]  Phone [phone]          Date: February 6, 2019  Patient Name:  Myrtle Vaz  Routing Message:    Discharge orders        The documents accompanying this notice contain confidential information belonging to the sender.  This information is intended only for the use of the individual or entity named above.  The authorized recipient of this information is prohibited from disclosing this information to any other party and is required to destroy the information after its stated need has been fulfilled, unless otherwise required by state law.      If you are not the intended recipient, you are hereby notified that any disclosure, copying, distribution or action taken in reliance on the contents of these documents is strictly prohibited. If you have received this document in error, please notify [facility] immediately at [phone].  You may return the document via fax ([phone]) or return mail  (Health Information Management, , [address] [place] [address]).

## 2019-02-04 NOTE — TELEPHONE ENCOUNTER
"I received a call from CAYDEN Davila at [facility] in [place] ([phone]).   Patient has a G-J tube that is cracked but still working, and complains of pain at insertion site.   It is unclear if this pain is worse, \"she has complained of pain since getting it\".   Denies fever/chills.  She asked if we could arrange a replacement G-J tube today.   I suggested that she should go to ED where a CT abdomen could be done to r/o abscess and we could arrange a surgical procedure once that was confirmed.   She felt that was \"overkill\" and asked about IR doing it.   I gave her the IR department # to call.       I discussed this case further with Dr. Ally Ybarra-  While reviewing the chart, it was discovered that patient has 2 tubes, a G-tube and a J-tube.  She had been told at her last clinic visit 12/3 to switch tube feedings from J-tube to G-tube.  I called Giovanni back to confirm this.   She said the J-tube is being used for medications only, and she is getting tube feeding via G-tube.   Both tubes have small cracks but work.   She confirmed there is some erythema around the site.     I explained that the G-tube could be replaced in clinic, but the J-tube would require fluoroscopy and is done in OR or by IR.   I offered her a clinic appointment Wednesday with Dr. Temitope Albarado (thoracic surgery).   She is going to discuss this with patients' daughter, would like it to be looked at/dealt with before Wednesday.   They may arrange transport to ED for further assessment.  "

## 2019-02-04 NOTE — ED TRIAGE NOTES
Pt arrived to the ED with reports of redness and pain around G tube and J tube site. On arrival vitals stable, afebrile. Pt alert and oriented x4.

## 2019-02-05 PROBLEM — R10.9 ABDOMINAL PAIN: Status: ACTIVE | Noted: 2019-02-05

## 2019-02-05 PROBLEM — K52.9 COLITIS: Status: ACTIVE | Noted: 2019-02-05

## 2019-02-05 LAB
ANION GAP SERPL CALCULATED.3IONS-SCNC: 9 MMOL/L (ref 3–14)
BUN SERPL-MCNC: 20 MG/DL (ref 7–30)
CALCIUM SERPL-MCNC: 8.6 MG/DL (ref 8.5–10.1)
CHLORIDE SERPL-SCNC: 98 MMOL/L (ref 94–109)
CO2 SERPL-SCNC: 30 MMOL/L (ref 20–32)
CREAT SERPL-MCNC: 0.77 MG/DL (ref 0.52–1.04)
ERYTHROCYTE [DISTWIDTH] IN BLOOD BY AUTOMATED COUNT: 16.4 % (ref 10–15)
GFR SERPL CREATININE-BSD FRML MDRD: 77 ML/MIN/{1.73_M2}
GLUCOSE SERPL-MCNC: 90 MG/DL (ref 70–99)
HCT VFR BLD AUTO: 35.2 % (ref 35–47)
HGB BLD-MCNC: 10.8 G/DL (ref 11.7–15.7)
MCH RBC QN AUTO: 30.4 PG (ref 26.5–33)
MCHC RBC AUTO-ENTMCNC: 30.7 G/DL (ref 31.5–36.5)
MCV RBC AUTO: 99 FL (ref 78–100)
PLATELET # BLD AUTO: 631 10E9/L (ref 150–450)
POTASSIUM SERPL-SCNC: 3.8 MMOL/L (ref 3.4–5.3)
RBC # BLD AUTO: 3.55 10E12/L (ref 3.8–5.2)
SODIUM SERPL-SCNC: 136 MMOL/L (ref 133–144)
WBC # BLD AUTO: 7.1 10E9/L (ref 4–11)

## 2019-02-05 PROCEDURE — 40000893 ZZH STATISTIC PT IP EVAL DEFER: Performed by: PHYSICAL THERAPIST

## 2019-02-05 PROCEDURE — A9270 NON-COVERED ITEM OR SERVICE: HCPCS | Mod: GY | Performed by: INTERNAL MEDICINE

## 2019-02-05 PROCEDURE — G0378 HOSPITAL OBSERVATION PER HR: HCPCS

## 2019-02-05 PROCEDURE — A9270 NON-COVERED ITEM OR SERVICE: HCPCS | Mod: GY | Performed by: STUDENT IN AN ORGANIZED HEALTH CARE EDUCATION/TRAINING PROGRAM

## 2019-02-05 PROCEDURE — 85027 COMPLETE CBC AUTOMATED: CPT | Performed by: INTERNAL MEDICINE

## 2019-02-05 PROCEDURE — 99223 1ST HOSP IP/OBS HIGH 75: CPT | Mod: GC | Performed by: PEDIATRICS

## 2019-02-05 PROCEDURE — 36415 COLL VENOUS BLD VENIPUNCTURE: CPT | Performed by: INTERNAL MEDICINE

## 2019-02-05 PROCEDURE — 25000132 ZZH RX MED GY IP 250 OP 250 PS 637: Mod: GY | Performed by: STUDENT IN AN ORGANIZED HEALTH CARE EDUCATION/TRAINING PROGRAM

## 2019-02-05 PROCEDURE — 27210429 ZZH NUTRITION PRODUCT INTERMEDIATE LITER

## 2019-02-05 PROCEDURE — 80048 BASIC METABOLIC PNL TOTAL CA: CPT | Performed by: INTERNAL MEDICINE

## 2019-02-05 PROCEDURE — 25000132 ZZH RX MED GY IP 250 OP 250 PS 637: Mod: GY | Performed by: INTERNAL MEDICINE

## 2019-02-05 RX ORDER — ALBUTEROL SULFATE 90 UG/1
6 AEROSOL, METERED RESPIRATORY (INHALATION) EVERY 4 HOURS PRN
Status: DISCONTINUED | OUTPATIENT
Start: 2019-02-05 | End: 2019-02-06 | Stop reason: HOSPADM

## 2019-02-05 RX ORDER — VENLAFAXINE 75 MG/1
75 TABLET ORAL 2 TIMES DAILY
COMMUNITY
End: 2019-02-06

## 2019-02-05 RX ORDER — BISACODYL 10 MG
10 SUPPOSITORY, RECTAL RECTAL DAILY PRN
Status: DISCONTINUED | OUTPATIENT
Start: 2019-02-05 | End: 2019-02-06 | Stop reason: HOSPADM

## 2019-02-05 RX ORDER — OXYCODONE HCL 5 MG/5 ML
10 SOLUTION, ORAL ORAL EVERY 4 HOURS PRN
Status: DISCONTINUED | OUTPATIENT
Start: 2019-02-05 | End: 2019-02-06

## 2019-02-05 RX ORDER — QUETIAPINE FUMARATE 25 MG/1
25 TABLET, FILM COATED ORAL
Status: DISCONTINUED | OUTPATIENT
Start: 2019-02-05 | End: 2019-02-06

## 2019-02-05 RX ORDER — CITALOPRAM HYDROBROMIDE 20 MG/1
20 TABLET ORAL DAILY
Status: DISCONTINUED | OUTPATIENT
Start: 2019-02-05 | End: 2019-02-06

## 2019-02-05 RX ORDER — ONDANSETRON 4 MG/1
4 TABLET, ORALLY DISINTEGRATING ORAL EVERY 6 HOURS PRN
Status: DISCONTINUED | OUTPATIENT
Start: 2019-02-05 | End: 2019-02-06 | Stop reason: HOSPADM

## 2019-02-05 RX ORDER — LIDOCAINE HYDROCHLORIDE 10 MG/ML
INJECTION, SOLUTION EPIDURAL; INFILTRATION; INTRACAUDAL; PERINEURAL
Status: DISCONTINUED
Start: 2019-02-05 | End: 2019-02-05 | Stop reason: HOSPADM

## 2019-02-05 RX ORDER — NALOXONE HYDROCHLORIDE 0.4 MG/ML
.1-.4 INJECTION, SOLUTION INTRAMUSCULAR; INTRAVENOUS; SUBCUTANEOUS
Status: DISCONTINUED | OUTPATIENT
Start: 2019-02-05 | End: 2019-02-06 | Stop reason: HOSPADM

## 2019-02-05 RX ORDER — ONDANSETRON 2 MG/ML
4 INJECTION INTRAMUSCULAR; INTRAVENOUS EVERY 6 HOURS PRN
Status: DISCONTINUED | OUTPATIENT
Start: 2019-02-05 | End: 2019-02-06 | Stop reason: HOSPADM

## 2019-02-05 RX ADMIN — LEVOTHYROXINE SODIUM 150 MCG: 300 TABLET ORAL at 10:17

## 2019-02-05 RX ADMIN — OXYCODONE HYDROCHLORIDE 10 MG: 5 SOLUTION ORAL at 00:46

## 2019-02-05 RX ADMIN — Medication 200 MG: at 19:40

## 2019-02-05 RX ADMIN — CITALOPRAM HYDROBROMIDE 20 MG: 20 TABLET ORAL at 11:27

## 2019-02-05 RX ADMIN — Medication 200 MG: at 11:30

## 2019-02-05 ASSESSMENT — ACTIVITIES OF DAILY LIVING (ADL)
AMBULATION: 1-->ASSISTIVE EQUIPMENT
COGNITION: 0 - NO COGNITION ISSUES REPORTED
DRESS: 2-->ASSISTIVE PERSON
SWALLOWING: 2-->DIFFICULTY SWALLOWING LIQUIDS/FOODS
RETIRED_COMMUNICATION: 0-->UNDERSTANDS/COMMUNICATES WITHOUT DIFFICULTY
TOILETING: 3-->ASSISTIVE EQUIPMENT AND PERSON
BATHING: 2-->ASSISTIVE PERSON
RETIRED_EATING: 0-->INDEPENDENT
FALL_HISTORY_WITHIN_LAST_SIX_MONTHS: YES
TRANSFERRING: 3-->ASSISTIVE EQUIPMENT AND PERSON
ADLS_ACUITY_SCORE: 21

## 2019-02-05 NOTE — CONSULTS
[facility] Thoracic Surgery Consultation    Myrtle Vaz MRN# [id number]   Age: 73 year old YOB: 1945     Date of Admission:  2/4/2019    Date of Consult:   2/04/19    Reason for consult: G/J tube check       Requesting service: ED; requesting provider: Dr. Hutson                   Assessment and Plan:   Assessment:     73 year old female with complex medical history most recently s/p midline laparotomy, takedown of fundoplication, transhiatal partial esophagogastrectomy, and placement of gastrostomy and jejunostomy tubes with left neck incision and cervical esophagostomy on 11/21 with Ana Albarado and Tate. She now presents to the ED for burning pain associated with moving G/J tubes. No tube abscess or any other concern for a thoracic surgical standpoint was identified on evaluation; however, colitis was noted on CT.         Plan:   -We recommend  Admission to medical service for colitis evaluation  -We will see patient while in hospital for G and J tube complaints with possible exchange. Tube exchange alone is not a reason for admission and can be arranged outpatient.      Discussed with fellow, Dr. Jimenez, who will discuss with staff, .    Tami Matos MD  PGY-2, General Surgery  [phone]            Chief Complaint:     Admitting Diagnosis: No diagnosis found.         History of Present Illness:   Myrtle Vaz is a 73 year old female with history of paraesophageal hernia s/p laparoscopic repair, partial fundoplication and g-tube placement on 11/15 at [facility] w/ Dr. Subramanian complicated by respiratory insufficiency and distal esophageal perforation s/p midline laparotomy, takedown of fundoplication, transhiatal partial esophagogastrectomy, and placement of gastrostomy and jejunostomy tubes with left neck incision and cervical esophagostomy on 11/21 with Ana Albarado and Tate. She presents to [facility] ED from her TCU with complaints of pain at her g and j sites with dressing changes in the past  week or so. She describes crusting and thin grey drainage around her tube sites associated with a burning pain when her dressings are changed. The pain is worsened by manipulation of the drains or touching the skin near the sites. She denies pain with use of the drains for tube feedings, fever, chills, or other abdominal pain. She reports improvement in frequency of diarrheal stools since changing the formula of her tube feeds to include fiber. Additionally, she reports sputum collecting underneath her cervical incision that is readily regurgitated with palpation over the site. She continues to have drainage more distally at her spit fistula site.    Clinically, she is afebrile without skin changes around her drains and no evidence of abscess, cellulitis, or displace of her drains on abdominal CT scan. Radiology did not pan colonic enhancement on abdominal CT that may correlate to the patient's episodes of diarrhea.          Past Medical History:   No past medical history on file.          Past Surgical History:     Past Surgical History:   Procedure Laterality Date     ESOPHAGOSCOPY, GASTROSCOPY, DUODENOSCOPY (EGD), COMBINED N/A 11/21/2018    Procedure: COMBINED ESOPHAGOSCOPY, GASTROSCOPY, DUODENOSCOPY (EGD);  Surgeon: Temitope Bullock MD;  Location: [facility] OR     LAPAROSCOPIC HERNIORRHAPHY HIATAL N/A 11/21/2018    Procedure: Midline laparotomy, Trans-hiatal esophagogasterectomy, gastrostomy, J-tube placement, G-tube placement, bilateral pleural chest tube placement, mediastinal abcess drainage, spit fistula creation, Esophagastrodueodenoscopy;  Surgeon: Temitope Bullock MD;  Location: [facility] OR             Social History:     Social History     Tobacco Use     Smoking status: Former Smoker     Years: 60.00     Types: Cigarettes     Smokeless tobacco: Never Used     Tobacco comment: patient smoked a few cigarettes a day for 60 YEARS   Substance Use Topics     Alcohol use: No     Frequency: Never              Family History:   No family history on file.             Allergies:     Allergies   Allergen Reactions     Gabapentin Other (See Comments)             Medications:     No current facility-administered medications for this encounter.      Current Outpatient Medications   Medication Sig     acetaminophen (TYLENOL) 32 mg/mL liquid 30.45 mLs (975 mg) by Per J Tube route every 8 hours     albuterol (PROAIR HFA/PROVENTIL HFA/VENTOLIN HFA) 108 (90 Base) MCG/ACT inhaler Inhale 6 puffs into the lungs every 4 hours as needed for shortness of breath / dyspnea     amiodarone (CORDARONE) 20 mg/mL SUSP 10 mLs (200 mg) by Per J Tube route 2 times daily     bacitracin 500 UNIT/GM OINT Apply topically 2 times daily     bisacodyl (DULCOLAX) 10 MG suppository Place 1 suppository (10 mg) rectally daily as needed for constipation (Patient not taking: Reported on 1/10/2019)     citalopram (CELEXA) 20 MG tablet 20 mg by Per J Tube route daily     diphenoxylate-atropine (LOMOTIL) 2.5-0.025 MG tablet 1 tablet by Per J Tube route 4 times daily as needed for diarrhea     gabapentin (NEURONTIN) 250 MG/5ML solution Take 6 mLs (300 mg) by mouth 3 times daily (Patient not taking: Reported on 1/10/2019)     levothyroxine (SYNTHROID) 25 mcg/mL SUSP 6 mLs (150 mcg) by Per J Tube route every morning (before breakfast)     Lidocaine (LIDOCARE) 4 % Patch Place 1-3 patches onto the skin every 24 hours (Patient not taking: Reported on 1/10/2019)     methocarbamol (ROBAXIN) 500 MG tablet Take 1 tablet (500 mg) by mouth 3 times daily (Patient not taking: Reported on 1/10/2019)     morphine (MS CONTIN) 100 MG 12 hr tablet Take 100 mg by mouth every 4 hours as needed for severe pain Oral per J tube     multivitamins w/minerals (CERTAVITE) liquid 15 mLs by Per Feeding Tube route daily (Patient not taking: Reported on 1/10/2019)     naloxone (NARCAN) 0.4 MG/ML injection Inject 0.25-1 mLs (0.1-0.4 mg) into the vein as needed for opioid reversal (Patient not  taking: Reported on 1/10/2019)     nystatin (MYCOSTATIN) 458122 UNIT/GM external cream      ondansetron (ZOFRAN-ODT) 4 MG ODT tab Take 1 tablet (4 mg) by mouth every 6 hours as needed for nausea or vomiting     oxyCODONE (ROXICODONE) 5 MG/5ML solution Take 5-10 mLs (5-10 mg) by mouth every 4 hours as needed for moderate to severe pain (Patient not taking: Reported on 1/10/2019)     polyethylene glycol (MIRALAX/GLYCOLAX) packet 17 g by Per J Tube route 2 times daily as needed for constipation (Patient not taking: Reported on 1/10/2019)     potassium chloride ER (K-TAB) 20 MEQ CR tablet Take 20 mEq by mouth Oral per J tube     protein modular (PROSOURCE NO CARB) 2 packets by Per Feeding Tube route daily     QUEtiapine (SEROQUEL) 25 MG tablet Take 1 tablet (25 mg) by mouth nightly as needed (Agitation) (Patient not taking: Reported on 1/10/2019)     sennosides (SENOKOT) 8.8 MG/5ML syrup 10 mLs by Per J Tube route nightly as needed for constipation (Patient not taking: Reported on 1/10/2019)               Review of Systems:   10 system ROS performed and negative expect per HPI          Physical Exam:   All vitals have been reviewed  Temp:  [98.2  F (36.8  C)] 98.2  F (36.8  C)  Heart Rate:  [84] 84  Resp:  [16] 16  BP: (135)/(65) 135/65  SpO2:  [94 %] 94 %  No intake or output data in the 24 hours ending 02/04/19 2302    Physical Examination:   Vital Signs: /65   Temp 98.2  F (36.8  C) (Oral)   Resp 16   Wt 86.7 kg (191 lb 2.2 oz)   SpO2 94%   BMI 31.81 kg/m    Constitutional:   awake, alert, cooperative, no apparent distress, and appears stated age   Eyes:   Lids and lashes normal, pupils equal, round and reactive to light, extra ocular muscles intact, sclera clear, conjunctiva normal   ENT:   Within normal limits   Neck:   supple, trachea midline, well healed left cervical incision with soft area of fluctuance without overlying skin changes or drainage, nontender   Lungs:   No increased work of breathing,  good air exchange, clear to auscultation bilaterally, no crackles or wheezing   Cardiovascular:   normal S1 and S2   Abdomen:   Healed midline scar, g-tube intact without drainage, surrounding erythema or chemical dermatitis but TTP at the exit site, j-tube intact without drainage, surrounding erythema or chemical dermatitis but TTP at the exit site, abdomen otherwise soft, non-tender, non-distended without signs of peritonitis    Chest / Breast:   Spit fistula with ostomy bag over the left chest, nontender   Genitounirinary:   deferred   Musculoskeletal:   no lower extremity pitting edema present  full range of motion noted   Neurologic:   Awake, alert, oriented to name, place and time.  Cranial nerves II-XII are grossly intact.    Neuropsychiatric:   General: normal, calm and normal eye contact  Affect: normal and pleasant   Skin:   normal skin color, texture, turgor             Data:   All laboratory data reviewed    Labs/Imaging/Other:  Results for orders placed or performed during the hospital encounter of 02/04/19 (from the past 24 hour(s))   CBC with platelets differential   Result Value Ref Range    WBC 8.5 4.0 - 11.0 10e9/L    RBC Count 3.75 (L) 3.8 - 5.2 10e12/L    Hemoglobin 11.5 (L) 11.7 - 15.7 g/dL    Hematocrit 36.9 35.0 - 47.0 %    MCV 98 78 - 100 fl    MCH 30.7 26.5 - 33.0 pg    MCHC 31.2 (L) 31.5 - 36.5 g/dL    RDW 16.3 (H) 10.0 - 15.0 %    Platelet Count 638 (H) 150 - 450 10e9/L    Diff Method Automated Method     % Neutrophils 54.8 %    % Lymphocytes 27.9 %    % Monocytes 12.8 %    % Eosinophils 3.8 %    % Basophils 0.5 %    % Immature Granulocytes 0.2 %    Nucleated RBCs 0 0 /100    Absolute Neutrophil 4.7 1.6 - 8.3 10e9/L    Absolute Lymphocytes 2.4 0.8 - 5.3 10e9/L    Absolute Monocytes 1.1 0.0 - 1.3 10e9/L    Absolute Eosinophils 0.3 0.0 - 0.7 10e9/L    Absolute Basophils 0.0 0.0 - 0.2 10e9/L    Abs Immature Granulocytes 0.0 0 - 0.4 10e9/L    Absolute Nucleated RBC 0.0    Comprehensive  metabolic panel   Result Value Ref Range    Sodium 134 133 - 144 mmol/L    Potassium 3.7 3.4 - 5.3 mmol/L    Chloride 98 94 - 109 mmol/L    Carbon Dioxide 29 20 - 32 mmol/L    Anion Gap 7 3 - 14 mmol/L    Glucose 78 70 - 99 mg/dL    Urea Nitrogen 20 7 - 30 mg/dL    Creatinine 0.66 0.52 - 1.04 mg/dL    GFR Estimate 88 >60 mL/min/[1.73_m2]    GFR Estimate If Black >90 >60 mL/min/[1.73_m2]    Calcium 8.4 (L) 8.5 - 10.1 mg/dL    Bilirubin Total 0.2 0.2 - 1.3 mg/dL    Albumin 2.9 (L) 3.4 - 5.0 g/dL    Protein Total 7.2 6.8 - 8.8 g/dL    Alkaline Phosphatase 114 40 - 150 U/L    ALT 16 0 - 50 U/L    AST 17 0 - 45 U/L   INR   Result Value Ref Range    INR 1.10 0.86 - 1.14   CRP inflammation   Result Value Ref Range    CRP Inflammation 14.0 (H) 0.0 - 8.0 mg/L   Erythrocyte sedimentation rate auto   Result Value Ref Range    Sed Rate 84 (H) 0 - 30 mm/h   Blood Culture ONE site   Result Value Ref Range    Specimen Description Blood Left Hand     Special Requests Received in aerobic bottle only     Culture Micro PENDING    CT Chest/Abdomen/Pelvis w Contrast    Narrative    PRELIMINARY REPORT - The following report is a preliminary  interpretation.     Impression    IMPRESSION:  1. Stable postsurgical changes of esophagectomy with split fistula. No  intrathoracic abscess identified.  2. Stable positioning of percutaneous gastrotomy and jejunostomy tubes  placement. There is diffuse ascending, transverse, and proximal  descending colonic wall thickening concerning for colitis. Etiologies  include inflammation, infection, versus ischemia.  3. Colonic diverticulosis without diverticulitis.

## 2019-02-05 NOTE — PROGRESS NOTES
THORACIC & FOREGUT SURGERY    S:  Pt seen at bedside resting comfortably.      O:  Vitals:    02/04/19 1549 02/05/19 0037 02/05/19 0510   BP: 135/65 120/75 93/69   Pulse:  80    Resp: 16 16 16   Temp: 98.2  F (36.8  C)     TempSrc: Oral     SpO2: 94% 94% 98%   Weight: 86.7 kg (191 lb 2.2 oz)         A&O, NAD  Breathing non-labored  Soft, NDNT, G and J tubes well positioned, button suture pulling on skin and tender to palpation  Distal extremities warm    A/P: Myrtle Vaz is a 73 year old female s/p transhiatal esophagogastrectomy, and placement of gastrostomy and jejunostomy tubes with left neck incision and cervical esophagostomy on 11/21 with Ana Albarado and Tate.     -J tubes sutures removed with relief of pain, balloon inflated and one additional suture placed  -Use G tube only for tube feeds and meds  -DO NOT remove J tube, may use for future surgery  -Thoracic to sign off, pt will be contacted to follow up with thoracic for further surgical planning    Guero Sebastian PA-C  Thoracic Surgery

## 2019-02-05 NOTE — PROGRESS NOTES
Brief Progress Note    J-tube balloon re-inflated and secured with suture. Please do not use J-tube for meds or tubes feeds, keep clamped. Only use G-tube for both tube feeds and meds. Please continue to crush all medications. Call with questions or concerns.    Patient seen with staff.    Dasia Brizuela, PGY1  Thoracic Surgery

## 2019-02-05 NOTE — ED NOTES
[facility], [place]   ED Nurse to Floor Handoff     Myrtle Vaz is a 73 year old female who speaks English and lives with family members,  in a home  They arrived in the ED by car from home    ED Chief Complaint: Feeding Problems    ED Dx;   Final diagnoses:   Colitis   Diarrhea, unspecified type   Mediastinitis-recent history of         Needed?: No    Allergies:   Allergies   Allergen Reactions     Gabapentin Other (See Comments)   .  Past Medical Hx: No past medical history on file.   Baseline Mental status: WDL  Current Mental Status changes: at basesline    Infection present or suspected this encounter: cultures pending  Sepsis suspected: No  Isolation type: Enteric     Activity level - Baseline/Home:  Independent  Activity Level - Current:   Stand with Assist    Bariatric equipment needed?: No    In the ED these meds were given:   Medications   sodium chloride (PF) 0.9% PF flush 79 mL (79 mLs Intravenous Given 2/4/19 2028)   iopamidol (ISOVUE-370) solution 118 mL (118 mLs Intravenous Given 2/4/19 2028)       Drips running?  No    Home pump  No    Current LDAs  Peripheral IV 02/04/19 Left;Anterior Lower forearm (Active)   Number of days: 1       Open Drain Left Chest  (Active)   Number of days: 76       Gastrostomy/Enterostomy Gastrostomy LUQ 1 20 fr (Active)   Number of days: 76       Gastrostomy/Enterostomy Jejunostomy LLQ 2 16 fr (Active)   Number of days: 76       Incision/Surgical Site 11/21/18 Abdomen (Active)   Number of days: 76       Incision/Surgical Site 11/21/18 Left Neck (Active)   Number of days: 76       Incision/Surgical Site 12/06/18 Left;Upper;Lateral Chest (Active)   Number of days: 61       Incision/Surgical Site 12/06/18 Right;Upper;Midline Chest (Active)   Number of days: 61       Labs results:   Labs Ordered and Resulted from Time of ED Arrival Up to the Time of Departure from the ED   CBC WITH PLATELETS DIFFERENTIAL - Abnormal; Notable for the  following components:       Result Value    RBC Count 3.75 (*)     Hemoglobin 11.5 (*)     MCHC 31.2 (*)     RDW 16.3 (*)     Platelet Count 638 (*)     All other components within normal limits   COMPREHENSIVE METABOLIC PANEL - Abnormal; Notable for the following components:    Calcium 8.4 (*)     Albumin 2.9 (*)     All other components within normal limits   CRP INFLAMMATION - Abnormal; Notable for the following components:    CRP Inflammation 14.0 (*)     All other components within normal limits   ERYTHROCYTE SEDIMENTATION RATE AUTO - Abnormal; Notable for the following components:    Sed Rate 84 (*)     All other components within normal limits   INR   BLOOD CULTURE   ENTERIC BACTERIA AND VIRUS PANEL BY NOVA STOOL   CLOSTRIDIUM DIFFICILE TOXIN B       Imaging Studies:   Recent Results (from the past 24 hour(s))   CT Chest/Abdomen/Pelvis w Contrast    Narrative    PRELIMINARY REPORT - The following report is a preliminary  interpretation.     Impression    IMPRESSION:  1. Stable postsurgical changes of esophagectomy with split fistula. No  intrathoracic abscess identified.  2. Stable positioning of percutaneous gastrotomy and jejunostomy tubes  placement. There is diffuse ascending, transverse, and proximal  descending colonic wall thickening concerning for colitis. Etiologies  include inflammation, infection, versus ischemia.  3. Colonic diverticulosis without diverticulitis.       Recent vital signs:   /65   Temp 98.2  F (36.8  C) (Oral)   Resp 16   Wt 86.7 kg (191 lb 2.2 oz)   SpO2 94%   BMI 31.81 kg/m      Cardiac Rhythm: Other  Pt needs tele? No  Skin/wound Issues: Redness at gj tube site, spit fistula    Code Status: Full Code    Pain control: fair    Nausea control: pt had none    Abnormal labs/tests/findings requiring intervention: none    Family present during ED course? Yes   Family Comments/Social Situation comments: daughter present at bedside    Tasks needing completion: needs c-diff  collected    Phoebe Lofton RN  ascom-- [phone]  [phone] [facility]  [phone] [facility]

## 2019-02-05 NOTE — PLAN OF CARE
VSS.  Patient reports abdominal pain much improved.  Patient taking only ice chips. Waiting for tube feeding orders.

## 2019-02-05 NOTE — UTILIZATION REVIEW
"  [facility] Utilization Review  Admission Status; Secondary Review Determination     Admission Date: 2/4/2019  4:53 PM      Under the authority of the Utilization Management Committee, the utilization review process indicated a secondary review on the above patient.  The review outcome is based on review of the medical records, discussions with staff, and applying clinical experience noted on the date of the review.        (X) Observation Status Appropriate - This patient does not meet hospital inpatient criteria and is placed in observation status. If this patient's primary payer is Medicare and was admitted as an inpatient, Condition Code 44 should be used and patient status changed to \"observation\".   () Observation Status concurrent Review           RATIONALE FOR DETERMINATION   73-year-old female with history of respiratory failure secondary to hiatal hernia, status post recent repair, Nissen fundoplication and gastrostomy tube placement, complicated by distal esophageal perforation and purulent mediastinitis, transhiatal esophagogastrectomy,split fistula, G and J-tube placement, admitted for feeding issues with pain around the feeding tube area.  Patient complains of increased pain since tube was placed, CT does not show any evidence of abscess, thought to be neuropathic in origin.  Patient does complain of chronic diarrhea for 3 months, CT showed diffuse ascending, transverse and proximal descending colonic wall thickness, concerning for colitis.  C. difficile, enteric pathogens has not been collected yet, no antibiotics at this time.  G-tube sutures removed with relief of pain, balloon inflated and one additional suture placed, plan to use G-tube only for tube feeding and medications, leaving J-tube in place.  Blood cultures pending. Patient does not meet inpatient criteria, recommend change to observation status, communicated to Dr. Parada       The severity of illness, intensity of service " provided, expected LOS make the care appropriate for observation status at this time.        The information on this document is developed by the utilization review team in order for the business office to ensure compliance.  This only denotes the appropriateness of proper admission status and does not reflect the quality of care rendered.         The definitions of Inpatient Status and Observation Status used in making the determination above are those provided in the CMS Coverage Manual, Chapter 1 and Chapter 6, section 70.4.      Sincerely,       Amado Bowser MD  Physician Advisor  Utilization Review-[place]    Phone: [phone]

## 2019-02-05 NOTE — H&P
[facility], [place]    History and Physical - [facility] Service        Date of Admission:  2/4/2019    Assessment & Plan   Myrtle Vaz is a 73 year old female admitted on 2/4/2019. She has history of  respiratory failure secondary to hiatal hernia for which she was hospitalized on 11/8/2018 and underwent a laparoscopic hiatal hernia repair, Nissen fundoplication and gastrostomy tube placement complicated by distal esophageal perforation and purulent mediastinitis and then underwent a transhiatal esophagogastrectomy, spit fistula, G and J-tube placement (11/21/2018),  MDD,  and is admitted for feeding problem who presented with pain around feeding tube area.    Pain surrounding G-J tube area  Ongoing since it was placed, gradually progressively increase in pain when touched. Tolerate tube feeding well. No pain when rest. On exam, no sign of surrounding skin infection. CT does not showed abscess, and in good position. Pain is likely neuropathic pain in etiology from the pain character.   - WOCN consult  - per patient, it is due to change (post 3 month placement)  - pain control: acetaminophen, gabapentin    Concerning for colitis  Chronic watery diarrhea, 3 month  Chronic diarrhea for 3 months (since 11/2018). CT on admission showed diffuse ascending, transverse, and proximal descending colonic wall thickening concerning for colitis. Was previously has negtive C.diff test in 12/2018 and atopine/diphenoxylate (lomotil) was started with improvement of diarrhea. It is most likely to be from malabsorption in combination with tubefeeding diet. However, with CT finding of colitis, will work up on infectious diarrhea and c.diff. Does not have signs of systemic infection (no SIRS or leukocytosis), will hold off antibiotic at this time.   - pending c.diff, enteric panel  - enteric precaution for now  - hold PTA diphenoxylate-atropine    Chronic issues  Paroxysmal Afib (CHADVAS= 2), not on  anticoagulant - cont PTA amiodarone  MDD - cont PTA citalopram, quetiapine  Hypothyroidism - cont PTA levothyroxine    Holding  - lomotil, laxative, KCl     Diet: NPO for now  Fluids: no IVF  DVT Prophylaxis: Low Risk/Ambulatory with no VTE prophylaxis indicated  Mora Catheter: not present  Code Status: DNR/DNI, discussed with patient    Disposition Plan   Expected discharge: 2 - 3 days, recommended to prior living arrangement once adequate pain management/ tolerating PO medications.  Entered: Ayanna Mckeon MD 02/05/2019, 12:54 AM     Patient will be staffed in am.    Ayanna Mckeon MD  [facility] [place], [place]  Pager: [phone]  Please see sticky note for cross cover information  ______________________________________________________________________    Chief Complaint   Pain around G-J tube area  History is obtained from the patient    History of Present Illness   Myrtle Vaz is a 73 year old female who has a history of respiratory failure secondary to hiatal hernia for which she was hospitalized on 11/8/2018 and underwent a laparoscopic hiatal hernia repair, Nissen fundoplication and gastrostomy tube placement complicated by distal esophageal perforation and purulent mediastinitis and then underwent a transhiatal esophagogastrectomy, spit fistula, G and J-tube placement (11/21/2018), HTN, MDD,  and is admitted for feeding problem.     Patient reported pain around G and J-tube sites on her left abdomen are becoming more and more tender over the past several days with a burning sensation like she was stabbed. Pain only occurs when someone touch the G-J tube. Occasionally has grey drainage from the GJ site. There is no pain when she stays still. Pain has been ongoing since the tube was placed in 11/2018, but has been gradually more sensitive during the past 2 months.       She also has chronic diarrhea since 11/2018 during the admission for respiratory failure from  hiatal hernia. She has diarrhea 5-6 times per day, watery, non-fatty, non-bloody. In the 12/2018 admission, C.diff was tested with negative result x2. Diarrhea was thought to be from malabsorption. Atopine/diphenoxylate (lomotil) was started with improvement of diarrhea. She currently has 3-4 diarrhea/day. Last bowel movent 2/4/19 morning with formed stool.     Denies fever, SOB, chest pain. No pain on urination. No sick contact.     She has ostomy on left chest which connect distal esophagus (from oral content including ice chip). No lopez's catheter.     ED course  - oxycodone 10 mg once  - CT chest/abd/pelvis with contrast    Review of Systems    The 10 point Review of Systems is negative other than noted in the HPI or here.     Past Medical History    I have reviewed this patient's medical history and updated it with pertinent information if needed.   No past medical history on file.     Past Surgical History   I have reviewed this patient's surgical history and updated it with pertinent information if needed.  Past Surgical History:   Procedure Laterality Date     ESOPHAGOSCOPY, GASTROSCOPY, DUODENOSCOPY (EGD), COMBINED N/A 11/21/2018    Procedure: COMBINED ESOPHAGOSCOPY, GASTROSCOPY, DUODENOSCOPY (EGD);  Surgeon: Temitope Bullock MD;  Location: [facility] OR     LAPAROSCOPIC HERNIORRHAPHY HIATAL N/A 11/21/2018    Procedure: Midline laparotomy, Trans-hiatal esophagogasterectomy, gastrostomy, J-tube placement, G-tube placement, bilateral pleural chest tube placement, mediastinal abcess drainage, spit fistula creation, Esophagastrodueodenoscopy;  Surgeon: Temitope Bullock MD;  Location: [facility] OR      Social History   I have reviewed this patient's social history and updated it with pertinent information if needed. Myrtle Vaz  reports that she has quit smoking. Her smoking use included cigarettes. She quit after 60.00 years of use. she has never used smokeless tobacco. She reports that she does not drink  alcohol or use drugs.   Lives in TCU.     Family History   I have reviewed this patient's family history and updated it with pertinent information if needed.   No family history on file.    Prior to Admission Medications   Prior to Admission Medications   Prescriptions Last Dose Informant Patient Reported? Taking?   Lidocaine (LIDOCARE) 4 % Patch   No No   Sig: Place 1-3 patches onto the skin every 24 hours   Patient not taking: Reported on 1/10/2019   QUEtiapine (SEROQUEL) 25 MG tablet   No No   Sig: Take 1 tablet (25 mg) by mouth nightly as needed (Agitation)   Patient not taking: Reported on 1/10/2019   acetaminophen (TYLENOL) 32 mg/mL liquid   No No   Si.45 mLs (975 mg) by Per J Tube route every 8 hours   albuterol (PROAIR HFA/PROVENTIL HFA/VENTOLIN HFA) 108 (90 Base) MCG/ACT inhaler   No No   Sig: Inhale 6 puffs into the lungs every 4 hours as needed for shortness of breath / dyspnea   amiodarone (CORDARONE) 20 mg/mL SUSP   No No   Sig: 10 mLs (200 mg) by Per J Tube route 2 times daily   bacitracin 500 UNIT/GM OINT   No No   Sig: Apply topically 2 times daily   bisacodyl (DULCOLAX) 10 MG suppository   No No   Sig: Place 1 suppository (10 mg) rectally daily as needed for constipation   Patient not taking: Reported on 1/10/2019   citalopram (CELEXA) 20 MG tablet   Yes No   Si mg by Per J Tube route daily   diphenoxylate-atropine (LOMOTIL) 2.5-0.025 MG tablet   Yes No   Si tablet by Per J Tube route 4 times daily as needed for diarrhea   gabapentin (NEURONTIN) 250 MG/5ML solution   No No   Sig: Take 6 mLs (300 mg) by mouth 3 times daily   Patient not taking: Reported on 1/10/2019   levothyroxine (SYNTHROID) 25 mcg/mL SUSP   No No   Si mLs (150 mcg) by Per J Tube route every morning (before breakfast)   methocarbamol (ROBAXIN) 500 MG tablet   No No   Sig: Take 1 tablet (500 mg) by mouth 3 times daily   Patient not taking: Reported on 1/10/2019   morphine (MS CONTIN) 100 MG 12 hr tablet   Yes No    Sig: Take 100 mg by mouth every 4 hours as needed for severe pain Oral per J tube   multivitamins w/minerals (CERTAVITE) liquid   No No   Sig: 15 mLs by Per Feeding Tube route daily   Patient not taking: Reported on 1/10/2019   naloxone (NARCAN) 0.4 MG/ML injection   No No   Sig: Inject 0.25-1 mLs (0.1-0.4 mg) into the vein as needed for opioid reversal   Patient not taking: Reported on 1/10/2019   nystatin (MYCOSTATIN) 348793 UNIT/GM external cream   Yes No   ondansetron (ZOFRAN-ODT) 4 MG ODT tab   No No   Sig: Take 1 tablet (4 mg) by mouth every 6 hours as needed for nausea or vomiting   oxyCODONE (ROXICODONE) 5 MG/5ML solution   No No   Sig: Take 5-10 mLs (5-10 mg) by mouth every 4 hours as needed for moderate to severe pain   Patient not taking: Reported on 1/10/2019   polyethylene glycol (MIRALAX/GLYCOLAX) packet   No No   Si g by Per J Tube route 2 times daily as needed for constipation   Patient not taking: Reported on 1/10/2019   potassium chloride ER (K-TAB) 20 MEQ CR tablet   Yes No   Sig: Take 20 mEq by mouth Oral per J tube   protein modular (PROSOURCE NO CARB)   No No   Si packets by Per Feeding Tube route daily   sennosides (SENOKOT) 8.8 MG/5ML syrup   No No   Sig: 10 mLs by Per J Tube route nightly as needed for constipation   Patient not taking: Reported on 1/10/2019      Facility-Administered Medications: None     Allergies   Allergies   Allergen Reactions     Gabapentin Other (See Comments)     Physical Exam   Vital Signs: Temp: 98.2  F (36.8  C) Temp src: Oral BP: 120/75 Pulse: 80 Heart Rate: 84 Resp: 16 SpO2: 94 % O2 Device: None (Room air)    Weight: 191 lbs 2.22 oz    General Appearance: no acute distress, oriented to time place person  HEENT: not pale conjunctivae, anicteric sclerae, cervical LN not palpable  Respiratory: no accessory muscle use, decrease breath sound left lung, clear both lungs  Cardiovascular: RRR, normal S1S2, no murmur  GI: not distended, soft, not tender apart  from skin around GJ tube area, liver and spleen not palpable, GJ tube on left abdomen without any redness or pus.  Skin: no rash  Musculoskeletal: no joint deformities, no LE edema  Neurologic: pupil 2 mm RTLBE, motor and sensation grossly intact  Psychiatric: appropriate affect    Data   Data reviewed today: I reviewed all medications, new labs and imaging results over the last 24 hours. I personally reviewed.    CT chest/abdomen/pelvis  1. Stable postsurgical changes of esophagectomy with split fistula. No  intrathoracic abscess identified.  2. Stable positioning of percutaneous gastrotomy and jejunostomy tubes placement. There is diffuse ascending, transverse, and proximal  descending colonic wall thickening concerning for colitis. Etiologies  include inflammation, infection, versus ischemia.  3. Colonic diverticulosis without diverticulitis.    Recent Labs   Lab 02/04/19  1856   WBC 8.5   HGB 11.5*   MCV 98   *   INR 1.10      POTASSIUM 3.7   CHLORIDE 98   CO2 29   BUN 20   CR 0.66   ANIONGAP 7   JOSE 8.4*   GLC 78   ALBUMIN 2.9*   PROTTOTAL 7.2   BILITOTAL 0.2   ALKPHOS 114   ALT 16   AST 17

## 2019-02-05 NOTE — PLAN OF CARE
PT ED: Per chart review and discussion with RN, education to RN regarding pt, pt lacks any skilled PT needs related to a safe discharge plan from the ED. Per RN who spoke with pt, pt is reporting baseline functional status, has been up walking with her walker without difficulty. Pt reports no weakness concerns. PT Eval deferred, orders completed.

## 2019-02-05 NOTE — SUMMARY OF CARE
Pt came to [facility] with the following belongings:  -boots  -winter jacket  -wearing glasses & a watch  -pants & a top

## 2019-02-05 NOTE — ED NOTES
"Natasha is asking about the immediate plan for her hospital visit. Did explain to the patient that the charge nurse was told that her in-patient bed would not likely be available during the day shift today. Also, thoracic surgery was here this morning and informed writer, \"We took one stitch out and put in another. Her tubes look great and we have completed what we are going to do for her.\" The patient has been offered a P.T. consult for eval for weakness but the patient has declined as she has been up to the BR indep and feels at her baseline. Have called the in-patient team and updated Dr Kerrie Umanzor. Have informed her that the patient states, \"I might be ready to go back to [facility] if everything is okay in my belly.\" Noé will re-evaluate this.  "

## 2019-02-06 VITALS
RESPIRATION RATE: 16 BRPM | HEART RATE: 74 BPM | WEIGHT: 187.6 LBS | DIASTOLIC BLOOD PRESSURE: 58 MMHG | OXYGEN SATURATION: 93 % | TEMPERATURE: 97.4 F | SYSTOLIC BLOOD PRESSURE: 121 MMHG | BODY MASS INDEX: 31.22 KG/M2

## 2019-02-06 LAB
ANION GAP SERPL CALCULATED.3IONS-SCNC: 7 MMOL/L (ref 3–14)
BUN SERPL-MCNC: 28 MG/DL (ref 7–30)
C COLI+JEJUNI+LARI FUSA STL QL NAA+PROBE: NOT DETECTED
C DIFF TOX B STL QL: NEGATIVE
CALCIUM SERPL-MCNC: 8.4 MG/DL (ref 8.5–10.1)
CHLORIDE SERPL-SCNC: 101 MMOL/L (ref 94–109)
CO2 SERPL-SCNC: 28 MMOL/L (ref 20–32)
CREAT SERPL-MCNC: 0.74 MG/DL (ref 0.52–1.04)
EC STX1 GENE STL QL NAA+PROBE: NOT DETECTED
EC STX2 GENE STL QL NAA+PROBE: NOT DETECTED
ENTERIC PATHOGEN COMMENT: NORMAL
ERYTHROCYTE [DISTWIDTH] IN BLOOD BY AUTOMATED COUNT: 16.1 % (ref 10–15)
GFR SERPL CREATININE-BSD FRML MDRD: 81 ML/MIN/{1.73_M2}
GLUCOSE SERPL-MCNC: 145 MG/DL (ref 70–99)
HCT VFR BLD AUTO: 36.1 % (ref 35–47)
HGB BLD-MCNC: 11.4 G/DL (ref 11.7–15.7)
MCH RBC QN AUTO: 31.1 PG (ref 26.5–33)
MCHC RBC AUTO-ENTMCNC: 31.6 G/DL (ref 31.5–36.5)
MCV RBC AUTO: 99 FL (ref 78–100)
NOROV GI+II ORF1-ORF2 JNC STL QL NAA+PR: NOT DETECTED
PLATELET # BLD AUTO: 613 10E9/L (ref 150–450)
POTASSIUM SERPL-SCNC: 4.2 MMOL/L (ref 3.4–5.3)
RBC # BLD AUTO: 3.66 10E12/L (ref 3.8–5.2)
RVA NSP5 STL QL NAA+PROBE: NOT DETECTED
SALMONELLA SP RPOD STL QL NAA+PROBE: NOT DETECTED
SHIGELLA SP+EIEC IPAH STL QL NAA+PROBE: NOT DETECTED
SODIUM SERPL-SCNC: 136 MMOL/L (ref 133–144)
SPECIMEN SOURCE: NORMAL
V CHOL+PARA RFBL+TRKH+TNAA STL QL NAA+PR: NOT DETECTED
WBC # BLD AUTO: 9.3 10E9/L (ref 4–11)
Y ENTERO RECN STL QL NAA+PROBE: NOT DETECTED

## 2019-02-06 PROCEDURE — 80048 BASIC METABOLIC PNL TOTAL CA: CPT | Performed by: STUDENT IN AN ORGANIZED HEALTH CARE EDUCATION/TRAINING PROGRAM

## 2019-02-06 PROCEDURE — 25000132 ZZH RX MED GY IP 250 OP 250 PS 637: Mod: GY | Performed by: STUDENT IN AN ORGANIZED HEALTH CARE EDUCATION/TRAINING PROGRAM

## 2019-02-06 PROCEDURE — 99238 HOSP IP/OBS DSCHRG MGMT 30/<: CPT | Mod: GC | Performed by: PEDIATRICS

## 2019-02-06 PROCEDURE — G0378 HOSPITAL OBSERVATION PER HR: HCPCS

## 2019-02-06 PROCEDURE — A9270 NON-COVERED ITEM OR SERVICE: HCPCS | Mod: GY | Performed by: STUDENT IN AN ORGANIZED HEALTH CARE EDUCATION/TRAINING PROGRAM

## 2019-02-06 PROCEDURE — 85027 COMPLETE CBC AUTOMATED: CPT | Performed by: STUDENT IN AN ORGANIZED HEALTH CARE EDUCATION/TRAINING PROGRAM

## 2019-02-06 PROCEDURE — 87493 C DIFF AMPLIFIED PROBE: CPT | Performed by: INTERNAL MEDICINE

## 2019-02-06 PROCEDURE — 36415 COLL VENOUS BLD VENIPUNCTURE: CPT | Performed by: STUDENT IN AN ORGANIZED HEALTH CARE EDUCATION/TRAINING PROGRAM

## 2019-02-06 PROCEDURE — 27210429 ZZH NUTRITION PRODUCT INTERMEDIATE LITER

## 2019-02-06 PROCEDURE — 87506 IADNA-DNA/RNA PROBE TQ 6-11: CPT | Performed by: INTERNAL MEDICINE

## 2019-02-06 RX ORDER — AMINO AC/PROTEIN HYDR/WHEY PRO 10G-100/30
1 LIQUID (ML) ORAL DAILY
Status: DISCONTINUED | OUTPATIENT
Start: 2019-02-06 | End: 2019-02-06 | Stop reason: HOSPADM

## 2019-02-06 RX ORDER — AMIODARONE HYDROCHLORIDE 200 MG/1
200 TABLET ORAL DAILY
COMMUNITY
End: 2019-02-06

## 2019-02-06 RX ORDER — LEVOTHYROXINE SODIUM 150 UG/1
150 TABLET ORAL EVERY MORNING
Status: ON HOLD | COMMUNITY
End: 2019-05-28

## 2019-02-06 RX ORDER — DIPHENOXYLATE HCL/ATROPINE 2.5-.025MG
1 TABLET ORAL 4 TIMES DAILY
Qty: 120 TABLET | Refills: 0 | Status: SHIPPED | DISCHARGE
Start: 2019-02-06 | End: 2019-04-07

## 2019-02-06 RX ORDER — VENLAFAXINE 75 MG/1
75 TABLET ORAL 2 TIMES DAILY
Status: DISCONTINUED | OUTPATIENT
Start: 2019-02-06 | End: 2019-02-06 | Stop reason: HOSPADM

## 2019-02-06 RX ORDER — AMIODARONE HYDROCHLORIDE 200 MG/1
200 TABLET ORAL DAILY
Qty: 30 TABLET | Refills: 0 | Status: ON HOLD | DISCHARGE
Start: 2019-02-06 | End: 2019-05-28

## 2019-02-06 RX ORDER — VENLAFAXINE 75 MG/1
75 TABLET ORAL 2 TIMES DAILY
Qty: 60 TABLET | Refills: 0 | Status: ON HOLD | DISCHARGE
Start: 2019-02-06 | End: 2019-05-28

## 2019-02-06 RX ADMIN — CITALOPRAM HYDROBROMIDE 20 MG: 20 TABLET ORAL at 08:43

## 2019-02-06 RX ADMIN — LEVOTHYROXINE SODIUM 150 MCG: 300 TABLET ORAL at 08:42

## 2019-02-06 RX ADMIN — MULTIVITAMIN 15 ML: LIQUID ORAL at 13:08

## 2019-02-06 RX ADMIN — VENLAFAXINE HYDROCHLORIDE 75 MG: 75 TABLET ORAL at 13:08

## 2019-02-06 RX ADMIN — Medication 200 MG: at 08:43

## 2019-02-06 NOTE — DISCHARGE SUMMARY
[facility], [place]  Discharge Summary - Medicine & Pediatrics       Date of Admission:  2/4/2019  Date of Discharge:  2/6/2019  2:49 PM  Discharging Provider: Dr. Umanzor and Dr. Parada  Discharge Service: Marv 4    Discharge Diagnoses   Abdominal pain (GJ-tube area)    Follow-ups Needed After Discharge   Follow-up Appointments     Adult [facility] Follow-up and recommended labs and tests      Thoracic surgery to follow up with you to schedule future procedures.     Appointments on [place] and/or [facility] (with [facility] or [facility] provider or service). Call [phone] if you haven't heard regarding these appointments within 7 days of discharge.              Unresulted Labs Ordered in the Past 30 Days of this Admission     Date and Time Order Name Status Description    2/4/2019 1742 Blood Culture ONE site Preliminary       These results will be followed up by PCP    Hospital Course   Myrtle Vaz was admitted on 2/4/2019.  Patient has a history of  respiratory failure secondary to hiatal hernia for which she was hospitalized on 11/8/2018 and underwent a laparoscopic hiatal hernia repair, Nissen fundoplication and gastrostomy tube placement complicated by distal esophageal perforation and purulent mediastinitis and then underwent a transhiatal esophagogastrectomy, spit fistula, G and J-tube placement (11/21/2018),  MDD,  and is admitted for feeding problem who presented with pain around feeding tube area.    The following problems were addressed during her hospitalization:    Pain surrounding G-J tube area:  Ongoing since it was placed, gradually progressively increase in pain when touched. Tolerate tube feeding well. No pain when rest. On exam, no sign of surrounding skin infection. CT does not showed abscess, and in good position. Pain is likely neuropathic pain in etiology from the pain character. Throacic surgery assessed the patient's tube on 02/05.  The J-tube balloon was  "re-inflated and secured with suture. Thoracic surgery clarified the use of the tubes as follows \"do not use J-tube for meds or tubes feeds, keep clamped. Only use G-tube for both tube feeds and meds. Please continue to crush all medications.... DO NOT remove J tube, may use for future surgery\"  Tubes were labeled in order to limit future confusion.  Patient was able to tolerate feeds overnight with only minor pain around the G-J tube area.  Patient felt ready to leave the hospital the morning of discharge.     Concerning for colitis:  Chronic diarrhea for 3 months (since 11/2018). CT on admission showed mild diffuse ascending, transverse, and proximal descending colonic wall thickening concerning for colitis.  Appears that this imaging my have been over read on admission leading to more detailed/involved workup.  Previously has negtive C.diff test in 12/2018 and atopine/diphenoxylate (lomotil) was started with improvement of diarrhea. It is most likely to be from malabsorption in combination with tubefeeding diet and improper use of J-tube for feeds in the past. Infectious diarrhea etiologies were worked up with enteric panel including C. Diff was negative.  Does not have signs of systemic infection (no SIRS or leukocytosis), will hold off antibiotic at this time.  Patient had no diarrhea or abdominal pain (except for tube associated pain) the day of discharge.    Consultations This Hospital Stay   VASCULAR ACCESS CARE ADULT IP CONSULT  PHYSICAL THERAPY ADULT IP CONSULT  OCCUPATIONAL THERAPY ADULT IP CONSULT  NUTRITION SERVICES ADULT IP CONSULT  MEDICATION HISTORY IP PHARMACY CONSULT  NUTRITION SERVICES ADULT IP CONSULT  PHYSICAL THERAPY ADULT IP CONSULT  OCCUPATIONAL THERAPY ADULT IP CONSULT    Code Status   Full code     The patient was discussed with MD [facility]  [facility], [place]  Pager: " [phone]  ______________________________________________________________________    Physical Exam   Vital Signs: Temp: 97.4  F (36.3  C) Temp src: Oral BP: 121/58   Heart Rate: 89 Resp: 16 SpO2: 93 % O2 Device: None (Room air) Oxygen Delivery: 2 LPM  Weight: 187 lbs 9.6 oz  General Appearance: NAD,   Respiratory: CTA bilateral, no wheezing rubs or gallops  Cardiovascular: RRR, normal S1S2, no murmur  GI: non-distended, soft, not tender apart from skin around GJ tube area, liver and spleen not palpable, GJ tube on left abdomen without erythremia   Skin: No skin rashes  Musculoskeletal: no joint deformities, no LE edema  Neurologic: AOx3, motor and sensation grossly intact, no focal neurologic deficits    Primary Care Physician   Joey Caballero    Discharge Disposition   Discharged to short-term care facility  Condition at discharge: Stable    Significant Results and Procedures   Most Recent 3 CBC's:  Recent Labs   Lab Test 02/06/19  0745 02/05/19  0551 02/04/19  1856   WBC 9.3 7.1 8.5   HGB 11.4* 10.8* 11.5*   MCV 99 99 98   * 631* 638*     Most Recent 3 BMP's:  Recent Labs   Lab Test 02/06/19  0745 02/05/19  0551 02/04/19  1856    136 134   POTASSIUM 4.2 3.8 3.7   CHLORIDE 101 98 98   CO2 28 30 29   BUN 28 20 20   CR 0.74 0.77 0.66   ANIONGAP 7 9 7   JOSE 8.4* 8.6 8.4*   * 90 78     Most Recent 2 LFT's:  Recent Labs   Lab Test 02/04/19 1856 12/03/18  0451   AST 17 29   ALT 16 28   ALKPHOS 114 124   BILITOTAL 0.2 0.2     Most Recent 3 INR's:  Recent Labs   Lab Test 02/04/19 1856 12/03/18  0451 11/29/18  0344   INR 1.10 1.07 1.12     Most Recent 6 Bacteria Isolates From Any Culture (See EPIC Reports for Culture Details):  Recent Labs   Lab Test 02/04/19  1856 12/02/18  2326 11/25/18  0926 11/25/18  0901 11/25/18  0839   CULT No growth after 2 days >100,000 colonies/mL  Escherichia coli  * No growth No growth Light growth  Candida albicans / dubliniensis  Candida albicans and Marylu dubliniensis  are not routinely speciated  Susceptibility testing not routinely done  *     Most Recent ESR & CRP:  Recent Labs   Lab Test 02/04/19  1856   SED 84*   CRP 14.0*     Most Recent Anemia Panel:  Recent Labs   Lab Test 02/06/19  0745   WBC 9.3   HGB 11.4*   HCT 36.1   MCV 99   *     Most Recent CPK:No lab results found.,   Results for orders placed or performed during the hospital encounter of 02/04/19   CT Chest/Abdomen/Pelvis w Contrast    Narrative    EXAMINATION: CT CHEST/ABDOMEN/PELVIS W CONTRAST  2/4/2019 8:34 PM      CLINICAL HISTORY: See the Clinical Information for Interpreting  Provider; h/o recent mediastinitis new dysphagia and G and J tube site  pain r/o abscess    COMPARISON: CT chest 12/06/2018, CT cap on 11/26/2018        PROCEDURE COMMENTS: CT of the chest, abdomen, and pelvis was performed  with iopamidol (ISOVUE-370) solution 118 mL intravenous and oral  contrast. Axial MIP  images of the chest, and coronal and sagittal  reformatted images of the chest, abdomen, and pelvis obtained.    FINDINGS:      Chest:  Postsurgical changes of esophagectomy with spit fistula extending  along the left lateral chest wall associated with minimal fat  stranding, similar to prior exam.    Heart size is enlarged with trace pericardial effusion. Advanced  coronary artery calcification. No mediastinal lymphadenopathy. Few  prominent left axillary lymph nodes. Tracheobronchial tree is patent.  Streaky basilar opacity with volume loss, right greater than left.     Abdomen/pelvis:  No focal hepatic lesion. Gallbladder is within normal limits. Spleen  appears lobulated, unchanged. Pancreas is atrophic. Right adrenal  gland is unremarkable.  Left adrenal gland adenoma, unchanged. Kidneys demonstrate mild  cortical irregularity, prior infarction/infection. Right renal cysts  unchanged. Urinary bladder is distended.    Stable positioning of percutaneous gastrotomy tube. Mild gastric  antrum mucosal thickening.  Percutaneous jejunostomy tube placement  with tip in the mid jejunum. Mild wall thickening in the descending  colon and sigmoid.  Colonic diverticulosis without diverticulitis. No  bowel obstruction, free intra-abdominal air or fluid.    No infrarenal aorta aneurysm.     Soft tissue/bones: Small fat-containing ventral hernia. Multi disc  space narrowing most pronounced at L1-L5. Moderate degenerative  changes of lumbar spine including posterior facet arthropathy.      Impression    IMPRESSION:  1. Stable postsurgical changes of esophagectomy with split fistula. No  intrathoracic abscess identified.  2. Stable positioning of percutaneous gastrotomy and jejunostomy tubes  placement.   3. Mild mild descending colonic and sigmoid wall thickening may simply  be decompression but in the right clinical setting could represent  colitis.  4. Colonic diverticulosis without diverticulitis.  5. Bibasilar lung opacities, right greater than left. Atelectasis  versus infection or aspiration on the right.    I have personally reviewed the examination and initial interpretation  and I agree with the findings.    JEREMIAH HANSON MD       Discharge Orders      General info for SNF    Length of Stay Estimate: Short Term Care: Estimated # of Days <30  Condition at Discharge: Improving  Level of care:skilled   Rehabilitation Potential: Good  Admission H&P remains valid and up-to-date: Yes  Recent Chemotherapy: N/A  Use Nursing Home Standing Orders: Yes     Mantoux instructions    Give two-step Mantoux (PPD) Per Facility Policy Yes     Activity - Up ad jose de jesus     Activity - Up with assistive device     Reason for your hospital stay    Dear Myrtle Vaz    Your were hospitalized at [facility] with abdominal pain and treated with repositioning of GJ-tube.  Over your hospitalization your symptoms improved and today you are ready to be discharged back to your previous facility.  You should continue to improve but  if you develop fever, shortness of breath, light headedness, chest pain or increased abdominal pain please seek medical attention.    We are suggesting the following medication changes:  No changes to medications    Please get the following tests done:  No follow up testing    Please set up an appointment with:  You will be contacted to follow up with thoracic surgery for further surgical planning  Follow up appointments as previously scheduled    It was a pleasure meeting with you today. Thank you for allowing me and my team the privilege of caring for you today. You are the reason we are here, and I truly hope we provided you with the excellent service you deserve. Please let us know if there is anything else we can do for you so that we can be sure you are leaving completely satisfied with your care experience.    Your hospital unit at the time of discharge is 5B so if you have any questions please call the hospital at [phone] and ask to talk to a nurse on 5B.    Krzysztof Parada MD, A  Internal Medicine PGY-3  [facility]     Adult [facility] Follow-up and recommended labs and tests    Thoracic surgery to follow up with you to schedule future procedures.     Appointments on [place] and/or [facility] (with [facility] or [facility] provider or service). Call [phone] if you haven't heard regarding these appointments within 7 days of discharge.     Nutrition Services Adult IP Consult    Recommendations already ordered by Registered Dietitian (RD):     Enteral Nutrition support: Modified home TF regimen,      Access: PEG tube   Dosing wt: 64 kg adjusted wt   Enteral regimen: Isosource 1.5, Cycled x 14 hours, decreased rate to 85 ml/hr, infuse from ( 6:00 pm - 8:00 am).      --Isosource 1.5 @ goal 85 ml/hr x 14 hours provides ~ (1190 ml/day) to provide 1785 kcals (28  kcal/kg/day), 81 gm PRO (1.3 gm/kg/day), 903 ml free H2O, 209 gm CHO and 18 gm Fiber daily     -Water flushes: Ordered feed  and flush ( @ 40 ml/hr over night with TF infusion, from 6:00 pm - 8:00 am) +  120 ml free water flushes before and after each cycle to provide for full hydration needs ( free water flushes provides 800 ml water), patient can utilize extra water flushes PRN  during the day.      --Modules: Ordered Prosource Protein Packet, once daily via G-tube. Provides additional 40 kcal and 11 gm protein).   **No need to dilute protein packet. Draw up via syringe and inject via feeding tube. Flush with 15 ml water post administration. Supplied by nutrition services**.        Physical Therapy Adult Consult    Evaluate and treat as clinically indicated.     Occupational Therapy Adult Consult    Evaluate and treat as clinically indicated.     Diet    Follow this diet upon discharge: Orders Placed This Encounter      Adult Formula Drip Feeding: Continuous Isosource 1.5; Gastrostomy; Goal Rate: 85; mL/hr; From: 6:00 PM; 8:00 AM; Medication - Feeding Tube Flush Frequency: At least 15-30 mL water before and after medication administration and with tube clogging; ...      Clear Liquid Diet     Discharge Medications   Discharge Medication List as of 2/6/2019  4:53 PM      CONTINUE these medications which have CHANGED    Details   acetaminophen (TYLENOL) 32 mg/mL liquid 30.45 mLs (975 mg) by Per G Tube route every 8 hours as needed for fever or mild pain, Disp-100 mL, R-0, Transitional      amiodarone (PACERONE/CODARONE) 200 MG tablet 1 tablet (200 mg) by Per G Tube route daily, Disp-30 tablet, R-0, Transitional      Banana Flakes (BANATROL PLUS) PACK 1 packet by Per G Tube route 2 times daily Mix with 120 ml water. Used for loose stools, Disp-75 each, R-0, Transitional      diphenoxylate-atropine (LOMOTIL) 2.5-0.025 MG tablet 1 tablet by Per G Tube route 4 times daily Hold lomotil if hard stools, Disp-120 tablet, R-0, Transitional      venlafaxine (EFFEXOR) 75 MG tablet 1 tablet (75 mg) by Per G Tube route 2 times daily, Disp-60 tablet,  R-0, Transitional         CONTINUE these medications which have NOT CHANGED    Details   albuterol (PROAIR HFA/PROVENTIL HFA/VENTOLIN HFA) 108 (90 Base) MCG/ACT inhaler Inhale 6 puffs into the lungs every 4 hours as needed for shortness of breath / dyspnea, No Print Out      diclofenac (VOLTAREN) 1 % topical gel Place onto the skin 4 times daily as needed for moderate painHistorical      levothyroxine (SYNTHROID/LEVOTHROID) 150 MCG tablet 150 mcg by Per G Tube route daily, Historical      ondansetron (ZOFRAN-ODT) 4 MG ODT tab Take 1 tablet (4 mg) by mouth every 6 hours as needed for nausea or vomiting, Disp-120 tablet, No Print Out         STOP taking these medications       amiodarone (CORDARONE) 20 mg/mL SUSP Comments:   Reason for Stopping:         bisacodyl (DULCOLAX) 10 MG suppository Comments:   Reason for Stopping:         levothyroxine (SYNTHROID) 25 mcg/mL SUSP Comments:   Reason for Stopping:             Allergies   Allergies   Allergen Reactions     Gabapentin Other (See Comments)

## 2019-02-06 NOTE — PLAN OF CARE
PT 5B: Orders received and acknowledged. Pt is currently OBS status. Will HOLD initiation of acute care PT eval at this time. Pt has a safe discharge plan of return to TCU already established. Pt has tentative discharge today at 4 pm per staff.

## 2019-02-06 NOTE — PHARMACY-ADMISSION MEDICATION HISTORY
Admission medication history interview status for the 2/4/2019 admission is complete. See Epic admission navigator for allergy information, pharmacy, prior to admission medications and immunization status.     Medication history interview sources:  MAR from [facility].  Also reviewed orders since early January to confirm all discontinued medications.     Changes made to PTA medication list (reason)  Added: Banana Flakes  Deleted: bacitracin, bisacodyl, citalopram, gabapentin, lidocaine patch, methocarbamol, morphine, MVI, naloxone, nystatin, oxycodone, polyethylene glycol, quetiapine, potassium  Changed: routes to feeding tube on many meds; formulations; acetaminophen to prn, lomotil to scheduled    Additional medication history information (including reliability of information, actions taken by pharmacist):None      Prior to Admission medications    Medication Sig Last Dose Taking? Auth Provider   amiodarone (PACERONE/CODARONE) 200 MG tablet 200 mg by Per J Tube route daily 2/4/2019 at 0800 Yes Unknown, Entered By History   Banana Flakes (BANATROL PLUS) PACK 1 packet by Per J Tube route 2 times daily Mix with 120 ml water. Used for loose stools 2/4/2019 at 0800 Yes Unknown, Entered By History   diphenoxylate-atropine (LOMOTIL) 2.5-0.025 MG tablet 1 tablet by Per J Tube route 4 times daily Hold lomotil if hard stools 2/4/2019 at 1200 Yes Reported, Patient   levothyroxine (SYNTHROID/LEVOTHROID) 150 MCG tablet 150 mcg by Per G Tube route daily 2/4/2019 at 1200 Yes Unknown, Entered By History   venlafaxine (EFFEXOR) 75 MG tablet 75 mg by Per J Tube route 2 times daily  2/4/2019 at 0800 Yes Reported, Patient   acetaminophen (TYLENOL) 32 mg/mL liquid 30.45 mLs (975 mg) by Per J Tube route every 8 hours  Patient taking differently: 975 mg by Per J Tube route every 8 hours as needed  Unknown at Unknown time  Ally Ybarra MD   albuterol (PROAIR HFA/PROVENTIL HFA/VENTOLIN HFA) 108 (90 Base) MCG/ACT inhaler Inhale 6  puffs into the lungs every 4 hours as needed for shortness of breath / dyspnea  Patient taking differently: Inhale 2 puffs into the lungs every 4 hours as needed for shortness of breath / dyspnea or wheezing  Unknown at Unknown time  Ally Ybarra MD   diclofenac (VOLTAREN) 1 % topical gel Place onto the skin 4 times daily as needed for moderate pain Unknown at Unknown time  Reported, Patient   ondansetron (ZOFRAN-ODT) 4 MG ODT tab Take 1 tablet (4 mg) by mouth every 6 hours as needed for nausea or vomiting Unknown at Unknown time  Ally Ybarra MD             Medication history completed by: Eli Ellis, PharmD  February 6, 2019

## 2019-02-06 NOTE — PROGRESS NOTES
CLINICAL NUTRITION SERVICES - ASSESSMENT NOTE     Nutrition Prescription    RECOMMENDATIONS FOR MDs/PROVIDERS TO ORDER:  --Please send the following enteral nutrition orders at discharge per recommendation below:     Malnutrition Status:    Patient does not meet two of the above criteria necessary for diagnosing malnutrition    Recommendations already ordered by Registered Dietitian (RD):    Enteral Nutrition support: Modified home TF regimen,     Access: PEG tube   Dosing wt: 64 kg adjusted wt   Enteral regimen: Isosource 1.5, Cycled x 14 hours, decreased rate to 85 ml/hr, infuse from ( 6:00 pm - 8:00 am).     --Isosource 1.5 @ goal 85 ml/hr x 14 hours provides ~ (1190 ml/day) to provide 1785 kcals (28  kcal/kg/day), 81 gm PRO (1.3 gm/kg/day), 903 ml free H2O, 209 gm CHO and 18 gm Fiber daily    -Water flushes: Ordered feed and flush ( @ 40 ml/hr over night with TF infusion, from 6:00 pm - 8:00 am) +  120 ml free water flushes before and after each cycle to provide for full hydration needs ( free water flushes provides 800 ml water), patient can utilize extra water flushes PRN  during the day.     --Modules: Ordered Prosource Protein Packet, once daily via G-tube. Provides additional 40 kcal and 11 gm protein).   **No need to dilute protein packet. Draw up via syringe and inject via feeding tube. Flush with 15 ml water post administration. Supplied by nutrition services**.      Future/Additional Recommendations:  None       REASON FOR ASSESSMENT  Myrtle Vaz is a/an 73 year old female assessed by the dietitian for Admission Nutrition Risk Screen for tube feeding or parenteral nutrition and Provider Order - Registered Dietitian to Assess and Order TF per Medical Nutrition Therapy Protocol      Chart reviewed: Observation status,   PMH:   ---Respiratory failure secondary to hiatal hernia, status post recent repair, Nissen fundoplication and gastrostomy tube placement,   --Complicated by distal esophageal perforation  "and purulent mediastinitis, S/P transhiatal esophagogastrectomy, split fistula, placement of gastrostomy and jejunostomy tubes with left neck incision and cervical esophagostomy on 11/21.     \" Per thoracic, use G-tube only for tube feeds and meds, Do not remove J-tube, may use for future surgery\".     --Admitted for feeding issues with pain around the feeding tube area, chronic diarrhea for 3 months, \"CT showed diffuse ascending, transverse and proximal descending colonic wall thickness, concerning for colitis.  C. difficile, enteric pathogens has not been collected yet, no antibiotics at this time\".     --GI: Stool x1 today      NUTRITION HISTORY  On Full TF support for the past few month. Tolerating Isosource 1.5 well. Unable to recall water flushes.     CURRENT NUTRITION ORDERS  Diet:   Clear Liquid    Enteral Nutrition support:  Access: PEG tube   Dosing wt: 64 kg adjusted wt   Enteral regimen: Isosource 1.5, cycled x 14 hours @ 100 ml/hr from ( 6:00 pm - 8:00 am).     --Isosource 1.5 @ goal 100 ml/hr x 14 hours (1400 ml/day) to provide 2100  kcals (33 kcal/kg/day), 95 gm PRO (1.5 gm/kg/day), 1063 ml free H2O, 246 gm CHO and 21 gm Fiber daily.    -Water flushes: None ordered     **Patient has a spit fistula, Likely no concern for aspiration with Gastrostomy tube feeds ). Per patient , has tried Bolus tube feeds in the past and was not able to tolerate it.     Intake/Tolerance:   Tube feed started last night by MD per home regimen @ 100 ml/hr cycled x 14 hours overnight. Per RN, patient is tolerating TF at 100 ml /hr well. No N/V.     LABS  Labs reviewed    MEDICATIONS  Medications reviewed    ANTHROPOMETRICS  Height: 0 cm (Data Unavailable) - 165.1 cm   Most Recent Weight: 85.1 kg (187 lb 9.6 oz) driest wt this admit on 2/5/19    IBW: 56.8 kg ( 150% IBW)   BMI: 31.22 kg /m2 - Obesity Grade I BMI 30-34.9  Weight History: stable wt over the past month   Wt Readings from Last 25 Encounters:   02/05/19 85.1 kg (187 " lb 9.6 oz)   01/10/19 84.9 kg (187 lb 1.6 oz)   12/06/18 93.8 kg (206 lb 11.2 oz)       Dosing Weight: 64 kg adjusted wt using lowest wt of 85.1 kg this admit and IBW of 56.8 kg     ASSESSED NUTRITION NEEDS  Estimated Energy Needs: 1427-4894 kcals/day ( 25-30 kcals/kg) --Justification: Maintenance   Estimated Protein Needs: 77-96 grams protein/day (1.2 - 1.5 grams of pro/kg)  Justification: Obesity guidelines and Post-op repletion   Estimated Fluid Needs:  (1 mL/kcal)   Justification: Maintenance    PHYSICAL FINDINGS  Ostomy on left chest which connect distal esophagus (from oral content including ice chip).    No LE edema     MALNUTRITION  % Intake: No decreased intake noted  % Weight Loss: Weight loss does not meet criteria  Subcutaneous Fat Loss: None observed  Muscle Loss: None observed  Fluid Accumulation/Edema: None noted  Malnutrition Diagnosis: Patient does not meet two of the above criteria necessary for diagnosing malnutrition    NUTRITION DIAGNOSIS  Inadequate oral intake related to post op GI surgery complications,inhibiting ability to advance diet >> NPO/CL as evidence by Clear diet status, reliant on Gastrostomy tube feed to provide for full nutrition/hydration needs.       INTERVENTIONS  Implementation  Nutrition Education:Discussed role of RD in nutrition POC, plan for decreasing TF rate to potentially help with diarrhea + to better meet estimated needs. Discussed adding addition of protein supplement to optimize nutrition. Patient in agreement.   Water flushes: discussed feed and flush system and before/ after water flush orders, patient in agreement.     Enteral Nutrition - Modify rate  Feeding tube flush     Goals  Total avg nutritional intake to meet a minimum of 25 kcal/kg and 1.2 gm PRO/kg daily (per dosing wt 64 kg adjusted wt).     Monitoring/Evaluation  Progress toward goals will be monitored and evaluated per protocol.    Kamila Shields RD/ARSENIO  Pager [phone]

## 2019-02-06 NOTE — PROGRESS NOTES
A&Ox4, VSS on RA.  Pt denies pain.  GT/JT site drsgs changed, areas are reddened and slightly tender.  Split fistula drain patent with milky green output. Meds given via gtube.  SBA to bathroom.    Report given to RN at TCU.  PIV removed.  All belongings and packet sent with pt.  Pt discharged from 5228 around 1445 via wheelchair; Grandson picked up at front entrance in private vehicle and will bring pt to TCU.

## 2019-02-06 NOTE — PLAN OF CARE
Patient states she is having minimal pain.  She is tolerating tube feeding at 100cc/hr.  Patient has had no Bm tonight.  She ambulated to bathroom with assist of one.

## 2019-02-06 NOTE — PLAN OF CARE
Reason for admission: Colitis    Status: no acute events overnight.   VS: VSS.   Pain: No c/o pain.   Neuro: Pt alert and oriented x4.   Respiratory: Breathing adequately on RA, was weaned of supplemental O2.     Skin: Spit fistula, good output. G and J tube sites WDL.   IV/Drains: PIV saline locked, site WDL.   GI/: Voiding appropriately. No BM overnight.   Diet: Clear liquid. TF running at 100mL/hr, tolerating well. No c/o nausea.   Activity: Up with SBA to bathroom.   Plan of care: Obs pt. Still needs a stool sample to rule out c diff. Continue to monitor and follow POC.     Observation goal:      -pain control: pt is meeting goal, no c/o pain.

## 2019-02-06 NOTE — PROGRESS NOTES
Pt denies pain and nausea.  GT/JT drsgs changed.  Formed BMx1, voiding painlessly.  TF stopped around 0900.  Received meds via [facility].  Up SBA.      Observation Goals:  - Pain controlled: MET

## 2019-02-06 NOTE — PROGRESS NOTES
Social Work Services Discharge Note      Patient Name:  Myrtle Vaz     Anticipated Discharge Date:  2/6/19    Discharge Disposition:   TCU:  [address] [phone]  Fax [phone]    Following MD:  Facility assignment     Pre-Admission Screening (PAS) online form has been completed.  The Level of Care (LOC) is:  Determined  Confirmation Code is:  NA, return to facility     Additional Services/Equipment Arranged:  Pt has arranged for her grandson Nestor to transport her at 3pm.      Patient / Family response to discharge plan:  Pt in agreement with plan.      Persons notified of above discharge plan:  Primary team Marv Casanova, pt, Kate- admissions at [facility] (ph [phone]), GUILLE Lindsay    Staff Discharge Instructions:  RN to call report to [phone].  Please fax discharge orders and signed hard scripts for any controlled substances.  Please print a packet and send with patient.     CTS Handoff completed:  YES    VINAYAK Lee, JASPERSW  5[address] Unit   Pager [phone]  Phone [phone]

## 2019-02-06 NOTE — PLAN OF CARE
OT/5B: Orders received and acknowledged. Will HOLD initiation of acute care OT at this time as patient is Observation status with a safe discharge plan of return to TCU already established.

## 2019-02-07 ENCOUNTER — MYC MEDICAL ADVICE (OUTPATIENT)
Dept: SURGERY | Facility: CLINIC | Age: 74
End: 2019-02-07

## 2019-02-10 LAB
BACTERIA SPEC CULT: NO GROWTH
Lab: NORMAL
SPECIMEN SOURCE: NORMAL

## 2019-02-11 ENCOUNTER — APPOINTMENT (OUTPATIENT)
Dept: CT IMAGING | Facility: CLINIC | Age: 74
End: 2019-02-11
Attending: EMERGENCY MEDICINE
Payer: MEDICARE

## 2019-02-11 ENCOUNTER — RECORDS - HEALTHEAST (OUTPATIENT)
Dept: LAB | Facility: CLINIC | Age: 74
End: 2019-02-11

## 2019-02-11 ENCOUNTER — HOSPITAL ENCOUNTER (EMERGENCY)
Facility: CLINIC | Age: 74
Discharge: HOME | End: 2019-02-11
Attending: EMERGENCY MEDICINE | Admitting: EMERGENCY MEDICINE
Payer: MEDICARE

## 2019-02-11 ENCOUNTER — APPOINTMENT (OUTPATIENT)
Dept: GENERAL RADIOLOGY | Facility: CLINIC | Age: 74
End: 2019-02-11
Attending: PHYSICIAN ASSISTANT
Payer: MEDICARE

## 2019-02-11 VITALS
BODY MASS INDEX: 31.22 KG/M2 | TEMPERATURE: 98.5 F | HEART RATE: 80 BPM | WEIGHT: 187.61 LBS | OXYGEN SATURATION: 93 % | SYSTOLIC BLOOD PRESSURE: 120 MMHG | DIASTOLIC BLOOD PRESSURE: 72 MMHG

## 2019-02-11 DIAGNOSIS — K94.29 PAIN FROM GASTROSTOMY TUBE (H): ICD-10-CM

## 2019-02-11 DIAGNOSIS — T85.528A DISLODGED GASTROSTOMY TUBE: ICD-10-CM

## 2019-02-11 LAB
ALBUMIN SERPL-MCNC: 2.9 G/DL (ref 3.4–5)
ALP SERPL-CCNC: 120 U/L (ref 40–150)
ALT SERPL W P-5'-P-CCNC: 17 U/L (ref 0–50)
ANION GAP SERPL CALCULATED.3IONS-SCNC: 12 MMOL/L (ref 3–14)
AST SERPL W P-5'-P-CCNC: 20 U/L (ref 0–45)
BASOPHILS # BLD AUTO: 0 10E9/L (ref 0–0.2)
BASOPHILS NFR BLD AUTO: 0.5 %
BILIRUB SERPL-MCNC: 0.3 MG/DL (ref 0.2–1.3)
BUN SERPL-MCNC: 19 MG/DL (ref 7–30)
CALCIUM SERPL-MCNC: 8.8 MG/DL (ref 8.5–10.1)
CHLORIDE SERPL-SCNC: 99 MMOL/L (ref 94–109)
CO2 SERPL-SCNC: 24 MMOL/L (ref 20–32)
CREAT SERPL-MCNC: 0.79 MG/DL (ref 0.52–1.04)
DIFFERENTIAL METHOD BLD: ABNORMAL
EOSINOPHIL # BLD AUTO: 0.2 10E9/L (ref 0–0.7)
EOSINOPHIL NFR BLD AUTO: 2.1 %
ERYTHROCYTE [DISTWIDTH] IN BLOOD BY AUTOMATED COUNT: 15.9 % (ref 10–15)
GFR SERPL CREATININE-BSD FRML MDRD: 74 ML/MIN/{1.73_M2}
GLUCOSE SERPL-MCNC: 94 MG/DL (ref 70–99)
HCT VFR BLD AUTO: 40.2 % (ref 35–47)
HGB BLD-MCNC: 12.3 G/DL (ref 11.7–15.7)
IMM GRANULOCYTES # BLD: 0 10E9/L (ref 0–0.4)
IMM GRANULOCYTES NFR BLD: 0.1 %
LIPASE SERPL-CCNC: 43 U/L (ref 73–393)
LYMPHOCYTES # BLD AUTO: 2 10E9/L (ref 0.8–5.3)
LYMPHOCYTES NFR BLD AUTO: 27.2 %
MCH RBC QN AUTO: 30.1 PG (ref 26.5–33)
MCHC RBC AUTO-ENTMCNC: 30.6 G/DL (ref 31.5–36.5)
MCV RBC AUTO: 98 FL (ref 78–100)
MONOCYTES # BLD AUTO: 0.9 10E9/L (ref 0–1.3)
MONOCYTES NFR BLD AUTO: 11.9 %
NEUTROPHILS # BLD AUTO: 4.3 10E9/L (ref 1.6–8.3)
NEUTROPHILS NFR BLD AUTO: 58.2 %
NRBC # BLD AUTO: 0 10*3/UL
NRBC BLD AUTO-RTO: 0 /100
PLATELET # BLD AUTO: 631 10E9/L (ref 150–450)
POTASSIUM SERPL-SCNC: 3.8 MMOL/L (ref 3.4–5.3)
PROT SERPL-MCNC: 8.1 G/DL (ref 6.8–8.8)
RBC # BLD AUTO: 4.09 10E12/L (ref 3.8–5.2)
SODIUM SERPL-SCNC: 135 MMOL/L (ref 133–144)
WBC # BLD AUTO: 7.5 10E9/L (ref 4–11)

## 2019-02-11 PROCEDURE — 80053 COMPREHEN METABOLIC PANEL: CPT | Performed by: EMERGENCY MEDICINE

## 2019-02-11 PROCEDURE — 74177 CT ABD & PELVIS W/CONTRAST: CPT

## 2019-02-11 PROCEDURE — 99284 EMERGENCY DEPT VISIT MOD MDM: CPT | Mod: Z6 | Performed by: EMERGENCY MEDICINE

## 2019-02-11 PROCEDURE — 85025 COMPLETE CBC W/AUTO DIFF WBC: CPT | Performed by: EMERGENCY MEDICINE

## 2019-02-11 PROCEDURE — 25000128 H RX IP 250 OP 636: Performed by: EMERGENCY MEDICINE

## 2019-02-11 PROCEDURE — 96374 THER/PROPH/DIAG INJ IV PUSH: CPT | Mod: 59 | Performed by: EMERGENCY MEDICINE

## 2019-02-11 PROCEDURE — 25000128 H RX IP 250 OP 636: Performed by: STUDENT IN AN ORGANIZED HEALTH CARE EDUCATION/TRAINING PROGRAM

## 2019-02-11 PROCEDURE — 83690 ASSAY OF LIPASE: CPT | Performed by: EMERGENCY MEDICINE

## 2019-02-11 PROCEDURE — 96376 TX/PRO/DX INJ SAME DRUG ADON: CPT | Mod: 59 | Performed by: EMERGENCY MEDICINE

## 2019-02-11 PROCEDURE — 49465 FLUORO EXAM OF G/COLON TUBE: CPT

## 2019-02-11 PROCEDURE — 99285 EMERGENCY DEPT VISIT HI MDM: CPT | Mod: 25 | Performed by: EMERGENCY MEDICINE

## 2019-02-11 RX ORDER — HYDROMORPHONE HCL/0.9% NACL/PF 0.2MG/0.2
0.2 SYRINGE (ML) INTRAVENOUS
Status: COMPLETED | OUTPATIENT
Start: 2019-02-11 | End: 2019-02-11

## 2019-02-11 RX ORDER — IOPAMIDOL 755 MG/ML
115 INJECTION, SOLUTION INTRAVASCULAR ONCE
Status: COMPLETED | OUTPATIENT
Start: 2019-02-11 | End: 2019-02-11

## 2019-02-11 RX ORDER — LIDOCAINE HYDROCHLORIDE 10 MG/ML
INJECTION, SOLUTION INFILTRATION; PERINEURAL
Status: DISCONTINUED
Start: 2019-02-11 | End: 2019-02-11 | Stop reason: HOSPADM

## 2019-02-11 RX ADMIN — IOPAMIDOL 115 ML: 755 INJECTION, SOLUTION INTRAVENOUS at 12:58

## 2019-02-11 RX ADMIN — Medication 0.2 MG: at 12:38

## 2019-02-11 RX ADMIN — Medication 0.2 MG: at 16:19

## 2019-02-11 RX ADMIN — DIATRIZOATE MEGLUMINE AND DIATRIZOATE SODIUM 1 ML: 660; 100 SOLUTION ORAL; RECTAL at 15:01

## 2019-02-11 ASSESSMENT — ENCOUNTER SYMPTOMS
CONFUSION: 0
COLOR CHANGE: 0
FEVER: 0
NECK STIFFNESS: 0
HEADACHES: 0
SHORTNESS OF BREATH: 0
ARTHRALGIAS: 0
ABDOMINAL PAIN: 0
DIFFICULTY URINATING: 0
EYE REDNESS: 0

## 2019-02-11 NOTE — ED TRIAGE NOTES
Pt arrived to the ED with G tube problems. Per pt's care facility the G tube is dislodged, pt is having pain at the sites of the G tube and J tube. Also when the G tube feeding is running there is drainage from the site. On arrival vitals stable, afebrile. Pt alert and oriented x4. Pain tolerable at rest.

## 2019-02-11 NOTE — ED AVS SNAPSHOT
[facility], [place], Emergency Department  [address] [place] [id number]  Phone:  [phone]                                    Myrtle Vaz   MRN: [id number]    Department:  [facility], Emergency Department   Date of Visit:  2/11/2019           After Visit Summary Signature Page    I have received my discharge instructions, and my questions have been answered. I have discussed any challenges I see with this plan with the nurse or doctor.    ..........................................................................................................................................  Patient/Patient Representative Signature      ..........................................................................................................................................  Patient Representative Print Name and Relationship to Patient    ..................................................               ................................................  Date                                   Time    ..........................................................................................................................................  Reviewed by Signature/Title    ...................................................              ..............................................  Date                                               Time          22EPIC Rev 08/18

## 2019-02-11 NOTE — ED PROVIDER NOTES
History     Chief Complaint   Patient presents with     Feeding Problems     The history is provided by the patient, medical records and a relative (Daughter).     Myrtle Vaz is a 73 year old female with a medical history significant for respiratory failure secondary to hiatal hernia s/p hiatal hernia repair (11/8/18), Nissen fundoplication and gastrostomy tube placement complicated by distal esophageal perforation and purulent mediastinitis and then underwent a transhiatal esophagogastrectomy, spit fistula, G and J tube placement (11/21/18), and MDD who presents to the Emergency Department for evaluation of left abdominal pain at site of G-J tube.     Per chart review, patient was hospitalized here from 2/4/2/19-2/6/19 for G-J tube problems. Patient underwent CT of abdomen which showed the tubes were in good position. The J tube balloon was re-inflated and secured with suture and tube was to be clamped and not used for medication or feeding. The J tube was also labeled to not be removed for possible future surgery.     Here, patient reports that she came to the ED a week ago due to burning pain from her G-J tubes so the J tube was closed off. She notes that the nurse tried to flush her G tube yesterday, but bubbles developed underneath her skin and the G tube leaked and dislodged last night. Patient complains of left abdominal pain at the site of the G-J tube area, but reports that the pain has since subsided. Movement of the tube, however, worsens the pain. Patient states that there are gray matter around the tube area when her tube was recently cleaned. No other symptoms noted.    No past medical history on file.    Past Surgical History:   Procedure Laterality Date     ESOPHAGOSCOPY, GASTROSCOPY, DUODENOSCOPY (EGD), COMBINED N/A 11/21/2018    Procedure: COMBINED ESOPHAGOSCOPY, GASTROSCOPY, DUODENOSCOPY (EGD);  Surgeon: Temitope Bullock MD;  Location: [facility] OR     LAPAROSCOPIC HERNIORRHAPHY HIATAL N/A  11/21/2018    Procedure: Midline laparotomy, Trans-hiatal esophagogasterectomy, gastrostomy, J-tube placement, G-tube placement, bilateral pleural chest tube placement, mediastinal abcess drainage, spit fistula creation, Esophagastrodueodenoscopy;  Surgeon: Temitope Bullock MD;  Location: [facility] OR       No family history on file.    Social History     Tobacco Use     Smoking status: Former Smoker     Years: 60.00     Types: Cigarettes     Smokeless tobacco: Never Used     Tobacco comment: patient smoked a few cigarettes a day for 60 YEARS   Substance Use Topics     Alcohol use: No     Frequency: Never       Current Facility-Administered Medications   Medication     HYDROmorphone (DILAUDID) injection 0.2 mg     Current Outpatient Medications   Medication     acetaminophen (TYLENOL) 32 mg/mL liquid     albuterol (PROAIR HFA/PROVENTIL HFA/VENTOLIN HFA) 108 (90 Base) MCG/ACT inhaler     amiodarone (PACERONE/CODARONE) 200 MG tablet     Banana Flakes (BANATROL PLUS) PACK     diclofenac (VOLTAREN) 1 % topical gel     diphenoxylate-atropine (LOMOTIL) 2.5-0.025 MG tablet     levothyroxine (SYNTHROID/LEVOTHROID) 150 MCG tablet     ondansetron (ZOFRAN-ODT) 4 MG ODT tab     venlafaxine (EFFEXOR) 75 MG tablet        Allergies   Allergen Reactions     Gabapentin Other (See Comments)       I have reviewed the Medications, Allergies, Past Medical and Surgical History, and Social History in the Epic system.    Review of Systems   Constitutional: Negative for fever.   HENT: Negative for congestion.    Eyes: Negative for redness.   Respiratory: Negative for shortness of breath.    Cardiovascular: Negative for chest pain.   Gastrointestinal: Negative for abdominal pain.        Positive for pain at site of G tube on left abdomen   Genitourinary: Negative for difficulty urinating.   Musculoskeletal: Negative for arthralgias and neck stiffness.   Skin: Negative for color change.   Neurological: Negative for headaches.    Psychiatric/Behavioral: Negative for confusion.   All other systems reviewed and are negative.      Physical Exam   BP: 122/61  Heart Rate: 88  Temp: 98.5  F (36.9  C)  Weight: 85.1 kg (187 lb 9.8 oz)  SpO2: 97 %      Physical Exam   Constitutional: She is oriented to person, place, and time. She appears well-developed and well-nourished. No distress.   HENT:   Head: Normocephalic and atraumatic.   Mouth/Throat: No oropharyngeal exudate.   Eyes: Pupils are equal, round, and reactive to light. Right eye exhibits no discharge. Left eye exhibits no discharge. No scleral icterus.   Neck: Normal range of motion. Neck supple.   Cardiovascular: Normal rate, regular rhythm, normal heart sounds and intact distal pulses. Exam reveals no gallop and no friction rub.   No murmur heard.  Pulmonary/Chest: Effort normal and breath sounds normal. No respiratory distress. She has no wheezes. She exhibits no tenderness.   Abdominal: Soft. Bowel sounds are normal. She exhibits no distension. There is no tenderness.       Musculoskeletal: Normal range of motion. She exhibits no edema, tenderness or deformity.   Neurological: She is alert and oriented to person, place, and time. No cranial nerve deficit.   Skin: Skin is warm and dry. No rash noted. She is not diaphoretic. No erythema. No pallor.   Psychiatric: She has a normal mood and affect.   Nursing note and vitals reviewed.      ED Course   11:24 AM  The patient was seen and examined by Win Martinez MD in Room ED21.        Procedures             Critical Care time:  none             Labs Ordered and Resulted from Time of ED Arrival Up to the Time of Departure from the ED - No data to display         Assessments & Plan (with Medical Decision Making)   Is a 73-year-old female with problems with her G-tube.  Patient has had some recent issues and it was evaluated and found to have no problem.  However patient has been having recent leakage of her tube feeds around the G-tube.   Patient also notes localized pain in the area.  On exam she has no other abdominal pain.  Lab work shows no acute abnormalities.  CT scan showed that the gastrostomy tube was partially removed and the balloon was in soft tissue.  There was some surrounding inflammation but there was no abscess or fluid collection.  I discussed the case with CT surgery who replaced the tube.  Repeat x-ray showed the tube in place.  Patient had a considerable improvement in symptoms, was more comfortable.  Patient states she feels well and wants to be discharged.  UA has not been done but patient is not having any urinary symptoms so we will discharge home without this as patient feels otherwise well.  I discussed results with patient, discussed that though there is no fluid collection or abscess there is pus but that one could form.  Will discharge home with return precautions. Discussed reasons to return to the emergency department.  Patient understands and agrees with this plan.    I have reviewed the nursing notes.    I have reviewed the findings, diagnosis, plan and need for follow up with the patient.       Medication List      There are no discharge medications for this visit.         Final diagnoses:   None     Ben ALLEN, am serving as a trained medical scribe to document services personally performed by Win Martinez DO, based on the provider's statements to me.      Win ALLEN DO, was physically present and have reviewed and verified the accuracy of this note documented by Ben Liriano.    2/11/2019   [facility], [place], EMERGENCY DEPARTMENT     Win Martinez DO  02/11/19 1940

## 2019-02-11 NOTE — DISCHARGE INSTRUCTIONS
Return to the emergency department if symptoms return, there are any new symptoms or any cause for concern.

## 2019-02-11 NOTE — PROGRESS NOTES
THORACIC SURGERY CONSULT  February 11, 2019      73 year-old female with type IV hiatal hernia who underwent laparoscopic hiatal hernia repair with Nissen fundoplication at an outside hospital, complicated by esophageal perforation requiring esophagectomy and cervical esophagostomy with recent admission for J tube malfunction who presents with G tube site pain and tube almost coming out.     Imaging reviewed.     Tube was almost out and tract was closing. Attempted advancing over a wire but wasn't able to. Replaced tube for a 16 fr without difficulty. Tube check done and tube looks in good position.    Ok to use G tube and discharge from ED if radiology agrees     Discussed with staff, Dr. Per Latham MD  General Surgery, PGY-3  [phone]

## 2019-02-12 ENCOUNTER — RECORDS - HEALTHEAST (OUTPATIENT)
Dept: LAB | Facility: CLINIC | Age: 74
End: 2019-02-12

## 2019-02-12 LAB
ALBUMIN SERPL-MCNC: 2.8 G/DL (ref 3.5–5)
ALBUMIN UR-MCNC: ABNORMAL MG/DL
ANION GAP SERPL CALCULATED.3IONS-SCNC: 9 MMOL/L (ref 5–18)
APPEARANCE UR: ABNORMAL
BACTERIA #/AREA URNS HPF: ABNORMAL HPF
BILIRUB UR QL STRIP: NEGATIVE
BUN SERPL-MCNC: 24 MG/DL (ref 8–28)
CALCIUM SERPL-MCNC: 9.3 MG/DL (ref 8.5–10.5)
CAOX CRY #/AREA URNS HPF: PRESENT /[HPF]
CHLORIDE BLD-SCNC: 100 MMOL/L (ref 98–107)
CO2 SERPL-SCNC: 28 MMOL/L (ref 22–31)
COLOR UR AUTO: YELLOW
CREAT SERPL-MCNC: 0.98 MG/DL (ref 0.6–1.1)
GFR SERPL CREATININE-BSD FRML MDRD: 56 ML/MIN/1.73M2
GLUCOSE BLD-MCNC: 117 MG/DL (ref 70–125)
GLUCOSE UR STRIP-MCNC: NEGATIVE MG/DL
HGB UR QL STRIP: NEGATIVE
KETONES UR STRIP-MCNC: NEGATIVE MG/DL
LEUKOCYTE ESTERASE UR QL STRIP: ABNORMAL
MUCOUS THREADS #/AREA URNS LPF: ABNORMAL LPF
NITRATE UR QL: NEGATIVE
PH UR STRIP: 6 [PH] (ref 4.5–8)
POTASSIUM BLD-SCNC: 4.3 MMOL/L (ref 3.5–5)
PREALB SERPL-MCNC: 17 MG/DL (ref 19–38)
RBC #/AREA URNS AUTO: ABNORMAL HPF
SODIUM SERPL-SCNC: 137 MMOL/L (ref 136–145)
SP GR UR STRIP: 1.05 (ref 1–1.03)
SQUAMOUS #/AREA URNS AUTO: ABNORMAL LPF
UROBILINOGEN UR STRIP-ACNC: ABNORMAL
WBC #/AREA URNS AUTO: ABNORMAL HPF

## 2019-02-13 LAB — BACTERIA SPEC CULT: NORMAL

## 2019-02-14 ENCOUNTER — DOCUMENTATION ONLY (OUTPATIENT)
Dept: OTHER | Facility: CLINIC | Age: 74
End: 2019-02-14

## 2019-02-19 ENCOUNTER — THERAPY VISIT (OUTPATIENT)
Dept: SPEECH THERAPY | Facility: CLINIC | Age: 74
End: 2019-02-19
Attending: THORACIC SURGERY (CARDIOTHORACIC VASCULAR SURGERY)
Payer: MEDICARE

## 2019-02-19 ENCOUNTER — ANCILLARY PROCEDURE (OUTPATIENT)
Dept: GENERAL RADIOLOGY | Facility: CLINIC | Age: 74
End: 2019-02-19
Attending: THORACIC SURGERY (CARDIOTHORACIC VASCULAR SURGERY)
Payer: MEDICARE

## 2019-02-19 DIAGNOSIS — S11.21XA: ICD-10-CM

## 2019-02-19 DIAGNOSIS — K44.9 HIATAL HERNIA: ICD-10-CM

## 2019-02-19 DIAGNOSIS — R13.10 DYSPHAGIA, UNSPECIFIED TYPE: Primary | ICD-10-CM

## 2019-02-19 DIAGNOSIS — J90 PLEURAL EFFUSION: ICD-10-CM

## 2019-02-19 LAB — ALBUMIN SERPL-MCNC: 3.2 G/DL (ref 3.4–5)

## 2019-02-20 ENCOUNTER — RECORDS - HEALTHEAST (OUTPATIENT)
Dept: LAB | Facility: CLINIC | Age: 74
End: 2019-02-20

## 2019-02-20 LAB
ALBUMIN SERPL-MCNC: 2.9 G/DL (ref 3.5–5)
ANION GAP SERPL CALCULATED.3IONS-SCNC: 8 MMOL/L (ref 5–18)
BUN SERPL-MCNC: 27 MG/DL (ref 8–28)
CALCIUM SERPL-MCNC: 8.8 MG/DL (ref 8.5–10.5)
CHLORIDE BLD-SCNC: 100 MMOL/L (ref 98–107)
CO2 SERPL-SCNC: 28 MMOL/L (ref 22–31)
CREAT SERPL-MCNC: 0.81 MG/DL (ref 0.6–1.1)
GFR SERPL CREATININE-BSD FRML MDRD: >60 ML/MIN/1.73M2
GLUCOSE BLD-MCNC: 97 MG/DL (ref 70–125)
POTASSIUM BLD-SCNC: 4.1 MMOL/L (ref 3.5–5)
PREALB SERPL-MCNC: 21.2 MG/DL (ref 19–38)
SODIUM SERPL-SCNC: 136 MMOL/L (ref 136–145)

## 2019-02-21 ENCOUNTER — APPOINTMENT (OUTPATIENT)
Dept: GENERAL RADIOLOGY | Facility: CLINIC | Age: 74
End: 2019-02-21
Attending: EMERGENCY MEDICINE
Payer: MEDICARE

## 2019-02-21 ENCOUNTER — HOSPITAL ENCOUNTER (EMERGENCY)
Facility: CLINIC | Age: 74
Discharge: NURSING, REHAB OR LONG-TERM CARE | End: 2019-02-21
Attending: EMERGENCY MEDICINE | Admitting: EMERGENCY MEDICINE
Payer: MEDICARE

## 2019-02-21 VITALS
SYSTOLIC BLOOD PRESSURE: 123 MMHG | DIASTOLIC BLOOD PRESSURE: 66 MMHG | HEART RATE: 71 BPM | RESPIRATION RATE: 14 BRPM | OXYGEN SATURATION: 92 %

## 2019-02-21 DIAGNOSIS — T85.528A DISLODGED GASTROSTOMY TUBE: ICD-10-CM

## 2019-02-21 PROCEDURE — 43762 RPLC GTUBE NO REVJ TRC: CPT | Mod: Z6 | Performed by: EMERGENCY MEDICINE

## 2019-02-21 PROCEDURE — 40000986 XR ABDOMEN 1 VW

## 2019-02-21 PROCEDURE — 43762 RPLC GTUBE NO REVJ TRC: CPT | Performed by: EMERGENCY MEDICINE

## 2019-02-21 PROCEDURE — 99283 EMERGENCY DEPT VISIT LOW MDM: CPT | Mod: 25 | Performed by: EMERGENCY MEDICINE

## 2019-02-21 RX ADMIN — DIATRIZOATE MEGLUMINE AND DIATRIZOATE SODIUM 50 ML: 660; 100 SOLUTION ORAL; RECTAL at 02:19

## 2019-02-21 NOTE — ED PROVIDER NOTES
History     Chief Complaint   Patient presents with     Gtube Problem     HPI  Myrtle Vaz is a 73 year old female with hx of esophageal perforation s/p G-tube and GJ-tube who presents to the ED after her G-tube fell out. Patient reports she was using the bathroom and her care attendant noticed that the G-tube had fallen onto the floor. Patient had not noticed it fall out. This was at 2330. Patient feeling otherwise well recently, no other concerns.     I have reviewed the Medications, Allergies, Past Medical and Surgical History, and Social History in the Epic system.    Review of Systems  No recent fevers, no chest pain, no abdominal pain    Physical Exam   BP: 114/70  Pulse: 79  Resp: 14  SpO2: 92 %    Physical Exam  General: No acute distress. Appears stated age.   HENT: MMM, no oropharyngeal lesions  Eyes: PERRL, normal sclerae   Cardio: Regular rate, extremities well perfused  Resp: Normal work of breathing, normal respiratory rate  Abdomen: esophageal stoma bag in place with clear watery output. G-tube site empty with normal surrounding skin and no surrounding tenderness. GJ tube in place.   Neuro: alert and fully oriented. CN II-XII grossly intact. Grossly normal strength and sensation in all extremities.   MSK: no deformities.   Integumentary/Skin: no rash, normal color  Psych: normal affect, normal behavior    ED Course      Procedures   G-tube placement  Procedure performed by: Dr. Abner Brown  Indication: accidental G-tube removal  Discussed the indications, alternatives, risks, and benefits of the procedure with the patient.  After demonstrating understanding and capacity, the patient gave verbal consent.   Topical anesthesia with 2% lidocaine Jelly.   Site cleansed with chlorhexidine  16 F G-tube with 3-5 ml balloon placed with cap at 6 cm  Placement confirmed with x-ray  Procedure tolerated well with no immediate complications.           Critical Care time:  none       Labs Ordered and  Resulted from Time of ED Arrival Up to the Time of Departure from the ED - No data to display         Assessments & Plan (with Medical Decision Making)   Patient presenting after her G-tube fell out. Vitals in the ED wnl. Patient had her G-tube with her (16 F, 3-5 ml balloon, cap at 6 cm), which had a deflated balloon. Urojet applied for pain and Mora placed temporarily into the G-tube canal to hold the space. Once the replacement G-tube arrived, it was placed as detailed above without complication. Placement confirmed with X-ray.     The complete clinical picture is most consistent with accidental G-tube removal. After counseling on the diagnosis, work-up, and treatment plan, the patient was discharged to home. The patient was advised to follow-up with her PCP as needed if any non-urgent concerns. The patient was advised to return to the ED if there are any urgent/life-threatening concerns.       Clinical Impression:  Accidental G-tube removal       Abner Brown MD  Emergency Medicine     I have reviewed the nursing notes.  I have reviewed the findings, diagnosis, plan and need for follow up with the patient.  Current Discharge Medication List          Final diagnoses:   Dislodged gastrostomy tube (H)       2/21/2019   [facility], [place], EMERGENCY DEPARTMENT     Abner Brown MD  02/21/19 0527       Abner Brown MD  02/21/19 0528

## 2019-02-21 NOTE — ED AVS SNAPSHOT
[facility], [place], Emergency Department  [address] [place] [id number]  Phone:  [phone]                                    Myrtle Vaz   MRN: [id number]    Department:  [facility], Emergency Department   Date of Visit:  2/21/2019           After Visit Summary Signature Page    I have received my discharge instructions, and my questions have been answered. I have discussed any challenges I see with this plan with the nurse or doctor.    ..........................................................................................................................................  Patient/Patient Representative Signature      ..........................................................................................................................................  Patient Representative Print Name and Relationship to Patient    ..................................................               ................................................  Date                                   Time    ..........................................................................................................................................  Reviewed by Signature/Title    ...................................................              ..............................................  Date                                               Time          22EPIC Rev 08/18

## 2019-02-21 NOTE — DISCHARGE INSTRUCTIONS
Please make an appointment to follow up with Your Primary Care Provider as needed for non-urgent concerns.      Return to the ED if you are having any urgent/life-threatening concerns.

## 2019-02-22 ASSESSMENT — ENCOUNTER SYMPTOMS
INSOMNIA: 0
EYE PAIN: 0
EYE IRRITATION: 0
DECREASED CONCENTRATION: 0
EYE REDNESS: 0
DOUBLE VISION: 0
EYE WATERING: 0
DEPRESSION: 1
NERVOUS/ANXIOUS: 1
PANIC: 1

## 2019-02-26 ENCOUNTER — RECORDS - HEALTHEAST (OUTPATIENT)
Dept: LAB | Facility: CLINIC | Age: 74
End: 2019-02-26

## 2019-02-26 LAB
ALBUMIN SERPL-MCNC: 3.3 G/DL (ref 3.5–5)
ANION GAP SERPL CALCULATED.3IONS-SCNC: 11 MMOL/L (ref 5–18)
BUN SERPL-MCNC: 26 MG/DL (ref 8–28)
CALCIUM SERPL-MCNC: 9.3 MG/DL (ref 8.5–10.5)
CHLORIDE BLD-SCNC: 98 MMOL/L (ref 98–107)
CO2 SERPL-SCNC: 28 MMOL/L (ref 22–31)
CREAT SERPL-MCNC: 0.84 MG/DL (ref 0.6–1.1)
GFR SERPL CREATININE-BSD FRML MDRD: >60 ML/MIN/1.73M2
GLUCOSE BLD-MCNC: 96 MG/DL (ref 70–125)
POTASSIUM BLD-SCNC: 4.3 MMOL/L (ref 3.5–5)
SODIUM SERPL-SCNC: 137 MMOL/L (ref 136–145)
TSH SERPL DL<=0.005 MIU/L-ACNC: 3.13 UIU/ML (ref 0.3–5)

## 2019-02-27 ENCOUNTER — OFFICE VISIT (OUTPATIENT)
Dept: SURGERY | Facility: CLINIC | Age: 74
End: 2019-02-27
Attending: THORACIC SURGERY (CARDIOTHORACIC VASCULAR SURGERY)
Payer: MEDICARE

## 2019-02-27 VITALS
OXYGEN SATURATION: 95 % | BODY MASS INDEX: 31.22 KG/M2 | SYSTOLIC BLOOD PRESSURE: 107 MMHG | DIASTOLIC BLOOD PRESSURE: 63 MMHG | TEMPERATURE: 98.5 F | HEIGHT: 65 IN | HEART RATE: 83 BPM

## 2019-02-27 DIAGNOSIS — K22.3 ESOPHAGEAL PERFORATION: Primary | ICD-10-CM

## 2019-02-27 LAB — PREALB SERPL-MCNC: 19.7 MG/DL (ref 19–38)

## 2019-02-27 PROCEDURE — G0463 HOSPITAL OUTPT CLINIC VISIT: HCPCS | Mod: ZF

## 2019-02-27 RX ORDER — CEFAZOLIN SODIUM 2 G/50ML
2 SOLUTION INTRAVENOUS
Status: CANCELLED | OUTPATIENT
Start: 2019-02-27

## 2019-02-27 RX ORDER — CEFAZOLIN SODIUM 1 G/50ML
1 INJECTION, SOLUTION INTRAVENOUS SEE ADMIN INSTRUCTIONS
Status: CANCELLED | OUTPATIENT
Start: 2019-02-27

## 2019-02-27 RX ORDER — HEPARIN SODIUM 5000 [USP'U]/.5ML
5000 INJECTION, SOLUTION INTRAVENOUS; SUBCUTANEOUS
Status: CANCELLED | OUTPATIENT
Start: 2019-02-27

## 2019-02-27 RX ORDER — CEPHALEXIN 500 MG/1
500 CAPSULE ORAL 3 TIMES DAILY
Qty: 21 CAPSULE | Refills: 0 | Status: SHIPPED | OUTPATIENT
Start: 2019-02-27 | End: 2019-04-07

## 2019-02-27 NOTE — Clinical Note
2/27/2019       RE: Myrtle Vaz  [address] [place] [id number]     Dear Colleague,    Thank you for referring your patient, Myrtle Vaz, to the [facility]. Please see a copy of my visit note below.    No notes on file    Again, thank you for allowing me to participate in the care of your patient.      Sincerely,    Marin Albarado MD

## 2019-02-27 NOTE — PROGRESS NOTES
.THORACIC SURGERY FOLLOW UP VISIT    I saw Ms. Myrtle Vaz in follow-up today. The clinical summary follows:     PREOP DIAGNOSIS   Distal esophageal perforation after giant hiatal hernia repair.   Purulent mediastinitis, septic shock, HANG and respiratory failure.     PROCEDURE   11/21/18 Takedown of Nissen, explantation of mesh, drainage of mediastinal abscess, transhiatal partial esophagogastrectomy, spit fistula, gastrostomy and jejunostomy tube.     COMPLICATIONS  Acute kidney injury  Readmitted for gastrostomy tube pain.     INTERVAL STUDIES  02/19/19 Normal video swallow study.       ETOHNone  TOB None   BMI 31    SUBJECTIVE  Ms Vaz is doing very well. She left the hospital a few weeks ago. Currently, she is still at TCU but making significant improvement. She is tolerating clears by mouth with minimal dysphagia and minimal tenderness at the spit fistula site. She is tolerating cycled tube feeds and she is gaining weight. She walks with a walker and her energy is improving. Normal bowel movements.     From a personal perspective, she comes to clinic with her daughter Haydee.     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     PLAN  I spent a total of 30 minutes with Ms. Myrtle Vaz and her daughter, more than 50% of which were spent in counseling, coordination of care, and face-to-face time. I reviewed the plan as follows:  -Cervical esophagostomy stenosis: dilated in the office with significant improvement. Keflex x 7 days for small abscess at spit fistula site.   -Intestinal discontinuity: Will plan for reconstruction in 4 weeks. She has excellent esophageal length (35 cm) and residual gastric remnant (10-15 cm). Approach would be redo laparotomy with intrabdominal retrosternal esophagogastrostomy. She already has a jejunostomy tube.   Necessary  Tests & Appointments: Prealbumin, LFTs, PAC.   Pain Control Plan: Epidural.  Anticoagulation Plan: Heparin in preop.   All questions were answered and the patient and present family were in agreement with the plan.  I appreciate the opportunity to participate in the care of your patient and will keep you updated.  Sincerely,

## 2019-02-28 DIAGNOSIS — K22.3 ESOPHAGEAL PERFORATION: Primary | ICD-10-CM

## 2019-03-23 ASSESSMENT — ENCOUNTER SYMPTOMS
INSOMNIA: 0
DEPRESSION: 1
PANIC: 0
NERVOUS/ANXIOUS: 1

## 2019-03-26 NOTE — PROGRESS NOTES
THORACIC SURGERY FOLLOW UP VISIT     Dear Dr. Caballero,  I saw Ms. Vaz in follow-up today. The clinical summary follows:      PREOP DIAGNOSIS   Distal esophageal perforation after giant hiatal hernia repair.   Purulent mediastinitis, septic shock, HANG and respiratory failure.      PROCEDURE  11/21/2018 -- Takedown of Nissen, explantation of mesh, drainage of mediastinal abscess, transhiatal partial esophagogastrectomy, spit fistula, gastrostomy and jejunostomy tube.      COMPLICATIONS  Acute kidney injury  Readmitted for gastrostomy tube pain.      INTERVAL STUDIES  None     ETOH None  TOB None  BMI 29    Physical exam  Esophageal spit fistula appears uninfected. Dilated today with carissa and mosquito clamp after lidocaine injection.  She is able to get up and walk to exam table easily without assistance.     SUBJECTIVE   Ms. Vza is doing well. She was last seen 2/27/2019.  She left TCU and is now at home. She ambulates with a walker for maybe 30 minutes total a day. She is tolerating clears by mouth with minimal dysphagia and minimal tenderness at the spit fistula site. She is tolerating cycled tube feeds and she is gaining weight. Normal bowel movements.     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 We had an extensive discussion about the indications for surgery, alternatives risks and benefits. We talked about risk for bleeding, infection, damage to surrounding structures, anastomotic leak, a fib, DVT/PE and even death.  Informed consent was obtained.      PLAN  I reviewed the plan as follows:    1) Procedure planned: redo laparotomy with intrabdominal retrosternal esophagogastrostomy. She already has a jejunostomy tube that will need replacement due to  clogging. She has excellent esophageal length (35 cm) and residual gastric remnant (10-15 cm).  2) Necessary Tests & Appointments: PAC.   3) Pain Control Plan: Epidural.  4) Anticoagulation Plan: Heparin in preop.     All questions were answered and the patient and present family were in agreement with the plan.  I appreciate the opportunity to participate in the care of your patient and will keep you updated.  Sincerely,

## 2019-03-27 ENCOUNTER — OFFICE VISIT (OUTPATIENT)
Dept: SURGERY | Facility: CLINIC | Age: 74
End: 2019-03-27
Attending: THORACIC SURGERY (CARDIOTHORACIC VASCULAR SURGERY)
Payer: MEDICARE

## 2019-03-27 ENCOUNTER — TELEPHONE (OUTPATIENT)
Dept: SURGERY | Facility: CLINIC | Age: 74
End: 2019-03-27

## 2019-03-27 VITALS
DIASTOLIC BLOOD PRESSURE: 72 MMHG | SYSTOLIC BLOOD PRESSURE: 122 MMHG | OXYGEN SATURATION: 91 % | HEIGHT: 65 IN | HEART RATE: 100 BPM | TEMPERATURE: 97.9 F | WEIGHT: 180.1 LBS | RESPIRATION RATE: 16 BRPM | BODY MASS INDEX: 30.01 KG/M2

## 2019-03-27 DIAGNOSIS — Z98.890 HISTORY OF ESOPHAGECTOMY: Primary | ICD-10-CM

## 2019-03-27 DIAGNOSIS — Z90.49 HISTORY OF ESOPHAGECTOMY: Primary | ICD-10-CM

## 2019-03-27 PROCEDURE — G0463 HOSPITAL OUTPT CLINIC VISIT: HCPCS | Mod: ZF

## 2019-03-27 RX ORDER — L-DESOXYEPHEDRINE 50 MG
INHALER (EA) NASAL
COMMUNITY
Start: 2019-03-06 | End: 2019-04-23

## 2019-03-27 RX ORDER — LACTOSE-REDUCED FOOD/FIBER 0.07 G-1.5
LIQUID (ML) ORAL
Status: ON HOLD | COMMUNITY
Start: 2019-03-11 | End: 2019-05-15

## 2019-03-27 ASSESSMENT — PAIN SCALES - GENERAL: PAINLEVEL: NO PAIN (0)

## 2019-03-27 ASSESSMENT — MIFFLIN-ST. JEOR: SCORE: 1327.81

## 2019-03-27 NOTE — TELEPHONE ENCOUNTER
Spoke to daughter Haydee about bringing Natasha in a bit early today, she will talk to her mother and call me back, patient called back and will be here by 11am

## 2019-03-27 NOTE — NURSING NOTE
"Oncology Rooming Note    March 27, 2019 11:17 AM   Myrtle Vaz is a 73 year old female who presents for:    Chief Complaint   Patient presents with     Oncology Clinic Visit     F/U Pre-op     Initial Vitals: /72 (BP Location: Right arm, Patient Position: Sitting, Cuff Size: Adult Regular)   Pulse 100   Temp 97.9  F (36.6  C) (Oral)   Resp 16   Ht 1.651 m (5' 5\")   Wt 81.7 kg (180 lb 1.6 oz)   SpO2 91%   BMI 29.97 kg/m   Estimated body mass index is 29.97 kg/m  as calculated from the following:    Height as of this encounter: 1.651 m (5' 5\").    Weight as of this encounter: 81.7 kg (180 lb 1.6 oz). Body surface area is 1.94 meters squared.  No Pain (0) Comment: Data Unavailable   No LMP recorded. Patient has had a hysterectomy.  Allergies reviewed: Yes  Medications reviewed: Yes    Medications: Medication refills not needed today.  Pharmacy name entered into [facility]: [id number] - [facility] [address]    Clinical concerns: None, Dr Albarado was NOT notified.      LARISSA MITCHELL LPN            "

## 2019-03-29 ENCOUNTER — TELEPHONE (OUTPATIENT)
Dept: SURGERY | Facility: CLINIC | Age: 74
End: 2019-03-29

## 2019-03-29 NOTE — TELEPHONE ENCOUNTER
Spoke with daughter to schedule procedure with Dr. Temitope Funes   Procedure was scheduled on 04/29 at [facility] OR  Patient will have H&P with PAC on 04/23  Patient is aware a / is needed day of surgery.   Surgery letter was sent via [employer], patient has my direct contact information for any further questions.

## 2019-04-07 ENCOUNTER — HOSPITAL ENCOUNTER (EMERGENCY)
Facility: CLINIC | Age: 74
Discharge: HOME | End: 2019-04-07
Attending: EMERGENCY MEDICINE | Admitting: EMERGENCY MEDICINE
Payer: MEDICARE

## 2019-04-07 ENCOUNTER — TRANSFERRED RECORDS (OUTPATIENT)
Dept: HEALTH INFORMATION MANAGEMENT | Facility: CLINIC | Age: 74
End: 2019-04-07

## 2019-04-07 ENCOUNTER — APPOINTMENT (OUTPATIENT)
Dept: GENERAL RADIOLOGY | Facility: CLINIC | Age: 74
End: 2019-04-07
Attending: EMERGENCY MEDICINE
Payer: MEDICARE

## 2019-04-07 VITALS
RESPIRATION RATE: 16 BRPM | BODY MASS INDEX: 28.25 KG/M2 | HEART RATE: 80 BPM | OXYGEN SATURATION: 91 % | DIASTOLIC BLOOD PRESSURE: 65 MMHG | HEIGHT: 67 IN | SYSTOLIC BLOOD PRESSURE: 116 MMHG | WEIGHT: 180 LBS

## 2019-04-07 DIAGNOSIS — K22.2 STRICTURE AND STENOSIS OF ESOPHAGUS: ICD-10-CM

## 2019-04-07 DIAGNOSIS — E87.0 HYPERNATREMIA: ICD-10-CM

## 2019-04-07 LAB
ALBUMIN SERPL-MCNC: 3.4 G/DL (ref 3.4–5)
ALP SERPL-CCNC: 121 U/L (ref 40–150)
ALT SERPL W P-5'-P-CCNC: 33 U/L (ref 0–50)
ANION GAP SERPL CALCULATED.3IONS-SCNC: 9 MMOL/L (ref 3–14)
AST SERPL W P-5'-P-CCNC: 29 U/L (ref 0–45)
BASOPHILS # BLD AUTO: 0 10E9/L (ref 0–0.2)
BASOPHILS NFR BLD AUTO: 0.1 %
BILIRUB SERPL-MCNC: 0.3 MG/DL (ref 0.2–1.3)
BUN SERPL-MCNC: 72 MG/DL (ref 7–30)
CALCIUM SERPL-MCNC: 9 MG/DL (ref 8.5–10.1)
CHLORIDE SERPL-SCNC: 118 MMOL/L (ref 94–109)
CO2 SERPL-SCNC: 29 MMOL/L (ref 20–32)
CREAT SERPL-MCNC: 1.25 MG/DL (ref 0.52–1.04)
DIFFERENTIAL METHOD BLD: ABNORMAL
EOSINOPHIL # BLD AUTO: 0.2 10E9/L (ref 0–0.7)
EOSINOPHIL NFR BLD AUTO: 1.3 %
ERYTHROCYTE [DISTWIDTH] IN BLOOD BY AUTOMATED COUNT: 18.4 % (ref 10–15)
GFR SERPL CREATININE-BSD FRML MDRD: 43 ML/MIN/{1.73_M2}
GLUCOSE SERPL-MCNC: 93 MG/DL (ref 70–99)
HCT VFR BLD AUTO: 44.6 % (ref 35–47)
HGB BLD-MCNC: 12.7 G/DL (ref 11.7–15.7)
IMM GRANULOCYTES # BLD: 0.1 10E9/L (ref 0–0.4)
IMM GRANULOCYTES NFR BLD: 0.4 %
LYMPHOCYTES # BLD AUTO: 2 10E9/L (ref 0.8–5.3)
LYMPHOCYTES NFR BLD AUTO: 14.5 %
MCH RBC QN AUTO: 27.4 PG (ref 26.5–33)
MCHC RBC AUTO-ENTMCNC: 28.5 G/DL (ref 31.5–36.5)
MCV RBC AUTO: 96 FL (ref 78–100)
MONOCYTES # BLD AUTO: 0.9 10E9/L (ref 0–1.3)
MONOCYTES NFR BLD AUTO: 6.7 %
NEUTROPHILS # BLD AUTO: 10.5 10E9/L (ref 1.6–8.3)
NEUTROPHILS NFR BLD AUTO: 77 %
NRBC # BLD AUTO: 0 10*3/UL
NRBC BLD AUTO-RTO: 0 /100
PLATELET # BLD AUTO: 461 10E9/L (ref 150–450)
POTASSIUM SERPL-SCNC: 3.5 MMOL/L (ref 3.4–5.3)
PROT SERPL-MCNC: 9.1 G/DL (ref 6.8–8.8)
RBC # BLD AUTO: 4.64 10E12/L (ref 3.8–5.2)
SODIUM SERPL-SCNC: 156 MMOL/L (ref 133–144)
WBC # BLD AUTO: 13.7 10E9/L (ref 4–11)

## 2019-04-07 PROCEDURE — 71046 X-RAY EXAM CHEST 2 VIEWS: CPT

## 2019-04-07 PROCEDURE — 96374 THER/PROPH/DIAG INJ IV PUSH: CPT | Performed by: EMERGENCY MEDICINE

## 2019-04-07 PROCEDURE — 99285 EMERGENCY DEPT VISIT HI MDM: CPT | Mod: 25 | Performed by: EMERGENCY MEDICINE

## 2019-04-07 PROCEDURE — 25000128 H RX IP 250 OP 636: Performed by: EMERGENCY MEDICINE

## 2019-04-07 PROCEDURE — 85025 COMPLETE CBC W/AUTO DIFF WBC: CPT | Performed by: EMERGENCY MEDICINE

## 2019-04-07 PROCEDURE — 80053 COMPREHEN METABOLIC PANEL: CPT | Performed by: EMERGENCY MEDICINE

## 2019-04-07 PROCEDURE — 99285 EMERGENCY DEPT VISIT HI MDM: CPT | Mod: Z6 | Performed by: EMERGENCY MEDICINE

## 2019-04-07 RX ORDER — AZITHROMYCIN 250 MG/1
TABLET, FILM COATED ORAL
Qty: 5 TABLET | Refills: 0 | Status: SHIPPED | OUTPATIENT
Start: 2019-04-07 | End: 2019-04-23

## 2019-04-07 RX ORDER — MORPHINE SULFATE 4 MG/ML
4 INJECTION, SOLUTION INTRAMUSCULAR; INTRAVENOUS
Status: COMPLETED | OUTPATIENT
Start: 2019-04-07 | End: 2019-04-07

## 2019-04-07 RX ADMIN — MORPHINE SULFATE 4 MG: 4 INJECTION INTRAVENOUS at 21:41

## 2019-04-07 ASSESSMENT — ENCOUNTER SYMPTOMS
POLYDIPSIA: 0
SHORTNESS OF BREATH: 0
CONFUSION: 0
BRUISES/BLEEDS EASILY: 0
CHILLS: 0
HEADACHES: 0
LIGHT-HEADEDNESS: 0
FEVER: 0
NECK STIFFNESS: 0
DIFFICULTY URINATING: 0
ARTHRALGIAS: 0
EYE REDNESS: 0
COUGH: 1
ADENOPATHY: 0
COLOR CHANGE: 0
ABDOMINAL PAIN: 0

## 2019-04-07 ASSESSMENT — MIFFLIN-ST. JEOR: SCORE: 1351.16

## 2019-04-07 NOTE — ED AVS SNAPSHOT
[facility], [place], Emergency Department  [address] [place] [id number]  Phone:  [phone]                                    Myrtle Vaz   MRN: [id number]    Department:  [facility], Emergency Department   Date of Visit:  4/7/2019           After Visit Summary Signature Page    I have received my discharge instructions, and my questions have been answered. I have discussed any challenges I see with this plan with the nurse or doctor.    ..........................................................................................................................................  Patient/Patient Representative Signature      ..........................................................................................................................................  Patient Representative Print Name and Relationship to Patient    ..................................................               ................................................  Date                                   Time    ..........................................................................................................................................  Reviewed by Signature/Title    ...................................................              ..............................................  Date                                               Time          22EPIC Rev 08/18

## 2019-04-07 NOTE — ED PROVIDER NOTES
[place] EMERGENCY DEPARTMENT ([facility])  4/07/19    History     Chief Complaint   Patient presents with     Cough     The history is provided by the patient, medical records and a relative (Patient's daughter).     Myrtle Vaz is a 73 year old female who presents to the Emergency Department from the [facility] Emergency Department for evaluation of an obstructed fistula. Patient underwent a transhiatal esophagogastrectomy with spit fistula and GJ tube placement on 11/21/2018. She follows with Dr. Temitope Randle of Thoracic Surgery and is scheduled for an esophagogastrostomy on 04/29/2019.     Patient reports that she developed a non-productive cough on Friday (4/5) and noticed decreased output from the fistula, however, it was still draining at that time. Last night (4/6), patient states that the fistula stopped draining completely. She is currently on a clear liquid diet by mouth and does tube feedings at night.  She denies any fevers.  No chest pain or shortness of breath.     Current Facility-Administered Medications   Medication     lidocaine (XYLOCAINE) 2 % topical gel     Current Outpatient Medications   Medication     azithromycin (ZITHROMAX Z-KIRAN) 250 MG tablet     levothyroxine (SYNTHROID/LEVOTHROID) 150 MCG tablet     lidocaine (XYLOCAINE) 2 % external gel     albuterol (PROAIR HFA/PROVENTIL HFA/VENTOLIN HFA) 108 (90 Base) MCG/ACT inhaler     amiodarone (PACERONE/CODARONE) 200 MG tablet     Camphor-Eucalyptus-Menthol (VICKS VAPORUB) 4.7-1.2-2.6 % OINT     diclofenac (VOLTAREN) 1 % topical gel     Nutritional Supplements (ISOSOURCE 1.5 JOSE) LIQD     ondansetron (ZOFRAN-ODT) 4 MG ODT tab     UNABLE TO FIND     venlafaxine (EFFEXOR) 75 MG tablet     History reviewed. No pertinent past medical history.    Past Surgical History:   Procedure Laterality Date     ESOPHAGOSCOPY, GASTROSCOPY, DUODENOSCOPY (EGD), COMBINED N/A 11/21/2018    Procedure: COMBINED ESOPHAGOSCOPY, GASTROSCOPY,  "DUODENOSCOPY (EGD);  Surgeon: Temitope Bullock MD;  Location: [facility] OR     LAPAROSCOPIC HERNIORRHAPHY HIATAL N/A 11/21/2018    Procedure: Midline laparotomy, Trans-hiatal esophagogasterectomy, gastrostomy, J-tube placement, G-tube placement, bilateral pleural chest tube placement, mediastinal abcess drainage, spit fistula creation, Esophagastrodueodenoscopy;  Surgeon: Temitope Bullock MD;  Location: [facility] OR       No family history on file.    Social History     Tobacco Use     Smoking status: Former Smoker     Years: 60.00     Types: Cigarettes     Smokeless tobacco: Never Used     Tobacco comment: patient smoked a few cigarettes a day for 60 YEARS   Substance Use Topics     Alcohol use: No     Frequency: Never     Allergies   Allergen Reactions     Gabapentin Other (See Comments)       I have reviewed the Medications, Allergies, Past Medical and Surgical History, and Social History in the Epic system.    Review of Systems   Constitutional: Negative for chills and fever.   HENT: Negative for congestion.    Eyes: Negative for redness.   Respiratory: Positive for cough. Negative for shortness of breath.    Cardiovascular: Negative for chest pain.   Gastrointestinal: Negative for abdominal pain.        Clogged spit fistula   Endocrine: Negative for polydipsia and polyuria.   Genitourinary: Negative for difficulty urinating.   Musculoskeletal: Negative for arthralgias and neck stiffness.   Skin: Negative for color change.   Neurological: Negative for light-headedness and headaches.   Hematological: Negative for adenopathy. Does not bruise/bleed easily.   Psychiatric/Behavioral: Negative for confusion.   All other systems reviewed and are negative.      Physical Exam   BP: 131/77  Pulse: 89  Resp: 16  Height: 168.9 cm (5' 6.5\")  Weight: 81.6 kg (180 lb)  SpO2: 94 %      Physical Exam   Constitutional: She is oriented to person, place, and time. She appears well-developed and well-nourished. No distress.   HENT: "   Head: Normocephalic and atraumatic.   Mouth/Throat: Oropharynx is clear and moist. No oropharyngeal exudate.   Eyes: EOM are normal. No scleral icterus.   Neck: Normal range of motion.   Cardiovascular: Normal rate, normal heart sounds and intact distal pulses.   Pulmonary/Chest: Effort normal and breath sounds normal. No respiratory distress. She exhibits no tenderness.   No drainage from spit fistula in left upper chest wall.  No tenderness, fluctuance, or erythema of the affected area.   Abdominal: Soft. Bowel sounds are normal. She exhibits no distension. There is no tenderness. There is no rebound and no guarding.   Feeding tube in the upper abdomen without erythema, induration, drainage, or tenderness surrounding and from the site.   Musculoskeletal: She exhibits no edema or tenderness.   Neurological: She is alert and oriented to person, place, and time. No cranial nerve deficit. She exhibits normal muscle tone. Coordination normal.   Skin: Skin is warm. No rash noted. She is not diaphoretic.   Psychiatric: She has a normal mood and affect. Her behavior is normal. Judgment and thought content normal.   Nursing note and vitals reviewed.      ED Course        Procedures             Critical Care time:  none             Labs Ordered and Resulted from Time of ED Arrival Up to the Time of Departure from the ED   CBC WITH PLATELETS DIFFERENTIAL - Abnormal; Notable for the following components:       Result Value    WBC 13.7 (*)     MCHC 28.5 (*)     RDW 18.4 (*)     Platelet Count 461 (*)     Absolute Neutrophil 10.5 (*)     All other components within normal limits   COMPREHENSIVE METABOLIC PANEL - Abnormal; Notable for the following components:    Sodium 156 (*)     Chloride 118 (*)     Urea Nitrogen 72 (*)     Creatinine 1.25 (*)     GFR Estimate 43 (*)     GFR Estimate If Black 50 (*)     Protein Total 9.1 (*)     All other components within normal limits   PERIPHERAL IV CATHETER   PULSE OXIMETRY NURSING             Assessments & Plan (with Medical Decision Making)   73-year-old woman presenting with a malfunctioning of spit fistula. Differential diagnosis: Fistula occlusion, infection, postop complication.    After thorough history and physical exam, patient appears to be in no acute distress. Thoracic Surgery was consulted and they will evaluate the patient for further input.     Patient was seen and evaluated by Thoracic Surgery team and they are able to dilate her esophageal stenosis and open up her fistula. She was able to drain clear liquids subsequently. Her laboratory studies returned significant for leukocytosis of 13,700. There is no anemia, hemoglobin is normal at 12.7. Electrolytes shows evidence of dehydration with elevated creatinine at 1.25, hyperchloremia of 118 and hypernatremia of 156. I reviewed patient's chest x-ray and I read radiology report; there is some peribronchial cuffing concerning for infectious/inflammatory bronchitis. Given the patient has a cough, leukocytosis and chest x-ray findings concerning for infectious process, I will give her a prescription for azithromycin. I discussed with her and her daughter the concerning findings of hypernatremia and dehydration. Patient stated that she would prefer to go home and follow-up with her primary care physician at her scheduled appointment in 4 days. I do not think this is unreasonable given that she has normal mentation and is at her baseline according to her daughter. Her daughter will stay with the patient over the next several days and closely monitor her mental state. They will reach out to her primary care clinic tomorrow to schedule a repeat lab check 24-48 hours from now. They will also increase free water flushes via the feeding tube. They also agreed to return to the Emergency Department if they notice any changes with the patient's mentation or if she experiences excessive fatigue. At this time, she is  stable for  discharge.    This part of the medical record was transcribed by Araseli Lee, Medical Scribe, from a dictation done by Christopher Franz MD.     I have reviewed the nursing notes.    I have reviewed the findings, diagnosis, plan and need for follow up with the patient.       Medication List      Started    azithromycin 250 MG tablet  Commonly known as:  ZITHROMAX Z-KIRAN  Two tablets on the first day, then one tablet daily for the next 4 days     lidocaine 2 % external gel  Commonly known as:  XYLOCAINE  Topical, PRN        ASK your doctor about these medications    acetaminophen 32 mg/mL liquid  Commonly known as:  TYLENOL  975 mg, Per G Tube, EVERY 8 HOURS PRN  Ask about: Should I take this medication?     BANATROL PLUS Pack  1 packet, Per G Tube, 2 TIMES DAILY, Mix with 120 ml water. Used for loose stools   Ask about: Should I take this medication?            Final diagnoses:   Stricture and stenosis of esophagus   Hypernatremia   I, Araseli Lee, am serving as a trained medical scribe to document services personally performed by Darren Franz MD, based on the provider's statements to me.   IDarren MD, was physically present and have reviewed and verified the accuracy of this note documented by Araseli Lee.    4/7/2019   [facility], [place], EMERGENCY DEPARTMENT     Christopher Franz MD  04/07/19 [id number]

## 2019-04-07 NOTE — ED TRIAGE NOTES
Patient presents via private car from [place] ED with occluded spit fistula. Patient c/o cough, unable to handle secretions. History esophagectomy.

## 2019-04-07 NOTE — ED NOTES
Bed: ED11  Expected date: 4/7/19  Expected time:   Means of arrival:   Comments:  Myrtle Vaz, 72 y/o with hx of esophagectomy, transferred from [place] ED for thoracic surgery consult.     Christopher Franz MD  04/07/19 7116

## 2019-04-08 NOTE — CONSULTS
Thoracic Surgery Consult Note     Patient name: Myrtle Vaz  Date of Service: 4/7/2019  Reason for Consult: Problem with spit fistula   Requesting Physician: Dr. Franz      Assessment/Plan:   74 yo F with history of esophagectomy, in discontinuity with spit fistula, who presents with stricture of her spit fistula.  This was dilated in the ED with lidocaine jelly and a hemostat, patient was able to swallow water after with good output from fistula.    - OK to discharge from fistula standpoint as this is now functioning    - Hypernatremia, discussed with ED staff who discussed with patient and she preferred no admission, will get follow up labs with her primary care provider in 1-2 days    Discussed with fellow, Dr. Reno.    Fatoumata Winslow  General Surgery PGY2    ------------------------------------------------------------------------  HPI:   74 yo F with history of giant hiatal hernia repair in 11/2018 complicated by esophageal perforation s/p esophagectomy with spit fistula who presents today with decreased output from her spit fistula.  She is scheduled for esophagogastrostomy on 4/29 and has required dilation of her spit fistula several times.  She states the output has been minimal for approximately 2 days and she has had increased dysphagia and secretions.  She denies fevers, chills, pain, or other symptom.      PMH: History reviewed. No pertinent past medical history.  PSH:   Past Surgical History:   Procedure Laterality Date     ESOPHAGOSCOPY, GASTROSCOPY, DUODENOSCOPY (EGD), COMBINED N/A 11/21/2018    Procedure: COMBINED ESOPHAGOSCOPY, GASTROSCOPY, DUODENOSCOPY (EGD);  Surgeon: Temitope Bullock MD;  Location: [facility] OR     LAPAROSCOPIC HERNIORRHAPHY HIATAL N/A 11/21/2018    Procedure: Midline laparotomy, Trans-hiatal esophagogasterectomy, gastrostomy, J-tube placement, G-tube placement, bilateral pleural chest tube placement, mediastinal abcess drainage, spit fistula creation, Esophagastrodueodenoscopy;   "Surgeon: Temitope Bullock MD;  Location: [facility] OR     Meds:   (Not in a hospital admission)  Allergies:   Allergies   Allergen Reactions     Gabapentin Other (See Comments)     FamHx: No family history on file.  SocHx:   Social History     Socioeconomic History     Marital status:      Spouse name: Not on file     Number of children: Not on file     Years of education: Not on file     Highest education level: Not on file   Occupational History     Not on file   Social Needs     Financial resource strain: Not on file     Food insecurity:     Worry: Not on file     Inability: Not on file     Transportation needs:     Medical: Not on file     Non-medical: Not on file   Tobacco Use     Smoking status: Former Smoker     Years: 60.00     Types: Cigarettes     Smokeless tobacco: Never Used     Tobacco comment: patient smoked a few cigarettes a day for 60 YEARS   Substance and Sexual Activity     Alcohol use: No     Frequency: Never     Drug use: No     Sexual activity: Not on file   Lifestyle     Physical activity:     Days per week: Not on file     Minutes per session: Not on file     Stress: Not on file   Relationships     Social connections:     Talks on phone: Not on file     Gets together: Not on file     Attends [employer] service: Not on file     Active member of club or organization: Not on file     Attends meetings of clubs or organizations: Not on file     Relationship status: Not on file     Intimate partner violence:     Fear of current or ex partner: Not on file     Emotionally abused: Not on file     Physically abused: Not on file     Forced sexual activity: Not on file   Other Topics Concern     Not on file   Social History Narrative     Not on file        ROS: 10-pt ROS negative except as noted above.     Objective:   /71   Pulse 82   Resp 16   Ht 1.689 m (5' 6.5\")   Wt 81.6 kg (180 lb)   SpO2 97%   BMI 28.62 kg/m    General - no acute distress, comfortable  HEENT - normocephalic, " atraumatic, EOMI  Cardio - regular rate, regular rhythm  Pulm - non labored respirations on room air  Chest: Spit fistula opening L chest, pink and healthy without surrounding erythema or fluctuance.   Abdomen - soft, nontender, nondistended  Neuro - moves all extremities without apparent deficit, non-focal  Extremities - no lower extremity edema, warm, well-perfused  Skin - no rash or bruising  Psych - age appropriate mental status / engagement     Labs:  WBC 13.7  Hbg 12.7  Na 156  Cr 1.25    Imaging:  CXR:  Impression:   1. Mild peribronchial cuffing, may be related to  infectious/inflammatory bronchitis.  2. Stable basilar opacity with atelectasis, not significantly changed  from prior exams.

## 2019-04-08 NOTE — DISCHARGE INSTRUCTIONS
Please follow up with Your Primary Care Provider in 4 days at your scheduled appointment with evaluation of your hypernatremia (elevated sodium level).  Your sodium was elevated 156 today.  Please increase your free water flushes throughout the day through your feeding tube.  If you start feeling excessively tired or confused please come back to the emergency department immediately.  Ideally, you should have your sodium rechecked in 24-48 hours, which can be arranged through your primary care clinic.    Please make an appointment to follow up with Your Thoracic Surgeon if you have any further concerns.  If you develop an issue with your fistula again please contact your surgeon or come back to the emergency department.

## 2019-04-11 ENCOUNTER — RECORDS - HEALTHEAST (OUTPATIENT)
Dept: LAB | Facility: CLINIC | Age: 74
End: 2019-04-11

## 2019-04-11 ENCOUNTER — TRANSFERRED RECORDS (OUTPATIENT)
Dept: HEALTH INFORMATION MANAGEMENT | Facility: CLINIC | Age: 74
End: 2019-04-11

## 2019-04-11 LAB
ALBUMIN SERPL-MCNC: 3.3 G/DL (ref 3.5–5)
ALP SERPL-CCNC: 112 U/L (ref 45–120)
ALT SERPL W P-5'-P-CCNC: 17 U/L (ref 0–45)
ANION GAP SERPL CALCULATED.3IONS-SCNC: 13 MMOL/L (ref 5–18)
AST SERPL W P-5'-P-CCNC: 18 U/L (ref 0–40)
BILIRUB SERPL-MCNC: 0.4 MG/DL (ref 0–1)
BUN SERPL-MCNC: 71 MG/DL (ref 8–28)
CALCIUM SERPL-MCNC: 9.1 MG/DL (ref 8.5–10.5)
CHLORIDE BLD-SCNC: 122 MMOL/L (ref 98–107)
CO2 SERPL-SCNC: 25 MMOL/L (ref 22–31)
CREAT SERPL-MCNC: 1.35 MG/DL (ref 0.6–1.1)
ERYTHROCYTE [DISTWIDTH] IN BLOOD BY AUTOMATED COUNT: 18.8 % (ref 11–14.5)
GFR SERPL CREATININE-BSD FRML MDRD: 39 ML/MIN/1.73M2
GLUCOSE BLD-MCNC: 104 MG/DL (ref 70–125)
HCT VFR BLD AUTO: 42 % (ref 35–47)
HGB BLD-MCNC: 12.4 G/DL (ref 12–16)
MCH RBC QN AUTO: 27.5 PG (ref 27–34)
MCHC RBC AUTO-ENTMCNC: 29.5 G/DL (ref 32–36)
MCV RBC AUTO: 93 FL (ref 80–100)
PLATELET # BLD AUTO: 381 THOU/UL (ref 140–440)
PMV BLD AUTO: 12.1 FL (ref 8.5–12.5)
POTASSIUM BLD-SCNC: 4 MMOL/L (ref 3.5–5)
PROT SERPL-MCNC: 7.8 G/DL (ref 6–8)
RBC # BLD AUTO: 4.51 MILL/UL (ref 3.8–5.4)
SODIUM SERPL-SCNC: 160 MMOL/L (ref 136–145)
TSH SERPL DL<=0.005 MIU/L-ACNC: 0.36 UIU/ML (ref 0.3–5)
WBC: 9.5 THOU/UL (ref 4–11)

## 2019-04-12 NOTE — PROGRESS NOTES
02/19/19 1000   General Information   Type Of Visit Initial   Start Of Care Date 02/19/19   Referring Physician Temitope Bullock MD    Orders Evaluate And Treat   Orders Comment Video swallow study with esophagram   Medical Diagnosis Dysphagia   Hearing WFL for 1:1 setting   Pertinent History of Current Problem/OT: Additional Occupational Profile Info 73 year-old female with type IV hiatal hernia who underwent laparoscopic hiatal hernia repair with Nissen fundoplication at an outside hospital, complicated by esophageal perforation requiring esophagectomy and cervical esophagostomy. Presents today for video swallow study to ensure no esophgeal leaks present. She has a spit fistual and is taking some ice chips by mouth.    Respiratory Status Room air   Prior Level Of Function Swallowing   Prior Level Of Function Comment Pt has been taking small amounts of ice chips since her surgery. She currently has a Jtube and a spit fistula.    Home/community Accessibility Comments (flowsheet Row) TCU since surgery   General Observations Pt highly pleasant and cooperative.    Patient/family Goals Pt would like to heal and return to eating/drinking.    Clinical Swallow Evaluation   Oral Musculature generally intact   Structural Abnormalities present  (See H/P)   Dentition present and adequate   Secretion Management other (see comments)  (Spit fistula)   Mucosal Quality good   Mandibular Strength and Mobility intact   Oral Labial Strength and Mobility WFL   Lingual Strength and Mobility WFL   Velar Elevation intact   Buccal Strength and Mobility intact   Laryngeal Function Cough;Throat clear;Swallow;Voicing initiated   VFSS Eval: Radiology   Radiologist Resident   Views Taken left lateral   Physical Location of Procedure [facility]   VFSS Eval: Thin Liquid Texture Trial   Mode of Presentation, Thin Liquid cup;self-fed   Order of Presentation 1,2,3,4   Preparatory Phase WFL   Oral Phase, Thin Liquid WFL   Pharyngeal Phase,  Thin Liquid WFL   Rosenbek's Penetration Aspiration Scale: Thin Liquid Trial Results 1 - no aspiration, contrast does not enter airway   Diagnostic Statement No aspiration/penetration noted on thin liquid trials. Pt only given a small amount due to esophgeal surgery and spit fistula.    Educational Assessment   Barriers to Learning No barriers   Esophageal Phase of Swallow   Patient reports or presents with symptoms of esophageal dysphagia Yes   Esophageal comments See radiology report for further details.    Swallow Eval: Clinical Impressions   Skilled Criteria for Therapy Intervention No problems identified which require skilled intervention   Dysphagia Outcome Severity Scale (ENRRIQUE) Level 7 - ENRRIQUE   Treatment Diagnosis Normal oropharyngeal swallow   Predicted Duration of Therapy Intervention (days/wks) Evaluation only   Risks and Benefits of Treatment have been explained. Yes   Patient, family and/or staff in agreement with Plan of Care Yes   Clinical Impression Comments Natasha Vaz demonstrates normal oropharyngeal swallow. No aspiration/penetration noted. Adequate oral manipulation and timely swallow demonstrated. No pharyngeal residue noted after the completed swallow. Recommend diet per GI due to recent esophageal surgery and spit fistula. No further SLP services indicated at this time.    Total Session Time   SLP Eval: VideoFluoroscopic Swallow function Minutes (50405) 30   Total Evaluation Time 30     Thank you for the referral of Myrtle Vaz. If you have any questions about this report, please contact me using the information below.      Kailey Azul MS, CCC-SLP  Speech-Language Pathology  [facility] [place]  Departement of Otolaryngology/D&T - 4th floor  Pager: [phone]  Phone: [phone]  Email: [email or web address]

## 2019-04-17 ENCOUNTER — PRE VISIT (OUTPATIENT)
Dept: SURGERY | Facility: CLINIC | Age: 74
End: 2019-04-17

## 2019-04-17 NOTE — TELEPHONE ENCOUNTER
FUTURE VISIT INFORMATION      SURGERY INFORMATION:    Date: 19    Location: [facility] OR    Surgeon:  Krystian Navarro    Anesthesia Type:  General    RECORDS REQUESTED FROM:       Primary Care Provider: Joey Torres- request for records/testing faxed 19- records received and sent to scanning    Pertinent Medical History: Respiratory failure    Most recent EKG+ Tracin18- [facility]    Most recent ECHO: 18

## 2019-04-22 ENCOUNTER — RECORDS - HEALTHEAST (OUTPATIENT)
Dept: LAB | Facility: CLINIC | Age: 74
End: 2019-04-22

## 2019-04-22 LAB
ALBUMIN SERPL-MCNC: 3.4 G/DL (ref 3.5–5)
ANION GAP SERPL CALCULATED.3IONS-SCNC: 12 MMOL/L (ref 5–18)
BUN SERPL-MCNC: 34 MG/DL (ref 8–28)
CALCIUM SERPL-MCNC: 9.3 MG/DL (ref 8.5–10.5)
CHLORIDE BLD-SCNC: 106 MMOL/L (ref 98–107)
CO2 SERPL-SCNC: 29 MMOL/L (ref 22–31)
CREAT SERPL-MCNC: 1.04 MG/DL (ref 0.6–1.1)
GFR SERPL CREATININE-BSD FRML MDRD: 52 ML/MIN/1.73M2
GLUCOSE BLD-MCNC: 95 MG/DL (ref 70–125)
PHOSPHATE SERPL-MCNC: 3.8 MG/DL (ref 2.5–4.5)
POTASSIUM BLD-SCNC: 4.3 MMOL/L (ref 3.5–5)
SODIUM SERPL-SCNC: 147 MMOL/L (ref 136–145)

## 2019-04-23 ENCOUNTER — ANESTHESIA EVENT (OUTPATIENT)
Dept: SURGERY | Facility: CLINIC | Age: 74
DRG: 326 | End: 2019-04-23
Payer: MEDICARE

## 2019-04-23 ENCOUNTER — OFFICE VISIT (OUTPATIENT)
Dept: SURGERY | Facility: CLINIC | Age: 74
End: 2019-04-23
Payer: MEDICARE

## 2019-04-23 VITALS
HEIGHT: 65 IN | RESPIRATION RATE: 18 BRPM | OXYGEN SATURATION: 95 % | BODY MASS INDEX: 28.64 KG/M2 | SYSTOLIC BLOOD PRESSURE: 121 MMHG | WEIGHT: 171.9 LBS | TEMPERATURE: 97.4 F | DIASTOLIC BLOOD PRESSURE: 75 MMHG | HEART RATE: 72 BPM

## 2019-04-23 DIAGNOSIS — Z01.818 PRE-OP EXAMINATION: Primary | ICD-10-CM

## 2019-04-23 DIAGNOSIS — K22.3 ESOPHAGEAL PERFORATION: ICD-10-CM

## 2019-04-23 PROCEDURE — 86923 COMPATIBILITY TEST ELECTRIC: CPT | Performed by: PHYSICIAN ASSISTANT

## 2019-04-23 ASSESSMENT — MIFFLIN-ST. JEOR: SCORE: 1290.61

## 2019-04-23 NOTE — ANESTHESIA PREPROCEDURE EVALUATION
Anesthesia Pre-Procedure Evaluation    Patient: Myrtle Vaz   MRN:     [id number] Gender:   female   Age:    73 year old :      1945        Preoperative Diagnosis: Esophageal Perforation   Procedure(s):  Redo Laparotomy Esophagogastrostomy     No past medical history on file.   Past Surgical History:   Procedure Laterality Date     ESOPHAGOSCOPY, GASTROSCOPY, DUODENOSCOPY (EGD), COMBINED N/A 2018    Procedure: COMBINED ESOPHAGOSCOPY, GASTROSCOPY, DUODENOSCOPY (EGD);  Surgeon: Temitope Bullock MD;  Location: [facility] OR     LAPAROSCOPIC HERNIORRHAPHY HIATAL N/A 2018    Procedure: Midline laparotomy, Trans-hiatal esophagogasterectomy, gastrostomy, J-tube placement, G-tube placement, bilateral pleural chest tube placement, mediastinal abcess drainage, spit fistula creation, Esophagastrodueodenoscopy;  Surgeon: Temitope Bullock MD;  Location: [facility] OR          Anesthesia Evaluation     . Pt has had prior anesthetic. Type: General    History of anesthetic complications    Patient went into A fib with RVR and hypotension      ROS/MED HX    ENT/Pulmonary:     (+), recent URI resolved . .    Neurologic:  - neg neurologic ROS     Cardiovascular:     (+) hypertension----. : . . . :. . Previous cardiac testing Echodate:18results:Left ventricular function, chamber size, wall motion, and wall thickness are  normal.The EF is 60-65%  Limited views of the right ventricle that appears with normal function.  No significant valve dysfunction detected.date: results:ECG reviewed date:19 results:Sinus rhythm 66  Normal ECG  When compared with ECG of 2018 09:27,  No significant change was found date: results:         (-) valvular problems/murmurs and pulmonary hypertension   METS/Exercise Tolerance: Comment: Continues to improve. Uses walker to carry her bag 1 - Eating, dressing   Hematologic:        (-) history of blood clots and History of Transfusion   Musculoskeletal: Comment: History of  Lyme's disease  (+) arthritis,  other musculoskeletal- raynauds disease      GI/Hepatic: Comment: History of esophageal perforation  Patient with jejunostomy, gastrotomy and spit fistula    (+) Other GI/Hepatic malabsorption      Renal/Genitourinary:  - ROS Renal section negative       Endo:     (+) thyroid problem hypothyroidism, .      Psychiatric:     (+) psychiatric history depression      Infectious Disease:  - neg infectious disease ROS       Malignancy:      - no malignancy   Other:    (+) no H/O Chronic Pain,no other significant disability                        PHYSICAL EXAM:   Mental Status/Neuro: A/A/O; Age Appropriate   Airway: Facies: Feasible  Mallampati: II  Mouth/Opening: Full  TM distance: < 6 cm  Neck ROM: Limited   Respiratory: Auscultation: CTAB     Resp. Rate: Normal     Resp. 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                    Lab Results   Component Value Date    WBC 13.7 (H) 04/07/2019    HGB 12.7 04/07/2019    HCT 44.6 04/07/2019     (H) 04/07/2019    CRP 14.0 (H) 02/04/2019    SED 84 (H) 02/04/2019     (H) 04/07/2019    POTASSIUM 3.5 04/07/2019    CHLORIDE 118 (H) 04/07/2019    CO2 29 04/07/2019    BUN 72 (H) 04/07/2019    CR 1.25 (H) 04/07/2019    GLC 93 04/07/2019    JOSE 9.0 04/07/2019    PHOS 2.6 12/07/2018    MAG 2.3 12/07/2018    ALBUMIN 3.4 04/07/2019    PROTTOTAL 9.1 (H) 04/07/2019    ALT 33 04/07/2019    AST 29 04/07/2019    ALKPHOS 121 04/07/2019    BILITOTAL 0.3 04/07/2019    LIPASE 43 (L) 02/11/2019    INR 1.10 02/04/2019       Preop Vitals  BP Readings from Last 3 Encounters:   04/07/19 116/65   03/27/19 122/72   02/27/19 107/63    Pulse Readings from Last 3 Encounters:   04/07/19 80   03/27/19 100   02/27/19 83      Resp Readings from Last 3 Encounters:   04/07/19 16   03/27/19 16   02/21/19 14    SpO2 Readings from Last 3 Encounters:   04/07/19 91%   03/27/19  "91%   02/27/19 95%      Temp Readings from Last 1 Encounters:   03/27/19 97.9  F (36.6  C) (Oral)    Ht Readings from Last 1 Encounters:   04/07/19 1.689 m (5' 6.5\")      Wt Readings from Last 1 Encounters:   04/07/19 81.6 kg (180 lb)    Estimated body mass index is 28.62 kg/m  as calculated from the following:    Height as of 4/7/19: 1.689 m (5' 6.5\").    Weight as of 4/7/19: 81.6 kg (180 lb).     LDA:  Open Drain Left Chest  (Active)   Site Description Edema/Swelling 4/7/2019  7:19 PM   Drainage Appearance Clear;Thin 4/7/2019  7:19 PM   Status Other (Comment) 4/7/2019  7:19 PM   Number of days: 153       Gastrostomy/Enterostomy Gastrostomy LUQ 1 20 fr (Active)   Number of days: 153       Gastrostomy/Enterostomy Jejunostomy LLQ 2 16 fr (Active)   Number of days: 153       Mucous Fistula (Active)   Number of days:             Assessment:   ASA SCORE: 3    NPO Status: > 6 hours since completed Solid Foods   Documentation: H&P complete; Preop Testing complete; Consents complete   Proceeding: Proceed without further delay  Tobacco Use:  NO Active use of Tobacco/UNKNOWN Tobacco use status     Plan:   Anes. Type:  General; Regional     RA Details:  Pre Induction; SS; Exparel   Pre-Induction: Midazolam IV; Opioid IV     Drips/Meds-Preparation: Phenylephrine (cold pack)   Induction:  IV (Standard)   Airway: Oral ETT   Access/Monitoring: PIV; 2nd PIV; A-Line   Maintenance: Balanced   Emergence: Procedure Site   Logistics: Same Day Surgery     Postop Pain/Sedation Strategy:  Standard-Options: Opioids PRN; Block SS; Exparel     PONV Management:  Adult Risk Factors: Female, Non-Smoker, Postop Opioids  Prevention: Ondansetron; Dexamethasone     CONSENT: Direct conversation   Plan and risks discussed with: Patient   Blood Products: Consented (ALL Blood Products)       Comments for Plan/Consent:  11/21/19: Patient had persisent intraoperative hypotension requiring multiple vasopressors.  Also had an episode of AFib with RVR (h/o " of recent AFib) despite being on amiodarone infusion.  DCCV x4 with eventual spontaneous conversion to NSR.  Patient intubated and sedated; unable to assess pain, PONV, mental status.    11/21/18; 1114; Mask Ventilation: Not attempted (RSI); Ease of Intubation: Easy; Airway Size: 7;  Cuffed;  Oral;  Blade Type: [place];  Blade Size: 2;  Place by: [facility];  Insertion Attempts: 1;  Secured at (cm)to lip: 21 cm;  Breath Sounds: Equal, clear and bilateral;  End Tidal CO2: Present;  Dentition: Intact;  Grade View of Cords: 1 (clear and dry )                  PAC Discussion and Assessment    ASA Classification: 3  Case is suitable for: [place]  Anesthetic techniques and relevant risks discussed: GA with regional block for post-op pain control and GA  Invasive monitoring and risk discussed:   Types:   Possibility and Risk of blood transfusion discussed:   NPO instructions given:   Additional anesthetic preparation and risks discussed:   Needs early admission to pre-op area:   Other:     PAC Resident/NP Anesthesia Assessment:  Natasha is a 73 year old woman who is scheduled for redo laparoscopic esophagogastrostomy on 4/29/19 by Dr. Per Funes in treatment of esophageal perforation.  PAC referral for risk assessment and optimization for anesthesia with comorbid conditions of HTN, HLD, history of SVT, a fib and RVR intraoperatively, hypothyroid, malabsorption, depression:    Pre-operative considerations:  1.  Cardiac:  Functional status- METS .  Intermediate risk surgery with 0.9% (RCRI #) risk of major adverse cardiac event.   ~ HTN/HLD/ History of a fib with RVR intra-operative with 11/21/18 surgery. No issues since. The patient denies any ESCOBAR, chest pain or palpitations. Continue amiodarone. EKG checked today shows normal sinus rhythm. No further cardiac testing indicated.       2.  Pulm:  Airway feasible.  DORY risk: Low.   ~ Recently in ER 4/7/19 and had dilation of esophageus and opening of spit fistula along with  possible infectious vs inflammatory bronchitis - was treated with prophylactic z nanci on 4/7/19. Patient denies any current symptoms. Recheck CBC on 4/11/19 with normal WBC.     3. Endo: hypothyroidism - continue levothyroxine.     4. GI:  Risk of PONV score =3  If > 2, anti-emetic intervention recommended.  ~ S/p jejunostomy, gastrostomy and spit fistula. Has a history of having issues with clogging and malabsorption with electrolyte abnormalities. Recently in the ED for hyponatremia. Continue zofran, cycled feeding. Improvement in hypernatremia from labs yesterday 4/22/19.     5. Musculoskeletal: depression - continue effexor    VTE risk: 0.5%      Anesthesia: Patient was intubated at her 11/29/18 surgery with RSI. She was intubated in the ICU 2 days later without issues.       Patient is optimized and is acceptable candidate for the proposed procedure.  No further diagnostic evaluation is needed. Will get a type and screen today.     Patient also evaluated by Dr. Mendez. See recommendations below.     For further details of assessment, testing, and physical exam please see H and P completed on same date.    Rosi Kan PA-C        Mid-Level Provider/Resident: Rosi Kan PA-C  Date: 4/23/19  Time:     Attending Anesthesiologist Anesthesia Assessment:        Anesthesiologist:   Date:   Time:   Pass/Fail:   Disposition:     PAC Pharmacist Assessment:        Pharmacist:   Date:   Time:        Rosi Kan PA-C

## 2019-04-23 NOTE — H&P
Pre-Operative H & P     CC:  Preoperative exam to assess for increased cardiopulmonary risk while undergoing surgery and anesthesia.    Date of Encounter: 4/23/2019  Primary Care Physician:  Joey Caballero     Reason for visit: pre operative examination, esophageal perforation     HPI  Myrtle Vaz is a 73 year old female who presents for pre-operative H & P in preparation for redo laparoscopic esophagogastrostomy with Dr. Per Funes on 4/29/19 at [facility].     The patient is a 73 year old woman who underwent an operation for a large hiatal hernia in 11/2018 at an outside hospital. She had a very complicated post-operative course with distal esophageal perforation causing purulent mediastinitis, septic shock, HANG and respiratory failure. She was transferred to [facility] and Dr. Per Funes did a takedown of the [place], explatation of mesh, drainage of mediastinal abscess, transhiatal partial esophagogastrectomy, spit fistula, gastrostomy and jejunostomy tube on 11/21/18. The patient has continued to have issues with pain and clogging of her gastrostomy tube. She has followed with Dr. Per Funes and was last seen on 3/27/19. At that time the patient was home from the TCU and tolerating cycled tube feeds. They discussed the next steps and the patient is now scheduled for the procedure as above.     History is obtained from the patient and chart review    Past Medical History  No past medical history on file.    Past Surgical History  Past Surgical History:   Procedure Laterality Date     ESOPHAGOSCOPY, GASTROSCOPY, DUODENOSCOPY (EGD), COMBINED N/A 11/21/2018    Procedure: COMBINED ESOPHAGOSCOPY, GASTROSCOPY, DUODENOSCOPY (EGD);  Surgeon: Temitope Bullock MD;  Location: [facility] OR     LAPAROSCOPIC HERNIORRHAPHY HIATAL N/A 11/21/2018    Procedure: Midline laparotomy, Trans-hiatal esophagogasterectomy, gastrostomy, J-tube placement, G-tube placement, bilateral  pleural chest tube placement, mediastinal abcess drainage, spit fistula creation, Esophagastrodueodenoscopy;  Surgeon: Temitope Bullock MD;  Location: [facility] OR       Hx of Blood transfusions/reactions: denies     Hx of abnormal bleeding or anti-platelet use: none    Menstrual history: No LMP recorded. Patient has had a hysterectomy.:     Steroid use in the last year: none    Personal or FH with difficulty with Anesthesia:  History of A fib with RVR intra-operatively    Prior to Admission Medications  Current Outpatient Medications   Medication Sig Dispense Refill     albuterol (PROAIR HFA/PROVENTIL HFA/VENTOLIN HFA) 108 (90 Base) MCG/ACT inhaler Inhale 6 puffs into the lungs every 4 hours as needed for shortness of breath / dyspnea (Patient taking differently: Inhale 2 puffs into the lungs every 4 hours as needed for shortness of breath / dyspnea or wheezing )       amiodarone (PACERONE/CODARONE) 200 MG tablet 1 tablet (200 mg) by Per G Tube route daily 30 tablet 0     azithromycin (ZITHROMAX Z-KIRAN) 250 MG tablet Two tablets on the first day, then one tablet daily for the next 4 days 5 tablet 0     Camphor-Eucalyptus-Menthol (VICKS VAPORUB) 4.7-1.2-2.6 % OINT Apply to chest as needed as directed.  Indications: congestion       diclofenac (VOLTAREN) 1 % topical gel Place onto the skin 4 times daily as needed for moderate pain       levothyroxine (SYNTHROID/LEVOTHROID) 150 MCG tablet 150 mcg by Per G Tube route daily       lidocaine (XYLOCAINE) 2 % external gel Apply topically as needed for moderate pain 30 mL 0     Nutritional Supplements (ISOSOURCE 1.5 JOSE) LIQD 90 mls/hour x 12 hours 8pm - 8am       ondansetron (ZOFRAN-ODT) 4 MG ODT tab Take 1 tablet (4 mg) by mouth every 6 hours as needed for nausea or vomiting 120 tablet      UNABLE TO FIND Imprimis eye drops. 1 drop to left eye four times daily x1 week then three times daily x1 week, then twice daily x1 week than daily for 1 week until bottle is empty   Indications: cataract surgery       venlafaxine (EFFEXOR) 75 MG tablet 1 tablet (75 mg) by Per G Tube route 2 times daily 60 tablet 0       Allergies  Allergies   Allergen Reactions     Gabapentin Other (See Comments)       Social History  Social History     Socioeconomic History     Marital status:      Spouse name: Not on file     Number of children: Not on file     Years of education: Not on file     Highest education level: Not on file   Occupational History     Not on file   Social Needs     Financial resource strain: Not on file     Food insecurity:     Worry: Not on file     Inability: Not on file     Transportation needs:     Medical: Not on file     Non-medical: Not on file   Tobacco Use     Smoking status: Former Smoker     Years: 60.00     Types: Cigarettes     Smokeless tobacco: Never Used     Tobacco comment: patient smoked a few cigarettes a day for 60 YEARS   Substance and Sexual Activity     Alcohol use: No     Frequency: Never     Drug use: No     Sexual activity: Not on file   Lifestyle     Physical activity:     Days per week: Not on file     Minutes per session: Not on file     Stress: Not on file   Relationships     Social connections:     Talks on phone: Not on file     Gets together: Not on file     Attends [employer] service: Not on file     Active member of club or organization: Not on file     Attends meetings of clubs or organizations: Not on file     Relationship status: Not on file     Intimate partner violence:     Fear of current or ex partner: Not on file     Emotionally abused: Not on file     Physically abused: Not on file     Forced sexual activity: Not on file   Other Topics Concern     Not on file   Social History Narrative     Not on file       Family History  No family history on file.    Review of Systems    ROS/MED HX    ENT/Pulmonary: Comment: History of esophageal perforation    (+)mild COPD, , . .    Neurologic:  - neg neurologic ROS     Cardiovascular:     (+)  "Dyslipidemia, hypertension----. : . . . :. . Previous cardiac testing Echodate:11/23/18results:date: results:ECG reviewed date:11/21/18 results:SR, low voltage QRS date: results:          METS/Exercise Tolerance:     Hematologic:         Musculoskeletal:  - neg musculoskeletal ROS       GI/Hepatic: Comment: Patient with jejunostomy, gastrotomy and spit fistula    (+) Other GI/Hepatic malabsorption      Renal/Genitourinary:         Endo:     (+) thyroid problem hypothyroidism, .      Psychiatric:     (+) psychiatric history depression      Infectious Disease:  - neg infectious disease ROS       Malignancy:         Other:             The complete review of systems is negative other than noted in the HPI or here.                         171 lbs 14.4 oz  5' 5\"   Body mass index is 28.61 kg/m .       Physical Exam  Constitutional: Awake, alert, cooperative, no apparent distress, and appears stated age.  Eyes: Pupils equal, round and reactive to light, extra ocular muscles intact, sclera clear, conjunctiva normal.  HENT: Normocephalic, oral pharynx with moist mucus membranes, good dentition. No goiter appreciated. Deviation of esophagus with spit fistula.    Respiratory: Clear to auscultation bilaterally, no crackles or wheezing.  Cardiovascular: Regular rate and rhythm, normal S1 and S2, and no murmur noted.  Carotids +2, no bruits. No edema. Palpable pulses to radial  DP and PT arteries.   GI: Normal bowel sounds, soft, non-distended, non-tender, no masses palpated, no hepatosplenomegaly.  Surgical scars: healed. G tube, J tube and spit fistula.   Lymph/Hematologic: No cervical lymphadenopathy and no supraclavicular lymphadenopathy.  Genitourinary:  defer  Skin: Warm and dry.  No rashes at anticipated surgical site.   Musculoskeletal: Full ROM of neck. There is no redness, warmth, or swelling of the joints. Gross motor strength is normal.    Neurologic: Awake, alert, oriented to name, place and time. Cranial nerves " II-XII are grossly intact. Gait is normal.   Neuropsychiatric: Calm, cooperative. Normal affect.     Labs: (personally reviewed)  19   3.4 (L) 3.5 - 5 g/dL   Calcium 9.3 8.5 - 10.5 mg/dL   Phosphorus 3.8 2.5 - 4.5 mg/dL   Glucose 95 70 - 125 mg/dL   BUN 34 (H) 8 - 28 mg/dL   Creatinine 1.04 0.6 - 1.1 mg/dL   Sodium 147 (H) 136 - 145 mmol/L   Potassium 4.3 3.5 - 5 mmol/L   Chloride 106 98 - 107 mmol/L   CO2 29 22 - 31 mmol/L   Anion Gap, Calculation 12 5 - 18 mmol/L   GFR MDRD Af Amer >60 >60 mL/min/1.73m2   GFR MDRD Non Af Amer 52 (L)      19  WBC 8.6 3.5 - 10.5 x10(9)/L [facility]     RBC 4.38 3.90 - 5.03 x10(12)/L [facility]     Hemoglobin 12.0 12.0 - 15.5 g/dL [facility]     HCT 39.9 34.9 - 44.5 % [facility]     MCV 91.1 80.0 - 100.0 fL [facility]     MCH 27.4 (L) 27.6 - 33.3 pg [facility]     MCHC 30.1 (L) 31.5 - 35.2 g/dL [facility]     RDW 18.7 (H) 11.9 - 15.5 % [facility]     Platelets 360 150 - 450 x10(9)/L [facility]     Automated NRBC 0 <=0 /100 WBC [facility]       EK19    Echo 18  Interpretation Summary  Left ventricular function, chamber size, wall motion, and wall thickness are  normal.The EF is 60-65%  Limited views of the right ventricle that appears with normal function.  No significant valve dysfunction detected.    Exam: XR CHEST 2 VW, 2019 8:32 PM  Impression:   1. Mild peribronchial cuffing, may be related to  infectious/inflammatory bronchitis.  2. Stable basilar opacity with atelectasis, not significantly changed  from prior exams.    Exam: XR ABDOMEN 1 VW, 2019 2:32 AM  Impression: Contrast administered through the gastrojejunostomy tube  within the stomach. No evidence of leakage.    The patient's records and results personally reviewed by this provider.     Outside records reviewed from: care everywhere    ASSESSMENT and PLAN  Natasha is a 73 year old  woman who is scheduled for redo laparoscopic esophagogastrostomy on 4/29/19 by Dr. Per Funes in treatment of esophageal perforation.  PAC referral for risk assessment and optimization for anesthesia with comorbid conditions of HTN, HLD, history of SVT, a fib and RVR intraoperatively, hypothyroid, malabsorption, depression:    Pre-operative considerations:  1.  Cardiac:  Functional status- METS .  Intermediate risk surgery with 0.9% (RCRI #) risk of major adverse cardiac event.   ~ HTN/HLD/ History of a fib with RVR intra-operative with 11/21/18 surgery. No issues since. The patient denies any ESCOBAR, chest pain or palpitations. Continue amiodarone. EKG checked today shows normal sinus rhythm. No further cardiac testing indicated.       2.  Pulm:  Airway feasible.  DORY risk: Low.   ~ Recently in ER 4/7/19 and had dilation of esophageus and opening of spit fistula along with possible infectious vs inflammatory bronchitis - was treated with prophylactic z nanci on 4/7/19. Patient denies any current symptoms. Recheck CBC on 4/11/19 with normal WBC.     3. Endo: hypothyroidism - continue levothyroxine.     4. GI:  Risk of PONV score =3  If > 2, anti-emetic intervention recommended.  ~ S/p jejunostomy, gastrostomy and spit fistula. Has a history of having issues with clogging and malabsorption with electrolyte abnormalities. Recently in the ED for hyponatremia. Continue zofran, cycled feeding. Improvement in hypernatremia from labs yesterday 4/22/19.     5. Musculoskeletal: depression - continue effexor    VTE risk: 0.5%      Patient was discussed with Dr Mendez.      The patient is optimized for their procedure. AVS with information on surgery time/arrival time, meds and NPO status given by nursing staff.        Rosi Kan PA-C  Preoperative Assessment Center  [facility]  Phone: [phone]  Fax: [phone]

## 2019-04-23 NOTE — PATIENT INSTRUCTIONS
Preparing for Your Surgery      Name:  Myrtle Vaz   MRN:  [id number]   :  1945   Today's Date:  2019     Arriving for surgery:  Surgery date:    Arrival time:  5:00 AM  Please come to:       [facility] [address]  [address] [place]  [address]    - ? parking is available in front of the hospital      -    Please proceed to [address] on the 3rd floor. [phone]?     - ?If you are in need of directions, wheelchair or escort please stop at the Information Desk in the lobby.  Inform the information person that you are here for surgery; a wheelchair and escort to [address] will be provided.?     What can I eat or drink?  -  You may have solid food or milk products until 8 hours prior to your surgery. 11:30 PM  -  You may have water, apple juice or 7up/Sprite until 2 hours prior to your surgery. 5:00 AM      -  Nothing after 5:00 AM    Which medicines can I take?  Stop Aspirin, vitamins and supplements one week prior to surgery.  Hold Ibuprofen for 24 hours and/or Naproxen for 48 hours prior to surgery.   -  Do NOT take these medications in the morning, the day of surgery:    -  Please take these medications the day of surgery:   Amiodarone, Effexor, and Levothyroxine    How do I prepare myself?  -  Take two showers: one the night before surgery; and one the morning of surgery.         Use Scrubcare or Hibiclens to wash from neck down.  You may use your own     shampoo and conditioner. No other hair products.   -  Do NOT use lotion, powder, deodorant, or antiperspirant the day of your surgery.  -  Do NOT wear any makeup, fingernail polish or jewelry.  - Do not bring your own medications to the hospital, except for inhalers and eye   drops.  -  Bring your ID and insurance card.    Questions or Concerns:  -If you have questions or concerns regarding the day of surgery, please call [phone].     -For questions after surgery please call your  surgeons office.           AFTER YOUR SURGERY  Breathing exercises   Breathing exercises help you recover faster. Take deep breaths and let the air out slowly. This will:     Help you wake up after surgery.    Help prevent complications like pneumonia.  Preventing complications will help you go home sooner.   Nausea and vomiting   You may feel sick to your stomach after surgery; if so, let your nurse know.    Pain control:  After surgery, you may have pain. Our goal is to help you manage your pain. Pain medicine will help you feel comfortable enough to do activities that will help you heal.  These activities may include breathing exercises, walking and physical therapy.   To help your health care team treat your pain we will ask: 1) If you have pain  2) where it is located 3) describe your pain in your words  Methods of pain control include medications given by mouth, vein or by nerve block for some surgeries.  We may give you a pain control pump that will:  1) Deliver the medicine through a tube placed in your vein  2) Control the amount of medicine you receive  3) Allow you to push a button to deliver a dose of pain medicine  Sequential Compression Device (SCD) or Pneumo Boots:  You may need to wear SCD S on your legs or feet. These are wraps connected to a machine that pumps in air and releases it. The repeated pumping helps prevent blood clots from forming.

## 2019-04-24 LAB — INTERPRETATION ECG - MUSE: NORMAL

## 2019-04-25 NOTE — OR NURSING
Surgery date changed from what was told in PAC clinic and provided in Pt instructions. Called pt daughter who was with her at PAC and informed her of new surgery date and time. She was very appreciative of call as they were hoping for an earlier surgery date to get procedure done.

## 2019-04-26 ENCOUNTER — APPOINTMENT (OUTPATIENT)
Dept: GENERAL RADIOLOGY | Facility: CLINIC | Age: 74
DRG: 326 | End: 2019-04-26
Attending: STUDENT IN AN ORGANIZED HEALTH CARE EDUCATION/TRAINING PROGRAM
Payer: MEDICARE

## 2019-04-26 ENCOUNTER — HOSPITAL ENCOUNTER (INPATIENT)
Facility: CLINIC | Age: 74
LOS: 19 days | Discharge: NURSING, REHAB OR LONG-TERM CARE | DRG: 326 | End: 2019-05-15
Attending: THORACIC SURGERY (CARDIOTHORACIC VASCULAR SURGERY) | Admitting: THORACIC SURGERY (CARDIOTHORACIC VASCULAR SURGERY)
Payer: MEDICARE

## 2019-04-26 DIAGNOSIS — K91.89 ESOPHAGECTOMY, ANASTOMOTIC LEAK: Primary | ICD-10-CM

## 2019-04-26 LAB
ABO + RH BLD: NORMAL
ABO + RH BLD: NORMAL
ANION GAP SERPL CALCULATED.3IONS-SCNC: 6 MMOL/L (ref 3–14)
BASE EXCESS BLDA CALC-SCNC: 0.8 MMOL/L
BASE EXCESS BLDA CALC-SCNC: 1.3 MMOL/L
BLD GP AB SCN SERPL QL: NORMAL
BLD PROD TYP BPU: NORMAL
BLD UNIT ID BPU: 0
BLD UNIT ID BPU: 0
BLOOD BANK CMNT PATIENT-IMP: NORMAL
BLOOD BANK CMNT PATIENT-IMP: NORMAL
BLOOD PRODUCT CODE: NORMAL
BLOOD PRODUCT CODE: NORMAL
BPU ID: NORMAL
BPU ID: NORMAL
BUN SERPL-MCNC: 30 MG/DL (ref 7–30)
CA-I BLD-MCNC: 4.3 MG/DL (ref 4.4–5.2)
CA-I BLD-MCNC: 4.5 MG/DL (ref 4.4–5.2)
CALCIUM SERPL-MCNC: 7.4 MG/DL (ref 8.5–10.1)
CHLORIDE SERPL-SCNC: 110 MMOL/L (ref 94–109)
CO2 SERPL-SCNC: 25 MMOL/L (ref 20–32)
CREAT SERPL-MCNC: 1.02 MG/DL (ref 0.52–1.04)
ERYTHROCYTE [DISTWIDTH] IN BLOOD BY AUTOMATED COUNT: 20 % (ref 10–15)
GFR SERPL CREATININE-BSD FRML MDRD: 55 ML/MIN/{1.73_M2}
GLUCOSE BLD-MCNC: 124 MG/DL (ref 70–99)
GLUCOSE BLD-MCNC: 146 MG/DL (ref 70–99)
GLUCOSE BLDC GLUCOMTR-MCNC: 87 MG/DL (ref 70–99)
GLUCOSE SERPL-MCNC: 188 MG/DL (ref 70–99)
HCO3 BLD-SCNC: 26 MMOL/L (ref 21–28)
HCO3 BLD-SCNC: 27 MMOL/L (ref 21–28)
HCT VFR BLD AUTO: 31 % (ref 35–47)
HGB BLD-MCNC: 9.2 G/DL (ref 11.7–15.7)
HGB BLD-MCNC: 9.8 G/DL (ref 11.7–15.7)
HGB BLD-MCNC: 9.9 G/DL (ref 11.7–15.7)
MCH RBC QN AUTO: 27.8 PG (ref 26.5–33)
MCHC RBC AUTO-ENTMCNC: 29.7 G/DL (ref 31.5–36.5)
MCV RBC AUTO: 94 FL (ref 78–100)
NUM BPU REQUESTED: 2
O2/TOTAL GAS SETTING VFR VENT: 50 %
O2/TOTAL GAS SETTING VFR VENT: ABNORMAL %
PCO2 BLD: 43 MM HG (ref 35–45)
PCO2 BLD: 45 MM HG (ref 35–45)
PH BLD: 7.38 PH (ref 7.35–7.45)
PH BLD: 7.39 PH (ref 7.35–7.45)
PLATELET # BLD AUTO: 367 10E9/L (ref 150–450)
PO2 BLD: 166 MM HG (ref 80–105)
PO2 BLD: 348 MM HG (ref 80–105)
POTASSIUM BLD-SCNC: 3.4 MMOL/L (ref 3.4–5.3)
POTASSIUM BLD-SCNC: 3.7 MMOL/L (ref 3.4–5.3)
POTASSIUM SERPL-SCNC: 4 MMOL/L (ref 3.4–5.3)
RBC # BLD AUTO: 3.31 10E12/L (ref 3.8–5.2)
SODIUM BLD-SCNC: 145 MMOL/L (ref 133–144)
SODIUM BLD-SCNC: 145 MMOL/L (ref 133–144)
SODIUM SERPL-SCNC: 142 MMOL/L (ref 133–144)
SPECIMEN EXP DATE BLD: NORMAL
TRANSFUSION STATUS PATIENT QL: NORMAL
WBC # BLD AUTO: 10.8 10E9/L (ref 4–11)

## 2019-04-26 PROCEDURE — 25000132 ZZH RX MED GY IP 250 OP 250 PS 637: Performed by: STUDENT IN AN ORGANIZED HEALTH CARE EDUCATION/TRAINING PROGRAM

## 2019-04-26 PROCEDURE — 25000125 ZZHC RX 250: Performed by: NURSE ANESTHETIST, CERTIFIED REGISTERED

## 2019-04-26 PROCEDURE — 25000128 H RX IP 250 OP 636: Performed by: NURSE ANESTHETIST, CERTIFIED REGISTERED

## 2019-04-26 PROCEDURE — 40000985 XR CHEST PORT 1 VW

## 2019-04-26 PROCEDURE — 20000004 ZZH R&B ICU UMMC

## 2019-04-26 PROCEDURE — 25000125 ZZHC RX 250: Performed by: STUDENT IN AN ORGANIZED HEALTH CARE EDUCATION/TRAINING PROGRAM

## 2019-04-26 PROCEDURE — 25000125 ZZHC RX 250: Performed by: ANESTHESIOLOGY

## 2019-04-26 PROCEDURE — 25000128 H RX IP 250 OP 636: Performed by: THORACIC SURGERY (CARDIOTHORACIC VASCULAR SURGERY)

## 2019-04-26 PROCEDURE — 37000008 ZZH ANESTHESIA TECHNICAL FEE, 1ST 30 MIN: Performed by: THORACIC SURGERY (CARDIOTHORACIC VASCULAR SURGERY)

## 2019-04-26 PROCEDURE — P9041 ALBUMIN (HUMAN),5%, 50ML: HCPCS | Performed by: NURSE ANESTHETIST, CERTIFIED REGISTERED

## 2019-04-26 PROCEDURE — 25800030 ZZH RX IP 258 OP 636: Performed by: NURSE ANESTHETIST, CERTIFIED REGISTERED

## 2019-04-26 PROCEDURE — P9016 RBC LEUKOCYTES REDUCED: HCPCS | Performed by: PHYSICIAN ASSISTANT

## 2019-04-26 PROCEDURE — 25000128 H RX IP 250 OP 636: Performed by: ANESTHESIOLOGY

## 2019-04-26 PROCEDURE — 80048 BASIC METABOLIC PNL TOTAL CA: CPT | Performed by: STUDENT IN AN ORGANIZED HEALTH CARE EDUCATION/TRAINING PROGRAM

## 2019-04-26 PROCEDURE — 27211024 ZZHC OR SUPPLY OTHER OPNP: Performed by: THORACIC SURGERY (CARDIOTHORACIC VASCULAR SURGERY)

## 2019-04-26 PROCEDURE — 94640 AIRWAY INHALATION TREATMENT: CPT

## 2019-04-26 PROCEDURE — 99291 CRITICAL CARE FIRST HOUR: CPT | Mod: GC | Performed by: INTERNAL MEDICINE

## 2019-04-26 PROCEDURE — 25800030 ZZH RX IP 258 OP 636: Performed by: STUDENT IN AN ORGANIZED HEALTH CARE EDUCATION/TRAINING PROGRAM

## 2019-04-26 PROCEDURE — 84295 ASSAY OF SERUM SODIUM: CPT | Performed by: THORACIC SURGERY (CARDIOTHORACIC VASCULAR SURGERY)

## 2019-04-26 PROCEDURE — 0DN80ZZ RELEASE SMALL INTESTINE, OPEN APPROACH: ICD-10-PCS | Performed by: THORACIC SURGERY (CARDIOTHORACIC VASCULAR SURGERY)

## 2019-04-26 PROCEDURE — 71000015 ZZH RECOVERY PHASE 1 LEVEL 2 EA ADDTL HR: Performed by: THORACIC SURGERY (CARDIOTHORACIC VASCULAR SURGERY)

## 2019-04-26 PROCEDURE — 25000128 H RX IP 250 OP 636

## 2019-04-26 PROCEDURE — 25000128 H RX IP 250 OP 636: Performed by: STUDENT IN AN ORGANIZED HEALTH CARE EDUCATION/TRAINING PROGRAM

## 2019-04-26 PROCEDURE — 25000576 ZZH RX IP 250 OP 636 J0585: Performed by: THORACIC SURGERY (CARDIOTHORACIC VASCULAR SURGERY)

## 2019-04-26 PROCEDURE — 0DX60Z5 TRANSFER STOMACH TO ESOPHAGUS, OPEN APPROACH: ICD-10-PCS | Performed by: THORACIC SURGERY (CARDIOTHORACIC VASCULAR SURGERY)

## 2019-04-26 PROCEDURE — 82803 BLOOD GASES ANY COMBINATION: CPT | Performed by: THORACIC SURGERY (CARDIOTHORACIC VASCULAR SURGERY)

## 2019-04-26 PROCEDURE — 25800030 ZZH RX IP 258 OP 636

## 2019-04-26 PROCEDURE — 82330 ASSAY OF CALCIUM: CPT | Performed by: THORACIC SURGERY (CARDIOTHORACIC VASCULAR SURGERY)

## 2019-04-26 PROCEDURE — 84132 ASSAY OF SERUM POTASSIUM: CPT | Performed by: THORACIC SURGERY (CARDIOTHORACIC VASCULAR SURGERY)

## 2019-04-26 PROCEDURE — 40000171 ZZH STATISTIC PRE-PROCEDURE ASSESSMENT III: Performed by: THORACIC SURGERY (CARDIOTHORACIC VASCULAR SURGERY)

## 2019-04-26 PROCEDURE — 0DNW0ZZ RELEASE PERITONEUM, OPEN APPROACH: ICD-10-PCS | Performed by: THORACIC SURGERY (CARDIOTHORACIC VASCULAR SURGERY)

## 2019-04-26 PROCEDURE — 36000068 ZZH SURGERY LEVEL 5 1ST 30 MIN - UMMC: Performed by: THORACIC SURGERY (CARDIOTHORACIC VASCULAR SURGERY)

## 2019-04-26 PROCEDURE — 37000009 ZZH ANESTHESIA TECHNICAL FEE, EACH ADDTL 15 MIN: Performed by: THORACIC SURGERY (CARDIOTHORACIC VASCULAR SURGERY)

## 2019-04-26 PROCEDURE — 0DBU0ZZ EXCISION OF OMENTUM, OPEN APPROACH: ICD-10-PCS | Performed by: THORACIC SURGERY (CARDIOTHORACIC VASCULAR SURGERY)

## 2019-04-26 PROCEDURE — 40000014 ZZH STATISTIC ARTERIAL MONITORING DAILY

## 2019-04-26 PROCEDURE — 0DJ08ZZ INSPECTION OF UPPER INTESTINAL TRACT, VIA NATURAL OR ARTIFICIAL OPENING ENDOSCOPIC: ICD-10-PCS | Performed by: THORACIC SURGERY (CARDIOTHORACIC VASCULAR SURGERY)

## 2019-04-26 PROCEDURE — 27210794 ZZH OR GENERAL SUPPLY STERILE: Performed by: THORACIC SURGERY (CARDIOTHORACIC VASCULAR SURGERY)

## 2019-04-26 PROCEDURE — A9270 NON-COVERED ITEM OR SERVICE: HCPCS | Performed by: STUDENT IN AN ORGANIZED HEALTH CARE EDUCATION/TRAINING PROGRAM

## 2019-04-26 PROCEDURE — 82947 ASSAY GLUCOSE BLOOD QUANT: CPT | Performed by: THORACIC SURGERY (CARDIOTHORACIC VASCULAR SURGERY)

## 2019-04-26 PROCEDURE — 71000014 ZZH RECOVERY PHASE 1 LEVEL 2 FIRST HR: Performed by: THORACIC SURGERY (CARDIOTHORACIC VASCULAR SURGERY)

## 2019-04-26 PROCEDURE — 0BJ08ZZ INSPECTION OF TRACHEOBRONCHIAL TREE, VIA NATURAL OR ARTIFICIAL OPENING ENDOSCOPIC: ICD-10-PCS | Performed by: THORACIC SURGERY (CARDIOTHORACIC VASCULAR SURGERY)

## 2019-04-26 PROCEDURE — 0D2DXUZ CHANGE FEEDING DEVICE IN LOWER INTESTINAL TRACT, EXTERNAL APPROACH: ICD-10-PCS | Performed by: THORACIC SURGERY (CARDIOTHORACIC VASCULAR SURGERY)

## 2019-04-26 PROCEDURE — A9270 NON-COVERED ITEM OR SERVICE: HCPCS | Performed by: ANESTHESIOLOGY

## 2019-04-26 PROCEDURE — 3E043XZ INTRODUCTION OF VASOPRESSOR INTO CENTRAL VEIN, PERCUTANEOUS APPROACH: ICD-10-PCS | Performed by: THORACIC SURGERY (CARDIOTHORACIC VASCULAR SURGERY)

## 2019-04-26 PROCEDURE — 40000275 ZZH STATISTIC RCP TIME EA 10 MIN

## 2019-04-26 PROCEDURE — 36000070 ZZH SURGERY LEVEL 5 EA 15 ADDTL MIN - UMMC: Performed by: THORACIC SURGERY (CARDIOTHORACIC VASCULAR SURGERY)

## 2019-04-26 PROCEDURE — 85027 COMPLETE CBC AUTOMATED: CPT | Performed by: STUDENT IN AN ORGANIZED HEALTH CARE EDUCATION/TRAINING PROGRAM

## 2019-04-26 PROCEDURE — 0C9M30Z DRAINAGE OF PHARYNX WITH DRAINAGE DEVICE, PERCUTANEOUS APPROACH: ICD-10-PCS | Performed by: THORACIC SURGERY (CARDIOTHORACIC VASCULAR SURGERY)

## 2019-04-26 PROCEDURE — 25800030 ZZH RX IP 258 OP 636: Performed by: ANESTHESIOLOGY

## 2019-04-26 PROCEDURE — 25000132 ZZH RX MED GY IP 250 OP 250 PS 637: Performed by: ANESTHESIOLOGY

## 2019-04-26 PROCEDURE — 00000146 ZZHCL STATISTIC GLUCOSE BY METER IP

## 2019-04-26 PROCEDURE — 25000566 ZZH SEVOFLURANE, EA 15 MIN: Performed by: THORACIC SURGERY (CARDIOTHORACIC VASCULAR SURGERY)

## 2019-04-26 RX ORDER — SODIUM CHLORIDE, SODIUM LACTATE, POTASSIUM CHLORIDE, CALCIUM CHLORIDE 600; 310; 30; 20 MG/100ML; MG/100ML; MG/100ML; MG/100ML
INJECTION, SOLUTION INTRAVENOUS CONTINUOUS PRN
Status: DISCONTINUED | OUTPATIENT
Start: 2019-04-26 | End: 2019-04-26

## 2019-04-26 RX ORDER — CHLORHEXIDINE GLUCONATE ORAL RINSE 1.2 MG/ML
30 SOLUTION DENTAL 2 TIMES DAILY
Status: DISCONTINUED | OUTPATIENT
Start: 2019-04-26 | End: 2019-05-13

## 2019-04-26 RX ORDER — ACETAMINOPHEN 325 MG/1
975 TABLET ORAL EVERY 8 HOURS
Status: DISCONTINUED | OUTPATIENT
Start: 2019-04-26 | End: 2019-04-27

## 2019-04-26 RX ORDER — FLUMAZENIL 0.1 MG/ML
0.2 INJECTION, SOLUTION INTRAVENOUS
Status: DISCONTINUED | OUTPATIENT
Start: 2019-04-26 | End: 2019-04-26 | Stop reason: HOSPADM

## 2019-04-26 RX ORDER — PANTOPRAZOLE SODIUM 40 MG/1
40 TABLET, DELAYED RELEASE ORAL DAILY
Status: DISCONTINUED | OUTPATIENT
Start: 2019-04-27 | End: 2019-05-01

## 2019-04-26 RX ORDER — AMIODARONE HYDROCHLORIDE 200 MG/1
200 TABLET ORAL ONCE
Status: COMPLETED | OUTPATIENT
Start: 2019-04-26 | End: 2019-04-26

## 2019-04-26 RX ORDER — ALBUMIN, HUMAN INJ 5% 5 %
SOLUTION INTRAVENOUS CONTINUOUS PRN
Status: DISCONTINUED | OUTPATIENT
Start: 2019-04-26 | End: 2019-04-26

## 2019-04-26 RX ORDER — ALBUTEROL SULFATE 0.83 MG/ML
2.5 SOLUTION RESPIRATORY (INHALATION) 4 TIMES DAILY
Status: DISCONTINUED | OUTPATIENT
Start: 2019-04-26 | End: 2019-04-26

## 2019-04-26 RX ORDER — IPRATROPIUM BROMIDE AND ALBUTEROL SULFATE 2.5; .5 MG/3ML; MG/3ML
3 SOLUTION RESPIRATORY (INHALATION)
Status: DISCONTINUED | OUTPATIENT
Start: 2019-04-27 | End: 2019-04-27

## 2019-04-26 RX ORDER — KETAMINE HYDROCHLORIDE 10 MG/ML
10 INJECTION, SOLUTION INTRAMUSCULAR; INTRAVENOUS
Status: COMPLETED | OUTPATIENT
Start: 2019-04-26 | End: 2019-04-26

## 2019-04-26 RX ORDER — AMOXICILLIN 250 MG
1 CAPSULE ORAL 2 TIMES DAILY
Status: DISCONTINUED | OUTPATIENT
Start: 2019-04-26 | End: 2019-04-27

## 2019-04-26 RX ORDER — VENLAFAXINE 75 MG/1
75 TABLET ORAL 2 TIMES DAILY
Status: DISCONTINUED | OUTPATIENT
Start: 2019-04-26 | End: 2019-05-15 | Stop reason: HOSPADM

## 2019-04-26 RX ORDER — AMOXICILLIN 250 MG
2 CAPSULE ORAL 2 TIMES DAILY
Status: DISCONTINUED | OUTPATIENT
Start: 2019-04-26 | End: 2019-04-27

## 2019-04-26 RX ORDER — HEPARIN SODIUM 5000 [USP'U]/.5ML
5000 INJECTION, SOLUTION INTRAVENOUS; SUBCUTANEOUS EVERY 8 HOURS
Status: DISCONTINUED | OUTPATIENT
Start: 2019-04-27 | End: 2019-05-04

## 2019-04-26 RX ORDER — ALBUTEROL SULFATE 0.83 MG/ML
2.5 SOLUTION RESPIRATORY (INHALATION)
Status: DISCONTINUED | OUTPATIENT
Start: 2019-04-27 | End: 2019-04-27

## 2019-04-26 RX ORDER — IPRATROPIUM BROMIDE AND ALBUTEROL SULFATE 2.5; .5 MG/3ML; MG/3ML
3 SOLUTION RESPIRATORY (INHALATION) 4 TIMES DAILY
Status: DISCONTINUED | OUTPATIENT
Start: 2019-04-26 | End: 2019-04-26

## 2019-04-26 RX ORDER — ALBUMIN, HUMAN INJ 5% 5 %
25 SOLUTION INTRAVENOUS ONCE
Status: DISCONTINUED | OUTPATIENT
Start: 2019-04-26 | End: 2019-04-26

## 2019-04-26 RX ORDER — NALOXONE HYDROCHLORIDE 0.4 MG/ML
.1-.4 INJECTION, SOLUTION INTRAMUSCULAR; INTRAVENOUS; SUBCUTANEOUS
Status: DISCONTINUED | OUTPATIENT
Start: 2019-04-26 | End: 2019-04-26

## 2019-04-26 RX ORDER — SODIUM CHLORIDE, SODIUM LACTATE, POTASSIUM CHLORIDE, CALCIUM CHLORIDE 600; 310; 30; 20 MG/100ML; MG/100ML; MG/100ML; MG/100ML
INJECTION, SOLUTION INTRAVENOUS CONTINUOUS
Status: DISCONTINUED | OUTPATIENT
Start: 2019-04-26 | End: 2019-04-28

## 2019-04-26 RX ORDER — NALOXONE HYDROCHLORIDE 0.4 MG/ML
.1-.4 INJECTION, SOLUTION INTRAMUSCULAR; INTRAVENOUS; SUBCUTANEOUS
Status: DISCONTINUED | OUTPATIENT
Start: 2019-04-26 | End: 2019-05-15 | Stop reason: HOSPADM

## 2019-04-26 RX ORDER — LEVOTHYROXINE SODIUM 150 UG/1
150 TABLET ORAL EVERY MORNING
Status: DISCONTINUED | OUTPATIENT
Start: 2019-04-27 | End: 2019-04-26

## 2019-04-26 RX ORDER — NALOXONE HYDROCHLORIDE 0.4 MG/ML
.1-.4 INJECTION, SOLUTION INTRAMUSCULAR; INTRAVENOUS; SUBCUTANEOUS
Status: DISCONTINUED | OUTPATIENT
Start: 2019-04-26 | End: 2019-04-26 | Stop reason: HOSPADM

## 2019-04-26 RX ORDER — ALBUTEROL SULFATE 90 UG/1
6 AEROSOL, METERED RESPIRATORY (INHALATION) EVERY 4 HOURS PRN
Status: DISCONTINUED | OUTPATIENT
Start: 2019-04-26 | End: 2019-05-01

## 2019-04-26 RX ORDER — ALBUMIN, HUMAN INJ 5% 5 %
250 SOLUTION INTRAVENOUS ONCE
Status: DISCONTINUED | OUTPATIENT
Start: 2019-04-26 | End: 2019-04-27 | Stop reason: CLARIF

## 2019-04-26 RX ORDER — HYDROMORPHONE HYDROCHLORIDE 1 MG/ML
.3-.5 INJECTION, SOLUTION INTRAMUSCULAR; INTRAVENOUS; SUBCUTANEOUS EVERY 10 MIN PRN
Status: DISCONTINUED | OUTPATIENT
Start: 2019-04-26 | End: 2019-04-26 | Stop reason: HOSPADM

## 2019-04-26 RX ORDER — LEVOTHYROXINE SODIUM 150 UG/1
150 TABLET ORAL EVERY MORNING
Status: DISCONTINUED | OUTPATIENT
Start: 2019-04-27 | End: 2019-05-07

## 2019-04-26 RX ORDER — ONDANSETRON 2 MG/ML
4 INJECTION INTRAMUSCULAR; INTRAVENOUS EVERY 30 MIN PRN
Status: DISCONTINUED | OUTPATIENT
Start: 2019-04-26 | End: 2019-04-26 | Stop reason: HOSPADM

## 2019-04-26 RX ORDER — CEFAZOLIN SODIUM 2 G/100ML
2 INJECTION, SOLUTION INTRAVENOUS
Status: COMPLETED | OUTPATIENT
Start: 2019-04-26 | End: 2019-04-26

## 2019-04-26 RX ORDER — LIDOCAINE HYDROCHLORIDE AND EPINEPHRINE 15; 5 MG/ML; UG/ML
INJECTION, SOLUTION EPIDURAL PRN
Status: DISCONTINUED | OUTPATIENT
Start: 2019-04-26 | End: 2019-04-26

## 2019-04-26 RX ORDER — ONDANSETRON 2 MG/ML
INJECTION INTRAMUSCULAR; INTRAVENOUS PRN
Status: DISCONTINUED | OUTPATIENT
Start: 2019-04-26 | End: 2019-04-26

## 2019-04-26 RX ORDER — FENTANYL CITRATE 50 UG/ML
25-50 INJECTION, SOLUTION INTRAMUSCULAR; INTRAVENOUS
Status: DISCONTINUED | OUTPATIENT
Start: 2019-04-26 | End: 2019-04-26 | Stop reason: HOSPADM

## 2019-04-26 RX ORDER — GINSENG 100 MG
CAPSULE ORAL 2 TIMES DAILY
Status: DISCONTINUED | OUTPATIENT
Start: 2019-04-26 | End: 2019-05-13

## 2019-04-26 RX ORDER — INDOCYANINE GREEN AND WATER 25 MG
KIT INJECTION PRN
Status: DISCONTINUED | OUTPATIENT
Start: 2019-04-26 | End: 2019-04-26

## 2019-04-26 RX ORDER — ONDANSETRON 4 MG/1
4 TABLET, ORALLY DISINTEGRATING ORAL EVERY 6 HOURS PRN
Status: DISCONTINUED | OUTPATIENT
Start: 2019-04-26 | End: 2019-05-02

## 2019-04-26 RX ORDER — PROPOFOL 10 MG/ML
INJECTION, EMULSION INTRAVENOUS PRN
Status: DISCONTINUED | OUTPATIENT
Start: 2019-04-26 | End: 2019-04-26

## 2019-04-26 RX ORDER — ACETAMINOPHEN 325 MG/1
650 TABLET ORAL EVERY 4 HOURS PRN
Status: DISCONTINUED | OUTPATIENT
Start: 2019-04-29 | End: 2019-04-30

## 2019-04-26 RX ORDER — ALBUTEROL SULFATE 0.83 MG/ML
2.5 SOLUTION RESPIRATORY (INHALATION) EVERY 4 HOURS PRN
Status: DISCONTINUED | OUTPATIENT
Start: 2019-04-26 | End: 2019-04-26 | Stop reason: HOSPADM

## 2019-04-26 RX ORDER — HYDROXYZINE HYDROCHLORIDE 10 MG/1
10 TABLET, FILM COATED ORAL EVERY 6 HOURS PRN
Status: DISCONTINUED | OUTPATIENT
Start: 2019-04-26 | End: 2019-05-15 | Stop reason: HOSPADM

## 2019-04-26 RX ORDER — CEFAZOLIN SODIUM 1 G/3ML
1 INJECTION, POWDER, FOR SOLUTION INTRAMUSCULAR; INTRAVENOUS SEE ADMIN INSTRUCTIONS
Status: DISCONTINUED | OUTPATIENT
Start: 2019-04-26 | End: 2019-04-26 | Stop reason: HOSPADM

## 2019-04-26 RX ORDER — HEPARIN SODIUM 5000 [USP'U]/.5ML
5000 INJECTION, SOLUTION INTRAVENOUS; SUBCUTANEOUS
Status: COMPLETED | OUTPATIENT
Start: 2019-04-26 | End: 2019-04-26

## 2019-04-26 RX ORDER — METOPROLOL TARTRATE 1 MG/ML
1-2 INJECTION, SOLUTION INTRAVENOUS EVERY 5 MIN PRN
Status: DISCONTINUED | OUTPATIENT
Start: 2019-04-26 | End: 2019-04-26 | Stop reason: HOSPADM

## 2019-04-26 RX ORDER — LIDOCAINE HYDROCHLORIDE 20 MG/ML
INJECTION, SOLUTION INFILTRATION; PERINEURAL PRN
Status: DISCONTINUED | OUTPATIENT
Start: 2019-04-26 | End: 2019-04-26

## 2019-04-26 RX ORDER — SODIUM CHLORIDE, SODIUM LACTATE, POTASSIUM CHLORIDE, CALCIUM CHLORIDE 600; 310; 30; 20 MG/100ML; MG/100ML; MG/100ML; MG/100ML
INJECTION, SOLUTION INTRAVENOUS CONTINUOUS
Status: DISCONTINUED | OUTPATIENT
Start: 2019-04-26 | End: 2019-04-26 | Stop reason: HOSPADM

## 2019-04-26 RX ORDER — DEXAMETHASONE SODIUM PHOSPHATE 4 MG/ML
INJECTION, SOLUTION INTRA-ARTICULAR; INTRALESIONAL; INTRAMUSCULAR; INTRAVENOUS; SOFT TISSUE PRN
Status: DISCONTINUED | OUTPATIENT
Start: 2019-04-26 | End: 2019-04-26

## 2019-04-26 RX ORDER — HYDRALAZINE HYDROCHLORIDE 20 MG/ML
2.5-5 INJECTION INTRAMUSCULAR; INTRAVENOUS EVERY 10 MIN PRN
Status: DISCONTINUED | OUTPATIENT
Start: 2019-04-26 | End: 2019-04-26 | Stop reason: HOSPADM

## 2019-04-26 RX ORDER — ONDANSETRON 2 MG/ML
4 INJECTION INTRAMUSCULAR; INTRAVENOUS EVERY 6 HOURS PRN
Status: DISCONTINUED | OUTPATIENT
Start: 2019-04-26 | End: 2019-05-02

## 2019-04-26 RX ORDER — ONDANSETRON 4 MG/1
4 TABLET, ORALLY DISINTEGRATING ORAL EVERY 30 MIN PRN
Status: DISCONTINUED | OUTPATIENT
Start: 2019-04-26 | End: 2019-04-26 | Stop reason: HOSPADM

## 2019-04-26 RX ORDER — NALBUPHINE HYDROCHLORIDE 10 MG/ML
2.5-5 INJECTION, SOLUTION INTRAMUSCULAR; INTRAVENOUS; SUBCUTANEOUS EVERY 6 HOURS PRN
Status: DISCONTINUED | OUTPATIENT
Start: 2019-04-26 | End: 2019-04-30

## 2019-04-26 RX ORDER — PROCHLORPERAZINE MALEATE 5 MG
5 TABLET ORAL EVERY 6 HOURS PRN
Status: DISCONTINUED | OUTPATIENT
Start: 2019-04-26 | End: 2019-05-15 | Stop reason: HOSPADM

## 2019-04-26 RX ADMIN — ROCURONIUM BROMIDE 10 MG: 10 INJECTION INTRAVENOUS at 15:45

## 2019-04-26 RX ADMIN — ROCURONIUM BROMIDE 20 MG: 10 INJECTION INTRAVENOUS at 09:22

## 2019-04-26 RX ADMIN — FENTANYL CITRATE 50 MCG: 50 INJECTION INTRAMUSCULAR; INTRAVENOUS at 06:49

## 2019-04-26 RX ADMIN — CEFAZOLIN SODIUM 2 G: 2 INJECTION, SOLUTION INTRAVENOUS at 08:32

## 2019-04-26 RX ADMIN — PHENYLEPHRINE HYDROCHLORIDE 0.3 MCG/KG/MIN: 10 INJECTION, SOLUTION INTRAMUSCULAR; INTRAVENOUS; SUBCUTANEOUS at 08:33

## 2019-04-26 RX ADMIN — ALBUMIN HUMAN: 0.05 INJECTION, SOLUTION INTRAVENOUS at 11:58

## 2019-04-26 RX ADMIN — PHENYLEPHRINE HYDROCHLORIDE 100 MCG: 10 INJECTION, SOLUTION INTRAMUSCULAR; INTRAVENOUS; SUBCUTANEOUS at 08:14

## 2019-04-26 RX ADMIN — Medication: at 21:36

## 2019-04-26 RX ADMIN — PHENYLEPHRINE HYDROCHLORIDE 100 MCG: 10 INJECTION, SOLUTION INTRAMUSCULAR; INTRAVENOUS; SUBCUTANEOUS at 11:53

## 2019-04-26 RX ADMIN — NOREPINEPHRINE BITARTRATE 6.4 MCG: 1 INJECTION INTRAVENOUS at 08:12

## 2019-04-26 RX ADMIN — SUGAMMADEX 200 MG: 100 INJECTION, SOLUTION INTRAVENOUS at 17:08

## 2019-04-26 RX ADMIN — PHENYLEPHRINE HYDROCHLORIDE 200 MCG: 10 INJECTION, SOLUTION INTRAMUSCULAR; INTRAVENOUS; SUBCUTANEOUS at 11:58

## 2019-04-26 RX ADMIN — Medication 1 UNITS: at 16:26

## 2019-04-26 RX ADMIN — Medication 1 UNITS: at 13:46

## 2019-04-26 RX ADMIN — Medication 1 UNITS: at 14:20

## 2019-04-26 RX ADMIN — FENTANYL CITRATE 25 MCG: 50 INJECTION INTRAMUSCULAR; INTRAVENOUS at 18:19

## 2019-04-26 RX ADMIN — AMIODARONE HYDROCHLORIDE 200 MG: 200 TABLET ORAL at 07:13

## 2019-04-26 RX ADMIN — CEFAZOLIN 1 G: 1 INJECTION, POWDER, FOR SOLUTION INTRAMUSCULAR; INTRAVENOUS at 14:35

## 2019-04-26 RX ADMIN — ROCURONIUM BROMIDE 10 MG: 10 INJECTION INTRAVENOUS at 12:26

## 2019-04-26 RX ADMIN — ROCURONIUM BROMIDE 10 MG: 10 INJECTION INTRAVENOUS at 11:48

## 2019-04-26 RX ADMIN — ALBUMIN HUMAN: 0.05 INJECTION, SOLUTION INTRAVENOUS at 09:45

## 2019-04-26 RX ADMIN — ROCURONIUM BROMIDE 20 MG: 10 INJECTION INTRAVENOUS at 13:05

## 2019-04-26 RX ADMIN — HEPARIN SODIUM 5000 UNITS: 5000 INJECTION, SOLUTION INTRAVENOUS; SUBCUTANEOUS at 06:55

## 2019-04-26 RX ADMIN — LIDOCAINE HYDROCHLORIDE 100 MG: 20 INJECTION, SOLUTION INFILTRATION; PERINEURAL at 08:09

## 2019-04-26 RX ADMIN — VENLAFAXINE 75 MG: 75 TABLET ORAL at 22:10

## 2019-04-26 RX ADMIN — ROCURONIUM BROMIDE 20 MG: 10 INJECTION INTRAVENOUS at 10:21

## 2019-04-26 RX ADMIN — ONDANSETRON 4 MG: 2 INJECTION INTRAMUSCULAR; INTRAVENOUS at 17:01

## 2019-04-26 RX ADMIN — ACETAMINOPHEN 975 MG: 325 TABLET, FILM COATED ORAL at 21:30

## 2019-04-26 RX ADMIN — FENTANYL CITRATE 50 MCG: 50 INJECTION INTRAMUSCULAR; INTRAVENOUS at 16:05

## 2019-04-26 RX ADMIN — SUGAMMADEX 200 MG: 100 INJECTION, SOLUTION INTRAVENOUS at 17:27

## 2019-04-26 RX ADMIN — Medication 10 MG: at 19:10

## 2019-04-26 RX ADMIN — DEXAMETHASONE SODIUM PHOSPHATE 6 MG: 4 INJECTION, SOLUTION INTRA-ARTICULAR; INTRALESIONAL; INTRAMUSCULAR; INTRAVENOUS; SOFT TISSUE at 12:31

## 2019-04-26 RX ADMIN — ROCURONIUM BROMIDE 70 MG: 10 INJECTION INTRAVENOUS at 08:09

## 2019-04-26 RX ADMIN — Medication 1 UNITS: at 10:46

## 2019-04-26 RX ADMIN — ROCURONIUM BROMIDE 20 MG: 10 INJECTION INTRAVENOUS at 14:04

## 2019-04-26 RX ADMIN — NOREPINEPHRINE BITARTRATE 6.4 MCG: 1 INJECTION INTRAVENOUS at 09:08

## 2019-04-26 RX ADMIN — FENTANYL CITRATE 50 MCG: 50 INJECTION INTRAMUSCULAR; INTRAVENOUS at 14:40

## 2019-04-26 RX ADMIN — FENTANYL CITRATE 100 MCG: 50 INJECTION INTRAMUSCULAR; INTRAVENOUS at 08:09

## 2019-04-26 RX ADMIN — CHLORHEXIDINE GLUCONATE AND ISOPROPYL ALCOHOL 3 ML: 20; .7 SOLUTION TOPICAL at 21:30

## 2019-04-26 RX ADMIN — NOREPINEPHRINE BITARTRATE 6.4 MCG: 1 INJECTION INTRAVENOUS at 08:23

## 2019-04-26 RX ADMIN — INDOCYANINE GREEN 10 MG: KIT INTRAVENOUS at 15:13

## 2019-04-26 RX ADMIN — PHENYLEPHRINE HYDROCHLORIDE 0.6 MCG/KG/MIN: 10 INJECTION, SOLUTION INTRAMUSCULAR; INTRAVENOUS; SUBCUTANEOUS at 21:27

## 2019-04-26 RX ADMIN — BACITRACIN: 500 OINTMENT TOPICAL at 21:30

## 2019-04-26 RX ADMIN — LIDOCAINE HYDROCHLORIDE,EPINEPHRINE BITARTRATE 3 ML: 15; .005 INJECTION, SOLUTION EPIDURAL; INFILTRATION; INTRACAUDAL; PERINEURAL at 06:53

## 2019-04-26 RX ADMIN — VASOPRESSIN 1 UNITS/HR: 20 INJECTION INTRAVENOUS at 11:01

## 2019-04-26 RX ADMIN — NOREPINEPHRINE BITARTRATE 6.4 MCG: 1 INJECTION INTRAVENOUS at 09:54

## 2019-04-26 RX ADMIN — NOREPINEPHRINE BITARTRATE 6.4 MCG: 1 INJECTION INTRAVENOUS at 10:43

## 2019-04-26 RX ADMIN — SODIUM CHLORIDE, POTASSIUM CHLORIDE, SODIUM LACTATE AND CALCIUM CHLORIDE: 600; 310; 30; 20 INJECTION, SOLUTION INTRAVENOUS at 09:12

## 2019-04-26 RX ADMIN — SODIUM CHLORIDE, POTASSIUM CHLORIDE, SODIUM LACTATE AND CALCIUM CHLORIDE: 600; 310; 30; 20 INJECTION, SOLUTION INTRAVENOUS at 21:27

## 2019-04-26 RX ADMIN — FENTANYL CITRATE 25 MCG: 50 INJECTION INTRAMUSCULAR; INTRAVENOUS at 18:35

## 2019-04-26 RX ADMIN — Medication 10 MG: at 18:47

## 2019-04-26 RX ADMIN — IPRATROPIUM BROMIDE AND ALBUTEROL SULFATE 3 ML: .5; 3 SOLUTION RESPIRATORY (INHALATION) at 22:24

## 2019-04-26 RX ADMIN — PHENYLEPHRINE HYDROCHLORIDE 1 MCG/KG/MIN: 10 INJECTION, SOLUTION INTRAMUSCULAR; INTRAVENOUS; SUBCUTANEOUS at 18:50

## 2019-04-26 RX ADMIN — CEFAZOLIN 1 G: 1 INJECTION, POWDER, FOR SOLUTION INTRAMUSCULAR; INTRAVENOUS at 12:30

## 2019-04-26 RX ADMIN — SODIUM CHLORIDE, POTASSIUM CHLORIDE, SODIUM LACTATE AND CALCIUM CHLORIDE: 600; 310; 30; 20 INJECTION, SOLUTION INTRAVENOUS at 07:59

## 2019-04-26 RX ADMIN — BUPIVACAINE HYDROCHLORIDE 6 ML/HR: 7.5 INJECTION, SOLUTION EPIDURAL; RETROBULBAR at 16:17

## 2019-04-26 RX ADMIN — ROCURONIUM BROMIDE 20 MG: 10 INJECTION INTRAVENOUS at 16:28

## 2019-04-26 RX ADMIN — PROPOFOL 140 MG: 10 INJECTION, EMULSION INTRAVENOUS at 08:09

## 2019-04-26 RX ADMIN — PHENYLEPHRINE HYDROCHLORIDE 100 MCG: 10 INJECTION, SOLUTION INTRAMUSCULAR; INTRAVENOUS; SUBCUTANEOUS at 08:09

## 2019-04-26 RX ADMIN — CHLORHEXIDINE GLUCONATE 0.12% ORAL RINSE 30 ML: 1.2 LIQUID ORAL at 21:30

## 2019-04-26 RX ADMIN — NOREPINEPHRINE BITARTRATE 6.4 MCG: 1 INJECTION INTRAVENOUS at 08:14

## 2019-04-26 RX ADMIN — Medication 10 MG: at 20:10

## 2019-04-26 RX ADMIN — ALBUMIN HUMAN: 0.05 INJECTION, SOLUTION INTRAVENOUS at 08:19

## 2019-04-26 RX ADMIN — SODIUM CHLORIDE, POTASSIUM CHLORIDE, SODIUM LACTATE AND CALCIUM CHLORIDE 500 ML: 600; 310; 30; 20 INJECTION, SOLUTION INTRAVENOUS at 22:10

## 2019-04-26 RX ADMIN — CEFAZOLIN 1 G: 1 INJECTION, POWDER, FOR SOLUTION INTRAMUSCULAR; INTRAVENOUS at 10:34

## 2019-04-26 RX ADMIN — ROCURONIUM BROMIDE 20 MG: 10 INJECTION INTRAVENOUS at 11:10

## 2019-04-26 RX ADMIN — NOREPINEPHRINE BITARTRATE 6.4 MCG: 1 INJECTION INTRAVENOUS at 10:37

## 2019-04-26 RX ADMIN — CEFAZOLIN 1 G: 1 INJECTION, POWDER, FOR SOLUTION INTRAMUSCULAR; INTRAVENOUS at 16:30

## 2019-04-26 ASSESSMENT — ACTIVITIES OF DAILY LIVING (ADL)
RETIRED_COMMUNICATION: 0-->UNDERSTANDS/COMMUNICATES WITHOUT DIFFICULTY
FALL_HISTORY_WITHIN_LAST_SIX_MONTHS: NO
COGNITION: 0 - NO COGNITION ISSUES REPORTED
TOILETING: 0-->INDEPENDENT
BATHING: 0-->INDEPENDENT
AMBULATION: 0-->INDEPENDENT
DRESS: 0-->INDEPENDENT
SWALLOWING: 0-->SWALLOWS FOODS/LIQUIDS WITHOUT DIFFICULTY
RETIRED_EATING: 0-->INDEPENDENT
TRANSFERRING: 0-->INDEPENDENT

## 2019-04-26 ASSESSMENT — MIFFLIN-ST. JEOR: SCORE: 1279.88

## 2019-04-26 NOTE — ANESTHESIA CARE TRANSFER NOTE
Patient: Myrtle Vaz    Procedure(s):  Redo Laparotomy SUBSTERNAL Esophagogastrostomy, Pharyngostomy, Intraoperative EGD, PARTIAL STERNOTOMY, LEFT PHARYNGOSTOMY,    Diagnosis: Esophageal Perforation  Diagnosis Additional Information: No value filed.    Anesthesia Type:   No value filed.     Note:  Airway :Face Mask  Patient transferred to:PACU  Comments: To PACU with CRNA, MD. Pt lethargic, easily arousable, VSS on O2 per FM. Report and care to PACU RNHandoff Report: Identifed the Patient, Identified the Reponsible Provider, Reviewed the pertinent medical history, Discussed the surgical course, Reviewed Intra-OP anesthesia mangement and issues during anesthesia, Set expectations for post-procedure period and Allowed opportunity for questions and acknowledgement of understanding      Vitals: (Last set prior to Anesthesia Care Transfer)    CRNA VITALS  4/26/2019 1714 - 4/26/2019 1754      4/26/2019             Resp Rate (observed):  9                Electronically Signed By: NARCISO Sanders CRNA  April 26, 2019  5:54 PM

## 2019-04-26 NOTE — BRIEF OP NOTE
[facility], [place]    Brief Operative Note    Pre-operative diagnosis: Esophageal Perforation  Post-operative diagnosis * No post-op diagnosis entered *  Procedure: Procedure(s):  Redo Laparotomy SUBSTERNAL Esophagogastrostomy, Pharyngostomy, Intraoperative EGD, PARTIAL STERNOTOMY, LEFT PHARYNGOSTOMY,  Surgeon: Surgeon(s) and Role:     * Temitope Bullock MD - Primary     * Krystian Jaime MD - Assisting     * Kamila Morrison MD - Resident - Assisting     * Michelle Jimenez MD - Fellow - Assisting  Anesthesia: Combined General with Block   Estimated blood loss: 500 ml  Drains: None  Specimens: * No specimens in log *  Findings:   see op note. EG anastomosis at 27cm from lips.  Complications: None.  Implants:  Replaced J tube

## 2019-04-26 NOTE — LETTER
Transition Communication Hand-off for Care Transitions to Next Level of Care Provider    Name: Myrtle Vaz  : 1945  MRN #: [id number]  Primary Care Provider: Joey Caballero     Primary Clinic: [facility] [address] [place] [address]     Reason for Hospitalization:  Esophageal Perforation  Esophagectomy, anastomotic leak  Admit Date/Time: 2019  5:06 AM  Discharge Date: 5/15/19  Payor Source: Payor: MEDICARE / Plan: MEDICARE / Product Type: Medicare /     Readmission Assessment Measure ([place]) Risk Score/category: Elevated         Reason for Communication Hand-off Referral: Other Admitting to TCU    Discharge Plan:    Social Work Services Discharge Note      Patient Name:  Myrtle Vaz     Anticipated Discharge Date:  5/15/19    Discharge Disposition:   TCU:  [facility]   [address] [place] [id number]  Nursing Ph: [phone]  Admissions: [phone]    Following MD:  Per facilities designation     Pre-Admission Screening (PAS) online form has been completed.  The Level of Care (LOC) is:  Determined  Confirmation Code is:  [id number]  Patient/caregiver informed of referral to [facility] for Pre-Admission Screening for skilled nursing facility (SNF) placement and to expect a phone call post discharge from SNF.     Additional Services/Equipment Arranged:   arranged W/C ride through [employer] (Ph: [phone]) for p/u at 4PM. [facility] to send O2 tank with for transport.      Patient / Family response to discharge plan:  Pt is in agreement. She is disappointed [facility] cannot accept her but is understanding.      Concern for non-adherence with plan of care:   Y/N N  Discharge Needs Assessment:  Needs      Most Recent Value   Anticipated Changes Related to Illness  none   Equipment Currently Used at Home  grab bar, tub/shower, grab bar, toilet          Already enrolled in Tele-monitoring program and name of  program:  N/A  Follow-up specialty is recommended: Yes    Follow-up plan:    Future Appointments   Date Time Provider Department Center   5/16/2019  6:00 AM Paulina Spangler, SLP [facility]   5/16/2019  8:30 AM Nishi Wan, LANCE [facility]   5/16/2019  9:30 AM Shantelle Hutchinson SLP [facility]   5/16/2019  1:00 PM Coco Stafford, PT [facility]   6/11/2019 12:00 PM Mesha Espinoza MD [facility]     VINAYAK Godoy, LGSW  [facility] Intermediate Care Unit   Phone: [phone]  Pager: [phone]

## 2019-04-26 NOTE — ANESTHESIA PROCEDURE NOTES
Arterial Line Procedure Note  Staff:     Anesthesiologist:  Idalmis Tucker MD  Location: In OR After Induction  Procedure Start/Stop Times:     patient identified, IV checked, site marked, risks and benefits discussed, informed consent, monitors and equipment checked, pre-op evaluation and at physician/surgeon's request      Correct Patient: Yes      Correct Position: Yes      Correct Site: Yes      Correct Procedure: Yes      Correct Laterality:  Yes    Site Marked:  Yes  Line Placement:     Procedure:  Arterial Line    Insertion Site:  Radial    Insertion laterality:  Right    Skin Prep: Chloraprep      Patient Prep: patient draped, mask, sterile gloves, hat and hand hygiene      Local skin infiltration:  1% lidocaine    Ultrasound Guided?: No      Catheter size:  20 gauge, Quick cath    Dressing:  Tegaderm    Complications:  None obvious    Arterial waveform: Yes      IBP within 10% of NIBP: Yes

## 2019-04-26 NOTE — OR NURSING
Pt had epidural catheter placed pre op without complications. Test dose given at 653 no side effects noted.

## 2019-04-26 NOTE — H&P
SURGICAL ICU H&P  April 26, 2019      CO-MORBIDITIES:   Malnutrition  Hypertension  History of atrial fibrillation  Hypothyroidism  Depression    ASSESSMENT: Myrtle Vaz is a 73 year old female with history of hiatal hernia s/p repair in 11/18, course complicated by esophageal perforation and purulent mediastinitis s/p partial esophagogastrectomy and spit fistula in 11/2018,  now POD # 0 s/p redo laparotomy and esophagogastostomy. Admitted to the SICU post-operatively for close hemodynamic monitoring.     PLAN:  Neuro/ pain/ sedation:  # Acute post-surgical pain  - Monitor neurological status; notify the MD for any acute changes in exam  - Epidural catheter in place with bupivacaine  - Dilaudid PCA and oxycodone prn for pain.  - minimal sedation if possible    Pulmonary care:   - Supplemental oxygen to keep saturation above 92%  - Incentive spirometer [id number] when awake  - head of bed elevation to 30  - pharyngostomy tube set to low suction  - duonebs present    Cardiovascular:    # Chronic hypertension  # History of atrial fibrillation  - Monitor hemodynamic status  - Continue PTA amiodarone (dose given in AM)  - MAP goal>65  - Wean off pheynylephrine. 500 CC LR bolus. If Phenylephrine requirement increases, will recheck Hb and lactic acid and place central line     GI care:   - NPO  - Bowel regimen  - tube feeds to begin tomorrow at 10am  - Protonix      Fluids/ Electrolytes/ Nutrition:   - LR maintainence fluid for IV fluid hydration@60  - 500 ml LR bolus for low UOP given   - No indication for parenteral nutrition    Renal/ Fluid Balance:    - Urine output is low  - Will continue to monitor intake and output    Endocrine:    # Stress-induced hyperglycemia  # Hypothyroidism  - Sliding scale for diabetes management   - Frequent glucose checks  - Resume PTA synthroid    ID/ Antibiotics:  - No indication for antibiotics    Heme:     - Hemoglobin 12.7 preop , 9.2 postop  - Recheck in AM  - Plts 367    MSK:  -  PT/OT    Psych:  # Depression  - Continue PTA venlafaxine    Prophylaxis:    - Mechanical prophylaxis for DVT   - heparin start 4/27    Lines/ tubes/ drains:  A line, PIVs, Mora, J tube    Disposition:  - Surgical ICU    Patient seen, findings and plan discussed with surgical ICU staff Dr. Hazel.    - - - - - - - - - - - - - - - - - - - - - - - - - - - - - - - - - - - - - - - - - - - - - - - - - - - - - - - - -     PRIMARY TEAM: Thoracic surgery  PRIMARY PHYSICIAN: Dr. Albarado    REASON FOR CRITICAL CARE ADMISSION: Close hemodynamic monitoring     ADMITTING PHYSICIAN: Dr. Cody    HISTORY PRESENTING ILLNESS: Myrtle Vaz is a 73 year old female POD # 0 s/p laparotomy, esophagogastrostomy, and pharyngostomy. She initially underwent hiatal hernia repair in 11/2018 at an outside hospital. Her post-operative course was then complicated by distal esophageal perforation resulting in purulent mediastinitis, septic shock, HANG, and respiratory failure. She was transferred to [facility] and underwent Nissen takedown, drainage of mediastinal abscess, transhiatal partial esophagogastrectomy, spit fistula, gastrostomy and jejunostomy on 11/21/18.     Intraop details:     ml  UOP 300cc since 7 AM  Crystalloids: 2500 ml  Colloids 1L  Pressors at time of admission : 0.5 phenylephrine         REVIEW OF SYSTEMS:   C: NEGATIVE for fever, chills, change in weight, nausea, vomiting, diarrhea, constipation.   E/M: NEGATIVE for changes in vision, hearing  R: NEGATIVE for significant cough, SOB, difficulty breathing.  CV: NEGATIVE for  palpitations or peripheral edema   GI: NEGATIVE for melena, hematochezia, heartburn, or other changes in bowel habits.  : NEGATIVE for frequency, dysuria, or hematuria   M: NEGATIVE for significant arthralgias or myalgia.  N: NEGATIVE for  dizziness or paresthesias   H/I: NEGATIVE for bleeding problems, worrisome rashes, moles or lesions.  P: NEGATIVE for changes in mood or affect    PAST MEDICAL  HISTORY:   Past Medical History:   Diagnosis Date     Depression      History of atrial fibrillation      HLD (hyperlipidemia)      HTN (hypertension)      Hypothyroidism        SURGICAL HISTORY:   Past Surgical History:   Procedure Laterality Date     ESOPHAGOSCOPY, GASTROSCOPY, DUODENOSCOPY (EGD), COMBINED N/A 2018    Procedure: COMBINED ESOPHAGOSCOPY, GASTROSCOPY, DUODENOSCOPY (EGD);  Surgeon: Temitope Bullock MD;  Location: [facility] OR     HERNIORRHAPHY HIATAL  2018     HYSTERECTOMY       LAPAROSCOPIC HERNIORRHAPHY HIATAL N/A 2018    Procedure: Midline laparotomy, Trans-hiatal esophagogasterectomy, gastrostomy, J-tube placement, G-tube placement, bilateral pleural chest tube placement, mediastinal abcess drainage, spit fistula creation, Esophagastrodueodenoscopy;  Surgeon: Temitope Bullock MD;  Location: [facility] OR     ROTATOR CUFF REPAIR RT/LT       THYROIDECTOMY       for hyperthroidism       SOCIAL HISTORY:  Social History     Tobacco Use     Smoking status: Former Smoker     Years: 60.00     Types: Cigarettes     Last attempt to quit: 2018     Years since quittin.4     Smokeless tobacco: Never Used     Tobacco comment: patient smoked a few cigarettes a day for 60 YEARS   Substance Use Topics     Alcohol use: No     Frequency: Never       FAMILY HISTORY:  Family History   Problem Relation Age of Onset     Cerebrovascular Disease Father         MI        ALLERGIES:      Allergies   Allergen Reactions     Gabapentin Other (See Comments)       MEDICATIONS:    No current facility-administered medications on file prior to encounter.   Current Outpatient Medications on File Prior to Encounter:  diclofenac (VOLTAREN) 1 % topical gel Place onto the skin 4 times daily as needed for moderate pain   levothyroxine (SYNTHROID/LEVOTHROID) 150 MCG tablet 150 mcg by Per G Tube route every morning    Nutritional Supplements (ISOSOURCE 1.5 JOSE) LIQD 90 mls/hour x 12 hours 8pm - 8am   []  acetaminophen (TYLENOL) 32 mg/mL liquid 30.45 mLs (975 mg) by Per G Tube route every 8 hours as needed for fever or mild pain   albuterol (PROAIR HFA/PROVENTIL HFA/VENTOLIN HFA) 108 (90 Base) MCG/ACT inhaler Inhale 6 puffs into the lungs every 4 hours as needed for shortness of breath / dyspnea (Patient taking differently: Inhale 2 puffs into the lungs every 4 hours as needed for shortness of breath / dyspnea or wheezing )   amiodarone (PACERONE/CODARONE) 200 MG tablet 1 tablet (200 mg) by Per G Tube route daily (Patient taking differently: 200 mg by Per G Tube route every morning )   ondansetron (ZOFRAN-ODT) 4 MG ODT tab Take 1 tablet (4 mg) by mouth every 6 hours as needed for nausea or vomiting   UNABLE TO FIND Imprimis eye drops. 1 drop to left eye four times daily x1 week then three times daily x1 week, then twice daily x1 week than daily for 1 week until bottle is empty  Indications: cataract surgery   venlafaxine (EFFEXOR) 75 MG tablet 1 tablet (75 mg) by Per G Tube route 2 times daily       PHYSICAL EXAMINATION:  Temp:  [98.2  F (36.8  C)] 98.2  F (36.8  C)  Pulse:  [71-73] 71  Heart Rate:  [69-73] 69  Resp:  [9-18] 9  BP: (120-135)/(66-74) 120/72  SpO2:  [98 %-100 %] 100 %  General: Alert, well-appearing, in no acute distress.  HEENT: Normocephalic, atraumatic. Moist mucous membranes.  Neck: No cervical lymphadenopathy. Incision C/D. Erythematous skin ( appears chronic with some bruising)  Chest wall: Symmetric thorax. No masses or tenderness to palpation.   Respiratory: Non-labored breathing. Lung sounds clear to auscultation bilaterally.   Cardiovascular: Regular rate and rhythm, no murmurs. HR 70s  Gastrointestinal: Abdomen soft, non-distended. Appropriately tender to palpation. No rebound or guarding. J tube site C/D  Extremities: No limb deformities. Warm and well perfused. No pedal edema.   Skin: As noted above. No rashes or lesions appreciated.    LABS: Reviewed.   Arterial Blood Gases   Recent Labs    Lab 04/26/19  1318   PH 7.39   PCO2 43   PO2 166*   HCO3 27     Complete Blood Count   Recent Labs   Lab 04/26/19  1318   HGB 9.9*     Basic Metabolic Panel  Recent Labs   Lab 04/26/19  1318   *   POTASSIUM 3.4   *     Liver Function Tests  No lab results found in last 7 days.  Pancreatic Enzymes  No lab results found in last 7 days.  Coagulation Profile  No lab results found in last 7 days.  Lactate  Invalid input(s): LACTATE    IMAGING:  Results for orders placed or performed during the hospital encounter of 04/07/19   XR Chest 2 Views    Narrative    Exam: XR CHEST 2 VW, 4/7/2019 8:32 PM    Indication: cough    Comparison: 2/19/2019    Findings:   PA and lateral views of the chest. Cardiac silhouette is within normal  limits. Basilar areas of linear atelectasis, similar to CT 2/4/2019.  Upper lobe predominant emphysematous changes. Mild peribronchial  cuffing. No pleural effusion or pneumothorax. No acute airspace  disease.      Impression    Impression:   1. Mild peribronchial cuffing, may be related to  infectious/inflammatory bronchitis.  2. Stable basilar opacity with atelectasis, not significantly changed  from prior exams.    I have personally reviewed the examination and initial interpretation  and I agree with the findings.    MD Shona LINARES MD  [phone]

## 2019-04-26 NOTE — ANESTHESIA PROCEDURE NOTES
Epidural Procedure Note    Staff:     Anesthesiologist:  Gustavo Land MD    Resident/CRNA:  Migue Gutierrez DO    Procedure performed by resident/CRNA in the presence of a teaching physician    Location: Pre-op     Procedure start time:  4/26/2019 6:40 AM     Procedure end time:  4/26/2019 6:55 AM   Pre-procedure checklist:   patient identified, IV checked, site marked, risks and benefits discussed, informed consent, monitors and equipment checked, pre-op evaluation, at physician/surgeon's request and post-op pain management      Correct Patient: Yes      Correct Position: Yes      Correct Site: Yes      Correct Procedure: Yes      Correct Laterality:  Yes    Site Marked:  Yes  Procedure:     Procedure:  Epidural catheter    ASA:  3    Position:  Sitting    Sterile Prep: chloraprep, mask and sterile gloves      Insertion site:  T7-8    Local skin infiltration:  1% lidocaine    Approach:  Right paramedian    Needle gauge (G):  17    Needle Length (in):  3.5    Block Needle Type:  Touhy    Injection Technique:  LORT saline    GURPREET at (cm):  5.5    Attempts:  1    Redirects:  0    Catheter gauge (G):  19    Catheter threaded easily: Yes      Threaded to cm at skin:  10    Paresthesias:  No    Aspiration negative for Heme or CSF: Yes      Test dose (mL):  3    Test dose time:  06:53    Test dose negative for signs of intravascular, subdural or intrathecal injection: Yes

## 2019-04-27 ENCOUNTER — APPOINTMENT (OUTPATIENT)
Dept: OCCUPATIONAL THERAPY | Facility: CLINIC | Age: 74
DRG: 326 | End: 2019-04-27
Attending: STUDENT IN AN ORGANIZED HEALTH CARE EDUCATION/TRAINING PROGRAM
Payer: MEDICARE

## 2019-04-27 ENCOUNTER — APPOINTMENT (OUTPATIENT)
Dept: GENERAL RADIOLOGY | Facility: CLINIC | Age: 74
DRG: 326 | End: 2019-04-27
Attending: THORACIC SURGERY (CARDIOTHORACIC VASCULAR SURGERY)
Payer: MEDICARE

## 2019-04-27 LAB
ALBUMIN SERPL-MCNC: 2.5 G/DL (ref 3.4–5)
ALP SERPL-CCNC: 76 U/L (ref 40–150)
ALT SERPL W P-5'-P-CCNC: 89 U/L (ref 0–50)
ANION GAP SERPL CALCULATED.3IONS-SCNC: 7 MMOL/L (ref 3–14)
AST SERPL W P-5'-P-CCNC: 163 U/L (ref 0–45)
BASE EXCESS BLDA CALC-SCNC: 2.1 MMOL/L
BILIRUB SERPL-MCNC: 0.4 MG/DL (ref 0.2–1.3)
BUN SERPL-MCNC: 30 MG/DL (ref 7–30)
CALCIUM SERPL-MCNC: 7.4 MG/DL (ref 8.5–10.1)
CHLORIDE SERPL-SCNC: 110 MMOL/L (ref 94–109)
CO2 SERPL-SCNC: 27 MMOL/L (ref 20–32)
CREAT SERPL-MCNC: 1.03 MG/DL (ref 0.52–1.04)
ERYTHROCYTE [DISTWIDTH] IN BLOOD BY AUTOMATED COUNT: 19.9 % (ref 10–15)
GFR SERPL CREATININE-BSD FRML MDRD: 54 ML/MIN/{1.73_M2}
GLUCOSE SERPL-MCNC: 112 MG/DL (ref 70–99)
HCO3 BLD-SCNC: 28 MMOL/L (ref 21–28)
HCT VFR BLD AUTO: 26.5 % (ref 35–47)
HGB BLD-MCNC: 8 G/DL (ref 11.7–15.7)
MCH RBC QN AUTO: 27.7 PG (ref 26.5–33)
MCHC RBC AUTO-ENTMCNC: 30.2 G/DL (ref 31.5–36.5)
MCV RBC AUTO: 92 FL (ref 78–100)
MRSA DNA SPEC QL NAA+PROBE: NEGATIVE
O2/TOTAL GAS SETTING VFR VENT: ABNORMAL %
OXYHGB MFR BLD: 94 % (ref 92–100)
PCO2 BLD: 48 MM HG (ref 35–45)
PH BLD: 7.37 PH (ref 7.35–7.45)
PLATELET # BLD AUTO: 335 10E9/L (ref 150–450)
PO2 BLD: 81 MM HG (ref 80–105)
POTASSIUM SERPL-SCNC: 4 MMOL/L (ref 3.4–5.3)
PROT SERPL-MCNC: 5.4 G/DL (ref 6.8–8.8)
RBC # BLD AUTO: 2.89 10E12/L (ref 3.8–5.2)
SODIUM SERPL-SCNC: 144 MMOL/L (ref 133–144)
SPECIMEN SOURCE: NORMAL
WBC # BLD AUTO: 16.2 10E9/L (ref 4–11)

## 2019-04-27 PROCEDURE — 87641 MR-STAPH DNA AMP PROBE: CPT

## 2019-04-27 PROCEDURE — 25000128 H RX IP 250 OP 636: Performed by: STUDENT IN AN ORGANIZED HEALTH CARE EDUCATION/TRAINING PROGRAM

## 2019-04-27 PROCEDURE — 40000014 ZZH STATISTIC ARTERIAL MONITORING DAILY

## 2019-04-27 PROCEDURE — A9270 NON-COVERED ITEM OR SERVICE: HCPCS

## 2019-04-27 PROCEDURE — 27211389 ZZ H KIT, 5 FR DL BIOFLO OPEN ENDED PICC

## 2019-04-27 PROCEDURE — A9270 NON-COVERED ITEM OR SERVICE: HCPCS | Performed by: SURGERY

## 2019-04-27 PROCEDURE — 85027 COMPLETE CBC AUTOMATED: CPT | Performed by: STUDENT IN AN ORGANIZED HEALTH CARE EDUCATION/TRAINING PROGRAM

## 2019-04-27 PROCEDURE — P9041 ALBUMIN (HUMAN),5%, 50ML: HCPCS | Performed by: SURGERY

## 2019-04-27 PROCEDURE — 25000128 H RX IP 250 OP 636

## 2019-04-27 PROCEDURE — 25000132 ZZH RX MED GY IP 250 OP 250 PS 637: Performed by: STUDENT IN AN ORGANIZED HEALTH CARE EDUCATION/TRAINING PROGRAM

## 2019-04-27 PROCEDURE — 82805 BLOOD GASES W/O2 SATURATION: CPT | Performed by: STUDENT IN AN ORGANIZED HEALTH CARE EDUCATION/TRAINING PROGRAM

## 2019-04-27 PROCEDURE — 97530 THERAPEUTIC ACTIVITIES: CPT | Mod: GO | Performed by: OCCUPATIONAL THERAPIST

## 2019-04-27 PROCEDURE — A9270 NON-COVERED ITEM OR SERVICE: HCPCS | Performed by: STUDENT IN AN ORGANIZED HEALTH CARE EDUCATION/TRAINING PROGRAM

## 2019-04-27 PROCEDURE — 20000004 ZZH R&B ICU UMMC

## 2019-04-27 PROCEDURE — 25800030 ZZH RX IP 258 OP 636

## 2019-04-27 PROCEDURE — 71046 X-RAY EXAM CHEST 2 VIEWS: CPT

## 2019-04-27 PROCEDURE — 80053 COMPREHEN METABOLIC PANEL: CPT | Performed by: STUDENT IN AN ORGANIZED HEALTH CARE EDUCATION/TRAINING PROGRAM

## 2019-04-27 PROCEDURE — 25800030 ZZH RX IP 258 OP 636: Performed by: STUDENT IN AN ORGANIZED HEALTH CARE EDUCATION/TRAINING PROGRAM

## 2019-04-27 PROCEDURE — 71045 X-RAY EXAM CHEST 1 VIEW: CPT

## 2019-04-27 PROCEDURE — 25000132 ZZH RX MED GY IP 250 OP 250 PS 637

## 2019-04-27 PROCEDURE — 99291 CRITICAL CARE FIRST HOUR: CPT | Mod: GC | Performed by: ANESTHESIOLOGY

## 2019-04-27 PROCEDURE — 25800030 ZZH RX IP 258 OP 636: Performed by: NURSE PRACTITIONER

## 2019-04-27 PROCEDURE — 25000132 ZZH RX MED GY IP 250 OP 250 PS 637: Performed by: SURGERY

## 2019-04-27 PROCEDURE — 25000128 H RX IP 250 OP 636: Performed by: SURGERY

## 2019-04-27 PROCEDURE — 97165 OT EVAL LOW COMPLEX 30 MIN: CPT | Mod: GO | Performed by: OCCUPATIONAL THERAPIST

## 2019-04-27 PROCEDURE — 97535 SELF CARE MNGMENT TRAINING: CPT | Mod: GO | Performed by: OCCUPATIONAL THERAPIST

## 2019-04-27 PROCEDURE — 27210437 ZZH NUTRITION PRODUCT SEMIELEM INTERMED LITER

## 2019-04-27 PROCEDURE — 27211417 ZZ H KIT, VPS RHYTHM STYLET

## 2019-04-27 PROCEDURE — 36569 INSJ PICC 5 YR+ W/O IMAGING: CPT

## 2019-04-27 PROCEDURE — 87640 STAPH A DNA AMP PROBE: CPT

## 2019-04-27 PROCEDURE — 25800030 ZZH RX IP 258 OP 636: Performed by: THORACIC SURGERY (CARDIOTHORACIC VASCULAR SURGERY)

## 2019-04-27 PROCEDURE — 25000125 ZZHC RX 250: Performed by: SURGERY

## 2019-04-27 PROCEDURE — 40000275 ZZH STATISTIC RCP TIME EA 10 MIN

## 2019-04-27 RX ORDER — POLYETHYLENE GLYCOL 3350 17 G/17G
17 POWDER, FOR SOLUTION ORAL DAILY
Status: DISCONTINUED | OUTPATIENT
Start: 2019-04-27 | End: 2019-05-15 | Stop reason: HOSPADM

## 2019-04-27 RX ORDER — ACETAMINOPHEN 325 MG/1
975 TABLET ORAL EVERY 8 HOURS
Status: DISCONTINUED | OUTPATIENT
Start: 2019-04-27 | End: 2019-05-15 | Stop reason: HOSPADM

## 2019-04-27 RX ORDER — HEPARIN SODIUM,PORCINE 10 UNIT/ML
2-5 VIAL (ML) INTRAVENOUS
Status: DISCONTINUED | OUTPATIENT
Start: 2019-04-27 | End: 2019-05-01

## 2019-04-27 RX ORDER — ALBUMIN, HUMAN INJ 5% 5 %
25 SOLUTION INTRAVENOUS ONCE
Status: COMPLETED | OUTPATIENT
Start: 2019-04-27 | End: 2019-04-27

## 2019-04-27 RX ORDER — HEPARIN SODIUM,PORCINE 10 UNIT/ML
5-10 VIAL (ML) INTRAVENOUS
Status: DISCONTINUED | OUTPATIENT
Start: 2019-04-27 | End: 2019-05-15 | Stop reason: HOSPADM

## 2019-04-27 RX ORDER — LIDOCAINE 40 MG/G
CREAM TOPICAL
Status: DISCONTINUED | OUTPATIENT
Start: 2019-04-27 | End: 2019-05-01

## 2019-04-27 RX ORDER — HEPARIN SODIUM,PORCINE 10 UNIT/ML
5-10 VIAL (ML) INTRAVENOUS EVERY 24 HOURS
Status: DISCONTINUED | OUTPATIENT
Start: 2019-04-27 | End: 2019-05-15 | Stop reason: HOSPADM

## 2019-04-27 RX ORDER — ALBUTEROL SULFATE 0.83 MG/ML
2.5 SOLUTION RESPIRATORY (INHALATION) EVERY 6 HOURS PRN
Status: DISCONTINUED | OUTPATIENT
Start: 2019-04-27 | End: 2019-05-15 | Stop reason: HOSPADM

## 2019-04-27 RX ORDER — AMOXICILLIN 250 MG
2 CAPSULE ORAL 2 TIMES DAILY
Status: DISCONTINUED | OUTPATIENT
Start: 2019-04-27 | End: 2019-05-13

## 2019-04-27 RX ADMIN — LIDOCAINE HYDROCHLORIDE 5 ML: 10 INJECTION, SOLUTION EPIDURAL; INFILTRATION; INTRACAUDAL; PERINEURAL at 14:33

## 2019-04-27 RX ADMIN — LEVOTHYROXINE SODIUM 150 MCG: 75 TABLET ORAL at 08:09

## 2019-04-27 RX ADMIN — ALBUMIN HUMAN 25 G: 0.05 INJECTION, SOLUTION INTRAVENOUS at 23:30

## 2019-04-27 RX ADMIN — HEPARIN SODIUM 5000 UNITS: 5000 INJECTION, SOLUTION INTRAVENOUS; SUBCUTANEOUS at 13:10

## 2019-04-27 RX ADMIN — PHENYLEPHRINE HYDROCHLORIDE 0.5 MCG/KG/MIN: 10 INJECTION, SOLUTION INTRAMUSCULAR; INTRAVENOUS; SUBCUTANEOUS at 07:59

## 2019-04-27 RX ADMIN — ACETAMINOPHEN 975 MG: 325 TABLET, FILM COATED ORAL at 21:59

## 2019-04-27 RX ADMIN — Medication: at 20:03

## 2019-04-27 RX ADMIN — SODIUM CHLORIDE, POTASSIUM CHLORIDE, SODIUM LACTATE AND CALCIUM CHLORIDE 500 ML: 600; 310; 30; 20 INJECTION, SOLUTION INTRAVENOUS at 09:59

## 2019-04-27 RX ADMIN — PHENYLEPHRINE HYDROCHLORIDE 0.5 MCG/KG/MIN: 10 INJECTION, SOLUTION INTRAMUSCULAR; INTRAVENOUS; SUBCUTANEOUS at 17:37

## 2019-04-27 RX ADMIN — SODIUM CHLORIDE, POTASSIUM CHLORIDE, SODIUM LACTATE AND CALCIUM CHLORIDE 500 ML: 600; 310; 30; 20 INJECTION, SOLUTION INTRAVENOUS at 01:14

## 2019-04-27 RX ADMIN — CHLORHEXIDINE GLUCONATE 0.12% ORAL RINSE 30 ML: 1.2 LIQUID ORAL at 19:59

## 2019-04-27 RX ADMIN — PANTOPRAZOLE SODIUM 40 MG: 40 TABLET, DELAYED RELEASE ORAL at 08:13

## 2019-04-27 RX ADMIN — BACITRACIN: 500 OINTMENT TOPICAL at 20:00

## 2019-04-27 RX ADMIN — CHLORHEXIDINE GLUCONATE AND ISOPROPYL ALCOHOL 3 ML: 20; .7 SOLUTION TOPICAL at 20:03

## 2019-04-27 RX ADMIN — POLYETHYLENE GLYCOL 3350 17 G: 17 POWDER, FOR SOLUTION ORAL at 08:13

## 2019-04-27 RX ADMIN — VENLAFAXINE 75 MG: 75 TABLET ORAL at 19:59

## 2019-04-27 RX ADMIN — CHLORHEXIDINE GLUCONATE 0.12% ORAL RINSE 30 ML: 1.2 LIQUID ORAL at 08:13

## 2019-04-27 RX ADMIN — HEPARIN SODIUM 5000 UNITS: 5000 INJECTION, SOLUTION INTRAVENOUS; SUBCUTANEOUS at 19:59

## 2019-04-27 RX ADMIN — VENLAFAXINE 75 MG: 75 TABLET ORAL at 08:09

## 2019-04-27 RX ADMIN — PHENYLEPHRINE HYDROCHLORIDE 0.5 MCG/KG/MIN: 10 INJECTION, SOLUTION INTRAMUSCULAR; INTRAVENOUS; SUBCUTANEOUS at 19:03

## 2019-04-27 RX ADMIN — Medication 200 MG: at 14:46

## 2019-04-27 RX ADMIN — SENNOSIDES AND DOCUSATE SODIUM 2 TABLET: 8.6; 5 TABLET ORAL at 19:59

## 2019-04-27 RX ADMIN — ALBUMIN HUMAN 25 G: 0.05 INJECTION, SOLUTION INTRAVENOUS at 16:49

## 2019-04-27 RX ADMIN — ACETAMINOPHEN 975 MG: 325 TABLET, FILM COATED ORAL at 14:46

## 2019-04-27 RX ADMIN — BACITRACIN: 500 OINTMENT TOPICAL at 08:08

## 2019-04-27 RX ADMIN — CHLORHEXIDINE GLUCONATE AND ISOPROPYL ALCOHOL 3 ML: 20; .7 SOLUTION TOPICAL at 08:08

## 2019-04-27 RX ADMIN — SENNOSIDES AND DOCUSATE SODIUM 2 TABLET: 8.6; 5 TABLET ORAL at 08:13

## 2019-04-27 RX ADMIN — SODIUM CHLORIDE, POTASSIUM CHLORIDE, SODIUM LACTATE AND CALCIUM CHLORIDE: 600; 310; 30; 20 INJECTION, SOLUTION INTRAVENOUS at 16:29

## 2019-04-27 ASSESSMENT — ACTIVITIES OF DAILY LIVING (ADL)
ADLS_ACUITY_SCORE: 18
ADLS_ACUITY_SCORE: 14
ADLS_ACUITY_SCORE: 18
IADL_COMMENTS: OT: PT WAS IND, FAMILY CAN A PRN
ADLS_ACUITY_SCORE: 14
ADLS_ACUITY_SCORE: 14
ADLS_ACUITY_SCORE: 18

## 2019-04-27 ASSESSMENT — MIFFLIN-ST. JEOR: SCORE: 1326.88

## 2019-04-27 ASSESSMENT — PAIN DESCRIPTION - DESCRIPTORS: DESCRIPTORS: CONSTANT;DISCOMFORT

## 2019-04-27 NOTE — OP NOTE
Procedure Date: 04/26/2019      PREOPERATIVE DIAGNOSES:   1.  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   2.  History of laparoscopic hiatal hernia repair with mesh, complicated with esophageal perforation.      POSTOPERATIVE DIAGNOSES:   1.  Intestinal discontinuity as after distal esophagectomy and partial gastrectomy.   2.  History of laparoscopic hiatal hernia repair with mesh, complicated with esophageal perforation.      PROCEDURES PERFORMED:   1.  Flexible bronchoscopy.   2.  EGD.   3.  Left neck incision.   4.  Takedown of cervical esophagostomy.   5.  Partial manubriectomy.   6.  Redo laparotomy.   7.  Extensive enterolysis, more than 2 hours.   8.  Kocherization of duodenum.   9.  Botox injection of the pylorus.   10.  Tubularization of gastric conduit and retrosternal transposition.  11.  Partial omentectomy.   12.  End-to-end handsewn 2-layer esophagogastrostomy with omental fat buttress.  13.  Left pharyngostomy tube placement.   14.  Jejunostomy tube exchange.      ANESTHESIA:  General endotracheal anesthesia with single lumen endotracheal tube.      SURGEON:  Temitope Funes MD      ASSISTANT:  Krystian Jaime MD.  The assistance of Dr. Jaime was required given the high complexity of the case, the patient's BMI was high, and she had extensive intra-abdominal adhesions which made this case particularly challenging and extremely complicated.  Please see separate dictation for his part of the procedure.      Michelle Jimenez MD, Cardiothoracic Surgery Fellow.   Kamila Morrison MD, General Surgery resident.      COMPLICATIONS:  None.      ESTIMATED BLOOD LOSS:  500 mL.      DRAINS:   1.  A 16-Ukrainian left neck pharyngostomy tube placed within the gastric conduit.   2.  A 16-Ukrainian jejunostomy tube placed in the old jejunostomy site.      SPECIMENS:  None.      OPERATIVE FINDINGS:   1.  Flexible bronchoscopy.  The patient had a moderate amount of secretions that  were suctioned out.  No external compression or endobronchial lesions.   2.  EGD.  The patient's anastomosis was created at 27 cm from the incisors.  The anastomosis appeared to be tension-free, with a patent lumen and buttressed with omentum.  Negative leak test.  The pylorus was injected with Botox.      DESCRIPTION OF PROCEDURE IN DETAIL:  The patient was taken to the operating room and placed in a supine position.  All pressure points were adequately padded.  General endotracheal anesthesia was induced.  The patient's neck, chest and abdomen were prepped with ChloraPrep and draped in a normal sterile fashion.  A formal timeout was carried out confirming the name of the patient and correct procedure.  She had SCDs in place and functioning prior to induction of anesthesia.  She received antibiotics within 30 minutes of incision.      After the timeout was completed, we started by performing a midline laparotomy incision at the site of the previous laparotomy.  The patient's previous abdominal scar was excised.  The incision was taken down to the fascia.  We entered the abdomen in most cephalad portion of the incision.  We used a scalpel to divide the fascia.  Once we were able to have intraperitoneal view, we sequentially took down the intra-abdominal adhesions to free it up from the midline fascia and we completed our midline laparotomy.  The patient had severe intra-abdominal adhesions due to the previous operation.  We spent approximately 2-3 hours lysing adhesions.  This was particularly challenging and a very tedious part of the case.  Once the adhesions were taken down, an Omni retractor was placed for wide operative exposure.  The greater omentum was  from the transverse colon.  We took down the splenic flexure as well as the hepatic flexure.  The duodenum was kocherized.  We injected 200 units of Botox at the pylorus.  The rest of the greater curvature of the stomach was mobilized to the level of  the diaphragm.  Next, the lesser curvature was also mobilized.  Care was taken to preserve the right gastroepiploic artery along the greater curvature which had an excellent pulse.  We had good gastric length.  The stomach was then tubularized with serial loads of the Ethicon stapler.  Next, a left neck incision was performed at the site of the previous scar.  The esophagus was mobilized circumferentially and the cervical esophagostomy was taken down.  Once this was completely performed, we evaluated the quality of the esophagus.  The distal third appeared to be with a very poor quality and we decided to resect this part of the esophagus.  At this point we decided to make the anastomosis in the left neck.  An upper midline sternotomy was done and T'd off in the second intercostal space to the left side.  A partial manubriectomy was performed.  Bone wax was placed to control bleeding from the bone marrow.  A retrosternal tunnel was then created with the surgeon's hand.  The tubular stomach was then transposed.  This was done without any difficulty and care was taken to avoid kinking or twisting of the conduit.  After the stomach was transposed, we performed an intraoperative ICG injection and pinpoint evaluation of the conduit; both the gastric conduit and the esophagus appeared to have excellent blood supply.      We then created an end-to-end handsewn anastomosis in 2 layers using 3-0 silk for the outer layer and 3-0 Vicryl for the inner layer.  The anastomosis was performed without any tension.  Care was taken to avoid twisting of the esophagus.  A completion endoscopy was performed which revealed patent anastomosis and a leak test was negative.  We buttressed the esophagogastrostomy with greater omentum.  Both incisions were then copiously irrigated with normal saline.  Hemostasis was confirmed.  The left neck incision was closed in multiple layers using absorbable suture.  The skin was closed with staples.  The  midline laparotomy wound was closed with #1 looped PDS in a running fashion.  The skin was closed with staples.  The jejunostomy tube was exchanged for a new tube since it was not functional.  We placed a left neck pharyngostomy tube and the tip of the pharyngostomy was advanced past the anastomosis but just above the pylorus.  The pharyngostomy was secured to the skin and connected to suction.      A completion bronchoscopy was done to clear secretions.  All instrument and sponge counts were correct at the end of the case.  The patient was then transferred to the PACU still intubated in stable condition.         GARCÍA QIU MD             D: 2019   T: 2019   MT: GISELLE      Name:     CANDICE LOYA   MRN:      [id number]        Account:        [id number]   :      1945           Procedure Date: 2019      Document: [id number]

## 2019-04-27 NOTE — PROGRESS NOTES
04/27/19 0800   Quick Adds   Type of Visit Initial Occupational Therapy Evaluation   Living Environment   Lives With alone   Living Arrangements   (town home)   Home Accessibility stairs to enter home   Number of Stairs, Main Entrance 1   Living Environment Comment OT: Pt's children live close by and can A prn, pt has stairs to enter home;grab bars present (bathtub);grab bars present (toilet)   Functional Level   Ambulation 1-->assistive equipment   Transferring 1-->assistive equipment   Toileting 1-->assistive equipment   Bathing 1-->assistive equipment  (shower seat, long handled sponge, grab bars)   Dressing 0-->independent   Eating 0-->independent   Communication 0-->understands/communicates without difficulty   Swallowing 0-->swallows foods/liquids without difficulty   Cognition 0 - no cognition issues reported   Fall history within last six months no   Which of the above functional risks had a recent onset or change? none   Prior Functional Level Comment OT: Pt reports she has been using walker lately since having catheter bag to hold catheter   General Information   Onset of Illness/Injury or Date of Surgery - Date 04/26/19   Referring Physician Michelle Jimenez MD   Patient/Family Goals Statement to discharge home   Additional Occupational Profile Info/Pertinent History of Current Problem Redo Laparotomy SUBSTERNAL Esophagogastrostomy, Pharyngostomy, Intraoperative EGD, PARTIAL STERNOTOMY, LEFT PHARYNGOSTOMY,   Precautions/Limitations fall precautions;sternal precautions;abdominal precautions;other (see comments)   Weight-Bearing Status - LUE   (10# lifting restriction)   Weight-Bearing Status - RUE   (10# lifting restriction)   General Info Comments OT asked MDs, stating no w/ ties on neck, MD stating they katerin put in a pt care order   Cognitive Status Examination   Cognitive Comment OT: No concerns at this time   Sensory Examination   Sensory Comments OT: No concerns at this time   Range of Motion (ROM)  "  ROM Comment OT: BUE WFL w/in precuations   Strength   Strength Comments OT: NT formally 2/2 post surgical precautions, pt appears overall deconditioned   Hand Strength   Hand Strength Comments OT: WFL   Muscle Tone Assessment   Muscle Tone Comments OT: Overall deconditioned   Mobility   Bed Mobility Comments OT: mod A   Transfer Skills   Transfer Comments OT: mod A   Balance   Balance Comments OT: min A once standing   Instrumental Activities of Daily Living (IADL)   IADL Comments OT: Pt was ind, family can A prn   Activities of Daily Living Analysis   Impairments Contributing to Impaired Activities of Daily Living balance impaired;pain;post surgical precautions;strength decreased   General Therapy Interventions   Planned Therapy Interventions ADL retraining;IADL retraining;balance training;bed mobility training;strengthening;transfer training;home program guidelines;progressive activity/exercise   Clinical Impression   Criteria for Skilled Therapeutic Interventions Met yes, treatment indicated   OT Diagnosis decreased ind in ADLS/IADLS   Influenced by the following impairments generalized weakness and precautions   Assessment of Occupational Performance 1-3 Performance Deficits   Identified Performance Deficits decreased ind in ADLS/IADLS   Clinical Decision Making (Complexity) Low complexity   Therapy Frequency daily   Anticipated Discharge Disposition Transitional Care Facility;Acute Rehabilitation Facility   Risks and Benefits of Treatment have been explained. Yes   Patient, Family & other staff in agreement with plan of care Yes   [employer] AM-PAC TM \"6 Clicks\"   2016, Trustees of [employer], under license to [employer].  All rights reserved.   6 Clicks Short Forms Daily Activity Inpatient Short Form   [employer] AM-PAC  \"6 Clicks\" Daily Activity Inpatient Short Form   1. Putting on and taking off regular lower body clothing? 2 - A Lot   2. Bathing (including washing, rinsing, drying)? " 2 - A Lot   3. Toileting, which includes using toilet, bedpan or urinal? 2 - A Lot   4. Putting on and taking off regular upper body clothing? 2 - A Lot   5. Taking care of personal grooming such as brushing teeth? 3 - A Little   6. Eating meals? 1 - Total   Daily Activity Raw Score (Score out of 24.Lower scores equate to lower levels of function) 12   Total Evaluation Time   Total Evaluation Time (Minutes) 6

## 2019-04-27 NOTE — PLAN OF CARE
Discharge Planner OT   Patient plan for discharge: not stated  Current status: OT educated pt on ADLS w/in post surgical precautions. Pt mod A supine> EOB> STS pivot taking a few steps to chair. Pt's VSS, limited by BPs to progress throughout OT stable on 4L NC  Barriers to return to prior living situation: medical status  Recommendations for discharge: TCU/ARU  Rationale for recommendations: pending pt medical activity tolerance       Entered by: Margarita Gross 04/27/2019 2:48 PM

## 2019-04-27 NOTE — PROVIDER NOTIFICATION
04/27/19 1500   PICC Double Lumen 04/27/19 Right Brachial vein medial   Placement Date/Time: 04/27/19 1536   Catheter Brand: Assurely DL 5fr open ended  Size (Fr): 5 Fr  Lot #: [id number]  Full barrier precautions done: Yes, hand hygiene, sterile gown, sterile gloves, mask, cap, full body drape, chlorhexidine scrub  Consent Si...   Site Assessment WDL   External Cath Length (cm) 2 cm   Extremity Circumference (cm) 33 cm   Dressing Intervention Chlorhexidine patch;Transparent;Securing device   Dressing Change Due 05/04/19   Lumen A - Color GRAY   Lumen A - Status blood return noted;saline locked   Lumen A - Cap Change Due 04/30/19   Lumen B - Color PURPLE   Lumen B - Status blood return noted;saline locked   Lumen B - Cap Change Due 04/30/19

## 2019-04-27 NOTE — PLAN OF CARE
D/I: Pt continues on Doni-synephrine gtt to maintain MAP >65. (was off only less than an hour before restarting) UO 10cc/hr. LR 500cc bolus given w/o little effect. Albumin 5% 25gm ordered and infusing.   A: UO to 30cc/hr... MIV increased to 100cc/hr (LR) Pt lethargic but easily arousable  and oriented x 4. C/o some incisional pain with activity. Pt continues on Dilaudid PCA at 0.2mg Q 10min w/o basal. And on Bupivacaine 6mg/hr. Pt up in chair this morning before going to xray 2view.   Irrigating pharyngostomy tube Left Neck with NS 10cc Q8hrs w/minimal output... Order to leave the filter  out from the tube out per MD. Left chest was draining moderate amt serosang.fluid this am. Area cleaned and reinforced dressing. Order to start changing internal dressing 48hrs post op. Pt using IS while awake .... Pt self administers it also.   P: Monitor BP and pain.. Encourage pulmonary toileting.

## 2019-04-27 NOTE — PROGRESS NOTES
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 Admitted to the SICU post-operatively for close hemodynamic monitoring. Requiring pressor, likely hypovolemic, possible contribution of epidural catheter.     PLAN:  Neuro/ pain/ sedation:  # Acute post-surgical pain  - Monitor neurological status; notify the MD for any acute changes in exam  - Epidural catheter in place with bupivacaine  - Dilaudid PCA and oxycodone prn for pain    Pulmonary care:   - Supplemental oxygen to keep saturation above 92%  - Incentive spirometer [id number] when awake  - head of bed elevation to 30  - pharyngostomy tube set to low suction  - duonebs present    Cardiovascular:    # Chronic hypertension  # History of atrial fibrillation  - Monitor hemodynamic status  - Continue PTA amiodarone (dose given in AM)  - MAP goal>65  - Wean off pheynylephrine. 500 CC LR bolus now. Albumin if any further fluid needs. If Phenylephrine requirement unable to be weaned off, will place PICC today    GI care:   - NPO  - Bowel regimen  - tube feeds to begin  - Protonix    Fluids/ Electrolytes/ Nutrition:   - LR maintainence fluid for IV fluid hydration@60  - Bolus this AM given  - No indication for parenteral nutrition    Renal/ Fluid Balance:    - Urine output is low  - Will continue to monitor intake and output    Endocrine:    # Stress-induced hyperglycemia  # Hypothyroidism  - Sliding scale for diabetes management   - Frequent glucose checks  - Resume PTA synthroid    ID/ Antibiotics:  - No indication for antibiotics    Heme:     #Acute blood loss anemia  - Monitor Hgb. No indication for transfusion    MSK:  - PT/OT    Psych:  # Depression  - Continue PTA  venlafaxine    Prophylaxis:    - Mechanical prophylaxis for DVT   - heparin ordered    Lines/ tubes/ drains:  A line, PIVs, Mora, J tube    Disposition:  - Surgical ICU    Discussed with Dr Escobar Fernandez MD, PhD  SICU fellow    - - - - - - - - - - - - - - - - - - - - - - - - - - - - - - - - - - - - - - - - - - - - - - - - - - - - - - - - -   Events overnight:  Still requiring pressor this AM. Othostatic hypotension. Pain controlled. Was able to get up in chair.     PHYSICAL EXAMINATION:  Temp:  [97.6  F (36.4  C)-99.3  F (37.4  C)] 97.9  F (36.6  C)  Pulse:  [63-77] 73  Heart Rate:  [62-80] 74  Resp:  [10-24] 14  BP: ()/(33-76) 101/52  MAP:  [50 mmHg-87 mmHg] 60 mmHg  Arterial Line BP: ()/(29-77) 96/29  FiO2 (%):  [2 %] 2 %  SpO2:  [89 %-100 %] 94 %  General: Alert, well-appearing, in no acute distress.  HEENT: Normocephalic, atraumatic. Moist mucous membranes.  Neck: No cervical lymphadenopathy. Neck incision C/D. Spit fistula site open, packed. Some bruising, non-blanching erythema on chest. Swelling over manubriectomy site.   Respiratory: Non-labored breathing. Lung sounds clear to auscultation bilaterally.   Cardiovascular: Regular rate and rhythm, no murmurs. HR 70s  Gastrointestinal: Abdomen soft, non-distended. Appropriately tender to palpation. No rebound or guarding. J tube site C/D  Extremities: No limb deformities. Warm and well perfused. No pedal edema.   Skin: As noted above. No rashes or lesions appreciated.    LABS: Reviewed.   Arterial Blood Gases   Recent Labs   Lab 04/27/19  0406 04/26/19  1520 04/26/19  1318   PH 7.37 7.38 7.39   PCO2 48* 45 43   PO2 81 348* 166*   HCO3 28 26 27     Complete Blood Count   Recent Labs   Lab 04/27/19  0421 04/26/19  1810 04/26/19  1520 04/26/19  1318   WBC 16.2* 10.8  --   --    HGB 8.0* 9.2* 9.8* 9.9*    367  --   --      Basic Metabolic Panel  Recent Labs   Lab 04/27/19  0421 04/26/19  1810 04/26/19  1520 04/26/19  1318    142  145* 145*   POTASSIUM 4.0 4.0 3.7 3.4   CHLORIDE 110* 110*  --   --    CO2 27 25  --   --    BUN 30 30  --   --    CR 1.03 1.02  --   --    * 188* 146* 124*     Liver Function Tests  Recent Labs   Lab 04/27/19  0421   *   ALT 89*   ALKPHOS 76   BILITOTAL 0.4   ALBUMIN 2.5*       IMAGING:  Results for orders placed or performed during the hospital encounter of 04/07/19   XR Chest 2 Views    Narrative    Exam: XR CHEST 2 VW, 4/7/2019 8:32 PM    Indication: cough    Comparison: 2/19/2019    Findings:   PA and lateral views of the chest. Cardiac silhouette is within normal  limits. Basilar areas of linear atelectasis, similar to CT 2/4/2019.  Upper lobe predominant emphysematous changes. Mild peribronchial  cuffing. No pleural effusion or pneumothorax. No acute airspace  disease.      Impression    Impression:   1. Mild peribronchial cuffing, may be related to  infectious/inflammatory bronchitis.  2. Stable basilar opacity with atelectasis, not significantly changed  from prior exams.    I have personally reviewed the examination and initial interpretation  and I agree with the findings.    TANYA GASPAR MD   Physician Attestation   I, Satish Cody, saw this patient with the resident and agree with the resident/fellow's findings and plan of care as documented in the note.      I personally reviewed vital signs, medications, labs and imaging.    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     Satish Cody MD  Date of Service (when I saw the patient): 4/27/2019  Time Spent on this Encounter     Total Critical Care time spent by me, excluding procedures, was 32 minutes.    Satish Cody MD

## 2019-04-27 NOTE — PLAN OF CARE
Neuro: Intact.     CV: SR 70s. Maps in low 70's with alyssa at 0.4    Resp: 1 L NC. CO2 in high 30s. Index 8-10. Lungs clear.    GI: NPO. J tube in place.     : Low urine output. LR 500ml bolus given x2.     Skin: Incision dressings with scant drainage. Pharygostomy tube minimal output.     Pain: Dilaudid PCA pump.

## 2019-04-27 NOTE — PROGRESS NOTES
THORACIC & FOREGUT SURGERY PROGRESS NOTE  04/27/2019    Patient: Myrtle Vaz  MRN: [id number]    Subjective  No acute overnight events. Still on phenylephrine; given albumin 250 x1 in PACU and  x2 overnight with improvement in oliguria. This morning, seen in chair, reports doing well. Pain controlled. Wants to know when she can eat.      Objective  Temp:  [97.6  F (36.4  C)-99.3  F (37.4  C)] 99.3  F (37.4  C)  Pulse:  [63-77] 63  Heart Rate:  [62-77] 73  Resp:  [10-22] 11  BP: ()/(33-76) 155/76  MAP:  [50 mmHg-84 mmHg] 67 mmHg  Arterial Line BP: ()/(29-69) 92/56  FiO2 (%):  [2 %] 2 %  SpO2:  [95 %-100 %] 97 %    General: AAOx4, NAD, sitting comfortably in chair  HEENT/chest: neck incision c/d/i with bruising and staples, pharyngostomy in place with clear output, prior spit fistula site in low chest with Kerlix removed (quite wet with s/s drainage) and repacked, sternotomy incision dressing c/d/i  CV: regular rate, warm, well-perfused  Pulm: no dyspnea, breathing comfortably on NC  Abd: soft, non-distended, appropriately tender, incision dressing c/d/i  Extremities: no edema  Neuro: moving all extremities spontaneously without apparent deficit    I/O last 3 completed shifts:  In: 5374.21 [I.V.:3324.21; NG/GT:50; IV Piggyback:1000]  Out: 785 [Urine:535; Blood:250]    Labs:  Recent Labs   Lab 04/27/19 0421 04/26/19 1810 04/26/19  1520   WBC 16.2* 10.8  --    HGB 8.0* 9.2* 9.8*    367  --      Recent Labs   Lab 04/27/19 0421 04/26/19 1810 04/26/19  1520    142 145*   POTASSIUM 4.0 4.0 3.7   CHLORIDE 110* 110*  --    CO2 27 25  --    BUN 30 30  --    CR 1.03 1.02  --    * 188* 146*   JOSE 7.4* 7.4*  --      Imaging:  CXR pending      Assessment & Plan  Myrtle Vaz is a 73 year old female with a 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 Albarado.    - Recommend gentle fluid  boluses with albumin PRN.  - Wean pressors as able per SICU. Agree with PICC given pressors still needed, will also allow for frequent blood draws.  - Pharyngostomy to LIS, pharyngostomy tube cares.  - Ok to advance TFs to 10 ml/hr via J, but no further than that today. STRICT NPO until at least POD#7, when swallow/esophagram planned.  - CXR daily to assess for conduit distention.  - PT/OT/OOB as much as possible.  - Heparin subcutaneous ppx to start today.  - Remainder per SICU, appreciate cares.      Patient seen and discussed with fellow and will be discussed with staff.  - - - - - - - - - - - - - - - - - -  Kamila Morrison MD  General Surgery PGY-3  See [facility] for on call information prior to paging me directly. Pager [phone].

## 2019-04-27 NOTE — PROGRESS NOTES
"REGIONAL ANESTHESIA PAIN SERVICE CONTINUOUS NERVE INFUSION NOTE  Subjective and Interval History: Pt reports moderate pain control via nerve block continuous infusion and PCA..  Denies any weakness, paresthesias, circumoral numbness, metallic taste or tinnitus.  Pt is not ambulating with assistance.  Patient currently denies nausea or vomiting.      Comfort (How is your pain?): Tolerable with discomfort  Change in Pain (Since your last medication/intervention?): About the same  Pain Control (How are your pain treatments working?): Partially effective pain control  Functioning (Are you able to do activities to get better?) : Pain keeps me from doing most of what I need to do  Sleep (Does your pain management allow you to sleep or rest?): Awake with occasional pain          OBJECTIVE:  Blood pressure 101/52, pulse 73, temperature 36.6  C (97.9  F), temperature source Oral, resp. rate 14, height 1.651 m (5' 5\"), weight 81.6 kg (179 lb 14.3 oz), SpO2 94 %.     Exam:   Strength 5/5 and symmetric grossly in bilateral LE .    Did not evaluate insertion site due to pain  Patient has a T3-7 level on the left, T3-5 level on the right with the catheter running at 66 cc/h.     A/P:  Postop day 1 s/p esophagogastrectomy and placement of T8-9 epidural catheter for analgesia.  Pt is receiving adequate analgesia with epidural catheter and PCA.    Patient is not yet ambulating.  No weakness or paresthesias, moderately adequate sensory block.  Unfortunately, the patient has a map of 65 on phenylephrine infusion at this time, and bolusing the epidural, or increasing the rate is probably not worth the increase in vasopressor requirement, unless the patient's pain were to be very poorly controlled.  Once her blood pressure improves, the continuous rate could be increased to provide more dermatomal levels of coverage. No evidence of adverse side effects associated with local anesthetic.      -Hold off on boluses at this time given low " blood pressure requiring phenylephrine infusion  -Continue current epidural rate at 6 cc/h of 0.125% bupivacaine  -- pt can receive local anesthetic bolus Q 12 hr PRN once blood pressure has stabilized and no longer requiring phenylephrine infusion , bedside nurse must contact [facility] jobcode pager [phone]  - Anticoagulation: Heparin 3 times daily, please contact [facility] if dose or medication is changed  - will continue to follow and adjust as needed  - discussed plan with attending anesthesiologist     Sundeep Causey MD  CA-3/PGY-4 Anesthesiology  [phone]     24 hour Job Code Pager.  For in-house use only.     [place]:  * * [phone]  [place]: * * [phone]  Peds: * * [phone]  Enter call-back number and #      This pager only accepts text messages through [facility]

## 2019-04-27 NOTE — PLAN OF CARE
4AB-PT: CXL: PT consult received and appreciated. Pt working with OT this a.m. And upon check in this PM with other provider and then planned x-ray. CXL PT today. Will continue to follow and initiate as appropriate.

## 2019-04-27 NOTE — PROGRESS NOTES
CLINICAL NUTRITION SERVICES - ASSESSMENT NOTE     Nutrition Prescription    RECOMMENDATIONS FOR MDs/PROVIDERS TO ORDER:  1. Per MD discretion, recommend adv Peptamen 1.5 to goal 55 ml/hr (1320 ml/day) to provide 1980 kcals (30 kcal/kg/day), 90 g PRO (1.4 g/kg/day), 1016 ml free H2O, 74 g Fat (70% from MCTs), 248 g CHO and no Fiber daily. --> Meets 100% minimum estimated needs    Malnutrition Status:    Unable to assess d/t pt having PICC placed bedside    Recommendations already ordered by Registered Dietitian (RD):  1. Continue TF as ordered per provider. See above for goal rate recommendation.  2. Ordered multivitamin/mineral (15 ml/day via FT) to help ensure micronutrient needs being met with suspected hypermetabolic demands and potential interruptions to TF infusions.    Future/Additional Recommendations:  -Monitor TF advancement to goal rate per MD permission.  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 Admitted to the SICU post-operatively for close hemodynamic monitoring. Requiring pressor, likely hypovolemic, possible contribution of epidural catheter.     NUTRITION HISTORY  Unable to meet with pt d/t PICC being placed.  Per RD note from 2/6/19, home TF regimen was modified to cycled PEG feeds of IsoSource 1.5 @ 85 mL/hr x 14 hrs (6pm - 8am)= 1785 kcals (28  kcal/kg/day), 81 gm PRO (1.3 gm/kg/day), 903 ml free H2O, 209 gm CHO and 18  "gm Fiber daily) + 1 pkt Prosource Protein daily (additional 40 kcal and 11 gm protein) + FWF @ 40 ml/hr overnight with TF infusion +  120 ml free water flushes before and after each cycle to provide for full hydration needs, per chart review.     CURRENT NUTRITION ORDERS  Diet: NPO --> per Thoracic note 4/27: STRICT NPO until at least POD#7, when swallow/esophagram planned.    Nutrition Support (ordered by team): Peptamen 1.5 @ 10 mL/hr - Per Thoracic note 4/27: Ok to advance TFs to 10 ml/hr via J, but no further than that today    Intake/Tolerance: Pt reports feeling hungry/wanting to know when can eat per chart review.    LABS  Labs reviewed  -K 4.0 (WNL)    MEDICATIONS  Medications reviewed  -Senna-docusate  -Miralax    ANTHROPOMETRICS  Height: 165.1 cm (5' 5\")  Most Recent Weight: 81.6 kg (179 lb 14.3 oz) on 4/27    IBW: 61.8 kg (132% IBW)  BMI: 29.94 kg/m2; Overweight BMI 25-29.9  Weight History: Wt appears to have downtrended (5%/9 lbs)over past couple months per wt hx below .   Wt Readings from Last 20 Encounters:   04/27/19 81.6 kg (179 lb 14.3 oz)   04/23/19 78 kg (171 lb 14.4 oz)   04/07/19 81.6 kg (180 lb)   03/27/19 81.7 kg (180 lb 1.6 oz)   02/11/19 85.1 kg (187 lb 9.8 oz)   02/05/19 85.1 kg (187 lb 9.6 oz)   01/10/19 84.9 kg (187 lb 1.6 oz)   12/06/18 93.8 kg (206 lb 11.2 oz)   Dosing Weight: 66 kg (adjusted, based on admission/dry wt of 76.9 kg on 4/26 and IBW of 61.8 kg)    ASSESSED NUTRITION NEEDS  Estimated Energy Needs: 8242-7979 kcals/day (25 - 30 kcals/kg)  Justification: Post-op  Estimated Protein Needs: 79-99 grams protein/day (1.2 - 1.5+ grams of pro/kg)  Justification: Post-op  Estimated Fluid Needs: 1 mL/kcal  Justification: Maintenance and Per provider pending fluid status    PHYSICAL FINDINGS  See malnutrition section below.  -PEG (LUQ) and PEJ (LLQ) per LDAs --> PEG is used for TF    MALNUTRITION  % Intake: Unable to assess  % Weight Loss: Up to 5% in 1 month (non-severe)  Subcutaneous " Fat Loss: Unable to assess  Muscle Loss: Unable to assess  Fluid Accumulation/Edema: None noted per chart  Malnutrition Diagnosis: Unable to determine due to pt having PICC placed bedside    NUTRITION DIAGNOSIS  Altered GI function related to NPO s/p redo laparotomy and esophagogastostomy as evidenced by NPO x at least 7 days post-op per primary team and continued need for enteral nutrition to meet needs until PO is adequate.      INTERVENTIONS  Implementation  -Nutrition Education: Unable to complete due to pt busy with PICC placement.   -Enteral Nutrition - Recs for goal rate as above.  -Multivitamin/mineral supplement therapy - Certavite    Goals  Total avg nutritional intake to meet a minimum of 25 kcal/kg and 1.2 g PRO/kg daily (per dosing wt 66 kg).     Monitoring/Evaluation  Progress toward goals will be monitored and evaluated per protocol.    Sandie Lopez RD, LD  Weekend Pager: [phone]

## 2019-04-27 NOTE — OR NURSING
Augustine TEMPLE RN called report to floor, will watch patient while waiting for room to be cleaned.

## 2019-04-28 ENCOUNTER — APPOINTMENT (OUTPATIENT)
Dept: GENERAL RADIOLOGY | Facility: CLINIC | Age: 74
DRG: 326 | End: 2019-04-28
Attending: THORACIC SURGERY (CARDIOTHORACIC VASCULAR SURGERY)
Payer: MEDICARE

## 2019-04-28 ENCOUNTER — APPOINTMENT (OUTPATIENT)
Dept: PHYSICAL THERAPY | Facility: CLINIC | Age: 74
DRG: 326 | End: 2019-04-28
Attending: STUDENT IN AN ORGANIZED HEALTH CARE EDUCATION/TRAINING PROGRAM
Payer: MEDICARE

## 2019-04-28 LAB
ABO + RH BLD: NORMAL
ABO + RH BLD: NORMAL
ANION GAP SERPL CALCULATED.3IONS-SCNC: 5 MMOL/L (ref 3–14)
APTT PPP: 47 SEC (ref 22–37)
BASE EXCESS BLDA CALC-SCNC: 3 MMOL/L
BASE EXCESS BLDV CALC-SCNC: 3.1 MMOL/L
BLD GP AB SCN SERPL QL: NORMAL
BLD PROD TYP BPU: NORMAL
BLD PROD TYP BPU: NORMAL
BLD UNIT ID BPU: 0
BLOOD BANK CMNT PATIENT-IMP: NORMAL
BLOOD PRODUCT CODE: NORMAL
BPU ID: NORMAL
BUN SERPL-MCNC: 23 MG/DL (ref 7–30)
CALCIUM SERPL-MCNC: 7.3 MG/DL (ref 8.5–10.1)
CHLORIDE SERPL-SCNC: 110 MMOL/L (ref 94–109)
CO2 SERPL-SCNC: 28 MMOL/L (ref 20–32)
CREAT SERPL-MCNC: 0.89 MG/DL (ref 0.52–1.04)
ERYTHROCYTE [DISTWIDTH] IN BLOOD BY AUTOMATED COUNT: 20.4 % (ref 10–15)
GFR SERPL CREATININE-BSD FRML MDRD: 64 ML/MIN/{1.73_M2}
GLUCOSE BLDC GLUCOMTR-MCNC: 82 MG/DL (ref 70–99)
GLUCOSE SERPL-MCNC: 85 MG/DL (ref 70–99)
HCO3 BLD-SCNC: 29 MMOL/L (ref 21–28)
HCO3 BLDV-SCNC: 29 MMOL/L (ref 21–28)
HCT VFR BLD AUTO: 21.4 % (ref 35–47)
HGB BLD-MCNC: 6.2 G/DL (ref 11.7–15.7)
HGB BLD-MCNC: 6.2 G/DL (ref 11.7–15.7)
HGB BLD-MCNC: 7.7 G/DL (ref 11.7–15.7)
INR PPP: 1.8 (ref 0.86–1.14)
MAGNESIUM SERPL-MCNC: 2 MG/DL (ref 1.6–2.3)
MCH RBC QN AUTO: 27.8 PG (ref 26.5–33)
MCHC RBC AUTO-ENTMCNC: 29 G/DL (ref 31.5–36.5)
MCV RBC AUTO: 96 FL (ref 78–100)
NUM BPU REQUESTED: 2
O2/TOTAL GAS SETTING VFR VENT: 15 %
O2/TOTAL GAS SETTING VFR VENT: 15 %
PCO2 BLD: 52 MM HG (ref 35–45)
PCO2 BLDV: 49 MM HG (ref 40–50)
PH BLD: 7.35 PH (ref 7.35–7.45)
PH BLDV: 7.38 PH (ref 7.32–7.43)
PHOSPHATE SERPL-MCNC: 2.1 MG/DL (ref 2.5–4.5)
PLATELET # BLD AUTO: 259 10E9/L (ref 150–450)
PO2 BLD: 32 MM HG (ref 80–105)
PO2 BLDV: 73 MM HG (ref 25–47)
POTASSIUM SERPL-SCNC: 3.5 MMOL/L (ref 3.4–5.3)
POTASSIUM SERPL-SCNC: 3.6 MMOL/L (ref 3.4–5.3)
RBC # BLD AUTO: 2.23 10E12/L (ref 3.8–5.2)
SODIUM SERPL-SCNC: 144 MMOL/L (ref 133–144)
SPECIMEN EXP DATE BLD: NORMAL
TRANSFUSION STATUS PATIENT QL: NORMAL
TRANSFUSION STATUS PATIENT QL: NORMAL
WBC # BLD AUTO: 13.5 10E9/L (ref 4–11)

## 2019-04-28 PROCEDURE — 99291 CRITICAL CARE FIRST HOUR: CPT | Mod: GC | Performed by: ANESTHESIOLOGY

## 2019-04-28 PROCEDURE — 97110 THERAPEUTIC EXERCISES: CPT | Mod: GP | Performed by: PHYSICAL THERAPIST

## 2019-04-28 PROCEDURE — 25800030 ZZH RX IP 258 OP 636: Performed by: STUDENT IN AN ORGANIZED HEALTH CARE EDUCATION/TRAINING PROGRAM

## 2019-04-28 PROCEDURE — A9270 NON-COVERED ITEM OR SERVICE: HCPCS | Performed by: SURGERY

## 2019-04-28 PROCEDURE — 20000004 ZZH R&B ICU UMMC

## 2019-04-28 PROCEDURE — 25000128 H RX IP 250 OP 636

## 2019-04-28 PROCEDURE — 25800030 ZZH RX IP 258 OP 636: Performed by: THORACIC SURGERY (CARDIOTHORACIC VASCULAR SURGERY)

## 2019-04-28 PROCEDURE — 25000128 H RX IP 250 OP 636: Performed by: STUDENT IN AN ORGANIZED HEALTH CARE EDUCATION/TRAINING PROGRAM

## 2019-04-28 PROCEDURE — P9016 RBC LEUKOCYTES REDUCED: HCPCS | Performed by: THORACIC SURGERY (CARDIOTHORACIC VASCULAR SURGERY)

## 2019-04-28 PROCEDURE — 40000275 ZZH STATISTIC RCP TIME EA 10 MIN

## 2019-04-28 PROCEDURE — 25800030 ZZH RX IP 258 OP 636

## 2019-04-28 PROCEDURE — 25000132 ZZH RX MED GY IP 250 OP 250 PS 637: Performed by: SURGERY

## 2019-04-28 PROCEDURE — 86900 BLOOD TYPING SEROLOGIC ABO: CPT | Performed by: THORACIC SURGERY (CARDIOTHORACIC VASCULAR SURGERY)

## 2019-04-28 PROCEDURE — 85027 COMPLETE CBC AUTOMATED: CPT | Performed by: THORACIC SURGERY (CARDIOTHORACIC VASCULAR SURGERY)

## 2019-04-28 PROCEDURE — 71045 X-RAY EXAM CHEST 1 VIEW: CPT

## 2019-04-28 PROCEDURE — 25000125 ZZHC RX 250: Performed by: SURGERY

## 2019-04-28 PROCEDURE — 84100 ASSAY OF PHOSPHORUS: CPT | Performed by: STUDENT IN AN ORGANIZED HEALTH CARE EDUCATION/TRAINING PROGRAM

## 2019-04-28 PROCEDURE — 25000132 ZZH RX MED GY IP 250 OP 250 PS 637: Performed by: STUDENT IN AN ORGANIZED HEALTH CARE EDUCATION/TRAINING PROGRAM

## 2019-04-28 PROCEDURE — 84132 ASSAY OF SERUM POTASSIUM: CPT | Performed by: STUDENT IN AN ORGANIZED HEALTH CARE EDUCATION/TRAINING PROGRAM

## 2019-04-28 PROCEDURE — 85018 HEMOGLOBIN: CPT | Performed by: SURGERY

## 2019-04-28 PROCEDURE — 94640 AIRWAY INHALATION TREATMENT: CPT

## 2019-04-28 PROCEDURE — 40000983 ZZH STATISTIC HFNC ADULT NON-CPAP

## 2019-04-28 PROCEDURE — 85610 PROTHROMBIN TIME: CPT | Performed by: STUDENT IN AN ORGANIZED HEALTH CARE EDUCATION/TRAINING PROGRAM

## 2019-04-28 PROCEDURE — 25000125 ZZHC RX 250

## 2019-04-28 PROCEDURE — 86850 RBC ANTIBODY SCREEN: CPT | Performed by: THORACIC SURGERY (CARDIOTHORACIC VASCULAR SURGERY)

## 2019-04-28 PROCEDURE — A9270 NON-COVERED ITEM OR SERVICE: HCPCS

## 2019-04-28 PROCEDURE — 86900 BLOOD TYPING SEROLOGIC ABO: CPT | Performed by: SURGERY

## 2019-04-28 PROCEDURE — 82803 BLOOD GASES ANY COMBINATION: CPT | Performed by: SURGERY

## 2019-04-28 PROCEDURE — 86901 BLOOD TYPING SEROLOGIC RH(D): CPT | Performed by: THORACIC SURGERY (CARDIOTHORACIC VASCULAR SURGERY)

## 2019-04-28 PROCEDURE — 36600 WITHDRAWAL OF ARTERIAL BLOOD: CPT

## 2019-04-28 PROCEDURE — 97162 PT EVAL MOD COMPLEX 30 MIN: CPT | Mod: GP | Performed by: PHYSICAL THERAPIST

## 2019-04-28 PROCEDURE — 25000132 ZZH RX MED GY IP 250 OP 250 PS 637

## 2019-04-28 PROCEDURE — 80048 BASIC METABOLIC PNL TOTAL CA: CPT | Performed by: THORACIC SURGERY (CARDIOTHORACIC VASCULAR SURGERY)

## 2019-04-28 PROCEDURE — A9270 NON-COVERED ITEM OR SERVICE: HCPCS | Performed by: STUDENT IN AN ORGANIZED HEALTH CARE EDUCATION/TRAINING PROGRAM

## 2019-04-28 PROCEDURE — 83735 ASSAY OF MAGNESIUM: CPT | Performed by: STUDENT IN AN ORGANIZED HEALTH CARE EDUCATION/TRAINING PROGRAM

## 2019-04-28 PROCEDURE — 00000146 ZZHCL STATISTIC GLUCOSE BY METER IP

## 2019-04-28 PROCEDURE — 85018 HEMOGLOBIN: CPT | Performed by: STUDENT IN AN ORGANIZED HEALTH CARE EDUCATION/TRAINING PROGRAM

## 2019-04-28 PROCEDURE — 85730 THROMBOPLASTIN TIME PARTIAL: CPT | Performed by: STUDENT IN AN ORGANIZED HEALTH CARE EDUCATION/TRAINING PROGRAM

## 2019-04-28 PROCEDURE — 86923 COMPATIBILITY TEST ELECTRIC: CPT | Performed by: THORACIC SURGERY (CARDIOTHORACIC VASCULAR SURGERY)

## 2019-04-28 PROCEDURE — 97530 THERAPEUTIC ACTIVITIES: CPT | Mod: GP | Performed by: PHYSICAL THERAPIST

## 2019-04-28 RX ORDER — POTASSIUM CHLORIDE 29.8 MG/ML
20 INJECTION INTRAVENOUS
Status: DISCONTINUED | OUTPATIENT
Start: 2019-04-28 | End: 2019-05-15 | Stop reason: HOSPADM

## 2019-04-28 RX ORDER — POTASSIUM CHLORIDE 750 MG/1
20-40 TABLET, EXTENDED RELEASE ORAL
Status: DISCONTINUED | OUTPATIENT
Start: 2019-04-28 | End: 2019-05-15 | Stop reason: HOSPADM

## 2019-04-28 RX ORDER — POTASSIUM CHLORIDE 7.45 MG/ML
10 INJECTION INTRAVENOUS
Status: DISCONTINUED | OUTPATIENT
Start: 2019-04-28 | End: 2019-05-15 | Stop reason: HOSPADM

## 2019-04-28 RX ORDER — FUROSEMIDE 10 MG/ML
10 INJECTION INTRAMUSCULAR; INTRAVENOUS ONCE
Status: DISCONTINUED | OUTPATIENT
Start: 2019-04-28 | End: 2019-04-28

## 2019-04-28 RX ORDER — FUROSEMIDE 10 MG/ML
40 INJECTION INTRAMUSCULAR; INTRAVENOUS ONCE
Status: COMPLETED | OUTPATIENT
Start: 2019-04-28 | End: 2019-04-28

## 2019-04-28 RX ORDER — POTASSIUM CL/LIDO/0.9 % NACL 10MEQ/0.1L
10 INTRAVENOUS SOLUTION, PIGGYBACK (ML) INTRAVENOUS
Status: DISCONTINUED | OUTPATIENT
Start: 2019-04-28 | End: 2019-05-15 | Stop reason: HOSPADM

## 2019-04-28 RX ORDER — IPRATROPIUM BROMIDE AND ALBUTEROL SULFATE 2.5; .5 MG/3ML; MG/3ML
3 SOLUTION RESPIRATORY (INHALATION) ONCE
Status: COMPLETED | OUTPATIENT
Start: 2019-04-28 | End: 2019-04-28

## 2019-04-28 RX ORDER — POTASSIUM CHLORIDE 1.5 G/1.58G
20-40 POWDER, FOR SOLUTION ORAL
Status: DISCONTINUED | OUTPATIENT
Start: 2019-04-28 | End: 2019-05-15 | Stop reason: HOSPADM

## 2019-04-28 RX ORDER — MAGNESIUM SULFATE HEPTAHYDRATE 40 MG/ML
4 INJECTION, SOLUTION INTRAVENOUS EVERY 4 HOURS PRN
Status: DISCONTINUED | OUTPATIENT
Start: 2019-04-28 | End: 2019-05-15 | Stop reason: HOSPADM

## 2019-04-28 RX ADMIN — CHLORHEXIDINE GLUCONATE AND ISOPROPYL ALCOHOL 3 ML: 20; .7 SOLUTION TOPICAL at 19:25

## 2019-04-28 RX ADMIN — POTASSIUM PHOSPHATE, MONOBASIC AND POTASSIUM PHOSPHATE, DIBASIC 15 MMOL: 224; 236 INJECTION, SOLUTION INTRAVENOUS at 22:01

## 2019-04-28 RX ADMIN — BACITRACIN: 500 OINTMENT TOPICAL at 19:25

## 2019-04-28 RX ADMIN — SENNOSIDES AND DOCUSATE SODIUM 2 TABLET: 8.6; 5 TABLET ORAL at 08:38

## 2019-04-28 RX ADMIN — SENNOSIDES AND DOCUSATE SODIUM 2 TABLET: 8.6; 5 TABLET ORAL at 19:24

## 2019-04-28 RX ADMIN — CHLORHEXIDINE GLUCONATE 0.12% ORAL RINSE 30 ML: 1.2 LIQUID ORAL at 08:38

## 2019-04-28 RX ADMIN — CHLORHEXIDINE GLUCONATE AND ISOPROPYL ALCOHOL 3 ML: 20; .7 SOLUTION TOPICAL at 08:39

## 2019-04-28 RX ADMIN — ACETAMINOPHEN 975 MG: 325 TABLET, FILM COATED ORAL at 06:20

## 2019-04-28 RX ADMIN — VENLAFAXINE 75 MG: 75 TABLET ORAL at 08:38

## 2019-04-28 RX ADMIN — CHLORHEXIDINE GLUCONATE 0.12% ORAL RINSE 30 ML: 1.2 LIQUID ORAL at 19:24

## 2019-04-28 RX ADMIN — BACITRACIN: 500 OINTMENT TOPICAL at 08:39

## 2019-04-28 RX ADMIN — BUPIVACAINE HYDROCHLORIDE: 7.5 INJECTION, SOLUTION EPIDURAL; RETROBULBAR at 01:35

## 2019-04-28 RX ADMIN — ACETAMINOPHEN 975 MG: 325 TABLET, FILM COATED ORAL at 14:15

## 2019-04-28 RX ADMIN — PANTOPRAZOLE SODIUM 40 MG: 40 TABLET, DELAYED RELEASE ORAL at 08:38

## 2019-04-28 RX ADMIN — FUROSEMIDE 40 MG: 10 INJECTION, SOLUTION INTRAVENOUS at 09:16

## 2019-04-28 RX ADMIN — LEVOTHYROXINE SODIUM 150 MCG: 75 TABLET ORAL at 08:38

## 2019-04-28 RX ADMIN — SODIUM CHLORIDE, POTASSIUM CHLORIDE, SODIUM LACTATE AND CALCIUM CHLORIDE: 600; 310; 30; 20 INJECTION, SOLUTION INTRAVENOUS at 02:29

## 2019-04-28 RX ADMIN — HEPARIN SODIUM 5000 UNITS: 5000 INJECTION, SOLUTION INTRAVENOUS; SUBCUTANEOUS at 13:04

## 2019-04-28 RX ADMIN — VENLAFAXINE 75 MG: 75 TABLET ORAL at 19:24

## 2019-04-28 RX ADMIN — PHENYLEPHRINE HYDROCHLORIDE 1.2 MCG/KG/MIN: 10 INJECTION, SOLUTION INTRAMUSCULAR; INTRAVENOUS; SUBCUTANEOUS at 02:30

## 2019-04-28 RX ADMIN — IPRATROPIUM BROMIDE AND ALBUTEROL SULFATE 3 ML: .5; 3 SOLUTION RESPIRATORY (INHALATION) at 04:37

## 2019-04-28 RX ADMIN — POLYETHYLENE GLYCOL 3350 17 G: 17 POWDER, FOR SOLUTION ORAL at 08:38

## 2019-04-28 RX ADMIN — HEPARIN SODIUM 5000 UNITS: 5000 INJECTION, SOLUTION INTRAVENOUS; SUBCUTANEOUS at 19:24

## 2019-04-28 RX ADMIN — Medication 200 MG: at 08:39

## 2019-04-28 RX ADMIN — ACETAMINOPHEN 975 MG: 325 TABLET, FILM COATED ORAL at 22:01

## 2019-04-28 ASSESSMENT — ACTIVITIES OF DAILY LIVING (ADL)
ADLS_ACUITY_SCORE: 19

## 2019-04-28 ASSESSMENT — PAIN DESCRIPTION - DESCRIPTORS
DESCRIPTORS: DISCOMFORT;TENDER
DESCRIPTORS: DISCOMFORT

## 2019-04-28 ASSESSMENT — MIFFLIN-ST. JEOR: SCORE: 1344.88

## 2019-04-28 NOTE — PLAN OF CARE
[id number]:  Neuro: Lethargic throughout shift, but wakening easily to voice. Forgetful at times, repeating conversations/questions. PCA dilaudid now at 0.1mg bumps; pt using infrequently. Epidural bupivicaine continues at 6mL/hr; pt with intermittent tremor (MD aware).  CV: Doni titrated to maintain MAP > 65; adjusted between 0.5 to 2 mcg/kg/min. Given 500cc albumin for increasing pressor needs/decreasing UOP. HR 70s, sinus with no ectopy. Hemoglobin 6.2 this AM; plan for 1 unit RBC followed by Lasix.  Resp: Increasing O2 needs overnight despite encouraging aggressive pulmonary toilet. Pt with weak nonproductive cough. Lung sounds diminished. Chest xray (portable) completed. Currently on HFNC with sats in mid 90s.  GI: NPO. TF at 10mL/hr via J tube.  : UOP improved following bolus.  Skin: No acute concerns. Incision covered with small amount serosanguinous drainage.  Lines: PICC with LR at 100mL/hr, PCA dilaudid, doni gtt. PIV x2 SL.  Drains: Pharyngostomy irrigated with NS per order, scant output.  Mobility: Assisted to turn q2h. Sternal precautions maintained.  Plan: Monitor closely and notify provider of any changes or concerns. Encourage activity and pulmonary toilet as tolerated.

## 2019-04-28 NOTE — PLAN OF CARE
Discharge Planner PT   Patient plan for discharge: TBD, aware rehab stay likely needed, notes she has been to [facility] before. Wants to learn more about insurance coverage.  Current status: PT 4A: PT eval complete, treatment provided. Instructed pt in LE ex to reduce loss of ROM and strength with hospitalization. Reinforced sternal precautions throughout visit, pt required PAPO x 2 sit<>stand, presented with unsteady standing balance, knee buckling and sudden fatigue standing < 30 sec. Pt required MAXA x 2 sit>supine and total assist to boost to reposition. Vitals prior to transferring were: /52, O2 91% on FiO2 50% on HFNC, HR 74 bpm. With return to supine BP 96/49, other VSS.  Barriers to return to prior living situation: medical status, requires 2 person assist for bed mobility, transfers, has reduced standing tolerance, LE weakness, unable to yet walk < bed to chair distances (< 3 ft).  Recommendations for discharge: rehab, TCU vs ARU   Rationale for recommendations: due to below baseline functional status post-op, recommend rehab setting to maximize functional return       Entered by: Nhi Torres 04/28/2019 12:19 PM

## 2019-04-28 NOTE — PROGRESS NOTES
04/28/19 1100   Quick Adds   Type of Visit Initial PT Evaluation   Living Environment   Lives With alone   Living Arrangements house   Home Accessibility stairs to enter home   Number of Stairs, Main Entrance 1   Living Environment Comment Pt moved a few years back to reduce challenges with living situation, now is , her children live close by and are able to assist as needed per report   Self-Care   Usual Activity Tolerance moderate   Current Activity Tolerance fair  (to poor)   Equipment Currently Used at Home grab bar, tub/shower;grab bar, toilet   Functional Level Prior   Ambulation 1-->assistive equipment   Transferring 1-->assistive equipment   Toileting 1-->assistive equipment   Bathing 1-->assistive equipment   Communication 0-->understands/communicates without difficulty   Swallowing 0-->swallows foods/liquids without difficulty   Cognition 1 - attention or memory deficits  (reports some memory concerns at baseline)   Fall history within last six months no   Which of the above functional risks had a recent onset or change? none   Prior Functional Level Comment Pt reports she has been using walker lately since having catheter bag to hold catheter   General Information   Onset of Illness/Injury or Date of Surgery - Date 04/26/19   Referring Physician Michelle Jimenez MD   Patient/Family Goals Statement To recover.    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 Albarado. PICC 4/27/2019. PMH includes HTN, afib.    Precautions/Limitations fall precautions;oxygen therapy device and L/min;sternal precautions   General Info Comments activity: ambulate with assist TID   Cognitive Status Examination   Level of Consciousness alert  (but at times appeared  "drowsy/fatigued)   Follows Commands and Answers Questions 100% of the time   Personal Safety and Judgment intact   Cognitive Comment pt reports her memory is worse now than usual but per pt \"my memory wasnt great before\"   Pain Assessment   Patient Currently in Pain No   Posture    Posture Comments reduced upright posture standing noted, but due to line (temporary) restrictions mainly   Range of Motion (ROM)   ROM Comment no major concerns with LE strength noted, no major UE restrictions noted, however did not formally assess   Strength   Strength Comments functional strength reduced, see below   Bed Mobility   Bed Mobility Comments MAXA x 2 sit>supine per sternal precautions   Transfer Skills   Transfer Comments pt required PAPO of 2 sit<>stand, ongoing UE support standing   Gait   Gait Comments pt able to side-step, but unsteady with PAPO, slight knee buckling noted   Balance   Balance Comments pt with reduced standing dynamic balance, sudden need to sit down with knee buckle episode, unable to perform SLS due to balance deficits   General Therapy Interventions   Planned Therapy Interventions bed mobility training;gait training;neuromuscular re-education;ROM;strengthening;stretching;progressive activity/exercise;transfer training   Clinical Impression   Criteria for Skilled Therapeutic Intervention yes, treatment indicated   PT Diagnosis impaired mobility   Influenced by the following impairments reduced strength, balance, activity tolerance   Functional limitations due to impairments below baseline bed mobility, transfers, gait   Clinical Presentation Evolving/Changing   Clinical Presentation Rationale clinical judgement   Clinical Decision Making (Complexity) Moderate complexity   Therapy Frequency` daily   Predicted Duration of Therapy Intervention (days/wks) 1 week   Anticipated Discharge Disposition Transitional Care Facility;Acute Rehabilitation Facility   Risk & Benefits of therapy have been explained Yes " "  Patient, Family & other staff in agreement with plan of care Yes   [facility] AM-PAC TM \"6 Clicks\"   2016, Trustees of [facility], under license to [employer].  All rights reserved.   6 Clicks Short Forms Basic Mobility Inpatient Short Form   [facility] AM-PAC  \"6 Clicks\" V.2 Basic Mobility Inpatient Short Form   1. Turning from your back to your side while in a flat bed without using bedrails? 3 - A Little   2. Moving from lying on your back to sitting on the side of a flat bed without using bedrails? 2 - A Lot   3. Moving to and from a bed to a chair (including a wheelchair)? 2 - A Lot   4. Standing up from a chair using your arms (e.g., wheelchair, or bedside chair)? 2 - A Lot   5. To walk in hospital room? 1 - Total   6. Climbing 3-5 steps with a railing? 1 - Total   Basic Mobility Raw Score (Score out of 24.Lower scores equate to lower levels of function) 11   Total Evaluation Time   Total Evaluation Time (Minutes) 9     "

## 2019-04-28 NOTE — PROGRESS NOTES
THORACIC & FOREGUT SURGERY PROGRESS NOTE  04/28/2019    Patient: Myrtle Vaz  MRN: [id number]    Subjective  No acute overnight events. Given  x1 and albumin 500 x2 yesterday. Dramatically increased O2 needs overnight (from ~12 AM to 3 AM) - 4L NC to 15L HFNC. Hgb 6.2 this morning. No tachycardia. Still on phenylephrine, needs generally stable. This morning, seen in bed. Reports feeling short of breath and hasn't been doing pulmonary toilet. Pain well controlled. No nausea. No BM/flatus.      Objective  Temp:  [97.7  F (36.5  C)-99  F (37.2  C)] 97.8  F (36.6  C)  Heart Rate:  [63-80] 67  Resp:  [12-20] 20  BP: ()/(41-90) 102/54  MAP:  [60 mmHg-86 mmHg] 60 mmHg  Arterial Line BP: ()/(29-77) 96/29  FiO2 (%):  [70 %] 70 %  SpO2:  [90 %-98 %] 92 %    General: AAOx4, appears fatigued, appears uncomfortable  HEENT/chest: neck incision c/d/i with bruising and staples, pharyngostomy in place with clear output, prior spit fistula site in low chest c/d/i, sternotomy incision dressing c/d/i  CV: regular rate, warm, well-perfused  Pulm: dyspneic on HFNC  Abd: soft, non-distended, appropriately tender, incision dressing c/d/i  Extremities: no edema  Neuro: moving all extremities spontaneously without apparent deficit    I/O last 3 completed shifts:  In: 4644.03 [I.V.:2454.03; NG/GT:230; IV Piggyback:500]  Out: 948 [Urine:793; Emesis/NG output:165]    Labs:  Recent Labs   Lab 04/28/19 0442 04/28/19 0313 04/27/19 0421 04/26/19 1810   WBC  --  13.5* 16.2* 10.8   HGB 6.2* 6.2* 8.0* 9.2*   PLT  --  259 335 367     Recent Labs   Lab 04/28/19 0313 04/27/19 0421 04/26/19  1810    144 142   POTASSIUM 3.6 4.0 4.0   CHLORIDE 110* 110* 110*   CO2 28 27 25   BUN 23 30 30   CR 0.89 1.03 1.02   GLC 85 112* 188*   JOSE 7.3* 7.4* 7.4*     Imaging:  Personally reviewed.  Impression:   1. Increased bibasilar opacities, likely representing pulmonary edema  versus atelectasis, less likely infection, aspiration.  2.  Increased pleural effusions.  3. Stable support devices.      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 Albarado. PICC 4/27/2019.    - Lasix 20 IV now with 2U PRBCs. Will likely need aggressive diuresis today given CXR with more edema.  - Consider CT PE if no improvement in O2 with aggressive diuresis today.  - Recommend turning off epidural to determine if this improves hypotension, as patient seems total volume up.   - Wean pressors as able per SICU.   - Pharyngostomy to LIS, pharyngostomy tube cares.  - TFs @ 20ml/hr. STRICT NPO until at least POD#7, when swallow/esophagram planned.  - CXR daily to assess for conduit distention.  - PT/OT/OOB as much as possible.  - Heparin subcutaneous ppx.  - Remainder per SICU, appreciate cares.      Patient seen and discussed with fellow and will be discussed with staff.  - - - - - - - - - - - - - - - - - -  Kamila Morrison MD  General Surgery PGY-3  See [facility] for on call information prior to paging me directly. Pager [phone].

## 2019-04-28 NOTE — PROGRESS NOTES
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 Admitted to the SICU post-operatively for close hemodynamic monitoring.     Changes today:  1u PRBCs this AM for hgb 6.2  Lasix 40mg IV once now   Repeat hgb now  Titrate O2 as able  Advance TFs to 20ml/hr      PLAN:  Neuro/ pain/ sedation:  # Acute post-surgical pain  - Monitor neurological status; notify the MD for any acute changes in exam  - Epidural catheter in place with bupivacaine  - Ok to increase bupivacaine up to 10ml/hr if needed per pain service  - Dilaudid PCA and oxycodone prn for pain    Pulmonary care:   - Supplemental oxygen to keep saturation above 92%  - Incentive spirometer [id number] when awake  - head of bed elevation to 30  - pharyngostomy tube set to low suction  - duonebs present    Cardiovascular:    # Chronic hypertension  # History of atrial fibrillation  - Monitor hemodynamic status  - Continue PTA amiodarone  - MAP goal>65  - Pressors weaned off this AM    GI care:   - NPO  - Bowel regimen  - TFs at 20ml/hr  - Protonix    Fluids/ Electrolytes/ Nutrition:   - LR maintainence fluid for IV fluid hydration  - No indication for parenteral nutrition    Renal/ Fluid Balance:    - Will continue to monitor intake and output    Endocrine:    # Stress-induced hyperglycemia  # Hypothyroidism  - Sliding scale for diabetes management   - Frequent glucose checks  - Resume PTA synthroid    ID/ Antibiotics:  - No indication for antibiotics    Heme:     #Acute blood loss anemia  - s/p 1u PRBCs this AM  - Monitor Hgb.     MSK:  - PT/OT    Psych:  # Depression  - Continue PTA venlafaxine    Prophylaxis:    - Mechanical prophylaxis for DVT   - heparin ppx    Lines/ tubes/  drains:  PICC, PIVs, Mora, J tube    Disposition:  - Surgical ICU    Patient was evaluated and plan was discussed with ICU staff, Dr. Cody.    Trudy Whyte MD, CA-1    - - - - - - - - - - - - - - - - - - - - - - - - - - - - - - - - - - - - - - - - - - - - - - - - - - - - - - - - -   Events overnight:  Oxygen requirements increased overnight after several IVF boluses. Patient states pain is well-controlled. Weaned off of phenylephrine this AM. Received 1u PRBCs for hemoglobin 6.2.    PHYSICAL EXAMINATION:  Temp:  [97.7  F (36.5  C)-99  F (37.2  C)] 97.8  F (36.6  C)  Heart Rate:  [63-80] 67  Resp:  [12-20] 20  BP: ()/(41-90) 102/54  MAP:  [60 mmHg-87 mmHg] 60 mmHg  Arterial Line BP: ()/(29-77) 96/29  FiO2 (%):  [70 %] 70 %  SpO2:  [90 %-99 %] 92 %  General: Alert, well-appearing, in no acute distress.  HEENT: Normocephalic, atraumatic. Moist mucous membranes.  Neck: Neck incision C/D. Spit fistula site open, packed. Some bruising, non-blanching erythema on chest. Swelling over manubriectomy site.   Respiratory: Non-labored breathing. Lung sounds coarse to auscultation bilaterally.   Cardiovascular: Regular rate and rhythm, no murmurs.  Gastrointestinal: Abdomen soft, non-distended. Appropriately tender to palpation. No rebound or guarding. J tube site clean and dry.  Extremities: No limb deformities. Warm and well perfused. No pedal edema.   Skin: As noted above. No rashes or lesions appreciated.    LABS: Reviewed.   Arterial Blood Gases   Recent Labs   Lab 04/28/19  0420 04/27/19  0406 04/26/19  1520 04/26/19  1318   PH 7.35 7.37 7.38 7.39   PCO2 52* 48* 45 43   PO2 32* 81 348* 166*   HCO3 29* 28 26 27     Complete Blood Count   Recent Labs   Lab 04/28/19  0442 04/28/19  0313 04/27/19  0421 04/26/19  1810   WBC  --  13.5* 16.2* 10.8   HGB 6.2* 6.2* 8.0* 9.2*   PLT  --  259 335 367     Basic Metabolic Panel  Recent Labs   Lab 04/28/19 0313 04/27/19  0421 04/26/19  1810 04/26/19  1520    144 142  145*   POTASSIUM 3.6 4.0 4.0 3.7   CHLORIDE 110* 110* 110*  --    CO2 28 27 25  --    BUN 23 30 30  --    CR 0.89 1.03 1.02  --    GLC 85 112* 188* 146*     Liver Function Tests  Recent Labs   Lab 04/27/19  0421   *   ALT 89*   ALKPHOS 76   BILITOTAL 0.4   ALBUMIN 2.5*       IMAGING:  Results for orders placed or performed during the hospital encounter of 04/07/19   XR Chest 2 Views    Narrative    Exam: XR CHEST 2 VW, 4/7/2019 8:32 PM    Indication: cough    Comparison: 2/19/2019    Findings:   PA and lateral views of the chest. Cardiac silhouette is within normal  limits. Basilar areas of linear atelectasis, similar to CT 2/4/2019.  Upper lobe predominant emphysematous changes. Mild peribronchial  cuffing. No pleural effusion or pneumothorax. No acute airspace  disease.      Impression    Impression:   1. Mild peribronchial cuffing, may be related to  infectious/inflammatory bronchitis.  2. Stable basilar opacity with atelectasis, not significantly changed  from prior exams.    I have personally reviewed the examination and initial interpretation  and I agree with the findings.    TANYA GAPSAR MD   Physician Attestation   I, Satish Cody, saw this patient with the resident and agree with the resident/fellow's findings and plan of care as documented in the note.      I personally reviewed vital signs, medications, labs and imaging.    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 Anemic requiring blood transfusion.    Satish Cody MD  Date of Service (when I saw the patient): 4/28/2019    Time Spent on this Encounter   Total Critical Care time spent by me, excluding procedures, was 30 minutes.    Satish Cody MD

## 2019-04-28 NOTE — PROGRESS NOTES
"[facility]AL ANESTHESIA PAIN SERVICE CONTINUOUS NERVE INFUSION NOTE  Subjective and Interval History: Pt reports moderate pain control via nerve block continuous infusion and PCA..  Denies any weakness, paresthesias, circumoral numbness, metallic taste or tinnitus.  Pt is not ambulating with assistance.  Patient currently denies nausea or vomiting.      Comfort (How is your pain?): Tolerable with discomfort  Change in Pain (Since your last medication/intervention?): About the same  Pain Control (How are your pain treatments working?): Partially effective pain control  Functioning (Are you able to do activities to get better?) : Pain keeps me from doing most of what I need to do  Sleep (Does your pain management allow you to sleep or rest?): Awake with occasional pain       OBJECTIVE:  Blood pressure 102/54, pulse 73, temperature 36.6  C (97.8  F), temperature source Oral, resp. rate 20, height 1.651 m (5' 5\"), weight 83.4 kg (183 lb 13.8 oz), SpO2 92 %.     Exam:   Strength 5/5 and symmetric grossly in bilateral LE .    Did not evaluate insertion site due to pain  Patient has a T3-7 level on the left, T3-5 level on the right with the catheter running at 6 cc/h.     A/P:  Postop day 2 s/p esophagogastrectomy and placement of T8-9 epidural catheter for analgesia.  Pt is receiving adequate analgesia with epidural catheter and PCA.   Patient is not yet ambulating.  No weakness or paresthesias, moderately adequate sensory block.  Unfortunately, the patient has a map of 65 on phenylephrine infusion at this time, and bolusing the epidural, or increasing the rate is probably not worth the increase in vasopressor requirement, unless the patient's pain were to be very poorly controlled.  She is also receiving PRBCs at this time.  Once her blood pressure improves, the continuous rate could be increased to provide more dermatomal levels of coverage. No evidence of adverse side effects associated with local anesthetic.      -Hold " off on boluses at this time given low blood pressure requiring phenylephrine infusion  -Continue current epidural rate at 6 cc/h of 0.125% bupivacaine  -- pt can receive local anesthetic bolus Q 12 hr PRN once blood pressure has stabilized and no longer requiring phenylephrine infusion , bedside nurse must contact [facility] jobcode pager [phone]  - Anticoagulation: Heparin 3 times daily, please contact [facility] if dose or medication is changed  - will continue to follow and adjust as needed  - discussed plan with attending anesthesiologist     Rober Roper MD  Anesthesiology, PGY3     24 hour Job Code Pager.  For in-house use only.     [place]:  * * [phone]  [place]: * * [phone]  Peds: * * [phone]  Enter call-back number and #      This pager only accepts text messages through [facility]

## 2019-04-28 NOTE — PROVIDER NOTIFICATION
On call SICU resident paged to notify them that pt is reporting that her speech is garbled and that her BUE and BLE have some twitching (appearing like a tremor). Also discussed that RN has been titrating pt's phenylephrine to maintain MAP at goal, now at 1.5mcg/kg/min and that UOP has been marginal, but close to goal. Asked provider if they would like to have anesthesia paged to assess if bupivicaine should be decreased.    Provider assessed pt at bedside, declines calling anesthesia at this time and states no new orders in regards to other concerns. Will continue to monitor pt closely and notify them of additional changes or concerns.    Addendum 2300: SICU resident paged and notified that pt's last hour UOP 25cc and that UOP since albumin bolus has been less than 40cc/hr (under 0.5mL/kg). Asked provider if they would like additional fluids. Will administer, if ordered.    Addendum 0330: Albumin bolus administered per order. Following bolus, UOP improved, though pt remains on 4-5L oxymask (MD updated throughout night). However, pt removed mask to perform incentive spirometer and O2 sats dropped to 80% on RA. Oxymask reapplied, titrating up in order to return O2 sats > 92%. Pt to 15L on oxymask with O2 sats only 94%, with frequent dips to 90%. Provider asked to evaluate pt at bedside. Vbg and Abg completed, RT to administer neb. Chest xray completed. Provider notified of critical results on Abg (pO2 32; provider believes is in error) and hemoglobin 6.2, consistent with recheck. Appears to be volume up in weight trending; plan for pRBC unit and lasix.

## 2019-04-29 ENCOUNTER — ANESTHESIA (OUTPATIENT)
Dept: SURGERY | Facility: CLINIC | Age: 74
DRG: 326 | End: 2019-04-29
Payer: MEDICARE

## 2019-04-29 ENCOUNTER — APPOINTMENT (OUTPATIENT)
Dept: GENERAL RADIOLOGY | Facility: CLINIC | Age: 74
DRG: 326 | End: 2019-04-29
Attending: THORACIC SURGERY (CARDIOTHORACIC VASCULAR SURGERY)
Payer: MEDICARE

## 2019-04-29 ENCOUNTER — APPOINTMENT (OUTPATIENT)
Dept: OCCUPATIONAL THERAPY | Facility: CLINIC | Age: 74
DRG: 326 | End: 2019-04-29
Attending: THORACIC SURGERY (CARDIOTHORACIC VASCULAR SURGERY)
Payer: MEDICARE

## 2019-04-29 LAB
ANION GAP SERPL CALCULATED.3IONS-SCNC: 6 MMOL/L (ref 3–14)
BUN SERPL-MCNC: 18 MG/DL (ref 7–30)
CALCIUM SERPL-MCNC: 7.4 MG/DL (ref 8.5–10.1)
CHLORIDE SERPL-SCNC: 107 MMOL/L (ref 94–109)
CO2 SERPL-SCNC: 29 MMOL/L (ref 20–32)
CREAT SERPL-MCNC: 0.86 MG/DL (ref 0.52–1.04)
ERYTHROCYTE [DISTWIDTH] IN BLOOD BY AUTOMATED COUNT: 19.6 % (ref 10–15)
GFR SERPL CREATININE-BSD FRML MDRD: 68 ML/MIN/{1.73_M2}
GLUCOSE BLDC GLUCOMTR-MCNC: 108 MG/DL (ref 70–99)
GLUCOSE SERPL-MCNC: 105 MG/DL (ref 70–99)
HCT VFR BLD AUTO: 24.9 % (ref 35–47)
HGB BLD-MCNC: 7.5 G/DL (ref 11.7–15.7)
MAGNESIUM SERPL-MCNC: 1.9 MG/DL (ref 1.6–2.3)
MCH RBC QN AUTO: 28.6 PG (ref 26.5–33)
MCHC RBC AUTO-ENTMCNC: 30.1 G/DL (ref 31.5–36.5)
MCV RBC AUTO: 95 FL (ref 78–100)
PHOSPHATE SERPL-MCNC: 2.7 MG/DL (ref 2.5–4.5)
PLATELET # BLD AUTO: 258 10E9/L (ref 150–450)
POTASSIUM SERPL-SCNC: 3.6 MMOL/L (ref 3.4–5.3)
RBC # BLD AUTO: 2.62 10E12/L (ref 3.8–5.2)
SODIUM SERPL-SCNC: 141 MMOL/L (ref 133–144)
WBC # BLD AUTO: 12.2 10E9/L (ref 4–11)

## 2019-04-29 PROCEDURE — 25000128 H RX IP 250 OP 636: Performed by: STUDENT IN AN ORGANIZED HEALTH CARE EDUCATION/TRAINING PROGRAM

## 2019-04-29 PROCEDURE — 25000132 ZZH RX MED GY IP 250 OP 250 PS 637: Performed by: NURSE PRACTITIONER

## 2019-04-29 PROCEDURE — 83735 ASSAY OF MAGNESIUM: CPT | Performed by: THORACIC SURGERY (CARDIOTHORACIC VASCULAR SURGERY)

## 2019-04-29 PROCEDURE — 84100 ASSAY OF PHOSPHORUS: CPT | Performed by: THORACIC SURGERY (CARDIOTHORACIC VASCULAR SURGERY)

## 2019-04-29 PROCEDURE — A9270 NON-COVERED ITEM OR SERVICE: HCPCS | Performed by: NURSE PRACTITIONER

## 2019-04-29 PROCEDURE — A9270 NON-COVERED ITEM OR SERVICE: HCPCS

## 2019-04-29 PROCEDURE — 97535 SELF CARE MNGMENT TRAINING: CPT | Mod: GO | Performed by: OCCUPATIONAL THERAPIST

## 2019-04-29 PROCEDURE — 25000128 H RX IP 250 OP 636: Performed by: NURSE PRACTITIONER

## 2019-04-29 PROCEDURE — 40000275 ZZH STATISTIC RCP TIME EA 10 MIN

## 2019-04-29 PROCEDURE — 25000132 ZZH RX MED GY IP 250 OP 250 PS 637

## 2019-04-29 PROCEDURE — 25000132 ZZH RX MED GY IP 250 OP 250 PS 637: Performed by: SURGERY

## 2019-04-29 PROCEDURE — 85027 COMPLETE CBC AUTOMATED: CPT | Performed by: THORACIC SURGERY (CARDIOTHORACIC VASCULAR SURGERY)

## 2019-04-29 PROCEDURE — 97530 THERAPEUTIC ACTIVITIES: CPT | Mod: GO | Performed by: OCCUPATIONAL THERAPIST

## 2019-04-29 PROCEDURE — 27210300 ZZH CANNULA HIGH FLOW, ADULT

## 2019-04-29 PROCEDURE — 20000004 ZZH R&B ICU UMMC

## 2019-04-29 PROCEDURE — 99232 SBSQ HOSP IP/OBS MODERATE 35: CPT | Mod: GC | Performed by: SURGERY

## 2019-04-29 PROCEDURE — 80048 BASIC METABOLIC PNL TOTAL CA: CPT | Performed by: THORACIC SURGERY (CARDIOTHORACIC VASCULAR SURGERY)

## 2019-04-29 PROCEDURE — 25000128 H RX IP 250 OP 636: Performed by: PHYSICIAN ASSISTANT

## 2019-04-29 PROCEDURE — A9270 NON-COVERED ITEM OR SERVICE: HCPCS | Performed by: SURGERY

## 2019-04-29 PROCEDURE — 25000128 H RX IP 250 OP 636: Performed by: SURGERY

## 2019-04-29 PROCEDURE — 27210437 ZZH NUTRITION PRODUCT SEMIELEM INTERMED LITER

## 2019-04-29 PROCEDURE — 94799 UNLISTED PULMONARY SVC/PX: CPT

## 2019-04-29 PROCEDURE — 25800030 ZZH RX IP 258 OP 636: Performed by: PHYSICIAN ASSISTANT

## 2019-04-29 PROCEDURE — A9270 NON-COVERED ITEM OR SERVICE: HCPCS | Performed by: STUDENT IN AN ORGANIZED HEALTH CARE EDUCATION/TRAINING PROGRAM

## 2019-04-29 PROCEDURE — 25800030 ZZH RX IP 258 OP 636: Performed by: STUDENT IN AN ORGANIZED HEALTH CARE EDUCATION/TRAINING PROGRAM

## 2019-04-29 PROCEDURE — 25000132 ZZH RX MED GY IP 250 OP 250 PS 637: Performed by: STUDENT IN AN ORGANIZED HEALTH CARE EDUCATION/TRAINING PROGRAM

## 2019-04-29 PROCEDURE — 71045 X-RAY EXAM CHEST 1 VIEW: CPT

## 2019-04-29 PROCEDURE — 40000047 ZZH STATISTIC CTO2 CONT OXYGEN TECH TIME EA 90 MIN

## 2019-04-29 PROCEDURE — 27210338 ZZH CIRCUIT HUMID FACE/TRACH MSK

## 2019-04-29 RX ORDER — FUROSEMIDE 10 MG/ML
40 INJECTION INTRAMUSCULAR; INTRAVENOUS ONCE
Status: DISCONTINUED | OUTPATIENT
Start: 2019-04-29 | End: 2019-04-29

## 2019-04-29 RX ORDER — FUROSEMIDE 10 MG/ML
20 INJECTION INTRAMUSCULAR; INTRAVENOUS EVERY 12 HOURS
Status: COMPLETED | OUTPATIENT
Start: 2019-04-29 | End: 2019-04-29

## 2019-04-29 RX ORDER — FUROSEMIDE 10 MG/ML
20 INJECTION INTRAMUSCULAR; INTRAVENOUS ONCE
Status: COMPLETED | OUTPATIENT
Start: 2019-04-29 | End: 2019-04-29

## 2019-04-29 RX ORDER — BISACODYL 10 MG
10 SUPPOSITORY, RECTAL RECTAL DAILY
Status: DISCONTINUED | OUTPATIENT
Start: 2019-04-29 | End: 2019-04-30

## 2019-04-29 RX ADMIN — BUPIVACAINE HYDROCHLORIDE: 7.5 INJECTION, SOLUTION EPIDURAL; RETROBULBAR at 11:14

## 2019-04-29 RX ADMIN — SENNOSIDES AND DOCUSATE SODIUM 2 TABLET: 8.6; 5 TABLET ORAL at 20:45

## 2019-04-29 RX ADMIN — POLYETHYLENE GLYCOL 3350 17 G: 17 POWDER, FOR SOLUTION ORAL at 08:40

## 2019-04-29 RX ADMIN — PANTOPRAZOLE SODIUM 40 MG: 40 TABLET, DELAYED RELEASE ORAL at 08:39

## 2019-04-29 RX ADMIN — ACETAMINOPHEN 975 MG: 325 TABLET, FILM COATED ORAL at 05:48

## 2019-04-29 RX ADMIN — BACITRACIN: 500 OINTMENT TOPICAL at 20:47

## 2019-04-29 RX ADMIN — HEPARIN SODIUM 5000 UNITS: 5000 INJECTION, SOLUTION INTRAVENOUS; SUBCUTANEOUS at 20:45

## 2019-04-29 RX ADMIN — POTASSIUM CHLORIDE 20 MEQ: 1.5 POWDER, FOR SOLUTION ORAL at 03:46

## 2019-04-29 RX ADMIN — VENLAFAXINE 75 MG: 75 TABLET ORAL at 20:45

## 2019-04-29 RX ADMIN — ACETAMINOPHEN 975 MG: 325 TABLET, FILM COATED ORAL at 22:58

## 2019-04-29 RX ADMIN — CHLORHEXIDINE GLUCONATE 0.12% ORAL RINSE 30 ML: 1.2 LIQUID ORAL at 20:45

## 2019-04-29 RX ADMIN — FUROSEMIDE 20 MG: 10 INJECTION, SOLUTION INTRAVENOUS at 13:16

## 2019-04-29 RX ADMIN — VENLAFAXINE 75 MG: 75 TABLET ORAL at 08:40

## 2019-04-29 RX ADMIN — Medication 200 MG: at 08:40

## 2019-04-29 RX ADMIN — PHYTONADIONE 10 MG: 10 INJECTION, EMULSION INTRAMUSCULAR; INTRAVENOUS; SUBCUTANEOUS at 16:48

## 2019-04-29 RX ADMIN — LEVOTHYROXINE SODIUM 150 MCG: 75 TABLET ORAL at 08:40

## 2019-04-29 RX ADMIN — FUROSEMIDE 20 MG: 10 INJECTION, SOLUTION INTRAVENOUS at 03:46

## 2019-04-29 RX ADMIN — FUROSEMIDE 20 MG: 10 INJECTION, SOLUTION INTRAVENOUS at 23:53

## 2019-04-29 RX ADMIN — BACITRACIN: 500 OINTMENT TOPICAL at 08:56

## 2019-04-29 RX ADMIN — Medication 5 ML: at 18:11

## 2019-04-29 RX ADMIN — CHLORHEXIDINE GLUCONATE AND ISOPROPYL ALCOHOL 3 ML: 20; .7 SOLUTION TOPICAL at 08:57

## 2019-04-29 RX ADMIN — ACETAMINOPHEN 975 MG: 325 TABLET, FILM COATED ORAL at 13:15

## 2019-04-29 RX ADMIN — CHLORHEXIDINE GLUCONATE AND ISOPROPYL ALCOHOL 3 ML: 20; .7 SOLUTION TOPICAL at 20:47

## 2019-04-29 RX ADMIN — CHLORHEXIDINE GLUCONATE 0.12% ORAL RINSE 30 ML: 1.2 LIQUID ORAL at 08:50

## 2019-04-29 RX ADMIN — HEPARIN SODIUM 5000 UNITS: 5000 INJECTION, SOLUTION INTRAVENOUS; SUBCUTANEOUS at 13:16

## 2019-04-29 RX ADMIN — BISACODYL 10 MG: 10 SUPPOSITORY RECTAL at 16:56

## 2019-04-29 RX ADMIN — SENNOSIDES AND DOCUSATE SODIUM 2 TABLET: 8.6; 5 TABLET ORAL at 08:40

## 2019-04-29 RX ADMIN — HEPARIN SODIUM 5000 UNITS: 5000 INJECTION, SOLUTION INTRAVENOUS; SUBCUTANEOUS at 03:12

## 2019-04-29 ASSESSMENT — ACTIVITIES OF DAILY LIVING (ADL)
ADLS_ACUITY_SCORE: 19

## 2019-04-29 ASSESSMENT — MIFFLIN-ST. JEOR: SCORE: 1338.88

## 2019-04-29 ASSESSMENT — PAIN DESCRIPTION - DESCRIPTORS
DESCRIPTORS: SORE;TENDER

## 2019-04-29 NOTE — PROVIDER NOTIFICATION
Provider text paged at [phone] (called Ascom with no answer) and notified via text page that pt's UOP last 2 hours (at midnight) 24mL (previous 2 hours 60mL). Asked provider to advise given concern for being net positive since admission - fluids vs lasix. Awaiting return page.    Addendum 0100: Provider Fatoumata Winslow notified that last hour UOP 12mL. Provider acknowledges decreasing UOP; will consider options and place orders accordingly.

## 2019-04-29 NOTE — PROGRESS NOTES
THORACIC & FOREGUT SURGERY PROGRESS NOTE  04/29/2019    Patient: Myrtle Vaz  MRN: [id number]    Subjective  No acute overnight events. Remains on high flow NC at 60%, 40 LPM. Generally stable, though did require transient phenylephrine overnight. Has not yet ambulated but has been up to the chair. Pain well controlled. No nausea. No BM/flatus. No fevers/chills.       Objective  Temp:  [97.7  F (36.5  C)-98.5  F (36.9  C)] 98.1  F (36.7  C)  Heart Rate:  [56-83] 80  Resp:  [11-22] 20  BP: ()/(41-80) 93/77  FiO2 (%):  [50 %-70 %] 50 %  SpO2:  [89 %-99 %] 96 %    General: NAD on HFNC  HEENT/chest: neck incision c/d/i with bruising and staples, pharyngostomy in place with clear output, prior spit fistula site in low chest c/d/i, sternotomy incision dressing c/d/i  CV: Non-cyanotic, well perfused.   Pulm: dyspneic on HFNC  Abd: soft, non-distended, appropriately tender, incision dressing c/d/i  Extremities: no edema  Neuro: moving all extremities spontaneously without apparent deficit    I/O last 3 completed shifts:  In: 1492.94 [I.V.:417.94; NG/GT:335]  Out: 3360 [Urine:3345; Emesis/NG output:25]    Labs:  Recent Labs   Lab 04/29/19 0336 04/28/19 1101 04/28/19 0442 04/28/19 0313 04/27/19 0421   WBC 12.2*  --   --  13.5* 16.2*   HGB 7.5* 7.7* 6.2* 6.2* 8.0*     --   --  259 335     Recent Labs   Lab 04/29/19 0336 04/28/19  1101 04/28/19 0313 04/27/19  0421     --  144 144   POTASSIUM 3.6 3.5 3.6 4.0   CHLORIDE 107  --  110* 110*   CO2 29  --  28 27   BUN 18  --  23 30   CR 0.86  --  0.89 1.03   *  --  85 112*   JOSE 7.4*  --  7.3* 7.4*   MAG 1.9 2.0  --   --    PHOS 2.7 2.1*  --   --      Imaging:  IMPRESSION:   1. Bilateral pleural effusions, slightly increased on the right side.  2. Mildly decreased bilateral mixed pulmonary opacities.    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 Albarado. PICC 4/27/2019.    - Today's CXR still appears wet with bilateral pleural effusions. Recommend aggressive diuresis.   - Would consider CT PE if no improvement in O2 with aggressive diuresis  - Pharyngostomy to LIS, pharyngostomy tube cares.  - Recommend increasing bowel regimen, addition of suppository.   - Recommend increasing TF to 30ml/hr. STRICT NPO until at least POD#7, when swallow/esophagram planned.  - CXR daily to assess for conduit distention.  - PT/OT/OOB as much as possible.  - Heparin subcutaneous ppx.  - Remainder per SICU, appreciate cares.      Patient seen and discussed with fellow and will be discussed with staff.  - - - - - - - - - - - - - - - - - -  Johana Randall MD   Surgery PGY-1

## 2019-04-29 NOTE — PROGRESS NOTES
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 Admitted to the SICU post-operatively for close hemodynamic monitoring. Chest imaging continues to display pleural effusions, to continue diuresis today.     Changes today:  - No fluids except colloids if oliguric  - Increased tube feeds to 30  - Lasix 20mg BID  - Discontinue maintainence fluids  - Increased bowel regimen, suppository ordered      PLAN:  Neuro/ pain/ sedation:  # Acute post-surgical pain  - Monitor neurological status; notify the MD for any acute changes in exam  - Epidural catheter in place with bupivacaine, reporting adequate pain control  - Okay to increase bupivacaine up to 10ml/hr if needed per pain service  - Dilaudid PCA and oxycodone prn for pain    Pulmonary care:   - CXR notable for continued pleural effusions, to continue aggressive diuresis  - Supplemental oxygen to keep saturation above 92%, continues to require highflow NC  - Incentive spirometer [id number] when awake  - head of bed elevation to 30 degrees  - pharyngostomy tube set to low suction  - duonebs present    Cardiovascular:    # Chronic hypertension  # History of atrial fibrillation  - Monitor hemodynamic status  - Continue PTA amiodarone  - MAP goal>65  - No pressor requirement at this time    GI care:   - NPO until POD#7, will assess for advancement of diet at that time (5/3)  - Bowel regimen, suppository ordered  - TFs increased to 30ml/hr  - Protonix    Fluids/ Electrolytes/ Nutrition:   - LR maintainence fluid discontinued in setting of pleural effusions  - No indication for parenteral nutrition    Renal/ Fluid Balance:    - Will continue to monitor intake and output    Endocrine:    #  Stress-induced hyperglycemia  # Hypothyroidism  - Sliding scale for diabetes management   - Frequent glucose checks  - Resume PTA synthroid    ID/ Antibiotics:  - No indication for antibiotics    Heme:     #Acute blood loss anemia  - Stable hemoglobin at this time, 7.5 this AM  - Continue to monitor    MSK:  - PT/OT    Psych:  # Depression  - Continue PTA venlafaxine    Prophylaxis:    - Mechanical prophylaxis for DVT   - heparin ppx    Lines/ tubes/ drains:  PICC, PIVs, Mora, J tube    Disposition:  - Surgical ICU, potential floor status this afternoon pending Thoracic recs    Patient was evaluated and plan was discussed with ICU staff, Dr. Locke.    Yong Maritnez M.D.  CA1 Anesthesiology      - - - - - - - - - - - - - - - - - - - - - - - - - - - - - - - - - - - - - - - - - - - - - - - - - - - - - - - - -   Events overnight:  No acute events reported overnight. Pt continues to require diuresis for fluid overload status and pleural effusions as illustrated on chest imaging. Hemoglobin stable from yesterday.     PHYSICAL EXAMINATION:  Temp:  [97.7  F (36.5  C)-98.5  F (36.9  C)] 98.1  F (36.7  C)  Heart Rate:  [56-83] 68  Resp:  [11-22] 20  BP: ()/(41-80) 115/60  FiO2 (%):  [50 %-70 %] 50 %  SpO2:  [89 %-99 %] 97 %  General: Alert, well-appearing, in no acute distress.  HEENT: Normocephalic, atraumatic. Moist mucous membranes.  Neck: Neck incision C/D. Spit fistula site open, packed. Some bruising, non-blanching erythema on chest. Swelling over manubriectomy site.   Respiratory: Non-labored breathing. Lung sounds coarse to auscultation bilaterally. L>R  Cardiovascular: Regular rate and rhythm, no murmurs.  Gastrointestinal: Abdomen soft, non-distended. No rebound or guarding. J tube site clean and dry.  Extremities: Warm and well perfused. No pedal edema.   Skin: No rashes or lesions appreciated, rest as noted above    LABS: Reviewed.   Arterial Blood Gases   Recent Labs   Lab 04/28/19  0420 04/27/19  9270  04/26/19  1520 04/26/19  1318   PH 7.35 7.37 7.38 7.39   PCO2 52* 48* 45 43   PO2 32* 81 348* 166*   HCO3 29* 28 26 27     Complete Blood Count   Recent Labs   Lab 04/29/19  0336 04/28/19  1101 04/28/19  0442 04/28/19  0313 04/27/19  0421 04/26/19  1810   WBC 12.2*  --   --  13.5* 16.2* 10.8   HGB 7.5* 7.7* 6.2* 6.2* 8.0* 9.2*     --   --  259 335 367     Basic Metabolic Panel  Recent Labs   Lab 04/29/19  0336 04/28/19  1101 04/28/19  0313 04/27/19  0421 04/26/19  1810     --  144 144 142   POTASSIUM 3.6 3.5 3.6 4.0 4.0   CHLORIDE 107  --  110* 110* 110*   CO2 29  --  28 27 25   BUN 18  --  23 30 30   CR 0.86  --  0.89 1.03 1.02   *  --  85 112* 188*     Liver Function Tests  Recent Labs   Lab 04/28/19  1101 04/27/19  0421   AST  --  163*   ALT  --  89*   ALKPHOS  --  76   BILITOTAL  --  0.4   ALBUMIN  --  2.5*   INR 1.80*  --        IMAGING:  Results for orders placed or performed during the hospital encounter of 04/07/19   XR Chest 2 Views    Narrative    Exam: XR CHEST 2 VW, 4/7/2019 8:32 PM    Indication: cough    Comparison: 2/19/2019    Findings:   PA and lateral views of the chest. Cardiac silhouette is within normal  limits. Basilar areas of linear atelectasis, similar to CT 2/4/2019.  Upper lobe predominant emphysematous changes. Mild peribronchial  cuffing. No pleural effusion or pneumothorax. No acute airspace  disease.      Impression    Impression:   1. Mild peribronchial cuffing, may be related to  infectious/inflammatory bronchitis.  2. Stable basilar opacity with atelectasis, not significantly changed  from prior exams.    I have personally reviewed the examination and initial interpretation  and I agree with the findings.    TANYA GASPAR MD     Chest XR 4/29  COMPARISON: 4/28/2019     FINDINGS: Semiupright AP view of chest. Right PICC line tip over the  cavoatrial junction. Pharyngostomy tube sidehole over the distal  esophagus and tip over the gastroesophageal junction.       Unchanged cardiac silhouette. Calcified aortic arch. Bilateral pleural  effusions, slightly increased on the right side. Bilateral mixed  pulmonary opacities, slightly decreased.                                                                      IMPRESSION:   1. Bilateral pleural effusions, slightly increased on the right side.  2. Mildly decreased bilateral mixed pulmonary opacities.     I have personally reviewed the examination and initial interpretation  and I agree with the findings.     MARY HORN MD

## 2019-04-29 NOTE — PLAN OF CARE
Cares resumed by this RN @ 0200. No acute changes, neurologically intact w/ pain from movement but well controlled w/ pca & epidural (possibly discontinue epidural today?);   Remains in SR no ectopy, alyssa currently off since 0220, otherwise requiring low doses to meet MAP goal of >65  Remains on hiflo nasal cannula @ 40 lpm & 60% fio2, attempted to wean to 35 lpm & pt satting mostly 88-90%, encouraging pulmonary toilet & pt participating independently   No BM since 4/25, on scheduled stool softeners-add prn suppository? Pt denies abdominal discomfort & abdomen nondistended w/ BS hypoactive-normoactive in all quadrants  MD Winslow aware of ongoing low UOP throughout night; 1x dose 20 mg IV lasix given & 20 replaced prophylactically-954 mls of urine dumped after lasix administration        Please see MAR/flowsheets for further interventions/assessments     Will plan to monitor respiratory status closely & notify MD of any acute changes or concerns.    Mitra Coker RN on 4/29/2019 at 6:16 AM

## 2019-04-29 NOTE — PROGRESS NOTES
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      SUBJECTIVE  Interval History: Overnight pressors weaned off, remains on hiflo nasal cannula at 40 lpm and 60% fiO2. Patient sitting up in chair at bedside, reports overall postop pain unchanged over the past 24 hours, comfortably manageable with epidural infusion, scheduled Tylenol and Dilaudid PCA (see below).  Denies weakness, paresthesias, circumoral numbness, metallic taste or tinnitus.  Denies nausea, tolerating Jtube feedings, urethral catheter in place     Clinically Aligned Pain Assessment (CAPA):  Comfort (How is your pain?): Tolerable with discomfort  Change in Pain (Since your last medication/intervention?): About the same  Pain Control (How are your pain treatments working?):  Partially effective pain control  Functioning (Are you able to do activities to get better?) : Can do most things, but pain gets in the way of some       Antithrombotic/Thrombolytic Therapy ordered:    heparin sodium injection 5,000 Units 5,000 Units, SC, Q8H         Analgesic Medications:  Medications related to Pain Management (From now, onward)    Start     Dose/Rate Route Frequency Ordered Stop    04/29/19 0000  acetaminophen (TYLENOL) tablet 650 mg      650 mg Per J Tube EVERY 4 HOURS PRN 04/26/19 2103      04/28/19 0915  bupivacaine (MARCAINE) 0.125 % in sodium chloride 0.9 % 250 mL EPIDURAL Infusion      6 mL/hr  EPIDURAL CONTINUOUS 04/28/19 0904      04/27/19 1400  acetaminophen (TYLENOL) tablet 975 mg      975 mg Per J Tube EVERY 8 HOURS 04/27/19 0612      04/27/19 1225  lidocaine (LMX4) cream       Topical ONCE PRN 04/27/19 1227      04/27/19 0800  polyethylene glycol (MIRALAX/GLYCOLAX) Packet 17 g      17 g Per  "J Tube DAILY 04/27/19 0612      04/27/19 0800  senna-docusate (SENOKOT-S/PERICOLACE) 8.6-50 MG per tablet 2 tablet      2 tablet Per J Tube 2 TIMES DAILY 04/27/19 0612 04/26/19 2115  HYDROmorphone (DILAUDID) PCA 1 mg/mL OPIOID NAIVE       Intravenous CONTINUOUS 04/26/19 2103 04/26/19 2103  hydrOXYzine (ATARAX) tablet 10 mg      10 mg Oral EVERY 6 HOURS PRN 04/26/19 2103 04/26/19 0709  nalbuphine (NUBAIN) injection 2.5-5 mg      2.5-5 mg Intravenous EVERY 6 HOURS PRN 04/26/19 0710             OBJECTIVE  Lab Results:   Recent Labs   Lab Test 04/29/19  0336   WBC 12.2*   RBC 2.62*   HGB 7.5*   HCT 24.9*   MCV 95   MCH 28.6   MCHC 30.1*   RDW 19.6*          Lab Results   Component Value Date    INR 1.80 04/28/2019    INR 1.10 02/04/2019    INR 1.07 12/03/2018       Vitals:    Temp:  [97.7  F (36.5  C)-98.5  F (36.9  C)] 97.7  F (36.5  C)  Heart Rate:  [56-83] 66  Resp:  [11-22] 22  BP: ()/(41-80) 93/50  FiO2 (%):  [50 %-70 %] 60 %  SpO2:  [89 %-99 %] 95 %  BP 93/50   Pulse 73   Temp 97.7  F (36.5  C) (Oral)   Resp 22   Ht 1.651 m (5' 5\")   Wt 82.8 kg (182 lb 8.7 oz)   SpO2 95%   BMI 30.38 kg/m         Exam:   GEN: alert and no distress  NEURO/MSK: Strength BLE 5/5  and overall symmetric  SKIN: Epidural catheter site with dressing c/d/i, no tenderness, erythema, heme, edema     ASSESSMENT  Myrtle Vaz is a 73 year old female POD #3 s/p ESOPHAGOGASTRECTOMY and placement of T8-9 epidural catheter for pain management.    Patient is receiving adequate analgesia with current multimodal therapy including infusion of bupivacaine 0.125% at 6 mL/hr.  No need for increase in epidural rate today.  Motor function intact and is meeting activity goals.  No evidence of adverse side effects related to local anesthetic. Adequate urine output.    PLAN  - continue current epidural infusion bupivacaine 0.125% at 6 mL/hour  - antithrombotic/thrombolytic therapy okay to continue Heparin SQ 8 hrs as ordered. " When plan to remove epidural, will need to wait at least 4 hrs after last dose, then resume heparin 1 hr later. Please contact RAPS (#[phone]) prior to any medication changes  - will continue to follow and adjust as needed    - discussed plan with attending anesthesiologist    NARCISO Benitez [facility]  Regional Anesthesia Pain Service  4/29/2019 7:34 AM    RAPS Contact Info (24 hour job code pager is the last 4 digits) For in-house use only:   [employer] phone: [place] [phone], [place] [phone], [place] [phone], then enter call-back number.    Text: Use [employer] on the Intranet <Paging/Directory> tab and enter Jobcode ID.   If no call back at any time, contact the hospital  and ask for RAPS attending or backup

## 2019-04-29 NOTE — PROGRESS NOTES
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      SUBJECTIVE  Interval History: Overnight pressors weaned off, remains on hiflo nasal cannula at 40 lpm and 60% fiO2. Patient sitting up in chair at bedside, reports overall postop pain unchanged over the past 24 hours, comfortably manageable with epidural infusion, scheduled Tylenol and Dilaudid PCA (see below).  Denies weakness, paresthesias, circumoral numbness, metallic taste or tinnitus.  Denies nausea, tolerating Jtube feedings, urethral catheter in place                 Clinically Aligned Pain Assessment (CAPA):  Comfort (How is your pain?): Tolerable with discomfort  Change in Pain (Since your last medication/intervention?): About the same  Pain Control (How are your pain treatments working?):  Partially effective pain control  Functioning (Are you able to do activities to get better?) : Can do most things, but pain gets in the way of some         Antithrombotic/Thrombolytic Therapy ordered:    heparin sodium injection 5,000 Units 5,000 Units, SC, Q8H            Analgesic Medications:              Medications related to Pain Management (From now, onward)     Start     Dose/Rate Route Frequency Ordered Stop     04/29/19 0000   acetaminophen (TYLENOL) tablet 650 mg      650 mg Per J Tube EVERY 4 HOURS PRN 04/26/19 2103       04/28/19 0915   bupivacaine (MARCAINE) 0.125 % in sodium chloride 0.9 % 250 mL EPIDURAL Infusion      6 mL/hr  EPIDURAL CONTINUOUS 04/28/19 0904       04/27/19 1400   acetaminophen (TYLENOL) tablet 975 mg      975 mg Per J Tube EVERY 8 HOURS 04/27/19 0612       04/27/19 1225   lidocaine (LMX4) cream        Topical ONCE PRN 04/27/19 1227       04/27/19 0800   polyethylene glycol  "(MIRALAX/GLYCOLAX) Packet 17 g      17 g Per J Tube DAILY 04/27/19 0612       04/27/19 0800   senna-docusate (SENOKOT-S/PERICOLACE) 8.6-50 MG per tablet 2 tablet      2 tablet Per J Tube 2 TIMES DAILY 04/27/19 0612 04/26/19 2115   HYDROmorphone (DILAUDID) PCA 1 mg/mL OPIOID NAIVE        Intravenous CONTINUOUS 04/26/19 2103 04/26/19 2103   hydrOXYzine (ATARAX) tablet 10 mg      10 mg Oral EVERY 6 HOURS PRN 04/26/19 2103 04/26/19 0709   nalbuphine (NUBAIN) injection 2.5-5 mg      2.5-5 mg Intravenous EVERY 6 HOURS PRN 04/26/19 0710               OBJECTIVE  Lab Results:       Recent Labs   Lab Test 04/29/19  0336   WBC 12.2*   RBC 2.62*   HGB 7.5*   HCT 24.9*   MCV 95   MCH 28.6   MCHC 30.1*   RDW 19.6*                  Lab Results   Component Value Date     INR 1.80 04/28/2019     INR 1.10 02/04/2019     INR 1.07 12/03/2018         Vitals:              Temp:  [97.7  F (36.5  C)-98.5  F (36.9  C)] 97.7  F (36.5  C)  Heart Rate:  [56-83] 66  Resp:  [11-22] 22  BP: ()/(41-80) 93/50  FiO2 (%):  [50 %-70 %] 60 %  SpO2:  [89 %-99 %] 95 %  BP 93/50   Pulse 73   Temp 97.7  F (36.5  C) (Oral)   Resp 22   Ht 1.651 m (5' 5\")   Wt 82.8 kg (182 lb 8.7 oz)   SpO2 95%   BMI 30.38 kg/m          Exam:   GEN: alert and no distress  NEURO/MSK: Strength BLE 5/5  and overall symmetric  SKIN: Epidural catheter site with dressing c/d/i, no tenderness, erythema, heme, edema       ASSESSMENT  Myrtle Vaz is a 73 year old female POD #3 s/p ESOPHAGOGASTRECTOMY and placement of T8-9 epidural catheter for pain management.     Patient is receiving adequate analgesia with current multimodal therapy including infusion of bupivacaine 0.125% at 6 mL/hr.  No need for increase in epidural rate today.  Motor function intact and is meeting activity goals.  No evidence of adverse side effects related to local anesthetic. Adequate urine output.     PLAN  - continue current epidural infusion bupivacaine 0.125% at 6 " mL/hour  - antithrombotic/thrombolytic therapy okay to continue Heparin SQ 8 hrs as ordered. When plan to remove epidural, will need to wait at least 4 hrs after last dose, then resume heparin 1 hr later. Please contact RAPS (#[phone]) prior to any medication changes  - will continue to follow and adjust as needed     Adalberto Peña MD on 4/29/2019 at 1:25 PM  RAPS Fellow     RAPS Contact Info (24 hour job code pager is the last 4 digits) For in-house use only:   [employer] phone: [place] [phone], [place] [phone], [place] [phone], then enter call-back number.    Text: Use [employer] on the Intranet <Paging/Directory> tab and enter Jobcode ID.   If no call back at any time, contact the hospital  and ask for RAPS attending or backup

## 2019-04-29 NOTE — PLAN OF CARE
D/I: Doni was titrated off then restarted d/t MAP in mid 50s.  Hgb 7.7 post 1 unit of PRBC. Lasix 40mg IV x 1 given (UO per I&O)  Pt continues on High flow NC, FiO2 weaned down to 50% but increased back to 60% d/t O2 saturating high 90s. Abd incisional pain and left arm pain in control with PCA dilaudid and Epidural at 6ml/hr.   A: Pt more alert this am... Up in chair and using IS and acapella independently.   P: Continue to monitor pain/hemodynamics and continue pulmonary toileting.

## 2019-04-29 NOTE — PLAN OF CARE
Neuro: Alert and orientated x4. Forgetful at times. Moves all extremities equally. Pupils equal an reactive. Denies LAST with bupivacaine epidural.   Cardiac: MAP maintained >65 without pressures. HR in the 60s-80s.   Respiratory: HFNC. Fio2 50%. 40L. O2 sats upper 90s. Using IS with encouragement. Small amount of clear thick oral secretions.   GI: - flatus or stool. Suppository given with no result. Hypoactive bs. TF at 30 mL/hr with 30 q 6 hr flushes.   : Adequate UOP via lopez cath.   IV:  PIV x2 SL. R PICC.   Pain: Dilaudid pca 0.1 q 10 minutes.   Skin: L neck incision, Midline incision, Old spit fistula site. Dressing changed at 1530.   PLAN: Continue with current plan of care. NOtify MD of any acute changes.

## 2019-04-29 NOTE — PLAN OF CARE
Discharge Planner OT   Patient plan for discharge: not stated  Current status: Pt completed bed mobility and functional STS transfers w/ mod A, min A ADLS in chair w/ education on post surgical precautions throughout. Pt's VSS on 60% FiO2 HFNC.   Barriers to return to prior living situation: medical status  Recommendations for discharge: ARU  Rationale for recommendations: to increase ind in ADLS/IADLS       Entered by: Margarita Gross 04/29/2019 9:42 AM

## 2019-04-29 NOTE — PROGRESS NOTES
RAPS Brief Note    Patient complains of pain in right chest wall/abbdomen and at neck incision. Explained that epidural may help with lower pain, but will not cover pain above the nipple line. Patient would like to try a bolus to help with pain in abdomen.    -  local anesthetic bolus PF bupivacaine 0.125% from pump, 5 mL through epidural catheter.  BP and P monitoring Q 5 min x 30 min - bedside nurse aware.  - patient can be evaluated to receive local anesthetic bolus Q 12 hr PRN, bedside nurse must page RAPS to request bolus    Rell Cross MD  CA-2/PGY-3 Anesthesia Resident

## 2019-04-29 NOTE — PLAN OF CARE
[id number]:  Neuro: Much more alert and participative today. Oriented x4. Pain adequately controlled with PCA dilaudid 0.1mg bumps, epidural bupivicaine at 6mL/hr.  CV: Doni off at 2000; restarted 0030. HR 60s-70s, sinus with rare PVC.  Resp: Remains on HFNC - FiO2 weaned to 60%, 40LPM. Performing incentive spirometry/Acapella independently but pulling only small volumes. Continue to encourage. Lung sounds diminished.  GI: NPO. BG WNL. Bowel sounds now active. Consider adding additional med for bowel regimen. TF continue via J tube at 20mL/hr with 30mL water flushes q6h.  : Mora with decreasing UOP; MD paged, awaiting return call/orders. Next RN to follow up.  Skin: No new concerns, incisions as noted in flowsheet. Preventative mepilex to coccyx.  Lines: DL PICC with TKO/IV dilaudid PCA, doni gtt. PIV x2 SL.  Drains: Pharyngostomy irrigated per order, scant green output.  Mobility: Assisted with turns.  Plan: Continue to monitor fluid volume status, respiratory status closely. Notify provider of additional concerns.

## 2019-04-30 ENCOUNTER — APPOINTMENT (OUTPATIENT)
Dept: OCCUPATIONAL THERAPY | Facility: CLINIC | Age: 74
DRG: 326 | End: 2019-04-30
Attending: THORACIC SURGERY (CARDIOTHORACIC VASCULAR SURGERY)
Payer: MEDICARE

## 2019-04-30 ENCOUNTER — APPOINTMENT (OUTPATIENT)
Dept: GENERAL RADIOLOGY | Facility: CLINIC | Age: 74
DRG: 326 | End: 2019-04-30
Attending: THORACIC SURGERY (CARDIOTHORACIC VASCULAR SURGERY)
Payer: MEDICARE

## 2019-04-30 ENCOUNTER — APPOINTMENT (OUTPATIENT)
Dept: PHYSICAL THERAPY | Facility: CLINIC | Age: 74
DRG: 326 | End: 2019-04-30
Attending: THORACIC SURGERY (CARDIOTHORACIC VASCULAR SURGERY)
Payer: MEDICARE

## 2019-04-30 LAB
ANION GAP SERPL CALCULATED.3IONS-SCNC: 5 MMOL/L (ref 3–14)
BUN SERPL-MCNC: 17 MG/DL (ref 7–30)
CALCIUM SERPL-MCNC: 7.3 MG/DL (ref 8.5–10.1)
CHLORIDE SERPL-SCNC: 100 MMOL/L (ref 94–109)
CO2 SERPL-SCNC: 35 MMOL/L (ref 20–32)
CREAT SERPL-MCNC: 0.87 MG/DL (ref 0.52–1.04)
ERYTHROCYTE [DISTWIDTH] IN BLOOD BY AUTOMATED COUNT: 19.7 % (ref 10–15)
GFR SERPL CREATININE-BSD FRML MDRD: 67 ML/MIN/{1.73_M2}
GLUCOSE BLDC GLUCOMTR-MCNC: 101 MG/DL (ref 70–99)
GLUCOSE SERPL-MCNC: 106 MG/DL (ref 70–99)
HCT VFR BLD AUTO: 27.1 % (ref 35–47)
HGB BLD-MCNC: 8.1 G/DL (ref 11.7–15.7)
INR PPP: 1.25 (ref 0.86–1.14)
MAGNESIUM SERPL-MCNC: 1.7 MG/DL (ref 1.6–2.3)
MCH RBC QN AUTO: 27.6 PG (ref 26.5–33)
MCHC RBC AUTO-ENTMCNC: 29.9 G/DL (ref 31.5–36.5)
MCV RBC AUTO: 93 FL (ref 78–100)
PHOSPHATE SERPL-MCNC: 1.7 MG/DL (ref 2.5–4.5)
PLATELET # BLD AUTO: 324 10E9/L (ref 150–450)
POTASSIUM SERPL-SCNC: 3 MMOL/L (ref 3.4–5.3)
POTASSIUM SERPL-SCNC: 3.8 MMOL/L (ref 3.4–5.3)
RBC # BLD AUTO: 2.93 10E12/L (ref 3.8–5.2)
SODIUM SERPL-SCNC: 141 MMOL/L (ref 133–144)
WBC # BLD AUTO: 9.9 10E9/L (ref 4–11)

## 2019-04-30 PROCEDURE — 25800030 ZZH RX IP 258 OP 636

## 2019-04-30 PROCEDURE — A9270 NON-COVERED ITEM OR SERVICE: HCPCS

## 2019-04-30 PROCEDURE — 25000132 ZZH RX MED GY IP 250 OP 250 PS 637: Performed by: SURGERY

## 2019-04-30 PROCEDURE — 27210437 ZZH NUTRITION PRODUCT SEMIELEM INTERMED LITER

## 2019-04-30 PROCEDURE — 94799 UNLISTED PULMONARY SVC/PX: CPT

## 2019-04-30 PROCEDURE — A9270 NON-COVERED ITEM OR SERVICE: HCPCS | Performed by: STUDENT IN AN ORGANIZED HEALTH CARE EDUCATION/TRAINING PROGRAM

## 2019-04-30 PROCEDURE — 97530 THERAPEUTIC ACTIVITIES: CPT | Mod: GP | Performed by: PHYSICAL THERAPIST

## 2019-04-30 PROCEDURE — A9270 NON-COVERED ITEM OR SERVICE: HCPCS | Performed by: SURGERY

## 2019-04-30 PROCEDURE — 25000128 H RX IP 250 OP 636: Performed by: STUDENT IN AN ORGANIZED HEALTH CARE EDUCATION/TRAINING PROGRAM

## 2019-04-30 PROCEDURE — 97530 THERAPEUTIC ACTIVITIES: CPT | Mod: GO | Performed by: OCCUPATIONAL THERAPIST

## 2019-04-30 PROCEDURE — 00000146 ZZHCL STATISTIC GLUCOSE BY METER IP

## 2019-04-30 PROCEDURE — 25000128 H RX IP 250 OP 636

## 2019-04-30 PROCEDURE — 25800030 ZZH RX IP 258 OP 636: Performed by: SURGERY

## 2019-04-30 PROCEDURE — 25000132 ZZH RX MED GY IP 250 OP 250 PS 637

## 2019-04-30 PROCEDURE — 84100 ASSAY OF PHOSPHORUS: CPT | Performed by: THORACIC SURGERY (CARDIOTHORACIC VASCULAR SURGERY)

## 2019-04-30 PROCEDURE — 40000047 ZZH STATISTIC CTO2 CONT OXYGEN TECH TIME EA 90 MIN

## 2019-04-30 PROCEDURE — 84132 ASSAY OF SERUM POTASSIUM: CPT

## 2019-04-30 PROCEDURE — 25000132 ZZH RX MED GY IP 250 OP 250 PS 637: Performed by: PHYSICIAN ASSISTANT

## 2019-04-30 PROCEDURE — 40000983 ZZH STATISTIC HFNC ADULT NON-CPAP

## 2019-04-30 PROCEDURE — 85610 PROTHROMBIN TIME: CPT | Performed by: THORACIC SURGERY (CARDIOTHORACIC VASCULAR SURGERY)

## 2019-04-30 PROCEDURE — 25000128 H RX IP 250 OP 636: Performed by: SURGERY

## 2019-04-30 PROCEDURE — 25000128 H RX IP 250 OP 636: Performed by: PHYSICIAN ASSISTANT

## 2019-04-30 PROCEDURE — 85027 COMPLETE CBC AUTOMATED: CPT | Performed by: THORACIC SURGERY (CARDIOTHORACIC VASCULAR SURGERY)

## 2019-04-30 PROCEDURE — 71045 X-RAY EXAM CHEST 1 VIEW: CPT

## 2019-04-30 PROCEDURE — 25000125 ZZHC RX 250

## 2019-04-30 PROCEDURE — 97110 THERAPEUTIC EXERCISES: CPT | Mod: GP | Performed by: PHYSICAL THERAPIST

## 2019-04-30 PROCEDURE — A9270 NON-COVERED ITEM OR SERVICE: HCPCS | Performed by: PHYSICIAN ASSISTANT

## 2019-04-30 PROCEDURE — 83735 ASSAY OF MAGNESIUM: CPT | Performed by: THORACIC SURGERY (CARDIOTHORACIC VASCULAR SURGERY)

## 2019-04-30 PROCEDURE — 12000001 ZZH R&B MED SURG/OB UMMC

## 2019-04-30 PROCEDURE — P9041 ALBUMIN (HUMAN),5%, 50ML: HCPCS

## 2019-04-30 PROCEDURE — 80048 BASIC METABOLIC PNL TOTAL CA: CPT | Performed by: THORACIC SURGERY (CARDIOTHORACIC VASCULAR SURGERY)

## 2019-04-30 PROCEDURE — 40000275 ZZH STATISTIC RCP TIME EA 10 MIN

## 2019-04-30 PROCEDURE — 25000132 ZZH RX MED GY IP 250 OP 250 PS 637: Performed by: STUDENT IN AN ORGANIZED HEALTH CARE EDUCATION/TRAINING PROGRAM

## 2019-04-30 RX ORDER — ALBUMIN, HUMAN INJ 5% 5 %
12.5 SOLUTION INTRAVENOUS ONCE
Status: COMPLETED | OUTPATIENT
Start: 2019-04-30 | End: 2019-04-30

## 2019-04-30 RX ORDER — ACETAMINOPHEN 325 MG/1
975 TABLET ORAL EVERY 8 HOURS
Status: DISCONTINUED | OUTPATIENT
Start: 2019-04-30 | End: 2019-04-30

## 2019-04-30 RX ORDER — ALBUMIN, HUMAN INJ 5% 5 %
SOLUTION INTRAVENOUS
Status: COMPLETED
Start: 2019-04-30 | End: 2019-04-30

## 2019-04-30 RX ORDER — ACETAZOLAMIDE 500 MG/5ML
500 INJECTION, POWDER, LYOPHILIZED, FOR SOLUTION INTRAVENOUS ONCE
Status: COMPLETED | OUTPATIENT
Start: 2019-04-30 | End: 2019-04-30

## 2019-04-30 RX ORDER — FUROSEMIDE 10 MG/ML
20 INJECTION INTRAMUSCULAR; INTRAVENOUS ONCE
Status: DISCONTINUED | OUTPATIENT
Start: 2019-04-30 | End: 2019-04-30

## 2019-04-30 RX ORDER — BISACODYL 10 MG
10 SUPPOSITORY, RECTAL RECTAL DAILY PRN
Status: DISCONTINUED | OUTPATIENT
Start: 2019-04-30 | End: 2019-05-15 | Stop reason: HOSPADM

## 2019-04-30 RX ADMIN — ACETAZOLAMIDE 500 MG: 500 INJECTION, POWDER, LYOPHILIZED, FOR SOLUTION INTRAVENOUS at 10:36

## 2019-04-30 RX ADMIN — CHLORHEXIDINE GLUCONATE 0.12% ORAL RINSE 30 ML: 1.2 LIQUID ORAL at 07:43

## 2019-04-30 RX ADMIN — ACETAMINOPHEN 975 MG: 325 TABLET, FILM COATED ORAL at 06:36

## 2019-04-30 RX ADMIN — POTASSIUM & SODIUM PHOSPHATES POWDER PACK 280-160-250 MG 1 PACKET: 280-160-250 PACK at 11:56

## 2019-04-30 RX ADMIN — HEPARIN SODIUM 5000 UNITS: 5000 INJECTION, SOLUTION INTRAVENOUS; SUBCUTANEOUS at 20:28

## 2019-04-30 RX ADMIN — BUPIVACAINE HYDROCHLORIDE: 7.5 INJECTION, SOLUTION EPIDURAL; RETROBULBAR at 20:55

## 2019-04-30 RX ADMIN — SENNOSIDES AND DOCUSATE SODIUM 2 TABLET: 8.6; 5 TABLET ORAL at 20:28

## 2019-04-30 RX ADMIN — ACETAMINOPHEN 975 MG: 325 TABLET, FILM COATED ORAL at 14:42

## 2019-04-30 RX ADMIN — POTASSIUM & SODIUM PHOSPHATES POWDER PACK 280-160-250 MG 1 PACKET: 280-160-250 PACK at 20:28

## 2019-04-30 RX ADMIN — VENLAFAXINE 75 MG: 75 TABLET ORAL at 07:44

## 2019-04-30 RX ADMIN — PANTOPRAZOLE SODIUM 40 MG: 40 TABLET, DELAYED RELEASE ORAL at 07:44

## 2019-04-30 RX ADMIN — CHLORHEXIDINE GLUCONATE AND ISOPROPYL ALCOHOL 3 ML: 20; .7 SOLUTION TOPICAL at 07:46

## 2019-04-30 RX ADMIN — POTASSIUM PHOSPHATE, MONOBASIC AND POTASSIUM PHOSPHATE, DIBASIC 20 MMOL: 224; 236 INJECTION, SOLUTION INTRAVENOUS at 06:36

## 2019-04-30 RX ADMIN — POTASSIUM CHLORIDE 20 MEQ: 29.8 INJECTION, SOLUTION INTRAVENOUS at 04:50

## 2019-04-30 RX ADMIN — VENLAFAXINE 75 MG: 75 TABLET ORAL at 20:28

## 2019-04-30 RX ADMIN — HEPARIN SODIUM 5000 UNITS: 5000 INJECTION, SOLUTION INTRAVENOUS; SUBCUTANEOUS at 11:57

## 2019-04-30 RX ADMIN — ACETAMINOPHEN 975 MG: 325 TABLET, FILM COATED ORAL at 22:29

## 2019-04-30 RX ADMIN — POLYETHYLENE GLYCOL 3350 17 G: 17 POWDER, FOR SOLUTION ORAL at 07:45

## 2019-04-30 RX ADMIN — ALBUMIN HUMAN 12.5 G: 50 SOLUTION INTRAVENOUS at 17:26

## 2019-04-30 RX ADMIN — POTASSIUM & SODIUM PHOSPHATES POWDER PACK 280-160-250 MG 1 PACKET: 280-160-250 PACK at 17:54

## 2019-04-30 RX ADMIN — Medication 200 MG: at 07:43

## 2019-04-30 RX ADMIN — POTASSIUM CHLORIDE 20 MEQ: 29.8 INJECTION, SOLUTION INTRAVENOUS at 05:49

## 2019-04-30 RX ADMIN — CHLORHEXIDINE GLUCONATE AND ISOPROPYL ALCOHOL 3 ML: 20; .7 SOLUTION TOPICAL at 20:29

## 2019-04-30 RX ADMIN — BACITRACIN: 500 OINTMENT TOPICAL at 07:46

## 2019-04-30 RX ADMIN — HEPARIN SODIUM 5000 UNITS: 5000 INJECTION, SOLUTION INTRAVENOUS; SUBCUTANEOUS at 03:41

## 2019-04-30 RX ADMIN — SENNOSIDES AND DOCUSATE SODIUM 2 TABLET: 8.6; 5 TABLET ORAL at 07:44

## 2019-04-30 RX ADMIN — CHLORHEXIDINE GLUCONATE 0.12% ORAL RINSE 30 ML: 1.2 LIQUID ORAL at 20:28

## 2019-04-30 RX ADMIN — Medication 5 ML: at 17:55

## 2019-04-30 RX ADMIN — BACITRACIN: 500 OINTMENT TOPICAL at 20:28

## 2019-04-30 RX ADMIN — LEVOTHYROXINE SODIUM 150 MCG: 75 TABLET ORAL at 07:45

## 2019-04-30 RX ADMIN — ALBUMIN HUMAN 12.5 G: 0.05 INJECTION, SOLUTION INTRAVENOUS at 17:26

## 2019-04-30 ASSESSMENT — ACTIVITIES OF DAILY LIVING (ADL)
ADLS_ACUITY_SCORE: 19

## 2019-04-30 ASSESSMENT — PAIN DESCRIPTION - DESCRIPTORS
DESCRIPTORS: TENDER;SORE
DESCRIPTORS: SORE;TENDER

## 2019-04-30 NOTE — PROGRESS NOTES
THORACIC & FOREGUT SURGERY PROGRESS NOTE  04/30/2019    Patient: Myrtle Vaz  MRN: [id number]    Subjective  No acute overnight events. Remains on high flow NC with slightly decreased needs - 60% --> 50% at 40 LPM. Given Lasix 20 IV x2 yesterday with good response. Had BM yesterday, no bloating with TFs at 30 ml/hr. Feeling ok overall but still quite sleepy. Did not walk at all, but did get up to a chair for most of the day. Pain controlled.      Objective  Temp:  [97.7  F (36.5  C)-98.8  F (37.1  C)] 98.3  F (36.8  C)  Heart Rate:  [61-79] 79  Resp:  [16-20] 16  BP: ()/(47-78) 100/59  FiO2 (%):  [40 %-50 %] 40 %  SpO2:  [92 %-98 %] 92 %    General: NAD, sleepy, laying comfortably in bed  HEENT/chest: neck incision c/d/i with bruising and staples, pharyngostomy in place with clear output, prior spit fistula site in low chest c/d/i, sternotomy incision dressing c/d/i  CV: regular rate and rhythm, well perfused.   Pulm: no dyspnea on HFNC  Abd: soft, non-distended, appropriately tender, incision dressing c/d/i, J tube site c/d/i with feeds going  Extremities: no edema  Neuro: moving all extremities spontaneously without apparent deficit    I/O last 3 completed shifts:  In: 1135 [I.V.:115; NG/GT:410]  Out: 3420 [Urine:2720; Emesis/NG output:700]    Labs:  Recent Labs   Lab 04/30/19  0313 04/29/19  0336 04/28/19  1101  04/28/19 0313   WBC 9.9 12.2*  --   --  13.5*   HGB 8.1* 7.5* 7.7*   < > 6.2*    258  --   --  259    < > = values in this interval not displayed.     Recent Labs   Lab 04/30/19  1026 04/30/19  0313 04/29/19  0336 04/28/19  1101 04/28/19 0313   NA  --  141 141  --  144   POTASSIUM 3.8 3.0* 3.6 3.5 3.6   CHLORIDE  --  100 107  --  110*   CO2  --  35* 29  --  28   BUN  --  17 18  --  23   CR  --  0.87 0.86  --  0.89   GLC  --  106* 105*  --  85   JOSE  --  7.3* 7.4*  --  7.3*   MAG  --  1.7 1.9 2.0  --    PHOS  --  1.7* 2.7 2.1*  --      Imaging:  CXR with improved though still significant  pulmonary edema and b/l L>R pleural effusions      Assessment & Plan  Myrtle Vaz is a 73 year old female with a 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 Albarado. PICC 4/27/2019 for pressors and frequent IV access.    Neuro:      - Epidural      - Tylenol 975 q8h, dilaudid PCA, Atarax PRN     - PTA Effexor  CV: HDS, no issues     - PTA amiodarone 200 enteral  Pulm: satting on HRNC, encourage IS/Acapella     - no chest tube     - CXR daily to assess for conduit distention     - Would consider CT PE if no improvement in O2 with aggressive diuresis     - albuterol PRN  HEENT: pharyngostomy to LIS, cares BID (Peridex swish and spit, Chloraprep, bacitracin), flushes  FEN/GI:      - Increase tube feeds to 40 ml/hr, then 10 ml/hr q4h until at goal. STRICT NPO (meds via J) until at least POD#7, when swallow/esophagram planned.     - lyte replacement protocol, K Phos 1 packet QID 4/30 - 5/1     - Protonix daily     - No IVFs, diuresis as below.     - Bowel regimen: Miralax, senna-colace     - Nausea control: PRNs  Renal: UOPA, lopez in place for strict I&Os and diuresis     - Recommend aggressive diuresis today as well per SICU, as patient still with high O2 needs and CXR appears volume up.  ID: afebrile, no leukocytosis, no abx  Endo: PTA Synthroid  Activity: PT/OT, OOB QID  PPx: heparin subcutaneous due to epidural in place  Dispo: transfer to  today, discharge to ARU vs TCU in 4-7 days in pending clinical course      Patient seen and will be discussed with staff.  - - - - - - - - - - - - - - - - - -  Kamila Morrison MD  General Surgery PGY-3  See [facility] for on call information prior to paging me directly. Pager [phone].

## 2019-04-30 NOTE — PROGRESS NOTES
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 Admitted to the SICU post-operatively for close hemodynamic monitoring. Patient doing well, planning for transfer to floor today.    Changes today:  - Hypokalemia,on electrolyte protocol  - Increase TF today, discuss with thoracic surgery  - Diurese w/ diamox   - Transfer to floor   - Neutrophos for 48hrs    PLAN:  Neuro/ pain/ sedation:  # Acute post-surgical pain  - Monitor neurological status; notify the MD for any acute changes in exam  - Epidural catheter in place with bupivacaine, reporting adequate pain control  - Okay to increase bupivacaine up to 10ml/hr if needed per pain service  - Dilaudid PCA and oxycodone prn for pain    Pulmonary care:   - CXR notable for continued pleural effusions, to continue diuresis with Diamox   - Continue to follow daily CXR  - Supplemental oxygen to keep saturation above 92%, continues to require highflow NC, though continuing to wean at this time  - Incentive spirometer [id number] when awake  - head of bed elevation to 30 degrees  - pharyngostomy tube set to low suction  - duonebs present    Cardiovascular:    # Chronic hypertension  # History of atrial fibrillation  - Monitor hemodynamic status  - Continue PTA amiodarone  - MAP goal>65  - No pressor requirement at this time    GI care:   - NPO until POD#7, will assess for advancement of diet at that time (5/3)  - Bowel regimen on board, pt with BM ovn  - TFs increased to goal  - Protonix    Fluids/ Electrolytes/ Nutrition:   - No indication for parenteral nutrition at this time    Renal/ Fluid Balance:    - Will continue to monitor intake and output    Endocrine:    # Stress-induced  hyperglycemia  # Hypothyroidism  - Sliding scale for diabetes management   - Frequent glucose checks  - Resume PTA synthroid    ID/ Antibiotics:  - No indication for antibiotics    Heme:     #Acute blood loss anemia  - Stable hemoglobin at this time, 8.1 this AM  - Continue to monitor    MSK:  - PT/OT    Psych:  # Depression  - Continue PTA venlafaxine    Prophylaxis:    - Mechanical prophylaxis for DVT   - heparin ppx    Lines/ tubes/ drains:  PICC, PIVs, Mora, J tube    Disposition:  - Floor transfer today    Patient was evaluated and plan was discussed with ICU staff, Dr. Locke.    Yong Martinez M.D.  CA1 Anesthesiology      - - - - - - - - - - - - - - - - - - - - - - - - - - - - - - - - - - - - - - - - - - - - - - - - - - - - - - - - -   Events overnight:  No acute events reported overnight. Pt continues to require diuresis for fluid overload status and pleural effusions as illustrated on chest imaging. She states she is doing well, reports BM overnight    PHYSICAL EXAMINATION:  Temp:  [97.7  F (36.5  C)-98.8  F (37.1  C)] 98  F (36.7  C)  Heart Rate:  [61-74] 69  Resp:  [16-20] 16  BP: ()/(51-78) 107/65  FiO2 (%):  [50 %] 50 %  SpO2:  [93 %-98 %] 95 %  General: Alert, well-appearing, in no acute distress.  HEENT: Normocephalic, atraumatic. Moist mucous membranes.  Neck: Neck incision C/D. Spit fistula site open, packed. Some bruising, non-blanching erythema on chest. Swelling over manubriectomy site.   Respiratory: Non-labored breathing. Lung sounds coarse to auscultation bilaterally. L>R  Cardiovascular: Regular rate and rhythm, no murmurs.  Gastrointestinal: Abdomen soft, non-distended. No rebound or guarding. J tube site clean and dry.  Extremities: Warm and well perfused. No pedal edema.   Skin: No rashes or lesions appreciated, rest as noted above    LABS: Reviewed.   Arterial Blood Gases   Recent Labs   Lab 04/28/19  0420 04/27/19  0406 04/26/19  1520 04/26/19  1318   PH 7.35 7.37 7.38 7.39    PCO2 52* 48* 45 43   PO2 32* 81 348* 166*   HCO3 29* 28 26 27     Complete Blood Count   Recent Labs   Lab 04/30/19 0313 04/29/19  0336 04/28/19  1101 04/28/19  0442 04/28/19 0313 04/27/19  0421   WBC 9.9 12.2*  --   --  13.5* 16.2*   HGB 8.1* 7.5* 7.7* 6.2* 6.2* 8.0*    258  --   --  259 335     Basic Metabolic Panel  Recent Labs   Lab 04/30/19 0313 04/29/19  0336 04/28/19  1101 04/28/19  0313 04/27/19  0421    141  --  144 144   POTASSIUM 3.0* 3.6 3.5 3.6 4.0   CHLORIDE 100 107  --  110* 110*   CO2 35* 29  --  28 27   BUN 17 18  --  23 30   CR 0.87 0.86  --  0.89 1.03   * 105*  --  85 112*     Liver Function Tests  Recent Labs   Lab 04/30/19 0313 04/28/19  1101 04/27/19  0421   AST  --   --  163*   ALT  --   --  89*   ALKPHOS  --   --  76   BILITOTAL  --   --  0.4   ALBUMIN  --   --  2.5*   INR 1.25* 1.80*  --        IMAGING:  Results for orders placed or performed during the hospital encounter of 04/07/19   XR Chest 2 Views    Narrative    Exam: XR CHEST 2 VW, 4/7/2019 8:32 PM    Indication: cough    Comparison: 2/19/2019    Findings:   PA and lateral views of the chest. Cardiac silhouette is within normal  limits. Basilar areas of linear atelectasis, similar to CT 2/4/2019.  Upper lobe predominant emphysematous changes. Mild peribronchial  cuffing. No pleural effusion or pneumothorax. No acute airspace  disease.      Impression    Impression:   1. Mild peribronchial cuffing, may be related to  infectious/inflammatory bronchitis.  2. Stable basilar opacity with atelectasis, not significantly changed  from prior exams.    I have personally reviewed the examination and initial interpretation  and I agree with the findings.    TANYA GASPAR MD     Chest XR 4/29  COMPARISON: 4/28/2019     FINDINGS: Semiupright AP view of chest. Right PICC line tip over the  cavoatrial junction. Pharyngostomy tube sidehole over the distal  esophagus and tip over the gastroesophageal junction.       Unchanged cardiac silhouette. Calcified aortic arch. Bilateral pleural  effusions, slightly increased on the right side. Bilateral mixed  pulmonary opacities, slightly decreased.                                                                      IMPRESSION:   1. Bilateral pleural effusions, slightly increased on the right side.  2. Mildly decreased bilateral mixed pulmonary opacities.     I have personally reviewed the examination and initial interpretation  and I agree with the findings.     MARY HORN MD

## 2019-04-30 NOTE — PLAN OF CARE
Neuro: Alert and orientated x4. Forgetful at times. Moves all extremities equally. Pupils equal an reactive. Epidural off since 1700 per Thoracic.    Cardiac:Hypotensive this afternoon. SBP  range 75 - 90. MD notified. 250 Albumin given x1. Map >65 post albumin. HR in the 60s-80s.Monitor closely.  Respiratory: HFNC. Fio2 40%. 30L. O2 sats mid 90s. Using IS with encouragement. Small amount of clear thick oral secretions.   GI: + flatus and stool. Active BS. TF at 55mL/hr with 30 q 6 hr flushes.   : Adequate UOP via lopez cath.   IV:  PIV x2 SL. R PICC.   Pain: Dilaudid pca 0.1 q 10 minutes.   Skin: L neck incision, Midline incision, Old spit fistula site. Dressing changed at 1530.   PLAN: Continue with current plan of care. NOtify MD of any acute changes.

## 2019-04-30 NOTE — PROGRESS NOTES
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      SUBJECTIVE  Interval History: Overnight no acute events CXR increased pleural edema, continues to require highflow NC. Patient awake, semiupright in bed, reports left neck incision pain, and soreness anterior chest, currently rating 4/10 with epidural infusion, Dilaudid PCA, scheduled Tylenol  (see below).  Denies LE weakness, paresthesias, circumoral numbness, metallic taste or tinnitus.  Patient moves upper and lower extremities.  Currently denies nausea, tolerating tube feedings, NPO. BM x 2, urinary catheter in place, pharyngostomy tube to low suction.     Clinically Aligned Pain Assessment (CAPA):  Comfort (How is your pain?): Tolerable with discomfort  Change in Pain (Since your last medication/intervention?): About the same  Pain Control (How are your pain treatments working?):  Partially effective pain control  Functioning (Are you able to do activities to get better?) : Can do most things, but pain gets in the way of some      Pain Intensity using Numerical Rating Scale (NRS):    3/10 at rest and 4/10 with activity      Antithrombotic/Thrombolytic Therapy ordered:    heparin sodium injection 5,000 Units 5,000 Units, SC, Q8H       Analgesic Medications:  Medications related to Pain Management (From now, onward)    Start     Dose/Rate Route Frequency Ordered Stop    04/29/19 1230  bisacodyl (DULCOLAX) Suppository 10 mg      10 mg Rectal DAILY 04/29/19 1218      04/29/19 0000  acetaminophen (TYLENOL) tablet 650 mg      650 mg Per J Tube EVERY 4 HOURS PRN 04/26/19 2103      04/28/19 0915  bupivacaine (MARCAINE) 0.125 % in sodium chloride 0.9 % 250 mL EPIDURAL Infusion      6 mL/hr  EPIDURAL CONTINUOUS  "04/28/19 0904      04/27/19 1400  acetaminophen (TYLENOL) tablet 975 mg      975 mg Per J Tube EVERY 8 HOURS 04/27/19 0612      04/27/19 1225  lidocaine (LMX4) cream       Topical ONCE PRN 04/27/19 1227      04/27/19 0800  polyethylene glycol (MIRALAX/GLYCOLAX) Packet 17 g      17 g Per J Tube DAILY 04/27/19 0612      04/27/19 0800  senna-docusate (SENOKOT-S/PERICOLACE) 8.6-50 MG per tablet 2 tablet      2 tablet Per J Tube 2 TIMES DAILY 04/27/19 0612 04/26/19 2115  HYDROmorphone (DILAUDID) PCA 1 mg/mL OPIOID NAIVE       Intravenous CONTINUOUS 04/26/19 2103 04/26/19 2103  hydrOXYzine (ATARAX) tablet 10 mg      10 mg Oral EVERY 6 HOURS PRN 04/26/19 2103              OBJECTIVE  Lab Results:   Recent Labs   Lab Test 04/30/19  0313   WBC 9.9   RBC 2.93*   HGB 8.1*   HCT 27.1*   MCV 93   MCH 27.6   MCHC 29.9*   RDW 19.7*          Lab Results   Component Value Date    INR 1.25 04/30/2019    INR 1.80 04/28/2019    INR 1.10 02/04/2019       Vitals:    Temp:  [97.7  F (36.5  C)-98.8  F (37.1  C)] 98  F (36.7  C)  Heart Rate:  [61-74] 69  Resp:  [16-20] 16  BP: ()/(51-78) 107/65  FiO2 (%):  [40 %-50 %] 40 %  SpO2:  [93 %-98 %] 95 %  /65   Pulse 73   Temp 98  F (36.7  C) (Oral)   Resp 16   Ht 1.651 m (5' 5\")   Wt 82.8 kg (182 lb 8.7 oz)   SpO2 95%   BMI 30.38 kg/m         Exam:   GEN: alert and no distress  NEURO/MSK: Extent of sensory blockade:  Strength BLE 5/5  and overall symmetric  SKIN: Epidural catheter site with dressing c/d/i, no tenderness, erythema, heme, edema     ASSESSMENT/PLAN:    Patient is receiving adequate analgesia with current multimodal therapy including infusion of bupivacaine 0.125% at 6mL/hr.  Motor function intact and adequate sensory block, is meeting activity goals.  No evidence of adverse side effects related to local anesthetic. Adequate urine output.      - continue current epidural infusion bupivacaine 0.125% at 6mL/hour  - antithrombotic/thrombolytic therapy " okay to continue Heparin SQ Q 8 hrs as ordered. Please contact RAPS (#[phone]) prior to any medication changes  - will continue to follow and adjust as needed    - discussed plan with attending anesthesiologist    NARCISO Benitez [facility]  Regional Anesthesia Pain Service  4/30/2019 11:11 AM    RAPS Contact Info (24 hour job code pager is the last 4 digits) For in-house use only:   [employer] phone: [place] [phone], [place] [phone], [employer] [phone], then enter call-back number.    Text: Use [employer] on the Intranet <Paging/Directory> tab and enter Jobcode ID.   If no call back at any time, contact the hospital  and ask for RAPS attending or backup

## 2019-04-30 NOTE — PLAN OF CARE
Discharge Planner PT   Patient plan for discharge: pt did not state  Current status: PT 4A: Arrived to see pt. Noted pt with more lethargy this visit than when this provider last saw pt on Saturday. Pt required MODA-MAXA x 1 supine>sit within sternal precautions, sat at edge of bed ~ 14 min with MODA lessened to CGA-PAPO to maintain sitting position. RN present for visit, noted with pt 6B transfer orders. Pt's MAP was 60 sitting. RN was ok with trial of getting pt up to a chair based on BPs today and spoke with MDs regarding pt's MAP. RN advised to hold on gait and PT was in agreement due to BPs. Pt was able to stand with MODA x 2 and use walker to step over to a chair. Pts BP once in chair was 90/48, then later lowered 75/50. Sling placed for dependent transfer to bed. Once lying flat pt's BP improved to 104/84.   Barriers to return to prior living situation: medical status, too weak to safely return home, presents with increased falling risk due to balance and strength concerns  Recommendations for discharge: ARU  Rationale for recommendations: far below baseline functional status, would benefit from ARU for maximal recovery       Entered by: Nhi Torres 04/30/2019 5:27 PM

## 2019-04-30 NOTE — PLAN OF CARE
Neuro: A/O x4, follows commands, call light appropriate. PERRL.   Cardiac: Afebrile. NSR in 60's. MAP goal >65 - maintained without intervention.   Resp: Remains on HFNC - FiO2 @ 50%/40LPM. Lung sounds diminished. Encouraged use of IS/acapella.   GI/: NPO. Bowel sounds hypoactive. 2 BMs (one at 1900 and one at 0500). Last bowel movement mucous-y, notified MD Fatoumata. Tube feeds at goal of 30 mL/hr with 30 free water flush q2h. Lasix BID to diuresis.   Skin: Midline/L neck incision - well approximated with staples. L chest wound/fistula - needing to be packed twice per day, small amount of serosanguinous out.   Activity: Turn q2h.   Lines: Pharyngostomy to LIS. PIV in R hand and L arm, double lumen PICC.   Gtts: TKO with PCA pump  Pain: Dilaudid PCA pump 0.1mg/q10min, bupivacaine pump @ 6mL/hr. PRN oral meds available.     Will continue to monitor and assess.

## 2019-04-30 NOTE — PLAN OF CARE
Discharge Planner OT   Patient plan for discharge: not stated  Current status: Pt required continued VC and A to adhere to and recall post surgical precautions. Pt mod A bed mobility, STS transfers EOB> chair. VSS throughout on 50% FiO2 HFNC  Barriers to return to prior living situation: medical status  Recommendations for discharge: ARU  Rationale for recommendations: to increase ind in ADLS/IADLS, pt would be able to tolerate 3hrs of therapy throughout the day.       Entered by: Margarita Gross 04/30/2019 2:59 PM

## 2019-05-01 ENCOUNTER — APPOINTMENT (OUTPATIENT)
Dept: GENERAL RADIOLOGY | Facility: CLINIC | Age: 74
DRG: 326 | End: 2019-05-01
Attending: THORACIC SURGERY (CARDIOTHORACIC VASCULAR SURGERY)
Payer: MEDICARE

## 2019-05-01 ENCOUNTER — APPOINTMENT (OUTPATIENT)
Dept: PHYSICAL THERAPY | Facility: CLINIC | Age: 74
DRG: 326 | End: 2019-05-01
Attending: THORACIC SURGERY (CARDIOTHORACIC VASCULAR SURGERY)
Payer: MEDICARE

## 2019-05-01 LAB
ALBUMIN SERPL-MCNC: 2.2 G/DL (ref 3.4–5)
ALP SERPL-CCNC: 101 U/L (ref 40–150)
ALT SERPL W P-5'-P-CCNC: 20 U/L (ref 0–50)
ANION GAP SERPL CALCULATED.3IONS-SCNC: 6 MMOL/L (ref 3–14)
AST SERPL W P-5'-P-CCNC: 17 U/L (ref 0–45)
BILIRUB DIRECT SERPL-MCNC: <0.1 MG/DL (ref 0–0.2)
BILIRUB SERPL-MCNC: 0.2 MG/DL (ref 0.2–1.3)
BUN SERPL-MCNC: 17 MG/DL (ref 7–30)
CALCIUM SERPL-MCNC: 7.1 MG/DL (ref 8.5–10.1)
CHLORIDE SERPL-SCNC: 103 MMOL/L (ref 94–109)
CO2 SERPL-SCNC: 28 MMOL/L (ref 20–32)
CREAT SERPL-MCNC: 0.85 MG/DL (ref 0.52–1.04)
ERYTHROCYTE [DISTWIDTH] IN BLOOD BY AUTOMATED COUNT: 19.8 % (ref 10–15)
GFR SERPL CREATININE-BSD FRML MDRD: 68 ML/MIN/{1.73_M2}
GLUCOSE SERPL-MCNC: 102 MG/DL (ref 70–99)
HCT VFR BLD AUTO: 25.4 % (ref 35–47)
HGB BLD-MCNC: 7.8 G/DL (ref 11.7–15.7)
MAGNESIUM SERPL-MCNC: 1.7 MG/DL (ref 1.6–2.3)
MCH RBC QN AUTO: 28.9 PG (ref 26.5–33)
MCHC RBC AUTO-ENTMCNC: 30.7 G/DL (ref 31.5–36.5)
MCV RBC AUTO: 94 FL (ref 78–100)
PHOSPHATE SERPL-MCNC: 1.9 MG/DL (ref 2.5–4.5)
PLATELET # BLD AUTO: 342 10E9/L (ref 150–450)
POTASSIUM SERPL-SCNC: 3.2 MMOL/L (ref 3.4–5.3)
POTASSIUM SERPL-SCNC: 3.8 MMOL/L (ref 3.4–5.3)
PROT SERPL-MCNC: 6 G/DL (ref 6.8–8.8)
RBC # BLD AUTO: 2.7 10E12/L (ref 3.8–5.2)
SODIUM SERPL-SCNC: 137 MMOL/L (ref 133–144)
WBC # BLD AUTO: 10.2 10E9/L (ref 4–11)

## 2019-05-01 PROCEDURE — A9270 NON-COVERED ITEM OR SERVICE: HCPCS | Performed by: SURGERY

## 2019-05-01 PROCEDURE — 40000047 ZZH STATISTIC CTO2 CONT OXYGEN TECH TIME EA 90 MIN

## 2019-05-01 PROCEDURE — 97530 THERAPEUTIC ACTIVITIES: CPT | Mod: GP

## 2019-05-01 PROCEDURE — 85027 COMPLETE CBC AUTOMATED: CPT | Performed by: SURGERY

## 2019-05-01 PROCEDURE — 80076 HEPATIC FUNCTION PANEL: CPT | Performed by: SURGERY

## 2019-05-01 PROCEDURE — 25000128 H RX IP 250 OP 636: Performed by: SURGERY

## 2019-05-01 PROCEDURE — 80048 BASIC METABOLIC PNL TOTAL CA: CPT | Performed by: SURGERY

## 2019-05-01 PROCEDURE — 97116 GAIT TRAINING THERAPY: CPT | Mod: GP

## 2019-05-01 PROCEDURE — 40000275 ZZH STATISTIC RCP TIME EA 10 MIN

## 2019-05-01 PROCEDURE — 25000132 ZZH RX MED GY IP 250 OP 250 PS 637: Performed by: THORACIC SURGERY (CARDIOTHORACIC VASCULAR SURGERY)

## 2019-05-01 PROCEDURE — 12000004 ZZH R&B IMCU UMMC

## 2019-05-01 PROCEDURE — 25000132 ZZH RX MED GY IP 250 OP 250 PS 637: Performed by: SURGERY

## 2019-05-01 PROCEDURE — 27210437 ZZH NUTRITION PRODUCT SEMIELEM INTERMED LITER

## 2019-05-01 PROCEDURE — 83735 ASSAY OF MAGNESIUM: CPT | Performed by: SURGERY

## 2019-05-01 PROCEDURE — 25800030 ZZH RX IP 258 OP 636: Performed by: SURGERY

## 2019-05-01 PROCEDURE — 84132 ASSAY OF SERUM POTASSIUM: CPT | Performed by: SURGERY

## 2019-05-01 PROCEDURE — 25000132 ZZH RX MED GY IP 250 OP 250 PS 637: Performed by: STUDENT IN AN ORGANIZED HEALTH CARE EDUCATION/TRAINING PROGRAM

## 2019-05-01 PROCEDURE — 25000128 H RX IP 250 OP 636: Performed by: STUDENT IN AN ORGANIZED HEALTH CARE EDUCATION/TRAINING PROGRAM

## 2019-05-01 PROCEDURE — 25000125 ZZHC RX 250: Performed by: SURGERY

## 2019-05-01 PROCEDURE — 36592 COLLECT BLOOD FROM PICC: CPT | Performed by: SURGERY

## 2019-05-01 PROCEDURE — 94799 UNLISTED PULMONARY SVC/PX: CPT

## 2019-05-01 PROCEDURE — A9270 NON-COVERED ITEM OR SERVICE: HCPCS | Performed by: THORACIC SURGERY (CARDIOTHORACIC VASCULAR SURGERY)

## 2019-05-01 PROCEDURE — 84100 ASSAY OF PHOSPHORUS: CPT | Performed by: SURGERY

## 2019-05-01 PROCEDURE — 71045 X-RAY EXAM CHEST 1 VIEW: CPT

## 2019-05-01 RX ORDER — FUROSEMIDE 10 MG/ML
20 INJECTION INTRAMUSCULAR; INTRAVENOUS ONCE
Status: COMPLETED | OUTPATIENT
Start: 2019-05-01 | End: 2019-05-01

## 2019-05-01 RX ORDER — AMIODARONE HYDROCHLORIDE 200 MG/1
200 TABLET ORAL DAILY
Status: DISCONTINUED | OUTPATIENT
Start: 2019-05-02 | End: 2019-05-15 | Stop reason: HOSPADM

## 2019-05-01 RX ORDER — OXYCODONE HCL 5 MG/5 ML
5 SOLUTION, ORAL ORAL
Status: DISCONTINUED | OUTPATIENT
Start: 2019-05-01 | End: 2019-05-01

## 2019-05-01 RX ORDER — OXYCODONE HYDROCHLORIDE 5 MG/1
5 TABLET ORAL EVERY 6 HOURS PRN
Status: DISCONTINUED | OUTPATIENT
Start: 2019-05-01 | End: 2019-05-01

## 2019-05-01 RX ORDER — LIDOCAINE 4 G/G
1 PATCH TOPICAL
Status: DISCONTINUED | OUTPATIENT
Start: 2019-05-01 | End: 2019-05-15 | Stop reason: HOSPADM

## 2019-05-01 RX ORDER — OXYCODONE HYDROCHLORIDE 5 MG/1
5 TABLET ORAL EVERY 6 HOURS
Status: DISCONTINUED | OUTPATIENT
Start: 2019-05-01 | End: 2019-05-01

## 2019-05-01 RX ADMIN — Medication 5 ML: at 17:03

## 2019-05-01 RX ADMIN — CHLORHEXIDINE GLUCONATE AND ISOPROPYL ALCOHOL 3 ML: 20; .7 SOLUTION TOPICAL at 20:28

## 2019-05-01 RX ADMIN — Medication 25 MG: at 23:53

## 2019-05-01 RX ADMIN — HEPARIN SODIUM 5000 UNITS: 5000 INJECTION, SOLUTION INTRAVENOUS; SUBCUTANEOUS at 20:17

## 2019-05-01 RX ADMIN — ACETAMINOPHEN 975 MG: 325 TABLET, FILM COATED ORAL at 22:04

## 2019-05-01 RX ADMIN — POTASSIUM & SODIUM PHOSPHATES POWDER PACK 280-160-250 MG 1 PACKET: 280-160-250 PACK at 20:17

## 2019-05-01 RX ADMIN — ACETAMINOPHEN 975 MG: 325 TABLET, FILM COATED ORAL at 13:52

## 2019-05-01 RX ADMIN — BACITRACIN: 500 OINTMENT TOPICAL at 08:55

## 2019-05-01 RX ADMIN — LEVOTHYROXINE SODIUM 150 MCG: 75 TABLET ORAL at 08:52

## 2019-05-01 RX ADMIN — HEPARIN SODIUM 5000 UNITS: 5000 INJECTION, SOLUTION INTRAVENOUS; SUBCUTANEOUS at 03:18

## 2019-05-01 RX ADMIN — FUROSEMIDE 20 MG: 10 INJECTION, SOLUTION INTRAVENOUS at 08:55

## 2019-05-01 RX ADMIN — POTASSIUM & SODIUM PHOSPHATES POWDER PACK 280-160-250 MG 1 PACKET: 280-160-250 PACK at 12:46

## 2019-05-01 RX ADMIN — VENLAFAXINE 75 MG: 75 TABLET ORAL at 08:53

## 2019-05-01 RX ADMIN — CHLORHEXIDINE GLUCONATE AND ISOPROPYL ALCOHOL 3 ML: 20; .7 SOLUTION TOPICAL at 08:54

## 2019-05-01 RX ADMIN — POTASSIUM & SODIUM PHOSPHATES POWDER PACK 280-160-250 MG 1 PACKET: 280-160-250 PACK at 08:52

## 2019-05-01 RX ADMIN — ACETAMINOPHEN 975 MG: 325 TABLET, FILM COATED ORAL at 06:16

## 2019-05-01 RX ADMIN — POTASSIUM CHLORIDE 40 MEQ: 1.5 POWDER, FOR SOLUTION ORAL at 06:16

## 2019-05-01 RX ADMIN — CHLORHEXIDINE GLUCONATE 0.12% ORAL RINSE 30 ML: 1.2 LIQUID ORAL at 08:54

## 2019-05-01 RX ADMIN — Medication 200 MG: at 08:53

## 2019-05-01 RX ADMIN — POTASSIUM CHLORIDE 20 MEQ: 1.5 POWDER, FOR SOLUTION ORAL at 08:52

## 2019-05-01 RX ADMIN — OXYCODONE HYDROCHLORIDE 5 MG: 5 TABLET ORAL at 08:53

## 2019-05-01 RX ADMIN — POTASSIUM PHOSPHATE, MONOBASIC AND POTASSIUM PHOSPHATE, DIBASIC 20 MMOL: 224; 236 INJECTION, SOLUTION INTRAVENOUS at 06:29

## 2019-05-01 RX ADMIN — VENLAFAXINE 75 MG: 75 TABLET ORAL at 20:17

## 2019-05-01 RX ADMIN — POTASSIUM & SODIUM PHOSPHATES POWDER PACK 280-160-250 MG 1 PACKET: 280-160-250 PACK at 17:03

## 2019-05-01 RX ADMIN — BACITRACIN: 500 OINTMENT TOPICAL at 20:28

## 2019-05-01 RX ADMIN — LIDOCAINE 1 PATCH: 560 PATCH PERCUTANEOUS; TOPICAL; TRANSDERMAL at 12:46

## 2019-05-01 RX ADMIN — PANTOPRAZOLE SODIUM 40 MG: 40 TABLET, DELAYED RELEASE ORAL at 08:53

## 2019-05-01 RX ADMIN — HEPARIN SODIUM 5000 UNITS: 5000 INJECTION, SOLUTION INTRAVENOUS; SUBCUTANEOUS at 12:46

## 2019-05-01 ASSESSMENT — PAIN DESCRIPTION - DESCRIPTORS
DESCRIPTORS: TENDER;SORE
DESCRIPTORS: TENDER
DESCRIPTORS: TENDER

## 2019-05-01 ASSESSMENT — MIFFLIN-ST. JEOR: SCORE: 1318.88

## 2019-05-01 ASSESSMENT — ACTIVITIES OF DAILY LIVING (ADL)
ADLS_ACUITY_SCORE: 19

## 2019-05-01 NOTE — PLAN OF CARE
Transfer  Transferred from:  Via:bed  Reason for transfer:Pt appropriate for 6B- improved patient condition  Family: Aware of transfer  Belongings: Received with pt  Chart: Received with pt  Medications: Meds received from old unit with pt  2 RN Skin Assessment Completed By: Karen HINTON and Svetlana MOSS Discoloration on coccyx. Blanchable. Bruising, 3 incisions, and pharyngostomy   Report received from: 4A  Pt status: VSS. Pain under control.

## 2019-05-01 NOTE — OP NOTE
Procedure Date: 2019      PREOPERATIVE DIAGNOSIS:  Esophageal discontinuity.      PROCEDURES PERFORMED:   1.  Substernal gastric pull-up and esophagogastrostomy.   2.  Left pharyngostomy tube placement.   3.  Esophagogastroscopy.      SURGEON:  Sonia Jaime MD  (I am dictating as co-surgeon with Dr. Albarado.)      DESCRIPTION OF PROCEDURE:  I helped Dr. Albarado with the creation of the gastric conduit and tunneling it retrosternally and then with a handsewn end-to-end 2-layer anastomosis.  I then also advance the pharyngostomy down through the esophagus into the gastric conduit and performed an endoscopy to verify that the anastomosis was airtight.      Please refer to Dr. Albarado's' operative report for further details.         SONIA JAIME MD             D: 2019   T: 2019   MT: JUAN      Name:     CANDICE LOYA   MRN:      [id number]        Account:        [id number]   :      1945           Procedure Date: 2019      Document: [id number]       cc: [facility] Surgery Billing

## 2019-05-01 NOTE — PLAN OF CARE
Neuro: A&O x4 confused occasionally   Cardiac: SR. VSS.   Respiratory: Sating 95 on RA.   GI/: Adequate urine output. BM x1  Diet/appetite: Tolerating NPO TF at goal of 55 dietl.  Activity:  Assist of 2, up to BSC  Pain: At acceptable level on current regimen.   Skin: No new deficits noted.  LDA's: Pharyngostomy to SANTORO. PICC TKO. J tube TF running.     Plan: Continue with POC. Notify primary team with changes.

## 2019-05-01 NOTE — PROVIDER NOTIFICATION
Patient having dropping blood pressures. Last 91/44 MAP 54. Anesthesia paged twice with no response. Gen Surg paged. MD advised to stop epidural. Will continue to monitor and assess blood pressure.

## 2019-05-01 NOTE — PROGRESS NOTES
THORACIC & FOREGUT SURGERY PROGRESS NOTE  05/01/2019    Patient: Myrtle Vaz  MRN: [id number]    Subjective  6B status. No acute overnight events. Remains on high flow NC with slightly decreased needs now on 30 LPM, 40%. In last 24 hours has not received lasix but did receive acetazolamide. BMx1. Having issues with pain control.     Objective  Temp:  [97.5  F (36.4  C)-98.9  F (37.2  C)] 98.4  F (36.9  C)  Heart Rate:  [67-82] 72  Resp:  [9-16] 16  BP: ()/() 117/69  FiO2 (%):  [40 %] 40 %  SpO2:  [89 %-99 %] 98 %    General: NAD, sleepy, laying comfortably in bed  HEENT/chest: neck incision c/d/i with bruising and staples, pharyngostomy in place with clear output, prior spit fistula site in low chest c/d/i, sternotomy incision dressing c/d/i  CV: regular rate and rhythm, well perfused.   Pulm: no dyspnea on HFNC  Abd: soft, non-distended, appropriately tender, incision dressing c/d/i, J tube site c/d/i with feeds going  Extremities: no edema  Neuro: moving all extremities spontaneously without apparent deficit    I/O last 3 completed shifts:  In: 1885 [I.V.:115; NG/GT:500]  Out: 2125 [Urine:1525; Emesis/NG output:600]    Labs:  Recent Labs   Lab 05/01/19  0320 04/30/19  0313 04/29/19  0336   WBC 10.2 9.9 12.2*   HGB 7.8* 8.1* 7.5*    324 258     Recent Labs   Lab 05/01/19  0320 04/30/19  1026 04/30/19  0313 04/29/19  0336     --  141 141   POTASSIUM 3.2* 3.8 3.0* 3.6   CHLORIDE 103  --  100 107   CO2 28  --  35* 29   BUN 17  --  17 18   CR 0.85  --  0.87 0.86   *  --  106* 105*   JOSE 7.1*  --  7.3* 7.4*   MAG 1.7  --  1.7 1.9   PHOS 1.9*  --  1.7* 2.7     Imaging:  CXR with stable pleural effusions and basilar opacities. Mild pulmonary edema is improved.       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 Albarado. [facility]  4/27/2019 for pressors and frequent IV access.    Neuro:      - Epidural shut off, likely remove today.      - Tylenol 975 q8h, dilaudid PCA, Atarax PRN, will add scheduled oxycodone      - PTA Effexor  CV: HDS, no issues     - PTA amiodarone 200 enteral  Pulm: satting on HRNC, encourage IS/Acapella     - no chest tube     - CXR daily to assess for conduit distention     - Would consider CT PE if no improvement in O2 with aggressive diuresis     - albuterol PRN      - Adding Metanebs QID   HEENT: pharyngostomy to LIS, cares BID (Peridex swish and spit, Chloraprep, bacitracin), flushes  FEN/GI:      - Tube feeds at goal of 55 mL/hr with 30 free water flush q6h. STRICT NPO (meds via J) until at least POD#7, when swallow/esophagram planned.     - lyte replacement protocol, neutraphos QID      - Protonix daily     - No IVFs, diuresis as below.     - Bowel regimen: Miralax, senna-colace     - Nausea control: PRNs  Renal: UOPA, lopez in place for strict I&Os and diuresis     - discontinue lopez      - 20 mg Lasix today   ID: afebrile, no leukocytosis, no abx  Endo: PTA Synthroid  Activity: PT/OT, OOB QID  PPx: heparin subcutaneous due to epidural in place  Dispo: transfer to  today, discharge to ARU vs TCU in 4-7 days in pending clinical course      Patient seen and will be discussed with staff.  - - - - - - - - - - - - - - - - - -  Johana Randall MD   Surgery PGY-1

## 2019-05-01 NOTE — PLAN OF CARE
Neuro: A/O x4, follows commands, call light appropriate. PERRL. Tired/lethargic overnight but oriented and arouses to voice.    Cardiac: Afebrile. NSR in 60-70's. Blood pressures soft with epidural pump, corrected once epidural was stopped.    Resp: Remains on HFNC - FiO2 @ 40%/30 LPM. Lung sounds diminished. Encouraged use of IS/acapella.   GI/: NPO. Bowel sounds active. 2 large liquid Bm. Tube feeds at goal of 55 mL/hr with 30 free water flush q6h.   Skin: Midline/L neck incision - well approximated with staples. L chest wound/fistula - needing to be packed twice per day, small amount of serosanguinous out.   Activity: Turn q2h.   Lines: Pharyngostomy to LIS. PIV in R hand and L arm, double lumen PICC.   Gtts: TKO with PCA pump  Pain: Dilaudid PCA pump 0.1mg/q10min, bupivacaine pump @ 6mL/hr - STOPPED due to low blood pressures, gen surg resident aware. PRN oral meds available.      Will continue to monitor and assess.

## 2019-05-01 NOTE — PROVIDER NOTIFICATION
MD notified of patient's complaint of feeling foggy/woozy. Patient becoming increasingly confused, repeating same questions. Per patient's daughter, patient does develop some confusion related to pain medications. Scheduled oxycodone/ PCA dilaudid discontinued. Patient will remain on scheduled Tylenol and PRN tramadol.     Patients code status is listed as PRIOR. Thoracic team has been notified to please update her current status, awaiting order and code status to be updated.

## 2019-05-01 NOTE — PLAN OF CARE
Discharge Planner PT   Patient plan for discharge: not discussed   Current status: Pt performed bed mobility and supine>sit with min A for shoulder management. Completed SPT bed>wc with min A. She performed STS from wc with cues for maintenance of precautions and min A. Pt ambulated 2 x 15-20' with FWW and CGA. Limited by activity tolerance today.   Barriers to return to prior living situation: medical needs, pain, current mobility   Recommendations for discharge: TCU   Rationale for recommendations: Pt would benefit from continued skilled PT to safely progress mobility, activity tolerance, and strengthening.       Entered by: Eliane Carrillo 05/01/2019 1:31 PM

## 2019-05-01 NOTE — PLAN OF CARE
Neuro: A&OX4. Follows commands and moves all extremities equally. On scheduled tylenol/tramadol for pain control. Lidocaine patch to back for additional pain control.     Cv: HR NSR. BPs stable. Received lasix. Afebrile.     Resp: LS clear, IS encouraged. Weaned off of HFNC to room air. Maintaining sats at 95 percent on room air.   Pharyngostomy tube to LIS 50 ml bile output q 2 hours.     : Mora d/c'd at 1030 and is due to void. Emptied 650 ml post lasix of clear, water appearing urine.     GI: TF infusing at goal rate. Two tan colored, soft stools noted during shift. Per thoracic team, patient is a strict NPO no ice chips.     Skin: Spit fistula CDI, L chest incision wet to dry dressing changed at 1230. L chest erythema noted, no drainage present.     Activity: Patient tolerated up to chair for one hour. Attempted to ambulate with PT. Encourage patient to increase activity.     Labs: Please recheck K+ level this afternoon.

## 2019-05-01 NOTE — PROGRESS NOTES
REGIONAL ANESTHESIA PAIN SERVICE EPIDURAL NOTE  Myrtle Vaz is a 73 year old female POD #5 s/p ESOPHAGOGASTRECTOMY and placement of T8-9 epidural catheter for pain management.      SUBJECTIVE  Interval History: Overnight events: Epidural stopped overnight due to hypotension. Patient reports moderate pain control with epidural infusion and current  analgesic medications (see below).  Denies weakness, paresthesias, circumoral numbness, metallic taste or tinnitus.  Patient has not been OOB yet this am.  Currently tolerating a TF diet, denies nausea or vomiting.  BM x 2 overnight.     Clinically Aligned Pain Assessment (CAPA):  Comfort (How is your pain?): Tolerable with discomfort  Change in Pain (Since your last medication/intervention?): About the same  Pain Control (How are your pain treatments working?):  Partially effective pain control  Functioning (Are you able to do activities to get better?) : Can do most things, but pain gets in the way of some   Sleep (Does your pain management allow you to sleep or rest?): Awake with occasional pain     Pain Intensity using Numerical Rating Scale (NRS):    7/10 at rest and 7/10 with activity      Antithrombotic/Thrombolytic Therapy ordered:  Heparin 5,000 units    Analgesic Medications:  Medications related to Pain Management (From now, onward)    Start     Dose/Rate Route Frequency Ordered Stop    04/30/19 1251  bisacodyl (DULCOLAX) Suppository 10 mg      10 mg Rectal DAILY PRN 04/30/19 1251      04/28/19 0915  bupivacaine (MARCAINE) 0.125 % in sodium chloride 0.9 % 250 mL EPIDURAL Infusion      6 mL/hr  EPIDURAL CONTINUOUS 04/28/19 0904      04/27/19 1400  acetaminophen (TYLENOL) tablet 975 mg      975 mg Per J Tube EVERY 8 HOURS 04/27/19 0612      04/27/19 1225  lidocaine (LMX4) cream       Topical ONCE PRN 04/27/19 1227      04/27/19 0800  polyethylene glycol (MIRALAX/GLYCOLAX) Packet 17 g      17 g Per J Tube DAILY 04/27/19 0612      04/27/19 0800  senna-docusate  "(SENOKOT-S/PERICOLACE) 8.6-50 MG per tablet 2 tablet      2 tablet Per J Tube 2 TIMES DAILY 04/27/19 0612 04/26/19 2115  HYDROmorphone (DILAUDID) PCA 1 mg/mL OPIOID NAIVE       Intravenous CONTINUOUS 04/26/19 2103 04/26/19 2103  hydrOXYzine (ATARAX) tablet 10 mg      10 mg Oral EVERY 6 HOURS PRN 04/26/19 2103             OBJECTIVE  Lab Results:   Recent Labs   Lab Test 05/01/19  0320   WBC 10.2   RBC 2.70*   HGB 7.8*   HCT 25.4*   MCV 94   MCH 28.9   MCHC 30.7*   RDW 19.8*          Lab Results   Component Value Date    INR 1.25 04/30/2019    INR 1.80 04/28/2019    INR 1.10 02/04/2019       Vitals:    Temp:  [97.5  F (36.4  C)-98.9  F (37.2  C)] 98.4  F (36.9  C)  Heart Rate:  [67-82] 79  Resp:  [9-16] 16  BP: ()/() 133/66  FiO2 (%):  [40 %-50 %] 40 %  SpO2:  [89 %-99 %] 98 %  /66   Pulse 73   Temp 98.4  F (36.9  C) (Oral)   Resp 16   Ht 1.651 m (5' 5\")   Wt 80.8 kg (178 lb 2.1 oz)   SpO2 98%   BMI 29.64 kg/m         Exam:   GEN: alert and no distress  NEURO/MSK: Strength B/L LE 5/5  and overall symmetric  SKIN: Epidural catheter site with dressing c/d/i, no tenderness, erythema, heme, edema     ASSESSMENT/PLAN:    Patient is receiving moderate analgesia with current multimodal therapy including T8-9 epidural catheter infusion of Bupivacaine 0.125% stopped due to hypotension.  Pt is participating in therapies.  Denies evidence of adverse side effects related to local anesthetic..      - 0835 discontinued epidural catheter dark tip intact without complication - POD #5  - antithrombotic/thrombolytic therapy:  Last dose of heparin was today at 0318.   - will sign off/call for questions or concerns    - discussed plan with attending anesthesiologist    NARCISO Arciniega CNP  Regional Anesthesia Pain Service  5/1/2019 6:37 AM    RAPS Contact Info (24 hour job code pager is the last 4 digits) For in-house use only:   [employer] phone: [place] [phone], [place] [phone], Peds " [phone], then enter call-back number.    Text: Use [employer] on the Intranet <Paging/Directory> tab and enter Jobcode ID.   If no call back at any time, contact the hospital  and ask for RAPS attending or backup

## 2019-05-01 NOTE — PROGRESS NOTES
STAFF ADDENDUM:  I saw and evaluated Ms. Vaz and agree with the resident s findings and plan of care as documented in the resident s note and edited by me, as applicable.      In summary, Ms. Vaz is progressing well. We may get an esophagram tomorrow. We will address her pain medication to decrease somnolence.  The patient had all questions answered and was in agreement with the plan.  Krystian Jaime MD

## 2019-05-02 ENCOUNTER — APPOINTMENT (OUTPATIENT)
Dept: SPEECH THERAPY | Facility: CLINIC | Age: 74
DRG: 326 | End: 2019-05-02
Attending: THORACIC SURGERY (CARDIOTHORACIC VASCULAR SURGERY)
Payer: MEDICARE

## 2019-05-02 ENCOUNTER — APPOINTMENT (OUTPATIENT)
Dept: GENERAL RADIOLOGY | Facility: CLINIC | Age: 74
DRG: 326 | End: 2019-05-02
Attending: THORACIC SURGERY (CARDIOTHORACIC VASCULAR SURGERY)
Payer: MEDICARE

## 2019-05-02 LAB
ANION GAP SERPL CALCULATED.3IONS-SCNC: 10 MMOL/L (ref 3–14)
ANION GAP SERPL CALCULATED.3IONS-SCNC: 11 MMOL/L (ref 3–14)
BLD PROD TYP BPU: NORMAL
BLD UNIT ID BPU: 0
BLOOD PRODUCT CODE: NORMAL
BPU ID: NORMAL
BUN SERPL-MCNC: 20 MG/DL (ref 7–30)
BUN SERPL-MCNC: 21 MG/DL (ref 7–30)
CALCIUM SERPL-MCNC: 7.8 MG/DL (ref 8.5–10.1)
CALCIUM SERPL-MCNC: 7.9 MG/DL (ref 8.5–10.1)
CHLORIDE SERPL-SCNC: 101 MMOL/L (ref 94–109)
CHLORIDE SERPL-SCNC: 103 MMOL/L (ref 94–109)
CO2 SERPL-SCNC: 25 MMOL/L (ref 20–32)
CO2 SERPL-SCNC: 25 MMOL/L (ref 20–32)
CREAT SERPL-MCNC: 0.8 MG/DL (ref 0.52–1.04)
CREAT SERPL-MCNC: 0.89 MG/DL (ref 0.52–1.04)
ERYTHROCYTE [DISTWIDTH] IN BLOOD BY AUTOMATED COUNT: 20.6 % (ref 10–15)
GFR SERPL CREATININE-BSD FRML MDRD: 65 ML/MIN/{1.73_M2}
GFR SERPL CREATININE-BSD FRML MDRD: 74 ML/MIN/{1.73_M2}
GLUCOSE SERPL-MCNC: 126 MG/DL (ref 70–99)
GLUCOSE SERPL-MCNC: 133 MG/DL (ref 70–99)
GRAM STN SPEC: ABNORMAL
GRAM STN SPEC: ABNORMAL
HCT VFR BLD AUTO: 28.5 % (ref 35–47)
HGB BLD-MCNC: 8.8 G/DL (ref 11.7–15.7)
MAGNESIUM SERPL-MCNC: 1.8 MG/DL (ref 1.6–2.3)
MAGNESIUM SERPL-MCNC: 2 MG/DL (ref 1.6–2.3)
MCH RBC QN AUTO: 28.6 PG (ref 26.5–33)
MCHC RBC AUTO-ENTMCNC: 30.9 G/DL (ref 31.5–36.5)
MCV RBC AUTO: 93 FL (ref 78–100)
PHOSPHATE SERPL-MCNC: 2.2 MG/DL (ref 2.5–4.5)
PHOSPHATE SERPL-MCNC: 3.7 MG/DL (ref 2.5–4.5)
PLATELET # BLD AUTO: 424 10E9/L (ref 150–450)
POTASSIUM SERPL-SCNC: 3.7 MMOL/L (ref 3.4–5.3)
POTASSIUM SERPL-SCNC: 4 MMOL/L (ref 3.4–5.3)
RBC # BLD AUTO: 3.08 10E12/L (ref 3.8–5.2)
SODIUM SERPL-SCNC: 136 MMOL/L (ref 133–144)
SODIUM SERPL-SCNC: 138 MMOL/L (ref 133–144)
SPECIMEN SOURCE: ABNORMAL
TRANSFUSION STATUS PATIENT QL: NORMAL
TRANSFUSION STATUS PATIENT QL: NORMAL
WBC # BLD AUTO: 12.9 10E9/L (ref 4–11)

## 2019-05-02 PROCEDURE — 25000132 ZZH RX MED GY IP 250 OP 250 PS 637: Performed by: SURGERY

## 2019-05-02 PROCEDURE — 36415 COLL VENOUS BLD VENIPUNCTURE: CPT | Performed by: STUDENT IN AN ORGANIZED HEALTH CARE EDUCATION/TRAINING PROGRAM

## 2019-05-02 PROCEDURE — 25000125 ZZHC RX 250: Performed by: SURGERY

## 2019-05-02 PROCEDURE — 87106 FUNGI IDENTIFICATION YEAST: CPT | Performed by: STUDENT IN AN ORGANIZED HEALTH CARE EDUCATION/TRAINING PROGRAM

## 2019-05-02 PROCEDURE — 25000132 ZZH RX MED GY IP 250 OP 250 PS 637: Performed by: STUDENT IN AN ORGANIZED HEALTH CARE EDUCATION/TRAINING PROGRAM

## 2019-05-02 PROCEDURE — 71045 X-RAY EXAM CHEST 1 VIEW: CPT

## 2019-05-02 PROCEDURE — A9270 NON-COVERED ITEM OR SERVICE: HCPCS | Performed by: SURGERY

## 2019-05-02 PROCEDURE — 87205 SMEAR GRAM STAIN: CPT | Performed by: STUDENT IN AN ORGANIZED HEALTH CARE EDUCATION/TRAINING PROGRAM

## 2019-05-02 PROCEDURE — 40000275 ZZH STATISTIC RCP TIME EA 10 MIN

## 2019-05-02 PROCEDURE — 87070 CULTURE OTHR SPECIMN AEROBIC: CPT | Performed by: STUDENT IN AN ORGANIZED HEALTH CARE EDUCATION/TRAINING PROGRAM

## 2019-05-02 PROCEDURE — 27210437 ZZH NUTRITION PRODUCT SEMIELEM INTERMED LITER

## 2019-05-02 PROCEDURE — 87077 CULTURE AEROBIC IDENTIFY: CPT | Performed by: STUDENT IN AN ORGANIZED HEALTH CARE EDUCATION/TRAINING PROGRAM

## 2019-05-02 PROCEDURE — 80048 BASIC METABOLIC PNL TOTAL CA: CPT | Performed by: STUDENT IN AN ORGANIZED HEALTH CARE EDUCATION/TRAINING PROGRAM

## 2019-05-02 PROCEDURE — 25000128 H RX IP 250 OP 636: Performed by: STUDENT IN AN ORGANIZED HEALTH CARE EDUCATION/TRAINING PROGRAM

## 2019-05-02 PROCEDURE — 25000131 ZZH RX MED GY IP 250 OP 636 PS 637: Performed by: STUDENT IN AN ORGANIZED HEALTH CARE EDUCATION/TRAINING PROGRAM

## 2019-05-02 PROCEDURE — 84100 ASSAY OF PHOSPHORUS: CPT | Performed by: STUDENT IN AN ORGANIZED HEALTH CARE EDUCATION/TRAINING PROGRAM

## 2019-05-02 PROCEDURE — 25000128 H RX IP 250 OP 636: Performed by: THORACIC SURGERY (CARDIOTHORACIC VASCULAR SURGERY)

## 2019-05-02 PROCEDURE — 92611 MOTION FLUOROSCOPY/SWALLOW: CPT | Mod: GN

## 2019-05-02 PROCEDURE — 25800030 ZZH RX IP 258 OP 636: Performed by: SURGERY

## 2019-05-02 PROCEDURE — 74230 X-RAY XM SWLNG FUNCJ C+: CPT

## 2019-05-02 PROCEDURE — 83735 ASSAY OF MAGNESIUM: CPT | Performed by: STUDENT IN AN ORGANIZED HEALTH CARE EDUCATION/TRAINING PROGRAM

## 2019-05-02 PROCEDURE — 31624 DX BRONCHOSCOPE/LAVAGE: CPT | Performed by: STUDENT IN AN ORGANIZED HEALTH CARE EDUCATION/TRAINING PROGRAM

## 2019-05-02 PROCEDURE — 85027 COMPLETE CBC AUTOMATED: CPT | Performed by: STUDENT IN AN ORGANIZED HEALTH CARE EDUCATION/TRAINING PROGRAM

## 2019-05-02 PROCEDURE — A9270 NON-COVERED ITEM OR SERVICE: HCPCS | Performed by: THORACIC SURGERY (CARDIOTHORACIC VASCULAR SURGERY)

## 2019-05-02 PROCEDURE — 12000004 ZZH R&B IMCU UMMC

## 2019-05-02 PROCEDURE — 94799 UNLISTED PULMONARY SVC/PX: CPT

## 2019-05-02 PROCEDURE — 25000132 ZZH RX MED GY IP 250 OP 250 PS 637: Performed by: THORACIC SURGERY (CARDIOTHORACIC VASCULAR SURGERY)

## 2019-05-02 PROCEDURE — 87186 SC STD MICRODIL/AGAR DIL: CPT | Performed by: STUDENT IN AN ORGANIZED HEALTH CARE EDUCATION/TRAINING PROGRAM

## 2019-05-02 PROCEDURE — 40000104 ZZH STATISTIC MODERATE SEDATION < 10 MIN: Performed by: STUDENT IN AN ORGANIZED HEALTH CARE EDUCATION/TRAINING PROGRAM

## 2019-05-02 PROCEDURE — C9113 INJ PANTOPRAZOLE SODIUM, VIA: HCPCS | Performed by: THORACIC SURGERY (CARDIOTHORACIC VASCULAR SURGERY)

## 2019-05-02 PROCEDURE — 25000128 H RX IP 250 OP 636: Performed by: SURGERY

## 2019-05-02 PROCEDURE — 87102 FUNGUS ISOLATION CULTURE: CPT | Performed by: STUDENT IN AN ORGANIZED HEALTH CARE EDUCATION/TRAINING PROGRAM

## 2019-05-02 PROCEDURE — 25000125 ZZHC RX 250: Performed by: STUDENT IN AN ORGANIZED HEALTH CARE EDUCATION/TRAINING PROGRAM

## 2019-05-02 RX ORDER — LIDOCAINE HYDROCHLORIDE 40 MG/ML
INJECTION, SOLUTION RETROBULBAR PRN
Status: DISCONTINUED | OUTPATIENT
Start: 2019-05-02 | End: 2019-05-02 | Stop reason: HOSPADM

## 2019-05-02 RX ORDER — ONDANSETRON 4 MG/1
4 TABLET, FILM COATED ORAL EVERY 6 HOURS
Status: DISCONTINUED | OUTPATIENT
Start: 2019-05-02 | End: 2019-05-13

## 2019-05-02 RX ORDER — FUROSEMIDE 10 MG/ML
20 INJECTION INTRAMUSCULAR; INTRAVENOUS ONCE
Status: COMPLETED | OUTPATIENT
Start: 2019-05-02 | End: 2019-05-02

## 2019-05-02 RX ORDER — MAGNESIUM HYDROXIDE 1200 MG/15ML
LIQUID ORAL PRN
Status: DISCONTINUED | OUTPATIENT
Start: 2019-05-02 | End: 2019-05-02 | Stop reason: HOSPADM

## 2019-05-02 RX ORDER — FENTANYL CITRATE 50 UG/ML
INJECTION, SOLUTION INTRAMUSCULAR; INTRAVENOUS PRN
Status: DISCONTINUED | OUTPATIENT
Start: 2019-05-02 | End: 2019-05-02 | Stop reason: HOSPADM

## 2019-05-02 RX ORDER — ALBUTEROL SULFATE 0.83 MG/ML
SOLUTION RESPIRATORY (INHALATION) PRN
Status: DISCONTINUED | OUTPATIENT
Start: 2019-05-02 | End: 2019-05-02 | Stop reason: HOSPADM

## 2019-05-02 RX ADMIN — CHLORHEXIDINE GLUCONATE 0.12% ORAL RINSE 15 ML: 1.2 LIQUID ORAL at 21:12

## 2019-05-02 RX ADMIN — ONDANSETRON HYDROCHLORIDE 4 MG: 4 TABLET, FILM COATED ORAL at 21:04

## 2019-05-02 RX ADMIN — AMIODARONE HYDROCHLORIDE 200 MG: 200 TABLET ORAL at 09:18

## 2019-05-02 RX ADMIN — BACITRACIN: 500 OINTMENT TOPICAL at 09:19

## 2019-05-02 RX ADMIN — ONDANSETRON HYDROCHLORIDE 4 MG: 4 TABLET, FILM COATED ORAL at 09:14

## 2019-05-02 RX ADMIN — PANTOPRAZOLE SODIUM 40 MG: 40 INJECTION, POWDER, LYOPHILIZED, FOR SOLUTION INTRAVENOUS at 09:16

## 2019-05-02 RX ADMIN — ONDANSETRON HYDROCHLORIDE 4 MG: 4 TABLET, FILM COATED ORAL at 14:29

## 2019-05-02 RX ADMIN — Medication 5 ML: at 17:26

## 2019-05-02 RX ADMIN — LEVOTHYROXINE SODIUM 150 MCG: 75 TABLET ORAL at 09:15

## 2019-05-02 RX ADMIN — CHLORHEXIDINE GLUCONATE AND ISOPROPYL ALCOHOL 3 ML: 20; .7 SOLUTION TOPICAL at 09:27

## 2019-05-02 RX ADMIN — ACETAMINOPHEN 975 MG: 325 TABLET, FILM COATED ORAL at 05:14

## 2019-05-02 RX ADMIN — VENLAFAXINE 75 MG: 75 TABLET ORAL at 09:26

## 2019-05-02 RX ADMIN — HEPARIN SODIUM 5000 UNITS: 5000 INJECTION, SOLUTION INTRAVENOUS; SUBCUTANEOUS at 21:05

## 2019-05-02 RX ADMIN — ACETAMINOPHEN 975 MG: 325 TABLET, FILM COATED ORAL at 21:55

## 2019-05-02 RX ADMIN — POTASSIUM PHOSPHATE, MONOBASIC AND POTASSIUM PHOSPHATE, DIBASIC 15 MMOL: 224; 236 INJECTION, SOLUTION INTRAVENOUS at 14:29

## 2019-05-02 RX ADMIN — FUROSEMIDE 20 MG: 10 INJECTION, SOLUTION INTRAVENOUS at 09:16

## 2019-05-02 RX ADMIN — HEPARIN SODIUM 5000 UNITS: 5000 INJECTION, SOLUTION INTRAVENOUS; SUBCUTANEOUS at 05:14

## 2019-05-02 RX ADMIN — BACITRACIN: 500 OINTMENT TOPICAL at 21:14

## 2019-05-02 RX ADMIN — POTASSIUM & SODIUM PHOSPHATES POWDER PACK 280-160-250 MG 1 PACKET: 280-160-250 PACK at 09:16

## 2019-05-02 RX ADMIN — ACETAMINOPHEN 975 MG: 325 TABLET, FILM COATED ORAL at 14:29

## 2019-05-02 RX ADMIN — CHLORHEXIDINE GLUCONATE AND ISOPROPYL ALCOHOL 3 ML: 20; .7 SOLUTION TOPICAL at 21:17

## 2019-05-02 RX ADMIN — SENNOSIDES AND DOCUSATE SODIUM 2 TABLET: 8.6; 5 TABLET ORAL at 09:18

## 2019-05-02 RX ADMIN — CHLORHEXIDINE GLUCONATE 0.12% ORAL RINSE 30 ML: 1.2 LIQUID ORAL at 09:14

## 2019-05-02 RX ADMIN — Medication 25 MG: at 21:07

## 2019-05-02 RX ADMIN — HEPARIN SODIUM 5000 UNITS: 5000 INJECTION, SOLUTION INTRAVENOUS; SUBCUTANEOUS at 14:29

## 2019-05-02 RX ADMIN — VENLAFAXINE 75 MG: 75 TABLET ORAL at 21:04

## 2019-05-02 RX ADMIN — Medication 5 ML: at 06:26

## 2019-05-02 ASSESSMENT — ACTIVITIES OF DAILY LIVING (ADL)
ADLS_ACUITY_SCORE: 19

## 2019-05-02 ASSESSMENT — MIFFLIN-ST. JEOR: SCORE: 1317.88

## 2019-05-02 ASSESSMENT — PAIN DESCRIPTION - DESCRIPTORS
DESCRIPTORS: TENDER;ACHING
DESCRIPTORS: TENDER

## 2019-05-02 NOTE — PLAN OF CARE
Neuro: A&Ox4.   Cardiac: SR. VSS.   Respiratory: Sating 97 on RA.  GI/: Adequate urine output. BM X1  Diet/appetite: Tolerating NPO diet. Ice chips Ok per MD. TF at goal of 55.   Activity:  Assist of 2, up to chair and BSC  Pain: At acceptable level on current regimen.   Skin: No new deficits noted. Dressing change x1  LDA's: No changes. Double lumen PICC HL. Pharyngostomy to LIS. J tube with continuous feed.     Phos 2.2 replaced. Recheck in AM     Plan: Continue with POC. Notify primary team with changes.

## 2019-05-02 NOTE — OR NURSING
Bronch with endobronchial washing completed and pt tolerated well with conscious sedation and on 2L nc oxygen.  Pt awake post procedure and report given to 6B.

## 2019-05-02 NOTE — PROGRESS NOTES
REGIONAL ANESTHESIA PAIN SERVICE EPIDURAL NOTE  Myrtle Vaz is a 73 year old female POD #5 s/p ESOPHAGOGASTRECTOMY and placement of T8-9 epidural catheter for pain management.      SUBJECTIVE  Interval History: Overnight events: Epidural stopped overnight due to hypotension. Patient reports moderate pain control with epidural infusion and current  analgesic medications (see below).  Denies weakness, paresthesias, circumoral numbness, metallic taste or tinnitus.  Patient has not been OOB yet this am.  Currently tolerating a TF diet, denies nausea or vomiting.  BM x 2 overnight.                 Clinically Aligned Pain Assessment (CAPA):  Comfort (How is your pain?): Tolerable with discomfort  Change in Pain (Since your last medication/intervention?): About the same  Pain Control (How are your pain treatments working?):  Partially effective pain control  Functioning (Are you able to do activities to get better?) : Can do most things, but pain gets in the way of some   Sleep (Does your pain management allow you to sleep or rest?): Awake with occasional pain                 Pain Intensity using Numerical Rating Scale (NRS):    7/10 at rest and 7/10 with activity        Antithrombotic/Thrombolytic Therapy ordered:  Heparin 5,000 units     Analgesic Medications:              Medications related to Pain Management (From now, onward)     Start     Dose/Rate Route Frequency Ordered Stop     04/30/19 1251   bisacodyl (DULCOLAX) Suppository 10 mg      10 mg Rectal DAILY PRN 04/30/19 1251       04/28/19 0915   bupivacaine (MARCAINE) 0.125 % in sodium chloride 0.9 % 250 mL EPIDURAL Infusion      6 mL/hr  EPIDURAL CONTINUOUS 04/28/19 0904       04/27/19 1400   acetaminophen (TYLENOL) tablet 975 mg      975 mg Per J Tube EVERY 8 HOURS 04/27/19 0612       04/27/19 1225   lidocaine (LMX4) cream        Topical ONCE PRN 04/27/19 1227       04/27/19 0800   polyethylene glycol (MIRALAX/GLYCOLAX) Packet 17 g      17 g Per J Tube DAILY  "04/27/19 0612 04/27/19 0800   senna-docusate (SENOKOT-S/PERICOLACE) 8.6-50 MG per tablet 2 tablet      2 tablet Per J Tube 2 TIMES DAILY 04/27/19 0612 04/26/19 2115   HYDROmorphone (DILAUDID) PCA 1 mg/mL OPIOID NAIVE        Intravenous CONTINUOUS 04/26/19 2103 04/26/19 2103   hydrOXYzine (ATARAX) tablet 10 mg      10 mg Oral EVERY 6 HOURS PRN 04/26/19 2103               OBJECTIVE  Lab Results:       Recent Labs   Lab Test 05/01/19  0320   WBC 10.2   RBC 2.70*   HGB 7.8*   HCT 25.4*   MCV 94   MCH 28.9   MCHC 30.7*   RDW 19.8*                  Lab Results   Component Value Date     INR 1.25 04/30/2019     INR 1.80 04/28/2019     INR 1.10 02/04/2019         Vitals:              Temp:  [97.5  F (36.4  C)-98.9  F (37.2  C)] 98.4  F (36.9  C)  Heart Rate:  [67-82] 79  Resp:  [9-16] 16  BP: ()/() 133/66  FiO2 (%):  [40 %-50 %] 40 %  SpO2:  [89 %-99 %] 98 %  /66   Pulse 73   Temp 98.4  F (36.9  C) (Oral)   Resp 16   Ht 1.651 m (5' 5\")   Wt 80.8 kg (178 lb 2.1 oz)   SpO2 98%   BMI 29.64 kg/m          Exam:   GEN: alert and no distress  NEURO/MSK: Strength B/L LE 5/5  and overall symmetric  SKIN: Epidural catheter site with dressing c/d/i, no tenderness, erythema, heme, edema       ASSESSMENT/PLAN:    Patient is receiving moderate analgesia with current multimodal therapy including T8-9 epidural catheter infusion of Bupivacaine 0.125% stopped due to hypotension.  Pt is participating in therapies.  Denies evidence of adverse side effects related to local anesthetic..       - 0835 discontinued epidural catheter dark tip intact without complication - POD #5  - antithrombotic/thrombolytic therapy:  Last dose of heparin was today at 0318.   - will sign off/call for questions or concerns     Gustavo Land   Regional Anesthesia Pain Service  5/1/2019 7:13 PM      RAPS Contact Info (24 hour job code pager is the last 4 digits) For in-house use only:   [employer] phone: [place] " [phone], [facility] [phone], [facility] [phone], then enter call-back number.    Text: Use [employer] on the Intranet <Paging/Directory> tab and enter Jobcode ID.   If no call back at any time, contact the hospital  and ask for RAPS attending or backup

## 2019-05-02 NOTE — PLAN OF CARE
Discharge Planner SLP   Patient plan for discharge: Did not discuss  Current status: Video swallow study completed to determine pts ability to participate in esophagram to rule out esophageal leak s/p esophagectomy. Unfortunately d/t technical issues the images were not saved. Pt was cued to take single cup sip. Adequate oral motor control with liquids. Slightly delayed swallow initiation. Reduced tongue base retraction, hyoid excursion, and laryngeal elevation. Potential for pharyngostomy tube to be impacting pharyngeal phase of swallow and epiglottic inversion. Silent aspiration occurs during and after the swallow. Liquid was again attempted as pt was provided a teaspoon and cued to complete a chin tuck maneuver, shallow penetration persisted without full clearance of the laryngeal vestibule or pharynx. A cup sip and chin tuck was also attempted and unsuccessful.     Recommend NPO status based on severity of oropharyngeal phases of swallow. Esophagram not completed given aspiration occurred on video swallow. Please complete excellent oral cares. Discussed pt with provider, received SLP consult to continue to follow to complete oropharyngeal exercises and repeat VFSS after removal of pharyngostomy tube and/or as deemed appropriate by thoracic team. Per provider no ice chips, only exercises at this time.     Barriers to return to prior living situation: Below baseline, aspiration risk  Recommendations for discharge: TCU  Rationale for recommendations: SLP at next level of care for management of dysphagia          Entered by: Carmen Egan 05/02/2019 3:29 PM

## 2019-05-02 NOTE — PLAN OF CARE
PT 6B: Cancel- Patient out of room for bronch this AM, unable to check back this afternoon due to scheduling demands. Will reschedule.

## 2019-05-02 NOTE — PLAN OF CARE
"Patient has been alert and oriented times 4 on this shift,  VSS have been stable.  NSR, Continent of Bladder on this shfit.  Tube feeds running at 55 ml/hr with q6 hr water flushes.  PICC on the right and 2 PIV. /87 (BP Location: Left arm)   Pulse 90   Temp 98.9  F (37.2  C) (Oral)   Resp 16   Ht 1.651 m (5' 5\")   Wt 80.7 kg (177 lb 14.6 oz)   SpO2 99%   BMI 29.61 kg/m                                                                    "

## 2019-05-02 NOTE — PROGRESS NOTES
[id number]  Neuro: A/Ox4. Forgetful and confused at times.   Cardiac: SR with HR 80s-90s. AVSS.   Respiratory: 90s on RA   GI/: Voiding spontaneously via the commode. BMx1 this shift.  Diet/appetite: Strict NPO. TF at goal of 55mL/hr.    Activity:  Ax2 to bedside commode  Pain: Denies pain, PRN tramadol available.   Skin: No new deficits noted. Lidocaine patch removed.   LDA's: Pharyngostomy to LIS, small output. PICC TKO. J tube site slightly reddened, TF running.     K recheck order released.     Plan: Continue with POC. Notify primary team with changes.

## 2019-05-02 NOTE — PROGRESS NOTES
THORACIC & FOREGUT SURGERY PROGRESS NOTE  05/02/2019    Patient: Myrtle Vaz  MRN: [id number]    Subjective  Transferred to . On 2 L NC with increasing pulmonary secretions. Not feeling well. Will bronch this morning. She also complains of nausea. BMx4 with adequate UOP. Responded to lasix 20.       Objective  Temp:  [98.2  F (36.8  C)-99.3  F (37.4  C)] 98.5  F (36.9  C)  Pulse:  [78-90] 90  Heart Rate:  [76-97] 86  Resp:  [16-20] 18  BP: ()/(60-87) 119/66  SpO2:  [93 %-99 %] 94 %    General: NAD  HEENT/chest: neck incision c/d/i with bruising and staples, pharyngostomy in place with clear output, prior spit fistula site in low chest c/d/i, sternotomy incision dressing c/d/i  CV: WWP, non-cyanotic   Pulm: On 2L NC and coughing with many secretions.   Abd: soft, non-distended, appropriately tender, incision dressing c/d/i, J tube site c/d/i with feeds going  Extremities: no edema  Neuro: moving all extremities spontaneously without apparent deficit    I/O last 3 completed shifts:  In: 1525 [I.V.:100; NG/GT:270]  Out: 2510 [Urine:2360; Emesis/NG output:150]    Labs:  Recent Labs   Lab 05/02/19  0715 05/01/19  0320 04/30/19  0313   WBC 12.9* 10.2 9.9   HGB 8.8* 7.8* 8.1*    342 324     Recent Labs   Lab 05/02/19  0715 05/01/19  2255 05/01/19  0320  04/30/19  0313     --  137  --  141   POTASSIUM 3.7 3.8 3.2*   < > 3.0*   CHLORIDE 103  --  103  --  100   CO2 25  --  28  --  35*   BUN 20  --  17  --  17   CR 0.80  --  0.85  --  0.87   *  --  102*  --  106*   JOSE 7.9*  --  7.1*  --  7.3*   MAG 2.0  --  1.7  --  1.7   PHOS 2.2*  --  1.9*  --  1.7*    < > = values in this interval not displayed.     Imaging:  CXR reviewed   IMPRESSION: Small bilateral pleural effusions with improved left  basilar atelectasis.    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 Albarado. PICC 4/27/2019 for pressors and frequent IV access.    Neuro:      - Epidural is out.      - Tylenol 975 q8h, dilaudid PCA, Atarax PRN, PRN tramadol      - PTA Effexor  CV: HDS, no issues     - PTA amiodarone 200 enteral  Pulm: Bronch this morning with clear secretions, repeat bronch tomorrow 9 am      -satting on HRNC, encourage IS/Acapella     - no chest tube     - CXR daily to assess for conduit distention     - albuterol PRN      - Metanebs QID   HEENT: pharyngostomy to LIS, cares BID (Peridex swish and spit, Chloraprep, bacitracin), TID flushes  FEN/GI:      - Esophagram this morning.       - Tube feeds at goal of 55 mL/hr with 30 free water flush q6h. Continue STRICT NPO (meds via J)     - lyte replacement protocol, neutraphos QID      - Protonix daily     - No IVFs, diuresis as below.     - Bowel regimen: Miralax, senna-colace     - Nausea control: scheduled zofran   Renal:      -20 mg Lasix      -check PM lytes  ID: afebrile, no leukocytosis, no abx  Endo: PTA Synthroid  Activity: PT/OT, OOB QID  PPx: heparin subcutaneous due to epidural in place  Dispo: 6B, discharge to ARU vs TCU in 4-7 days in pending clinical course      Patient seen and will be discussed with staff.  - - - - - - - - - - - - - - - - - -  Johana Randall MD   Surgery PGY-1

## 2019-05-02 NOTE — PROGRESS NOTES
STAFF ADDENDUM:  I saw and evaluated Ms. Vaz and agree with the resident s findings and plan of care as documented in the resident s note and edited by me, as applicable.      In summary, Ms. Vaz had some respiratory issues with increased secretions and continued O2 needs.  Overall improving.  Bronch today with minimal watery secretions.  Continue diuresis today    Ally Ybarra MD

## 2019-05-03 ENCOUNTER — APPOINTMENT (OUTPATIENT)
Dept: OCCUPATIONAL THERAPY | Facility: CLINIC | Age: 74
DRG: 326 | End: 2019-05-03
Attending: THORACIC SURGERY (CARDIOTHORACIC VASCULAR SURGERY)
Payer: MEDICARE

## 2019-05-03 ENCOUNTER — APPOINTMENT (OUTPATIENT)
Dept: SPEECH THERAPY | Facility: CLINIC | Age: 74
DRG: 326 | End: 2019-05-03
Attending: THORACIC SURGERY (CARDIOTHORACIC VASCULAR SURGERY)
Payer: MEDICARE

## 2019-05-03 ENCOUNTER — APPOINTMENT (OUTPATIENT)
Dept: GENERAL RADIOLOGY | Facility: CLINIC | Age: 74
DRG: 326 | End: 2019-05-03
Attending: THORACIC SURGERY (CARDIOTHORACIC VASCULAR SURGERY)
Payer: MEDICARE

## 2019-05-03 LAB
ALBUMIN UR-MCNC: NEGATIVE MG/DL
ANION GAP SERPL CALCULATED.3IONS-SCNC: 9 MMOL/L (ref 3–14)
APPEARANCE UR: ABNORMAL
BILIRUB UR QL STRIP: NEGATIVE
BUN SERPL-MCNC: 25 MG/DL (ref 7–30)
CALCIUM SERPL-MCNC: 8.1 MG/DL (ref 8.5–10.1)
CHLORIDE SERPL-SCNC: 101 MMOL/L (ref 94–109)
CO2 SERPL-SCNC: 24 MMOL/L (ref 20–32)
COLOR UR AUTO: ABNORMAL
CREAT SERPL-MCNC: 0.86 MG/DL (ref 0.52–1.04)
ERYTHROCYTE [DISTWIDTH] IN BLOOD BY AUTOMATED COUNT: 20.6 % (ref 10–15)
GFR SERPL CREATININE-BSD FRML MDRD: 67 ML/MIN/{1.73_M2}
GLUCOSE SERPL-MCNC: 107 MG/DL (ref 70–99)
GLUCOSE UR STRIP-MCNC: NEGATIVE MG/DL
HCT VFR BLD AUTO: 28.4 % (ref 35–47)
HGB BLD-MCNC: 8.8 G/DL (ref 11.7–15.7)
HGB UR QL STRIP: NEGATIVE
KETONES UR STRIP-MCNC: NEGATIVE MG/DL
LEUKOCYTE ESTERASE UR QL STRIP: ABNORMAL
MAGNESIUM SERPL-MCNC: 2.1 MG/DL (ref 1.6–2.3)
MCH RBC QN AUTO: 28.8 PG (ref 26.5–33)
MCHC RBC AUTO-ENTMCNC: 31 G/DL (ref 31.5–36.5)
MCV RBC AUTO: 93 FL (ref 78–100)
MUCOUS THREADS #/AREA URNS LPF: PRESENT /LPF
NITRATE UR QL: NEGATIVE
PH UR STRIP: 5 PH (ref 5–7)
PHOSPHATE SERPL-MCNC: 3.1 MG/DL (ref 2.5–4.5)
PLATELET # BLD AUTO: 480 10E9/L (ref 150–450)
POTASSIUM SERPL-SCNC: 3.9 MMOL/L (ref 3.4–5.3)
RBC # BLD AUTO: 3.06 10E12/L (ref 3.8–5.2)
RBC #/AREA URNS AUTO: 1 /HPF (ref 0–2)
SODIUM SERPL-SCNC: 134 MMOL/L (ref 133–144)
SOURCE: ABNORMAL
SP GR UR STRIP: 1.01 (ref 1–1.03)
SQUAMOUS #/AREA URNS AUTO: 3 /HPF (ref 0–1)
TRANS CELLS #/AREA URNS HPF: <1 /HPF (ref 0–1)
UROBILINOGEN UR STRIP-MCNC: NORMAL MG/DL (ref 0–2)
WBC # BLD AUTO: 14.7 10E9/L (ref 4–11)
WBC #/AREA URNS AUTO: 11 /HPF (ref 0–5)

## 2019-05-03 PROCEDURE — A9270 NON-COVERED ITEM OR SERVICE: HCPCS | Performed by: SURGERY

## 2019-05-03 PROCEDURE — A9270 NON-COVERED ITEM OR SERVICE: HCPCS | Performed by: THORACIC SURGERY (CARDIOTHORACIC VASCULAR SURGERY)

## 2019-05-03 PROCEDURE — 25000132 ZZH RX MED GY IP 250 OP 250 PS 637: Performed by: STUDENT IN AN ORGANIZED HEALTH CARE EDUCATION/TRAINING PROGRAM

## 2019-05-03 PROCEDURE — 83735 ASSAY OF MAGNESIUM: CPT | Performed by: STUDENT IN AN ORGANIZED HEALTH CARE EDUCATION/TRAINING PROGRAM

## 2019-05-03 PROCEDURE — 92526 ORAL FUNCTION THERAPY: CPT | Mod: GN

## 2019-05-03 PROCEDURE — 84100 ASSAY OF PHOSPHORUS: CPT | Performed by: STUDENT IN AN ORGANIZED HEALTH CARE EDUCATION/TRAINING PROGRAM

## 2019-05-03 PROCEDURE — 85027 COMPLETE CBC AUTOMATED: CPT | Performed by: STUDENT IN AN ORGANIZED HEALTH CARE EDUCATION/TRAINING PROGRAM

## 2019-05-03 PROCEDURE — 36592 COLLECT BLOOD FROM PICC: CPT | Performed by: STUDENT IN AN ORGANIZED HEALTH CARE EDUCATION/TRAINING PROGRAM

## 2019-05-03 PROCEDURE — 25000132 ZZH RX MED GY IP 250 OP 250 PS 637: Performed by: SURGERY

## 2019-05-03 PROCEDURE — 25000128 H RX IP 250 OP 636: Performed by: SURGERY

## 2019-05-03 PROCEDURE — A9270 NON-COVERED ITEM OR SERVICE: HCPCS | Performed by: STUDENT IN AN ORGANIZED HEALTH CARE EDUCATION/TRAINING PROGRAM

## 2019-05-03 PROCEDURE — 81001 URINALYSIS AUTO W/SCOPE: CPT | Performed by: STUDENT IN AN ORGANIZED HEALTH CARE EDUCATION/TRAINING PROGRAM

## 2019-05-03 PROCEDURE — 97530 THERAPEUTIC ACTIVITIES: CPT | Mod: GO

## 2019-05-03 PROCEDURE — 71046 X-RAY EXAM CHEST 2 VIEWS: CPT

## 2019-05-03 PROCEDURE — 80048 BASIC METABOLIC PNL TOTAL CA: CPT | Performed by: STUDENT IN AN ORGANIZED HEALTH CARE EDUCATION/TRAINING PROGRAM

## 2019-05-03 PROCEDURE — 12000004 ZZH R&B IMCU UMMC

## 2019-05-03 PROCEDURE — 40000802 ZZH SITE CHECK

## 2019-05-03 PROCEDURE — 97535 SELF CARE MNGMENT TRAINING: CPT | Mod: GO

## 2019-05-03 PROCEDURE — 25000132 ZZH RX MED GY IP 250 OP 250 PS 637: Performed by: THORACIC SURGERY (CARDIOTHORACIC VASCULAR SURGERY)

## 2019-05-03 PROCEDURE — 40000274 ZZH STATISTIC RCP CONSULT EA 30 MIN

## 2019-05-03 PROCEDURE — 27210437 ZZH NUTRITION PRODUCT SEMIELEM INTERMED LITER

## 2019-05-03 PROCEDURE — 25000131 ZZH RX MED GY IP 250 OP 636 PS 637: Performed by: STUDENT IN AN ORGANIZED HEALTH CARE EDUCATION/TRAINING PROGRAM

## 2019-05-03 PROCEDURE — 87086 URINE CULTURE/COLONY COUNT: CPT | Performed by: STUDENT IN AN ORGANIZED HEALTH CARE EDUCATION/TRAINING PROGRAM

## 2019-05-03 RX ORDER — FUROSEMIDE 10 MG/ML
20 SOLUTION ORAL
Status: COMPLETED | OUTPATIENT
Start: 2019-05-03 | End: 2019-05-03

## 2019-05-03 RX ORDER — SIMETHICONE 80 MG
80 TABLET,CHEWABLE ORAL EVERY 6 HOURS PRN
Status: DISCONTINUED | OUTPATIENT
Start: 2019-05-03 | End: 2019-05-15 | Stop reason: HOSPADM

## 2019-05-03 RX ORDER — FUROSEMIDE 10 MG/ML
20 INJECTION INTRAMUSCULAR; INTRAVENOUS EVERY 12 HOURS
Status: DISCONTINUED | OUTPATIENT
Start: 2019-05-03 | End: 2019-05-03

## 2019-05-03 RX ORDER — PIPERACILLIN SODIUM, TAZOBACTAM SODIUM 3; .375 G/15ML; G/15ML
3.38 INJECTION, POWDER, LYOPHILIZED, FOR SOLUTION INTRAVENOUS EVERY 6 HOURS
Status: DISCONTINUED | OUTPATIENT
Start: 2019-05-03 | End: 2019-05-13

## 2019-05-03 RX ADMIN — BACITRACIN: 500 OINTMENT TOPICAL at 10:05

## 2019-05-03 RX ADMIN — FUROSEMIDE 20 MG: 10 SOLUTION ORAL at 09:31

## 2019-05-03 RX ADMIN — ONDANSETRON HYDROCHLORIDE 4 MG: 4 TABLET, FILM COATED ORAL at 09:31

## 2019-05-03 RX ADMIN — ONDANSETRON HYDROCHLORIDE 4 MG: 4 TABLET, FILM COATED ORAL at 03:40

## 2019-05-03 RX ADMIN — CHLORHEXIDINE GLUCONATE AND ISOPROPYL ALCOHOL 3 ML: 20; .7 SOLUTION TOPICAL at 21:51

## 2019-05-03 RX ADMIN — LEVOTHYROXINE SODIUM 150 MCG: 75 TABLET ORAL at 09:31

## 2019-05-03 RX ADMIN — Medication 5 ML: at 19:52

## 2019-05-03 RX ADMIN — FUROSEMIDE 20 MG: 10 SOLUTION ORAL at 15:27

## 2019-05-03 RX ADMIN — VENLAFAXINE 75 MG: 75 TABLET ORAL at 09:32

## 2019-05-03 RX ADMIN — HEPARIN SODIUM 5000 UNITS: 5000 INJECTION, SOLUTION INTRAVENOUS; SUBCUTANEOUS at 19:49

## 2019-05-03 RX ADMIN — Medication 25 MG: at 19:46

## 2019-05-03 RX ADMIN — CHLORHEXIDINE GLUCONATE AND ISOPROPYL ALCOHOL 3 ML: 20; .7 SOLUTION TOPICAL at 10:05

## 2019-05-03 RX ADMIN — ONDANSETRON HYDROCHLORIDE 4 MG: 4 TABLET, FILM COATED ORAL at 15:27

## 2019-05-03 RX ADMIN — BACITRACIN: 500 OINTMENT TOPICAL at 21:51

## 2019-05-03 RX ADMIN — HEPARIN SODIUM 5000 UNITS: 5000 INJECTION, SOLUTION INTRAVENOUS; SUBCUTANEOUS at 03:40

## 2019-05-03 RX ADMIN — HEPARIN SODIUM 5000 UNITS: 5000 INJECTION, SOLUTION INTRAVENOUS; SUBCUTANEOUS at 13:51

## 2019-05-03 RX ADMIN — AMIODARONE HYDROCHLORIDE 200 MG: 200 TABLET ORAL at 09:32

## 2019-05-03 RX ADMIN — PANTOPRAZOLE SODIUM 40 MG: 40 TABLET, DELAYED RELEASE ORAL at 09:30

## 2019-05-03 RX ADMIN — PIPERACILLIN AND TAZOBACTAM 3.38 G: 3; .375 INJECTION, POWDER, FOR SOLUTION INTRAVENOUS at 21:54

## 2019-05-03 RX ADMIN — VENLAFAXINE 75 MG: 75 TABLET ORAL at 19:46

## 2019-05-03 RX ADMIN — PIPERACILLIN AND TAZOBACTAM 3.38 G: 3; .375 INJECTION, POWDER, FOR SOLUTION INTRAVENOUS at 10:04

## 2019-05-03 RX ADMIN — ONDANSETRON HYDROCHLORIDE 4 MG: 4 TABLET, FILM COATED ORAL at 19:47

## 2019-05-03 RX ADMIN — ACETAMINOPHEN 975 MG: 325 TABLET, FILM COATED ORAL at 19:46

## 2019-05-03 RX ADMIN — CHLORHEXIDINE GLUCONATE 0.12% ORAL RINSE 30 ML: 1.2 LIQUID ORAL at 09:30

## 2019-05-03 RX ADMIN — ACETAMINOPHEN 975 MG: 325 TABLET, FILM COATED ORAL at 05:53

## 2019-05-03 RX ADMIN — PIPERACILLIN AND TAZOBACTAM 3.38 G: 3; .375 INJECTION, POWDER, FOR SOLUTION INTRAVENOUS at 15:36

## 2019-05-03 RX ADMIN — Medication 25 MG: at 09:38

## 2019-05-03 RX ADMIN — CHLORHEXIDINE GLUCONATE 0.12% ORAL RINSE 30 ML: 1.2 LIQUID ORAL at 19:56

## 2019-05-03 ASSESSMENT — ACTIVITIES OF DAILY LIVING (ADL)
ADLS_ACUITY_SCORE: 19

## 2019-05-03 ASSESSMENT — PAIN DESCRIPTION - DESCRIPTORS
DESCRIPTORS: ACHING
DESCRIPTORS: ACHING;SORE

## 2019-05-03 ASSESSMENT — MIFFLIN-ST. JEOR: SCORE: 1313.88

## 2019-05-03 NOTE — PLAN OF CARE
"Neuro: A&Ox4.   Cardiac: SR. VSS. /79 (BP Location: Left arm, Cuff Size: Adult Regular)   Pulse 90   Temp 99.3  F (37.4  C) (Oral)   Resp 16   Ht 1.651 m (5' 5\")   Wt 80.3 kg (177 lb 0.5 oz)   SpO2 95%   BMI 29.46 kg/m      Respiratory: Sating 95%  on 2L NC  GI/: Adequate urine output.   Diet/appetite: NPO with tube feeds at 55ml/hr.  Free water flushed 30 mL q 6 hours.   Activity:  Assist of 2 up to chair and in halls.  Pain: At acceptable level on current regimen.   Skin: No change  LDA's:2 PIV and 1 PICC line in the left arm    Pharyngostomy dressing change was completed at 2200.  Dressing has a moderate amount of serosanguineous drainage on the packing.  PRN Tramadol; was administered once.      At 0600 writer was called into the patients room to find the patient coughing up clear thick phlegm that was making her gag.  Zofran was given on schedule throughout the night but was not effective in preventing the coughing/vomitting.      Plan: Continue with POC. Notify primary team with changes.   "

## 2019-05-03 NOTE — PROVIDER NOTIFICATION
0523--6B. 35-1. S.Milind. Hgb yesterday was 8.8. Phosphorus was replaced yesterday as well.  Both were supposed to be rechecked this morning.  Would you like them rechecked? Please order if needed.     Provider called back and said rechecks were completed in the afternoon on 5/2.  No recheck ordered.

## 2019-05-03 NOTE — PROGRESS NOTES
Social Work: Assessment with Discharge Plan    Patient Name:  Myrtle Vaz  :  1945  Age:  73 year old  MRN:  [id number]  Risk/Complexity Score:     Completed assessment with:  Patient, Chart Review    Presenting Information   Reason for Referral:  Discharge plan  Date of Intake:  May 3, 2019  Referral Source:  Chart Review  Decision Maker:  Patient  Alternate Decision Maker:  Daughter Haydee Mccoy (P: [phone])  Health Care Directive:  None on file  Living Situation:  Pt lives alone in a town home in [place], [place].  Previous Functional Status:  Independent  Patient and family understanding of hospitalization:  Pt demonstrates good understanding.   Cultural/Language/Spiritual Considerations:  English speaking  Adjustment to Illness:  Pt said that she is in worse shape now because of a mistake that happened in her previous surgery.     Physical Health  Reason for Admission:  No diagnosis found.  Services Needed/Recommended:  TCU    Mental Health/Chemical Dependency  Diagnosis:  None  Support/Services in Place:  n/a  Services Needed/Recommended:  n/a    Support System  Significant relationship at present time:  Pt is , spouse  2018. Pt has two children, daughter Haydee (P: [phone]) lives in [place] and son Charles (P: [phone]) lives in [place].  Family of origin is available for support:  Pt's children both work full time.  Other support available:  None  Gaps in support system:  None  Patient is caregiver to:  None     Provider Information   Primary Care Physician:  Joey Caballero   [phone]   Clinic:  [facility] [address] [facility] [address]      :  None    Financial   Income Source:  Social Security  Financial Concerns:  None identified  Insurance:    Payor/Plan Subscriber Name Rel Member # Group #   MEDICARE - MEDICARE SHALINIMYRTLE [id number]       ATTN CLAIMS, [address]   JONAH/EKTA - LELA* SHALINI,MYRTLE DARIO [id number]         [id number]       Discharge Plan   Patient and family discharge goal:  Pt is agreeable to TCU recommendation.   Provided education on discharge plan:  YES  Patient agreeable to discharge plan:  YES  A list of Medicare Certified Facilities was provided to the patient and/or family to encourage patient choice. Patient's choices for facility are:  Pt was just at [facility] (Ph: [phone], Admissions: [phone], F: [phone]) in February 2019 and would be interested in returning there if TCU is needed.     ANA LILIA spoke with Shaylee in Admissions at [facility] re: this referral. ANA LILIA also informed Shaylee that current Pt has a pharyngostomy and it is unclear if she will need this at discharge. Shaylee stated that they would consider Pt with the pharyngostomy and requested SW fax over the face sheet which ANA LILIA did. Shaylee has access to Epic and will review referral that way.  Will NH provide Skilled rehabilitation or complex medical:  YES  General information regarding anticipated insurance coverage and possible out of pocket cost was discussed. Patient and patient's family are aware patient may incur the cost of transportation to the facility, pending insurance payment: YES  Barriers to discharge:  Medical stability    Discharge Recommendations   Anticipated Disposition:  Facility:  TCU (TBD) - [facility] is reviewing referral  Transportation Needs:  TBD  Name of Transportation Company and Phone:  VINAYAK Carranza, JESSICA  6B Intermediate Care Unit   Phone: [phone]  Pager: [phone]

## 2019-05-03 NOTE — PROGRESS NOTES
THORACIC & FOREGUT SURGERY PROGRESS NOTE  05/03/2019    Patient: Myrtle Vaz  MRN: [id number]    Subjective  No distress on 2 L NC. T max 99.3. Feeling okay. Was able to sit up in the chair last night. She also complains of nausea. BMx2 with adequate UOP. Bronch yesterday with copious clear secretions.       Objective  Temp:  [98.3  F (36.8  C)-99.3  F (37.4  C)] 99.1  F (37.3  C)  Heart Rate:  [82-90] 83  Resp:  [16-20] 18  BP: ()/(62-84) 142/84  SpO2:  [90 %-98 %] 95 %    General: NAD  HEENT/chest: neck incision c/d/i with bruising and staples, pharyngostomy in place with clear output, prior spit fistula site in low chest c/d/i, sternotomy incision dressing c/d/i  CV: WWP, non-cyanotic   Pulm: On 2L NC and in no distress.   Abd: soft, non-distended, appropriately tender, incision dressing c/d/i, J tube site c/d/i with feeds going  Extremities: no edema  Neuro: moving all extremities spontaneously without apparent deficit    I/O last 3 completed shifts:  In: 1325 [NG/GT:170]  Out: 1140 [Urine:440; Emesis/NG output:700]    Labs:  Recent Labs   Lab 05/03/19  0629 05/02/19  0715 05/01/19  0320   WBC 14.7* 12.9* 10.2   HGB 8.8* 8.8* 7.8*   * 424 342     Recent Labs   Lab 05/03/19  0629 05/02/19  1705 05/02/19  0715    136 138   POTASSIUM 3.9 4.0 3.7   CHLORIDE 101 101 103   CO2 24 25 25   BUN 25 21 20   CR 0.86 0.89 0.80   * 133* 126*   JOSE 8.1* 7.8* 7.9*   MAG 2.1 1.8 2.0   PHOS 3.1 3.7 2.2*     Imaging:  CXR pending this am, will review.       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 Albarado. PICC 4/27/2019 for pressors and frequent IV access.    Neuro:      - Epidural is out.      - Tylenol 975 q8h, dilaudid PCA, Atarax PRN, PRN tramadol      - PTA Effexor     - Health psych consult to assist with motivation, patient  tolerance of illness and hospital stay.   CV: HDS, no issues     - PTA amiodarone 200 enteral  Pulm: Bronch yesterday morning with clear secretions, no bronch planned for today.      - No distress on NC, encourage IS/Acapella     - no chest tube     - CXR daily to assess for conduit distention     - albuterol PRN      - Metanebs QID   HEENT: pharyngostomy to LIS, cares BID (Peridex swish and spit, Chloraprep, bacitracin), TID flushes and RN to check patency of tube q 2 hours given it is frequently clogged and not to suction.   FEN/GI:      - Aspiration noted on video swallow yesterday, SLP consult.       - Tube feeds at goal of 55 mL/hr with 30 free water flush q6h. Continue STRICT NPO (meds via J)     - lyte replacement protocol, neutraphos QID      - Protonix daily     - No IVFs, diuresis as below.     - Bowel regimen: Miralax, senna-colace     - Nausea control: scheduled zofran   Renal:      -20 mg Lasix BID via J tube   ID: afebrile, WBC 14.7 (12.9)  -Start Zosyn   -Check c. Diff   Endo: PTA Synthroid  Activity: PT/OT, OOB QID  PPx: heparin subcutaneous  Dispo: 6B, discharge to ARU vs TCU in the upcoming days pending clinical course. PT recs TCU.       Patient seen and will be discussed with staff.  - - - - - - - - - - - - - - - - - -  Johana Randall MD   Surgery PGY-1

## 2019-05-03 NOTE — PLAN OF CARE
Discharge Planner SLP   Patient plan for discharge: Pt did not state  Current status: Recommend continued NPO status, NO ice chips, with excellent oral cares 2-3x per day.   Pt able to complete oropharyngeal strengthening exercises with min cues.  SLP to follow to complete oropharyngeal exercises and repeat VFSS after removal of pharyngostomy tube and/or as deemed appropriate by thoracic team   Barriers to return to prior living situation: NPO status, dysphagia, medical status  Recommendations for discharge: TCU  Rationale for recommendations: Below baseline function; pt will benefit from ongoing ST targeting swallowing       Entered by: Paulina Spangler 05/03/2019 10:25 AM

## 2019-05-03 NOTE — PLAN OF CARE
OT/6B - Discharge Planner OT   Patient plan for discharge: not discussed this session  Current status: Min A required for bed mobility and to transfer into bedside chair increased OOB activity tolerance. Pt scores 15/30 on MOCA version 8.2 indicating cognitive impairment (a score of 26 or greater is considered normal). Pt reports cognition is improving compared to previous days. Pt requires cues throughout to recall sternal precautions. VSS on 2L NC throughout session.  Barriers to return to prior living situation: cognition, post surgical precautions, activity tolerance  Recommendations for discharge: TCU  Rationale for recommendations: to increase ADL/IADl IND prior to return home       Entered by: Abeba Pereyra 05/03/2019 3:33 PM

## 2019-05-03 NOTE — PROGRESS NOTES
CLINICAL NUTRITION SERVICES - REASSESSMENT NOTE     Nutrition Prescription    RECOMMENDATIONS FOR MDs/PROVIDERS TO ORDER:  Decrease TF interruptions as able  Fluids per team     Malnutrition Status:    Patient does not meet two of the above criteria necessary for diagnosing malnutrition but is at risk for malnutrition    Recommendations already ordered by Registered Dietitian (RD):  None at this time    Future/Additional Recommendations:  Monitor for diet adv < CL  Monitor TF intakes and  Possible need to increase rate to ensure pt is meeting needs with any interruptions in TF.      EVALUATION OF THE PROGRESS TOWARD GOALS   Diet: NPO  Nutrition Support: Peptamen 1.5 to goal 55 ml/hr (1320 ml/day) to provide 1980 kcals (30 kcal/kg/day), 90 g PRO (1.4 g/kg/day), 1016 ml free H2O, 74 g Fat (70% from MCTs), 248 g CHO and no Fiber daily.   Access: J tube  Free water: 30ml Q6  Intake: average Tf intakes over the past 6 days: 702 ml, 1053 kcals (16 kcals/kg-63%), 48 g pro ( .7 g/kg pro-61%)  -- pt reports no issues with TF at this time. Reports tolerating at goal, no n/v/abdominal pain/ bloating.    SLP: Recommend NPO status based on severity of oropharyngeal phases of swallow. Esophagram not completed given aspiration occurred on video swallow.       NEW FINDINGS   Weight: weight gain since admit, however, overall 5% wt loss in the past 3 months  Wt Readings from Last 10 Encounters:   05/03/19 80.3 kg (177 lb 0.5 oz)   04/23/19 78 kg (171 lb 14.4 oz)   04/07/19 81.6 kg (180 lb)   03/27/19 81.7 kg (180 lb 1.6 oz)   02/11/19 85.1 kg (187 lb 9.8 oz)   02/05/19 85.1 kg (187 lb 9.6 oz)   01/10/19 84.9 kg (187 lb 1.6 oz)   12/06/18 93.8 kg (206 lb 11.2 oz)     Meds: Zofran, Miralax, senna, Lasix  GI: nausea  Skin: Discoloration on coccyx. Blanchable.     Procedure Date: 04/26/2019   PREOPERATIVE DIAGNOSIS:  Esophageal discontinuity.   PROCEDURES PERFORMED:   1.  Substernal gastric pull-up and esophagogastrostomy.   2.  Left  pharyngostomy tube placement.   3.  Esophagogastroscopy.     MALNUTRITION  % Intake: < 75% for > 7 days (non-severe)  % Weight Loss: Weight loss does not meet criteria  Subcutaneous Fat Loss: None observed  Muscle Loss: None observed- sarcopenia  Fluid Accumulation/Edema: None noted  Malnutrition Diagnosis: Patient does not meet two of the above criteria necessary for diagnosing malnutrition but is at risk for malnutrition    Previous Goals   Total avg nutritional intake to meet a minimum of 25 kcal/kg and 1.2 g PRO/kg daily (per dosing wt 66 kg).  Evaluation: Not met    Previous Nutrition Diagnosis  Altered GI function related to NPO s/p redo laparotomy and esophagogastostomy as evidenced by NPO x at least 7 days post-op per primary team and continued need for enteral nutrition to meet needs until PO is adequate.    Evaluation: No change    CURRENT NUTRITION DIAGNOSIS  Inadequate protein-energy intake related to slow TF advancement and TF interruptions as evidenced by TF only providing ~ 60% of lower end estimated needs at this time.     INTERVENTIONS  Implementation  Collaboration with other providers-6b rounds    Goals  Total avg nutritional intake to meet a minimum of 25 kcal/kg and 1.2 g PRO/kg daily (per dosing wt 66 kg).    Monitoring/Evaluation  Progress toward goals will be monitored and evaluated per protocol.      Collette Helton, RD, MS, LD  6B- Pager: [phone]

## 2019-05-04 ENCOUNTER — APPOINTMENT (OUTPATIENT)
Dept: SPEECH THERAPY | Facility: CLINIC | Age: 74
DRG: 326 | End: 2019-05-04
Attending: THORACIC SURGERY (CARDIOTHORACIC VASCULAR SURGERY)
Payer: MEDICARE

## 2019-05-04 ENCOUNTER — APPOINTMENT (OUTPATIENT)
Dept: GENERAL RADIOLOGY | Facility: CLINIC | Age: 74
DRG: 326 | End: 2019-05-04
Attending: THORACIC SURGERY (CARDIOTHORACIC VASCULAR SURGERY)
Payer: MEDICARE

## 2019-05-04 LAB
ANION GAP SERPL CALCULATED.3IONS-SCNC: 7 MMOL/L (ref 3–14)
BACTERIA SPEC CULT: NORMAL
BUN SERPL-MCNC: 24 MG/DL (ref 7–30)
CALCIUM SERPL-MCNC: 7.9 MG/DL (ref 8.5–10.1)
CHLORIDE SERPL-SCNC: 101 MMOL/L (ref 94–109)
CO2 SERPL-SCNC: 28 MMOL/L (ref 20–32)
CREAT SERPL-MCNC: 0.89 MG/DL (ref 0.52–1.04)
ERYTHROCYTE [DISTWIDTH] IN BLOOD BY AUTOMATED COUNT: 20.8 % (ref 10–15)
GFR SERPL CREATININE-BSD FRML MDRD: 64 ML/MIN/{1.73_M2}
GLUCOSE SERPL-MCNC: 97 MG/DL (ref 70–99)
HCT VFR BLD AUTO: 27.4 % (ref 35–47)
HGB BLD-MCNC: 8.2 G/DL (ref 11.7–15.7)
Lab: NORMAL
MCH RBC QN AUTO: 28.1 PG (ref 26.5–33)
MCHC RBC AUTO-ENTMCNC: 29.9 G/DL (ref 31.5–36.5)
MCV RBC AUTO: 94 FL (ref 78–100)
PLATELET # BLD AUTO: 519 10E9/L (ref 150–450)
POTASSIUM SERPL-SCNC: 4 MMOL/L (ref 3.4–5.3)
RBC # BLD AUTO: 2.92 10E12/L (ref 3.8–5.2)
SODIUM SERPL-SCNC: 135 MMOL/L (ref 133–144)
SPECIMEN SOURCE: NORMAL
WBC # BLD AUTO: 15.2 10E9/L (ref 4–11)

## 2019-05-04 PROCEDURE — 85027 COMPLETE CBC AUTOMATED: CPT | Performed by: SURGERY

## 2019-05-04 PROCEDURE — A9270 NON-COVERED ITEM OR SERVICE: HCPCS | Performed by: SURGERY

## 2019-05-04 PROCEDURE — 25000132 ZZH RX MED GY IP 250 OP 250 PS 637: Performed by: SURGERY

## 2019-05-04 PROCEDURE — 25000131 ZZH RX MED GY IP 250 OP 636 PS 637: Performed by: STUDENT IN AN ORGANIZED HEALTH CARE EDUCATION/TRAINING PROGRAM

## 2019-05-04 PROCEDURE — 36592 COLLECT BLOOD FROM PICC: CPT | Performed by: SURGERY

## 2019-05-04 PROCEDURE — 92526 ORAL FUNCTION THERAPY: CPT | Mod: GN

## 2019-05-04 PROCEDURE — 40000558 ZZH STATISTIC CVC DRESSING CHANGE

## 2019-05-04 PROCEDURE — 25000128 H RX IP 250 OP 636: Performed by: SURGERY

## 2019-05-04 PROCEDURE — 71046 X-RAY EXAM CHEST 2 VIEWS: CPT

## 2019-05-04 PROCEDURE — A9270 NON-COVERED ITEM OR SERVICE: HCPCS | Performed by: THORACIC SURGERY (CARDIOTHORACIC VASCULAR SURGERY)

## 2019-05-04 PROCEDURE — 12000004 ZZH R&B IMCU UMMC

## 2019-05-04 PROCEDURE — 27210437 ZZH NUTRITION PRODUCT SEMIELEM INTERMED LITER

## 2019-05-04 PROCEDURE — 25000132 ZZH RX MED GY IP 250 OP 250 PS 637: Performed by: STUDENT IN AN ORGANIZED HEALTH CARE EDUCATION/TRAINING PROGRAM

## 2019-05-04 PROCEDURE — 25000132 ZZH RX MED GY IP 250 OP 250 PS 637: Performed by: THORACIC SURGERY (CARDIOTHORACIC VASCULAR SURGERY)

## 2019-05-04 PROCEDURE — 80048 BASIC METABOLIC PNL TOTAL CA: CPT | Performed by: SURGERY

## 2019-05-04 RX ADMIN — PIPERACILLIN AND TAZOBACTAM 3.38 G: 3; .375 INJECTION, POWDER, FOR SOLUTION INTRAVENOUS at 10:34

## 2019-05-04 RX ADMIN — CHLORHEXIDINE GLUCONATE AND ISOPROPYL ALCOHOL 3 ML: 20; .7 SOLUTION TOPICAL at 20:51

## 2019-05-04 RX ADMIN — ACETAMINOPHEN 975 MG: 325 TABLET, FILM COATED ORAL at 22:10

## 2019-05-04 RX ADMIN — AMIODARONE HYDROCHLORIDE 200 MG: 200 TABLET ORAL at 10:34

## 2019-05-04 RX ADMIN — PIPERACILLIN AND TAZOBACTAM 3.38 G: 3; .375 INJECTION, POWDER, FOR SOLUTION INTRAVENOUS at 02:40

## 2019-05-04 RX ADMIN — VENLAFAXINE 75 MG: 75 TABLET ORAL at 10:34

## 2019-05-04 RX ADMIN — ACETAMINOPHEN 975 MG: 325 TABLET, FILM COATED ORAL at 14:30

## 2019-05-04 RX ADMIN — CHLORHEXIDINE GLUCONATE 0.12% ORAL RINSE 30 ML: 1.2 LIQUID ORAL at 10:35

## 2019-05-04 RX ADMIN — SENNOSIDES AND DOCUSATE SODIUM 2 TABLET: 8.6; 5 TABLET ORAL at 20:49

## 2019-05-04 RX ADMIN — BACITRACIN: 500 OINTMENT TOPICAL at 20:50

## 2019-05-04 RX ADMIN — ONDANSETRON HYDROCHLORIDE 4 MG: 4 TABLET, FILM COATED ORAL at 02:39

## 2019-05-04 RX ADMIN — CHLORHEXIDINE GLUCONATE AND ISOPROPYL ALCOHOL 3 ML: 20; .7 SOLUTION TOPICAL at 10:36

## 2019-05-04 RX ADMIN — ONDANSETRON HYDROCHLORIDE 4 MG: 4 TABLET, FILM COATED ORAL at 10:35

## 2019-05-04 RX ADMIN — LEVOTHYROXINE SODIUM 150 MCG: 75 TABLET ORAL at 10:34

## 2019-05-04 RX ADMIN — Medication 25 MG: at 02:54

## 2019-05-04 RX ADMIN — PIPERACILLIN AND TAZOBACTAM 3.38 G: 3; .375 INJECTION, POWDER, FOR SOLUTION INTRAVENOUS at 22:10

## 2019-05-04 RX ADMIN — HEPARIN SODIUM 5000 UNITS: 5000 INJECTION, SOLUTION INTRAVENOUS; SUBCUTANEOUS at 04:01

## 2019-05-04 RX ADMIN — PIPERACILLIN AND TAZOBACTAM 3.38 G: 3; .375 INJECTION, POWDER, FOR SOLUTION INTRAVENOUS at 17:39

## 2019-05-04 RX ADMIN — ONDANSETRON HYDROCHLORIDE 4 MG: 4 TABLET, FILM COATED ORAL at 20:49

## 2019-05-04 RX ADMIN — VENLAFAXINE 75 MG: 75 TABLET ORAL at 20:50

## 2019-05-04 RX ADMIN — ACETAMINOPHEN 975 MG: 325 TABLET, FILM COATED ORAL at 06:20

## 2019-05-04 RX ADMIN — PANTOPRAZOLE SODIUM 40 MG: 40 TABLET, DELAYED RELEASE ORAL at 10:36

## 2019-05-04 RX ADMIN — Medication 25 MG: at 17:39

## 2019-05-04 RX ADMIN — Medication 25 MG: at 10:04

## 2019-05-04 RX ADMIN — BACITRACIN: 500 OINTMENT TOPICAL at 10:37

## 2019-05-04 RX ADMIN — ENOXAPARIN SODIUM 40 MG: 40 INJECTION SUBCUTANEOUS at 14:30

## 2019-05-04 RX ADMIN — ONDANSETRON HYDROCHLORIDE 4 MG: 4 TABLET, FILM COATED ORAL at 14:30

## 2019-05-04 ASSESSMENT — ACTIVITIES OF DAILY LIVING (ADL)
ADLS_ACUITY_SCORE: 19

## 2019-05-04 ASSESSMENT — MIFFLIN-ST. JEOR: SCORE: 1268.84

## 2019-05-04 NOTE — PROGRESS NOTES
D/I: s/p redo laparotomy and partial sternotomy, esophagogastrostomy on 4/26/2019    Tmax 99.5 oral (OOB and IS encouraged). Increase in WBC (14.7). Placed on enteric precautions for r/o c-diff.    Neuro- a/ox4  CV- SR  Pulm- crackles bases. Using IS; requiring 2 L NC  GI- strict NPO. 1 smear BM (unable to collect). Very scant tan output from pharyngostomy   - poor UOP (team aware). On lasix BID. UA sent   Gtts- TKO between IV abx  Skin- left upper chest wound packed by nursing this evening. Sternal incision reddened; cleansed with chloraprep and wound cleanser  Pain- moderate incisional pain; tramadol effective     *See flow sheets for further interventions and assessments.    A: stable  P: Continue to monitor pt closely. Notify MD of significant changes.

## 2019-05-04 NOTE — PROGRESS NOTES
"Neuro: A&Ox4.   Cardiac: SR. VSS.     /59 (BP Location: Left arm, Cuff Size: Adult Regular)   Pulse 79   Temp 99.0  F (Oral)   Resp 18   Ht 1.651 m (5' 5\")   SpO2 98%       Respiratory: Sating 98%  on 2L NC  GI/: Low but adequate urine output.   Diet/appetite: NPO with tube feeds at 55ml/hr.  Free water flushed 30 mL q 6 hours.   Activity:  Assist of 2 up to bedside commode.  Pain: At acceptable level on current regimen PRN Tramadol given x 1. .   Skin: Redness on upper left sternal incision site, no drainage, sutures intact.  LDA's: DL PICC line in the left arm, left neck with pharyngostomy tube, GJ tube     Watch urine volumes.  Monitor upper left sternal incision.       Plan: Continue with POC. Notify primary team with changes.               "

## 2019-05-04 NOTE — PROVIDER NOTIFICATION
Surg cross cover paged d/t pts pharyngostomy tube becoming unstitched. Pt moving in bed and the stitch holding the tube in place came undone. Tubing taped to pts neck and pinned to her gown. Tube remains to suction, with no notable issues. Cross cover will come bedside to replace stitch.

## 2019-05-04 NOTE — PLAN OF CARE
Discharge Planner SLP   Patient plan for discharge: Pt did not state  Current status: Recommend continued NPO status, no ice chips, with excellent oral cares 2-3x per day.   Pt able to complete oropharyngeal strengthening exercises with min-mod cues.  SLP to follow for oropharyngeal exercises and to repeat VFSS after removal of pharyngostomy tube and/or as deemed appropriate by thoracic team   Barriers to return to prior living situation: NPO status, dysphagia, medical status  Recommendations for discharge: TCU  Rationale for recommendations: Below baseline function; pt will benefit from ongoing ST targeting swallowing       Entered by: Paulina Spangler 05/04/2019 2:59 PM

## 2019-05-04 NOTE — PROGRESS NOTES
THORACIC & FOREGUT SURGERY PROGRESS NOTE  05/04/2019    Patient: Myrtle Vaz  MRN: [id number]    Subjective  No acute events overnight. Pharyngostomy tube not suctioning this morning, but patient has no nausea and feels ok. Lots of secretions that she is clearing well on her own and spitting up. Isn't sure how many times she walked or got to a chair yesterday; feels very worn out and tired.       Objective  Temp:  [97.6  F (36.4  C)-99.5  F (37.5  C)] 98.9  F (37.2  C)  Heart Rate:  [79-90] 82  Resp:  [18-20] 18  BP: (110-121)/(54-70) 115/59  SpO2:  [94 %-98 %] 97 %    General: NAD  HEENT/chest: neck incision c/d/i with bruising and staples, pharyngostomy in place with clear output but clogged again as well as suction cannister not working due to  being off, flushed and restarted suctioning  CV: WWP, non-cyanotic   Pulm: On 2L NC breathing comfortably  Abd: soft, non-distended, appropriately tender  Extremities: no edema  Neuro: moving all extremities spontaneously without apparent deficit    I/O last 3 completed shifts:  In: 2225 [I.V.:300; NG/GT:660]  Out: 870 [Urine:800; Emesis/NG output:100]    Labs:  Pending this morning    Recent Labs   Lab 05/03/19  0629 05/02/19  0715 05/01/19  0320   WBC 14.7* 12.9* 10.2   HGB 8.8* 8.8* 7.8*   * 424 342     Recent Labs   Lab 05/03/19  0629 05/02/19  1705 05/02/19  0715    136 138   POTASSIUM 3.9 4.0 3.7   CHLORIDE 101 101 103   CO2 24 25 25   BUN 25 21 20   CR 0.86 0.89 0.80   * 133* 126*   JOSE 8.1* 7.8* 7.9*   MAG 2.1 1.8 2.0   PHOS 3.1 3.7 2.2*     Imaging:  CXR stable R>L effusions      Assessment & Plan  Myrtle Vaz is a 73 year old female with a 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 Albarado. PICC 4/27/2019 for pressors and frequent IV access.    Neuro:      - Epidural is out.      - Tylenol 975 q8h, dilaudid PCA, Atarax PRN, PRN  tramadol      - PTA Effexor     - Health psych consult to assist with motivation, patient tolerance of illness and hospital stay.   CV: HDS, no issues     - PTA amiodarone 200 enteral  Pulm: Bronch 5/2 with clear secretions.     - No distress on NC, encourage IS/Acapella     - no chest tube     - CXR daily to assess for conduit distention     - albuterol PRN      - Metanebs QID   HEENT: pharyngostomy to LIS, cares BID (Peridex swish and spit, Chloraprep, bacitracin), flushes q4h of saline through main clear port and air only through blue port, and RN to check patency of tube q 2 hours given it is frequently clogged and not to suction  FEN/GI:      - SLP following.      - Tube feeds at goal of 55 mL/hr with 30 free water flush q6h. Continue STRICT NPO (meds via J)     - lyte replacement protocol, neutraphos QID      - Protonix daily     - No IVFs, diuresis as below.     - Bowel regimen: Miralax, senna-colace     - Nausea control: Scheduled zofran q6h  Renal:      -No Lasix today, weight stable, patient appears euvolemic  ID: afebrile, +stable leukocytosis  -Zosyn 5/3 for leukocytosis, GNRs in bronch 5/2  -C Diff pending (though stool charted yesterday)  Endo: PTA Synthroid  Activity: PT/OT, OOB QID  PPx: Lovenox subcutaneous now that epidural no longer in place  Dispo: 6B, discharge to TCU anticipated Monday 5/6 - discussed with Social Work re need for TCU bed no later than Monday       Patient seen and discussed with staff.  - - - - - - - - - - - - - - - - - -  Kamila Morrison MD  General Surgery PGY-3  See [facility] for on call information prior to paging me directly. Pager [phone].

## 2019-05-05 ENCOUNTER — APPOINTMENT (OUTPATIENT)
Dept: GENERAL RADIOLOGY | Facility: CLINIC | Age: 74
DRG: 326 | End: 2019-05-05
Attending: THORACIC SURGERY (CARDIOTHORACIC VASCULAR SURGERY)
Payer: MEDICARE

## 2019-05-05 ENCOUNTER — APPOINTMENT (OUTPATIENT)
Dept: CT IMAGING | Facility: CLINIC | Age: 74
DRG: 326 | End: 2019-05-05
Attending: THORACIC SURGERY (CARDIOTHORACIC VASCULAR SURGERY)
Payer: MEDICARE

## 2019-05-05 LAB
ANION GAP SERPL CALCULATED.3IONS-SCNC: 5 MMOL/L (ref 3–14)
BUN SERPL-MCNC: 23 MG/DL (ref 7–30)
C DIFF TOX B STL QL: NEGATIVE
CALCIUM SERPL-MCNC: 8.1 MG/DL (ref 8.5–10.1)
CHLORIDE SERPL-SCNC: 100 MMOL/L (ref 94–109)
CO2 SERPL-SCNC: 28 MMOL/L (ref 20–32)
CREAT SERPL-MCNC: 0.83 MG/DL (ref 0.52–1.04)
ERYTHROCYTE [DISTWIDTH] IN BLOOD BY AUTOMATED COUNT: 21.1 % (ref 10–15)
GFR SERPL CREATININE-BSD FRML MDRD: 70 ML/MIN/{1.73_M2}
GLUCOSE SERPL-MCNC: 115 MG/DL (ref 70–99)
HCT VFR BLD AUTO: 27.8 % (ref 35–47)
HGB BLD-MCNC: 8.3 G/DL (ref 11.7–15.7)
MCH RBC QN AUTO: 28.1 PG (ref 26.5–33)
MCHC RBC AUTO-ENTMCNC: 29.9 G/DL (ref 31.5–36.5)
MCV RBC AUTO: 94 FL (ref 78–100)
PLATELET # BLD AUTO: 606 10E9/L (ref 150–450)
POTASSIUM SERPL-SCNC: 4.2 MMOL/L (ref 3.4–5.3)
RBC # BLD AUTO: 2.95 10E12/L (ref 3.8–5.2)
SODIUM SERPL-SCNC: 134 MMOL/L (ref 133–144)
SPECIMEN SOURCE: NORMAL
WBC # BLD AUTO: 16 10E9/L (ref 4–11)

## 2019-05-05 PROCEDURE — 12000004 ZZH R&B IMCU UMMC

## 2019-05-05 PROCEDURE — 74177 CT ABD & PELVIS W/CONTRAST: CPT

## 2019-05-05 PROCEDURE — 27210437 ZZH NUTRITION PRODUCT SEMIELEM INTERMED LITER

## 2019-05-05 PROCEDURE — A9270 NON-COVERED ITEM OR SERVICE: HCPCS | Performed by: SURGERY

## 2019-05-05 PROCEDURE — 25000132 ZZH RX MED GY IP 250 OP 250 PS 637: Performed by: THORACIC SURGERY (CARDIOTHORACIC VASCULAR SURGERY)

## 2019-05-05 PROCEDURE — 71046 X-RAY EXAM CHEST 2 VIEWS: CPT

## 2019-05-05 PROCEDURE — 80048 BASIC METABOLIC PNL TOTAL CA: CPT | Performed by: SURGERY

## 2019-05-05 PROCEDURE — 71260 CT THORAX DX C+: CPT

## 2019-05-05 PROCEDURE — 25000132 ZZH RX MED GY IP 250 OP 250 PS 637: Performed by: SURGERY

## 2019-05-05 PROCEDURE — 25000131 ZZH RX MED GY IP 250 OP 636 PS 637: Performed by: STUDENT IN AN ORGANIZED HEALTH CARE EDUCATION/TRAINING PROGRAM

## 2019-05-05 PROCEDURE — C9113 INJ PANTOPRAZOLE SODIUM, VIA: HCPCS | Performed by: THORACIC SURGERY (CARDIOTHORACIC VASCULAR SURGERY)

## 2019-05-05 PROCEDURE — 25000128 H RX IP 250 OP 636: Performed by: SURGERY

## 2019-05-05 PROCEDURE — A9270 NON-COVERED ITEM OR SERVICE: HCPCS | Performed by: THORACIC SURGERY (CARDIOTHORACIC VASCULAR SURGERY)

## 2019-05-05 PROCEDURE — 85027 COMPLETE CBC AUTOMATED: CPT | Performed by: SURGERY

## 2019-05-05 PROCEDURE — 25000128 H RX IP 250 OP 636: Performed by: THORACIC SURGERY (CARDIOTHORACIC VASCULAR SURGERY)

## 2019-05-05 PROCEDURE — 25000125 ZZHC RX 250

## 2019-05-05 PROCEDURE — 87493 C DIFF AMPLIFIED PROBE: CPT | Performed by: SURGERY

## 2019-05-05 PROCEDURE — 36592 COLLECT BLOOD FROM PICC: CPT | Performed by: SURGERY

## 2019-05-05 PROCEDURE — 25000132 ZZH RX MED GY IP 250 OP 250 PS 637: Performed by: STUDENT IN AN ORGANIZED HEALTH CARE EDUCATION/TRAINING PROGRAM

## 2019-05-05 PROCEDURE — 25000128 H RX IP 250 OP 636: Performed by: STUDENT IN AN ORGANIZED HEALTH CARE EDUCATION/TRAINING PROGRAM

## 2019-05-05 RX ORDER — IOPAMIDOL 755 MG/ML
105 INJECTION, SOLUTION INTRAVASCULAR ONCE
Status: COMPLETED | OUTPATIENT
Start: 2019-05-05 | End: 2019-05-05

## 2019-05-05 RX ORDER — LIDOCAINE HYDROCHLORIDE 10 MG/ML
INJECTION, SOLUTION EPIDURAL; INFILTRATION; INTRACAUDAL; PERINEURAL
Status: COMPLETED
Start: 2019-05-05 | End: 2019-05-05

## 2019-05-05 RX ADMIN — Medication 25 MG: at 14:23

## 2019-05-05 RX ADMIN — LEVOTHYROXINE SODIUM 150 MCG: 75 TABLET ORAL at 08:49

## 2019-05-05 RX ADMIN — ONDANSETRON HYDROCHLORIDE 4 MG: 4 TABLET, FILM COATED ORAL at 03:46

## 2019-05-05 RX ADMIN — ONDANSETRON HYDROCHLORIDE 4 MG: 4 TABLET, FILM COATED ORAL at 19:45

## 2019-05-05 RX ADMIN — CHLORHEXIDINE GLUCONATE AND ISOPROPYL ALCOHOL 3 ML: 20; .7 SOLUTION TOPICAL at 08:59

## 2019-05-05 RX ADMIN — IOPAMIDOL 105 ML: 755 INJECTION, SOLUTION INTRAVENOUS at 19:35

## 2019-05-05 RX ADMIN — CHLORHEXIDINE GLUCONATE AND ISOPROPYL ALCOHOL 3 ML: 20; .7 SOLUTION TOPICAL at 19:43

## 2019-05-05 RX ADMIN — VENLAFAXINE 75 MG: 75 TABLET ORAL at 19:42

## 2019-05-05 RX ADMIN — AMIODARONE HYDROCHLORIDE 200 MG: 200 TABLET ORAL at 08:49

## 2019-05-05 RX ADMIN — PIPERACILLIN AND TAZOBACTAM 3.38 G: 3; .375 INJECTION, POWDER, FOR SOLUTION INTRAVENOUS at 03:46

## 2019-05-05 RX ADMIN — PIPERACILLIN AND TAZOBACTAM 3.38 G: 3; .375 INJECTION, POWDER, FOR SOLUTION INTRAVENOUS at 11:37

## 2019-05-05 RX ADMIN — ONDANSETRON HYDROCHLORIDE 4 MG: 4 TABLET, FILM COATED ORAL at 08:49

## 2019-05-05 RX ADMIN — CHLORHEXIDINE GLUCONATE 0.12% ORAL RINSE 30 ML: 1.2 LIQUID ORAL at 19:41

## 2019-05-05 RX ADMIN — Medication 25 MG: at 00:08

## 2019-05-05 RX ADMIN — ENOXAPARIN SODIUM 40 MG: 40 INJECTION SUBCUTANEOUS at 14:26

## 2019-05-05 RX ADMIN — VENLAFAXINE 75 MG: 75 TABLET ORAL at 08:49

## 2019-05-05 RX ADMIN — Medication 25 MG: at 08:13

## 2019-05-05 RX ADMIN — Medication 5 ML: at 16:27

## 2019-05-05 RX ADMIN — ACETAMINOPHEN 975 MG: 325 TABLET, FILM COATED ORAL at 22:29

## 2019-05-05 RX ADMIN — PIPERACILLIN AND TAZOBACTAM 3.38 G: 3; .375 INJECTION, POWDER, FOR SOLUTION INTRAVENOUS at 22:29

## 2019-05-05 RX ADMIN — ACETAMINOPHEN 975 MG: 325 TABLET, FILM COATED ORAL at 14:23

## 2019-05-05 RX ADMIN — BACITRACIN: 500 OINTMENT TOPICAL at 08:59

## 2019-05-05 RX ADMIN — BACITRACIN: 500 OINTMENT TOPICAL at 19:29

## 2019-05-05 RX ADMIN — ONDANSETRON HYDROCHLORIDE 4 MG: 4 TABLET, FILM COATED ORAL at 14:23

## 2019-05-05 RX ADMIN — CHLORHEXIDINE GLUCONATE 0.12% ORAL RINSE 30 ML: 1.2 LIQUID ORAL at 08:49

## 2019-05-05 RX ADMIN — PIPERACILLIN AND TAZOBACTAM 3.38 G: 3; .375 INJECTION, POWDER, FOR SOLUTION INTRAVENOUS at 16:26

## 2019-05-05 RX ADMIN — Medication 25 MG: at 19:41

## 2019-05-05 RX ADMIN — Medication 5 ML: at 07:00

## 2019-05-05 RX ADMIN — LIDOCAINE HYDROCHLORIDE: 10 INJECTION, SOLUTION EPIDURAL; INFILTRATION; INTRACAUDAL; PERINEURAL at 17:07

## 2019-05-05 RX ADMIN — ACETAMINOPHEN 975 MG: 325 TABLET, FILM COATED ORAL at 05:41

## 2019-05-05 RX ADMIN — PANTOPRAZOLE SODIUM 40 MG: 40 INJECTION, POWDER, LYOPHILIZED, FOR SOLUTION INTRAVENOUS at 08:50

## 2019-05-05 ASSESSMENT — ACTIVITIES OF DAILY LIVING (ADL)
ADLS_ACUITY_SCORE: 18
ADLS_ACUITY_SCORE: 19

## 2019-05-05 ASSESSMENT — MIFFLIN-ST. JEOR
SCORE: 1323.27
SCORE: 1326.9

## 2019-05-05 ASSESSMENT — PAIN DESCRIPTION - DESCRIPTORS: DESCRIPTORS: ACHING;SORE

## 2019-05-05 NOTE — PROGRESS NOTES
THORACIC & FOREGUT SURGERY PROGRESS NOTE  05/05/2019    Patient: Myrtle Vaz  MRN: [id number]    Subjective  No acute events overnight. Patient with adequate UOP. Continues to have secretions which she appears to be clearing well on her own and spitting up. Says she feels improved this morning. No loose stools, no fevers. WBC 16 this morning. BMx1.     Objective  Temp:  [97.5  F (36.4  C)-99  F (37.2  C)] 98.5  F (36.9  C)  Heart Rate:  [78-83] 83  Resp:  [16-18] 18  BP: (106-121)/(52-62) 106/62  SpO2:  [95 %-97 %] 96 %    General: NAD  HEENT/chest: neck incision c/d/i with bruising and staples, pharyngostomy in place with clear output  CV: WWP, non-cyanotic   Pulm: On 2L NC breathing comfortably  Abd: soft, non-distended, appropriately tender  Extremities: no edema  Neuro: moving all extremities spontaneously without apparent deficit    I/O last 3 completed shifts:  In: 1645 [NG/GT:380]  Out: 1270 [Urine:1175; Emesis/NG output:125]    Labs:    Recent Labs   Lab 05/05/19  0705 05/04/19  1150 05/03/19  0629   WBC 16.0* 15.2* 14.7*   HGB 8.3* 8.2* 8.8*   * 519* 480*     Recent Labs   Lab 05/05/19  0705 05/04/19  1150 05/03/19  0629 05/02/19  1705 05/02/19  0715    135 134 136 138   POTASSIUM 4.2 4.0 3.9 4.0 3.7   CHLORIDE 100 101 101 101 103   CO2 28 28 24 25 25   BUN 23 24 25 21 20   CR 0.83 0.89 0.86 0.89 0.80   * 97 107* 133* 126*   JOSE 8.1* 7.9* 8.1* 7.8* 7.9*   MAG  --   --  2.1 1.8 2.0   PHOS  --   --  3.1 3.7 2.2*     Imaging:  AM CXR pending.       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 Albarado. PICC 4/27/2019 for pressors and frequent IV access.    Neuro:      - Epidural is out.      - Tylenol 975 q8h,  Atarax PRN, PRN tramadol      - PTA Effexor     - Health psych consult to assist with motivation, patient tolerance of  illness and hospital stay.   CV: HDS, no issues     - PTA amiodarone 200 enteral  Pulm:      - Bronch 5/2 with clear secretions: moderate growth stenotrophomonas maltophilia, group B strep (pending susceptibilities).      - No distress on NC, encourage IS/Acapella     - no chest tube     - CXR daily to assess for conduit distention     - albuterol PRN      - Metanebs QID   HEENT: pharyngostomy to LIS, cares BID (Peridex swish and spit, Chloraprep, bacitracin), flushes q4h of saline through main clear port and air only through blue port, and RN to check patency of tube q 2 hours given it is frequently clogged and not to suction  -CXR to assess placement of pharyngostomy tube given it is taped in place. Pending proper placement, will stitch tube back in place this morning.   FEN/GI:      - SLP following.      - Tube feeds at goal of 55 mL/hr with 30 free water flush q6h. Continue STRICT NPO (meds via J)     - lyte replacement protocol, neutraphos QID      - Protonix daily     - No IVFs, diuresis as below.     - Bowel regimen: Miralax, senna-colace     - Nausea control: Scheduled zofran q6h  Renal:      -No Lasix today, weight stable, patient appears euvolemic  ID: afebrile, +stable leukocytosis  -Zosyn 5/3 for leukocytosis  - Bronch 5/2 with clear secretions: moderate growth stenotrophomonas maltophilia, group B strep (pending susceptibilities).   -WBC continues to be elevated, UA negative. Will continue Zosyn and monitor WBC.   -C Diff pending (though patient is no longer having loose stools, low risk for c diff)   Endo: PTA Synthroid  Activity: PT/OT, OOB QID  PPx: Lovenox   Dispo: 6B, discharge to TCU anticipated Monday 5/6 - discussed with Social Work re need for TCU bed no later than Monday       Patient seen and discussed with staff.  - - - - - - - - - - - - - - - - - -  Johana Randall MD   Surgery PGY-1     ADDENDUM: CT CAP with IV and G tube contrast today for leukocytosis.

## 2019-05-05 NOTE — PLAN OF CARE
"/57 (BP Location: Left arm)   Pulse 90   Temp 98.9  F (37.2  C) (Oral)   Resp 18   Ht 1.651 m (5' 5\")   Wt 75.8 kg (167 lb 1.6 oz)   SpO2 97%   BMI 27.81 kg/m      Neuro: A&Ox4.   Cardiac: SR. VSS.   Respiratory: Sating >95% on 2L NC.  GI/: Adequate urine output. No BM, Stool sample needed.   Diet/appetite: Tolerating TF @ 55 ml/hr, strict NPO   Activity:  Assist of 1-SBA, up to chair and commode.   Pain: At acceptable level on current regimen.   Skin: No new deficits noted.  LDA's: pharyngostomy- hooked to LIS, flush frequently to avoid clogs, blue sump does not need to be capped! Tubing taped in place at this time- cross cover will come restitch       Plan: Continue with POC. Notify primary team with changes.   "

## 2019-05-05 NOTE — PLAN OF CARE
Neuro: A&Ox4. Depressed about situation. Moving all extremities   Cardiac: SR. VSS. Max temp 99.2   Respiratory: Tried to wean off O2, but patient did dip down to low 80's without it. Otherwise no SOB reported. Using yaunker independently  GI/: Adequate urine output. No BM, gave senna, patient only had a smear 05/03, and therefore has not had BM in recent days. Waiting to get stool sample for CDiff  Diet/appetite: Strict NPO  Activity:  Assist of 1, up to bedside commode. Patient needing directions but able to stand doing most of the work.   Pain: At acceptable level on current regimen. Scheduled tylenol and tram as needed.   Skin: Spit fistula site dressing done at 2200.   LDA's: R PICC    Plan: Pharyngostomy to LIS with minimal output this shift. Irrigated with sterile saline Q4hrs

## 2019-05-05 NOTE — PROGRESS NOTES
Brief SW Note    Sent updates to [facility], called admissions and left a voicemail asking for a call back.  Ready for discharge tomorrow but haven't confirmed with [facility].  PAS completed:  [id number]    VINAYAK Quintana, [facility]

## 2019-05-06 ENCOUNTER — ANESTHESIA (OUTPATIENT)
Dept: SURGERY | Facility: CLINIC | Age: 74
DRG: 326 | End: 2019-05-06
Payer: MEDICARE

## 2019-05-06 ENCOUNTER — SURGERY (OUTPATIENT)
Age: 74
End: 2019-05-06
Payer: MEDICARE

## 2019-05-06 ENCOUNTER — APPOINTMENT (OUTPATIENT)
Dept: GENERAL RADIOLOGY | Facility: CLINIC | Age: 74
DRG: 326 | End: 2019-05-06
Attending: THORACIC SURGERY (CARDIOTHORACIC VASCULAR SURGERY)
Payer: MEDICARE

## 2019-05-06 ENCOUNTER — APPOINTMENT (OUTPATIENT)
Dept: SPEECH THERAPY | Facility: CLINIC | Age: 74
DRG: 326 | End: 2019-05-06
Attending: THORACIC SURGERY (CARDIOTHORACIC VASCULAR SURGERY)
Payer: MEDICARE

## 2019-05-06 ENCOUNTER — ANESTHESIA EVENT (OUTPATIENT)
Dept: SURGERY | Facility: CLINIC | Age: 74
DRG: 326 | End: 2019-05-06
Payer: MEDICARE

## 2019-05-06 LAB
ANION GAP SERPL CALCULATED.3IONS-SCNC: 6 MMOL/L (ref 3–14)
BACTERIA SPEC CULT: ABNORMAL
BUN SERPL-MCNC: 18 MG/DL (ref 7–30)
CALCIUM SERPL-MCNC: 8 MG/DL (ref 8.5–10.1)
CHLORIDE SERPL-SCNC: 97 MMOL/L (ref 94–109)
CO2 SERPL-SCNC: 28 MMOL/L (ref 20–32)
CREAT SERPL-MCNC: 0.76 MG/DL (ref 0.52–1.04)
ERYTHROCYTE [DISTWIDTH] IN BLOOD BY AUTOMATED COUNT: 21 % (ref 10–15)
GFR SERPL CREATININE-BSD FRML MDRD: 78 ML/MIN/{1.73_M2}
GLUCOSE BLDC GLUCOMTR-MCNC: 108 MG/DL (ref 70–99)
GLUCOSE BLDC GLUCOMTR-MCNC: 71 MG/DL (ref 70–99)
GLUCOSE SERPL-MCNC: 106 MG/DL (ref 70–99)
GRAM STN SPEC: ABNORMAL
HCT VFR BLD AUTO: 26.5 % (ref 35–47)
HGB BLD-MCNC: 7.9 G/DL (ref 11.7–15.7)
LACTATE BLD-SCNC: 1.1 MMOL/L (ref 0.7–2)
Lab: ABNORMAL
MCH RBC QN AUTO: 27.9 PG (ref 26.5–33)
MCHC RBC AUTO-ENTMCNC: 29.8 G/DL (ref 31.5–36.5)
MCV RBC AUTO: 94 FL (ref 78–100)
PLATELET # BLD AUTO: 709 10E9/L (ref 150–450)
POTASSIUM SERPL-SCNC: 4.1 MMOL/L (ref 3.4–5.3)
RBC # BLD AUTO: 2.83 10E12/L (ref 3.8–5.2)
SODIUM SERPL-SCNC: 131 MMOL/L (ref 133–144)
SODIUM SERPL-SCNC: 133 MMOL/L (ref 133–144)
SPECIMEN SOURCE: ABNORMAL
SPECIMEN SOURCE: ABNORMAL
WBC # BLD AUTO: 17.7 10E9/L (ref 4–11)

## 2019-05-06 PROCEDURE — 25000128 H RX IP 250 OP 636: Performed by: SURGERY

## 2019-05-06 PROCEDURE — A9270 NON-COVERED ITEM OR SERVICE: HCPCS | Performed by: THORACIC SURGERY (CARDIOTHORACIC VASCULAR SURGERY)

## 2019-05-06 PROCEDURE — 21501 I&D DP ABSC/HMTMA SFT TS NCK: CPT | Mod: 78 | Performed by: THORACIC SURGERY (CARDIOTHORACIC VASCULAR SURGERY)

## 2019-05-06 PROCEDURE — 87070 CULTURE OTHR SPECIMN AEROBIC: CPT | Performed by: THORACIC SURGERY (CARDIOTHORACIC VASCULAR SURGERY)

## 2019-05-06 PROCEDURE — 87205 SMEAR GRAM STAIN: CPT | Performed by: THORACIC SURGERY (CARDIOTHORACIC VASCULAR SURGERY)

## 2019-05-06 PROCEDURE — A9270 NON-COVERED ITEM OR SERVICE: HCPCS | Performed by: SURGERY

## 2019-05-06 PROCEDURE — 25000132 ZZH RX MED GY IP 250 OP 250 PS 637: Performed by: THORACIC SURGERY (CARDIOTHORACIC VASCULAR SURGERY)

## 2019-05-06 PROCEDURE — 87077 CULTURE AEROBIC IDENTIFY: CPT | Performed by: THORACIC SURGERY (CARDIOTHORACIC VASCULAR SURGERY)

## 2019-05-06 PROCEDURE — 87106 FUNGI IDENTIFICATION YEAST: CPT | Performed by: THORACIC SURGERY (CARDIOTHORACIC VASCULAR SURGERY)

## 2019-05-06 PROCEDURE — 74360 X-RAY GUIDE GI DILATION: CPT | Mod: 26 | Performed by: THORACIC SURGERY (CARDIOTHORACIC VASCULAR SURGERY)

## 2019-05-06 PROCEDURE — 00000146 ZZHCL STATISTIC GLUCOSE BY METER IP

## 2019-05-06 PROCEDURE — 27210794 ZZH OR GENERAL SUPPLY STERILE: Performed by: THORACIC SURGERY (CARDIOTHORACIC VASCULAR SURGERY)

## 2019-05-06 PROCEDURE — 0HD5XZZ EXTRACTION OF CHEST SKIN, EXTERNAL APPROACH: ICD-10-PCS | Performed by: THORACIC SURGERY (CARDIOTHORACIC VASCULAR SURGERY)

## 2019-05-06 PROCEDURE — 93005 ELECTROCARDIOGRAM TRACING: CPT

## 2019-05-06 PROCEDURE — 37000008 ZZH ANESTHESIA TECHNICAL FEE, 1ST 30 MIN: Performed by: THORACIC SURGERY (CARDIOTHORACIC VASCULAR SURGERY)

## 2019-05-06 PROCEDURE — 87076 CULTURE ANAEROBE IDENT EACH: CPT | Performed by: THORACIC SURGERY (CARDIOTHORACIC VASCULAR SURGERY)

## 2019-05-06 PROCEDURE — C1874 STENT, COATED/COV W/DEL SYS: HCPCS | Performed by: THORACIC SURGERY (CARDIOTHORACIC VASCULAR SURGERY)

## 2019-05-06 PROCEDURE — 25000132 ZZH RX MED GY IP 250 OP 250 PS 637: Performed by: SURGERY

## 2019-05-06 PROCEDURE — 87186 SC STD MICRODIL/AGAR DIL: CPT | Performed by: THORACIC SURGERY (CARDIOTHORACIC VASCULAR SURGERY)

## 2019-05-06 PROCEDURE — 12000004 ZZH R&B IMCU UMMC

## 2019-05-06 PROCEDURE — 25000132 ZZH RX MED GY IP 250 OP 250 PS 637: Performed by: STUDENT IN AN ORGANIZED HEALTH CARE EDUCATION/TRAINING PROGRAM

## 2019-05-06 PROCEDURE — 25000128 H RX IP 250 OP 636: Performed by: PHYSICIAN ASSISTANT

## 2019-05-06 PROCEDURE — 71000015 ZZH RECOVERY PHASE 1 LEVEL 2 EA ADDTL HR: Performed by: THORACIC SURGERY (CARDIOTHORACIC VASCULAR SURGERY)

## 2019-05-06 PROCEDURE — C1769 GUIDE WIRE: HCPCS | Performed by: THORACIC SURGERY (CARDIOTHORACIC VASCULAR SURGERY)

## 2019-05-06 PROCEDURE — 43266 EGD ENDOSCOPIC STENT PLACE: CPT | Mod: 78 | Performed by: THORACIC SURGERY (CARDIOTHORACIC VASCULAR SURGERY)

## 2019-05-06 PROCEDURE — 25000128 H RX IP 250 OP 636: Performed by: NURSE ANESTHETIST, CERTIFIED REGISTERED

## 2019-05-06 PROCEDURE — 0D758DZ DILATION OF ESOPHAGUS WITH INTRALUMINAL DEVICE, VIA NATURAL OR ARTIFICIAL OPENING ENDOSCOPIC: ICD-10-PCS | Performed by: THORACIC SURGERY (CARDIOTHORACIC VASCULAR SURGERY)

## 2019-05-06 PROCEDURE — 36592 COLLECT BLOOD FROM PICC: CPT | Performed by: SURGERY

## 2019-05-06 PROCEDURE — 83605 ASSAY OF LACTIC ACID: CPT

## 2019-05-06 PROCEDURE — 71046 X-RAY EXAM CHEST 2 VIEWS: CPT

## 2019-05-06 PROCEDURE — 37000009 ZZH ANESTHESIA TECHNICAL FEE, EACH ADDTL 15 MIN: Performed by: THORACIC SURGERY (CARDIOTHORACIC VASCULAR SURGERY)

## 2019-05-06 PROCEDURE — 36000053 ZZH SURGERY LEVEL 2 EA 15 ADDTL MIN - UMMC: Performed by: THORACIC SURGERY (CARDIOTHORACIC VASCULAR SURGERY)

## 2019-05-06 PROCEDURE — 25800030 ZZH RX IP 258 OP 636: Performed by: NURSE ANESTHETIST, CERTIFIED REGISTERED

## 2019-05-06 PROCEDURE — 25000131 ZZH RX MED GY IP 250 OP 636 PS 637: Performed by: STUDENT IN AN ORGANIZED HEALTH CARE EDUCATION/TRAINING PROGRAM

## 2019-05-06 PROCEDURE — 40000170 ZZH STATISTIC PRE-PROCEDURE ASSESSMENT II: Performed by: THORACIC SURGERY (CARDIOTHORACIC VASCULAR SURGERY)

## 2019-05-06 PROCEDURE — 85027 COMPLETE CBC AUTOMATED: CPT | Performed by: SURGERY

## 2019-05-06 PROCEDURE — 71000014 ZZH RECOVERY PHASE 1 LEVEL 2 FIRST HR: Performed by: THORACIC SURGERY (CARDIOTHORACIC VASCULAR SURGERY)

## 2019-05-06 PROCEDURE — 25000128 H RX IP 250 OP 636: Performed by: ANESTHESIOLOGY

## 2019-05-06 PROCEDURE — 93010 ELECTROCARDIOGRAM REPORT: CPT | Performed by: INTERNAL MEDICINE

## 2019-05-06 PROCEDURE — 87102 FUNGUS ISOLATION CULTURE: CPT | Performed by: THORACIC SURGERY (CARDIOTHORACIC VASCULAR SURGERY)

## 2019-05-06 PROCEDURE — 36000055 ZZH SURGERY LEVEL 2 W FLUORO 1ST 30 MIN - UMMC: Performed by: THORACIC SURGERY (CARDIOTHORACIC VASCULAR SURGERY)

## 2019-05-06 PROCEDURE — 84295 ASSAY OF SERUM SODIUM: CPT | Performed by: SURGERY

## 2019-05-06 PROCEDURE — 25000132 ZZH RX MED GY IP 250 OP 250 PS 637: Performed by: NURSE ANESTHETIST, CERTIFIED REGISTERED

## 2019-05-06 PROCEDURE — 27210437 ZZH NUTRITION PRODUCT SEMIELEM INTERMED LITER

## 2019-05-06 PROCEDURE — 80048 BASIC METABOLIC PNL TOTAL CA: CPT | Performed by: SURGERY

## 2019-05-06 PROCEDURE — 40000277 XR SURGERY CARM FLUORO LESS THAN 5 MIN W STILLS: Mod: TC

## 2019-05-06 PROCEDURE — 92526 ORAL FUNCTION THERAPY: CPT | Mod: GN

## 2019-05-06 PROCEDURE — 40000986 XR CHEST PORT 1 VW

## 2019-05-06 PROCEDURE — 25000566 ZZH SEVOFLURANE, EA 15 MIN: Performed by: THORACIC SURGERY (CARDIOTHORACIC VASCULAR SURGERY)

## 2019-05-06 PROCEDURE — 87075 CULTR BACTERIA EXCEPT BLOOD: CPT | Performed by: THORACIC SURGERY (CARDIOTHORACIC VASCULAR SURGERY)

## 2019-05-06 PROCEDURE — 25000125 ZZHC RX 250: Performed by: NURSE ANESTHETIST, CERTIFIED REGISTERED

## 2019-05-06 DEVICE — STENT ESOPHAGEAL ENDOMAXX 23X100MM MAXX-2310
Type: IMPLANTABLE DEVICE | Site: ESOPHAGUS | Status: NON-FUNCTIONAL
Removed: 2019-06-10

## 2019-05-06 RX ORDER — SODIUM CHLORIDE, SODIUM LACTATE, POTASSIUM CHLORIDE, CALCIUM CHLORIDE 600; 310; 30; 20 MG/100ML; MG/100ML; MG/100ML; MG/100ML
INJECTION, SOLUTION INTRAVENOUS CONTINUOUS PRN
Status: DISCONTINUED | OUTPATIENT
Start: 2019-05-06 | End: 2019-05-06

## 2019-05-06 RX ORDER — SULFAMETHOXAZOLE/TRIMETHOPRIM 800-160 MG
1 TABLET ORAL 2 TIMES DAILY
Status: DISCONTINUED | OUTPATIENT
Start: 2019-05-06 | End: 2019-05-09

## 2019-05-06 RX ORDER — ONDANSETRON 2 MG/ML
4 INJECTION INTRAMUSCULAR; INTRAVENOUS EVERY 30 MIN PRN
Status: DISCONTINUED | OUTPATIENT
Start: 2019-05-06 | End: 2019-05-06 | Stop reason: HOSPADM

## 2019-05-06 RX ORDER — NALOXONE HYDROCHLORIDE 0.4 MG/ML
.1-.4 INJECTION, SOLUTION INTRAMUSCULAR; INTRAVENOUS; SUBCUTANEOUS
Status: DISCONTINUED | OUTPATIENT
Start: 2019-05-06 | End: 2019-05-06 | Stop reason: HOSPADM

## 2019-05-06 RX ORDER — PROPOFOL 10 MG/ML
INJECTION, EMULSION INTRAVENOUS PRN
Status: DISCONTINUED | OUTPATIENT
Start: 2019-05-06 | End: 2019-05-06

## 2019-05-06 RX ORDER — ONDANSETRON 4 MG/1
4 TABLET, ORALLY DISINTEGRATING ORAL EVERY 30 MIN PRN
Status: DISCONTINUED | OUTPATIENT
Start: 2019-05-06 | End: 2019-05-06 | Stop reason: HOSPADM

## 2019-05-06 RX ORDER — FENTANYL CITRATE 50 UG/ML
25-50 INJECTION, SOLUTION INTRAMUSCULAR; INTRAVENOUS
Status: DISCONTINUED | OUTPATIENT
Start: 2019-05-06 | End: 2019-05-06 | Stop reason: HOSPADM

## 2019-05-06 RX ORDER — MEPERIDINE HYDROCHLORIDE 25 MG/ML
12.5 INJECTION INTRAMUSCULAR; INTRAVENOUS; SUBCUTANEOUS
Status: DISCONTINUED | OUTPATIENT
Start: 2019-05-06 | End: 2019-05-06 | Stop reason: HOSPADM

## 2019-05-06 RX ORDER — ONDANSETRON 2 MG/ML
INJECTION INTRAMUSCULAR; INTRAVENOUS PRN
Status: DISCONTINUED | OUTPATIENT
Start: 2019-05-06 | End: 2019-05-06

## 2019-05-06 RX ORDER — DEXAMETHASONE SODIUM PHOSPHATE 10 MG/ML
INJECTION, SOLUTION INTRAMUSCULAR; INTRAVENOUS PRN
Status: DISCONTINUED | OUTPATIENT
Start: 2019-05-06 | End: 2019-05-06

## 2019-05-06 RX ORDER — DIMENHYDRINATE 50 MG/ML
25 INJECTION, SOLUTION INTRAMUSCULAR; INTRAVENOUS
Status: DISCONTINUED | OUTPATIENT
Start: 2019-05-06 | End: 2019-05-06 | Stop reason: HOSPADM

## 2019-05-06 RX ORDER — FLUCONAZOLE 2 MG/ML
400 INJECTION, SOLUTION INTRAVENOUS EVERY 24 HOURS
Status: DISCONTINUED | OUTPATIENT
Start: 2019-05-06 | End: 2019-05-15 | Stop reason: HOSPADM

## 2019-05-06 RX ORDER — NALOXONE HYDROCHLORIDE 0.4 MG/ML
.1-.4 INJECTION, SOLUTION INTRAMUSCULAR; INTRAVENOUS; SUBCUTANEOUS
Status: DISCONTINUED | OUTPATIENT
Start: 2019-05-06 | End: 2019-05-06

## 2019-05-06 RX ORDER — LIDOCAINE HYDROCHLORIDE 20 MG/ML
INJECTION, SOLUTION INFILTRATION; PERINEURAL PRN
Status: DISCONTINUED | OUTPATIENT
Start: 2019-05-06 | End: 2019-05-06

## 2019-05-06 RX ORDER — FENTANYL CITRATE 50 UG/ML
INJECTION, SOLUTION INTRAMUSCULAR; INTRAVENOUS PRN
Status: DISCONTINUED | OUTPATIENT
Start: 2019-05-06 | End: 2019-05-06

## 2019-05-06 RX ORDER — SODIUM CHLORIDE, SODIUM LACTATE, POTASSIUM CHLORIDE, CALCIUM CHLORIDE 600; 310; 30; 20 MG/100ML; MG/100ML; MG/100ML; MG/100ML
INJECTION, SOLUTION INTRAVENOUS CONTINUOUS
Status: DISCONTINUED | OUTPATIENT
Start: 2019-05-06 | End: 2019-05-06 | Stop reason: HOSPADM

## 2019-05-06 RX ORDER — HYDROMORPHONE HYDROCHLORIDE 1 MG/ML
.3-.5 INJECTION, SOLUTION INTRAMUSCULAR; INTRAVENOUS; SUBCUTANEOUS EVERY 5 MIN PRN
Status: DISCONTINUED | OUTPATIENT
Start: 2019-05-06 | End: 2019-05-06 | Stop reason: HOSPADM

## 2019-05-06 RX ADMIN — ONDANSETRON HYDROCHLORIDE 4 MG: 4 TABLET, FILM COATED ORAL at 04:09

## 2019-05-06 RX ADMIN — CHLORHEXIDINE GLUCONATE 0.12% ORAL RINSE 30 ML: 1.2 LIQUID ORAL at 09:00

## 2019-05-06 RX ADMIN — VENLAFAXINE 75 MG: 75 TABLET ORAL at 09:01

## 2019-05-06 RX ADMIN — ACETAMINOPHEN 975 MG: 325 TABLET, FILM COATED ORAL at 05:20

## 2019-05-06 RX ADMIN — CHLORHEXIDINE GLUCONATE 0.12% ORAL RINSE 30 ML: 1.2 LIQUID ORAL at 20:36

## 2019-05-06 RX ADMIN — LIDOCAINE HYDROCHLORIDE 100 MG: 20 INJECTION, SOLUTION INFILTRATION; PERINEURAL at 14:53

## 2019-05-06 RX ADMIN — PIPERACILLIN AND TAZOBACTAM 3.38 G: 3; .375 INJECTION, POWDER, FOR SOLUTION INTRAVENOUS at 22:51

## 2019-05-06 RX ADMIN — PROPOFOL 50 MG: 10 INJECTION, EMULSION INTRAVENOUS at 14:54

## 2019-05-06 RX ADMIN — PIPERACILLIN AND TAZOBACTAM 3.38 G: 3; .375 INJECTION, POWDER, FOR SOLUTION INTRAVENOUS at 09:56

## 2019-05-06 RX ADMIN — ONDANSETRON HYDROCHLORIDE 4 MG: 4 TABLET, FILM COATED ORAL at 09:56

## 2019-05-06 RX ADMIN — PIPERACILLIN AND TAZOBACTAM 3.38 G: 3; .375 INJECTION, POWDER, FOR SOLUTION INTRAVENOUS at 15:13

## 2019-05-06 RX ADMIN — SULFAMETHOXAZOLE AND TRIMETHOPRIM 1 TABLET: 800; 160 TABLET ORAL at 09:57

## 2019-05-06 RX ADMIN — VENLAFAXINE 75 MG: 75 TABLET ORAL at 20:35

## 2019-05-06 RX ADMIN — SUGAMMADEX 160 MG: 100 INJECTION, SOLUTION INTRAVENOUS at 16:02

## 2019-05-06 RX ADMIN — Medication 25 MG: at 20:35

## 2019-05-06 RX ADMIN — ROCURONIUM BROMIDE 50 MG: 10 INJECTION INTRAVENOUS at 14:54

## 2019-05-06 RX ADMIN — HYDROXYZINE HYDROCHLORIDE 10 MG: 10 TABLET ORAL at 01:02

## 2019-05-06 RX ADMIN — BACITRACIN: 500 OINTMENT TOPICAL at 20:36

## 2019-05-06 RX ADMIN — DEXAMETHASONE SODIUM PHOSPHATE 8 MG: 10 INJECTION, SOLUTION INTRAMUSCULAR; INTRAVENOUS at 15:06

## 2019-05-06 RX ADMIN — AMIODARONE HYDROCHLORIDE 200 MG: 200 TABLET ORAL at 09:00

## 2019-05-06 RX ADMIN — CHLORHEXIDINE GLUCONATE AND ISOPROPYL ALCOHOL 3 ML: 20; .7 SOLUTION TOPICAL at 20:37

## 2019-05-06 RX ADMIN — FENTANYL CITRATE 25 MCG: 50 INJECTION INTRAMUSCULAR; INTRAVENOUS at 17:19

## 2019-05-06 RX ADMIN — BACITRACIN: 500 OINTMENT TOPICAL at 08:28

## 2019-05-06 RX ADMIN — POLYETHYLENE GLYCOL 400 AND PROPYLENE GLYCOL 3 DROP: 4; 3 SOLUTION/ DROPS OPHTHALMIC at 14:55

## 2019-05-06 RX ADMIN — FENTANYL CITRATE 100 MCG: 50 INJECTION, SOLUTION INTRAMUSCULAR; INTRAVENOUS at 14:53

## 2019-05-06 RX ADMIN — SODIUM CHLORIDE, POTASSIUM CHLORIDE, SODIUM LACTATE AND CALCIUM CHLORIDE: 600; 310; 30; 20 INJECTION, SOLUTION INTRAVENOUS at 14:35

## 2019-05-06 RX ADMIN — CHLORHEXIDINE GLUCONATE AND ISOPROPYL ALCOHOL 3 ML: 20; .7 SOLUTION TOPICAL at 08:29

## 2019-05-06 RX ADMIN — FENTANYL CITRATE 25 MCG: 50 INJECTION INTRAMUSCULAR; INTRAVENOUS at 17:37

## 2019-05-06 RX ADMIN — PHENYLEPHRINE HYDROCHLORIDE 100 MCG: 10 INJECTION, SOLUTION INTRAMUSCULAR; INTRAVENOUS; SUBCUTANEOUS at 14:58

## 2019-05-06 RX ADMIN — ACETAMINOPHEN 975 MG: 325 TABLET, FILM COATED ORAL at 22:52

## 2019-05-06 RX ADMIN — PANTOPRAZOLE SODIUM 40 MG: 40 TABLET, DELAYED RELEASE ORAL at 09:00

## 2019-05-06 RX ADMIN — PIPERACILLIN AND TAZOBACTAM 3.38 G: 3; .375 INJECTION, POWDER, FOR SOLUTION INTRAVENOUS at 04:09

## 2019-05-06 RX ADMIN — LEVOTHYROXINE SODIUM 150 MCG: 75 TABLET ORAL at 09:01

## 2019-05-06 RX ADMIN — SULFAMETHOXAZOLE AND TRIMETHOPRIM 1 TABLET: 800; 160 TABLET ORAL at 20:35

## 2019-05-06 RX ADMIN — Medication 25 MG: at 04:09

## 2019-05-06 RX ADMIN — FLUCONAZOLE 400 MG: 400 INJECTION, SOLUTION INTRAVENOUS at 09:00

## 2019-05-06 RX ADMIN — ONDANSETRON 4 MG: 2 INJECTION INTRAMUSCULAR; INTRAVENOUS at 15:30

## 2019-05-06 ASSESSMENT — ACTIVITIES OF DAILY LIVING (ADL)
ADLS_ACUITY_SCORE: 19

## 2019-05-06 ASSESSMENT — PAIN DESCRIPTION - DESCRIPTORS
DESCRIPTORS: SORE
DESCRIPTORS: ACHING;SORE

## 2019-05-06 ASSESSMENT — MIFFLIN-ST. JEOR: SCORE: 1326.88

## 2019-05-06 NOTE — OR NURSING
Dr. Murguia called and he said that either he or Dr. Martin would do the case depending on when dr. martin completes his current case. Also staple removal is included with the written procedure. Courtney the [place] orderly called and came to  Natasha's ring. Report to Leila MACIAS RN

## 2019-05-06 NOTE — ANESTHESIA CARE TRANSFER NOTE
Patient: Myrtle Vaz    Procedure(s):  ESOPHAGOGASTRODUODENOSCOPY WITH ESOPHAGEAL STENT REPLACEMENT, IRRIGATION AND DEBRIDEMENT OF LEFT NECK, WOUND VAC PLACEMENT    Diagnosis: Esophageal Perforation  Diagnosis Additional Information: No value filed.    Anesthesia Type:   No value filed.     Note:  Airway :Face Mask and Nasal Cannula  Patient transferred to:PACU  Comments: Exchanged O2 via NC to O2 via optimizer mask despite pt following commands to cough and deep breath. Pt very awake, states comfort, VSS. Handoff Report: Identifed the Patient, Identified the Reponsible Provider, Reviewed the pertinent medical history, Discussed the surgical course, Reviewed Intra-OP anesthesia mangement and issues during anesthesia, Set expectations for post-procedure period and Allowed opportunity for questions and acknowledgement of understanding      Vitals: (Last set prior to Anesthesia Care Transfer)    CRNA VITALS  5/6/2019 1544 - 5/6/2019 1620      5/6/2019             Pulse:  76    SpO2:  90 %                Electronically Signed By: NARCISO Palma CRNA  May 6, 2019  4:20 PM

## 2019-05-06 NOTE — ANESTHESIA PREPROCEDURE EVALUATION
Anesthesia Pre-Procedure Evaluation    Patient: Myrtle Vaz   MRN:     [id number] Gender:   female   Age:    73 year old :      1945        Preoperative Diagnosis: Esophageal Perforation   Procedure(s):  ESOPHAGOGASTRODUODENOSCOPY WITH ESOPHAGEAL STENT REPLACEMENT     Past Medical History:   Diagnosis Date     Depression      History of atrial fibrillation      HLD (hyperlipidemia)      HTN (hypertension)      Hypothyroidism       Past Surgical History:   Procedure Laterality Date     BRONCHOSCOPY (RIGID OR FLEXIBLE), DIAGNOSTIC N/A 2019    Procedure: BRONCHOSCOPY, WITH BAL;  Surgeon: Ally Ybarra MD;  Location: [facility] GI     ESOPHAGOGASTRECTOMY N/A 2019    Procedure: Redo Laparotomy SUBSTERNAL Esophagogastrostomy, Pharyngostomy, Intraoperative EGD, PARTIAL STERNOTOMY, LEFT PHARYNGOSTOMY,;  Surgeon: Temitope Bullock MD;  Location: [facility] OR     ESOPHAGOSCOPY, GASTROSCOPY, DUODENOSCOPY (EGD), COMBINED N/A 2018    Procedure: COMBINED ESOPHAGOSCOPY, GASTROSCOPY, DUODENOSCOPY (EGD);  Surgeon: Temitope Bullock MD;  Location: [facility] OR     HERNIORRHAPHY HIATAL  2018     HYSTERECTOMY       LAPAROSCOPIC HERNIORRHAPHY HIATAL N/A 2018    Procedure: Midline laparotomy, Trans-hiatal esophagogasterectomy, gastrostomy, J-tube placement, G-tube placement, bilateral pleural chest tube placement, mediastinal abcess drainage, spit fistula creation, Esophagastrodueodenoscopy;  Surgeon: Temitope Bullock MD;  Location: [facility] OR     ROTATOR CUFF REPAIR RT/LT       THYROIDECTOMY       for hyperthroidism               [facility] AN PHYSICAL EXAM    Lab Results   Component Value Date    WBC 17.7 (H) 2019    HGB 7.9 (L) 2019    HCT 26.5 (L) 2019     (H) 2019    CRP 14.0 (H) 2019    SED 84 (H) 2019     (L) 2019    POTASSIUM 4.1 2019    CHLORIDE 97 2019    CO2 28 2019    BUN 18 2019    CR 0.76 2019     (H) 2019  "   JOSE 8.0 (L) 05/06/2019    PHOS 3.1 05/03/2019    MAG 2.1 05/03/2019    ALBUMIN 2.2 (L) 05/01/2019    PROTTOTAL 6.0 (L) 05/01/2019    ALT 20 05/01/2019    AST 17 05/01/2019    ALKPHOS 101 05/01/2019    BILITOTAL 0.2 05/01/2019    LIPASE 43 (L) 02/11/2019    PTT 47 (H) 04/28/2019    INR 1.25 (H) 04/30/2019       Preop Vitals  BP Readings from Last 3 Encounters:   05/06/19 (!) 89/72   04/23/19 121/75   04/07/19 116/65    Pulse Readings from Last 3 Encounters:   05/01/19 90   04/23/19 72   04/07/19 80      Resp Readings from Last 3 Encounters:   05/06/19 16   04/23/19 18   04/07/19 16    SpO2 Readings from Last 3 Encounters:   05/06/19 95%   04/23/19 95%   04/07/19 91%      Temp Readings from Last 1 Encounters:   05/06/19 37.2  C (99  F) (Oral)    Ht Readings from Last 1 Encounters:   04/26/19 1.651 m (5' 5\")      Wt Readings from Last 1 Encounters:   05/06/19 81.6 kg (179 lb 14.3 oz)    Estimated body mass index is 29.94 kg/m  as calculated from the following:    Height as of this encounter: 1.651 m (5' 5\").    Weight as of this encounter: 81.6 kg (179 lb 14.3 oz).     LDA:  PICC Double Lumen 04/27/19 Right Brachial vein medial (Active)   Site Assessment WDL 5/6/2019  8:00 AM   External Cath Length (cm) 2 cm 5/4/2019 12:14 PM   Extremity Circumference (cm) 33 cm 4/27/2019  3:00 PM   Dressing Intervention Chlorhexidine patch;Transparent 5/6/2019  8:00 AM   Dressing Change Due 05/11/19 5/6/2019 12:40 AM   PICC Comment CDI 5/6/2019 12:40 AM   Lumen A - Color GRAY 5/6/2019  8:00 AM   Lumen A - Status saline locked;blood return noted 5/6/2019  8:00 AM   Lumen A - Cap Change Due 05/06/19 5/6/2019 12:40 AM   Lumen B - Color PURPLE 5/6/2019  8:00 AM   Lumen B - Status blood return noted;saline locked 5/6/2019  8:00 AM   Lumen B - Cap Change Due 05/06/19 5/6/2019 12:40 AM   Extravasation? No 5/5/2019  4:00 PM   Number of days: 9       NG/OG Tube  (Active)   Site Description WDL except;Reddened 5/6/2019  8:00 AM   Status " Suction-low intermittent 5/6/2019  8:00 AM   Drainage Appearance Clear;Yellow 5/6/2019  8:00 AM   Intake (ml) 10 ml 5/5/2019  4:00 AM   Flush/Free Water (mL) 10 mL 5/6/2019  4:10 AM   Residual (mL) 50 mL 4/27/2019  4:00 AM   Output (ml) 30 ml 5/6/2019  8:00 AM   Number of days: 10       Gastrostomy/Enterostomy Gastrostomy LUQ 1 20 fr (Active)   Number of days: 166       Gastrostomy/Enterostomy Jejunostomy LLQ 2 16 fr (Active)   Number of days: 166       Gastrostomy/Enterostomy Jejunostomy LLQ 1 16 fr (Active)   Site Description WDL except 5/6/2019  8:00 AM   Site care cleansed with soap and water 5/6/2019  4:10 AM   Drainage Appearance Milky 5/6/2019  4:10 AM   Status - Gastrostomy Clamped 5/5/2019 12:00 PM   Status - Jejunostomy Continuous Enteral Feedings 5/6/2019  8:00 AM   Dressing Status Normal: Clean, Dry & Intact 5/6/2019  8:00 AM   Dressing Change Due 05/06/19 5/6/2019  4:10 AM   Intake (ml) 50 ml 5/5/2019  5:47 AM   Flush/Free Water (mL) 30 mL 5/5/2019  8:00 PM   Number of days: 10       Mucous Fistula (Active)   Number of days:             Assessment:   ASA SCORE: 3    NPO Status: > 6 hours since completed Solid Foods   Documentation: H&P complete; Preop Testing complete; Consents complete   Proceeding: Proceed without further delay  Tobacco Use:  NO Active use of Tobacco/UNKNOWN Tobacco use status     Plan:   Anes. Type:  General   Pre-Induction: None   Induction:  IV (Standard)   Airway: Oral ETT   Access/Monitoring: PIV   Maintenance: Balanced   Emergence: Procedure Site   Logistics: Same Day Surgery     Postop Pain/Sedation Strategy:  Standard-Options: Opioids PRN     PONV Management:  Adult Risk Factors: Female, Non-Smoker, Postop Opioids     CONSENT: Direct conversation                               Broderick Elliott MD

## 2019-05-06 NOTE — OR NURSING
1700: Dr. Morrison by to see pt. Confirmed chest xray read. No new orders. She attempted bowel disimpaction, though states no stool to be found. Also stated no IVF orders.

## 2019-05-06 NOTE — PLAN OF CARE
"/57   Pulse 90   Temp 98  F (36.7  C) (Oral)   Resp 16   Ht 1.651 m (5' 5\")   Wt 81.6 kg (179 lb 14.3 oz)   SpO2 98%   BMI 29.94 kg/m      Shift: 3757-0612  Neuro: A/Ox4.   Cardiac: VSS.   Respiratory:   GI/: Pharyngostomy tube to LIS, assessing q2h to check patency, flushing q4h and as needed. All meds via J tube. Voiding spontaneously. Large loose BM overnight.   Diet/Appetite: NPO, tube feeding.   Activity: Up with SBA to commode.   Pain: C/o sternum/incisional pain. Controlled with tylenol and ultram.  Skin: Staples removed from chest incision.   LDA's: DL PICC, pharyngostomy tube, J tube.     Plan: Plan for EGD today. Will continue to monitor per POC.     "

## 2019-05-06 NOTE — PLAN OF CARE
"/48 (BP Location: Left arm, Cuff Size: Adult Large)   Pulse 90   Temp 98.6  F (37  C) (Oral)   Resp 20   Ht 1.651 m (5' 5\")   Wt 81.6 kg (179 lb 14.4 oz)   SpO2 96%   BMI 29.94 kg/m      Neuro: A&Ox4.   Cardiac: SR. VSS.   Respiratory: Sating >95% on 1-2L NC   GI/: Adequate urine output. BM X1 (CDIFF negative)   Diet/appetite: Tolerating TF @ 55 ml/hr. Strict NPO   Activity:  Assist of 1, up to chair and commode  Pain: At acceptable level on current regimen.   Skin: MD came by remove several staples in pts chest incision, 1 strip of packing was placed where the staples were removed. A new stitch was place in pts neck to hold pharyngostomy tube in place.   LDA's:  PICC- SL   Pharyngostomy- to LIS     Plan: Continue with POC. Notify primary team with changes.   "

## 2019-05-06 NOTE — PHARMACY-VANCOMYCIN DOSING SERVICE
Pharmacy Vancomycin Initial Note  Date of Service May 6, 2019  Patient's  1945  73 year old, female    Indication: Abscess    Current estimated CrCl = Estimated Creatinine Clearance: 70.6 mL/min (based on SCr of 0.76 mg/dL).    Creatinine for last 3 days  2019: 11:50 AM Creatinine 0.89 mg/dL  2019:  7:05 AM Creatinine 0.83 mg/dL  2019:  5:28 AM Creatinine 0.76 mg/dL      Vancomycin IV Administrations (past 72 hours)      No vancomycin orders with administrations in past 72 hours.                Nephrotoxins and other renal medications (From now, onward)    Start     Dose/Rate Route Frequency Ordered Stop    19 0800  vancomycin (VANCOCIN) 1,750 mg in sodium chloride 0.9 % 500 mL intermittent infusion      1,750 mg  over 2 Hours Intravenous EVERY 24 HOURS 19 0759      19 0830  piperacillin-tazobactam (ZOSYN) 3.375 g vial to attach to  mL bag      3.375 g  over 30 Minutes Intravenous EVERY 6 HOURS 19 0807          Contrast Orders - past 72 hours (72h ago, onward)    Start     Dose/Rate Route Frequency Ordered Stop    19 1845  iopamidol (ISOVUE-370) solution 105 mL      105 mL Intravenous ONCE 19 1838 19 1935    19 1545  iohexol (OMNIPAQUE) solution 50 mL      50 mL Oral ONCE 19 1542 19 1628           Plan:  1.  Start vancomycin  1750 mg IV q24h.   2.  Goal Trough Level: 15-20 mg/L   3.  Pharmacy will check trough levels as appropriate in 1-3 Days.    4. Serum creatinine levels will be ordered daily for the first week of therapy and at least twice weekly for subsequent weeks.    5. [place]ell method utilized to dose vancomycin therapy: Combination of methods    Sarah Liriano, LuciaD, BCPS

## 2019-05-06 NOTE — BRIEF OP NOTE
[facility], [place]    Brief Operative Note    Pre-operative diagnosis: Esophageal Perforation  Post-operative diagnosis Same  Procedure: Procedure(s):  ESOPHAGOGASTRODUODENOSCOPY WITH ESOPHAGEAL STENT REPLACEMENT, IRRIGATION AND DEBRIDEMENT OF LEFT NECK, WOUND VAC PLACEMENT  Surgeon: Surgeon(s) and Role:     * Harley Murguia MD - Primary     * Kamila Morrison MD - Resident - Assisting  Anesthesia: General   Estimated blood loss: 5 ml  Drains:  Pre-existing pharyngostomy tube, pre-existed J tube, new Alek drain into R mediastinum, new wound vac to old spit fistula cavity  Specimens:   ID Type Source Tests Collected by Time Destination   1 : MEDIASTINAL ABSCESS  Abscess Other ABSCESS CULTURE AEROBIC BACTERIAL, ANAEROBIC BACTERIAL CULTURE, FUNGUS CULTURE, GRAM STAIN Harley Murguia MD 5/6/2019  3:30 PM      Findings:   Leak seen on EGD on right anteriolateral surface of gastric conduit. Neck incision opened and cavity in right mediastinum found with cloudy serous fluid expressed; pus expressed from left mediastinum - sent for culture. Esophageal stent placed. Neck and mediastinum washed out copious and flat Alek drain placed in right mediastinal cavity. Prior spit fistula site still without significant granulation tissue - wound vac placed (2 pieces of sponge stapled together placed in the tunnel). Prior pharynstomy tube in good position, no changes made.  Complications: None.  Implants:  * No implants in log *     Plan:  - CXR in PACU to confirm stent/pharyngostomy tube placement. Daily CXRs for stent placement.  - Continue Vanc/Zosyn/fluc.  - Continue strict NPO with J tube feeds.  - Continue pharyngostomy to [facility] with frequent checks that it is functioning.  - Alek drain to bulb (not holding suction; this is expected).  - Daily dry dressing changes of Kerlix to neck; ok to change outer ABD pad PRN (do not just reinforce if soaked/soiled - please change).  - Wound vac to -50  mmHg (will discuss with staff). Vac change schedule MWF (next Wednesday).  - Lovenox to restart tomorrow.    - - - - - - - - - - - - - - - - - -  Kamila Morrison MD  General Surgery PGY-3  See [facility] for on call information prior to paging me directly. Pager [phone].

## 2019-05-06 NOTE — PROGRESS NOTES
THORACIC & FOREGUT SURGERY PROGRESS NOTE  05/06/2019    Patient: Myrtle Vaz  MRN: [id number]    Subjective  Given continued leukocytosis on zosyn, patient was scanned yesterday evening. Found to have a small paraesophageal fluid/air collection, likely anastomotic leak. She has remained afebrile. BMx3, adequate UOP. From a neurological standpoint, she seems to be less confused than she was last week. Has not ambulated much over the weekend.     Objective  Temp:  [97.9  F (36.6  C)-99  F (37.2  C)] 99  F (37.2  C)  Heart Rate:  [79-86] 86  Resp:  [16-20] 16  BP: ()/(48-72) 89/72  SpO2:  [96 %-98 %] 98 %    General: NAD  HEENT/chest: neck incision c/d/i with erythema. Neck incision has been opened at bedside, staples removed. has pharyngostomy in place with clear output.  CV: WWP, non-cyanotic   Pulm: On 2L NC breathing comfortably  Abd: soft, non-distended, appropriately tender  Extremities: no edema  Neuro: moving all extremities spontaneously without apparent deficit    I/O last 3 completed shifts:  In: 1725 [I.V.:270; NG/GT:80]  Out: 1045 [Urine:850; Emesis/NG output:195]    Labs:    Recent Labs   Lab 05/06/19  0528 05/05/19  0705 05/04/19  1150   WBC 17.7* 16.0* 15.2*   HGB 7.9* 8.3* 8.2*   * 606* 519*     Recent Labs   Lab 05/06/19  0528 05/05/19  0705 05/04/19  1150 05/03/19  0629 05/02/19  1705 05/02/19  0715   * 134 135 134 136 138   POTASSIUM 4.1 4.2 4.0 3.9 4.0 3.7   CHLORIDE 97 100 101 101 101 103   CO2 28 28 28 24 25 25   BUN 18 23 24 25 21 20   CR 0.76 0.83 0.89 0.86 0.89 0.80   * 115* 97 107* 133* 126*   JOSE 8.0* 8.1* 7.9* 8.1* 7.8* 7.9*   MAG  --   --   --  2.1 1.8 2.0   PHOS  --   --   --  3.1 3.7 2.2*     Imaging:  AM CXR pending.       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 Albarado. [facility] 4/27/2019 for  pressors and frequent IV access. Found to have anastomotic leak per CT scan on 5/519. She has been added onto the OR schedule 5/6 for EGD, flex bronch, possible esophageal stent, neck wound washout, and manual fecal disimpaction. She has been consented and pre-op orders have been placed.     Neuro:      - Epidural is out.      - Tylenol 975 q8h,  Atarax PRN, PRN tramadol      - PTA Effexor     - Health psych consult to assist with motivation, patient tolerance of illness and hospital stay.   CV: HDS, no issues     - PTA amiodarone 200 enteral  Pulm:      - Bronch 5/2 with clear secretions: moderate growth stenotrophomonas maltophilia, group B strep, yeast.      - No distress on 2L NC, encourage IS/Acapella     - no chest tube     - CXR daily to assess for conduit distention     - albuterol PRN      - Metanebs QID     - PT/OT, encourage ambulation!    HEENT: pharyngostomy to LIS, cares BID (Peridex swish and spit, Chloraprep, bacitracin), flushes q4h of saline through main clear port and air only through blue port, and RN to check patency of tube q 2 hours given it is frequently clogged and not to suction.  FEN/GI:      - SLP following.      - Tube feeds at goal of 55 mL/hr with 30 free water flush q6h. Continue STRICT NPO (meds via J)     - lyte replacement protocol, neutraphos QID      - Protonix daily     - No IVFs, diuresis as below.     - Bowel regimen: Miralax, senna-colace     - Nausea control: Scheduled zofran q6h     - Heavy stool burden on CT (though she has been having bowel movements), will disimpact in the OR today.   Renal:      -No Lasix today, weight stable, patient appears euvolemic     -Slightly hyponatremic at 131 which has gradually decreased from 145 (4/29). Will change free water flushes to normal saline flushes 30 ml q6 hours and recheck Na this afternoon.   ID: afebrile, +leukocytosis   - Zosyn (5/3- ) for leukocytosis  - Bronch 5/2 with clear secretions: moderate growth stenotrophomonas  maltophilia, group B strep, yeast  - C diff negative, UA negative   - Will add fluconazole, vancomycin, and bactrim (for stenotrophomonas)   Endo: PTA Synthroid  Activity: PT/OT, OOB QID  PPx: Lovenox   Dispo: 6B       Patient seen and discussed with staff.  - - - - - - - - - - - - - - - - - -  Johana Randall MD   Surgery PGY-1

## 2019-05-06 NOTE — PLAN OF CARE
House Orderly picked up pts ring on 3c. The ring was bagged, labeled and brought back to 6B. It was sent to security while the patient is in the OR.

## 2019-05-06 NOTE — PROGRESS NOTES
Social Work Services Progress Note    Hospital Day: 10  Date of Initial Social Work Evaluation:  5/3/19  Collaborated with:  Thoracic Team, [facility] TCU ([facility]), Chart Review    Data:  Pt is 74 y/o female admitted to [facility] on 4/26/19 s/p redo laparotomy and partial sternotomy, esophagogastrostomy on 4/26/2019. SW received update from Thoracic Team that Pt has a new anastomotic leak and is on the OR schedule for today 5/6 for EGD, flex bronch, possible esophageal stent, neck wound washout, and manual fecal disimpaction.    Thoracic Team anticipating potential discharge at the end of this week. Pt continues to have pharyngostomy tube to [facility]. Pt is getting all meds via the J tube.     Intervention:  ANA LILIA spoke with [facility] in Admissions at [facility] (Ph: [phone], Admissions: [phone], F: [phone]) to update on delay in discharge. [facility] states that they will continue to assess for ability to accept Pt pending medical stability; she does note that if pt needs pharyngostomy at discharge, they are unable to accept her. If this is discontinued, then they will consider her for placement.    Assessment:  Pt will likely need TCU placement at discharge. Pt is going to the OR today r/t anastomotic leak. Needs TBD.    Plan:    Anticipated Disposition:  Facility:  TCU (TBD)    Barriers to d/c plan:  Medical stability    Follow Up:  ANA LILIA to continue to follow and assist with discharge plan.     Addendum at 1520: ANA LILIA received call from Pt's dtr (Haydee) to discuss discharge planning. ANA LILIA explained current barrier to being accepted at [facility] r/t having pharyngostomy. Pt's dtr is hopeful that this will be discontinued before discharge and she might be able to discharge to [facility].    ANA LILIA did explain that options are limited for TCU's for Pt's with pharyngostomies. Per discussion with other ANA LILIA colleagues, only TCU options are:  TCU, [facility], and [facility].  Haydee would prefer referral to  TCU if Pt does not pharyngostomy at discharge d/t feeling more comfortable with Pt being at a TCU related to [facility]. Referral in process.    SW to continue to follow and assist as needed.    VINAYAK Godoy, [facility]  6B Intermediate Care Unit   Phone: [phone]  Pager: [phone]

## 2019-05-06 NOTE — PLAN OF CARE
Discharge Planner SLP   Patient plan for discharge: TCU  Current status: Recommend continue NPO status with TF for all nutrition/medication. Encourage completion of pharyngeal strengthening exercises. Will plan to complete repeat VFSS as appropriate per Thoracic team  Barriers to return to prior living situation: none  Recommendations for discharge: TCU  Rationale for recommendations: below baseline status; TF dependence; nursing care needs       Entered by: Lilian Zimmerman 05/06/2019 9:10 AM

## 2019-05-06 NOTE — PLAN OF CARE
"BP (!) 89/72 (BP Location: Left arm)   Pulse 90   Temp 99  F (37.2  C) (Oral)   Resp 16   Ht 1.651 m (5' 5\")   Wt 81.6 kg (179 lb 14.3 oz)   SpO2 95%   BMI 29.94 kg/m      Neuro: A&Ox4.   Cardiac: SR. SBP 80-90s (asymptomatic)    Respiratory: Sating 98% on 1L NC.  GI/: Adequate urine output. BM X2 (loose)   Diet/appetite: Tolerating TF @ 55 ml/hr (goal).   Activity:  Assist of 1-SBA, up to chair and commode.  Pain: At acceptable level on current regimen.   Skin: No new deficits noted.  LDA's: PICC x2- SL, intermit abx   Phary tube- to LIS, tube is curled in the back of pts mouth, gagging often with movement.     Plan: Continue with POC. Notify primary team with changes. Pt went to OR for washout,and staple removal.    "

## 2019-05-06 NOTE — PLAN OF CARE
NEURO: AAOX4;  Up with assist x1; pian well controlled with prn Tramadol and scheduled Tylenol. Sad/ frustrated with current health and recovery pace; emotional support provided PRN.    CV: NSR; afebrile; MAP >65    PULM: On 1L NC; clear lung sounds; denies SOB but continues to report throat discomfort from Pharyngostomy tube    GI/: J-tube with TF at 55ml/hr; 1 loose BM; voids spont. Denies nausea. Pharyngostomy tube to LIS. Abdominal CT done, results pending.    SKIN: L chest wound re-packed; L neck/chest incision intact with staples and TIFFANIE, incision is reddened. Midline abdominal incision WDL and intact with staples and TIFFANIE    PLAN: Continue to monitor and with POC, update team with changes/concerns.

## 2019-05-06 NOTE — ANESTHESIA POSTPROCEDURE EVALUATION
Anesthesia POST Procedure Evaluation    Patient: Myrtle Vaz   MRN:     [id number] Gender:   female   Age:    73 year old :      1945        Preoperative Diagnosis: Esophageal Perforation   Procedure(s):  ESOPHAGOGASTRODUODENOSCOPY WITH ESOPHAGEAL STENT REPLACEMENT, IRRIGATION AND DEBRIDEMENT OF LEFT NECK, WOUND VAC PLACEMENT   Postop Comments: No value filed.       Anesthesia Type:  General  No value filed.    Reportable Event: NO     PAIN: Uncomplicated   Sign Out status: Comfortable, Well controlled pain     PONV: No PONV   Sign Out status:  No Nausea or Vomiting     Neuro/Psych: Uneventful perioperative course   Sign Out Status: Preoperative baseline; Age appropriate mentation     Airway/Resp.: Uneventful perioperative course   Sign Out Status: Non labored breathing, age appropriate RR; Resp. Status within EXPECTED Parameters     CV: Uneventful perioperative course   Sign Out status: Appropriate BP and perfusion indices; Appropriate HR/Rhythm     Disposition:   Sign Out in:  PACU  Disposition:  Floor  Recovery Course: Uneventful  Follow-Up: Not required           Last Anesthesia Record Vitals:  CRNA VITALS  2019 1544 - 2019 1644      2019             Resp Rate (observed):  16          Last PACU Vitals:  Vitals Value Taken Time   /67 2019  5:50 PM   Temp 36.6  C (97.9  F) 2019  5:00 PM   Pulse 80 2019  5:50 PM   Resp 14 2019  5:30 PM   SpO2 93 % 2019  5:57 PM   Temp src     NIBP     Pulse     SpO2     Resp     Temp     Ht Rate     Temp 2     Vitals shown include unvalidated device data.      Electronically Signed By: Broderick Elliott MD, May 6, 2019, 6:18 PM

## 2019-05-07 ENCOUNTER — APPOINTMENT (OUTPATIENT)
Dept: PHYSICAL THERAPY | Facility: CLINIC | Age: 74
DRG: 326 | End: 2019-05-07
Attending: THORACIC SURGERY (CARDIOTHORACIC VASCULAR SURGERY)
Payer: MEDICARE

## 2019-05-07 ENCOUNTER — APPOINTMENT (OUTPATIENT)
Dept: OCCUPATIONAL THERAPY | Facility: CLINIC | Age: 74
DRG: 326 | End: 2019-05-07
Attending: THORACIC SURGERY (CARDIOTHORACIC VASCULAR SURGERY)
Payer: MEDICARE

## 2019-05-07 ENCOUNTER — APPOINTMENT (OUTPATIENT)
Dept: GENERAL RADIOLOGY | Facility: CLINIC | Age: 74
DRG: 326 | End: 2019-05-07
Attending: THORACIC SURGERY (CARDIOTHORACIC VASCULAR SURGERY)
Payer: MEDICARE

## 2019-05-07 LAB
ANION GAP SERPL CALCULATED.3IONS-SCNC: 7 MMOL/L (ref 3–14)
BUN SERPL-MCNC: 16 MG/DL (ref 7–30)
CALCIUM SERPL-MCNC: 7.9 MG/DL (ref 8.5–10.1)
CHLORIDE SERPL-SCNC: 100 MMOL/L (ref 94–109)
CO2 SERPL-SCNC: 25 MMOL/L (ref 20–32)
CREAT SERPL-MCNC: 0.8 MG/DL (ref 0.52–1.04)
ERYTHROCYTE [DISTWIDTH] IN BLOOD BY AUTOMATED COUNT: 21 % (ref 10–15)
GFR SERPL CREATININE-BSD FRML MDRD: 74 ML/MIN/{1.73_M2}
GLUCOSE SERPL-MCNC: 160 MG/DL (ref 70–99)
HCT VFR BLD AUTO: 26.6 % (ref 35–47)
HGB BLD-MCNC: 7.9 G/DL (ref 11.7–15.7)
INTERPRETATION ECG - MUSE: NORMAL
MAGNESIUM SERPL-MCNC: 2.3 MG/DL (ref 1.6–2.3)
MCH RBC QN AUTO: 27.7 PG (ref 26.5–33)
MCHC RBC AUTO-ENTMCNC: 29.7 G/DL (ref 31.5–36.5)
MCV RBC AUTO: 93 FL (ref 78–100)
PHOSPHATE SERPL-MCNC: 3.4 MG/DL (ref 2.5–4.5)
PLATELET # BLD AUTO: 813 10E9/L (ref 150–450)
POTASSIUM SERPL-SCNC: 4.4 MMOL/L (ref 3.4–5.3)
RBC # BLD AUTO: 2.85 10E12/L (ref 3.8–5.2)
SODIUM SERPL-SCNC: 131 MMOL/L (ref 133–144)
WBC # BLD AUTO: 19.4 10E9/L (ref 4–11)

## 2019-05-07 PROCEDURE — 83735 ASSAY OF MAGNESIUM: CPT | Performed by: SURGERY

## 2019-05-07 PROCEDURE — 71046 X-RAY EXAM CHEST 2 VIEWS: CPT

## 2019-05-07 PROCEDURE — 97535 SELF CARE MNGMENT TRAINING: CPT | Mod: GO

## 2019-05-07 PROCEDURE — 12000004 ZZH R&B IMCU UMMC

## 2019-05-07 PROCEDURE — A9270 NON-COVERED ITEM OR SERVICE: HCPCS | Performed by: SURGERY

## 2019-05-07 PROCEDURE — 25000132 ZZH RX MED GY IP 250 OP 250 PS 637: Performed by: THORACIC SURGERY (CARDIOTHORACIC VASCULAR SURGERY)

## 2019-05-07 PROCEDURE — 25000132 ZZH RX MED GY IP 250 OP 250 PS 637: Performed by: STUDENT IN AN ORGANIZED HEALTH CARE EDUCATION/TRAINING PROGRAM

## 2019-05-07 PROCEDURE — 97530 THERAPEUTIC ACTIVITIES: CPT | Mod: GP

## 2019-05-07 PROCEDURE — 25000132 ZZH RX MED GY IP 250 OP 250 PS 637: Performed by: SURGERY

## 2019-05-07 PROCEDURE — 97116 GAIT TRAINING THERAPY: CPT | Mod: GP

## 2019-05-07 PROCEDURE — 40000802 ZZH SITE CHECK

## 2019-05-07 PROCEDURE — 85027 COMPLETE CBC AUTOMATED: CPT | Performed by: SURGERY

## 2019-05-07 PROCEDURE — 25000131 ZZH RX MED GY IP 250 OP 636 PS 637: Performed by: STUDENT IN AN ORGANIZED HEALTH CARE EDUCATION/TRAINING PROGRAM

## 2019-05-07 PROCEDURE — 80048 BASIC METABOLIC PNL TOTAL CA: CPT | Performed by: SURGERY

## 2019-05-07 PROCEDURE — 25000128 H RX IP 250 OP 636: Performed by: PHYSICIAN ASSISTANT

## 2019-05-07 PROCEDURE — 25000128 H RX IP 250 OP 636: Performed by: SURGERY

## 2019-05-07 PROCEDURE — 25000128 H RX IP 250 OP 636: Performed by: THORACIC SURGERY (CARDIOTHORACIC VASCULAR SURGERY)

## 2019-05-07 PROCEDURE — A9270 NON-COVERED ITEM OR SERVICE: HCPCS | Performed by: THORACIC SURGERY (CARDIOTHORACIC VASCULAR SURGERY)

## 2019-05-07 PROCEDURE — 25800030 ZZH RX IP 258 OP 636: Performed by: THORACIC SURGERY (CARDIOTHORACIC VASCULAR SURGERY)

## 2019-05-07 PROCEDURE — 36592 COLLECT BLOOD FROM PICC: CPT | Performed by: SURGERY

## 2019-05-07 PROCEDURE — 84100 ASSAY OF PHOSPHORUS: CPT | Performed by: SURGERY

## 2019-05-07 RX ORDER — LEVOTHYROXINE SODIUM 150 UG/1
150 TABLET ORAL EVERY MORNING
Status: DISCONTINUED | OUTPATIENT
Start: 2019-05-08 | End: 2019-05-15 | Stop reason: HOSPADM

## 2019-05-07 RX ORDER — HYDROMORPHONE HYDROCHLORIDE 1 MG/ML
.3-.5 INJECTION, SOLUTION INTRAMUSCULAR; INTRAVENOUS; SUBCUTANEOUS EVERY 4 HOURS PRN
Status: DISCONTINUED | OUTPATIENT
Start: 2019-05-07 | End: 2019-05-15 | Stop reason: HOSPADM

## 2019-05-07 RX ORDER — FUROSEMIDE 10 MG/ML
20 SOLUTION ORAL ONCE
Status: COMPLETED | OUTPATIENT
Start: 2019-05-07 | End: 2019-05-08

## 2019-05-07 RX ADMIN — ONDANSETRON HYDROCHLORIDE 4 MG: 4 TABLET, FILM COATED ORAL at 03:55

## 2019-05-07 RX ADMIN — PIPERACILLIN AND TAZOBACTAM 3.38 G: 3; .375 INJECTION, POWDER, FOR SOLUTION INTRAVENOUS at 15:42

## 2019-05-07 RX ADMIN — VANCOMYCIN HYDROCHLORIDE 1750 MG: 10 INJECTION, POWDER, LYOPHILIZED, FOR SOLUTION INTRAVENOUS at 00:27

## 2019-05-07 RX ADMIN — AMIODARONE HYDROCHLORIDE 200 MG: 200 TABLET ORAL at 09:21

## 2019-05-07 RX ADMIN — SENNOSIDES AND DOCUSATE SODIUM 2 TABLET: 8.6; 5 TABLET ORAL at 09:23

## 2019-05-07 RX ADMIN — FLUCONAZOLE 400 MG: 400 INJECTION, SOLUTION INTRAVENOUS at 06:36

## 2019-05-07 RX ADMIN — CHLORHEXIDINE GLUCONATE AND ISOPROPYL ALCOHOL 3 ML: 20; .7 SOLUTION TOPICAL at 08:36

## 2019-05-07 RX ADMIN — Medication 25 MG: at 09:20

## 2019-05-07 RX ADMIN — ACETAMINOPHEN 975 MG: 325 TABLET, FILM COATED ORAL at 13:24

## 2019-05-07 RX ADMIN — BACITRACIN: 500 OINTMENT TOPICAL at 09:21

## 2019-05-07 RX ADMIN — CHLORHEXIDINE GLUCONATE 0.12% ORAL RINSE 30 ML: 1.2 LIQUID ORAL at 20:22

## 2019-05-07 RX ADMIN — SULFAMETHOXAZOLE AND TRIMETHOPRIM 1 TABLET: 800; 160 TABLET ORAL at 09:21

## 2019-05-07 RX ADMIN — BACITRACIN: 500 OINTMENT TOPICAL at 20:25

## 2019-05-07 RX ADMIN — PANTOPRAZOLE SODIUM 40 MG: 40 TABLET, DELAYED RELEASE ORAL at 06:36

## 2019-05-07 RX ADMIN — LEVOTHYROXINE SODIUM 150 MCG: 75 TABLET ORAL at 09:21

## 2019-05-07 RX ADMIN — CHLORHEXIDINE GLUCONATE 0.12% ORAL RINSE 30 ML: 1.2 LIQUID ORAL at 09:20

## 2019-05-07 RX ADMIN — CHLORHEXIDINE GLUCONATE AND ISOPROPYL ALCOHOL 3 ML: 20; .7 SOLUTION TOPICAL at 20:41

## 2019-05-07 RX ADMIN — ACETAMINOPHEN 975 MG: 325 TABLET, FILM COATED ORAL at 06:12

## 2019-05-07 RX ADMIN — PIPERACILLIN AND TAZOBACTAM 3.38 G: 3; .375 INJECTION, POWDER, FOR SOLUTION INTRAVENOUS at 03:56

## 2019-05-07 RX ADMIN — PIPERACILLIN AND TAZOBACTAM 3.38 G: 3; .375 INJECTION, POWDER, FOR SOLUTION INTRAVENOUS at 13:15

## 2019-05-07 RX ADMIN — Medication 25 MG: at 15:42

## 2019-05-07 RX ADMIN — ACETAMINOPHEN 975 MG: 325 TABLET, FILM COATED ORAL at 22:33

## 2019-05-07 RX ADMIN — VENLAFAXINE 75 MG: 75 TABLET ORAL at 09:22

## 2019-05-07 RX ADMIN — ENOXAPARIN SODIUM 40 MG: 40 INJECTION SUBCUTANEOUS at 13:15

## 2019-05-07 RX ADMIN — SULFAMETHOXAZOLE AND TRIMETHOPRIM 1 TABLET: 800; 160 TABLET ORAL at 20:13

## 2019-05-07 RX ADMIN — VANCOMYCIN HYDROCHLORIDE 1750 MG: 10 INJECTION, POWDER, LYOPHILIZED, FOR SOLUTION INTRAVENOUS at 13:15

## 2019-05-07 RX ADMIN — PIPERACILLIN AND TAZOBACTAM 3.38 G: 3; .375 INJECTION, POWDER, FOR SOLUTION INTRAVENOUS at 22:35

## 2019-05-07 RX ADMIN — ONDANSETRON HYDROCHLORIDE 4 MG: 4 TABLET, FILM COATED ORAL at 09:21

## 2019-05-07 RX ADMIN — VENLAFAXINE 75 MG: 75 TABLET ORAL at 20:13

## 2019-05-07 ASSESSMENT — ACTIVITIES OF DAILY LIVING (ADL)
ADLS_ACUITY_SCORE: 19

## 2019-05-07 ASSESSMENT — MIFFLIN-ST. JEOR: SCORE: 1329.88

## 2019-05-07 ASSESSMENT — PAIN DESCRIPTION - DESCRIPTORS: DESCRIPTORS: ACHING;SORE

## 2019-05-07 NOTE — PLAN OF CARE
OT/6B - Discharge Planner OT   Patient plan for discharge: rehab  Current status: Pt unable to recall post surgical precautions, reviewed precautions for increased understanding. Discussed compensatory techniques for LB dressing for increased safety during LB dressing. Pt declines OOB activity during OT session due to headache.   Barriers to return to prior living situation: cognition, strength, activity tolerance, post surgical precautions  Recommendations for discharge: rehab  Rationale for recommendations: to increase ADL/IADL IND prior to return home       Entered by: Abeba Pereyra 05/07/2019 3:05 PM

## 2019-05-07 NOTE — PROGRESS NOTES
"POST OP CHECK     S: Patient feeling well. She feels the best she has in days. She has yet to ambulate post-op. Has voided 500 ml. Pain is controlled with PO medications. She continues to have frequent secretions requiring oral suction. Wound vac to suction without apparent leak. Neck incision dressed with gauze/ABD. Alek drain with no output. Daughter is at bedside.     O:   /69 (BP Location: Left arm)   Pulse 87   Temp 97.8  F (36.6  C) (Oral)   Resp 16   Ht 1.651 m (5' 5\")   Wt 81.6 kg (179 lb 14.3 oz)   SpO2 97%   BMI 29.94 kg/m      GEN: NAD, awake, alert.   CV: Non-cyanotic, WWP   RESP: Nonlabored breathing on 2L NC   ABD: soft, non-tender, without guarding or rebound tenderness, incisions c/d/i, staples over the midline abdominal wound.   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 Albarado. Found to have anastomotic leak per CT scan on 5/519 and is s/p EGD, flex bronch, possible esophageal stent, neck wound washout. Recovering appropriately.     - Pain control with oral medications    - Strict I/O  - Continue Vanc/Zosyn/fluc.  - Continue strict NPO with J tube feeds.  - Continue pharyngostomy to LIS with frequent checks that it is functioning.  - Alek drain to bulb (not holding suction; this is expected).  - Wound vac to -50 mmHg (will discuss with staff). Vac change schedule MWF (next Wednesday).  - Lovenox to restart tomorrow.    Johana Randall MD   Surgery PGY-1              "

## 2019-05-07 NOTE — PLAN OF CARE
"/73 (BP Location: Left arm, Cuff Size: Adult Regular)   Pulse 92   Temp 97.3  F (36.3  C) (Axillary)   Resp 13   Ht 1.651 m (5' 5\")   Wt 81.9 kg (180 lb 8.9 oz)   SpO2 97%   BMI 30.05 kg/m      Neuro: A&Ox4.   Cardiac: SR . -130. Afebrile.   Respiratory: Sating >95% on 2 L NC.  GI/: Adequate urine output. No BMs, passing gas.  Diet/appetite: NPO maintained. Tube feeds running via J-Tube @ 55cc/hr. G-tube clamped.   Activity:  SBA up to chair and in halls.  Pain: At acceptable level on current regimen.   Skin: No new deficits noted.  LDA's: Wound vac @ 50mmhg with no output overnight. Pharyngostomy irrigated with 10cc NS and air Q2H (NS in clear tube, air in blue)/ to LIS. Bulb drain to gravity, no suction, drops of output overnight.     Plan: Continue with POC. Notify primary team with changes.     Ayo Whitman RN on 5/7/2019 at 6:27 AM    "

## 2019-05-07 NOTE — PLAN OF CARE
Neuro: A&Ox4.   Cardiac: SR. VSS.   Respiratory: Sating  on 3L NC with CAPNO   GI/: Adequate urine output. No BM overshift  Diet/appetite: NPO with tube feeds 55ml/hr through J tube. Tolerating well.  Activity:  Assist of x1 up to commode   Pain: Tramadol and tylenol for pain  Skin: Wound and multiple incisions   LDA's: pharyngeal tube to low intermittent suction, Bulb drain not clamped, wound Vac 50 mmhg continuous, PICC line x2 lumens tko for antibiotics, and PIV x1     Plan: Continue with POC. Notify primary team with changes.

## 2019-05-07 NOTE — PLAN OF CARE
6B Discharge Planner PT   Patient plan for discharge: home with assist  Current status: Pt with improved tolerance for therapy today, required Phil for bed mobility and Phil for multiple sit<>stand transfers. Benefits from reinforcement of sternal and abdominal precautions throughout session. Ambulated 100'+100' with FWW and close w/c follow. Pt becomes SOB and requires rest break MCC through. AVSS on 2L O2 via NC.  Barriers to return to prior living situation: acute medical needs, deconditioning, balance, LE weakness, recent surgery, post-op precautions  Recommendations for discharge: ARU  Rationale for recommendations: Pt is below baseline mobility and motivated to return to PLOF, would benefit from intensive rehab at ARU to progress activity tolerance, strength, and balance. Would tolerate 3 hrs of therapy/day.       Entered by: Elisha Devine 05/07/2019 11:35 AM

## 2019-05-07 NOTE — PLAN OF CARE
"/52 (BP Location: Left arm)   Pulse 92   Temp 99.2  F (37.3  C) (Oral)   Resp 16   Ht 1.651 m (5' 5\")   Wt 81.9 kg (180 lb 8.9 oz)   SpO2 96%   BMI 30.05 kg/m      Neuro: A&Ox4.   Cardiac: SR. VSS.   Respiratory: Sating >95% on 1-2L NC.  GI/: Adequate urine output. BM X1  Diet/appetite: Tolerating TF @ 55 ml/hr   Activity:  Assist of 1, up to chair and in halls.  Pain: At acceptable level on current regimen.   Skin: No new deficits noted.  LDA's: PICC- TKO w/ intermit abx   WV @ -50 (spit fistula site)- no output   Phary tube to LIS    ANGELO- to gravity suction       Plan: Continue with POC. Notify primary team with changes.    "

## 2019-05-07 NOTE — PROGRESS NOTES
THORACIC & FOREGUT SURGERY PROGRESS NOTE  05/07/2019    Patient: Myrtle Vaz  MRN: [id number]    Subjective  No acute events overnight. Pt states she feels well and would like to ambulate more. Pain controlled.     Objective  Temp:  [97.3  F (36.3  C)-99.2  F (37.3  C)] 99.2  F (37.3  C)  Pulse:  [78-92] 92  Heart Rate:  [73-87] 79  Resp:  [12-18] 16  BP: (107-138)/(52-85) 127/52  SpO2:  [93 %-99 %] 96 %    General: NAD  HEENT/chest: neck incision packed with kerlix, mediastinal drain to bulb suction.  CV: WWP, non-cyanotic   Pulm: On 2L NC breathing comfortably  Abd: soft, non-distended, appropriately tender  Extremities: no edema  Neuro: moving all extremities spontaneously without apparent deficit    I/O last 3 completed shifts:  In: 2515 [I.V.:900; NG/GT:570]  Out: 1705 [Urine:1700; Blood:5]    Labs:    Recent Labs   Lab 05/07/19  0508 05/06/19  0528 05/05/19  0705   WBC 19.4* 17.7* 16.0*   HGB 7.9* 7.9* 8.3*   * 709* 606*     Recent Labs   Lab 05/07/19  0508 05/06/19  1842 05/06/19  0528 05/05/19  0705  05/03/19  0629 05/02/19  1705   * 133 131* 134   < > 134 136   POTASSIUM 4.4  --  4.1 4.2   < > 3.9 4.0   CHLORIDE 100  --  97 100   < > 101 101   CO2 25  --  28 28   < > 24 25   BUN 16  --  18 23   < > 25 21   CR 0.80  --  0.76 0.83   < > 0.86 0.89   *  --  106* 115*   < > 107* 133*   JOSE 7.9*  --  8.0* 8.1*   < > 8.1* 7.8*   MAG 2.3  --   --   --   --  2.1 1.8   PHOS 3.4  --   --   --   --  3.1 3.7    < > = values in this interval not displayed.     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 Albarado. Found to have anastomotic leak per CT scan on 5/519 and is s/p EGD, flex bronch, possible esophageal stent, neck wound washout. Recovering appropriately.    - Pain control with oral medications    -  Strict I/O  - Continue Vanc/Zosyn/fluc/bactrim  - Continue strict NPO with J tube feeds.  - Plans for esophagram and video swallow tomorrow  - Continue pharyngostomy to LIS with frequent checks that it is functioning.  - Alek drain to bulb (not holding suction; this is expected, continue to place to suction periodically).  - Wound vac to -50 mmHg (will discuss with staff). Vac change schedule MW (next Wednesday).  - Laxix 20mg per J x1 today  - Lovenox for ppx.  - Dispo: pending progress       Patient seen and discussed with staff.  - - - - - - - - - - - - - - - - - -  Marcos Horton PA-C  [phone]

## 2019-05-08 ENCOUNTER — APPOINTMENT (OUTPATIENT)
Dept: GENERAL RADIOLOGY | Facility: CLINIC | Age: 74
DRG: 326 | End: 2019-05-08
Attending: THORACIC SURGERY (CARDIOTHORACIC VASCULAR SURGERY)
Payer: MEDICARE

## 2019-05-08 ENCOUNTER — APPOINTMENT (OUTPATIENT)
Dept: PHYSICAL THERAPY | Facility: CLINIC | Age: 74
DRG: 326 | End: 2019-05-08
Attending: THORACIC SURGERY (CARDIOTHORACIC VASCULAR SURGERY)
Payer: MEDICARE

## 2019-05-08 ENCOUNTER — APPOINTMENT (OUTPATIENT)
Dept: OCCUPATIONAL THERAPY | Facility: CLINIC | Age: 74
DRG: 326 | End: 2019-05-08
Attending: THORACIC SURGERY (CARDIOTHORACIC VASCULAR SURGERY)
Payer: MEDICARE

## 2019-05-08 ENCOUNTER — APPOINTMENT (OUTPATIENT)
Dept: SPEECH THERAPY | Facility: CLINIC | Age: 74
DRG: 326 | End: 2019-05-08
Attending: THORACIC SURGERY (CARDIOTHORACIC VASCULAR SURGERY)
Payer: MEDICARE

## 2019-05-08 LAB
ANION GAP SERPL CALCULATED.3IONS-SCNC: 7 MMOL/L (ref 3–14)
BUN SERPL-MCNC: 18 MG/DL (ref 7–30)
CALCIUM SERPL-MCNC: 8 MG/DL (ref 8.5–10.1)
CHLORIDE SERPL-SCNC: 98 MMOL/L (ref 94–109)
CO2 SERPL-SCNC: 28 MMOL/L (ref 20–32)
CREAT SERPL-MCNC: 0.82 MG/DL (ref 0.52–1.04)
ERYTHROCYTE [DISTWIDTH] IN BLOOD BY AUTOMATED COUNT: 21.2 % (ref 10–15)
GFR SERPL CREATININE-BSD FRML MDRD: 72 ML/MIN/{1.73_M2}
GLUCOSE SERPL-MCNC: 90 MG/DL (ref 70–99)
HCT VFR BLD AUTO: 25.5 % (ref 35–47)
HGB BLD-MCNC: 7.6 G/DL (ref 11.7–15.7)
MCH RBC QN AUTO: 27.9 PG (ref 26.5–33)
MCHC RBC AUTO-ENTMCNC: 29.8 G/DL (ref 31.5–36.5)
MCV RBC AUTO: 94 FL (ref 78–100)
PLATELET # BLD AUTO: 927 10E9/L (ref 150–450)
POTASSIUM SERPL-SCNC: 4.3 MMOL/L (ref 3.4–5.3)
RBC # BLD AUTO: 2.72 10E12/L (ref 3.8–5.2)
SODIUM SERPL-SCNC: 133 MMOL/L (ref 133–144)
VANCOMYCIN SERPL-MCNC: 15.8 MG/L
WBC # BLD AUTO: 16.4 10E9/L (ref 4–11)

## 2019-05-08 PROCEDURE — 97530 THERAPEUTIC ACTIVITIES: CPT | Mod: GP | Performed by: PHYSICAL THERAPIST

## 2019-05-08 PROCEDURE — 25000128 H RX IP 250 OP 636: Performed by: SURGERY

## 2019-05-08 PROCEDURE — 25800030 ZZH RX IP 258 OP 636: Performed by: SURGERY

## 2019-05-08 PROCEDURE — 80202 ASSAY OF VANCOMYCIN: CPT | Performed by: THORACIC SURGERY (CARDIOTHORACIC VASCULAR SURGERY)

## 2019-05-08 PROCEDURE — 36592 COLLECT BLOOD FROM PICC: CPT | Performed by: SURGERY

## 2019-05-08 PROCEDURE — A9270 NON-COVERED ITEM OR SERVICE: HCPCS | Performed by: PHYSICIAN ASSISTANT

## 2019-05-08 PROCEDURE — 40000802 ZZH SITE CHECK

## 2019-05-08 PROCEDURE — 12000004 ZZH R&B IMCU UMMC

## 2019-05-08 PROCEDURE — 25000125 ZZHC RX 250

## 2019-05-08 PROCEDURE — 25000132 ZZH RX MED GY IP 250 OP 250 PS 637: Performed by: SURGERY

## 2019-05-08 PROCEDURE — 71046 X-RAY EXAM CHEST 2 VIEWS: CPT

## 2019-05-08 PROCEDURE — 25000132 ZZH RX MED GY IP 250 OP 250 PS 637: Performed by: STUDENT IN AN ORGANIZED HEALTH CARE EDUCATION/TRAINING PROGRAM

## 2019-05-08 PROCEDURE — 92611 MOTION FLUOROSCOPY/SWALLOW: CPT | Mod: GN

## 2019-05-08 PROCEDURE — 97116 GAIT TRAINING THERAPY: CPT | Mod: GP | Performed by: PHYSICAL THERAPIST

## 2019-05-08 PROCEDURE — 27210437 ZZH NUTRITION PRODUCT SEMIELEM INTERMED LITER

## 2019-05-08 PROCEDURE — 85027 COMPLETE CBC AUTOMATED: CPT | Performed by: SURGERY

## 2019-05-08 PROCEDURE — A9270 NON-COVERED ITEM OR SERVICE: HCPCS | Performed by: SURGERY

## 2019-05-08 PROCEDURE — A9270 NON-COVERED ITEM OR SERVICE: HCPCS | Performed by: STUDENT IN AN ORGANIZED HEALTH CARE EDUCATION/TRAINING PROGRAM

## 2019-05-08 PROCEDURE — 80048 BASIC METABOLIC PNL TOTAL CA: CPT | Performed by: SURGERY

## 2019-05-08 PROCEDURE — 74230 X-RAY XM SWLNG FUNCJ C+: CPT

## 2019-05-08 PROCEDURE — 97535 SELF CARE MNGMENT TRAINING: CPT | Mod: GO | Performed by: OCCUPATIONAL THERAPIST

## 2019-05-08 PROCEDURE — 92526 ORAL FUNCTION THERAPY: CPT | Mod: GN

## 2019-05-08 PROCEDURE — 25000132 ZZH RX MED GY IP 250 OP 250 PS 637: Performed by: PHYSICIAN ASSISTANT

## 2019-05-08 PROCEDURE — 36592 COLLECT BLOOD FROM PICC: CPT | Performed by: THORACIC SURGERY (CARDIOTHORACIC VASCULAR SURGERY)

## 2019-05-08 RX ORDER — LIDOCAINE HYDROCHLORIDE 10 MG/ML
INJECTION, SOLUTION EPIDURAL; INFILTRATION; INTRACAUDAL; PERINEURAL
Status: DISPENSED
Start: 2019-05-08 | End: 2019-05-08

## 2019-05-08 RX ORDER — FUROSEMIDE 10 MG/ML
20 SOLUTION ORAL
Status: DISCONTINUED | OUTPATIENT
Start: 2019-05-08 | End: 2019-05-10

## 2019-05-08 RX ORDER — MAGNESIUM HYDROXIDE 1200 MG/15ML
LIQUID ORAL
Status: COMPLETED
Start: 2019-05-08 | End: 2019-05-08

## 2019-05-08 RX ADMIN — FLUCONAZOLE 400 MG: 400 INJECTION, SOLUTION INTRAVENOUS at 06:51

## 2019-05-08 RX ADMIN — ENOXAPARIN SODIUM 40 MG: 40 INJECTION SUBCUTANEOUS at 12:51

## 2019-05-08 RX ADMIN — VENLAFAXINE 75 MG: 75 TABLET ORAL at 08:19

## 2019-05-08 RX ADMIN — SODIUM CHLORIDE 500 ML: 900 IRRIGANT IRRIGATION at 12:52

## 2019-05-08 RX ADMIN — CHLORHEXIDINE GLUCONATE 0.12% ORAL RINSE 15 ML: 1.2 LIQUID ORAL at 08:27

## 2019-05-08 RX ADMIN — CHLORHEXIDINE GLUCONATE AND ISOPROPYL ALCOHOL 3 ML: 20; .7 SOLUTION TOPICAL at 09:43

## 2019-05-08 RX ADMIN — SULFAMETHOXAZOLE AND TRIMETHOPRIM 1 TABLET: 800; 160 TABLET ORAL at 08:19

## 2019-05-08 RX ADMIN — CHLORHEXIDINE GLUCONATE AND ISOPROPYL ALCOHOL 3 ML: 20; .7 SOLUTION TOPICAL at 19:20

## 2019-05-08 RX ADMIN — BACITRACIN: 500 OINTMENT TOPICAL at 19:20

## 2019-05-08 RX ADMIN — PANTOPRAZOLE SODIUM 40 MG: 40 TABLET, DELAYED RELEASE ORAL at 08:22

## 2019-05-08 RX ADMIN — PIPERACILLIN AND TAZOBACTAM 3.38 G: 3; .375 INJECTION, POWDER, FOR SOLUTION INTRAVENOUS at 22:05

## 2019-05-08 RX ADMIN — BACITRACIN: 500 OINTMENT TOPICAL at 09:43

## 2019-05-08 RX ADMIN — Medication 10 ML: at 16:54

## 2019-05-08 RX ADMIN — ACETAMINOPHEN 975 MG: 325 TABLET, FILM COATED ORAL at 22:05

## 2019-05-08 RX ADMIN — ACETAMINOPHEN 975 MG: 325 TABLET, FILM COATED ORAL at 14:23

## 2019-05-08 RX ADMIN — SULFAMETHOXAZOLE AND TRIMETHOPRIM 1 TABLET: 800; 160 TABLET ORAL at 19:17

## 2019-05-08 RX ADMIN — Medication 25 MG: at 22:10

## 2019-05-08 RX ADMIN — Medication 25 MG: at 14:28

## 2019-05-08 RX ADMIN — FUROSEMIDE 20 MG: 10 SOLUTION ORAL at 08:25

## 2019-05-08 RX ADMIN — FUROSEMIDE 20 MG: 10 SOLUTION ORAL at 08:26

## 2019-05-08 RX ADMIN — VENLAFAXINE 75 MG: 75 TABLET ORAL at 19:17

## 2019-05-08 RX ADMIN — PIPERACILLIN AND TAZOBACTAM 3.38 G: 3; .375 INJECTION, POWDER, FOR SOLUTION INTRAVENOUS at 11:03

## 2019-05-08 RX ADMIN — AMIODARONE HYDROCHLORIDE 200 MG: 200 TABLET ORAL at 08:21

## 2019-05-08 RX ADMIN — LEVOTHYROXINE SODIUM 150 MCG: 150 TABLET ORAL at 08:20

## 2019-05-08 RX ADMIN — VANCOMYCIN HYDROCHLORIDE 1750 MG: 10 INJECTION, POWDER, LYOPHILIZED, FOR SOLUTION INTRAVENOUS at 12:50

## 2019-05-08 RX ADMIN — Medication 25 MG: at 00:03

## 2019-05-08 RX ADMIN — PIPERACILLIN AND TAZOBACTAM 3.38 G: 3; .375 INJECTION, POWDER, FOR SOLUTION INTRAVENOUS at 16:54

## 2019-05-08 RX ADMIN — ACETAMINOPHEN 975 MG: 325 TABLET, FILM COATED ORAL at 05:23

## 2019-05-08 RX ADMIN — PIPERACILLIN AND TAZOBACTAM 3.38 G: 3; .375 INJECTION, POWDER, FOR SOLUTION INTRAVENOUS at 03:30

## 2019-05-08 ASSESSMENT — ACTIVITIES OF DAILY LIVING (ADL)
ADLS_ACUITY_SCORE: 19

## 2019-05-08 ASSESSMENT — PAIN DESCRIPTION - DESCRIPTORS
DESCRIPTORS: ACHING;SORE
DESCRIPTORS: DISCOMFORT;ACHING
DESCRIPTORS: ACHING;SORE
DESCRIPTORS: ACHING;DISCOMFORT

## 2019-05-08 ASSESSMENT — MIFFLIN-ST. JEOR: SCORE: 1344.14

## 2019-05-08 NOTE — PLAN OF CARE
"Discharge Planner SLP   Patient plan for discharge: Unknown  Current status: Videoswallow study completed per MD order to determine pt's ability to participate in esophagram. Under fluoroscopy, pt presents with severe pharyngeal dysphagia in setting of prolonged NPO status, generalized weakness, and suspected impact of pharyngostomy tube (pt endorses sensation of a \"staple\" in her throat when swallowing). Pt assessed with thin liquids via cup sip. Oral phase WFL. Swallow trigger delayed. Once triggered, BOT retraction, pharyngeal constriction, and hyolaryngeal elevation/excursion were all reduced. Epiglottic inversion was incomplete. Consistent penetration to the level of the true vocal cords occurred with thin liquids, penetration material remaining in laryngeal vestibule; this residue was eventually aspirated on both trials. Esophagram not completed d/t aspiration risk. Recommend continued NPO with ongoing ST targeting pharyngeal strengthening. Recommend repeat VFSS after removal of pharyngostomy tube and/or as deemed appropriate by thoracic team. SLP to follow.  Barriers to return to prior living situation: NPO status  Recommendations for discharge: TCU  Rationale for recommendations: Pt will benefit from ongoing ST targeting pharyngeal strengthening       Entered by: Tiffanie Hutchison 05/08/2019 2:09 PM       "

## 2019-05-08 NOTE — PHARMACY-VANCOMYCIN DOSING SERVICE
Pharmacy Vancomycin Note  Date of Service May 8, 2019  Patient's  1945   73 year old, female    Indication: Abscess  Goal Trough Level: 15-20 mg/L  Day of Therapy: 2  Current Vancomycin regimen:  1750 mg IV q24h    Current estimated CrCl = Estimated Creatinine Clearance: 66.1 mL/min (based on SCr of 0.82 mg/dL).    Creatinine for last 3 days  2019:  5:28 AM Creatinine 0.76 mg/dL  2019:  5:08 AM Creatinine 0.80 mg/dL  2019:  5:09 AM Creatinine 0.82 mg/dL    Recent Vancomycin Levels (past 3 days)  2019: 11:33 AM Vancomycin Level 15.8 mg/L    Vancomycin IV Administrations (past 72 hours)                   vancomycin (VANCOCIN) 1,750 mg in sodium chloride 0.9 % 500 mL intermittent infusion (mg) 1,750 mg New Bag 19 1250     1,750 mg New Bag 19 1315     1,750 mg New Bag  0027                Nephrotoxins and other renal medications (From now, onward)    Start     Dose/Rate Route Frequency Ordered Stop    19 0715  furosemide (LASIX) solution 20 mg      20 mg Oral 2 TIMES DAILY. 19 0714      19 0800  vancomycin (VANCOCIN) 1,750 mg in sodium chloride 0.9 % 500 mL intermittent infusion      1,750 mg  over 2 Hours Intravenous EVERY 24 HOURS 19 0759      19 0830  piperacillin-tazobactam (ZOSYN) 3.375 g vial to attach to  mL bag      3.375 g  over 30 Minutes Intravenous EVERY 6 HOURS 19 0807               Contrast Orders - past 72 hours (72h ago, onward)    Start     Dose/Rate Route Frequency Ordered Stop    19 1845  iopamidol (ISOVUE-370) solution 105 mL      105 mL Intravenous ONCE 19 1838 19 1935    19 1545  iohexol (OMNIPAQUE) solution 50 mL      50 mL Oral ONCE 19 1542 19 1628          Interpretation of levels and current regimen:  Trough level is  Therapeutic (~22H trough, will likely accumulate some but still be therapeutic)    Has serum creatinine changed > 50% in last 72 hours: No    Urine output:  good urine  output    Renal Function: Stable    Plan:  1.  Continue Current Dose  2.  Pharmacy will check trough levels as appropriate in 1-3 Days.    3. Serum creatinine levels will be ordered daily for the first week of therapy and at least twice weekly for subsequent weeks.      Sarah Liriano, LuciaD, BCPS

## 2019-05-08 NOTE — PLAN OF CARE
Discharge Planner OT   Patient plan for discharge: not discussed  Current status: Ambulating in room with CGA and FWW. Pt was SBA for standing ADLs, and LB dressing. Somewhat impulsive, fatigued with prolonged standing, tolerating well otherwise.   Barriers to return to prior living situation: Decreased ADL independence and activity tolerance  Recommendations for discharge: TCU  Rationale for recommendations: Increase functional endurance and ADL independence at rehab       Entered by: Mayco Gutierrez 05/08/2019 1:03 PM

## 2019-05-08 NOTE — OP NOTE
Preoperative diagnosis:                         Anastomotic leak  Postoperative diagnosis:                       Anastomotic leak    Procedure:   1. EGD, esophageal stent insertion  2. Irrigation and debridement of left neck wound  3. VAC placement    Anesthesia: General   Surgeon: Harley Murguia   Co-surgeon:  Temitope Funes  Resident surgeon: Kamila Morrison  EBL:  5 mL    Complications: none immediate  Findings:  Anastomotic leak; purulent pocket near conduit     Description of procedure     The patient was brought to the room and placed supine upon the table.  After confirming the patient, consent, appropriate monitoring devices were placed.  General anesthesia was administered the patient was intubated.  Intravenous antibiotic was administered within 1 hour of the procedure.  The neck incision was opened and the conduit was immediately apparent.  Using digital dissection, Dr. Albarado accessed a purulent pocket, from which cultures were sent.  The cavity was irrigated copiously and a 10 flat ANGELO drain was placed near the site of the pocket.  This was brought out through separate stab incision medial to the existing neck and chest incision.  The skin of the incision was loosely approximated using nylon sutures.  An endoscope was passed in the posterior pharynx into the esophagus without difficulty.  The anastomosis was present approximately 28 cm from the incisors.  There is a defect noted.  The mucosa of the esophagus on the conduit appeared normal.  Under fluoroscopy, a 23 x 100 mm covered esophageal stent was placed without difficulty.    A VAC dressing was then applied to the low chest incision, where the prior spit fistula had been located.    Dr. Albarado acted as my cosurgeon, completing  the irrigation and debridement of the neck wound.

## 2019-05-08 NOTE — PROGRESS NOTES
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 Anastomotic leak 5/5 now s/p EGD, flex bronch, esophageal stent, neck washout. 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 CT imaging on 5/5 with anastomotic leak now s/p EGD, flex bronch, esophageal stent, and neck washout. Started on zosyn 5/3, fluconazole 5/6, bactrim 5/6, and vancomycin 5/7. Bronchoscopy on 5/2 with stenotrophomonas, strep agalactiae. Mediastinal abscess swab on 5/6 with prelim cultures growing strep anginosus and enterococcus faecalis (GPC and GNR seen on gram stain).       Assessment:   73 year old female with Nissen s/p perforation now with redo laparotomy on 4/26 with development of an anastomotic leak s/p EGD, flex bronch, esophageal stent, and neck washout on 5/6. Her respiratory status is stable and does not appear to be evidence of a fistula involving the bronchus; given prolonged hospitalizations the stenotrophomonas in her lungs is likely a colonizer and does not need to be treated so the bactrim can be discontinued. Likewise, there have not been resistant gram positives identified on cultures so would discontinue the vancomycin. Recommend continuing the zosyn (which will cover the two strep species and e faecalis already identified) and the fluconazole (for empiric fungal coverage while awaiting fungal cultures) and narrow as able based on cultures.     Recommendations:  1. Continue the zosyn to cover identified strep species and  enterococcus  2. Continue the fluconazole for empiric coverage given esophageal perforation; awaiting fungal cultures  3. Discontinue vancomycin given no resistant gram positive organisms have been identified  4. Discontinue bactrim as clinically does not have pneumonia and stenotrophomonas likely colonizer.     Discussed with ID Staff - Dr. Mal Costa    Current Anti-infective Orders:  Anti-infectives (From now, onward)    Start     Dose/Rate Route Frequency Ordered Stop    05/06/19 0800  sulfamethoxazole-trimethoprim (BACTRIM DS/SEPTRA DS) 800-160 MG per tablet 1 tablet      1 tablet Per J Tube 2 TIMES DAILY 05/06/19 0749      05/06/19 0800  vancomycin (VANCOCIN) 1,750 mg in sodium chloride 0.9 % 500 mL intermittent infusion      1,750 mg  over 2 Hours Intravenous EVERY 24 HOURS 05/06/19 0759      05/06/19 0700  fluconazole (DIFLUCAN) intermittent infusion 400 mg in NaCl      400 mg  100 mL/hr over 120 Minutes Intravenous EVERY 24 HOURS 05/06/19 0654      05/03/19 0830  piperacillin-tazobactam (ZOSYN) 3.375 g vial to attach to  mL bag      3.375 g  over 30 Minutes Intravenous EVERY 6 HOURS 05/03/19 0807            Vitals and other clinical features  Vitals       05/06 0700  -  05/07 0659 05/07 0700  -  05/08 0659 05/08 0700  -  05/08 0916   Most Recent    Temp ( F) 97.3 -  99    97.7 -  99.2      98     98 (36.7)    Pulse 78 -  92      76       76    Heart Rate 73 -  87    68 -  80      72     72    Resp 13 -  18    12 -  16      16     16    BP 89/72 -  138/69    120/60 -  140/71      138/74     138/74    SpO2 (%) 93 -  98    96 -  99      99     99        Temperature curve:  Afebrile    Labs  Estimated Creatinine Clearance: 66.1 mL/min (based on SCr of 0.82 mg/dL).  Recent Labs   Lab Test 05/08/19  0509 05/07/19  0508 05/06/19  0528 05/05/19  0705 05/04/19  1150 05/03/19  0629   CR 0.82 0.80 0.76 0.83 0.89 0.86       Recent Labs   Lab Test 05/08/19  0509 05/07/19  0508 05/06/19  0528  05/05/19  0705 05/04/19  1150 05/03/19  0629  04/07/19  1840 02/11/19  1147  02/04/19  1856   WBC 16.4* 19.4* 17.7* 16.0* 15.2* 14.7*   < > 13.7* 7.5   < > 8.5   ANEU  --   --   --   --   --   --   --  10.5* 4.3  --  4.7   ALYM  --   --   --   --   --   --   --  2.0 2.0  --  2.4   KIMBERLEY  --   --   --   --   --   --   --  0.9 0.9  --  1.1   AEOS  --   --   --   --   --   --   --  0.2 0.2  --  0.3   HGB 7.6* 7.9* 7.9* 8.3* 8.2* 8.8*   < > 12.7 12.3   < > 11.5*   HCT 25.5* 26.6* 26.5* 27.8* 27.4* 28.4*   < > 44.6 40.2   < > 36.9   MCV 94 93 94 94 94 93   < > 96 98   < > 98   * 813* 709* 606* 519* 480*   < > 461* 631*   < > 638*    < > = values in this interval not displayed.       Recent Labs   Lab Test 05/01/19  0320 04/27/19  0421 04/07/19  1840 02/19/19  0819 02/11/19  1147 02/04/19  1856 12/03/18  0451   BILITOTAL 0.2 0.4 0.3  --  0.3 0.2  --  0.2   ALKPHOS 101 76 121  --  120 114  --  124   ALBUMIN 2.2* 2.5* 3.4 3.2* 2.9* 2.9*   < > 1.6*   AST 17 163* 29  --  20 17  --  29   ALT 20 89* 33  --  17 16  --  28    < > = values in this interval not displayed.       Recent Labs   Lab Test 05/06/19  0921 02/04/19  1856 11/25/18  0411 11/21/18  1831   LACT 1.1  --   --  1.1   CRP  --  14.0* 160.0*  --    SED  --  84*  --   --      Recent Labs   Lab Test 11/22/18  0522   VANCOMYCIN 11.2       Culture results with specimen source  Culture Micro   Date Value Ref Range Status   05/06/2019 Moderate growth  Streptococcus anginosus   (A)  Preliminary   05/06/2019 Light growth  Gram positive cocci   (A)  Preliminary   05/06/2019 Culture in progress  Preliminary   05/06/2019 Culture negative monitoring continues  Preliminary   05/06/2019 Culture negative after 20 hours  Preliminary   05/03/2019   Final    <10,000 colonies/mL  mixed urogenital merry  Susceptibility testing not routinely done      Specimen Description   Date Value Ref Range Status   05/06/2019 Mediastinal Abscess  Final   05/06/2019 Mediastinal Abscess  Final    05/06/2019 Mediastinal Abscess  Final   05/06/2019 Mediastinal Abscess  Final   05/05/2019 Feces  Final   05/03/2019 Midstream Urine  Final        Urine Studies     Recent Labs   Lab Test 05/03/19 2015 12/02/18  2326 11/25/18  0836 11/21/18  0200   URINEPH 5.0 5.5 5.5 6.0   NITRITE Negative Positive* Negative Negative   LEUKEST Large* Large* Negative Small*   WBCU 11* >182* 1 84*       CSF Testing  No lab results found.    Respiratory Virus Testing    No results found for: RVSPEC, IFLUA, FLUAH1, FLUAH3, RN3763, IFLUB, RSVA, RSVB, PIV1, PIV2, PIV3, HMPV, HRVS, ADVBE, ADVC  Last check of C difficile  C Diff Toxin B PCR   Date Value Ref Range Status   05/05/2019 Negative NEG^Negative Final     Comment:     Negative: Clostridium difficile target DNA sequences NOT detected, presumed   negative for Clostridium difficile toxin B or the number of bacteria present   may be below the limit of detection for the test.  FDA approved assay performed using [employer] GeneXpert real-time PCR.  A negative result does not exclude actual disease due to Clostridium difficile   and may be due to improper collection, handling and storage of the specimen   or the number of organisms in the specimen is below the detection limit of the   assay.         Imaging:  Ct Chest/abdomen/pelvis W Contrast    Addendum Date: 5/5/2019    Findings of suspected anastomotic leak discussed with cross cover resident Son by Yong Mccarthy at 10:13 PM on 5/5/2019 TANYA GASPAR MD    Result Date: 5/5/2019  Examination: CT CHEST/ABDOMEN/PELVIS W CONTRAST, 5/5/2019 7:49 PM Comparison: R 2018, 2/11/2019, 2/4/2019. History: POD9 from esophagogastrostomy. Now with persistent leukocytosis.. Technique: Axial images of the chest, abdomen and pelvis were obtained with contrast. Coronal reconstructions were provided. Images were reviewed in bone, lung, and soft tissue windows. Contrast dose: 105cc of Isovue 370 Radiation Dose (DLP): 1432 mGy*cm Findings: Chest:  Postsurgical changes of gastric pull up, left pharyngostomy tube. At the inferior cervical esophagus, and upper upper mediastinal gastric pull up anastomosis, there are fluid collections and fat stranding. Along the inferior neck anterior to the left sternocleidomastoid, there is an air and fluid collection measuring 3.5 x 1.5 x 6.8 cm. Air and fluid collection tracks anterior to the inferior cervical esophagus, medial to the sternocleidomastoid, near the cutaneous surgical staples in the low neck. There are bubbly lucencies at the level of the skin along the inferior left neck (series 2 image 18). Posteriorly, this fluid collection tracks along within the postsurgical changes of the resected manubrium. Anteriorly, this fluid collection tracks to a cutaneous defect over the superior left chest wall. A second pocket communicating with the fluid collection is noted along the superior mediastinum, inferior to the medial left clavicle, measuring 2.7 x 1.7 x 5.1 cm (series 2 image 34; series 5 image 41). This fluid collection tracks along the posterolateral margin of the gastric pull-up on the left. A third pocket communicating with the air and fluid collection tracks along the anteromedial aspect of the gastric pull-up and measures 4.1 x 2.8 x 6.0 cm (series 2 image 40; series 5 image 33). Inflammatory changes track along the gastric pull-up with small fluid collection along the inferior mediastinum deep to the midline surgical incision. Moderate right pleural effusion with overlying streaky and patchy consolidation in the right lower lobe, right upper lobe. Small left pleural effusion with overlying streaky and patchy consolidation in the left lower lobe. The heart size is normal. Mild coronary artery calcification. Mild aortic valve calcification. Right nephrectomy approach PICC with tip in the inferior SVC. Trace pericardial fluid. Normal caliber and configuration of the thoracic great vessels. Atherosclerotic  calcification of the aorta and its major branches. Mild mediastinal adenopathy. Postsurgical changes of thyroidectomy. Abdomen: Postsurgical changes of gastric pull up, esophagogastrostomy with upper abdominal surgical staples. Nondilated loops of large and small bowel. No small or large bowel mucosal thickening. Diverticulosis without diverticulitis. No pneumatosis. Trace free abdominal fluid and free air. Moderate volume of inspissated stool in the rectum. Mild mesenteric edema. No abdominal lymphadenopathy. Kidneys demonstrate well-circumscribed fluid attenuating cortical foci, consistent with simple cysts, and additional too small to characterize hypoattenuating foci. Mildly hypertrophic adrenal glands. The liver, spleen, gallbladder, and pancreas appear within normal limits. Postsurgical changes of hysterectomy. The urinary bladder is distended, containing small volume of air, likely iatrogenic. Aorta is nondilated. Bones and soft tissues: No suspicious lytic or blastic osseous lesions. Small volume of subcutaneous air over the right inguinal region. No acute osseous pathology. Degenerative changes of the spine. Post surgical changes of the manubrium, sternum.     Impression: 1.  Postsurgical changes of gastric pull up with fluid collections extending from the esophageal anastomosis to the skin overlying the left upper chest. Findings highly suspicious for anastomotic leak. I have personally reviewed the examination and initial interpretation and I agree with the findings. TANYA GASPAR MD    Xr Chest 2 Views    Result Date: 5/7/2019  XR CHEST 2 VW  5/7/2019 10:09 AM  HISTORY: status post gastric pull up and esophagogastrostomy, check pharyngostomy tube placement, interval change. COMPARISON: 5/6/2018, 5/6/2019. FINDINGS: PA and lateral views of the chest. Stable surgical changes from gastric pull-up with esophagogastrostomy. Pharyngostomy tube and esophageal stent are unchanged in position. Left  mediastinal drain noted. Right upper extremity PICC line tip projects over the atriocaval junction. Trachea is midline. Cardiac size is stable. No pneumothorax or pleural effusion. Improved left pleural effusion with overlying basilar opacities. Stable right pleural effusion with overlying basilar opacities. Grossly stable bilateral perihilar interstitial opacities. No acute osseous abnormality, degenerative changes of the spine with exaggerated thoracic kyphosis. Visualized abdomen is unremarkable.     IMPRESSION: 1. Stable postsurgical changes of gastric pull-up and esophagogastrostomy. 2. Pharyngostomy tube appears unchanged in position. 3. Improved left pleural effusion with overlying basilar opacity, likely atelectasis versus consolidation. 4. Stable small right pleural effusion with overlying basilar opacity, likely atelectasis versus consolidation. I have personally reviewed the examination and initial interpretation and I agree with the findings. YULI ADAMS MD    Xr Chest 2 Views    Result Date: 5/6/2019  XR CHEST 2 VW  5/6/2019 9:38 AM  HISTORY: interval change, check pharyngostomy placement. COMPARISON: CT chest 5/5/2019, chest radiograph 5/20/2019. FINDINGS: PA and lateral views of the chest. Postsurgical changes of gastric pull-up. Pharyngostomy tube tip appears to have advanced inferiorly approximately 3 vertebral spaces. Interval removal of surgical staples. Right upper extremity approach PICC line tip projects over the intracaval junction. No pneumothorax. Stable bilateral pleural effusions with overlying atelectasis. Stable slight cardiomegaly. No acute osseous abnormality, degenerative changes of spine.     IMPRESSION: 1.  Pharyngostomy tube remains in position, the tip appears to advanced approximately 3 vertebral bodies inferiorly. 2.  Stable cardiomegaly and bilateral pleural effusions with overlying atelectasis. I have personally reviewed the examination and initial interpretation and I  agree with the findings. YULI ADAMS MD    Xr Chest 2 Views    Result Date: 5/5/2019  Exam: Chest x-ray, 2 views, 5/5/2019. COMPARISON: 5/4/2019. HISTORY: Interval change status post esophagogastrostomy. FINDINGS: PA and lateral views of the chest were obtained. Pharyngostomy tube in place with the tip in the lower chest. Right-sided PICC line with its deep over the superior cavoatrial junction. Small bilateral pleural effusions with overlying atelectasis versus consolidation. No pneumothorax. Tortuous thoracic aorta with calcifications. Cardiomediastinal silhouette slightly enlarged, unchanged. Skin staples.     IMPRESSION: Small bilateral pleural effusions with overlying atelectasis versus consolidation, unchanged. MOON RENTERIA MD    Xr Chest 2 Views    Result Date: 5/4/2019  PA and lateral chest x-ray Comparisons: 5/3/2019 HISTORY: Interval change. Status post esophagogastrostomy FINDINGS: Pharyngostomy tube is in place with the tip in the lower chest. Right-sided PICC line remains in place. Stranding opacities at both lung bases are again noted. Mild blunting of both costophrenic angles, unchanged. Lungs are otherwise clear. Pulmonary vascularity is distinct. Cardiac silhouette is unchanged.     IMPRESSION: Small bilateral pleural effusions with associated basilar atelectasis, unchanged. MICKI SANDERS MD    Xr Chest 2 Views    Result Date: 5/3/2019  EXAMINATION: XR CHEST 2 VW, 5/3/2019 9:04 AM INDICATION: interval change, s/p esophagogastrostomy COMPARISON: Chest x-ray 5/2/2019 FINDINGS: A single portable AP radiograph of the chest. Right upper extremity port tip projects over the level of the cavoatrial junction. Postoperative changes of the esophagogastrostomy. Pharyngostomy tube tip projects over the level of the low midline thorax. Normal cardiomediastinal silhouette. Left basilar opacities and blunting of the costophrenic angle. Right perihilar opacities and basilar opacities. Bibasilar  atelectasis. Bilateral pleural effusions. Upper abdomen is unremarkable.     IMPRESSION: Small bilateral pleural effusions with overlying atelectasis. I have personally reviewed the examination and initial interpretation and I agree with the findings. MARY HORN MD    Xr Chest Port 1 View    Result Date: 5/6/2019  XR CHEST PORT 1 VW  5/6/2019 4:57 PM  HISTORY: postop thoracic, esophageal stent placement COMPARISON: Earlier 5/6/2019 FINDINGS: Laparotomy staples. Pharyngostomy tube passes into the left hemidiaphragm, tip not visualized. Metallic stent projects over the expected location of the esophagus at the T6-T10 levels. Right upper extremity PICC line tip projects over the atriocaval junction. Cardiac silhouette is mildly enlarged. No pneumothorax. Small bilateral pleural effusions. Retrocardiac opacities. Mediastinal drain.     IMPRESSION: 1. Esophageal stent projects over the expected location of the esophagus at the T6-T10. 2. Retrocardiac opacities, which favor atelectasis over pneumonia. 3. Bilateral pleural effusions. 4. Enlarged cardiac silhouette. I have personally reviewed the examination and initial interpretation and I agree with the findings. JEREMIAH HANSON MD    Xr Chest Port 1 View    Result Date: 5/2/2019  EXAMINATION: XR CHEST PORT 1 VW, 5/2/2019 10:20 AM INDICATION: interval change COMPARISON: 5/1/2019 FINDINGS: Single portable AP radiograph of the chest. Patient is rotated. Right upper extremity PICC tip projects over the superior cavoatrial junction. Postoperative changes of esophagogastrostomy. Pharyngostomy tube tip projects over the level of the left hemidiaphragm. Cardiomediastinal silhouette is unchanged. Small bilateral pleural effusions. Improved left basilar opacities. Ovoid opacity projecting over the lower right lung and represent fluid within the fissure. No pneumothorax.     IMPRESSION: Small bilateral pleural effusions with improved left basilar atelectasis. I have  personally reviewed the examination and initial interpretation and I agree with the findings. MADAI MONTANO MD    Xr Surgery Sakina Fluoro L/t 5 Min W Stills    Result Date: 5/6/2019  This exam was marked as non-reportable because it will not be read by a radiologist or a [facility] non-radiologist provider.     Xr Video Swallow W/o Esophagram    Result Date: 5/2/2019  EXAMINATION: XR VIDEO SWALLOW W/O ESOPHAGRAM  5/2/2019 2:11 PM  CLINICAL HISTORY: status post esophagogastrostomy 4/26. Assess for leak. COMPARISON: 2/19/2019. PROCEDURE COMMENTS: Fluoroscopy time: .5 low-dose pulsed Contrast: The patient was fed water-soluble contrast in the following manner and consistencies: Thin liquid Patient position: Lateral view slightly recumbent from the upright sitting position. FINDINGS: Images were not transferred to PACS due to technical malfunction. The oral preparatory and oral phase of swallowing were normal. There was normal initiation of swallowing. There was normal palatal elevation and limited epiglottic deflection. Silent aspiration of thin liquid contrast by spoon and cup.  The visualized esophagus showed no obstruction or other obvious abnormality, although complete evaluation of the esophagus was not performed.  There was no residual contrast in the oral cavity/pharynx.     IMPRESSION: 1. Silent aspiration of thin liquid contrast. Images were not transferred to PACS due to technical malfunction. 2. Speech pathology report for additional details. I, MOON RENTERIA MD, attest that I was present for all critical portions of the procedure and was immediately available to provide guidance and assistance during the remainder of the procedure. I have personally reviewed the examination and initial interpretation and I agree with the findings. MOON RENTERIA MD

## 2019-05-08 NOTE — PROGRESS NOTES
CLINICAL NUTRITION SERVICES - BRIEF NOTE    Per team, would like to cycle patients TF to 18 hours.     Current TF regimen: Peptamen 1.5 to goal 55 ml/hr (1320 ml/day) to provide 1980 kcals (30 kcal/kg/day), 90 g PRO (1.4 g/kg/day), 1016 ml free H2O, 74 g Fat (70% from MCTs), 248 g CHO and no Fiber daily.     Diet: NPO    INTERVENTIONS  Recommendations / Nutrition Prescription  Monitor tolerance to cycled TF    Implementation  RD to order the following: Peptamen 1.5 @ 75 ml/hr x 18 hours ( 4 pm - 10 am)  to provide: 1350 ml, 2025 kcals (31 kcals/kg), 92 g pro (1.4 g/kg pro), 0 g fiber, 254 g CHO      Collette Helton RD, MS, LD  6B- Pager: [phone]

## 2019-05-08 NOTE — PROGRESS NOTES
05/08/19 1354   General Information   Onset Date 04/26/19   Start of Care Date 05/02/19   Referring Physician Ashli Randall MD   Patient Profile Review/OT: Additional Occupational Profile Info See Profile for full history and prior level of function   Patient/Family Goals Statement None stated   Swallowing Evaluation Videofluoroscopic evaluation   Behaviorial Observations WFL (within functional limits)  (Pt endorsed increased pain in chest)   Mode of current nutrition NPO;PEG   Respiratory Status Room air   Comments SLP: Pt is a 73 year old female with a 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 Albarado. Found to have anastomotic leak per CT scan on 5/519 and is s/p EGD, flex bronch, possible esophageal stent, neck wound washout. Pt with previous VFSS on 5/2 which revealed aspiration. Repeat VFSS completed today to determine readiness for esophagram.    Clinical Swallow Evaluation   Oral Musculature Comments See initial eval   VFSS Eval: Radiology   Radiologist Resident    Views Taken left lateral   Physical Location of Procedure [facility] [address]   VFSS Eval: Thin Liquid Texture Trial   Mode of Presentation, Thin Liquid cup;self-fed   Order of Presentation cup sip x2   Preparatory Phase WFL   Oral Phase, Thin Liquid WFL   Pharyngeal Phase, Thin Liquid Residue in valleculae;Residue in pyriform sinus   Rosenbek's Penetration Aspiration Scale: Thin Liquid Trial Results 8 - contrast passes glottis, visible subglottic residue remains, absent patient response (aspiration)   Response to Aspiration productive volitional cough following clinician cue   Diagnostic Statement Oral phase WFL. Pharyngeal phase characterized by delayed swallow trigger and reduced hyolaryngeal elevation/excusion, resulting in penetration and eventual aspiration of thin liquids. Aspiration occurred with laryngeal  "vestibule residue when pt attempted a second swallow to clear pharyngeal residue.   General Therapy Interventions   Planned Therapy Interventions Dysphagia Treatment   Dysphagia treatment Oropharyngeal exercise training   Swallow Eval: Clinical Impressions   Skilled Criteria for Therapy Intervention Skilled criteria met.  Treatment indicated.   Functional Assessment Scale (FAS) 1   Treatment Diagnosis Severe pharyngeal dysphagia in setting of prolonged NPO status, weakness, and presence of pharyngostomy tube   Diet texture recommendations NPO   Therapy Frequency 5 times/wk   Predicted Duration of Therapy Intervention (days/wks) 1 week   Anticipated Discharge Disposition extended care facility   Risks and Benefits of Treatment have been explained. Yes   Patient, family and/or staff in agreement with Plan of Care Yes   Clinical Impression Comments SLP: Videoswallow study completed per MD order to determine pt's ability to participate in esophagram. Under fluoroscopy, pt presents with severe pharyngeal dysphagia in setting of prolonged NPO status, generalized weakness, and suspected impact of pharyngostomy tube (pt endorses sensation of a \"staple\" in her throat when swallowing). Pt assessed with thin liquids via cup sip. Oral phase WFL. Swallow trigger delayed. Once triggered, BOT retraction, pharyngeal constriction, and hyolaryngeal elevation/excursion were all reduced. Epiglottic inversion was incomplete. Consistent penetration to the level of the true vocal cords occurred with thin liquids, penetration material remaining in laryngeal vestibule; this residue was eventually aspirated on both trials. Esophagram not completed d/t aspiration risk. Recommend continued NPO with ongoing ST targeting pharyngeal strengthening. Recommend repeat VFSS after removal of pharyngostomy tube and/or as deemed appropriate by thoracic team. SLP to follow.   Total Evaluation Time   Total Evaluation Time (Minutes) 12     "

## 2019-05-08 NOTE — PLAN OF CARE
Discharge Planner PT   Patient plan for discharge: wants to return to home  Current status: PT 6B: Pt fatigued from multiple medical tests/procedures per report, but was agreeable to PT with encouragement. Pt SBA sitting at edge of bed, required CGA-PAPO sit>stand from raised bed, PAPO sit>supine per precautions. Pt demonstrated reduced recall of precautions and required further cues. Pt ambulated slowly with FWW, had two losses of balance, with need for PAPO and pt grasp of side-rail to steady, achieved up to ft 180 ft slowly.   Barriers to return to prior living situation:   Recommendations for discharge: rehab  Rationale for recommendations: pt yet below PLOF, rehab setting would maximize functional recovery       Entered by: Nhi Torres 05/08/2019 4:41 PM

## 2019-05-08 NOTE — PLAN OF CARE
"/73 (BP Location: Left arm, Cuff Size: Adult Regular)   Pulse 76   Temp 98.6  F (37  C) (Oral)   Resp 16   Ht 1.651 m (5' 5\")   Wt 83.3 kg (183 lb 11.2 oz)   SpO2 96%   BMI 30.57 kg/m      Neuro: A&Ox4.   Cardiac: SR 70-90. -140. Afebrile.  Respiratory: Sating >95% on 2L NC.  GI/: Adequate urine output. No BMs, passing gas.  Diet/appetite: NPO. Tube feeds running @ 55cc/hr via j-tube. Pt failed video-swallow eval.  Activity:  SBA, up to chair and in halls.  Pain: At acceptable level on current regimen.   Skin: No new deficits noted.    Plan: Continue with POC. Notify primary team with changes.     Ayo Whitman RN on 5/8/2019 at 4:13 PM    "

## 2019-05-08 NOTE — PROGRESS NOTES
THORACIC & FOREGUT SURGERY PROGRESS NOTE  05/08/2019     Patient: Myrtle Vaz  MRN: [id number]     Subjective  No acute events overnight. Pt seen at beside resting comfortably. Chest VAC and neck wound dressing changed this AM, tolerated well. Discomfort with pharyngostomy tube at back of throat noted this AM, resutured in place. No other complaints.      Objective  Temp:  [98  F (36.7  C)-99.2  F (37.3  C)] 98  F (36.7  C)  Pulse:  [76] 76  Heart Rate:  [68-79] 72  Resp:  [12-16] 16  BP: (120-140)/(52-92) 138/74  SpO2:  [96 %-99 %] 99 %     General: NAD  HEENT/chest: neck incision packed with kerlix, mediastinal drain to bulb suction. Wound VAC in place, holding suction if neck incision covered with occlusive dressing  CV: WWP, non-cyanotic   Pulm: On 2L NC breathing comfortably  Abd: soft, non-distended, appropriately tender  Extremities: no edema  Neuro: moving all extremities spontaneously without apparent deficit     I/O last 3 completed shifts:  In: 1700 [I.V.:20; NG/GT:360]  Out: 1960 [Urine:2000]     Labs:     Recent Labs   Lab 05/08/19  0509 05/07/19  0508 05/06/19  0528   WBC 16.4* 19.4* 17.7*   HGB 7.6* 7.9* 7.9*   * 813* 709*                Recent Labs   Lab 05/08/19  0509 05/07/19  0508 05/06/19  1842 05/06/19  0528   05/03/19  0629 05/02/19  1705    131* 133 131*   < > 134 136   POTASSIUM 4.3 4.4  --  4.1   < > 3.9 4.0   CHLORIDE 98 100  --  97   < > 101 101   CO2 28 25  --  28   < > 24 25   BUN 18 16  --  18   < > 25 21   CR 0.82 0.80  --  0.76   < > 0.86 0.89   GLC 90 160*  --  106*   < > 107* 133*   JOSE 8.0* 7.9*  --  8.0*   < > 8.1* 7.8*   MAG  --  2.3  --   --   --  2.1 1.8   PHOS  --  3.4  --   --   --  3.1 3.7    < > = values in this interval not displayed.      Imaging:  CXR reviewed        Assessment & Plan  Myrtle Vaz is a 73 year old female with a h/o Nissen c/b esophageal perforation 11/2018 s/p spit fistula now s/p redo laparotomy and partial sternotomy, esophagogastrostomy  on 4/26/2019 with Dr. Albarado. Found to have anastomotic leak per CT scan on 5/519 and is s/p EGD, flex bronch, possible esophageal stent, neck wound washout. Recovering appropriately.     - Pain control with oral medications    - Strict I/O  - Continue Vanc/Zosyn/fluc/bactrim  - Continue strict NPO with J tube feeds.  - Plans for esophagram and video swallow today  - Continue pharyngostomy to LIS with frequent checks that it is functioning.  - Alek drain to bulb (not holding suction; this is expected, continue to place to suction periodically).  - Wound vac to -50 mmHg. Vac changed today, schedule MWF (next Friday, need to occlude neck wound to maintain suction)  - Laxix 20mg per J BID  - Lovenox for ppx.  - Dispo: pending progress         Patient seen and discussed with staff.  - - - - - - - - - - - - - - - - - -  Marcos Hroton PA-C  [phone]

## 2019-05-08 NOTE — PROVIDER NOTIFICATION
Time of notification: 5:30 AM  Provider notified: Surgery Cross Cover  Patient status: L neck pharyngostomy tube not secure, sutures not in place.   Temp:  [97.7  F (36.5  C)-99.2  F (37.3  C)] 98.6  F (37  C)  Pulse:  [76] 76  Heart Rate:  [68-80] 74  Resp:  [12-16] 16  BP: (120-140)/(52-92) 140/71  SpO2:  [96 %-99 %] 96 %  Orders received: Secure pharyngostomy via tape. Thoracic to reassess first this AM. Continue with POC.

## 2019-05-08 NOTE — PLAN OF CARE
Neuro: A&Ox4.   Cardiac: Afebrile. SR with HR's 60's-70's. VSS.  Respiratory: Sating >92% on 2-4L NC. LS clear with fine crackles. Good, productive cough.  GI/: Adequate urine output. +BS, +flatus, -BM.  Diet/appetite: NPO. Strict I/O's. TF's at goal, 55mL/hr. Denies N/V.  Activity: Assist of 1, up to commode.  Pain: Tramadol given x1 for generalized abdominal pain with moderate relief.  Skin: Neck incision packed w/kerlix. Bilateral incision/surgical abdomen site, closed with staples and TIFFANIE. Mediastinal drain to bulb suction. Not holding suction, MD's aware.  LDA's: L pharyngostomy tube to LIS, L mediastinal ANGELO to bulb suction, L chest wound vac, G/J-tube, R DL PICC    Plan: Continue with POC. Notify primary team with changes.

## 2019-05-09 ENCOUNTER — APPOINTMENT (OUTPATIENT)
Dept: OCCUPATIONAL THERAPY | Facility: CLINIC | Age: 74
DRG: 326 | End: 2019-05-09
Attending: THORACIC SURGERY (CARDIOTHORACIC VASCULAR SURGERY)
Payer: MEDICARE

## 2019-05-09 ENCOUNTER — APPOINTMENT (OUTPATIENT)
Dept: GENERAL RADIOLOGY | Facility: CLINIC | Age: 74
DRG: 326 | End: 2019-05-09
Attending: THORACIC SURGERY (CARDIOTHORACIC VASCULAR SURGERY)
Payer: MEDICARE

## 2019-05-09 ENCOUNTER — APPOINTMENT (OUTPATIENT)
Dept: SPEECH THERAPY | Facility: CLINIC | Age: 74
DRG: 326 | End: 2019-05-09
Attending: THORACIC SURGERY (CARDIOTHORACIC VASCULAR SURGERY)
Payer: MEDICARE

## 2019-05-09 LAB
ANION GAP SERPL CALCULATED.3IONS-SCNC: 8 MMOL/L (ref 3–14)
BUN SERPL-MCNC: 21 MG/DL (ref 7–30)
CALCIUM SERPL-MCNC: 7.8 MG/DL (ref 8.5–10.1)
CHLORIDE SERPL-SCNC: 98 MMOL/L (ref 94–109)
CO2 SERPL-SCNC: 27 MMOL/L (ref 20–32)
CREAT SERPL-MCNC: 0.79 MG/DL (ref 0.52–1.04)
ERYTHROCYTE [DISTWIDTH] IN BLOOD BY AUTOMATED COUNT: 21.3 % (ref 10–15)
GFR SERPL CREATININE-BSD FRML MDRD: 75 ML/MIN/{1.73_M2}
GLUCOSE SERPL-MCNC: 116 MG/DL (ref 70–99)
HCT VFR BLD AUTO: 27.2 % (ref 35–47)
HGB BLD-MCNC: 8.1 G/DL (ref 11.7–15.7)
MCH RBC QN AUTO: 28.5 PG (ref 26.5–33)
MCHC RBC AUTO-ENTMCNC: 29.8 G/DL (ref 31.5–36.5)
MCV RBC AUTO: 96 FL (ref 78–100)
PLATELET # BLD AUTO: 1053 10E9/L (ref 150–450)
POTASSIUM SERPL-SCNC: 3.9 MMOL/L (ref 3.4–5.3)
RBC # BLD AUTO: 2.84 10E12/L (ref 3.8–5.2)
SODIUM SERPL-SCNC: 133 MMOL/L (ref 133–144)
WBC # BLD AUTO: 16.7 10E9/L (ref 4–11)

## 2019-05-09 PROCEDURE — A9270 NON-COVERED ITEM OR SERVICE: HCPCS | Performed by: SURGERY

## 2019-05-09 PROCEDURE — 80048 BASIC METABOLIC PNL TOTAL CA: CPT | Performed by: SURGERY

## 2019-05-09 PROCEDURE — 25000132 ZZH RX MED GY IP 250 OP 250 PS 637: Performed by: SURGERY

## 2019-05-09 PROCEDURE — 92526 ORAL FUNCTION THERAPY: CPT | Mod: GN

## 2019-05-09 PROCEDURE — 97530 THERAPEUTIC ACTIVITIES: CPT | Mod: GO

## 2019-05-09 PROCEDURE — 71046 X-RAY EXAM CHEST 2 VIEWS: CPT

## 2019-05-09 PROCEDURE — 25000132 ZZH RX MED GY IP 250 OP 250 PS 637: Performed by: STUDENT IN AN ORGANIZED HEALTH CARE EDUCATION/TRAINING PROGRAM

## 2019-05-09 PROCEDURE — 27210437 ZZH NUTRITION PRODUCT SEMIELEM INTERMED LITER

## 2019-05-09 PROCEDURE — A9270 NON-COVERED ITEM OR SERVICE: HCPCS | Performed by: STUDENT IN AN ORGANIZED HEALTH CARE EDUCATION/TRAINING PROGRAM

## 2019-05-09 PROCEDURE — 25000128 H RX IP 250 OP 636: Performed by: SURGERY

## 2019-05-09 PROCEDURE — 36592 COLLECT BLOOD FROM PICC: CPT | Performed by: SURGERY

## 2019-05-09 PROCEDURE — 25000131 ZZH RX MED GY IP 250 OP 636 PS 637: Performed by: SURGERY

## 2019-05-09 PROCEDURE — 12000004 ZZH R&B IMCU UMMC

## 2019-05-09 PROCEDURE — 85027 COMPLETE CBC AUTOMATED: CPT | Performed by: SURGERY

## 2019-05-09 RX ADMIN — ONDANSETRON HYDROCHLORIDE 4 MG: 4 TABLET, FILM COATED ORAL at 11:16

## 2019-05-09 RX ADMIN — PIPERACILLIN AND TAZOBACTAM 3.38 G: 3; .375 INJECTION, POWDER, FOR SOLUTION INTRAVENOUS at 22:13

## 2019-05-09 RX ADMIN — CHLORHEXIDINE GLUCONATE 0.12% ORAL RINSE 30 ML: 1.2 LIQUID ORAL at 19:56

## 2019-05-09 RX ADMIN — Medication 50 MG: at 08:07

## 2019-05-09 RX ADMIN — BACITRACIN: 500 OINTMENT TOPICAL at 08:21

## 2019-05-09 RX ADMIN — Medication 25 MG: at 03:28

## 2019-05-09 RX ADMIN — VENLAFAXINE 75 MG: 75 TABLET ORAL at 08:07

## 2019-05-09 RX ADMIN — Medication 80 MG: at 11:16

## 2019-05-09 RX ADMIN — Medication 50 MG: at 15:03

## 2019-05-09 RX ADMIN — PANTOPRAZOLE SODIUM 40 MG: 40 TABLET, DELAYED RELEASE ORAL at 08:07

## 2019-05-09 RX ADMIN — ENOXAPARIN SODIUM 40 MG: 40 INJECTION SUBCUTANEOUS at 15:03

## 2019-05-09 RX ADMIN — CHLORHEXIDINE GLUCONATE AND ISOPROPYL ALCOHOL 3 ML: 20; .7 SOLUTION TOPICAL at 08:22

## 2019-05-09 RX ADMIN — ACETAMINOPHEN 975 MG: 325 TABLET, FILM COATED ORAL at 15:03

## 2019-05-09 RX ADMIN — Medication 50 MG: at 22:12

## 2019-05-09 RX ADMIN — ONDANSETRON HYDROCHLORIDE 4 MG: 4 TABLET, FILM COATED ORAL at 16:16

## 2019-05-09 RX ADMIN — CHLORHEXIDINE GLUCONATE 0.12% ORAL RINSE 30 ML: 1.2 LIQUID ORAL at 08:07

## 2019-05-09 RX ADMIN — PIPERACILLIN AND TAZOBACTAM 3.38 G: 3; .375 INJECTION, POWDER, FOR SOLUTION INTRAVENOUS at 03:32

## 2019-05-09 RX ADMIN — SULFAMETHOXAZOLE AND TRIMETHOPRIM 1 TABLET: 800; 160 TABLET ORAL at 08:07

## 2019-05-09 RX ADMIN — FUROSEMIDE 20 MG: 10 SOLUTION ORAL at 08:07

## 2019-05-09 RX ADMIN — ACETAMINOPHEN 975 MG: 325 TABLET, FILM COATED ORAL at 22:11

## 2019-05-09 RX ADMIN — LEVOTHYROXINE SODIUM 150 MCG: 150 TABLET ORAL at 08:07

## 2019-05-09 RX ADMIN — FUROSEMIDE 20 MG: 10 SOLUTION ORAL at 16:14

## 2019-05-09 RX ADMIN — ONDANSETRON HYDROCHLORIDE 4 MG: 4 TABLET, FILM COATED ORAL at 22:12

## 2019-05-09 RX ADMIN — Medication 0.3 MG: at 16:52

## 2019-05-09 RX ADMIN — PIPERACILLIN AND TAZOBACTAM 3.38 G: 3; .375 INJECTION, POWDER, FOR SOLUTION INTRAVENOUS at 16:16

## 2019-05-09 RX ADMIN — FLUCONAZOLE 400 MG: 400 INJECTION, SOLUTION INTRAVENOUS at 07:00

## 2019-05-09 RX ADMIN — VENLAFAXINE 75 MG: 75 TABLET ORAL at 20:00

## 2019-05-09 RX ADMIN — Medication 5 ML: at 16:15

## 2019-05-09 RX ADMIN — AMIODARONE HYDROCHLORIDE 200 MG: 200 TABLET ORAL at 08:07

## 2019-05-09 RX ADMIN — ACETAMINOPHEN 975 MG: 325 TABLET, FILM COATED ORAL at 05:49

## 2019-05-09 RX ADMIN — PIPERACILLIN AND TAZOBACTAM 3.38 G: 3; .375 INJECTION, POWDER, FOR SOLUTION INTRAVENOUS at 11:16

## 2019-05-09 ASSESSMENT — MIFFLIN-ST. JEOR: SCORE: 1321.88

## 2019-05-09 ASSESSMENT — ACTIVITIES OF DAILY LIVING (ADL)
ADLS_ACUITY_SCORE: 19

## 2019-05-09 ASSESSMENT — PAIN DESCRIPTION - DESCRIPTORS
DESCRIPTORS: ACHING;DISCOMFORT;SORE
DESCRIPTORS: ACHING;SORE
DESCRIPTORS: SORE

## 2019-05-09 NOTE — PLAN OF CARE
"/65 (BP Location: Left arm)   Pulse 76   Temp 98.7  F (37.1  C) (Oral)   Resp 18   Ht 1.651 m (5' 5\")   Wt 81.1 kg (178 lb 12.7 oz)   SpO2 98%   BMI 29.75 kg/m      Neuro: A&Ox4.   Cardiac: SR. VSS.   Respiratory: Sating 95% on 1-2L NC.  GI/: Adequate urine output. BM X2 (loose, brown)   Diet/appetite: Tolerating cycled TF overnight, strict NPO   Activity:  Assist of 1, up to chair and in halls.  Pain: At acceptable level on current regimen.   Skin: No new deficits noted.  LDA's: RUE PICC x2- SL/TKO w/ intermit abx   ANGELO- minimal drainage   L WV to -50 cont., no output.   Phary. Tube- was pulled today, primapore dressing covering site- CDI       Plan: Continue with POC. Notify primary team with changes.   "

## 2019-05-09 NOTE — PLAN OF CARE
Care Provided: 2300-0730    Temp: 98.7  F (37.1  C) Temp src: Oral BP: 137/69   Heart Rate: 79 Resp: 18 SpO2: 97 % O2 Device: Nasal cannula Oxygen Delivery: 2 LPM    Neuro: A&Ox4. Able to make needs known appropriately.   Cardiac: SR, no complaints of chest pain.   Respiratory: Sating >95% on 2L NC. Fine crackles audible in bases.   GI/: Adequate urine output. Loose BM x2.   Diet/appetite: NPO, swabs at bedside - pt educated on importance of strict NPO. TF per J-tube at 75cc/hr cycled from 0875-7439. No N/V.   Activity: Ax1 - up to bedside commode x2 overnight.   Pain: Tramadol Q6h PRN; pt reporting that pain at incisions is still present, but tolerable. Scheduled tylenol given; educated on availability of IV dilaudid for breakthrough.   Skin: No new deficits noted - spit fistula dressing intact, but moist.   Negative Pressure Wound Therapy Chest Left (Active)   Wound Type Surgical 5/9/2019  3:15 AM   Unit Type KCI 5/9/2019  3:15 AM   Dressing Type Black foam 5/9/2019  3:15 AM   Cycle Continuous 5/9/2019  3:15 AM   Target Pressure (mmHg) 50 5/9/2019  3:15 AM   Cannister changed? No 5/9/2019  3:15 AM   Dressing Status Clean, dry, intact 5/9/2019  3:15 AM   Drainage Amount Scant 5/9/2019  3:15 AM   Output (ml) 0 ml 5/9/2019  3:15 AM       Incision/Surgical Site 04/26/19 Bilateral Abdomen (Active)   Incision Assessment WDL except;Erythema 5/9/2019  3:10 AM   Erika-Incision Assessment Erythema 5/9/2019  3:10 AM   Closure Staples 5/9/2019  3:10 AM   Incision Drainage Amount None 5/9/2019  3:10 AM   Dressing Intervention Open to air / No Dressing 5/9/2019  3:10 AM       Incision/Surgical Site 04/26/19 Left Chest (Active)   Incision Assessment UTV 5/9/2019  3:10 AM   Erika-Incision Assessment UTV 5/9/2019 12:00 AM   Closure LUIS A 5/9/2019  3:10 AM   Incision Drainage Amount Moderate 5/9/2019  3:10 AM   Drainage Description Other (Comment) 5/9/2019  3:10 AM   Dressing Intervention Moist drainage 5/9/2019  3:10 AM        Incision/Surgical Site 05/06/19 Left Neck (Active)   Incision Assessment WDL except;Erythema 5/9/2019  3:10 AM   Erika-Incision Assessment Erythema 5/9/2019  3:10 AM   Closure Sutures 5/9/2019  3:10 AM   Incision Drainage Amount Small 5/9/2019  3:10 AM   Drainage Description Yellow;Green 5/9/2019  3:10 AM   Dressing Intervention Open to air / No Dressing 5/9/2019  3:10 AM       Incision/Surgical Site 05/06/19 Left Chest (Active)   Incision Assessment UTV 5/9/2019  3:10 AM   Erika-Incision Assessment UTV 5/8/2019  8:00 PM   Closure Other (Comment) 5/9/2019  3:10 AM   Incision Drainage Amount Scant 5/9/2019  3:10 AM   Incision Care Therapy - negative pressure 5/9/2019  3:10 AM   Dressing Intervention Clean, dry, intact 5/9/2019  3:10 AM     LDAs:    - Pharyngostomy: LIS, yellow/green output; flushed Q4h with saline.   - J-tube: TF & meds; G-tube: clamped  - Wound Vac: Unmeasureable output on shift  - ANGELO: ~10cc/4hr serosang/tan output; does not hold suction.     Plan: Will continue to monitor pt closely and notify primary team with any changes.

## 2019-05-09 NOTE — PLAN OF CARE
Neuro: A&Ox4.   Cardiac: Afebrile. SR with HR's 60's-70's. VSS.  Respiratory: Sating >92% on 1-2L NC. LS clear with fine crackles. Good, productive cough.  GI/: Adequate urine output. +BS, +flatus, -BM.  Diet/appetite: NPO. Strict I/O's. Cycled TF's from 4PM to 10AM, increased to 75mL/hr. Denies N/V.  Activity: Assist of 1, up to commode.  Pain: Tramadol given x1 for generalized abdominal pain with moderate relief.  Skin: Neck incision packed w/kerlix. Bilateral incision/surgical abdomen site, closed with staples and TIFFANIE. Mediastinal drain to bulb suction. Not holding suction, MD's aware.  LDA's: L pharyngostomy tube to LIS, L mediastinal ANGELO to bulb suction, L chest wound vac, G/J-tube, R DL PICC     Plan: Continue with POC. Notify primary team with changes.

## 2019-05-09 NOTE — CONSULTS
[facility] ID SERVICE: NEW CONSULTATION  Patient:  Myrtle Vaz, Date of birth 1945, Medical record number [id number]  Date of Admission: 4/26/2019  Date of Visit:  5/9/2019  Requesting Provider: Marin Albarado         Assessment and Recommendations:   Problem List:  - H/o hiatal hernia repair with mesh reinforcement, nissen fundoplication, and gastrostomy tube placement c/b postop esophageal perforation 11/2018  - s/p Nissen takedown, drainage of mediastinal abscess, trans-hiatal partial esophagogastrectomy, spit fistula, gastrostomy and jejunostomy 11/2018  - s/p redo laparotomy, partial sternotomy, intrabdominal retrosternal esophagogastrostomy 4/26/2019  - Postop anastomotic leak now s/p EGD, flex bronch, esophageal stent, neck washout 5/6  - BAL with stenotrophomonas maltophilia, strep agalactiae, canida albicans.  - Mediastinal abscess cultures from 5/6 with strep anginosus, enterococcus faecalis and candida albicans.    Discussion:  73 year old female with PMH of laparoscopic hiatal hernia repair with mesh reinforcement, nissen fundoplication, and gastrostomy tube placement c/b postop esophageal perforation 11/2018 s/p Nissen takedown, drainage of mediastinal abscess, trans-hiatal partial esophagogastrectomy, spit fistula, gastrostomy and jejunostomy. Admitted for redo laparotomy, partial sternotomy, intrabdominal retrosternal esophagogastrostomy for continued pain and clogged G-tube, procedure done on 4/26. Found to have anastomotic leak 5/5 on CT now s/p EGD, flex bronch, esophageal stent, neck washout on 5/6. On vanc/zosyn/fluc/bactrim. Bronchoscopy on 5/2 with stenotrophomonas maltophilia, strep agalactiae, fungal cultures with canida albicans. Mediastinal abscess cultures from 5/6 with strep anginosus, enterococcus faecalis and candida albicans. ID consulted for antibiotic recommendations.    Agree with AMT recs to continue the zosyn to cover identified strep species and enterococcus  faecalis, and fluconazole for candida albicans. Would discontinue vancomycin given no resistant gram positive organisms have been identified and bactrim as clinically does not have pneumonia and stenotrophomonas likely colonizer.     Recommendations:  - Continue Zosyn and Fluconazole  - Discontinue Bactrim and Vancomycin    Recommendations discussed with primary team.  Thank you for this consult. The [facility] ID team will continue to follow this patient. Please feel free to call with any questions.     Plan of care discussed with Staff ID Physician Dr Antonieta Craft  PGY4 Infectious Diseases Fellow  Pager: [phone]       History of Present Illness:     74 y/o F initially admitted at OSH in 11/18 for dyspnea. She was found to have a giant paraesophageal hiatal hernia. She underwent laparoscopic hiatal hernia repair with mesh reinforcement, laparoscopic nissen fundoplication, and laparoscopic gastrostomy tube placement. Intraoperatively she was found to have a Type 4 hiatal hernia in the posterior mediastinum containing the entirety of the transverse colon, the majority of the omentum and the transverse mesocolon, and the stomach. Her post-operative course was then complicated by distal esophageal perforation resulting in purulent mediastinitis, septic shock, HANG, and respiratory failure. She was transferred to the [facility] for revision of the repair. She underwent Nissen takedown, drainage of mediastinal abscess, trans-hiatal partial esophagogastrectomy, spit fistula, gastrostomy and jejunostomy on 11/21/18. She was discharged to TCU in stable condition with spit fistula, J-tube, G-tube, and staples in midline abdominal wound in place.     The patient has continued to have issues with pain and clogging of her gastrostomy tube. She has followed with Dr. Per Funes and was last seen on 3/27/19. At that time the patient was home from the TCU. It was decided to admit the  patient  for redo laparotomy, partial sternotomy, intrabdominal retrosternal esophagogastrostomy. She underwent this on 4/26. CT imaging on 5/5 showed anastomotic leak now s/p EGD, flex bronch, esophageal stent, and neck washout on 5/6. Bronchoscopy on 5/2 with stenotrophomonas maltophilia, strep agalactiae, fungal cultures with canida albicans. Was started on Vanc+ Bactrim +Fluconazole 5/6. Mediastinal abscess cultures from 5/6 with strep anginosus, enterococcus faecalis and candida albicans. ID consulted for antibiotic recommendations.     Patient states that she feels much better today. Appropriate postop pain. Denies fevers, chills, sweats. No other complaints. Denies cough, SOB.         Review of Systems:   CONSTITUTIONAL:  No fevers or chills  EYES: Negative for icterus  ENT:  Negative for oral lesions and sore throat  RESPIRATORY:  Negative for cough and dyspnea  CARDIOVASCULAR:  Appropriate post op chest pain chest pain, palpitations  GASTROINTESTINAL:  Negative for nausea, vomiting, diarrhea and constipation  GENITOURINARY:  Negative for dysuria  INTEGUMENT:  Negative for rash and pruritus  NEURO:  Negative for headache       Past Medical History:     Past Medical History:   Diagnosis Date     Depression      History of atrial fibrillation      HLD (hyperlipidemia)      HTN (hypertension)      Hypothyroidism        Allergies:      Allergies   Allergen Reactions     Gabapentin Other (See Comments)         Recent Antimicrobials::     Zosyn 5/3-  Fluconazole 5/6-  Bactrim 5/6-  Vancomycin 5/7-        Family History:   Reviewed and noncontributory.   Family History   Problem Relation Age of Onset     Cerebrovascular Disease Father         MI        Social History:     Social History     Socioeconomic History     Marital status:      Spouse name: Not on file     Number of children: Not on file     Years of education: Not on file     Highest education level: Not on file   Occupational History     Not on file  "  Social Needs     Financial resource strain: Not on file     Food insecurity:     Worry: Not on file     Inability: Not on file     Transportation needs:     Medical: Not on file     Non-medical: Not on file   Tobacco Use     Smoking status: Former Smoker     Years: 60.00     Types: Cigarettes     Last attempt to quit: 2018     Years since quittin.5     Smokeless tobacco: Never Used     Tobacco comment: patient smoked a few cigarettes a day for 60 YEARS   Substance and Sexual Activity     Alcohol use: No     Frequency: Never     Drug use: No     Sexual activity: Not on file   Lifestyle     Physical activity:     Days per week: Not on file     Minutes per session: Not on file     Stress: Not on file   Relationships     Social connections:     Talks on phone: Not on file     Gets together: Not on file     Attends Worship service: Not on file     Active member of club or organization: Not on file     Attends meetings of clubs or organizations: Not on file     Relationship status: Not on file     Intimate partner violence:     Fear of current or ex partner: Not on file     Emotionally abused: Not on file     Physically abused: Not on file     Forced sexual activity: Not on file   Other Topics Concern     Not on file   Social History Narrative     Not on file          Physical Exam:   /65 (BP Location: Left arm)   Pulse 76   Temp 98.7  F (37.1  C) (Oral)   Resp 18   Ht 1.651 m (5' 5\")   Wt 81.1 kg (178 lb 12.7 oz)   SpO2 98%   BMI 29.75 kg/m     Exam:  GENERAL:  Well-developed, well-nourished, sitting in bed in no acute distress.   ENT:  Head is normocephalic, atraumatic. Oropharynx is moist without exudates or ulcers.  EYES:  Eyes have anicteric sclerae.    NECK:  Dressing in place over surgical site, CDI  LUNGS:  Coarse BS over left anterior field  CARDIOVASCULAR:  Regular rate and rhythm with no murmurs, gallops or rubs.  ABDOMEN:  Normal bowel sounds, soft, nontender.   EXT: Extremities warm " and without edema.  SKIN:  No acute rashes. Line is in place without any surrounding erythema.  Surgical scars - laparotomy and sternotomy CDI, well approximated. Wound vac over previous spit fistula over chest.   Pigtail drain over right chest and left neck drain, G tube all without surrounding erythema /ttp  NEUROLOGIC:  Grossly nonfocal.         Laboratory Data:     Creatinine   Date Value Ref Range Status   05/09/2019 0.79 0.52 - 1.04 mg/dL Final   05/08/2019 0.82 0.52 - 1.04 mg/dL Final   05/07/2019 0.80 0.52 - 1.04 mg/dL Final   05/06/2019 0.76 0.52 - 1.04 mg/dL Final   05/05/2019 0.83 0.52 - 1.04 mg/dL Final     WBC   Date Value Ref Range Status   05/09/2019 16.7 (H) 4.0 - 11.0 10e9/L Final   05/08/2019 16.4 (H) 4.0 - 11.0 10e9/L Final   05/07/2019 19.4 (H) 4.0 - 11.0 10e9/L Final   05/06/2019 17.7 (H) 4.0 - 11.0 10e9/L Final   05/05/2019 16.0 (H) 4.0 - 11.0 10e9/L Final     Hemoglobin   Date Value Ref Range Status   05/09/2019 8.1 (L) 11.7 - 15.7 g/dL Final     Platelet Count   Date Value Ref Range Status   05/09/2019 1,053 (HH) 150 - 450 10e9/L Final     Comment:     This result has been called to CARROL MAE RN 6B by Tamar Apodaca on 05 09 2019 at 0649, and has been read back.        Lab Results   Component Value Date     05/09/2019    BUN 21 05/09/2019    CO2 27 05/09/2019     CRP Inflammation   Date Value Ref Range Status   02/04/2019 14.0 (H) 0.0 - 8.0 mg/L Final   11/25/2018 160.0 (H) 0.0 - 8.0 mg/L Final           Pertinent Recent Microbiology Data:     Recent Labs   Lab 05/06/19  1530 05/05/19  0800 05/03/19 2015   CULT Moderate growth  Streptococcus anginosus  Susceptibility testing in progress  *  Light growth  Enterococcus faecalis  *  Light growth  Streptococcus agalactiae sero group B  Susceptibility testing in progress  *  Light growth  Candida albicans / dubliniensis  Candida albicans and Candida dubliniensis are not routinely speciated  Susceptibility testing not routinely  done  *  Culture negative after 3 days  Culture negative monitoring continues  --  <10,000 colonies/mL  mixed urogenital merry  Susceptibility testing not routinely done     SDES Mediastinal Abscess  Mediastinal Abscess  Mediastinal Abscess  Mediastinal Abscess Feces Midstream Urine            Imaging:     Recent Results (from the past 48 hour(s))   XR Chest 2 Views    Narrative    XR CHEST 2 VW  5/8/2019 1:30 PM      HISTORY: status post gastric pull up and esophagogastrostomy, check  pharyngostomy tube placement, interval change.    COMPARISON: 5/6/2019, 5/7/2019.    FINDINGS: PA and lateral views of the chest. Postsurgical changes from  retrosternal gastric pull-up and esophagogastrostomy. Pharyngostomy  tube is unchanged in position. Esophageal stent is stable. Left  mediastinal drain noted. Right upper extremity PICC line tip projects  over the atriocaval junction.    Trachea is midline, heart size is stable. No pneumothorax. Stable  bilateral pleural effusions with overlying opacities. No acute osseous  abnormality. Degenerative changes of the spine. Visualized abdomen is  unremarkable.      Impression    IMPRESSION:  1. Stable postsurgical changes of retrosternal gastric pull-up and  esophagogastrostomy.  2. Pharyngostomy tube and esophageal stent are unchanged in position.  3. Stable bilateral pleural effusions with overlying atelectasis  versus developing consolidation    I have personally reviewed the examination and initial interpretation  and I agree with the findings.    MARY HORN MD   XR Video Swallow w/o Esophagram    Narrative    Exam: XR VIDEO SWALLOW W/O ESOPHAGRAM, 5/8/2019 2:00 PM    Indication: status post esophagogastrostomy with anastomotic leak and  esophageal stent, check for leak    Comparison: Swallow study 2/19/2019, CT chest, abdomen, and pelvis  5/5/2019    Findings:   The patient ingested various quantities of Omnipaque 140 as directed  by the speech  pathologist.    Aspiration occurred with Omnipaque, which elicited an immediate cough  response.    The exam was ended after this aspiration event. Esophagram was not  performed.    Fluoroscopy time: 0.5 minutes      Impression    Impression: Aspiration of Omnipaque contrast with immediate cough  response. After discussion with the treatment team, the esophagram was  cancelled due to the patient's aspiration episode.    I have personally reviewed the examination and initial interpretation  and I agree with the findings.    MADAI MONTANO MD   XR Chest 2 Views    Narrative    XR CHEST 2 VW  5/9/2019 9:03 AM      HISTORY: status post gastric pull up and esophagogastrostomy, check  pharyngostomy tube placement, interval change.    COMPARISON: 5/8/2019, 5/7/2019    FINDINGS: PA and lateral views of the chest. Right upper approach PICC  with tip projecting over the right atrium. Postoperative changes from  esophagectomy and gastric interposition with stent in place. Midline  anterior chest wall drain. The pharyngostomy tube appears unchanged in  position.    Streaky bibasilar opacities. Trace pleural effusions. No appreciable  pneumothorax. Heart size is normal.      Impression    IMPRESSION:   1. Stable support devices.  2. Unchanged trace pleural effusions with overlying atelectasis.    I have personally reviewed the examination and initial interpretation  and I agree with the findings.    MCKENZIE BONDS, DO

## 2019-05-09 NOTE — PLAN OF CARE
OT/6B - Discharge Planner OT   Patient plan for discharge: not discussed this session  Current status: SBA for bed mobility supine <> sitting EOB. Facilitated MoCA to screen cognitive changes. Improved cognitive screen score noted; pt scores 23/30 on MoCA version 8.3 on this date compared to previous score of 15/30 on May 3rd (a score of 26 or greater is considered normal).   Barriers to return to prior living situation: cognition, strength, post surgical precautions, activity tolerance  Recommendations for discharge: TCU  Rationale for recommendations: to increase ADL/IADL IND prior to return home       Entered by: Abeba Pereyra 05/09/2019 11:43 AM

## 2019-05-09 NOTE — PROGRESS NOTES
STAFF ADDENDUM:  I saw and evaluated Ms. Vaz and agree with the resident s findings and plan of care as documented in the resident s note and edited by me, as applicable.    Ms. Vaz looks well.  COntinue neck wound dressing changes.  Plan for removal of pahryngostomy and swallow in the near future  Ally Ybarra MD

## 2019-05-09 NOTE — PROGRESS NOTES
THORACIC & FOREGUT SURGERY PROGRESS NOTE  05/09/2019    Patient: Myrtle Vaz  MRN: [id number]    Subjective  No acute events overnight. Pt seen at beside resting comfortably. Neck wound dressing changed this AM, tolerated well. Discomfort with pharyngostomy tube at back of throat noted this AM. No other issues noted.     Objective  Temp:  [97.7  F (36.5  C)-98.7  F (37.1  C)] 98.5  F (36.9  C)  Heart Rate:  [75-79] 78  Resp:  [16-18] 16  BP: (115-137)/(64-90) 115/64  SpO2:  [94 %-97 %] 94 %    General: NAD  HEENT/chest: neck incision packed with kerlix, mediastinal drain to bulb suction. Wound VAC in place, holding suction if neck incision covered with occlusive dressing  CV: WWP, non-cyanotic   Pulm: On 2L NC breathing comfortably  Abd: soft, non-distended, appropriately tender  Extremities: no edema  Neuro: moving all extremities spontaneously without apparent deficit    I/O last 3 completed shifts:  In: 1853 [I.V.:20; NG/GT:360]  Out: 2885 [Urine:2550; Drains:45; Other:290]    Labs:    Recent Labs   Lab 05/09/19  0616 05/08/19  0509 05/07/19  0508   WBC 16.7* 16.4* 19.4*   HGB 8.1* 7.6* 7.9*   PLT 1,053* 927* 813*     Recent Labs   Lab 05/09/19  0616 05/08/19  0509 05/07/19  0508  05/03/19  0629 05/02/19  1705    133 131*   < > 134 136   POTASSIUM 3.9 4.3 4.4   < > 3.9 4.0   CHLORIDE 98 98 100   < > 101 101   CO2 27 28 25   < > 24 25   BUN 21 18 16   < > 25 21   CR 0.79 0.82 0.80   < > 0.86 0.89   * 90 160*   < > 107* 133*   JOSE 7.8* 8.0* 7.9*   < > 8.1* 7.8*   MAG  --   --  2.3  --  2.1 1.8   PHOS  --   --  3.4  --  3.1 3.7    < > = values in this interval not displayed.     Imaging:  CXR reviewed      Assessment & Plan  Mrytle Vaz is a 73 year old female with a 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 Albarado. Found to have anastomotic leak per CT scan on 5/519 and is s/p EGD, flex bronch,  possible esophageal stent, neck wound washout. Recovering appropriately.    - Pain control with enteral medications    - Strict I/O  - Abx per ID, will discontinue vanc and bactrim today  - Continue strict NPO with J tube feeds.  - Plans for repeat esophagram and video swallow tomorrow  - Pharyngostomy removed this AM  - Alek drain to bulb (now holding suction)  - Wound vac to -50 mmHg. Vac change tomorrow, schedule MWF (next Friday, need to occlude neck wound to maintain suction)  - Laxix 20mg per J BID  - Lovenox for ppx.  - Dispo: Likely early next week      Guero Sebastian PA-C  P: [phone]

## 2019-05-09 NOTE — PLAN OF CARE
Discharge Planner SLP   Patient plan for discharge: Pt did not state  Current status: Recommend continued strict NPO status, no ice chips, and excellent oral cares 2-3x per day.  Pt requiring additional support for completion of oropharyngeal strengthening exercises, able to complete with moderate assist (verbal, visual).  SLP will continue to follow for oropharyngeal strengthening exercises, recommend repeat VFSS after removal of pharyngostomy tube and/or as deemed appropriate by thoracic team.  Barriers to return to prior living situation: NPO status  Recommendations for discharge: TCU  Rationale for recommendations: Below baseline function; pt will benefit from ongoing ST targeting swallowing (oropharyngeal strengthening exercises)       Entered by: Paulina Spangler 05/09/2019 9:56 AM

## 2019-05-10 ENCOUNTER — APPOINTMENT (OUTPATIENT)
Dept: GENERAL RADIOLOGY | Facility: CLINIC | Age: 74
DRG: 326 | End: 2019-05-10
Attending: PHYSICIAN ASSISTANT
Payer: MEDICARE

## 2019-05-10 ENCOUNTER — APPOINTMENT (OUTPATIENT)
Dept: GENERAL RADIOLOGY | Facility: CLINIC | Age: 74
DRG: 326 | End: 2019-05-10
Attending: THORACIC SURGERY (CARDIOTHORACIC VASCULAR SURGERY)
Payer: MEDICARE

## 2019-05-10 ENCOUNTER — APPOINTMENT (OUTPATIENT)
Dept: SPEECH THERAPY | Facility: CLINIC | Age: 74
DRG: 326 | End: 2019-05-10
Attending: THORACIC SURGERY (CARDIOTHORACIC VASCULAR SURGERY)
Payer: MEDICARE

## 2019-05-10 ENCOUNTER — APPOINTMENT (OUTPATIENT)
Dept: PHYSICAL THERAPY | Facility: CLINIC | Age: 74
DRG: 326 | End: 2019-05-10
Attending: THORACIC SURGERY (CARDIOTHORACIC VASCULAR SURGERY)
Payer: MEDICARE

## 2019-05-10 LAB
ANION GAP SERPL CALCULATED.3IONS-SCNC: 6 MMOL/L (ref 3–14)
BUN SERPL-MCNC: 20 MG/DL (ref 7–30)
CALCIUM SERPL-MCNC: 7.6 MG/DL (ref 8.5–10.1)
CHLORIDE SERPL-SCNC: 99 MMOL/L (ref 94–109)
CO2 SERPL-SCNC: 29 MMOL/L (ref 20–32)
CREAT SERPL-MCNC: 0.9 MG/DL (ref 0.52–1.04)
ERYTHROCYTE [DISTWIDTH] IN BLOOD BY AUTOMATED COUNT: 21.5 % (ref 10–15)
GFR SERPL CREATININE-BSD FRML MDRD: 64 ML/MIN/{1.73_M2}
GLUCOSE BLDC GLUCOMTR-MCNC: 133 MG/DL (ref 70–99)
GLUCOSE SERPL-MCNC: 94 MG/DL (ref 70–99)
HCT VFR BLD AUTO: 26.1 % (ref 35–47)
HGB BLD-MCNC: 7.5 G/DL (ref 11.7–15.7)
MCH RBC QN AUTO: 27.4 PG (ref 26.5–33)
MCHC RBC AUTO-ENTMCNC: 28.7 G/DL (ref 31.5–36.5)
MCV RBC AUTO: 95 FL (ref 78–100)
PLATELET # BLD AUTO: 1036 10E9/L (ref 150–450)
POTASSIUM SERPL-SCNC: 3.9 MMOL/L (ref 3.4–5.3)
RBC # BLD AUTO: 2.74 10E12/L (ref 3.8–5.2)
SODIUM SERPL-SCNC: 134 MMOL/L (ref 133–144)
WBC # BLD AUTO: 13.8 10E9/L (ref 4–11)

## 2019-05-10 PROCEDURE — 12000004 ZZH R&B IMCU UMMC

## 2019-05-10 PROCEDURE — 25000132 ZZH RX MED GY IP 250 OP 250 PS 637: Performed by: SURGERY

## 2019-05-10 PROCEDURE — 25000131 ZZH RX MED GY IP 250 OP 636 PS 637: Performed by: SURGERY

## 2019-05-10 PROCEDURE — 25000132 ZZH RX MED GY IP 250 OP 250 PS 637: Performed by: THORACIC SURGERY (CARDIOTHORACIC VASCULAR SURGERY)

## 2019-05-10 PROCEDURE — 25000128 H RX IP 250 OP 636: Performed by: SURGERY

## 2019-05-10 PROCEDURE — 27210437 ZZH NUTRITION PRODUCT SEMIELEM INTERMED LITER

## 2019-05-10 PROCEDURE — 97530 THERAPEUTIC ACTIVITIES: CPT | Mod: GP

## 2019-05-10 PROCEDURE — A9270 NON-COVERED ITEM OR SERVICE: HCPCS | Performed by: SURGERY

## 2019-05-10 PROCEDURE — 25000125 ZZHC RX 250

## 2019-05-10 PROCEDURE — 74230 X-RAY XM SWLNG FUNCJ C+: CPT

## 2019-05-10 PROCEDURE — 74220 X-RAY XM ESOPHAGUS 1CNTRST: CPT

## 2019-05-10 PROCEDURE — 00000146 ZZHCL STATISTIC GLUCOSE BY METER IP

## 2019-05-10 PROCEDURE — 36592 COLLECT BLOOD FROM PICC: CPT | Performed by: SURGERY

## 2019-05-10 PROCEDURE — 25000128 H RX IP 250 OP 636: Performed by: STUDENT IN AN ORGANIZED HEALTH CARE EDUCATION/TRAINING PROGRAM

## 2019-05-10 PROCEDURE — 80048 BASIC METABOLIC PNL TOTAL CA: CPT | Performed by: SURGERY

## 2019-05-10 PROCEDURE — 85027 COMPLETE CBC AUTOMATED: CPT | Performed by: SURGERY

## 2019-05-10 PROCEDURE — 92611 MOTION FLUOROSCOPY/SWALLOW: CPT | Mod: GN

## 2019-05-10 PROCEDURE — 71046 X-RAY EXAM CHEST 2 VIEWS: CPT

## 2019-05-10 RX ORDER — FUROSEMIDE 10 MG/ML
20 INJECTION INTRAMUSCULAR; INTRAVENOUS EVERY 12 HOURS
Status: COMPLETED | OUTPATIENT
Start: 2019-05-10 | End: 2019-05-10

## 2019-05-10 RX ORDER — MAGNESIUM HYDROXIDE 1200 MG/15ML
LIQUID ORAL
Status: COMPLETED
Start: 2019-05-10 | End: 2019-05-10

## 2019-05-10 RX ORDER — GUAR GUM
1 PACKET (EA) ORAL 3 TIMES DAILY
Status: DISCONTINUED | OUTPATIENT
Start: 2019-05-10 | End: 2019-05-13

## 2019-05-10 RX ADMIN — LEVOTHYROXINE SODIUM 150 MCG: 150 TABLET ORAL at 08:18

## 2019-05-10 RX ADMIN — PIPERACILLIN AND TAZOBACTAM 3.38 G: 3; .375 INJECTION, POWDER, FOR SOLUTION INTRAVENOUS at 10:51

## 2019-05-10 RX ADMIN — ENOXAPARIN SODIUM 40 MG: 40 INJECTION SUBCUTANEOUS at 13:08

## 2019-05-10 RX ADMIN — FUROSEMIDE 20 MG: 10 INJECTION, SOLUTION INTRAVENOUS at 08:46

## 2019-05-10 RX ADMIN — FUROSEMIDE 20 MG: 10 INJECTION, SOLUTION INTRAVENOUS at 21:18

## 2019-05-10 RX ADMIN — AMIODARONE HYDROCHLORIDE 200 MG: 200 TABLET ORAL at 08:18

## 2019-05-10 RX ADMIN — CHLORHEXIDINE GLUCONATE 0.12% ORAL RINSE 30 ML: 1.2 LIQUID ORAL at 21:59

## 2019-05-10 RX ADMIN — Medication 0.5 MG: at 08:25

## 2019-05-10 RX ADMIN — PANTOPRAZOLE SODIUM 40 MG: 40 TABLET, DELAYED RELEASE ORAL at 08:19

## 2019-05-10 RX ADMIN — ACETAMINOPHEN 975 MG: 325 TABLET, FILM COATED ORAL at 14:20

## 2019-05-10 RX ADMIN — SODIUM CHLORIDE 500 ML: 900 IRRIGANT IRRIGATION at 16:24

## 2019-05-10 RX ADMIN — ONDANSETRON HYDROCHLORIDE 4 MG: 4 TABLET, FILM COATED ORAL at 21:19

## 2019-05-10 RX ADMIN — Medication 1 PACKET: at 21:19

## 2019-05-10 RX ADMIN — BACITRACIN: 500 OINTMENT TOPICAL at 22:00

## 2019-05-10 RX ADMIN — PIPERACILLIN AND TAZOBACTAM 3.38 G: 3; .375 INJECTION, POWDER, FOR SOLUTION INTRAVENOUS at 04:01

## 2019-05-10 RX ADMIN — Medication 50 MG: at 11:33

## 2019-05-10 RX ADMIN — Medication 80 MG: at 08:19

## 2019-05-10 RX ADMIN — CHLORHEXIDINE GLUCONATE AND ISOPROPYL ALCOHOL 3 ML: 20; .7 SOLUTION TOPICAL at 22:00

## 2019-05-10 RX ADMIN — VENLAFAXINE 75 MG: 75 TABLET ORAL at 08:18

## 2019-05-10 RX ADMIN — Medication 1 PACKET: at 14:20

## 2019-05-10 RX ADMIN — Medication 5 ML: at 16:22

## 2019-05-10 RX ADMIN — Medication 50 MG: at 18:08

## 2019-05-10 RX ADMIN — CHLORHEXIDINE GLUCONATE AND ISOPROPYL ALCOHOL 3 ML: 20; .7 SOLUTION TOPICAL at 08:42

## 2019-05-10 RX ADMIN — SENNOSIDES AND DOCUSATE SODIUM 2 TABLET: 8.6; 5 TABLET ORAL at 21:19

## 2019-05-10 RX ADMIN — BACITRACIN: 500 OINTMENT TOPICAL at 08:42

## 2019-05-10 RX ADMIN — ACETAMINOPHEN 975 MG: 325 TABLET, FILM COATED ORAL at 05:36

## 2019-05-10 RX ADMIN — FLUCONAZOLE 400 MG: 400 INJECTION, SOLUTION INTRAVENOUS at 08:15

## 2019-05-10 RX ADMIN — ACETAMINOPHEN 975 MG: 325 TABLET, FILM COATED ORAL at 21:18

## 2019-05-10 RX ADMIN — CHLORHEXIDINE GLUCONATE 0.12% ORAL RINSE 30 ML: 1.2 LIQUID ORAL at 08:20

## 2019-05-10 RX ADMIN — PIPERACILLIN AND TAZOBACTAM 3.38 G: 3; .375 INJECTION, POWDER, FOR SOLUTION INTRAVENOUS at 22:25

## 2019-05-10 RX ADMIN — Medication 50 MG: at 05:35

## 2019-05-10 RX ADMIN — VENLAFAXINE 75 MG: 75 TABLET ORAL at 21:18

## 2019-05-10 RX ADMIN — PIPERACILLIN AND TAZOBACTAM 3.38 G: 3; .375 INJECTION, POWDER, FOR SOLUTION INTRAVENOUS at 16:14

## 2019-05-10 ASSESSMENT — MIFFLIN-ST. JEOR: SCORE: 1317.88

## 2019-05-10 ASSESSMENT — PAIN DESCRIPTION - DESCRIPTORS
DESCRIPTORS: ACHING;SORE
DESCRIPTORS: BURNING

## 2019-05-10 ASSESSMENT — ACTIVITIES OF DAILY LIVING (ADL)
ADLS_ACUITY_SCORE: 19

## 2019-05-10 NOTE — PROGRESS NOTES
05/10/19 1408   General Information   Onset Date 04/26/19   Start of Care Date 05/02/19   Referring Physician Guero Sebastian PA-C   Patient Profile Review/OT: Additional Occupational Profile Info See Profile for full history and prior level of function   Patient/Family Goals Statement None stated   Swallowing Evaluation Videofluoroscopic evaluation   Behaviorial Observations WFL (within functional limits)   Mode of current nutrition PEG   Respiratory Status O2 Supply   Type of O2 supply Nasal cannula   Comments SLP: Pt seen for repeat VFSS per MD order. See previous evals for PMHx.    Clinical Swallow Evaluation   Oral Musculature Comments See initial eval   VFSS Eval: Radiology   Radiologist Attending   Views Taken left lateral   Physical Location of Procedure [facility] [address]   VFSS Eval: Thin Liquid Texture Trial   Mode of Presentation, Thin Liquid cup;self-fed   Order of Presentation Cup sip x3   Preparatory Phase WFL   Oral Phase, Thin Liquid WFL   Pharyngeal Phase, Thin Liquid Delayed swallow reflex;Residue in pyriform sinus  (decreased hyolaryngeal elevation)   Rosenbek's Penetration Aspiration Scale: Thin Liquid Trial Results 4 - contrast contacts vocal cords, no residue remains (penetration)   Response to Aspiration   (No response)   Successful Strategies Trialed During Procedure, Thin Liquid chin tuck  (eliminated penetration)   Diagnostic Statement Oral phase WFL. Pharyngeal phase characterized by mildly delayed swallow and deep penetration to the level of the TVF. No aspiration. Penetration eliminated with chin tuck strategy.   Esophageal Phase of Swallow   Patient reports or presents with symptoms of esophageal dysphagia Yes   Esophageal sweep performed during today s vidofluoroscopic exam  Please refer to radiologist's report for details   General Therapy Interventions   Planned Therapy Interventions Dysphagia Treatment   Dysphagia treatment Oropharyngeal exercise training;Compensatory  strategies for swallowing   Swallow Eval: Clinical Impressions   Skilled Criteria for Therapy Intervention Skilled criteria met.  Treatment indicated.   Functional Assessment Scale (FAS) 5   Treatment Diagnosis Mild pharyngeal dysphagia   Diet texture recommendations NPO  (per thoracic recommendations)   Recommended Feeding/Eating Techniques tuck chin during every swallow;small sips/bites   Demonstrates Need for Referral to Another Service dietitian   Therapy Frequency 5 times/wk   Predicted Duration of Therapy Intervention (days/wks) 2 weeks   Anticipated Discharge Disposition inpatient rehabilitation facility   Risks and Benefits of Treatment have been explained. Yes   Patient, family and/or staff in agreement with Plan of Care Yes   Clinical Impression Comments SLP: Repeat videoswallow study completed per MD order. Under fluoroscopy, pt presents with mild pharyngeal dysphagia in setting of prolonged NPO status; swallow function has improved since removal of pharyngostomy tube. Pt assessed with thin liquids via cup sip. Oral phase WFL. Swallow trigger mildly delayed. Once triggered, velar elevation and BOT retraction WFL; pharyngeal constriction and hyolaryngeal elevation/excursion were reduced. Epiglottic inversion was incomplete. Consistent penetration to the level of the true vocal cords occurred, with penetrated material remaining in laryngeal vestibule. Pt then cued to complete chin tuck strategy; this improved epiglottic inversion and eliminated penetration. No aspiration observed during the study. From ST perspective, pt clear for thin liquids when using chin tuck strategy; defer to thoracic for initiation of PO. Recommend ongoing ST targeting pharyngeal strengthening. SLP to follow.   Total Evaluation Time   Total Evaluation Time (Minutes) 12

## 2019-05-10 NOTE — PROGRESS NOTES
CLINICAL NUTRITION SERVICES - REASSESSMENT NOTE     Nutrition Prescription    RECOMMENDATIONS FOR MDs/PROVIDERS TO ORDER:  Bowel regimen per team  Fluids per team    Malnutrition Status:    None    Recommendations already ordered by Registered Dietitian (RD):  RD to order 3 packets NS fiber ( 9 g soluble fiber)    Future/Additional Recommendations:  Monitor stooling trends     EVALUATION OF THE PROGRESS TOWARD GOALS   Diet: NPO  Nutrition Support: Peptamen 1.5 @ 75 ml/hr x 18 hours ( 4 pm - 10 am)  to provide: 1350 ml, 2025 kcals (31 kcals/kg), 92 g pro (1.4 g/kg pro), 0 g fiber, 254 g CHO  Access: J tube    Intake: average Tf intakes over the past 6 days: 1208 ml, 1811 kcals (27 kcals/kg), 82 g pro ( 1.2 g/kg pro)    NEW FINDINGS   Weight: overall weight gain since admit, suspect fluids  Meds: lasix, levothyroxine   GI: Per team, would like to trial fiber modular. Pt with 1-3 BM+/day, however noted to be very loose.   Skin:  - Pharyngostomy tube removed yesterday (5/9)  - Plans for repeat esophagram and video swallow this morning. - passed per pt report    MALNUTRITION  % Intake: No decreased intake noted  % Weight Loss: None noted  Subcutaneous Fat Loss: None observed  Muscle Loss: None observed  Fluid Accumulation/Edema: None noted  Malnutrition Diagnosis: Patient does not meet two of the above criteria necessary for diagnosing malnutrition    Previous Goals   Total avg nutritional intake to meet a minimum of 25 kcal/kg and 1.2 g PRO/kg daily (per dosing wt 66 kg).  Evaluation: Met    Previous Nutrition Diagnosis  Inadequate protein-energy intake related to slow TF advancement and TF interruptions as evidenced by TF only providing ~ 60% of lower end estimated needs at this time.   Evaluation: Improving    CURRENT NUTRITION DIAGNOSIS  Predicted inadequate nutrient intake kcals/pro related to TF interruptions/ inadequate TF infusion    INTERVENTIONS  Implementation  Collaboration with other providers-6B  rounds    Goals  Total avg nutritional intake to meet a minimum of 25 kcal/kg and 1.2 g PRO/kg daily (per dosing wt 66 kg).    Monitoring/Evaluation  Progress toward goals will be monitored and evaluated per protocol.      Collette Helton, RD, MS, LD  6B- Pager: [phone]

## 2019-05-10 NOTE — PLAN OF CARE
SLP: Cancel - Pt brought to radiology for videoswallow study with ST to evaluate readiness for esophagram. Per discussion with radiology attending, decision made to proceed with esophagram w/o videoswallow. Discussed with thoracic PA. SLP will follow-up if indicated.

## 2019-05-10 NOTE — PLAN OF CARE
Discharge Planner SLP   Patient plan for discharge: Unknown  Current status: Repeat videoswallow study completed per MD order. Under fluoroscopy, pt presents with mild pharyngeal dysphagia in setting of prolonged NPO status; swallow function has improved since removal of pharyngostomy tube. Pt assessed with thin liquids via cup sip. Oral phase WFL. Swallow trigger mildly delayed. Once triggered, velar elevation and BOT retraction WFL; pharyngeal constriction and hyolaryngeal elevation/excursion were reduced. Epiglottic inversion was incomplete. Consistent penetration to the level of the true vocal cords occurred, with penetrated material remaining in laryngeal vestibule. Pt then cued to complete chin tuck strategy; this improved epiglottic inversion and eliminated penetration. No aspiration observed during the study. From ST perspective, pt clear for thin liquids when using chin tuck strategy; defer to thoracic for initiation of PO. Recommend ongoing ST targeting pharyngeal strengthening. SLP to follow.  Barriers to return to prior living situation: NPO status  Recommendations for discharge: Defer to MD  Rationale for recommendations: Pt will benefit from ongoing ST targeting swallow function       Entered by: Tiffanie Hutchison 05/10/2019 2:23 PM

## 2019-05-10 NOTE — PROGRESS NOTES
Social Work Services Progress Note    Hospital Day: 14  Date of Initial Social Work Evaluation:  5/3/19  Collaborated with:  Carroll Fung (Shaylee),  [facility] ([facility]), Chart Review    Data:  Pt is 72 y/o female admitted to [facility] on 4/26/19 s/p redo laparotomy and partial sternotomy, esophagogastrostomy on 4/26/2019. Per chart review, Pt may be ready for discharge early next week. Pt's pharyngostomy was removed yesterday.     Intervention:  SW continues to follow for discharge needs.    Referrals in Process:  -Carroll Fung (Ph: [phone], Admissions: [phone], F: [phone]): SW spoke with Shaylee in Admissions today and she will review Pt for admission next week.   -[facility] ([id number]): SW spoke with Rubia and they are following for potential discharge needs.     Assessment:  TCU referrals in process.    Plan:    Anticipated Disposition:  Facility:  TCU (D)    Barriers to d/c plan:  Medical stability    Follow Up:  SW to continue to follow and assist with discharge plan.    VINAYAK Godoy, LGSW  6B Intermediate Care Unit   Phone: [phone]  Pager: [phone]

## 2019-05-10 NOTE — PROGRESS NOTES
[facility] ID SERVICE: FOLLOW UP  Patient:  Myrtle Vaz, Date of birth 1945, Medical record number [id number]  Date of Admission: 4/26/2019         Assessment and Recommendations:   Problem List:  - H/o hiatal hernia repair with mesh reinforcement, nissen fundoplication, and gastrostomy tube placement c/b postop esophageal perforation 11/2018  - s/p Nissen takedown, drainage of mediastinal abscess, trans-hiatal partial esophagogastrectomy, spit fistula, gastrostomy and jejunostomy 11/2018  - s/p redo laparotomy, partial sternotomy, intrabdominal retrosternal esophagogastrostomy 4/26/2019  - Postop anastomotic leak now s/p EGD, flex bronch, esophageal stent, neck washout 5/6  - BAL with stenotrophomonas maltophilia, strep agalactiae, canida albicans.  - Mediastinal abscess cultures from 5/6 with strep anginosus, strep agalactiae, enterococcus faecalis and candida albicans.    Discussion:  73 year old female with PMH of laparoscopic hiatal hernia repair with mesh reinforcement, nissen fundoplication, and gastrostomy tube placement c/b postop esophageal perforation 11/2018 s/p Nissen takedown, drainage of mediastinal abscess, trans-hiatal partial esophagogastrectomy, spit fistula, gastrostomy and jejunostomy. Admitted for redo laparotomy, partial sternotomy, intrabdominal retrosternal esophagogastrostomy for continued pain and clogged G-tube, procedure done on 4/26. Found to have anastomotic leak 5/5 on CT now s/p EGD, flex bronch, esophageal stent, neck washout on 5/6.  Bronchoscopy on 5/2 with stenotrophomonas maltophilia, strep agalactiae, fungal cultures with candida albicans. Mediastinal abscess cultures from 5/6 with strep anginosus, enterococcus faecalis and candida albicans. ID consulted for antibiotic recommendations. Was on vanc/zosyn/fluc/bactrim. Continued zosyn to cover identified strep species and enterococcus faecalis, and fluconazole for candida albicans. Discontinued vancomycin given no  "resistant gram positive organisms have been identified and bactrim as stenotrophomonas likely colonizer. Doing well, will continue current antibiotics. Duration TBD based on clinical course.     Recommendations:  - Continue Zosyn and Fluconazole, duration TBD based on clinical course.   - ID will sign off for now  - Will need ID clinic follow up  - Please call us back when planning discharge for further management plans.    Recommendations discussed with primary team.  Please feel free to call with any questions.     Plan of care discussed with Staff ID Physician Dr Antonieta Craft  PGY4 Infectious Diseases Fellow  Pager: [phone]       Subjective and interval events:     Tmax 99. Pharyngotomy tube removed yesterday afternoon. Patient feeling well. Denies pain, fevers, chills, sweats, cough, abdominal pain. Video swallow and esophagogram today.         Review of Systems:    5 point ROS negative other than above         Physical Exam:   /82 (BP Location: Left arm)   Pulse 76   Temp 99  F (37.2  C) (Oral)   Resp 18   Ht 1.651 m (5' 5\")   Wt 81.1 kg (178 lb 12.7 oz)   SpO2 95%   BMI 29.75 kg/m     Exam:  GENERAL:  Well-developed, well-nourished, sitting in bed in no acute distress.   ENT:  Head is normocephalic, atraumatic. Oropharynx is moist without exudates or ulcers.  EYES:  Eyes have anicteric sclerae.    NECK:  Dressing in place over surgical site, CDI  LUNGS:  Coarse BS over left anterior field  CARDIOVASCULAR:  Regular rate and rhythm with no murmurs, gallops or rubs.  ABDOMEN:  Normal bowel sounds, soft, nontender.   EXT: Extremities warm and without edema.  SKIN:  No acute rashes. Line is in place without any surrounding erythema.  Surgical scars - laparotomy and sternotomy CDI, well approximated. Wound vac over previous spit fistula over chest.   Pigtail drain over right chest. G tube all without surrounding erythema /ttp  NEUROLOGIC:  Grossly nonfocal.         " Laboratory Data:     Creatinine   Date Value Ref Range Status   05/10/2019 0.90 0.52 - 1.04 mg/dL Final   05/09/2019 0.79 0.52 - 1.04 mg/dL Final   05/08/2019 0.82 0.52 - 1.04 mg/dL Final   05/07/2019 0.80 0.52 - 1.04 mg/dL Final   05/06/2019 0.76 0.52 - 1.04 mg/dL Final     WBC   Date Value Ref Range Status   05/10/2019 13.8 (H) 4.0 - 11.0 10e9/L Final   05/09/2019 16.7 (H) 4.0 - 11.0 10e9/L Final   05/08/2019 16.4 (H) 4.0 - 11.0 10e9/L Final   05/07/2019 19.4 (H) 4.0 - 11.0 10e9/L Final   05/06/2019 17.7 (H) 4.0 - 11.0 10e9/L Final     Hemoglobin   Date Value Ref Range Status   05/10/2019 7.5 (L) 11.7 - 15.7 g/dL Final     Platelet Count   Date Value Ref Range Status   05/10/2019 1,036 (HH) 150 - 450 10e9/L Final     Comment:     .   Critical result, provider not notified due to previous critical result   notification.       Lab Results   Component Value Date     05/10/2019    BUN 20 05/10/2019    CO2 29 05/10/2019     CRP Inflammation   Date Value Ref Range Status   02/04/2019 14.0 (H) 0.0 - 8.0 mg/L Final   11/25/2018 160.0 (H) 0.0 - 8.0 mg/L Final           Pertinent Recent Microbiology Data:     Recent Labs   Lab 05/06/19  1530 05/05/19  0800 05/03/19 2015   CULT Moderate growth  Streptococcus anginosus  Susceptibility testing in progress  *  Light growth  Enterococcus faecalis  *  Light growth  Streptococcus agalactiae sero group B  Susceptibility testing in progress  *  Light growth  Candida albicans / dubliniensis  Candida albicans and Candida dubliniensis are not routinely speciated  Susceptibility testing not routinely done  *  Culture negative after 3 days  Culture negative monitoring continues  --  <10,000 colonies/mL  mixed urogenital merry  Susceptibility testing not routinely done     SDES Mediastinal Abscess  Mediastinal Abscess  Mediastinal Abscess  Mediastinal Abscess Feces Midstream Urine            Imaging:     Esophagogram 5/10:  Impression:   1. No esophageal leak  demonstrated.  2. Stasis of contrast up to the superior esophagus suggesting  Dysmotility.    CXR 5/10  Impression:   1. The pharyngostomy stent is unchanged in position from prior  examination. Additional support devices are stable.  2. Trace pleural effusions and bibasilar atelectasis are unchanged.    Videoswallow 5/10  Impression: Penetration without aspiration with swallows of Omnipaque  140 contrast which is improved with chin tuck technique

## 2019-05-10 NOTE — PLAN OF CARE
Neuro: A&Ox4. Pleasant demeanor. Patient demonstrating appropriate performance of SLP exercises.  Cardiac: SR. VSS.   Respiratory: Sating > 95% on 1-2L per NC. Good use of incentive spirometer.  GI/: Adequate urine output. Loose BM x 1.  Diet/appetite: Tolerating cycled TF @ goal rate.   Activity:  Assist of 1 up to BSC.  Pain: At acceptable level on current regimen.   Skin: No new deficits noted.  LDA's: Removed saturated packing in neck wound x 1, dressed per orders. Wound vac to -50 continuous, minimal output. Scant output from ANGELO,    Plan: Repeat video swallow eval today. Continue with POC. Notify primary team with changes.

## 2019-05-10 NOTE — PLAN OF CARE
6B PT  Patient plan for discharge: home with assist  Current status: Pt requires Phil with bed mobility and reinforcement of sternal and abdominal precautions throughout session,  transfers sit<>stand with CGA and ambulated 20' with FWW. Mild balance impairments noted and standing balance exercises initiated. AVSS on 2L NC, session limited by arrival of pt transport to Xray/swallow study.   Barriers to return to prior living situation: post-op precautions, activity tolerance, balance, acute medical needs  Recommendations for discharge: ARU  Rationale for recommendations: Pt continues to be motivated to progress toward PLOF, however remains below baseline mobility. Pt could tolerate 3 hrs of therapy / day and would benefit from continued intensive therapy at ARU to progress balance, strength, and activity tolerance in order to improve functional independence. Pt was IND and living active lifestyle prior to hospital stay, pt lives alone and has very supportive daughter who is available to assist with pt needs.        Entered by: Elisha Devine 05/10/2019 10:22 AM

## 2019-05-10 NOTE — PROGRESS NOTES
THORACIC & FOREGUT SURGERY PROGRESS NOTE  05/10/2019    Patient: Myrtle Vaz  MRN: [id number]    Subjective  No acute events overnight. Pharyngostomy tube removed yesterday. Pt comfortable in bed. Neck wound dressing changed this AM, tolerated well. Tolerating tube feeds cycled over 18 hours.     Objective  Temp:  [97.9  F (36.6  C)-99  F (37.2  C)] 99  F (37.2  C)  Pulse:  [76] 76  Heart Rate:  [72-81] 80  Resp:  [16-18] 18  BP: ()/(52-82) 123/82  SpO2:  [93 %-98 %] 95 %    General: NAD  HEENT/chest: neck incision packed with kerlix, mediastinal drain to bulb suction. Wound VAC in place, holding suction if neck incision covered with occlusive dressing  CV: WWP, non-cyanotic   Pulm: On 2L NC breathing comfortably  Abd: soft, non-distended, appropriately tender  Extremities: no edema  Neuro: moving all extremities spontaneously without apparent deficit    I/O last 3 completed shifts:  In: 1945 [I.V.:520; NG/GT:300]  Out: 2815 [Urine:1900; Drains:15; Other:900]    Labs:    Recent Labs   Lab 05/10/19  0436 05/09/19  0616 05/08/19  0509   WBC 13.8* 16.7* 16.4*   HGB 7.5* 8.1* 7.6*   PLT 1,036* 1,053* 927*     Recent Labs   Lab 05/10/19  0436 05/09/19  0616 05/08/19  0509 05/07/19  0508    133 133 131*   POTASSIUM 3.9 3.9 4.3 4.4   CHLORIDE 99 98 98 100   CO2 29 27 28 25   BUN 20 21 18 16   CR 0.90 0.79 0.82 0.80   GLC 94 116* 90 160*   JOSE 7.6* 7.8* 8.0* 7.9*   MAG  --   --   --  2.3   PHOS  --   --   --  3.4     Imaging:  CXR reviewed: stable from previous. Esophageal stent in place.     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 Albarado. Found to have anastomotic leak per CT scan on 5/519 and is s/p EGD, flex bronch, possible esophageal stent, neck wound washout. Recovering appropriately. Pharyngostomy removed 5/9.     - Pain control with  enteral medications    - Strict I/O  - WBC downtrending, afebrile. Abx per ID, now on Zosyn (5/3-) and Fluconazole (5/6-)          -Mediastinal fluid cultures: Strep angiosus, Enterococcus faecalis, Group B strep, Candida           -Vanc (5/6-5/9), Bactrim (5/6-5/9)  - Continue strict NPO with J tube feeds. Cycled over 18 hours.   - Aspirin has been started for thrombocytosis (plt 1,036 from 1,053)  - Plans for repeat esophagram and video swallow this morning.   - Alek drain to bulb (now holding suction)  - Wound vac to -50 mmHg. Vac changed today, schedule MWF (next Friday, need to occlude neck wound to maintain suction)  - BID dressing changes to neck wound. MD to perform morning change, RN to perform afternoon.   - Midline wound staples removed today   - Laxix 20mg IV x2 today   - Lovenox for ppx.  - Dispo: Likely early next week    Seen with fellow, to be discussed with staff.     Johana Randall MD   Surgery PGY-1

## 2019-05-11 ENCOUNTER — APPOINTMENT (OUTPATIENT)
Dept: PHYSICAL THERAPY | Facility: CLINIC | Age: 74
DRG: 326 | End: 2019-05-11
Attending: THORACIC SURGERY (CARDIOTHORACIC VASCULAR SURGERY)
Payer: MEDICARE

## 2019-05-11 ENCOUNTER — APPOINTMENT (OUTPATIENT)
Dept: GENERAL RADIOLOGY | Facility: CLINIC | Age: 74
DRG: 326 | End: 2019-05-11
Attending: THORACIC SURGERY (CARDIOTHORACIC VASCULAR SURGERY)
Payer: MEDICARE

## 2019-05-11 ENCOUNTER — APPOINTMENT (OUTPATIENT)
Dept: SPEECH THERAPY | Facility: CLINIC | Age: 74
DRG: 326 | End: 2019-05-11
Attending: THORACIC SURGERY (CARDIOTHORACIC VASCULAR SURGERY)
Payer: MEDICARE

## 2019-05-11 ENCOUNTER — APPOINTMENT (OUTPATIENT)
Dept: OCCUPATIONAL THERAPY | Facility: CLINIC | Age: 74
DRG: 326 | End: 2019-05-11
Attending: THORACIC SURGERY (CARDIOTHORACIC VASCULAR SURGERY)
Payer: MEDICARE

## 2019-05-11 LAB
ANION GAP SERPL CALCULATED.3IONS-SCNC: 6 MMOL/L (ref 3–14)
BACTERIA SPEC CULT: ABNORMAL
BUN SERPL-MCNC: 22 MG/DL (ref 7–30)
CALCIUM SERPL-MCNC: 8 MG/DL (ref 8.5–10.1)
CHLORIDE SERPL-SCNC: 96 MMOL/L (ref 94–109)
CO2 SERPL-SCNC: 31 MMOL/L (ref 20–32)
CREAT SERPL-MCNC: 0.9 MG/DL (ref 0.52–1.04)
ERYTHROCYTE [DISTWIDTH] IN BLOOD BY AUTOMATED COUNT: 21.2 % (ref 10–15)
GFR SERPL CREATININE-BSD FRML MDRD: 64 ML/MIN/{1.73_M2}
GLUCOSE SERPL-MCNC: 107 MG/DL (ref 70–99)
HCT VFR BLD AUTO: 27.3 % (ref 35–47)
HGB BLD-MCNC: 7.9 G/DL (ref 11.7–15.7)
Lab: ABNORMAL
MCH RBC QN AUTO: 27.9 PG (ref 26.5–33)
MCHC RBC AUTO-ENTMCNC: 28.9 G/DL (ref 31.5–36.5)
MCV RBC AUTO: 97 FL (ref 78–100)
PLATELET # BLD AUTO: 1193 10E9/L (ref 150–450)
POTASSIUM SERPL-SCNC: 4.1 MMOL/L (ref 3.4–5.3)
RBC # BLD AUTO: 2.83 10E12/L (ref 3.8–5.2)
SODIUM SERPL-SCNC: 133 MMOL/L (ref 133–144)
SPECIMEN SOURCE: ABNORMAL
WBC # BLD AUTO: 12.7 10E9/L (ref 4–11)

## 2019-05-11 PROCEDURE — 25000132 ZZH RX MED GY IP 250 OP 250 PS 637: Performed by: SURGERY

## 2019-05-11 PROCEDURE — 27210437 ZZH NUTRITION PRODUCT SEMIELEM INTERMED LITER

## 2019-05-11 PROCEDURE — 12000004 ZZH R&B IMCU UMMC

## 2019-05-11 PROCEDURE — 40000558 ZZH STATISTIC CVC DRESSING CHANGE

## 2019-05-11 PROCEDURE — 97116 GAIT TRAINING THERAPY: CPT | Mod: GP | Performed by: PHYSICAL THERAPIST

## 2019-05-11 PROCEDURE — 92526 ORAL FUNCTION THERAPY: CPT | Mod: GN

## 2019-05-11 PROCEDURE — 85049 AUTOMATED PLATELET COUNT: CPT | Performed by: THORACIC SURGERY (CARDIOTHORACIC VASCULAR SURGERY)

## 2019-05-11 PROCEDURE — A9270 NON-COVERED ITEM OR SERVICE: HCPCS | Performed by: SURGERY

## 2019-05-11 PROCEDURE — 40000802 ZZH SITE CHECK

## 2019-05-11 PROCEDURE — 25000128 H RX IP 250 OP 636: Performed by: SURGERY

## 2019-05-11 PROCEDURE — 97535 SELF CARE MNGMENT TRAINING: CPT | Mod: GO | Performed by: OCCUPATIONAL THERAPIST

## 2019-05-11 PROCEDURE — 36592 COLLECT BLOOD FROM PICC: CPT | Performed by: SURGERY

## 2019-05-11 PROCEDURE — 25000132 ZZH RX MED GY IP 250 OP 250 PS 637: Performed by: THORACIC SURGERY (CARDIOTHORACIC VASCULAR SURGERY)

## 2019-05-11 PROCEDURE — 71046 X-RAY EXAM CHEST 2 VIEWS: CPT

## 2019-05-11 PROCEDURE — 25000131 ZZH RX MED GY IP 250 OP 636 PS 637: Performed by: SURGERY

## 2019-05-11 PROCEDURE — 85027 COMPLETE CBC AUTOMATED: CPT | Performed by: SURGERY

## 2019-05-11 PROCEDURE — 80048 BASIC METABOLIC PNL TOTAL CA: CPT | Performed by: SURGERY

## 2019-05-11 PROCEDURE — 97530 THERAPEUTIC ACTIVITIES: CPT | Mod: GP | Performed by: PHYSICAL THERAPIST

## 2019-05-11 PROCEDURE — C9113 INJ PANTOPRAZOLE SODIUM, VIA: HCPCS | Performed by: SURGERY

## 2019-05-11 RX ORDER — FUROSEMIDE 10 MG/ML
20 INJECTION INTRAMUSCULAR; INTRAVENOUS EVERY 12 HOURS
Status: COMPLETED | OUTPATIENT
Start: 2019-05-11 | End: 2019-05-11

## 2019-05-11 RX ORDER — MAGNESIUM HYDROXIDE 1200 MG/15ML
LIQUID ORAL
Status: DISPENSED
Start: 2019-05-11 | End: 2019-05-12

## 2019-05-11 RX ADMIN — Medication 50 MG: at 02:45

## 2019-05-11 RX ADMIN — SENNOSIDES AND DOCUSATE SODIUM 2 TABLET: 8.6; 5 TABLET ORAL at 09:25

## 2019-05-11 RX ADMIN — ONDANSETRON HYDROCHLORIDE 4 MG: 4 TABLET, FILM COATED ORAL at 05:14

## 2019-05-11 RX ADMIN — ONDANSETRON HYDROCHLORIDE 4 MG: 4 TABLET, FILM COATED ORAL at 09:24

## 2019-05-11 RX ADMIN — CHLORHEXIDINE GLUCONATE AND ISOPROPYL ALCOHOL 3 ML: 20; .7 SOLUTION TOPICAL at 20:47

## 2019-05-11 RX ADMIN — PANTOPRAZOLE SODIUM 40 MG: 40 INJECTION, POWDER, LYOPHILIZED, FOR SOLUTION INTRAVENOUS at 09:25

## 2019-05-11 RX ADMIN — ACETAMINOPHEN 975 MG: 325 TABLET, FILM COATED ORAL at 05:14

## 2019-05-11 RX ADMIN — VENLAFAXINE 75 MG: 75 TABLET ORAL at 20:20

## 2019-05-11 RX ADMIN — ACETAMINOPHEN 975 MG: 325 TABLET, FILM COATED ORAL at 14:40

## 2019-05-11 RX ADMIN — BACITRACIN: 500 OINTMENT TOPICAL at 20:47

## 2019-05-11 RX ADMIN — AMIODARONE HYDROCHLORIDE 200 MG: 200 TABLET ORAL at 09:26

## 2019-05-11 RX ADMIN — ONDANSETRON HYDROCHLORIDE 4 MG: 4 TABLET, FILM COATED ORAL at 21:58

## 2019-05-11 RX ADMIN — Medication 1 PACKET: at 09:40

## 2019-05-11 RX ADMIN — CHLORHEXIDINE GLUCONATE 0.12% ORAL RINSE 30 ML: 1.2 LIQUID ORAL at 09:27

## 2019-05-11 RX ADMIN — FUROSEMIDE 20 MG: 10 INJECTION, SOLUTION INTRAVENOUS at 20:20

## 2019-05-11 RX ADMIN — ACETAMINOPHEN 975 MG: 325 TABLET, FILM COATED ORAL at 21:58

## 2019-05-11 RX ADMIN — POLYETHYLENE GLYCOL 3350 17 G: 17 POWDER, FOR SOLUTION ORAL at 09:26

## 2019-05-11 RX ADMIN — PIPERACILLIN AND TAZOBACTAM 3.38 G: 3; .375 INJECTION, POWDER, FOR SOLUTION INTRAVENOUS at 04:33

## 2019-05-11 RX ADMIN — Medication 1 PACKET: at 14:40

## 2019-05-11 RX ADMIN — VENLAFAXINE 75 MG: 75 TABLET ORAL at 09:24

## 2019-05-11 RX ADMIN — CHLORHEXIDINE GLUCONATE 0.12% ORAL RINSE 30 ML: 1.2 LIQUID ORAL at 21:59

## 2019-05-11 RX ADMIN — LEVOTHYROXINE SODIUM 150 MCG: 150 TABLET ORAL at 09:24

## 2019-05-11 RX ADMIN — CHLORHEXIDINE GLUCONATE AND ISOPROPYL ALCOHOL 3 ML: 20; .7 SOLUTION TOPICAL at 09:27

## 2019-05-11 RX ADMIN — Medication 5 ML: at 17:00

## 2019-05-11 RX ADMIN — BACITRACIN: 500 OINTMENT TOPICAL at 09:26

## 2019-05-11 RX ADMIN — PIPERACILLIN AND TAZOBACTAM 3.38 G: 3; .375 INJECTION, POWDER, FOR SOLUTION INTRAVENOUS at 17:00

## 2019-05-11 RX ADMIN — Medication 50 MG: at 17:00

## 2019-05-11 RX ADMIN — Medication 50 MG: at 09:24

## 2019-05-11 RX ADMIN — PIPERACILLIN AND TAZOBACTAM 3.38 G: 3; .375 INJECTION, POWDER, FOR SOLUTION INTRAVENOUS at 11:43

## 2019-05-11 RX ADMIN — PIPERACILLIN AND TAZOBACTAM 3.38 G: 3; .375 INJECTION, POWDER, FOR SOLUTION INTRAVENOUS at 21:58

## 2019-05-11 RX ADMIN — FLUCONAZOLE 400 MG: 400 INJECTION, SOLUTION INTRAVENOUS at 09:47

## 2019-05-11 RX ADMIN — FUROSEMIDE 20 MG: 10 INJECTION, SOLUTION INTRAVENOUS at 09:25

## 2019-05-11 RX ADMIN — Medication 80 MG: at 09:25

## 2019-05-11 RX ADMIN — Medication 1 PACKET: at 20:26

## 2019-05-11 RX ADMIN — ENOXAPARIN SODIUM 40 MG: 40 INJECTION SUBCUTANEOUS at 12:32

## 2019-05-11 RX ADMIN — ONDANSETRON HYDROCHLORIDE 4 MG: 4 TABLET, FILM COATED ORAL at 16:59

## 2019-05-11 ASSESSMENT — MIFFLIN-ST. JEOR: SCORE: 1282.9

## 2019-05-11 ASSESSMENT — ACTIVITIES OF DAILY LIVING (ADL)
ADLS_ACUITY_SCORE: 19

## 2019-05-11 ASSESSMENT — PAIN DESCRIPTION - DESCRIPTORS: DESCRIPTORS: CONSTANT;DULL

## 2019-05-11 NOTE — PROGRESS NOTES
STAFF ADDENDUM:  I saw and evaluated Ms. Vaz and agree with the resident s findings and plan of care as documented in the resident s note and edited by me, as applicable.      In summary, Ms. Vaz is doing well. Swallow yesterday is OK. We'll start her on sips of clears today and liq diet tomorrow. Remove ANGELO on Monday if everything is OK.  The patient had all questions answered and was in agreement with the plan.  Krystian Jaime MD

## 2019-05-11 NOTE — PLAN OF CARE
Discharge Planner SLP   Patient plan for discharge: Unknown  Current status: Pt clear for sips of clear liquids per thoracic. Pt to be fully alert and upright for all PO, taking small bites/sips at slow rate. Chin tuck for all sips of liquid. Caregivers to encourage independent completion of pharyngeal strengthening exercises. SLP to follow.  Barriers to return to prior living situation: Modified diet/dysphagia, medical condition  Recommendations for discharge: TCU  Rationale for recommendations: Pt currently below baseline for swallow function       Entered by: Tiffanie Hutchison 05/11/2019 10:11 AM

## 2019-05-11 NOTE — PROGRESS NOTES
THORACIC & FOREGUT SURGERY PROGRESS NOTE  05/11/2019    Patient: Myrtle Vaz  MRN: [id number]    Subjective  No acute events overnight. Doing well, no complaints. Wants to try some clears; very excited. Having BMs. Walked some yesterday as well. Tolerating tube feeds cycled.     Objective  Temp:  [97.8  F (36.6  C)-98.9  F (37.2  C)] 97.8  F (36.6  C)  Heart Rate:  [72-85] 72  Resp:  [18-20] 18  BP: (106-123)/(55-79) 107/55  SpO2:  [96 %-98 %] 96 %    General: NAD  HEENT/chest: neck incision packed with Kerlix soupy but clean, mediastinal drain to bulb suction to thick cloudy yellow-brown output. Wound VAC in place, holding suction with brown output  CV: WWP, non-cyanotic   Pulm: On 2L NC breathing comfortably  Abd: soft, non-distended, appropriately tender  Extremities: no edema  Neuro: moving all extremities spontaneously without apparent deficit    I/O last 3 completed shifts:  In: 1550 [I.V.:80; NG/GT:270]  Out: 2600 [Urine:1500; Other:700; Stool:400]    Labs:    Recent Labs   Lab 05/11/19  0440 05/10/19  0436 05/09/19  0616   WBC 12.7* 13.8* 16.7*   HGB 7.9* 7.5* 8.1*   PLT 1,193* 1,036* 1,053*     Recent Labs   Lab 05/11/19  0440 05/10/19  0436 05/09/19  0616  05/07/19  0508    134 133   < > 131*   POTASSIUM 4.1 3.9 3.9   < > 4.4   CHLORIDE 96 99 98   < > 100   CO2 31 29 27   < > 25   BUN 22 20 21   < > 16   CR 0.90 0.90 0.79   < > 0.80   * 94 116*   < > 160*   JOSE 8.0* 7.6* 7.8*   < > 7.9*   MAG  --   --   --   --  2.3   PHOS  --   --   --   --  3.4    < > = values in this interval not displayed.     Imaging:  CXR stable      Assessment & Plan  Myrtle Vaz is a 73 year old female with a 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 Albarado. Found to have anastomotic leak per CT scan on 5/519 and is s/p EGD, flex bronch, possible esophageal stent, neck wound washout. Recovering appropriately.  Pharyngostomy removed 5/9.     - Pain control with enteral medications    - Strict I/O  - WBC downtrending, afebrile. Zosyn (5/3-) and Fluconazole (5/6-). ID signed off.          -Mediastinal fluid cultures: Strep angiosus, Enterococcus faecalis, Group B strep, Candida, Parvimonas micra (covered by Zosyn per ID)          -Vanc (5/6-5/9), Bactrim (5/6-5/9)  - Advance to sips of clears, likely clear liquid diet tomorrow without restrictions. J tube feeds cycled over 18 hours.   - Aspirin has been started for thrombocytosis.  - Alek drain to bulb (now holding suction), plan removal Monday 5/13.  - Wound vac to -50 mmHg. Vac change MW  - BID dressing changes to neck wound. MD to perform morning change, RN to perform afternoon.   - Laxix 20mg IV x2 today   - Lovenox for ppx.  - Dispo: Likely early next week to TCU      Seen and discussed with fellow and staff.  - - - - - - - - - - - - - - - - - -  Kamila Morrison MD  General Surgery PGY-3  See [facility] for on call information prior to paging me directly. Pager [phone].

## 2019-05-11 NOTE — PLAN OF CARE
Discharge Planner PT   Patient plan for discharge: not discussed  Current status: Pt supine in bed upon entry, agreeable to PT session. Pt transfers with CGA-min A. Cues for 100% of transfers to adhere to sternal precautions. Pt oriented to date/Saturday, states she has never heard about her sternal precautions. Pt ambulated 200 ft with FWW, CGA-min A. VSS.   Barriers to return to prior living situation: confusion, medical status, impaired balance, post surgical precautions, lives alone   Recommendations for discharge: ARU  Rationale for recommendations: Pt continues to be motivated to progress toward PLOF, however remains below baseline mobility. Pt could tolerate 3 hrs of therapy / day and would benefit from continued intensive therapy at ARU to progress balance, strength, and activity tolerance in order to improve functional independence. Pt was IND and living active lifestyle prior to hospital stay, pt lives alone and has very supportive daughter who is available to assist with pt needs.          Entered by: Michelle Mar 05/11/2019 4:19 PM

## 2019-05-11 NOTE — PLAN OF CARE
"/66 (BP Location: Left arm, Cuff Size: Adult Regular)   Pulse 76   Temp 98.9  F (37.2  C) (Oral)   Resp 20   Ht 1.651 m (5' 5\")   Wt 80.7 kg (177 lb 14.6 oz)   SpO2 98%   BMI 29.61 kg/m    Neuro: A&Ox4.   Cardiac: SR. VSS.   Respiratory: Sating >94% on 2L NC.  GI/: Adequate urine output. No BM this shift, BS+  Diet/appetite: NPO, cycyled TF @ 75mL/hr via J tube, [id number]  Activity:  Assist of 1, up to chair and in halls.  Pain: At acceptable level on current regimen.   Skin: No new deficits noted.  LDA's:  ANGELO, wound vac to -50 continuous, RUE PICC    Plan: Continue with POC. Notify primary team with changes.    "

## 2019-05-11 NOTE — PLAN OF CARE
Discharge Planner OT   Patient plan for discharge: not discussed  Current status: CGA for ambulation to bathroom with FWW. Performed standing and seated sponge bath, generally with SBA. Some incisional pain during LB dressing, needing several VCs throughout session to maintain precautions. Somewhat forgetful and impulsive. All VSS otherwise.  Barriers to return to prior living situation: Decreased ADL independence and activity tolerance  Recommendations for discharge: TCU  Rationale for recommendations: Increase functional endurance and ADL independence at rehab       Entered by: Mayco Gutierrez 05/11/2019 11:42 AM

## 2019-05-11 NOTE — PLAN OF CARE
"Neuro: A&Ox4.   Cardiac: SR. VSS./58 (BP Location: Left arm, Cuff Size: Adult Regular)   Pulse 76   Temp 98.7  F (37.1  C) (Oral)   Resp 18   Ht 1.651 m (5' 5\")   Wt 80.7 kg (177 lb 14.6 oz)   SpO2 98%   BMI 29.61 kg/m    Respiratory: Sating * 97-99% on 2L NC  GI/: Adequate urine output. BM X1(loose diarrhea)  Diet/appetite: Tolerating NPO tube feeding at 75 ml/hr from 4pm to 10 am.  Activity:  Assist of 1, up to chair and in halls.  Pain: At acceptable level on current regimen. Prn pain medication administered times one  Skin: No change  LDA's: Double lumen PICC in the right.  Cap change completed 05/11/19    Plan: Continue with POC. Notify primary team with changes.   "

## 2019-05-12 ENCOUNTER — APPOINTMENT (OUTPATIENT)
Dept: GENERAL RADIOLOGY | Facility: CLINIC | Age: 74
DRG: 326 | End: 2019-05-12
Attending: THORACIC SURGERY (CARDIOTHORACIC VASCULAR SURGERY)
Payer: MEDICARE

## 2019-05-12 ENCOUNTER — APPOINTMENT (OUTPATIENT)
Dept: PHYSICAL THERAPY | Facility: CLINIC | Age: 74
DRG: 326 | End: 2019-05-12
Attending: THORACIC SURGERY (CARDIOTHORACIC VASCULAR SURGERY)
Payer: MEDICARE

## 2019-05-12 LAB
ANION GAP SERPL CALCULATED.3IONS-SCNC: 6 MMOL/L (ref 3–14)
BUN SERPL-MCNC: 23 MG/DL (ref 7–30)
CALCIUM SERPL-MCNC: 7.9 MG/DL (ref 8.5–10.1)
CHLORIDE SERPL-SCNC: 97 MMOL/L (ref 94–109)
CO2 SERPL-SCNC: 31 MMOL/L (ref 20–32)
CREAT SERPL-MCNC: 0.93 MG/DL (ref 0.52–1.04)
CRP SERPL-MCNC: 97 MG/L (ref 0–8)
ERYTHROCYTE [DISTWIDTH] IN BLOOD BY AUTOMATED COUNT: 21.1 % (ref 10–15)
GFR SERPL CREATININE-BSD FRML MDRD: 61 ML/MIN/{1.73_M2}
GLUCOSE SERPL-MCNC: 110 MG/DL (ref 70–99)
HCT VFR BLD AUTO: 25.9 % (ref 35–47)
HGB BLD-MCNC: 7.5 G/DL (ref 11.7–15.7)
MCH RBC QN AUTO: 27.8 PG (ref 26.5–33)
MCHC RBC AUTO-ENTMCNC: 29 G/DL (ref 31.5–36.5)
MCV RBC AUTO: 96 FL (ref 78–100)
PLATELET # BLD AUTO: 1143 10E9/L (ref 150–450)
POTASSIUM SERPL-SCNC: 3.9 MMOL/L (ref 3.4–5.3)
RBC # BLD AUTO: 2.7 10E12/L (ref 3.8–5.2)
SODIUM SERPL-SCNC: 133 MMOL/L (ref 133–144)
WBC # BLD AUTO: 11.5 10E9/L (ref 4–11)

## 2019-05-12 PROCEDURE — 25000132 ZZH RX MED GY IP 250 OP 250 PS 637: Performed by: SURGERY

## 2019-05-12 PROCEDURE — 27210437 ZZH NUTRITION PRODUCT SEMIELEM INTERMED LITER

## 2019-05-12 PROCEDURE — A9270 NON-COVERED ITEM OR SERVICE: HCPCS | Performed by: SURGERY

## 2019-05-12 PROCEDURE — 25000128 H RX IP 250 OP 636: Performed by: SURGERY

## 2019-05-12 PROCEDURE — 25000132 ZZH RX MED GY IP 250 OP 250 PS 637: Performed by: THORACIC SURGERY (CARDIOTHORACIC VASCULAR SURGERY)

## 2019-05-12 PROCEDURE — 25000132 ZZH RX MED GY IP 250 OP 250 PS 637: Performed by: STUDENT IN AN ORGANIZED HEALTH CARE EDUCATION/TRAINING PROGRAM

## 2019-05-12 PROCEDURE — 71046 X-RAY EXAM CHEST 2 VIEWS: CPT

## 2019-05-12 PROCEDURE — 12000004 ZZH R&B IMCU UMMC

## 2019-05-12 PROCEDURE — 25000131 ZZH RX MED GY IP 250 OP 636 PS 637: Performed by: SURGERY

## 2019-05-12 PROCEDURE — 80048 BASIC METABOLIC PNL TOTAL CA: CPT | Performed by: INTERNAL MEDICINE

## 2019-05-12 PROCEDURE — 86140 C-REACTIVE PROTEIN: CPT | Performed by: INTERNAL MEDICINE

## 2019-05-12 PROCEDURE — 85027 COMPLETE CBC AUTOMATED: CPT | Performed by: SURGERY

## 2019-05-12 PROCEDURE — A9270 NON-COVERED ITEM OR SERVICE: HCPCS | Performed by: STUDENT IN AN ORGANIZED HEALTH CARE EDUCATION/TRAINING PROGRAM

## 2019-05-12 PROCEDURE — 36592 COLLECT BLOOD FROM PICC: CPT | Performed by: INTERNAL MEDICINE

## 2019-05-12 PROCEDURE — 36592 COLLECT BLOOD FROM PICC: CPT | Performed by: SURGERY

## 2019-05-12 RX ORDER — FUROSEMIDE 10 MG/ML
20 SOLUTION ORAL 2 TIMES DAILY
Status: COMPLETED | OUTPATIENT
Start: 2019-05-12 | End: 2019-05-13

## 2019-05-12 RX ADMIN — VENLAFAXINE 75 MG: 75 TABLET ORAL at 21:09

## 2019-05-12 RX ADMIN — ONDANSETRON HYDROCHLORIDE 4 MG: 4 TABLET, FILM COATED ORAL at 10:37

## 2019-05-12 RX ADMIN — ONDANSETRON HYDROCHLORIDE 4 MG: 4 TABLET, FILM COATED ORAL at 21:09

## 2019-05-12 RX ADMIN — Medication 50 MG: at 00:01

## 2019-05-12 RX ADMIN — CHLORHEXIDINE GLUCONATE AND ISOPROPYL ALCOHOL 3 ML: 20; .7 SOLUTION TOPICAL at 08:34

## 2019-05-12 RX ADMIN — FUROSEMIDE 20 MG: 10 SOLUTION ORAL at 10:36

## 2019-05-12 RX ADMIN — Medication 25 MG: at 17:18

## 2019-05-12 RX ADMIN — Medication 80 MG: at 08:33

## 2019-05-12 RX ADMIN — CHLORHEXIDINE GLUCONATE 0.12% ORAL RINSE 30 ML: 1.2 LIQUID ORAL at 21:07

## 2019-05-12 RX ADMIN — ACETAMINOPHEN 975 MG: 325 TABLET, FILM COATED ORAL at 14:05

## 2019-05-12 RX ADMIN — Medication 1 PACKET: at 14:05

## 2019-05-12 RX ADMIN — ACETAMINOPHEN 975 MG: 325 TABLET, FILM COATED ORAL at 06:40

## 2019-05-12 RX ADMIN — ACETAMINOPHEN 975 MG: 325 TABLET, FILM COATED ORAL at 21:06

## 2019-05-12 RX ADMIN — Medication 1 PACKET: at 08:34

## 2019-05-12 RX ADMIN — PANTOPRAZOLE SODIUM 40 MG: 40 TABLET, DELAYED RELEASE ORAL at 08:33

## 2019-05-12 RX ADMIN — LEVOTHYROXINE SODIUM 150 MCG: 150 TABLET ORAL at 08:34

## 2019-05-12 RX ADMIN — FLUCONAZOLE 400 MG: 400 INJECTION, SOLUTION INTRAVENOUS at 09:44

## 2019-05-12 RX ADMIN — Medication 10 ML: at 17:19

## 2019-05-12 RX ADMIN — BACITRACIN: 500 OINTMENT TOPICAL at 08:34

## 2019-05-12 RX ADMIN — Medication 1 PACKET: at 21:08

## 2019-05-12 RX ADMIN — ENOXAPARIN SODIUM 40 MG: 40 INJECTION SUBCUTANEOUS at 14:06

## 2019-05-12 RX ADMIN — PIPERACILLIN AND TAZOBACTAM 3.38 G: 3; .375 INJECTION, POWDER, FOR SOLUTION INTRAVENOUS at 17:18

## 2019-05-12 RX ADMIN — CHLORHEXIDINE GLUCONATE 0.12% ORAL RINSE 30 ML: 1.2 LIQUID ORAL at 08:33

## 2019-05-12 RX ADMIN — BACITRACIN: 500 OINTMENT TOPICAL at 21:07

## 2019-05-12 RX ADMIN — FUROSEMIDE 20 MG: 10 SOLUTION ORAL at 21:08

## 2019-05-12 RX ADMIN — AMIODARONE HYDROCHLORIDE 200 MG: 200 TABLET ORAL at 08:34

## 2019-05-12 RX ADMIN — PIPERACILLIN AND TAZOBACTAM 3.38 G: 3; .375 INJECTION, POWDER, FOR SOLUTION INTRAVENOUS at 03:30

## 2019-05-12 RX ADMIN — PIPERACILLIN AND TAZOBACTAM 3.38 G: 3; .375 INJECTION, POWDER, FOR SOLUTION INTRAVENOUS at 10:36

## 2019-05-12 RX ADMIN — ONDANSETRON HYDROCHLORIDE 4 MG: 4 TABLET, FILM COATED ORAL at 17:18

## 2019-05-12 RX ADMIN — Medication 50 MG: at 08:33

## 2019-05-12 RX ADMIN — VENLAFAXINE 75 MG: 75 TABLET ORAL at 08:34

## 2019-05-12 ASSESSMENT — ACTIVITIES OF DAILY LIVING (ADL)
ADLS_ACUITY_SCORE: 19

## 2019-05-12 ASSESSMENT — PAIN DESCRIPTION - DESCRIPTORS: DESCRIPTORS: CONSTANT

## 2019-05-12 ASSESSMENT — MIFFLIN-ST. JEOR: SCORE: 1303.88

## 2019-05-12 NOTE — PLAN OF CARE
Discharge Planner PT   Patient plan for discharge: not discussed  Current status: Pt needs cues for sternal precautions with bed mobility. Performs STSx5 with no UE support. Pt declined ambulation 2/2 lines, performed seated exercises, standing balance exercises in room.   Barriers to return to prior living situation: medical status, impaired balance, post surgical precautions, lives alone   Recommendations for discharge: ARU  Rationale for recommendations: Pt continues to be motivated to progress toward PLOF, however remains below baseline mobility. Pt would tolerate 3 hours of therapy. High level of PLOF and IND.

## 2019-05-12 NOTE — PROGRESS NOTES
THORACIC & FOREGUT SURGERY PROGRESS NOTE  05/12/2019    Patient: Myrtle Vaz  MRN: [id number]    Subjective  No acute events overnight. Doing well. Loved trying apple juice yesterday. In good spirits. Having BMs. Ambulating. Tolerating cycled tube feeds.     Objective  Temp:  [97.7  F (36.5  C)-99  F (37.2  C)] 99  F (37.2  C)  Heart Rate:  [70-85] 70  Resp:  [18] 18  BP: (106-120)/(51-79) 120/63  SpO2:  [96 %-99 %] 97 %    General: NAD  HEENT/chest: neck incision packed with Kerlix soupy but clean, mediastinal drain to bulb suction to thick cloudy yellow-brown output. Wound VAC in place, holding suction with brown output  CV: WWP, non-cyanotic   Pulm: On 2L NC breathing comfortably  Abd: soft, non-distended, appropriately tender  Extremities: no edema  Neuro: moving all extremities spontaneously without apparent deficit    I/O last 3 completed shifts:  In: 1430 [P.O.:60; I.V.:20; NG/GT:600]  Out: 2700 [Urine:1700; Other:700; Stool:300]    Labs:    Recent Labs   Lab 05/12/19  0630 05/11/19  0440 05/10/19  0436   WBC 11.5* 12.7* 13.8*   HGB 7.5* 7.9* 7.5*   PLT 1,143* 1,193* 1,036*     Recent Labs   Lab 05/12/19  0630 05/11/19  0440 05/10/19  0436  05/07/19  0508    133 134   < > 131*   POTASSIUM 3.9 4.1 3.9   < > 4.4   CHLORIDE 97 96 99   < > 100   CO2 31 31 29   < > 25   BUN 23 22 20   < > 16   CR 0.93 0.90 0.90   < > 0.80   * 107* 94   < > 160*   JOSE 7.9* 8.0* 7.6*   < > 7.9*   MAG  --   --   --   --  2.3   PHOS  --   --   --   --  3.4    < > = values in this interval not displayed.     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 Albarado. Found to have anastomotic leak per CT scan on 5/519 and is s/p EGD, flex bronch, possible esophageal stent, neck wound washout. Recovering appropriately. Pharyngostomy  removed 5/9.     - Pain control with enteral medications    - Strict I/O  - WBC downtrending, afebrile. Zosyn (5/3-) and Fluconazole (5/6-). ID signed off.          -Mediastinal fluid cultures: Strep angiosus, Enterococcus faecalis, Group B strep, Candida, Parvimonas micra (covered by Zosyn per ID)          -Vanc (5/6-5/9), Bactrim (5/6-5/9)  - Clear liquids today. J tube feeds cycled over 18 hours.   - Aspirin has been started for thrombocytosis.  - Alek drain to bulb (now holding suction), plan removal Monday 5/13.  - Wound vac to -50 mmHg. Vac change MWF  - BID dressing changes to neck wound. MD to perform morning change, RN to perform afternoon.   - Laxix 20mg mg BID via J tube today   - Encouraged patient to get up into the recliner today and eat her meals sitting up.   - Lovenox for ppx.  - Dispo: Likely early next week to TCU      Seen and discussed with fellow and staff.  - - - - - - - - - - - - - - - - - -  Johana Randall MD   Surgery PGY-1

## 2019-05-12 NOTE — PROGRESS NOTES
STAFF ADDENDUM:  I couldn't see Ms. Vaz who was in Radiology at the time of rounding. However, she is making progress and if her swallow evaluation is favorable,we will slowly start her on sips of clears.  Krystian Jaime MD

## 2019-05-12 NOTE — PLAN OF CARE
Neuro: A&Ox4. Happy about sips of clears.   Cardiac: SR. VSS.   Respiratory: Sating 2L NC   GI/: Adequate urine output. BM X2  Diet/appetite: Tolerating sips  Activity:  SBA, up to chair and in halls.  Pain: At acceptable level on current regimen. Tramadol for neck and chest discomfort  Skin: No new deficits noted. Dressing changed by Thoracic this am.   LDA's: R PICC    Plan: Continue with POC. Notify primary team with changes.

## 2019-05-12 NOTE — PLAN OF CARE
Patient has a good night overnight.  VSS. SR.  PRN Tramadol was administered once on the shift. Saturated serosanguineous and tan drainage on the dressing when it was changed at 2100.  Patient has been up to the commode three times with excellent output.  Senna was held last shift because of persistent diarrhea. PICC line in the right arm.  One lumen is saline locked and one is infusing.

## 2019-05-12 NOTE — PROGRESS NOTES
STAFF ADDENDUM:  I saw and evaluated Ms. Vaz and agree with the resident s findings and plan of care as documented in the resident s note and edited by me, as applicable.      In summary, Ms. Vaz is doing well on sips of clears, and we will advance her to a clear liquid diet with the aim of removing her ANGELO drain tomorrow if everything continues well.  The patient had all questions answered and was in agreement with the plan.  Krystian Jaime MD

## 2019-05-13 ENCOUNTER — APPOINTMENT (OUTPATIENT)
Dept: SPEECH THERAPY | Facility: CLINIC | Age: 74
DRG: 326 | End: 2019-05-13
Attending: THORACIC SURGERY (CARDIOTHORACIC VASCULAR SURGERY)
Payer: MEDICARE

## 2019-05-13 ENCOUNTER — APPOINTMENT (OUTPATIENT)
Dept: CT IMAGING | Facility: CLINIC | Age: 74
DRG: 326 | End: 2019-05-13
Attending: PHYSICIAN ASSISTANT
Payer: MEDICARE

## 2019-05-13 ENCOUNTER — APPOINTMENT (OUTPATIENT)
Dept: GENERAL RADIOLOGY | Facility: CLINIC | Age: 74
DRG: 326 | End: 2019-05-13
Attending: THORACIC SURGERY (CARDIOTHORACIC VASCULAR SURGERY)
Payer: MEDICARE

## 2019-05-13 LAB
ANION GAP SERPL CALCULATED.3IONS-SCNC: 8 MMOL/L (ref 3–14)
BACTERIA SPEC CULT: ABNORMAL
BUN SERPL-MCNC: 21 MG/DL (ref 7–30)
CALCIUM SERPL-MCNC: 7.9 MG/DL (ref 8.5–10.1)
CHLORIDE SERPL-SCNC: 98 MMOL/L (ref 94–109)
CO2 SERPL-SCNC: 30 MMOL/L (ref 20–32)
CREAT SERPL-MCNC: 0.88 MG/DL (ref 0.52–1.04)
ERYTHROCYTE [DISTWIDTH] IN BLOOD BY AUTOMATED COUNT: 21.2 % (ref 10–15)
GFR SERPL CREATININE-BSD FRML MDRD: 65 ML/MIN/{1.73_M2}
GLUCOSE BLDC GLUCOMTR-MCNC: 117 MG/DL (ref 70–99)
GLUCOSE SERPL-MCNC: 97 MG/DL (ref 70–99)
HCT VFR BLD AUTO: 25.1 % (ref 35–47)
HGB BLD-MCNC: 7.3 G/DL (ref 11.7–15.7)
Lab: ABNORMAL
MAGNESIUM SERPL-MCNC: 2.2 MG/DL (ref 1.6–2.3)
MCH RBC QN AUTO: 27.5 PG (ref 26.5–33)
MCHC RBC AUTO-ENTMCNC: 29.1 G/DL (ref 31.5–36.5)
MCV RBC AUTO: 95 FL (ref 78–100)
PHOSPHATE SERPL-MCNC: 2.8 MG/DL (ref 2.5–4.5)
PLATELET # BLD AUTO: 1179 10E9/L (ref 150–450)
POTASSIUM SERPL-SCNC: 3.9 MMOL/L (ref 3.4–5.3)
RBC # BLD AUTO: 2.65 10E12/L (ref 3.8–5.2)
SODIUM SERPL-SCNC: 136 MMOL/L (ref 133–144)
SPECIMEN SOURCE: ABNORMAL
WBC # BLD AUTO: 10.3 10E9/L (ref 4–11)

## 2019-05-13 PROCEDURE — 84100 ASSAY OF PHOSPHORUS: CPT | Performed by: SURGERY

## 2019-05-13 PROCEDURE — 97112 NEUROMUSCULAR REEDUCATION: CPT | Mod: GP

## 2019-05-13 PROCEDURE — 71046 X-RAY EXAM CHEST 2 VIEWS: CPT

## 2019-05-13 PROCEDURE — 97530 THERAPEUTIC ACTIVITIES: CPT | Mod: GP

## 2019-05-13 PROCEDURE — 25000128 H RX IP 250 OP 636: Performed by: SURGERY

## 2019-05-13 PROCEDURE — 97110 THERAPEUTIC EXERCISES: CPT | Mod: GP

## 2019-05-13 PROCEDURE — 12000004 ZZH R&B IMCU UMMC

## 2019-05-13 PROCEDURE — 27210437 ZZH NUTRITION PRODUCT SEMIELEM INTERMED LITER

## 2019-05-13 PROCEDURE — 25000132 ZZH RX MED GY IP 250 OP 250 PS 637: Performed by: STUDENT IN AN ORGANIZED HEALTH CARE EDUCATION/TRAINING PROGRAM

## 2019-05-13 PROCEDURE — 36592 COLLECT BLOOD FROM PICC: CPT | Performed by: SURGERY

## 2019-05-13 PROCEDURE — 25000128 H RX IP 250 OP 636: Performed by: INTERNAL MEDICINE

## 2019-05-13 PROCEDURE — A9270 NON-COVERED ITEM OR SERVICE: HCPCS | Performed by: SURGERY

## 2019-05-13 PROCEDURE — 85027 COMPLETE CBC AUTOMATED: CPT | Performed by: SURGERY

## 2019-05-13 PROCEDURE — 25000132 ZZH RX MED GY IP 250 OP 250 PS 637: Performed by: SURGERY

## 2019-05-13 PROCEDURE — 25000132 ZZH RX MED GY IP 250 OP 250 PS 637: Performed by: THORACIC SURGERY (CARDIOTHORACIC VASCULAR SURGERY)

## 2019-05-13 PROCEDURE — 92526 ORAL FUNCTION THERAPY: CPT | Mod: GN

## 2019-05-13 PROCEDURE — A9270 NON-COVERED ITEM OR SERVICE: HCPCS | Performed by: STUDENT IN AN ORGANIZED HEALTH CARE EDUCATION/TRAINING PROGRAM

## 2019-05-13 PROCEDURE — 80048 BASIC METABOLIC PNL TOTAL CA: CPT | Performed by: SURGERY

## 2019-05-13 PROCEDURE — 25000131 ZZH RX MED GY IP 250 OP 636 PS 637: Performed by: SURGERY

## 2019-05-13 PROCEDURE — 83735 ASSAY OF MAGNESIUM: CPT | Performed by: SURGERY

## 2019-05-13 PROCEDURE — 74176 CT ABD & PELVIS W/O CONTRAST: CPT

## 2019-05-13 PROCEDURE — 00000146 ZZHCL STATISTIC GLUCOSE BY METER IP

## 2019-05-13 RX ORDER — GUAR GUM
2 PACKET (EA) ORAL 3 TIMES DAILY
Status: DISCONTINUED | OUTPATIENT
Start: 2019-05-13 | End: 2019-05-15 | Stop reason: HOSPADM

## 2019-05-13 RX ORDER — AMOXICILLIN 250 MG
2 CAPSULE ORAL 2 TIMES DAILY PRN
Status: DISCONTINUED | OUTPATIENT
Start: 2019-05-13 | End: 2019-05-15 | Stop reason: HOSPADM

## 2019-05-13 RX ORDER — AMPICILLIN AND SULBACTAM 2; 1 G/1; G/1
3 INJECTION, POWDER, FOR SOLUTION INTRAMUSCULAR; INTRAVENOUS EVERY 6 HOURS
Status: DISCONTINUED | OUTPATIENT
Start: 2019-05-13 | End: 2019-05-15 | Stop reason: HOSPADM

## 2019-05-13 RX ORDER — ONDANSETRON 4 MG/1
4 TABLET, FILM COATED ORAL EVERY 6 HOURS PRN
Status: DISCONTINUED | OUTPATIENT
Start: 2019-05-13 | End: 2019-05-15 | Stop reason: HOSPADM

## 2019-05-13 RX ADMIN — ACETAMINOPHEN 975 MG: 325 TABLET, FILM COATED ORAL at 06:33

## 2019-05-13 RX ADMIN — Medication 1 PACKET: at 13:27

## 2019-05-13 RX ADMIN — FUROSEMIDE 20 MG: 10 SOLUTION ORAL at 20:03

## 2019-05-13 RX ADMIN — AMIODARONE HYDROCHLORIDE 200 MG: 200 TABLET ORAL at 08:36

## 2019-05-13 RX ADMIN — ONDANSETRON HYDROCHLORIDE 4 MG: 4 TABLET, FILM COATED ORAL at 04:00

## 2019-05-13 RX ADMIN — PIPERACILLIN AND TAZOBACTAM 3.38 G: 3; .375 INJECTION, POWDER, FOR SOLUTION INTRAVENOUS at 00:28

## 2019-05-13 RX ADMIN — ACETAMINOPHEN 975 MG: 325 TABLET, FILM COATED ORAL at 13:25

## 2019-05-13 RX ADMIN — Medication 5 ML: at 20:03

## 2019-05-13 RX ADMIN — Medication 25 MG: at 19:05

## 2019-05-13 RX ADMIN — LEVOTHYROXINE SODIUM 150 MCG: 150 TABLET ORAL at 08:36

## 2019-05-13 RX ADMIN — PANTOPRAZOLE SODIUM 40 MG: 40 TABLET, DELAYED RELEASE ORAL at 08:36

## 2019-05-13 RX ADMIN — BACITRACIN: 500 OINTMENT TOPICAL at 09:26

## 2019-05-13 RX ADMIN — PIPERACILLIN AND TAZOBACTAM 3.38 G: 3; .375 INJECTION, POWDER, FOR SOLUTION INTRAVENOUS at 15:43

## 2019-05-13 RX ADMIN — Medication 2 PACKET: at 20:08

## 2019-05-13 RX ADMIN — Medication 1 PACKET: at 09:13

## 2019-05-13 RX ADMIN — PIPERACILLIN AND TAZOBACTAM 3.38 G: 3; .375 INJECTION, POWDER, FOR SOLUTION INTRAVENOUS at 11:10

## 2019-05-13 RX ADMIN — VENLAFAXINE 75 MG: 75 TABLET ORAL at 08:36

## 2019-05-13 RX ADMIN — ENOXAPARIN SODIUM 40 MG: 40 INJECTION SUBCUTANEOUS at 13:30

## 2019-05-13 RX ADMIN — ACETAMINOPHEN 975 MG: 325 TABLET, FILM COATED ORAL at 21:13

## 2019-05-13 RX ADMIN — FUROSEMIDE 20 MG: 10 SOLUTION ORAL at 08:36

## 2019-05-13 RX ADMIN — FLUCONAZOLE 400 MG: 400 INJECTION, SOLUTION INTRAVENOUS at 09:13

## 2019-05-13 RX ADMIN — AMPICILLIN SODIUM AND SULBACTAM SODIUM 3 G: 2; 1 INJECTION, POWDER, FOR SOLUTION INTRAMUSCULAR; INTRAVENOUS at 21:12

## 2019-05-13 RX ADMIN — Medication 50 MG: at 00:50

## 2019-05-13 RX ADMIN — VENLAFAXINE 75 MG: 75 TABLET ORAL at 20:03

## 2019-05-13 RX ADMIN — Medication 50 MG: at 06:33

## 2019-05-13 RX ADMIN — PIPERACILLIN AND TAZOBACTAM 3.38 G: 3; .375 INJECTION, POWDER, FOR SOLUTION INTRAVENOUS at 04:00

## 2019-05-13 RX ADMIN — CHLORHEXIDINE GLUCONATE AND ISOPROPYL ALCOHOL 3 ML: 20; .7 SOLUTION TOPICAL at 00:27

## 2019-05-13 RX ADMIN — Medication 80 MG: at 08:36

## 2019-05-13 ASSESSMENT — ACTIVITIES OF DAILY LIVING (ADL)
ADLS_ACUITY_SCORE: 19

## 2019-05-13 ASSESSMENT — PAIN DESCRIPTION - DESCRIPTORS
DESCRIPTORS: CONSTANT;DISCOMFORT
DESCRIPTORS: CONSTANT
DESCRIPTORS: ACHING

## 2019-05-13 ASSESSMENT — MIFFLIN-ST. JEOR: SCORE: 1308.88

## 2019-05-13 NOTE — PLAN OF CARE
Discharge Planner SLP   Patient plan for discharge: Pt did not state  Current status: Recommend continuation of clear liquid diet (no straws) per recommendations from thoracic team.  Pt to be fully alert and upright for all PO, taking small sips at slow rate.  Pt to continue use of chin tuck strategy with all clear liquid intake.  Pt able to complete oropharyngeal strengthening exercises given min-mod cues, continues to benefit from encouragement for independent completion of exercises.  SLP to follow for PO tolerance, exercises, education, and diet advancement per thoracic team recommendations.  Barriers to return to prior living situation: Dysphagia, medical status  Recommendations for discharge: TCU  Rationale for recommendations: Below baseline function; pt will benefit from ongoing ST targeting swallowing       Entered by: Paulina Spangler 05/13/2019 11:03 AM

## 2019-05-13 NOTE — PROGRESS NOTES
RD to increase Nutrisource fiber to 6 packets daily for a total of 18 g soluble finer. If tolerates, RD to cycle TF to 16 or 14 hours pending tolerance. Per team, plan to switch bowel meds to PRN.     Collette Helton RD, MS, LD  6B- Pager: [phone]

## 2019-05-13 NOTE — PLAN OF CARE
"Neuro: A&Ox4.   Cardiac: SR. /60 (BP Location: Left arm, Cuff Size: Adult Regular)   Pulse 81   Temp 97.8  F (36.6  C) (Oral)   Resp 18   Ht 1.651 m (5' 5\")   Wt 79.8 kg (175 lb 14.8 oz)   SpO2 95%   BMI 29.28 kg/m     Respiratory: Sating >95% on 2L NC.  GI/: Adequate urine output. BM X3  Diet/appetite: Clears. TF at 75 mL/hr 4p-10a. Denies nausea.  Activity:  SBA.  Pain: At acceptable level on current regimen. Tramadol x1.  Skin: See Flowsheets.   LDA's: ANGELO- no output overnight, G/J, PICC- tko    Plan: Continue with POC. Notify primary team with changes.   "

## 2019-05-13 NOTE — PLAN OF CARE
Patient stable, cooperative, motivated with therapies. Tolerated clear liquids today. Up to commode SBA. Held stool softeners due to frequent stooling. Tramadol for pain is adequate. Meds still going in J-tube.

## 2019-05-13 NOTE — PROGRESS NOTES
THORACIC & FOREGUT SURGERY PROGRESS NOTE  05/13/2019    Patient: Myrtle Vaz  MRN: [id number]    Subjective  No acute events overnight. Doing well. Having BMs. Ambulating. Tolerating cycled tube feeds.     Objective  Temp:  [97.8  F (36.6  C)-99.1  F (37.3  C)] 97.8  F (36.6  C)  Pulse:  [76-81] 81  Heart Rate:  [70-85] 72  Resp:  [16-20] 18  BP: (112-128)/(60-76) 114/61  SpO2:  [95 %-99 %] 98 %    General: NAD  HEENT/chest: neck incision packed with Kerlix soupy but clean, mediastinal drain to bulb suction to thick cloudy yellow-brown output. Wound VAC in place, holding suction with brown output. Changed and granulation tissue throughout base looks great.  CV: WWP, non-cyanotic   Pulm: On 2L NC breathing comfortably  Abd: soft, non-distended, appropriately tender  Extremities: no edema  Neuro: moving all extremities spontaneously without apparent deficit    I/O last 3 completed shifts:  In: 1810 [P.O.:350; I.V.:20; NG/GT:540]  Out: 2200 [Urine:1300; Other:900]    Labs:    Recent Labs   Lab 05/13/19  0513 05/12/19  0630 05/11/19  0440   WBC 10.3 11.5* 12.7*   HGB 7.3* 7.5* 7.9*   PLT 1,179* 1,143* 1,193*     Recent Labs   Lab 05/13/19  0513 05/12/19  0630 05/11/19  0440  05/07/19  0508    133 133   < > 131*   POTASSIUM 3.9 3.9 4.1   < > 4.4   CHLORIDE 98 97 96   < > 100   CO2 30 31 31   < > 25   BUN 21 23 22   < > 16   CR 0.88 0.93 0.90   < > 0.80   GLC 97 110* 107*   < > 160*   JOSE 7.9* 7.9* 8.0*   < > 7.9*   MAG 2.2  --   --   --  2.3   PHOS 2.8  --   --   --  3.4    < > = values in this interval not displayed.     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 Albarado. Found to have anastomotic leak per CT scan on 5/519 and is s/p EGD, flex bronch, possible esophageal stent, neck wound washout. Recovering  appropriately. Pharyngostomy removed 5/9.     - Pain control with enteral medications    - Strict I/O  - WBC downtrending, afebrile. Zosyn (5/3-) and Fluconazole (5/6-). ID to recommend outpatient regimen prior to dc          -Mediastinal fluid cultures: Strep angiosus, Enterococcus faecalis, Group B strep, Candida, Parvimonas micra (covered by Zosyn per ID)          -Vanc (5/6-5/9), Bactrim (5/6-5/9)  - Clear liquids. J tube feeds cycled over 18 hours.   - Aspirin has been started for thrombocytosis.  - Alek drain to bulb (now holding suction), plan removal today after rounds with attg Monday 5/13.  - Wound vac to -50 mmHg. Vac change MWF. Done today.  - BID dressing changes to neck wound. MD to perform morning change, RN to perform afternoon.   - Laxix 20mg mg BID via J tube today   - Encouraged patient to get up into the recliner today and eat her meals sitting up.   - Lovenox for ppx.  - Dispo: Early this week to ARF    Call with any questions.  Michelle Jimenez MD  Cardiothoracic surgery fellow  pgr [phone]

## 2019-05-13 NOTE — PLAN OF CARE
"/65 (BP Location: Left arm, Cuff Size: Adult Regular)   Pulse 81   Temp 98.5  F (36.9  C) (Oral)   Resp 16   Ht 1.651 m (5' 5\")   Wt 79.8 kg (175 lb 14.8 oz)   SpO2 95%   BMI 29.28 kg/m      A&Ox4.  Lungs crackles in bases and on 2L NC.  SR.  HR 60-70s.  Loose stool, fiber increased.  TF cycled.  Voiding in bathroom.  SBA. Walked X3.  ANGELO removed at bedside.  Wound vac dressing changed by MD.  Denies pain on shift, tylenol given.  Plans to discharge earlier this week. Continue to monitor.    "

## 2019-05-13 NOTE — PROGRESS NOTES
[facility] ID SERVICE: FOLLOW UP  Patient:  Myrtle Vaz, Date of birth 1945, Medical record number [id number]  Date of Admission: 4/26/2019  Date of Service: 5/13/2019          Assessment and Recommendations:   Problem List:  - H/o hiatal hernia repair with mesh reinforcement, nissen fundoplication, and gastrostomy tube placement c/b postop esophageal perforation 11/2018  - S/p Nissen takedown, drainage of mediastinal abscess, trans-hiatal partial esophagogastrectomy, spit fistula, gastrostomy and jejunostomy 11/2018. All mesh removed per discussion with thoracic surgery.   - S/p redo laparotomy, partial sternotomy, intrabdominal retrosternal esophagogastrostomy 4/26/2019  - Postop anastomotic leak now s/p EGD, flex bronch, esophageal stent, neck washout 5/6. Multiple abscesses on pre-op CT. Mediastinal abscess cultures from 5/6 with S. anginosus, S. agalactiae, Enterococcus faecalis and Candida albicans.  - BAL with Stenotrophomonas maltophilia, S. agalactiae, Candida albicans.  - Thrombocytosis - likely reactive    Discussion:  73 year old female with PMH of laparoscopic hiatal hernia repair with mesh reinforcement, nissen fundoplication, and gastrostomy tube placement c/b postop esophageal perforation 11/2018 s/p Nissen takedown, drainage of mediastinal abscess, trans-hiatal partial esophagogastrectomy, spit fistula, gastrostomy and jejunostomy. Admitted for redo laparotomy, partial sternotomy, intrabdominal retrosternal esophagogastrostomy for continued pain and clogged G-tube, procedure done on 4/26. Found to have anastomotic leak 5/5 on CT now s/p EGD, flex bronch, esophageal stent, neck washout on 5/6.  Bronchoscopy on 5/2 with stenotrophomonas maltophilia, strep agalactiae, fungal cultures with candida albicans. Mediastinal abscess cultures from 5/6 with strep anginosus, enterococcus faecalis and candida albicans. Stenotrophomonas from sputum was likely a colonizer so we are not treating it. With all  "cultures finalized, Unasyn would provide good coverage for the Strep and Enterococcus identified so we will change to this agent.     Antibiotic history:   Unasyn 5/13-present  Fluconazole 5/6-present    Pip/tazo 5/3-5/13  Vancomycin 5/7-5/8  TMP-SMX 5/6-5/9    Recommendations:  - Change pip/tazo to amp/sulbactam (done) for slightly narrower coverage   - Continue fluconazole.  - Await CT results: Duration of antibiotics and transition from IV to PO will depend on whether she has any residual abscesses and their extent.     Mesha Espinoza MD  Infectious Diseases  [phone]        Subjective and interval events:   Anticipating discharge in next several days. Has been afebrile with downward trending WBC. Happy drain is removed.          Review of Systems:    5 point ROS negative other than above         Physical Exam:   /69 (BP Location: Left arm)   Pulse 81   Temp 99.1  F (37.3  C) (Oral)   Resp 16   Ht 1.651 m (5' 5\")   Wt 79.8 kg (175 lb 14.8 oz)   SpO2 99%   BMI 29.28 kg/m     Exam:  Exam:  GENERAL:  Well-developed, well-nourished, sitting in bed in no acute distress.   ENT:  Head is normocephalic, atraumatic. Anterior oropharynx without ulcers.  EYES:  Eyes have anicteric sclerae.    CHEST: Wound vac in place  NECK:  Supple.  LUNGS:  Normal respiratory effort.   ABDOMEN:  Non-distended.   EXT: Extremities without visible edema.   SKIN:  No acute rashes.  Line is in place without any surrounding erythema.  NEUROLOGIC:  Grossly nonfocal.          Laboratory Data:     Creatinine   Date Value Ref Range Status   05/13/2019 0.88 0.52 - 1.04 mg/dL Final   05/12/2019 0.93 0.52 - 1.04 mg/dL Final   05/11/2019 0.90 0.52 - 1.04 mg/dL Final   05/10/2019 0.90 0.52 - 1.04 mg/dL Final   05/09/2019 0.79 0.52 - 1.04 mg/dL Final     WBC   Date Value Ref Range Status   05/13/2019 10.3 4.0 - 11.0 10e9/L Final   05/12/2019 11.5 (H) 4.0 - 11.0 10e9/L Final   05/11/2019 12.7 (H) 4.0 - 11.0 10e9/L Final   05/10/2019 " 13.8 (H) 4.0 - 11.0 10e9/L Final   05/09/2019 16.7 (H) 4.0 - 11.0 10e9/L Final     Hemoglobin   Date Value Ref Range Status   05/13/2019 7.3 (L) 11.7 - 15.7 g/dL Final     Platelet Count   Date Value Ref Range Status   05/13/2019 1,179 (HH) 150 - 450 10e9/L Final     Comment:     .   Critical result, provider not notified due to previous critical result   notification.       Lab Results   Component Value Date     05/13/2019    BUN 21 05/13/2019    CO2 30 05/13/2019     CRP Inflammation   Date Value Ref Range Status   05/12/2019 97.0 (H) 0.0 - 8.0 mg/L Final   02/04/2019 14.0 (H) 0.0 - 8.0 mg/L Final   11/25/2018 160.0 (H) 0.0 - 8.0 mg/L Final           Pertinent Recent Microbiology Data:     Recent Labs   Lab 05/06/19  1530   CULT Culture negative after 1 week  Light growth  Parvimonas micra  Susceptibility testing not routinely done  *  Moderate growth  Streptococcus anginosus  *  Light growth  Enterococcus faecalis  *  Light growth  Streptococcus agalactiae sero group B  This isolate is presumed to be clindamycin resistant based on detection of inducible   clindamycin resistance. Erythromycin and clindamycin are resistant, therefore, they are   not recommended for use.  *  Light growth  Candida albicans / dubliniensis  Candida albicans and Candida dubliniensis are not routinely speciated  Susceptibility testing not routinely done  *   SDES Mediastinal Abscess  Mediastinal Abscess  Mediastinal Abscess  Mediastinal Abscess            Imaging:     Esophagogram 5/10:  Impression:   1. No esophageal leak demonstrated.  2. Stasis of contrast up to the superior esophagus suggesting  Dysmotility.    CXR 5/10  Impression:   1. The pharyngostomy stent is unchanged in position from prior  examination. Additional support devices are stable.  2. Trace pleural effusions and bibasilar atelectasis are unchanged.    Videoswallow 5/10  Impression: Penetration without aspiration with swallows of Omnipaque  140 contrast  which is improved with chin tuck technique

## 2019-05-13 NOTE — PROGRESS NOTES
Social Work Services Progress Note    Hospital Day: 17  Date of Initial Social Work Evaluation:  5/3/19  Collaborated with:  Patient, Pt's dtr (Haydee), [facility] (Nellie), [facility] (Johanna), Thoracic Team, Chart Review    Data:  Pt is 72 y/o female admitted to [facility] on 4/26/19 s/p redo laparotomy and partial sternotomy, esophagogastrostomy on 4/26/2019.    Per Thoracic Team, pt may be medically stable for discharge tomorrow. ANA LILIA spoke with JENARO Bradshaw to determine discharge needs. Pt currently has Wound Vac changed MWF, BID drsg changes to neck, cycled J tube TF, and IV abx (Zosyn IV Q6, and Fluconazole IV Qday). Per Augustine, he is waiting for update from ID to determine if Pt will discharge with IV vs PO abx. Augustine also informed SW that they would prefer Pt not discharge with wound vac but he is unsure if this will be needed or not.     Intervention:  ANA LILIA continues to follow for placement needs at discharge.    ANA LILIA spoke with Nellie in Admissions at [facility] (Ph: [phone], Admissions: [phone], F: [phone]) and she is reviewing referral today for potential admission tomorrow.     ANA LILIA spoke with [facility]CU Liaison (Johanna) who continues to follow for potential placement needs.    ANA LILIA met with Pt at bedside and provided update on status of TCU referrals. ANA LILIA also explained about new recommendation for ARU placement at discharge; SW explained differences between TCU and ARU and Pt said that she would prefer her dtr (Haydee) make the decision re: best placement at discharge.    ANA LILIA spoke with Haydee via the phone. ANA LILIA provided update on status of referrals as well as explained the new recommendation for ARU placement. ANA LILIA explained differences between ARU and TCU. Haydee said that if Pt is accepted at [facility] she would prefer for Pt to return there at discharge d/t having a great previous experience and feeling that Pt would likely thrive there more d/t the familiar environment and people.    ANA LILIA continues to  await update from Thoracic Team whether Pt will need IV abx and wound vac at discharge.    Referrals in Process:  -[facility] (Ph: [phone], Admissions: [phone], F: [phone])  -[facility] ([id number])    Assessment:  Pt and dtr continue to be in agreement with TCU placement at discharge. They prefer discharge back to [facility] TCU if they are able to meet Pt's needs at discharge.     Plan:    Anticipated Disposition:  Facility:  TCU [facility]; [facility] in process.    Barriers to d/c plan:  Medical stability    Follow Up:  SW to continue to follow and assist with discharge plan.    VINAYAK Godoy, LGSW  6B Intermediate Care Unit   Phone: [phone]  Pager: [phone]

## 2019-05-14 ENCOUNTER — APPOINTMENT (OUTPATIENT)
Dept: PHYSICAL THERAPY | Facility: CLINIC | Age: 74
DRG: 326 | End: 2019-05-14
Attending: THORACIC SURGERY (CARDIOTHORACIC VASCULAR SURGERY)
Payer: MEDICARE

## 2019-05-14 ENCOUNTER — APPOINTMENT (OUTPATIENT)
Dept: SPEECH THERAPY | Facility: CLINIC | Age: 74
DRG: 326 | End: 2019-05-14
Attending: THORACIC SURGERY (CARDIOTHORACIC VASCULAR SURGERY)
Payer: MEDICARE

## 2019-05-14 ENCOUNTER — APPOINTMENT (OUTPATIENT)
Dept: OCCUPATIONAL THERAPY | Facility: CLINIC | Age: 74
DRG: 326 | End: 2019-05-14
Attending: THORACIC SURGERY (CARDIOTHORACIC VASCULAR SURGERY)
Payer: MEDICARE

## 2019-05-14 LAB
ANION GAP SERPL CALCULATED.3IONS-SCNC: 6 MMOL/L (ref 3–14)
BUN SERPL-MCNC: 19 MG/DL (ref 7–30)
CALCIUM SERPL-MCNC: 7.8 MG/DL (ref 8.5–10.1)
CHLORIDE SERPL-SCNC: 98 MMOL/L (ref 94–109)
CO2 SERPL-SCNC: 31 MMOL/L (ref 20–32)
CREAT SERPL-MCNC: 0.76 MG/DL (ref 0.52–1.04)
CRP SERPL-MCNC: 91 MG/L (ref 0–8)
ERYTHROCYTE [DISTWIDTH] IN BLOOD BY AUTOMATED COUNT: 21.1 % (ref 10–15)
GFR SERPL CREATININE-BSD FRML MDRD: 78 ML/MIN/{1.73_M2}
GLUCOSE BLDC GLUCOMTR-MCNC: 110 MG/DL (ref 70–99)
GLUCOSE SERPL-MCNC: 120 MG/DL (ref 70–99)
HCT VFR BLD AUTO: 25 % (ref 35–47)
HGB BLD-MCNC: 7.5 G/DL (ref 11.7–15.7)
MCH RBC QN AUTO: 28.3 PG (ref 26.5–33)
MCHC RBC AUTO-ENTMCNC: 30 G/DL (ref 31.5–36.5)
MCV RBC AUTO: 94 FL (ref 78–100)
PLATELET # BLD AUTO: 1166 10E9/L (ref 150–450)
POTASSIUM SERPL-SCNC: 3.9 MMOL/L (ref 3.4–5.3)
RBC # BLD AUTO: 2.65 10E12/L (ref 3.8–5.2)
SODIUM SERPL-SCNC: 135 MMOL/L (ref 133–144)
WBC # BLD AUTO: 10.2 10E9/L (ref 4–11)

## 2019-05-14 PROCEDURE — 97535 SELF CARE MNGMENT TRAINING: CPT | Mod: GO

## 2019-05-14 PROCEDURE — 97116 GAIT TRAINING THERAPY: CPT | Mod: GP | Performed by: PHYSICAL THERAPIST

## 2019-05-14 PROCEDURE — 85027 COMPLETE CBC AUTOMATED: CPT | Performed by: STUDENT IN AN ORGANIZED HEALTH CARE EDUCATION/TRAINING PROGRAM

## 2019-05-14 PROCEDURE — 92526 ORAL FUNCTION THERAPY: CPT | Mod: GN

## 2019-05-14 PROCEDURE — 25000132 ZZH RX MED GY IP 250 OP 250 PS 637: Performed by: SURGERY

## 2019-05-14 PROCEDURE — 81403 MOPATH PROCEDURE LEVEL 4: CPT | Performed by: HOSPITALIST

## 2019-05-14 PROCEDURE — 36592 COLLECT BLOOD FROM PICC: CPT | Performed by: STUDENT IN AN ORGANIZED HEALTH CARE EDUCATION/TRAINING PROGRAM

## 2019-05-14 PROCEDURE — 25000132 ZZH RX MED GY IP 250 OP 250 PS 637: Performed by: STUDENT IN AN ORGANIZED HEALTH CARE EDUCATION/TRAINING PROGRAM

## 2019-05-14 PROCEDURE — 25000128 H RX IP 250 OP 636: Performed by: SURGERY

## 2019-05-14 PROCEDURE — A9270 NON-COVERED ITEM OR SERVICE: HCPCS | Performed by: PHYSICIAN ASSISTANT

## 2019-05-14 PROCEDURE — 97110 THERAPEUTIC EXERCISES: CPT | Mod: GP | Performed by: PHYSICAL THERAPIST

## 2019-05-14 PROCEDURE — A9270 NON-COVERED ITEM OR SERVICE: HCPCS | Performed by: STUDENT IN AN ORGANIZED HEALTH CARE EDUCATION/TRAINING PROGRAM

## 2019-05-14 PROCEDURE — 12000004 ZZH R&B IMCU UMMC

## 2019-05-14 PROCEDURE — 25000128 H RX IP 250 OP 636: Performed by: INTERNAL MEDICINE

## 2019-05-14 PROCEDURE — 81219 CALR GENE COM VARIANTS: CPT | Performed by: HOSPITALIST

## 2019-05-14 PROCEDURE — 86140 C-REACTIVE PROTEIN: CPT | Performed by: STUDENT IN AN ORGANIZED HEALTH CARE EDUCATION/TRAINING PROGRAM

## 2019-05-14 PROCEDURE — 25000132 ZZH RX MED GY IP 250 OP 250 PS 637: Performed by: PHYSICIAN ASSISTANT

## 2019-05-14 PROCEDURE — A9270 NON-COVERED ITEM OR SERVICE: HCPCS | Performed by: SURGERY

## 2019-05-14 PROCEDURE — 27210437 ZZH NUTRITION PRODUCT SEMIELEM INTERMED LITER

## 2019-05-14 PROCEDURE — 86140 C-REACTIVE PROTEIN: CPT | Performed by: THORACIC SURGERY (CARDIOTHORACIC VASCULAR SURGERY)

## 2019-05-14 PROCEDURE — 25000132 ZZH RX MED GY IP 250 OP 250 PS 637: Performed by: THORACIC SURGERY (CARDIOTHORACIC VASCULAR SURGERY)

## 2019-05-14 PROCEDURE — 00000146 ZZHCL STATISTIC GLUCOSE BY METER IP

## 2019-05-14 PROCEDURE — 97530 THERAPEUTIC ACTIVITIES: CPT | Mod: GO

## 2019-05-14 PROCEDURE — 97530 THERAPEUTIC ACTIVITIES: CPT | Mod: GP | Performed by: PHYSICAL THERAPIST

## 2019-05-14 PROCEDURE — 80048 BASIC METABOLIC PNL TOTAL CA: CPT | Performed by: STUDENT IN AN ORGANIZED HEALTH CARE EDUCATION/TRAINING PROGRAM

## 2019-05-14 RX ADMIN — AMPICILLIN SODIUM AND SULBACTAM SODIUM 3 G: 2; 1 INJECTION, POWDER, FOR SOLUTION INTRAMUSCULAR; INTRAVENOUS at 14:16

## 2019-05-14 RX ADMIN — AMPICILLIN SODIUM AND SULBACTAM SODIUM 3 G: 2; 1 INJECTION, POWDER, FOR SOLUTION INTRAMUSCULAR; INTRAVENOUS at 21:18

## 2019-05-14 RX ADMIN — AMPICILLIN SODIUM AND SULBACTAM SODIUM 3 G: 2; 1 INJECTION, POWDER, FOR SOLUTION INTRAMUSCULAR; INTRAVENOUS at 08:31

## 2019-05-14 RX ADMIN — MINERAL SUPPLEMENT IRON 300 MG / 5 ML STRENGTH LIQUID 100 PER BOX UNFLAVORED 300 MG: at 15:50

## 2019-05-14 RX ADMIN — AMPICILLIN SODIUM AND SULBACTAM SODIUM 3 G: 2; 1 INJECTION, POWDER, FOR SOLUTION INTRAMUSCULAR; INTRAVENOUS at 02:50

## 2019-05-14 RX ADMIN — ACETAMINOPHEN 975 MG: 325 TABLET, FILM COATED ORAL at 15:42

## 2019-05-14 RX ADMIN — Medication 2 PACKET: at 08:33

## 2019-05-14 RX ADMIN — Medication 2 PACKET: at 15:42

## 2019-05-14 RX ADMIN — ACETAMINOPHEN 975 MG: 325 TABLET, FILM COATED ORAL at 05:40

## 2019-05-14 RX ADMIN — ACETAMINOPHEN 975 MG: 325 TABLET, FILM COATED ORAL at 21:17

## 2019-05-14 RX ADMIN — Medication 325 MG: at 09:30

## 2019-05-14 RX ADMIN — Medication 2 PACKET: at 21:18

## 2019-05-14 RX ADMIN — FLUCONAZOLE 400 MG: 400 INJECTION, SOLUTION INTRAVENOUS at 11:40

## 2019-05-14 RX ADMIN — AMIODARONE HYDROCHLORIDE 200 MG: 200 TABLET ORAL at 08:33

## 2019-05-14 RX ADMIN — Medication 5 ML: at 19:36

## 2019-05-14 RX ADMIN — VENLAFAXINE 75 MG: 75 TABLET ORAL at 19:36

## 2019-05-14 RX ADMIN — Medication 25 MG: at 15:50

## 2019-05-14 RX ADMIN — PANTOPRAZOLE SODIUM 40 MG: 40 TABLET, DELAYED RELEASE ORAL at 08:32

## 2019-05-14 RX ADMIN — ENOXAPARIN SODIUM 40 MG: 40 INJECTION SUBCUTANEOUS at 15:52

## 2019-05-14 RX ADMIN — VENLAFAXINE 75 MG: 75 TABLET ORAL at 08:32

## 2019-05-14 RX ADMIN — LEVOTHYROXINE SODIUM 150 MCG: 150 TABLET ORAL at 08:32

## 2019-05-14 ASSESSMENT — MIFFLIN-ST. JEOR: SCORE: 1305.88

## 2019-05-14 ASSESSMENT — ACTIVITIES OF DAILY LIVING (ADL)
ADLS_ACUITY_SCORE: 19

## 2019-05-14 ASSESSMENT — PAIN DESCRIPTION - DESCRIPTORS
DESCRIPTORS: DISCOMFORT
DESCRIPTORS: DISCOMFORT

## 2019-05-14 NOTE — PLAN OF CARE
OT/6B - Discharge Planner OT   Patient plan for discharge: TCU that she has previously stayed on  Current status: Reviewed post surgical precautions for increased safety in relation to ADL. Facilitated LB dressing using compensatory figure four method with VCs-min A due to precautions. Pt tolerates x5 reps STS from EOB for increased LE strength needed for functional transfers. SBA for commode transfer.   Barriers to return to prior living situation: activity tolerance, strength, post surgical precautions  Recommendations for discharge: TCU  Rationale for recommendations: to increase ADL/IADL IND prior to return home       Entered by: Abeba Pereyra 05/14/2019 10:42 AM

## 2019-05-14 NOTE — PLAN OF CARE
Pt A&O X4. VSS, afebrile, HR sinus rhythm 70's, BP's 100-130's/60-70's, O2 sats > 90% on 1L via NC. LS clear/fine crackles, BS+ X4, CMS intact. Pt slept majority of overnight shift, had few requests/compliaints. Reported tenderness at ANGELO bulb removal site, received scheduled Tylenol & PRN Ultram X1 with relief. L neck pharyngostomy site WDL. Mid neck wound dressing changed, packed w/ dry Kerlix, moderate serous drainage. L chest wound-vac patent to -50mmHg suction, outputting scant serous drainage. Midline abdominal incision WDL & TIFFANIE. PICC X2 in R arm patent, grey port infusing NS @ TKO between antibiotics, purple port patent & Heparin locked w/ blood return. G-tube clamped, J-tube patent & infusing cycled TF @ 75mL/hr w/ 60mL FWF Q6hrs per eMar. Tolerating clear liquid diet with no nausea/emesis. Pt gets up with SBA, ambulates to BR, voiding urine adequately, passing gas, BM X1 overnight. Using IS & acapella at bedside. Has call light within reach, able to make needs known, will continue to monitor per POC.

## 2019-05-14 NOTE — PROGRESS NOTES
[facility] ID SERVICE: Sign Off Note  Patient:  Myrtle Vaz, Date of birth 1945, Medical record number [id number]  Date of Admission: 4/26/2019  Date of Service: 5/14/2019          Assessment and Recommendations:   Problem List:  - H/o hiatal hernia repair with mesh reinforcement, nissen fundoplication, and gastrostomy tube placement c/b postop esophageal perforation 11/2018  - S/p Nissen takedown, drainage of mediastinal abscess, trans-hiatal partial esophagogastrectomy, spit fistula, gastrostomy and jejunostomy 11/2018. All mesh removed per discussion with thoracic surgery.   - S/p redo laparotomy, partial sternotomy, intrabdominal retrosternal esophagogastrostomy 4/26/2019  - Postop anastomotic leak now s/p EGD, flex bronch, esophageal stent, neck washout 5/6. Multiple abscesses on pre-op CT. Mediastinal abscess cultures from 5/6 with S. anginosus, S. agalactiae, Enterococcus faecalis and Candida albicans.  - BAL with Stenotrophomonas maltophilia, S. agalactiae, Candida albicans.  - Thrombocytosis - likely reactive    Discussion:  73 year old female with PMH of laparoscopic hiatal hernia repair with mesh reinforcement, nissen fundoplication, and gastrostomy tube placement c/b postop esophageal perforation 11/2018 s/p Nissen takedown, drainage of mediastinal abscess, trans-hiatal partial esophagogastrectomy, spit fistula, gastrostomy and jejunostomy. Admitted for redo laparotomy, partial sternotomy, intrabdominal retrosternal esophagogastrostomy for continued pain and clogged G-tube, procedure done on 4/26. Found to have anastomotic leak 5/5 on CT now s/p EGD, flex bronch, esophageal stent, neck washout on 5/6.  Bronchoscopy on 5/2 with stenotrophomonas maltophilia, strep agalactiae, fungal cultures with candida albicans. Mediastinal abscess cultures from 5/6 with strep anginosus, enterococcus faecalis and candida albicans. Stenotrophomonas from sputum was likely a colonizer so we are not treating it. With  "all cultures finalized, Unasyn would provide good coverage for the Strep and Enterococcus identified so we will change to this agent.     CT C/A/P from 5/13/19 discussed with radiology. Largest remaining fluid collection is 2 x 2 x 1 cm and is located just to the right of the gastric pull through.     We will continue IV Unasyn x 2 weeks after 5/6 surgery and then transition to oral Augmentin for an additional 2 weeks.     Antibiotic history:   Unasyn 5/13-present  Fluconazole 5/6-present    Pip/tazo 5/3-5/13  Vancomycin 5/7-5/8  TMP-SMX 5/6-5/9    Recommendations:  - Continue amp/sulbactam (Unasyn) through 5/20/19 and then transition to oral Augmentin 875/125 mg PO BID through 6/3/19.   - Continue fluconazole through 6/3/19  - Check CBC with diff, CRP, and CMP weekly through 6/3 with results faxed to me at [phone]  - Follow-up with me in ID clinic on 6/11 at 12:00. Patient's daughter can call clinic if this time won't work to discuss alternatives.     It has been a pleasure to be involved with this patient's care. We will sign off for now, but please feel free to call with additional questions.     Mesha Espinoza MD  Infectious Diseases  [phone]        Subjective and interval events:   Anticipating discharge in next several days. Has been afebrile with downward trending WBC and CRP. CT with improvement post-operatively         Review of Systems:    5 point ROS negative other than above         Physical Exam:   /63 (BP Location: Left arm)   Pulse 72   Temp 97.5  F (36.4  C) (Oral)   Resp 16   Ht 1.651 m (5' 5\")   Wt 79.5 kg (175 lb 4.3 oz)   SpO2 93%   BMI 29.17 kg/m     Exam:  Exam:  GENERAL:  Well-developed, well-nourished, sitting in bed in no acute distress.   ENT:  Head is normocephalic, atraumatic. Anterior oropharynx without ulcers.  EYES:  Eyes have anicteric sclerae.    CHEST: Incision healing well.   NECK:  Supple.  LUNGS:  Normal respiratory effort.   ABDOMEN:  Non-distended. "   EXT: Extremities without visible edema.   SKIN:  No acute rashes.  PICC is in place without any surrounding erythema.  NEUROLOGIC:  Grossly nonfocal.          Laboratory Data:     Creatinine   Date Value Ref Range Status   05/14/2019 0.76 0.52 - 1.04 mg/dL Final   05/13/2019 0.88 0.52 - 1.04 mg/dL Final   05/12/2019 0.93 0.52 - 1.04 mg/dL Final   05/11/2019 0.90 0.52 - 1.04 mg/dL Final   05/10/2019 0.90 0.52 - 1.04 mg/dL Final     WBC   Date Value Ref Range Status   05/14/2019 10.2 4.0 - 11.0 10e9/L Final   05/13/2019 10.3 4.0 - 11.0 10e9/L Final   05/12/2019 11.5 (H) 4.0 - 11.0 10e9/L Final   05/11/2019 12.7 (H) 4.0 - 11.0 10e9/L Final   05/10/2019 13.8 (H) 4.0 - 11.0 10e9/L Final     Hemoglobin   Date Value Ref Range Status   05/14/2019 7.5 (L) 11.7 - 15.7 g/dL Final     Platelet Count   Date Value Ref Range Status   05/14/2019 1,166 (HH) 150 - 450 10e9/L Final     Comment:     .   Critical result, provider not notified due to previous critical result   notification.       Lab Results   Component Value Date     05/14/2019    BUN 19 05/14/2019    CO2 31 05/14/2019     CRP Inflammation   Date Value Ref Range Status   05/14/2019 91.0 (H) 0.0 - 8.0 mg/L Final   05/12/2019 97.0 (H) 0.0 - 8.0 mg/L Final   02/04/2019 14.0 (H) 0.0 - 8.0 mg/L Final   11/25/2018 160.0 (H) 0.0 - 8.0 mg/L Final           Pertinent Recent Microbiology Data:     No results for input(s): CULT, SDES in the last 168 hours.         Imaging:   CT C/A/P without contrast 5/13/19:   Improvement in prior fluid collections. Discussed with radiology. Largest remaining collection is ~2x2x1 cm.    Esophagogram 5/10:  Impression:   1. No esophageal leak demonstrated.  2. Stasis of contrast up to the superior esophagus suggesting  Dysmotility.    CXR 5/10  Impression:   1. The pharyngostomy stent is unchanged in position from prior  examination. Additional support devices are stable.  2. Trace pleural effusions and bibasilar atelectasis are  unchanged.    Videoswallow 5/10  Impression: Penetration without aspiration with swallows of Omnipaque  140 contrast which is improved with chin tuck technique

## 2019-05-14 NOTE — PLAN OF CARE
Discharge Planner PT   Patient plan for discharge: pt would like to let her daughter decide, notes she is unsure she can tolerate 3 hrs/day therapy in TCU  Current status: PT 6B: Pt was able to work on walking with railing to challenge her gait stability, required CGA and railing 10 ft x 4, further ambulated with FWW and CGA, achieved up to 180 ft, displays slow pace, reduced step length and tendency to look at the ground. Pt CGA sit<>stand and benefits from cues for precautions. CGA-PAPO required sit>supine to ensure adherence to precautions.   Barriers to return to prior living situation: below baseline functional status, stair  Recommendations for discharge: ARU vs TCU.   Rationale for recommendations: Pt continues to be motivated to progress toward PLOF, however remains below baseline mobility. It appears pt would be able to tolerate 3 hrs/day of therapies, however pt uncertain. Pt is making good progress in therapies at this time. Pt was IND and living active lifestyle prior to hospital stay, pt lives alone and has good ultimate family support when she is ready to return home.          Entered by: Nhi Torres 05/14/2019 5:07 PM

## 2019-05-14 NOTE — PLAN OF CARE
"/77 (BP Location: Left arm)   Pulse 86   Temp 98.8  F (37.1  C) (Oral)   Resp 16   Ht 1.651 m (5' 5\")   Wt 79.5 kg (175 lb 4.3 oz)   SpO2 96%   BMI 29.17 kg/m      A&Ox4.  Lungs crackles in bases and on 1L NC.  SR.  HR 60-70s.  TF cycled.  Two watery stools on shift.  Voiding in bathroom.  SBA. Walked X3. Wound vac in place. Complaining of right chest incisional pain, tylenol and tramadol given.  Plans to discharge earlier this week. Continue to monitor  "

## 2019-05-14 NOTE — CONSULTS
Brief hematology consultation note.  Myrtle Vaz is a 73 year old female w/ PMH of laparoscopic hiatal hernia repair with mesh, complicated with esophageal perforation 11/2018 s/p spit fistula who was admitted for redo laparotomy and partial sternotomy, esophagogastrostomy on 4/26/2019. Found anastomotic leak per CT scan on 5/5/19, underwent EGD esophageal stent replacement, I&D of left neck on 5/6/19. Today is POD#19.    Thoracic surgery consulted hematology for thrombocytosis.    A/P  # Thrombocytosis, likely reactive VS primary  # Leukocytosis with normal differentials (4/7/19)  # Normocytic normochromic anemia likely related to recent surgery/laboratory tests    Her PLT count at baseline appears around 400-600K (461 on 4/7/19). After her surgery on 4/27/19, PLT count reached at a abhishek of 258-259 around 4/28-4/29 (POD2-3) which was expected after the surgery. Reactive thrombocytosis occurred afterward. The peak of reactive change could be 2-3 times as many PLT as her baseline, and is expected around day 14 after the surgery (5/9 1053K). However, since then, her PLT count did stay around 1000-1100K. Given the course of PLT changes in correlation with surgery, it is likely reactive changes but due to prolonged course, we would like to rule out primary etiology (myeloproliferative neoplasm; MPN). It is not known whether using aspirin is beneficial in patients with reactive thrombocytosis except MPN. In MPN, if PLT is >1000K, there is a risk of developing relative vWF deficiency causing increased risk of bleeding. Will check vWF as well.    She also noted to have leukocytosis since the surgery. Differentials on admission was normal. Notably, CT CAP 5/13/19 was negative for splenomegaly.     If her PLT trends down next few days with negative MPN panel, she will not need to be seen in the hematology clinic.    Recommendations:  - MPN panel (JAK2, CALR, MPL) and vWF activity w/ reflex ristocetin cofactor assay (orders  placed)  - Continue daily CBC for next few days to see the trajectory of thrombocytosis    Care plans discussed with Dr. Huynh.    Se gemma Gamino MD  [facility]  Hematology oncology and transplantation fellow  Pager [phone]

## 2019-05-14 NOTE — PLAN OF CARE
Discharge Planner SLP   Patient plan for discharge: Pt did not state  Current status: Recommend continuation of clear thin liquid diet, with use of chin tuck strategy for every swallow.  No straws. Pt to be fully alert and upright for all PO, taking small sips at slow rate.Pt able to complete oropharyngeal strengthening exercises given min cues, improved independent completion of exercises in today's session.  SLP to follow for PO tolerance, exercises, education, and diet advancement per thoracic team recommendations.  Barriers to return to prior living situation: Dysphagia, medical status  Recommendations for discharge: TCU  Rationale for recommendations: Below baseline function; pt will benefit from ongoing ST targeting swallowing       Entered by: Paulina Spangler 05/14/2019 2:04 PM

## 2019-05-14 NOTE — PROGRESS NOTES
Social Work Services Progress Note    Hospital Day: 18  Date of Initial Social Work Evaluation:  5/3/19  Collaborated with:  Thoracic PA (Augustine & Shawn), [facility] (Shaylee), Chart Review    Data:  Pt is 74 y/o female admitted to [facility] on 4/26/19 s/p redo laparotomy and partial sternotomy, esophagogastrostomy on 4/26/2019.    SW received update from Thoracic PA (Augustine) that Pt would be stable for discharge today pending ID recommendations for abx at discharge - unknown if abx will be IV vs. PO at discharge. Augustine said that Pt will discharge with wound vac.     Intervention:  SW f/u with Shaylee in Admissions at [facility] (Ph: [phone], Admissions: [phone], F: [phone]) and she is still reviewing Pt's referral. SW provided update that Pt will discharge with wound vac, but unclear yet if she will need IV abx. Shaylee said she will call SW back once referral is reviewed.     SW f/u with Thoracic PA (Shawn) at 1400 as no note from ID yet re: abx plan for discharge. Shawn stated that if ID does not have recommendations in by tomorrow morning then Thoracic will f/u with ID then.     ID input their note as 1410. They recommend continuing IV Unasyn Q6 through 5/20/19 and then transitioning to PO Augmentin BID through 6/3/19; they also recommend continuing IV Fluconazole Qday until 6/3/19.    ANA LILIA called and spoke with Admissions at [facility] again at 1415 and f/u on status of referral. She reported that they are awaiting confirmation from their DON if they can accept Pt. They did request the updated ID note to be faxed over which SW completed via Epic.    SW awaiting update if Pt is accepted for admission.     Assessment:  TCU referral in process    Plan:    Anticipated Disposition:  Facility:  TCU (TBD) - Being assessed by [facility]    Barriers to d/c plan:  Placement    Follow Up:  SW to continue to follow and assist with discharge plan.    Adriana Panchal, MSW, LGSW  6B Intermediate Care Unit    Phone: [phone]  Pager: [phone]

## 2019-05-14 NOTE — PROGRESS NOTES
THORACIC & FOREGUT SURGERY PROGRESS NOTE  05/14/2019    Patient: Myrtle Vaz  MRN: [id number]    Subjective  No acute events overnight. Doing well. Having BMs. Ambulating. Tolerating cycled tube feeds. Eager for dispo when appropriate.    Objective  Temp:  [97.5  F (36.4  C)-98.7  F (37.1  C)] 97.5  F (36.4  C)  Pulse:  [72] 72  Heart Rate:  [69-79] 79  Resp:  [16] 16  BP: (106-132)/(63-72) 125/63  SpO2:  [93 %-99 %] 93 %    General: NAD  HEENT/chest: neck incision packed with Kerlix soupy but clean, mediastinal drain removed, dressing in place. Wound VAC in place, holding suction with minimal output. Changed yesterday and granulation tissue throughout base looks great.  CV: WWP, non-cyanotic   Pulm: On minimal NC breathing comfortably  Abd: soft, non-distended, appropriately tender  Extremities: no edema  Neuro: moving all extremities spontaneously without apparent deficit    I/O last 3 completed shifts:  In: 4225 [P.O.:1820; I.V.:200; NG/GT:330]  Out: 1301 [Urine:300; Drains:101; Other:900]    Labs:    Recent Labs   Lab 05/14/19  0617 05/13/19  0513 05/12/19  0630   WBC 10.2 10.3 11.5*   HGB 7.5* 7.3* 7.5*   PLT 1,166* 1,179* 1,143*     Recent Labs   Lab 05/14/19  0617 05/13/19  0513 05/12/19  0630    136 133   POTASSIUM 3.9 3.9 3.9   CHLORIDE 98 98 97   CO2 31 30 31   BUN 19 21 23   CR 0.76 0.88 0.93   * 97 110*   JOSE 7.8* 7.9* 7.9*   MAG  --  2.2  --    PHOS  --  2.8  --      Imaging:  AM CXR stable      Assessment & Plan  Myrtle Vaz is a 73 year old female with a 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 Albarado. Found to have anastomotic leak per CT scan on 5/519 and is s/p EGD, flex bronch, possible esophageal stent, neck wound washout. Recovering appropriately. Pharyngostomy removed 5/9.     - Pain control with enteral medications    - Strict I/O  - WBC downtrending, afebrile. Zosyn  (5/3-) and Fluconazole (5/6-). ID to recommend outpatient regimen after review of yesterday's CT scan          -Mediastinal fluid cultures: Strep angiosus, Enterococcus faecalis, Group B strep, Candida, Parvimonas micra (covered by Zosyn per ID)          -Vanc (5/6-5/9), Bactrim (5/6-5/9)  - Clear liquids. J tube feeds cycled over 18 hours.   - Aspirin has been started for thrombocytosis.  - Wound vac to -50 mmHg. Vac change MWF. Will change tomorrow  - BID dressing changes to neck wound. MD to perform morning change, RN to perform afternoon.   - Continue Lasix 20mg mg BID via J tube   - Encouraged patient to get up into the recliner today and eat her meals sitting up.   - Lovenox for ppx.  - Dispo: Early this week to ARF  -Follow up in OR for EGD in 3-4 weeks    Guero Sebastian PA-C  P: [phone]

## 2019-05-14 NOTE — CONSULTS
Health Psychology                  Clinic    Department of Medicine  Ladonna Sharif, Ph.D., L.P. [phone]                          Clinics and Surgery Center  [facility] Racheal Aiken, Ph.D.,  L.P. [phone]                 3rd Floor  [place] Mail Code 746   Inessa Cruz, Ph.D., L.P. [phone]    [address] Alejandrina Vargas, Ph.D., L.P. [phone]            [place] [address]          Charles Wheeler, Ph.D., A.B.P.P., L.P. [phone]      Shantelle Cody, Ph.D., L.P. [phone]      Inpatient Health Psychology Consultation*    DOS: 5/14/19    Short conversation with Ms Vaz to introduce myself and Health Psychology services. She reports that she is likely to discharge today to TCU near home in [place]. Feels that she is doing very well emotionally. Able to focus on sense of gratitude for her recovery and ongoing healing, for the support of her family and friends, for the ability that she will have to get back home after further healing and [facility] at [facility]. Laughs when telling me that she was able to finally have PO intake recently and loved it despite not liking apple juice. Affect relaxed, bright.  Appreciative of opportunity for additional support but does not feel she needs it.    Ladonna Sharif, PhD, LP  [phone]    *In accordance with the Rules of the [place] Board of [employer], it is noted that psychological descriptions and scientific procedures underlying psychological evaluations have limitations.  Absolute predictions cannot be made based on information in this report.

## 2019-05-15 ENCOUNTER — HOSPITAL ENCOUNTER (INPATIENT)
Facility: SKILLED NURSING FACILITY | Age: 74
LOS: 13 days | Discharge: HOME HEALTH | DRG: 950 | End: 2019-05-28
Attending: HOSPITALIST | Admitting: HOSPITALIST
Payer: MEDICARE

## 2019-05-15 ENCOUNTER — APPOINTMENT (OUTPATIENT)
Dept: PHYSICAL THERAPY | Facility: CLINIC | Age: 74
DRG: 326 | End: 2019-05-15
Attending: THORACIC SURGERY (CARDIOTHORACIC VASCULAR SURGERY)
Payer: MEDICARE

## 2019-05-15 VITALS
RESPIRATION RATE: 16 BRPM | DIASTOLIC BLOOD PRESSURE: 73 MMHG | HEIGHT: 65 IN | OXYGEN SATURATION: 98 % | SYSTOLIC BLOOD PRESSURE: 137 MMHG | TEMPERATURE: 98.8 F | WEIGHT: 175.04 LBS | HEART RATE: 75 BPM | BODY MASS INDEX: 29.16 KG/M2

## 2019-05-15 DIAGNOSIS — J96.91 RESPIRATORY FAILURE WITH HYPOXIA, UNSPECIFIED CHRONICITY (H): ICD-10-CM

## 2019-05-15 DIAGNOSIS — E03.9 HYPOTHYROIDISM, UNSPECIFIED TYPE: ICD-10-CM

## 2019-05-15 DIAGNOSIS — K22.3 ESOPHAGEAL PERFORATION: Primary | ICD-10-CM

## 2019-05-15 DIAGNOSIS — K22.3 ESOPHAGEAL PERFORATION: ICD-10-CM

## 2019-05-15 DIAGNOSIS — R19.7 DIARRHEA, UNSPECIFIED TYPE: ICD-10-CM

## 2019-05-15 DIAGNOSIS — J98.51 MEDIASTINITIS: ICD-10-CM

## 2019-05-15 DIAGNOSIS — I48.91 ATRIAL FIBRILLATION, UNSPECIFIED TYPE (H): ICD-10-CM

## 2019-05-15 DIAGNOSIS — E61.1 IRON DEFICIENCY: ICD-10-CM

## 2019-05-15 DIAGNOSIS — K91.89 ESOPHAGECTOMY, ANASTOMOTIC LEAK: ICD-10-CM

## 2019-05-15 DIAGNOSIS — R53.1 WEAKNESS: Primary | ICD-10-CM

## 2019-05-15 DIAGNOSIS — F32.0 CURRENT MILD EPISODE OF MAJOR DEPRESSIVE DISORDER WITHOUT PRIOR EPISODE (H): ICD-10-CM

## 2019-05-15 DIAGNOSIS — R06.00 DYSPNEA, UNSPECIFIED TYPE: ICD-10-CM

## 2019-05-15 LAB
ERYTHROCYTE [DISTWIDTH] IN BLOOD BY AUTOMATED COUNT: 21.3 % (ref 10–15)
HCT VFR BLD AUTO: 24.9 % (ref 35–47)
HGB BLD-MCNC: 7.4 G/DL (ref 11.7–15.7)
MCH RBC QN AUTO: 28.1 PG (ref 26.5–33)
MCHC RBC AUTO-ENTMCNC: 29.7 G/DL (ref 31.5–36.5)
MCV RBC AUTO: 95 FL (ref 78–100)
PLATELET # BLD AUTO: 1311 10E9/L (ref 150–450)
RBC # BLD AUTO: 2.63 10E12/L (ref 3.8–5.2)
WBC # BLD AUTO: 10.4 10E9/L (ref 4–11)

## 2019-05-15 PROCEDURE — 25000128 H RX IP 250 OP 636: Performed by: SURGERY

## 2019-05-15 PROCEDURE — 85027 COMPLETE CBC AUTOMATED: CPT | Performed by: SURGERY

## 2019-05-15 PROCEDURE — A9270 NON-COVERED ITEM OR SERVICE: HCPCS | Performed by: STUDENT IN AN ORGANIZED HEALTH CARE EDUCATION/TRAINING PROGRAM

## 2019-05-15 PROCEDURE — A9270 NON-COVERED ITEM OR SERVICE: HCPCS | Performed by: SURGERY

## 2019-05-15 PROCEDURE — 25000132 ZZH RX MED GY IP 250 OP 250 PS 637: Performed by: SURGERY

## 2019-05-15 PROCEDURE — A9270 NON-COVERED ITEM OR SERVICE: HCPCS | Performed by: HOSPITALIST

## 2019-05-15 PROCEDURE — 40000802 ZZH SITE CHECK

## 2019-05-15 PROCEDURE — 27210437 ZZH NUTRITION PRODUCT SEMIELEM INTERMED LITER

## 2019-05-15 PROCEDURE — 12000022 ZZH R&B SNF

## 2019-05-15 PROCEDURE — 97530 THERAPEUTIC ACTIVITIES: CPT | Mod: GP

## 2019-05-15 PROCEDURE — A9270 NON-COVERED ITEM OR SERVICE: HCPCS | Performed by: PHYSICIAN ASSISTANT

## 2019-05-15 PROCEDURE — C9113 INJ PANTOPRAZOLE SODIUM, VIA: HCPCS | Performed by: SURGERY

## 2019-05-15 PROCEDURE — 36592 COLLECT BLOOD FROM PICC: CPT | Performed by: HOSPITALIST

## 2019-05-15 PROCEDURE — 25000132 ZZH RX MED GY IP 250 OP 250 PS 637: Performed by: PHYSICIAN ASSISTANT

## 2019-05-15 PROCEDURE — 25000128 H RX IP 250 OP 636: Performed by: INTERNAL MEDICINE

## 2019-05-15 PROCEDURE — 85245 CLOT FACTOR VIII VW RISTOCTN: CPT | Performed by: HOSPITALIST

## 2019-05-15 PROCEDURE — 85246 CLOT FACTOR VIII VW ANTIGEN: CPT | Performed by: HOSPITALIST

## 2019-05-15 PROCEDURE — 00000401 ZZHCL STATISTIC THROMBIN TIME NC: Performed by: HOSPITALIST

## 2019-05-15 PROCEDURE — 25800030 ZZH RX IP 258 OP 636

## 2019-05-15 PROCEDURE — 00000328 ZZHCL STATISTIC PTT NC: Performed by: HOSPITALIST

## 2019-05-15 PROCEDURE — 25000128 H RX IP 250 OP 636: Performed by: HOSPITALIST

## 2019-05-15 PROCEDURE — 00000167 ZZHCL STATISTIC INR NC: Performed by: HOSPITALIST

## 2019-05-15 PROCEDURE — 85240 CLOT FACTOR VIII AHG 1 STAGE: CPT | Performed by: HOSPITALIST

## 2019-05-15 PROCEDURE — 25000132 ZZH RX MED GY IP 250 OP 250 PS 637: Performed by: STUDENT IN AN ORGANIZED HEALTH CARE EDUCATION/TRAINING PROGRAM

## 2019-05-15 PROCEDURE — 25000132 ZZH RX MED GY IP 250 OP 250 PS 637: Performed by: HOSPITALIST

## 2019-05-15 PROCEDURE — 97110 THERAPEUTIC EXERCISES: CPT | Mod: GP

## 2019-05-15 PROCEDURE — 25000132 ZZH RX MED GY IP 250 OP 250 PS 637: Performed by: THORACIC SURGERY (CARDIOTHORACIC VASCULAR SURGERY)

## 2019-05-15 PROCEDURE — 97116 GAIT TRAINING THERAPY: CPT | Mod: GP

## 2019-05-15 RX ORDER — GUAR GUM
2 PACKET (EA) ORAL 3 TIMES DAILY
Status: DISCONTINUED | OUTPATIENT
Start: 2019-05-15 | End: 2019-05-22 | Stop reason: CLARIF

## 2019-05-15 RX ORDER — VENLAFAXINE 75 MG/1
75 TABLET ORAL 2 TIMES DAILY
Status: DISCONTINUED | OUTPATIENT
Start: 2019-05-15 | End: 2019-05-28 | Stop reason: HOSPADM

## 2019-05-15 RX ORDER — AMOXICILLIN 250 MG
2 CAPSULE ORAL 2 TIMES DAILY PRN
Status: DISCONTINUED | OUTPATIENT
Start: 2019-05-15 | End: 2019-05-28 | Stop reason: HOSPADM

## 2019-05-15 RX ORDER — AMOXICILLIN 250 MG
2 CAPSULE ORAL 2 TIMES DAILY PRN
Status: ON HOLD | DISCHARGE
Start: 2019-05-15 | End: 2019-05-28

## 2019-05-15 RX ORDER — ALBUTEROL SULFATE 90 UG/1
2 AEROSOL, METERED RESPIRATORY (INHALATION) EVERY 4 HOURS PRN
Status: DISCONTINUED | OUTPATIENT
Start: 2019-05-15 | End: 2019-05-28 | Stop reason: HOSPADM

## 2019-05-15 RX ORDER — FLUCONAZOLE 2 MG/ML
400 INJECTION, SOLUTION INTRAVENOUS EVERY 24 HOURS
Status: ON HOLD | DISCHARGE
Start: 2019-05-16 | End: 2019-05-28

## 2019-05-15 RX ORDER — AMPICILLIN AND SULBACTAM 2; 1 G/1; G/1
3 INJECTION, POWDER, FOR SOLUTION INTRAMUSCULAR; INTRAVENOUS EVERY 6 HOURS
Status: ON HOLD | DISCHARGE
Start: 2019-05-15 | End: 2019-05-28

## 2019-05-15 RX ORDER — AMPICILLIN AND SULBACTAM 2; 1 G/1; G/1
3 INJECTION, POWDER, FOR SOLUTION INTRAMUSCULAR; INTRAVENOUS EVERY 6 HOURS
Status: COMPLETED | OUTPATIENT
Start: 2019-05-15 | End: 2019-05-20

## 2019-05-15 RX ORDER — ACETAMINOPHEN 325 MG/1
975 TABLET ORAL EVERY 8 HOURS
Status: ON HOLD | DISCHARGE
Start: 2019-05-15 | End: 2019-05-28

## 2019-05-15 RX ORDER — NALOXONE HYDROCHLORIDE 0.4 MG/ML
.1-.4 INJECTION, SOLUTION INTRAMUSCULAR; INTRAVENOUS; SUBCUTANEOUS
Status: DISCONTINUED | OUTPATIENT
Start: 2019-05-15 | End: 2019-05-28 | Stop reason: HOSPADM

## 2019-05-15 RX ORDER — ACETAMINOPHEN 325 MG/1
650 TABLET ORAL EVERY 4 HOURS PRN
Status: DISCONTINUED | OUTPATIENT
Start: 2019-05-15 | End: 2019-05-17

## 2019-05-15 RX ORDER — GUAR GUM
2 PACKET (EA) ORAL 3 TIMES DAILY
Status: ON HOLD | DISCHARGE
Start: 2019-05-15 | End: 2019-05-28

## 2019-05-15 RX ORDER — VENLAFAXINE 75 MG/1
75 TABLET ORAL 2 TIMES DAILY
Status: DISCONTINUED | OUTPATIENT
Start: 2019-05-15 | End: 2019-05-15

## 2019-05-15 RX ORDER — ALUMINA, MAGNESIA, AND SIMETHICONE 2400; 2400; 240 MG/30ML; MG/30ML; MG/30ML
15 SUSPENSION ORAL EVERY 4 HOURS PRN
Status: DISCONTINUED | OUTPATIENT
Start: 2019-05-15 | End: 2019-05-17

## 2019-05-15 RX ORDER — AMIODARONE HYDROCHLORIDE 200 MG/1
200 TABLET ORAL DAILY
Status: DISCONTINUED | OUTPATIENT
Start: 2019-05-16 | End: 2019-05-28 | Stop reason: HOSPADM

## 2019-05-15 RX ORDER — ONDANSETRON 4 MG/1
4 TABLET, ORALLY DISINTEGRATING ORAL EVERY 6 HOURS PRN
Status: DISCONTINUED | OUTPATIENT
Start: 2019-05-15 | End: 2019-05-28 | Stop reason: HOSPADM

## 2019-05-15 RX ORDER — LEVOTHYROXINE SODIUM 150 UG/1
150 TABLET ORAL EVERY MORNING
Status: DISCONTINUED | OUTPATIENT
Start: 2019-05-16 | End: 2019-05-28 | Stop reason: HOSPADM

## 2019-05-15 RX ORDER — SIMETHICONE 80 MG
80 TABLET,CHEWABLE ORAL EVERY 6 HOURS PRN
Status: ON HOLD | DISCHARGE
Start: 2019-05-15 | End: 2019-05-28

## 2019-05-15 RX ORDER — FLUCONAZOLE 2 MG/ML
400 INJECTION, SOLUTION INTRAVENOUS EVERY 24 HOURS
Status: COMPLETED | OUTPATIENT
Start: 2019-05-16 | End: 2019-05-20

## 2019-05-15 RX ORDER — LIDOCAINE 4 G/G
1 PATCH TOPICAL EVERY 24 HOURS
Status: ON HOLD | DISCHARGE
Start: 2019-05-15 | End: 2019-05-28

## 2019-05-15 RX ORDER — ACETAMINOPHEN 650 MG/1
650 SUPPOSITORY RECTAL EVERY 4 HOURS PRN
Status: DISCONTINUED | OUTPATIENT
Start: 2019-05-15 | End: 2019-05-28 | Stop reason: HOSPADM

## 2019-05-15 RX ORDER — ONDANSETRON 2 MG/ML
4 INJECTION INTRAMUSCULAR; INTRAVENOUS EVERY 6 HOURS PRN
Status: DISCONTINUED | OUTPATIENT
Start: 2019-05-15 | End: 2019-05-28 | Stop reason: HOSPADM

## 2019-05-15 RX ORDER — SIMETHICONE 80 MG
80 TABLET,CHEWABLE ORAL EVERY 6 HOURS PRN
Status: DISCONTINUED | OUTPATIENT
Start: 2019-05-15 | End: 2019-05-24

## 2019-05-15 RX ORDER — SODIUM CHLORIDE 9 MG/ML
INJECTION, SOLUTION INTRAVENOUS
Status: COMPLETED
Start: 2019-05-15 | End: 2019-05-15

## 2019-05-15 RX ORDER — TRAMADOL HYDROCHLORIDE 50 MG/1
25-50 TABLET ORAL EVERY 6 HOURS PRN
Status: ON HOLD | DISCHARGE
Start: 2019-05-15 | End: 2019-05-28

## 2019-05-15 RX ADMIN — AMPICILLIN SODIUM AND SULBACTAM SODIUM 3 G: 2; 1 INJECTION, POWDER, FOR SOLUTION INTRAMUSCULAR; INTRAVENOUS at 14:55

## 2019-05-15 RX ADMIN — AMPICILLIN SODIUM AND SULBACTAM SODIUM 3 G: 2; 1 INJECTION, POWDER, FOR SOLUTION INTRAMUSCULAR; INTRAVENOUS at 03:48

## 2019-05-15 RX ADMIN — MINERAL SUPPLEMENT IRON 300 MG / 5 ML STRENGTH LIQUID 100 PER BOX UNFLAVORED 300 MG: at 08:55

## 2019-05-15 RX ADMIN — Medication 2 PACKET: at 21:45

## 2019-05-15 RX ADMIN — FLUCONAZOLE 400 MG: 400 INJECTION, SOLUTION INTRAVENOUS at 08:56

## 2019-05-15 RX ADMIN — ACETAMINOPHEN 975 MG: 325 TABLET, FILM COATED ORAL at 05:56

## 2019-05-15 RX ADMIN — Medication 2 PACKET: at 08:57

## 2019-05-15 RX ADMIN — AMIODARONE HYDROCHLORIDE 200 MG: 200 TABLET ORAL at 08:55

## 2019-05-15 RX ADMIN — ACETAMINOPHEN 975 MG: 325 SOLUTION ORAL at 21:32

## 2019-05-15 RX ADMIN — ACETAMINOPHEN 975 MG: 325 TABLET, FILM COATED ORAL at 13:25

## 2019-05-15 RX ADMIN — PANTOPRAZOLE SODIUM 40 MG: 40 INJECTION, POWDER, LYOPHILIZED, FOR SOLUTION INTRAVENOUS at 08:55

## 2019-05-15 RX ADMIN — LEVOTHYROXINE SODIUM 150 MCG: 150 TABLET ORAL at 08:55

## 2019-05-15 RX ADMIN — AMPICILLIN SODIUM AND SULBACTAM SODIUM 3 G: 2; 1 INJECTION, POWDER, FOR SOLUTION INTRAMUSCULAR; INTRAVENOUS at 09:13

## 2019-05-15 RX ADMIN — ENOXAPARIN SODIUM 40 MG: 40 INJECTION SUBCUTANEOUS at 15:42

## 2019-05-15 RX ADMIN — SODIUM CHLORIDE: 9 INJECTION, SOLUTION INTRAVENOUS at 21:46

## 2019-05-15 RX ADMIN — AMPICILLIN SODIUM AND SULBACTAM SODIUM 3 G: 2; 1 INJECTION, POWDER, FOR SOLUTION INTRAMUSCULAR; INTRAVENOUS at 21:28

## 2019-05-15 RX ADMIN — VENLAFAXINE 75 MG: 75 TABLET ORAL at 21:32

## 2019-05-15 RX ADMIN — Medication 2 PACKET: at 13:25

## 2019-05-15 RX ADMIN — VENLAFAXINE 75 MG: 75 TABLET ORAL at 08:55

## 2019-05-15 RX ADMIN — Medication 50 MG: at 18:25

## 2019-05-15 RX ADMIN — Medication 325 MG: at 08:55

## 2019-05-15 ASSESSMENT — ACTIVITIES OF DAILY LIVING (ADL)
ADLS_ACUITY_SCORE: 19
COGNITION: 0 - NO COGNITION ISSUES REPORTED

## 2019-05-15 ASSESSMENT — MIFFLIN-ST. JEOR: SCORE: 1304.88

## 2019-05-15 NOTE — PROGRESS NOTES
Social Work Services Discharge Note      Patient Name:  Myrtle Vaz     Anticipated Discharge Date:  5/15/19    Discharge Disposition:   TCU:  [facility]   [facility] [address], [place] [address]  Nursing Ph: [phone]  Admissions: [phone]    Following MD:  Per facilities designation     Pre-Admission Screening (PAS) online form has been completed.  The Level of Care (LOC) is:  Determined  Confirmation Code is:  [id number]  Patient/caregiver informed of referral to [facility] for Pre-Admission Screening for skilled nursing facility (SNF) placement and to expect a phone call post discharge from SNF.     Additional Services/Equipment Arranged:  SW arranged W/C ride through [employer] (Ph: [phone]) for p/u at 4PM. [facility] to send O2 tank with for transport.      Patient / Family response to discharge plan:  Pt is in agreement. She is disappointed [facility] cannot accept her but is understanding. Update at 1205: SW received call back from Pt's dtr (Haydee) and SW informed her of discharge to  TCU. She is disappointed [facility] cannot take Pt, but expressed understanding re: discharge plan to  TCU.      Persons notified of above discharge plan:  Pt, Pt's sister (Ladonna), P't's dtr (Haydee), Thoracic Team,  TCU    Staff Discharge Instructions:  Please print a packet and send with patient.   Please send portable O2 tank with Pt for transport.  Please call nurse to nurse at [phone] prior to discharge.     CTS Handoff completed:  YES    Medicare Notice of Rights provided to the patient/family:  YES    VINAYAK Godoy, JESSICA  6B Intermediate Care Unit   Phone: [phone]  Pager: [phone]

## 2019-05-15 NOTE — PLAN OF CARE
Discharge Planner PT  - 6B  Patient plan for discharge: Rehab.  Current status: Pt completes supine <> sit with HOB slightly elevated and CGA. Pt transfers sit <> stand with CGA. Pt requires verbal cues to maintain sternal precautions during bed mobility and transfers. Pt ambulates ~200ft with FWW and CGA. Pt ambulates additional ~100ft 2x with 1 UE support on IV pole and CGA. Pt slightly unsteady with use of IV pole for balance during ambulation, recommend pt continue to use FWW at this time to ensure safety. Pt participates in seated LE therex. VSS on 2 L O2 via NC throughout PT session.  Barriers to return to prior living situation: Medical needs, LE weakness, impaired balance, decreased activity tolerance, level of assist, sternal precautions.  Recommendations for discharge: TCU.  Rationale for recommendations: Pt below baseline functional mobility - would benefit from continued therapy for progression of strength, balance, activity tolerance, and IND/safety with functional mobility prior to return home.

## 2019-05-15 NOTE — PLAN OF CARE
Pt A&O X4. VSS, afebrile, HR sinus rhythm 70-80's, BP's 120-130's/50-90's, O2 sats > 90% on 2L via NC. LS clear upper/crackles lower, BS+ X4, CMS intact. Pt slept majority of overnight shift, had few requests/compliaints. Reported tenderness at ANGELO bulb removal site, received scheduled Tylenol with relief. L neck pharyngostomy site WDL. Mid neck dressing CDI. L chest wound-vac patent to -50mmHg suction, outputting scant serous drainage. Midline abdominal incision WDL & TIFFANIE. PICC X2 in R arm patent, grey port infusing NS @ TKO between antibiotics, purple port patent & Heparin locked w/ blood return. G-tube clamped, J-tube patent & infusing cycled TF @ 75mL/hr w/ 60mL NS flush Q6hrs per eMar. Tolerating clear liquid diet with no nausea/emesis. Pt gets up with SBA, used commode overnight, voiding urine adequately, passing gas, loose BM X2 overnight. Using IS & acapella at bedside. Has call light within reach, able to make needs known, will continue to monitor per POC.

## 2019-05-15 NOTE — PROGRESS NOTES
Social Work Services Progress Note    Hospital Day: 19  Date of Initial Social Work Evaluation:  5/3/19  Collaborated with:  Pt's dtr (Haydee - left VM), Thoracic PA (Augustine), Chart Review    Data:  Pt is 72 y/o female admitted to [facility] on 4/26/19 s/p redo laparotomy and partial sternotomy, esophagogastrostomy on 4/26/2019.    Per Thoracic PA (Augustine), Pt remains stable for discharge today pending placement.     Intervention:  ANA LILIA left VM at 0830 for Admissions at [facility] to f/u on status of referral and re-iterated Pt's readiness for discharge; awaiting return call.     SW f/u with  TCU Liaison (Oksana) as backup TCU option. Paulina asked ANA LILIA to clarify with Thoracic if nursing can do the BID wound dressing, and if IV Protonix be changed to oral suspension - ANA LILIA confirmed with JENARO Bradshaw that nursing can do wound care and he will change Protonix order.     ANA LILIA left another  for [facility] at 1010 asking for call back by 1100 to confirm if they can accept Pt; awaiting return call.     ANA LILIA received call back from Justice in Admissions at [facility] at 1100 that they have declined this referral.     ANA LILIA updated Oksana at  TCU of update from Thoracic.  TCU can accept Pt for admission today. ANA LILIA arranged W/C ride through [facility] [employer] (Ph: [phone]) for p/u at 4PM. [facility] to send O2 tank with for transport.     ANA LILIA left  for Pt's dtr (Haydee) to update on discharge; awaiting return call.     Assessment:  Pt accepted to  TCU for admission today.    Plan:    Anticipated Disposition:  Facility:   TCU    Barriers to d/c plan:  n/a    Follow Up:  ANA LILIA to follow for discharge.     VINAYAK Godoy, LGSW  6B Intermediate Care Unit   Phone: [phone]  Pager: [phone]

## 2019-05-15 NOTE — PROGRESS NOTES
DISCHARGE                         5/15/2019  4:18 PM  ----------------------------------------------------------------------------  Discharged to:  TCU  Via: [employer]  Accompanied by: MORALES  Discharge Instructions: packet printed and sent with the patient  Prescriptions: TCU  Follow Up Appointments: TCU  Belongings: All sent with pt, patient had cellphone with her and one set of clothes, other items sent were hospital supplies and Tube Feed  IV: PICC line intact, saline locked for transport  Telemetry: d/c'd    Discharge Paperwork: Signed, copied, and sent home with patient. RN to RN report called to TCU. Patient disconnected from our wound vac, to be hooked up to wound vac at TCU. 1L oxygen on [employer] oxygen tank.

## 2019-05-15 NOTE — PLAN OF CARE
Occupational Therapy Discharge Summary    Reason for therapy discharge:    Discharged to transitional care facility.    Progress towards therapy goal(s). See goals on Care Plan in [facility] electronic health record for goal details.  Goals partially met.  Barriers to achieving goals:   discharge from facility.    Therapy recommendation(s):    Continued therapy is recommended.  Rationale/Recommendations:  continued OT at TCU to increase pt's (I) with ADL/IADLs.

## 2019-05-15 NOTE — DISCHARGE SUMMARY
NAME: Myrtle Vaz   MRN: [id number]   : 1945     DATE OF ADMISSION: 2019     PRE/POSTOPERATIVE DIAGNOSES:   1.  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   2.  History of laparoscopic hiatal hernia repair with mesh, complicated with esophageal perforation.     PROCEDURES PERFORMED:   1.  Flexible bronchoscopy.   2.  EGD.   3.  Left neck incision.   4.  Takedown of cervical esophagostomy.   5.  Partial manubriectomy.   6.  Redo laparotomy.   7.  Extensive enterolysis, more than 2 hours.   8.  Kocherization of duodenum.   9.  Botox injection of the pylorus.   10.  Tubularization of gastric conduit and retrosternal transposition.  11.  Partial omentectomy.   12.  End-to-end handsewn 2-layer esophagogastrostomy with omental fat buttress.  13.  Left pharyngostomy tube placement.   14.  Jejunostomy tube exchange.     ADDITIONAL PROCEDURES PERFORMED: 19 For Anastamotic leak  1. EGD, esophageal stent insertion  2. Irrigation and debridement of left neck wound  3. VAC placement    PATHOLOGY RESULTS: None     CULTURE RESULTS:   Mediastinal abscess cultures from  with S. anginosus, S. agalactiae, Enterococcus faecalis and Candida albicans.    BAL with Stenotrophomonas maltophilia, S. agalactiae, Candida albicans.    INTRAOPERATIVE COMPLICATIONS: None     POSTOPERATIVE COMPLICATIONS:   1. Anastamotic leak- Treated with esophageal stent and pharyngostomy as above.  Pharyngostomy removed prior to discharge    DRAINS/TUBES PRESENT AT DISCHARGE: J tube, wound vac.  Instructions as below    DATE OF DISCHARGE:  May 15, 2019     HOSPITAL COURSE: Myrtle Vaz is a 73 year old female who on 2019 underwent the above-named procedures.  She tolerated the operation well and postoperatively was transferred to the post-surgical intensive care unit.  The remainder of her course was complicated primarily be her anastamotic leak and establishment of source control.  Prior to discharge her  "pain was controlled well and she had full return of bowel and bladder function.  Due to OT/PT recommendations for TCU at the time of discharge, on May 15, 2019, she was discharged to [facility] in stable condition.     DISCHARGE EXAM:   A&O, NAD  Resp non-labored  Distal extremities warm    Incisions and wounds healing well     DISCHARGE INSTRUCTIONS:  Discharge Procedure Orders   General info for SNF   Order Comments: Length of Stay Estimate: Short Term Care: Estimated # of Days <30  Condition at Discharge: Improving  Level of care:skilled   Rehabilitation Potential: Good  Admission H&P remains valid and up-to-date: Yes  Recent Chemotherapy: N/A  Use Nursing Home Standing Orders: Yes     Mantoux instructions   Order Comments: Give two-step Mantoux (PPD) Per Facility Policy Yes     Intake and output   Order Comments: Every shift     Glucose monitor nursing POCT   Order Comments: Before meals and at bedtime     Daily weights   Order Comments: Call Provider for weight gain of more than 2 pounds per day or 5 pounds per week.     Wound care (specify)   Order Comments: Wound vac to left midclavicular wound (old spit fistula site) to -50mmhg suction, will likely discontinue vac week of 5/20     Wound care   Order Comments: Site:   Left medial cervical wound  Instructions:  BID wet to dry dressing changes     Follow Up and recommended labs and tests   Order Comments: Follow up in OR with thoracic surgery in 3-4 weeks for repeat EGD, possible stent revision     Additional Discharge Instructions   Order Comments: THORACIC SURGERY DISCHARGE INSTRUCTIONS    In emergencies, call [phone]    For other Questions or Concerns;   A.) During weekday working hours (Monday through Friday 8am to 4:30pm)   call [phone] and ask to speak to a clinical nurse specialist.     B.) At nights (after 4:30pm), on weekends, or if urgent call [phone] and   tell the  \"I would like to page job code [phone], the thoracic " "surgery   fellow on call, please.\"     Activity - Up with nursing assistance     Order Specific Question Answer Comments   Is discharge order? Yes      Activity - Ambulate in hallway   Order Comments: Every shift     Order Specific Question Answer Comments   Is discharge order? Yes      Tubes   Order Comments: J TUBE INSTRUCTIONS:    Flush your feeding tube with 30cc of water;  1. After medication administration through the feeding tube  2. Before and after tube feeds  3. Every 8 hours while awake if you have not used the tube during that time      -If possible take medications by mouth or as solution via j tube (Do not put crushed pills through the tube if possible)     -Change the gauze around your tube daily or more often if soiled    -KEEP A FLEXI-TRACK (or other tube retention device) on your tube at all times to prevent incidental removal.     Nutrition Services Adult IP Consult   Order Comments: Reason:  Tube feeds, please do not wean TFs based on PO intake     Physical Therapy Adult Consult   Order Comments: Evaluate and treat as clinically indicated.    Reason:  S/p thoracic surgery, deconditioning     Occupational Therapy Adult Consult   Order Comments: Evaluate and treat as clinically indicated.    Reason:  S/p thoracic surgery, deconditioning     Speech Language Path Adult Consult   Order Comments: Evaluate and treat as clinically indicated.    Reason:  Continuation of therapy     Adult Formula Drip Feeding   Order Comments: Nutrition Support: Peptamen 1.5 @ 75 ml/hr x 18 hours ( 4 pm - 10 am)     Oxygen - Nasal cannula   Order Comments: 1-2 Lpm by nasal cannula to keep O2 sats 92% or greater.     Fall precautions     Diet   Order Comments: Clear liquid diet, may advance to full liquids if ok with speech therapy.  PO intake for comfort only, small volumes ~ 1-2 cups per shift     Order Specific Question Answer Comments   Is discharge order? Yes        DISCHARGE MEDICATIONS:   Current Discharge Medication " List      START taking these medications    Details   acetaminophen (TYLENOL) 325 MG tablet 3 tablets (975 mg) by Per J Tube route every 8 hours    Associated Diagnoses: Esophagectomy, anastomotic leak      ampicillin-sulbactam (UNASYN) 3 (2-1) g SOLR vial Inject 3 g into the vein every 6 hours for 5 days    Associated Diagnoses: Esophagectomy, anastomotic leak      aspirin 325 mg/32.5 mL SUSP 32.5 mLs (325 mg) by Oral or Feeding Tube route daily    Associated Diagnoses: Esophagectomy, anastomotic leak      enoxaparin (LOVENOX) 40 MG/0.4ML syringe Inject 0.4 mLs (40 mg) Subcutaneous every 24 hours for 10 days    Associated Diagnoses: Esophagectomy, anastomotic leak      ferrous sulfate 300 (60 Fe) MG/5ML syrup 5 mLs (300 mg) by Per J Tube route daily    Associated Diagnoses: Esophagectomy, anastomotic leak      fluconazole (DIFLUCAN) 400-0.9 MG/200ML-% infusion Inject 200 mLs (400 mg) into the vein every 24 hours for 4 days    Associated Diagnoses: Esophagectomy, anastomotic leak      Guar Gum (FIBER MODULAR, NUTRISOURCE FIBER,) packet 2 packets by Per Feeding Tube route 3 times daily    Associated Diagnoses: Esophagectomy, anastomotic leak      Lidocaine (LIDOCARE) 4 % Patch Place 1 patch onto the skin every 24 hours Apply to point of maximal pain    Associated Diagnoses: Esophagectomy, anastomotic leak      pantoprazole (PROTONIX) 2 mg/mL SUSP suspension 20 mLs (40 mg) by Per Feeding Tube route every morning (before breakfast)    Associated Diagnoses: Esophagectomy, anastomotic leak      senna-docusate (SENOKOT-S/PERICOLACE) 8.6-50 MG tablet 2 tablets by Per J Tube route 2 times daily as needed for constipation    Associated Diagnoses: Esophagectomy, anastomotic leak      simethicone (MYLICON) 80 MG chewable tablet Take 1 tablet (80 mg) by mouth every 6 hours as needed for cramping    Associated Diagnoses: Esophagectomy, anastomotic leak      traMADol (ULTRAM) 50 MG tablet 0.5-1 tablets (25-50 mg) by Per Feeding  Tube route every 6 hours as needed for moderate to severe pain    Associated Diagnoses: Esophagectomy, anastomotic leak         CONTINUE these medications which have NOT CHANGED    Details   diclofenac (VOLTAREN) 1 % topical gel Place onto the skin 4 times daily as needed for moderate pain      levothyroxine (SYNTHROID/LEVOTHROID) 150 MCG tablet 150 mcg by Per G Tube route every morning       albuterol (PROAIR HFA/PROVENTIL HFA/VENTOLIN HFA) 108 (90 Base) MCG/ACT inhaler Inhale 6 puffs into the lungs every 4 hours as needed for shortness of breath / dyspnea    Associated Diagnoses: Respiratory failure with hypoxia, unspecified chronicity (H)      amiodarone (PACERONE/CODARONE) 200 MG tablet 1 tablet (200 mg) by Per G Tube route daily  Qty: 30 tablet, Refills: 0    Associated Diagnoses: Mediastinitis      venlafaxine (EFFEXOR) 75 MG tablet 1 tablet (75 mg) by Per G Tube route 2 times daily  Qty: 60 tablet, Refills: 0    Associated Diagnoses: Current mild episode of major depressive disorder without prior episode (H)         STOP taking these medications       acetaminophen (TYLENOL) 32 mg/mL liquid Comments:   Reason for Stopping:         lidocaine (XYLOCAINE) 2 % external gel Comments:   Reason for Stopping:         Nutritional Supplements (ISOSOURCE 1.5 JOSE) LIQD Comments:   Reason for Stopping:         ondansetron (ZOFRAN-ODT) 4 MG ODT tab Comments:   Reason for Stopping:         UNABLE TO FIND Comments:   Reason for Stopping:

## 2019-05-16 ENCOUNTER — DOCUMENTATION ONLY (OUTPATIENT)
Dept: CARE COORDINATION | Facility: CLINIC | Age: 74
End: 2019-05-16

## 2019-05-16 LAB
ANION GAP SERPL CALCULATED.3IONS-SCNC: 9 MMOL/L (ref 3–14)
BUN SERPL-MCNC: 14 MG/DL (ref 7–30)
CALCIUM SERPL-MCNC: 8 MG/DL (ref 8.5–10.1)
CHLORIDE SERPL-SCNC: 101 MMOL/L (ref 94–109)
CO2 SERPL-SCNC: 28 MMOL/L (ref 20–32)
CREAT SERPL-MCNC: 0.68 MG/DL (ref 0.52–1.04)
ERYTHROCYTE [DISTWIDTH] IN BLOOD BY AUTOMATED COUNT: 20.7 % (ref 10–15)
FACT VIII ACT/NOR PPP: 367 % (ref 55–200)
GFR SERPL CREATININE-BSD FRML MDRD: 87 ML/MIN/{1.73_M2}
GLUCOSE SERPL-MCNC: 77 MG/DL (ref 70–99)
HCT VFR BLD AUTO: 25.1 % (ref 35–47)
HGB BLD-MCNC: 7.5 G/DL (ref 11.7–15.7)
MCH RBC QN AUTO: 27.9 PG (ref 26.5–33)
MCHC RBC AUTO-ENTMCNC: 29.9 G/DL (ref 31.5–36.5)
MCV RBC AUTO: 93 FL (ref 78–100)
PLATELET # BLD AUTO: 1020 10E9/L (ref 150–450)
POTASSIUM SERPL-SCNC: 4.1 MMOL/L (ref 3.4–5.3)
RBC # BLD AUTO: 2.69 10E12/L (ref 3.8–5.2)
SODIUM SERPL-SCNC: 138 MMOL/L (ref 133–144)
VWF CBA/VWF AG PPP IA-RTO: 281 % (ref 50–200)
VWF:AC ACT/NOR PPP IA: 261 % (ref 50–180)
WBC # BLD AUTO: 9.2 10E9/L (ref 4–11)

## 2019-05-16 PROCEDURE — 99305 1ST NF CARE MODERATE MDM 35: CPT | Performed by: HOSPITALIST

## 2019-05-16 PROCEDURE — 92610 EVALUATE SWALLOWING FUNCTION: CPT | Mod: GN | Performed by: SPEECH-LANGUAGE PATHOLOGIST

## 2019-05-16 PROCEDURE — 97162 PT EVAL MOD COMPLEX 30 MIN: CPT | Mod: GP | Performed by: PHYSICAL THERAPIST

## 2019-05-16 PROCEDURE — 40000901 ZZH STATISTIC WOC PT EDUCATION, 0-15 MIN

## 2019-05-16 PROCEDURE — 85027 COMPLETE CBC AUTOMATED: CPT | Performed by: HOSPITALIST

## 2019-05-16 PROCEDURE — 97167 OT EVAL HIGH COMPLEX 60 MIN: CPT | Mod: GO | Performed by: OCCUPATIONAL THERAPIST

## 2019-05-16 PROCEDURE — 97535 SELF CARE MNGMENT TRAINING: CPT | Mod: GO | Performed by: OCCUPATIONAL THERAPIST

## 2019-05-16 PROCEDURE — 25000125 ZZHC RX 250: Performed by: HOSPITALIST

## 2019-05-16 PROCEDURE — 25000128 H RX IP 250 OP 636: Performed by: HOSPITALIST

## 2019-05-16 PROCEDURE — 85240 CLOT FACTOR VIII AHG 1 STAGE: CPT | Performed by: HOSPITALIST

## 2019-05-16 PROCEDURE — 25800030 ZZH RX IP 258 OP 636

## 2019-05-16 PROCEDURE — 99207 ZZC CDG-MDM COMPONENT: MEETS LOW - DOWN CODED: CPT | Performed by: HOSPITALIST

## 2019-05-16 PROCEDURE — 12000022 ZZH R&B SNF

## 2019-05-16 PROCEDURE — 25000132 ZZH RX MED GY IP 250 OP 250 PS 637: Performed by: HOSPITALIST

## 2019-05-16 PROCEDURE — 36592 COLLECT BLOOD FROM PICC: CPT | Performed by: HOSPITALIST

## 2019-05-16 PROCEDURE — 97110 THERAPEUTIC EXERCISES: CPT | Mod: GP | Performed by: PHYSICAL THERAPIST

## 2019-05-16 PROCEDURE — 27210436 ZZH NUTRITION PRODUCT SEMIELEM INTERMED CAN

## 2019-05-16 PROCEDURE — A9270 NON-COVERED ITEM OR SERVICE: HCPCS | Performed by: HOSPITALIST

## 2019-05-16 PROCEDURE — 80048 BASIC METABOLIC PNL TOTAL CA: CPT | Performed by: HOSPITALIST

## 2019-05-16 PROCEDURE — 97116 GAIT TRAINING THERAPY: CPT | Mod: GP | Performed by: PHYSICAL THERAPIST

## 2019-05-16 RX ORDER — LIDOCAINE 40 MG/G
CREAM TOPICAL
Status: ACTIVE | OUTPATIENT
Start: 2019-05-16

## 2019-05-16 RX ORDER — HEPARIN SODIUM,PORCINE 10 UNIT/ML
5-10 VIAL (ML) INTRAVENOUS EVERY 24 HOURS
Status: ACTIVE | OUTPATIENT
Start: 2019-05-16

## 2019-05-16 RX ORDER — SODIUM CHLORIDE 9 MG/ML
INJECTION, SOLUTION INTRAVENOUS
Status: COMPLETED
Start: 2019-05-16 | End: 2019-05-16

## 2019-05-16 RX ORDER — HEPARIN SODIUM,PORCINE 10 UNIT/ML
5-10 VIAL (ML) INTRAVENOUS
Status: ACTIVE | OUTPATIENT
Start: 2019-05-16

## 2019-05-16 RX ORDER — HEPARIN SODIUM,PORCINE 10 UNIT/ML
5-10 VIAL (ML) INTRAVENOUS EVERY 24 HOURS
Status: DISCONTINUED | OUTPATIENT
Start: 2019-05-16 | End: 2019-05-28 | Stop reason: HOSPADM

## 2019-05-16 RX ORDER — LIDOCAINE 40 MG/G
CREAM TOPICAL
Status: DISCONTINUED | OUTPATIENT
Start: 2019-05-16 | End: 2019-05-28 | Stop reason: HOSPADM

## 2019-05-16 RX ORDER — HEPARIN SODIUM,PORCINE 10 UNIT/ML
5-10 VIAL (ML) INTRAVENOUS
Status: DISCONTINUED | OUTPATIENT
Start: 2019-05-16 | End: 2019-05-28 | Stop reason: HOSPADM

## 2019-05-16 RX ADMIN — AMPICILLIN SODIUM AND SULBACTAM SODIUM 3 G: 2; 1 INJECTION, POWDER, FOR SOLUTION INTRAMUSCULAR; INTRAVENOUS at 08:22

## 2019-05-16 RX ADMIN — AMIODARONE HYDROCHLORIDE 200 MG: 200 TABLET ORAL at 08:22

## 2019-05-16 RX ADMIN — Medication 10 ML: at 17:53

## 2019-05-16 RX ADMIN — AMPICILLIN SODIUM AND SULBACTAM SODIUM 3 G: 2; 1 INJECTION, POWDER, FOR SOLUTION INTRAMUSCULAR; INTRAVENOUS at 20:35

## 2019-05-16 RX ADMIN — AMPICILLIN SODIUM AND SULBACTAM SODIUM 3 G: 2; 1 INJECTION, POWDER, FOR SOLUTION INTRAMUSCULAR; INTRAVENOUS at 04:51

## 2019-05-16 RX ADMIN — Medication 325 MG: at 08:20

## 2019-05-16 RX ADMIN — Medication 2 PACKET: at 14:31

## 2019-05-16 RX ADMIN — SODIUM CHLORIDE 500 ML: 9 INJECTION, SOLUTION INTRAVENOUS at 20:35

## 2019-05-16 RX ADMIN — LEVOTHYROXINE SODIUM 150 MCG: 150 TABLET ORAL at 08:22

## 2019-05-16 RX ADMIN — ACETAMINOPHEN 975 MG: 325 SOLUTION ORAL at 14:26

## 2019-05-16 RX ADMIN — Medication 50 MG: at 08:36

## 2019-05-16 RX ADMIN — Medication 2 PACKET: at 08:32

## 2019-05-16 RX ADMIN — VENLAFAXINE 75 MG: 75 TABLET ORAL at 08:22

## 2019-05-16 RX ADMIN — PANTOPRAZOLE SODIUM 40 MG: 40 TABLET, DELAYED RELEASE ORAL at 04:55

## 2019-05-16 RX ADMIN — AMPICILLIN SODIUM AND SULBACTAM SODIUM 3 G: 2; 1 INJECTION, POWDER, FOR SOLUTION INTRAMUSCULAR; INTRAVENOUS at 14:44

## 2019-05-16 RX ADMIN — MINERAL SUPPLEMENT IRON 300 MG / 5 ML STRENGTH LIQUID 100 PER BOX UNFLAVORED 300 MG: at 08:21

## 2019-05-16 RX ADMIN — Medication 50 MG: at 21:58

## 2019-05-16 RX ADMIN — Medication 2 PACKET: at 20:35

## 2019-05-16 RX ADMIN — ACETAMINOPHEN 975 MG: 325 SOLUTION ORAL at 21:57

## 2019-05-16 RX ADMIN — ACETAMINOPHEN 975 MG: 325 SOLUTION ORAL at 04:56

## 2019-05-16 RX ADMIN — VENLAFAXINE 75 MG: 75 TABLET ORAL at 20:35

## 2019-05-16 RX ADMIN — FLUCONAZOLE 400 MG: 400 INJECTION, SOLUTION INTRAVENOUS at 09:44

## 2019-05-16 RX ADMIN — Medication 5 UNITS: at 08:37

## 2019-05-16 RX ADMIN — ENOXAPARIN SODIUM 40 MG: 40 INJECTION SUBCUTANEOUS at 17:52

## 2019-05-16 ASSESSMENT — ACTIVITIES OF DAILY LIVING (ADL): PREVIOUS_RESPONSIBILITIES: MEAL PREP;HOUSEKEEPING;LAUNDRY;SHOPPING;MEDICATION MANAGEMENT;FINANCES;DRIVING

## 2019-05-16 NOTE — PLAN OF CARE
Nalini completed. Pt at SBA level with mobility; 200 ft with ww. Medical needs will drive discharge.

## 2019-05-16 NOTE — PLAN OF CARE
Completed clinical bedside swallow eval per MD orders- Pt has had 3 VFSS completed recently: 5/2, 5/8/ and 5/10-- recommendations from the last as well as d/c reocmmendations from SLP at hospital were to continue with Clear liquid diet with use of chin tuck and also not straws. Pt on the last VFSS demonstrated mild oral pharygneal dysphagia: swallow trigger mildly delayed, pharyngeal constriction and hyolaryngeal elevation were mild reduced,  and epiglottic inversion was incomplete and also she had consistent penetration to the level of the vocal cords with thin when no using a chin tuck but with chin tuck- improved epiglottic inversion as well as this controlled for penetration. Based on today's bedside swallow eval- no aspiration s/s with thin liquids by cup rim with using a chin tuck strategy. Pt was also able to demonstrate the oral phayrngeal excs that were shown to her in the hospital. Pt is not allowed to advance diet or trial other textures or liquids at this point until she is medically cleared by the thoracic team to do so-- SLP notes from the hospital indicate thoracic team is to advance diet. Pt has an appointment with the thoracic team on Monday 5/20/19 so will await recommendations then. For now pt is to continue with current Clear liquid diet and PEG TF for nutritional support - pt is to continue with strategies of chin tuck and no straw, small single sips. SLP to follow up for diet tolerance and readiness once cleared by team for further diet advancement

## 2019-05-16 NOTE — PROGRESS NOTES
05/16/19 0804   Quick Adds   Quick Adds Certification   Type of Visit Initial PT Evaluation   Living Environment   Lives With alone  (daughter stays with pt during wk)   Living Arrangements house   Home Accessibility stairs to enter home   Number of Stairs, Main Entrance 1   Stair Railings, Main Entrance railing on right side (ascending)   Transportation Anticipated car, drives self;family or friend will provide   Living Environment Comment  , her children live close by and are able to assist as needed per report. Daughter stays with pt during wk while her spouse drives over the road truck   Self-Care   Usual Activity Tolerance moderate   Current Activity Tolerance fair   Regular Exercise No   Equipment Currently Used at Home grab bar, toilet;grab bar, tub/shower;raised toilet;shower chair;walker, rolling  (4ww and FWW)   Activity/Exercise/Self-Care Comment use 4ww occasionally in kitchen; back pain limits activity    Functional Level Prior   Ambulation 1-->assistive equipment   Transferring 1-->assistive equipment   Toileting 0-->independent   Bathing 1-->assistive equipment   Communication 0-->understands/communicates without difficulty   Swallowing 0-->swallows foods/liquids without difficulty   Cognition 1 - attention or memory deficits  (states memory wasn't ever great)   Fall history within last six months no   Which of the above functional risks had a recent onset or change? ambulation;transferring   Prior Functional Level Comment Pt reports she has been using walker lately since having catheter bag to hold catheter   General Information   Onset of Illness/Injury or Date of Surgery - Date 04/26/19   Referring Physician Arielle Morris MD; Dr Eze Ozuna   Patient/Family Goals Statement be able to walk and not feel so wobbly   Pertinent History of Current Problem (include personal factors and/or comorbidities that impact the POC) 73 year old female with a h/o Nissen c/b esophageal  perforation 11/2018 s/p spit fistula now s/p redo laparotomy and partial sternotomy, esophagogastrostomy on 4/26/2019 with Dr. Albarado. PICC 4/27/2019. 5/6 anastamotic leak--I and D of neck wound, wound vac PMH includes HTN, afib.    Precautions/Limitations sternal precautions   Heart Disease Risk Factors High blood pressure;Lack of physical activity;Age   General Observations pt lying in bed; 1L O2; agreeable to PT   Cognitive Status Examination   Orientation orientation to person, place and time   Level of Consciousness alert   Follows Commands and Answers Questions 100% of the time   Personal Safety and Judgment intact   Memory intact   Pain Assessment   Patient Currently in Pain No   Integumentary/Edema   Integumentary/Edema Comments healing sternal incision; no edema LE   Posture    Posture Kyphosis;Forward head position;Protracted shoulders   Range of Motion (ROM)   ROM Comment WFL LE   Strength   Strength Comments knees 5/5; hips 4/5; DF 4/5   Bed Mobility   Bed Mobility Comments SBA sit<>supine; pt had HOB slightly elevated.    Transfer Skills   Transfer Comments SBA sit<>stand   Gait   Gait Comments amb with ww 200 ft; SBA; toes out L foot but states lifelong; narrowed stance; kyphotic posture; step thru; no difficulty with vinyl/carpet transition. PT pushed IV pole; On 1 L O2; SaO2 97% after walking   Balance   Balance Comments moderate perturbations taken in static stand; unable to single leg stand; tandem stand couple seconds but held walker to get in position   Sensory Examination   Sensory Perception Comments intact light touch LE   Coordination   Coordination no deficits were identified   Coordination Comments none noted with activity   Muscle Tone   Muscle Tone no deficits were identified   General Therapy Interventions   Planned Therapy Interventions ADL retraining;bed mobility training;balance training;gait training;groups;manual therapy;neuromuscular re-education;strengthening;transfer training;motor  coordination training;home program guidelines;progressive activity/exercise   Clinical Impression   Criteria for Skilled Therapeutic Intervention yes, treatment indicated   PT Diagnosis deconditioning   Influenced by the following impairments decreased activity tolerance; weakness; decreased balance   Functional limitations due to impairments bed mobility, transfers, gait below prior IND level   Clinical Presentation Evolving/Changing   Clinical Presentation Rationale clinical judgment of mobility; comorbidities; participation limitation   Clinical Decision Making (Complexity) Moderate complexity   Therapy Frequency`   (6 days/wk)   Predicted Duration of Therapy Intervention (days/wks) 5/22/19   Anticipated Equipment Needs at Discharge   (has ww and 4ww at home)   Anticipated Discharge Disposition Home with Home Therapy  (safety eval; progress gait)   Risk & Benefits of therapy have been explained Yes   Patient, Family & other staff in agreement with plan of care Yes   Clinical Impression Comments pt with Nissen c/b esophageal perforation 11/2018 s/p spit fistula now s/p redo laparotomy and partial sternotomy, esophagogastrostomy on 4/26/2019,  [facility] 4/27/2019. 5/6 anastamotic leak--I and D of neck wound, wound vac . Previously IND with mobility with occasional use of 4ww or ww when back pain limited. SBA with mobility using ww today. Pt motivated to return; anticipate good recovery of functional mobility with skilled PT intervention. Pt's medical issues will drive discharge   Therapy Certification   Start of care date 05/16/19   Certification date from 05/16/19   Certification date to 06/08/19   Medical Diagnosis Nissen c/b esophageal perforation 11/2018 s/p spit fistula now s/p redo laparotomy and partial sternotomy, esophagogastrostomy on 4/26/2019 with Dr. Albarado. PIC 4/27/2019. 5/6 anastamotic leak--I and D of neck wound, wound vac    Certification I certify the need for these services furnished under this plan  of treatment and while under my care.  (Physician co-signature of this document indicates review and certification of the therapy plan).    Total Evaluation Time   Total Evaluation Time (Minutes) 30

## 2019-05-16 NOTE — PROGRESS NOTES
05/16/19 1300   General Information   Patient Profile Review See Profile for full history and prior level of function   Daily Contact with Relatives or Friends Phone call;Visit  (daughter involved in cares)   Observations Pt supine in bed upon arrival. Pt pleasant throughout.   Community Involvement   Community Involvement Retired   Drives No  (not since November 2018 after illness)   Sees Hospital ? No   Outings Dinner;Shopping;Movies   Music   Listens to Recorded Music Yes  (car radio)   Brought Own Equipment Yes   Media   Computer Has laptop here   TV / Movies TV;Movie list   Sports / Physical Activities   Sports/Exercise Health club  (3x/wk)   Sports Fan Other (comments)  (hockey)   Impression   Open to Socializing with Others Independent   Barriers to Leisure No barriers / independent   Patient, family and / or staff in agreement with Plan of Care Yes   Treatment Plan   Type of Intervention 1:1 intervention;Structured groups   Equipment and Supplies While on Unit None needed   Assessment Assessment completed. Pt not interested in any recreational services while on unit. Pt has own iPad here.

## 2019-05-16 NOTE — PLAN OF CARE
Patient alert and oriented x 4,medicated with tramadol x 1 for incisional pain,pt reported relief,she also gets scheduled liquid Tylenol.Pt is on liquid diet as well as peptamen 1.5 Tube feeding.J-tube intact,water flushes of 60 ml before feed & after feed.Chest sutures intact,TIFFANIE,neck /thoracic wound orders wet to dry,waiting for MD, to put in packing orders.O2 @ 2 LPM nasal canula.

## 2019-05-16 NOTE — PROGRESS NOTES
WOC Consult    S: WOC Consult placed for management of wound VAC to left midclavicular wound (old spit fistula site) to -50 mm/Hg suction.    B: Per JENARO Judd with Thoracic Surgery on 5/15/15: Myrtle Vaz is a 73 year old female who on 4/26/2019 underwent the above-named procedures.  She tolerated the operation well and postoperatively was transferred to the post-surgical intensive care unit.  The remainder of her course was complicated primarily be her anastamotic leak and establishment of source control.  Prior to discharge her pain was controlled well and she had full return of bowel and bladder function.  Due to OT/PT recommendations for TCU at the time of discharge, on May 15, 2019, she was discharged to [facility] in stable condition.    A: Spoke with Guero Sebastian on 5/16/19. VAC was last changes yesterday on 5/15/19 with MWF changes. WOC did not assess wound today.     Supplies order for dressing changes.     P: WOC to change dressing tomorrow, 5/17/19. Plan to contact Guero Sebastian on Monday 5/20/19 to arrange a time for him to come look at wound and determine of VAC can be discontinued.     Binta Pan RN, CWOCN

## 2019-05-16 NOTE — PLAN OF CARE
Speech Language Therapy Discharge Summary    Reason for therapy discharge:    Discharged to transitional care facility.    Progress towards therapy goal(s). See goals on Care Plan in [facility] electronic health record for goal details.  Goals partially met.  Barriers to achieving goals:   discharge from facility.    Therapy recommendation(s):    Continued therapy is recommended.  Rationale/Recommendations:  Clear liquid diet with chin tuck, defer to thoracic team for diet advancement.

## 2019-05-16 NOTE — PLAN OF CARE
Physical Therapy Discharge Summary    Reason for therapy discharge:    Discharged to transitional care facility.    Progress towards therapy goal(s). See goals on Care Plan in [facility] electronic health record for goal details.  Goals partially met.  Barriers to achieving goals:   discharge from facility.    Therapy recommendation(s):    Continued therapy is recommended.  Rationale/Recommendations:  Pt below baseline functional mobility - would benefit from continued therapy for progression of strength, balance, activity tolerance, and IND/safety with functional mobility prior to return home..

## 2019-05-16 NOTE — PLAN OF CARE
Patient is a 73 year old female admitted to room 435 via wheelchair.  Patient is alert and oriented X 3. See Epic for VS and assessment. O2 sat  On 2L via nc. Patient denies pain on admission.  Patient is able to transfer independently. Patient was settled into their room, shown call light, tv, mealtimes etc. Oriented to unit. Full skin assessment. New dressing to left neck wound. Incision to upper mid chest is CDI and TIFFANIE. Wound vac is intact to left chest. A WOCN consult initiated. GT is intact and patient is tolerating tube feeding at 75 ml/hour. Denies N/V. Tramadol x 1 for chest discomfort. Will continue monitoring pain level and VS. Notifying MD with any concerns. Follow MD orders for cares and medications.    Level of Schooling: High school  Ethnicity: [facility]   Marital Status:   Vascular Access: Yes  Dentures: none  Smoker: no   Glasses: No. Contact lens  Occupation: None  Falls 0-1 mo:

## 2019-05-16 NOTE — PROGRESS NOTES
"Social Work: Initial Assessment with Discharge Plan    Patient Name: Myrtle Vaz  : 1945  Age: 73 year old  MRN: [id number]  Completed assessment with: Pt  Admitted to TCU: 5/15/2019    Presenting Information   Date of SW assessment: May 16, 2019  Health Care Directive: Provided education  Primary Health Care Agent: Self  Secondary Health Care Agent: Pt's DTR Haydee Mccoy ([phone])  Living Situation: Pt lives alone in [facility]. Pt did state that her DTR typically stays with her while her DTR\"s  is working ()  Previous Functional Status: Independent   DME available: See therapy notes   Patient and family understanding of hospitalization: Pt states that she is here for IV's, therapy and her wound vac.   Cultural/Language/Spiritual Considerations: English speaking.   Abuse concerns: No concerns   BIMS: Pt scored 15 on BIMS indicating cognitively intact.   PHQ-9: Pt scored 02 on PHQ-9 indicating minimal depressive symptoms.   PAS: confirmation number- [id number]  Has there been a level II screen?  No  Were there any recommendations in the screen? N/A  If yes, will the recommendations we incorporated into the Plan of Care?  N/A  Physical Health  Reason for admission: Esophagectomy, anastomotic leak    Provider Information   Primary Care Physician:Joey Caballero   : MORALES    Mental Health:   Diagnosis: No concerns   Current Support/Services: NA  Previous Services: NA  Services Needed/Recommended: NA    Substance Use:  Diagnosis: No concerns   Current Support/Services: NA  Previous Services: NA  Services Needed/Recommended: NA    Support System  Marital Status:    Family support: Pt is , spouse  2018. Pt has two children, daughter Haydee (P: [phone]) lives in [facility] and son Charles (P: [phone]) lives in [facility].  Other support available: MORALES  Gaps in support system: Limited support     Community Resources  Current in home services: Pt " states that she has had TF in the past at home but can't remember the company.   Previous services: NA    Financial/Employment/Education  Employment Status: Retired   Income Source: SSI  Education: High school   Financial Concerns:  No concerns   Insurance: medicare/ [facility]      Discharge Plan   Patient and family discharge goal: Pt's goal is to return home   Provided Education on discharge plan: YES  Patient agreeable to discharge plan:  YES  A list of Medicare Certified Facilities was provided to the patient and/or family to encourage patient choice. Based on location and rating, patient would like referrals made to: NA  General information regarding anticipated insurance coverage and possible out of pocket cost was discussed. Patient and patient's family are aware patient may incur the cost of transportation to the facility, pending insurance payment: YES  Barriers to discharge: Medical clearance, safe discharge plan, complete therapy goals.     Discharge Recommendations   Disposition: Home with continued support.   Transportation Needs: Family to provide   Name of Transportation Company and Phone: NA    Additional comments   Writer introduced self and role of SW. Pt was pleasant and open to SW services while here on TCU.  Pt will receive her care plan goals and orders tomorrow 5/17 by covering SW. Pt agreeable and understanding. SW will continue to follow and assist as needed.    SOLE Magallanes  [facility]   P: [phone]  Pgr: [phone]       05/16/19 1000   Living Arrangements   Lives With alone  (DTR stays with pt while DTR's  works typically M-Fri)   Living Arrangements house   Able to Return to Prior Arrangements yes   Home Safety   Patient Feels Safe Living in Home? yes   Discharge Planning   Expected Discharge Date 05/24/19   Patient/Family Anticipates Transition to home with family;home with help/services   Discharge Needs Assessment   Transportation Anticipated family or friend  will provide

## 2019-05-16 NOTE — H&P
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   # History of laparoscopic hiatal hernia repair with mesh, complicated with esophageal perforation.     -she had multiple procedures performed.   - Are liste below.     1.  Flexible bronchoscopy.   2.  EGD.   3.  Left neck incision.   4.  Takedown of cervical esophagostomy.   5.  Partial manubriectomy.   6.  Redo laparotomy.   7.  Extensive enterolysis, more than 2 hours.   8.  Kocherization of duodenum.   9.  Botox injection of the pylorus.   10.  Tubularization of gastric conduit and retrosternal transposition.  11.  Partial omentectomy.   12.  End-to-end handsewn 2-layer esophagogastrostomy with omental fat buttress.  13.  Left pharyngostomy tube placement.   14.  Jejunostomy tube exchange.     -She is doing okay. Left medial sternal wound is packed with kerlix and covered with wet to dry dressing.   -Chest wound is cover with wound vac. Thoracic surgery will come and visit on Monday and decide if they will change it.  -she is supposed to be on clear liquids by mouth and TF  -OT, PT, Speech consulted for therapies.   -Ct IV Uanasyn and diflucan for 5 more day    #Afib: HR is controlled. Ct Amiodorone and ASA  #Hypothyroidism: Ct synthroid  # Depression: Ct Effexor 75 mg BID        DVT Prophylaxis: Heparin SQ  Code Status: Full Code    Disposition: TBD based on therapy needs and wound needs    Arielle Morris MD    Primary Care Physician   Joey Caballero    Chief Complaint   Weakness  Wound care    History is obtained from the patient    History of Present Illness      Myrtle Vaz is a 73 year old female who on 4/26/2019 underwent the above-named procedures.  She tolerated the operation well and postoperatively was transferred to the post-surgical  intensive care unit.  The remainder of her course was complicated primarily be her anastamotic leak and establishment of source control.  Prior to discharge her pain was controlled well and she had full return of bowel and bladder function. She is admitted to TCU for rehab needs and IV antibiotics.       Past Medical History    I have reviewed this patient's medical history and updated it with pertinent information if needed.   Past Medical History:   Diagnosis Date     Depression      History of atrial fibrillation      HLD (hyperlipidemia)      HTN (hypertension)      Hypothyroidism        Past Surgical History   I have reviewed this patient's surgical history and updated it with pertinent information if needed.  Past Surgical History:   Procedure Laterality Date     BRONCHOSCOPY (RIGID OR FLEXIBLE), DIAGNOSTIC N/A 5/2/2019    Procedure: BRONCHOSCOPY, WITH BAL;  Surgeon: Ally Ybarra MD;  Location: [facility]     COMBINED ESOPHAGOSCOPY, GASTROSCOPY, DUODENOSCOPY (EGD), REPLACE ESOPHAGEAL STENT N/A 5/6/2019    Procedure: ESOPHAGOGASTRODUODENOSCOPY WITH ESOPHAGEAL STENT REPLACEMENT, IRRIGATION AND DEBRIDEMENT OF LEFT NECK, WOUND VAC PLACEMENT;  Surgeon: Harley Murguia MD;  Location: [facility]     ESOPHAGOGASTRECTOMY N/A 4/26/2019    Procedure: Redo Laparotomy SUBSTERNAL Esophagogastrostomy, Pharyngostomy, Intraoperative EGD, PARTIAL STERNOTOMY, LEFT PHARYNGOSTOMY,;  Surgeon: Temitope Bullock MD;  Location: [facility] OR     ESOPHAGOSCOPY, GASTROSCOPY, DUODENOSCOPY (EGD), COMBINED N/A 11/21/2018    Procedure: COMBINED ESOPHAGOSCOPY, GASTROSCOPY, DUODENOSCOPY (EGD);  Surgeon: Temitope Bullock MD;  Location: [facility]     HERNIORRHAPHY HIATAL  11/2018     HYSTERECTOMY       LAPAROSCOPIC HERNIORRHAPHY HIATAL N/A 11/21/2018    Procedure: Midline laparotomy, Trans-hiatal esophagogasterectomy, gastrostomy, J-tube placement, G-tube placement, bilateral pleural chest tube placement, mediastinal abcess drainage, spit fistula  creation, Esophagastrodueodenoscopy;  Surgeon: Temitope Bullock MD;  Location: [facility] OR     ROTATOR CUFF REPAIR RT/LT       THYROIDECTOMY       for hyperthroidism       Prior to Admission Medications   Prior to Admission Medications   Prescriptions Last Dose Informant Patient Reported? Taking?   Guar Gum (FIBER MODULAR, NUTRISOURCE FIBER,) packet   No No   Si packets by Per Feeding Tube route 3 times daily   Lidocaine (LIDOCARE) 4 % Patch   No No   Sig: Place 1 patch onto the skin every 24 hours Apply to point of maximal pain   acetaminophen (TYLENOL) 325 MG tablet   No No   Sig: 3 tablets (975 mg) by Per J Tube route every 8 hours   albuterol (PROAIR HFA/PROVENTIL HFA/VENTOLIN HFA) 108 (90 Base) MCG/ACT inhaler   No No   Sig: Inhale 6 puffs into the lungs every 4 hours as needed for shortness of breath / dyspnea   Patient taking differently: Inhale 2 puffs into the lungs every 4 hours as needed for shortness of breath / dyspnea or wheezing    amiodarone (PACERONE/CODARONE) 200 MG tablet   No No   Si tablet (200 mg) by Per G Tube route daily   Patient taking differently: 200 mg by Per G Tube route every morning    ampicillin-sulbactam (UNASYN) 3 (2-1) g SOLR vial   No No   Sig: Inject 3 g into the vein every 6 hours for 5 days   aspirin 325 mg/32.5 mL SUSP   No No   Si.5 mLs (325 mg) by Oral or Feeding Tube route daily   diclofenac (VOLTAREN) 1 % topical gel   Yes No   Sig: Place onto the skin 4 times daily as needed for moderate pain   enoxaparin (LOVENOX) 40 MG/0.4ML syringe   No No   Sig: Inject 0.4 mLs (40 mg) Subcutaneous every 24 hours for 10 days   ferrous sulfate 300 (60 Fe) MG/5ML syrup   No No   Si mLs (300 mg) by Per J Tube route daily   fluconazole (DIFLUCAN) 400-0.9 MG/200ML-% infusion   No No   Sig: Inject 200 mLs (400 mg) into the vein every 24 hours for 4 days   levothyroxine (SYNTHROID/LEVOTHROID) 150 MCG tablet   Yes No   Si mcg by Per G Tube route every morning     pantoprazole (PROTONIX) 2 mg/mL SUSP suspension   No No   Si mLs (40 mg) by Per Feeding Tube route every morning (before breakfast)   senna-docusate (SENOKOT-S/PERICOLACE) 8.6-50 MG tablet   No No   Si tablets by Per J Tube route 2 times daily as needed for constipation   simethicone (MYLICON) 80 MG chewable tablet   No No   Sig: Take 1 tablet (80 mg) by mouth every 6 hours as needed for cramping   traMADol (ULTRAM) 50 MG tablet   No No   Si.5-1 tablets (25-50 mg) by Per Feeding Tube route every 6 hours as needed for moderate to severe pain   venlafaxine (EFFEXOR) 75 MG tablet   No No   Si tablet (75 mg) by Per G Tube route 2 times daily      Facility-Administered Medications: None     Allergies   Allergies   Allergen Reactions     Gabapentin Other (See Comments)       Social History   I have reviewed this patient's social history and updated it with pertinent information if needed. Myrtle Vaz  reports that she quit smoking about 6 months ago. Her smoking use included cigarettes. She quit after 60.00 years of use. She has never used smokeless tobacco. She reports that she does not drink alcohol or use drugs.    Family History   I have reviewed this patient's family history and updated it with pertinent information if needed.   Family History   Problem Relation Age of Onset     Cerebrovascular Disease Father         MI       Review of Systems   The 10 point Review of Systems is negative other than noted in the HPI or here.     Physical Exam   Temp: 97.8  F (36.6  C) Temp src: Oral BP: 138/59 Pulse: 74   Resp: 12 SpO2: 94 % O2 Device: Nasal cannula Oxygen Delivery: 1 LPM  Vital Signs with Ranges  Temp:  [97.5  F (36.4  C)-97.8  F (36.6  C)] 97.8  F (36.6  C)  Pulse:  [74-87] 74  Resp:  [12-16] 12  BP: (121-138)/(59-75) 138/59  SpO2:  [94 %] 94 %  0 lbs 0 oz    Constitutional: Awake, alert, cooperative, no apparent distress.  Eyes: Conjunctiva and pupils examined and normal.  HEENT: mucosa is moist,  she has medial clavicular wound, which is deep and packed, looks clean.  Respiratory: Clear to auscultation bilaterally, no crackles or wheezing.  Cardiovascular: mid clavicular chest wound covered with wound VAC. Regular rate and rhythm, normal S1 and S2, and no murmur noted.  GI: Soft, non-distended, non-tender, normal bowel sounds.  Lymph/Hematologic: No anterior cervical or supraclavicular adenopathy.  Skin: No rashes, no cyanosis, no edema.  Musculoskeletal: No joint swelling, erythema or tenderness.  Neurologic: Cranial nerves 2-12 intact, normal strength and sensation.  Psychiatric: Alert, oriented to person, place and time, no obvious anxiety or depression.      Data   Data reviewed today:  I personally reviewed no images or EKG's today.  Recent Labs   Lab 05/16/19  1008 05/15/19  1445 05/14/19  0617 05/13/19  0513   WBC 9.2 10.4 10.2 10.3   HGB 7.5* 7.4* 7.5* 7.3*   MCV 93 95 94 95   PLT 1,020* 1,311* 1,166* 1,179*     --  135 136   POTASSIUM 4.1  --  3.9 3.9   CHLORIDE 101  --  98 98   CO2 28  --  31 30   BUN 14  --  19 21   CR 0.68  --  0.76 0.88   ANIONGAP 9  --  6 8   JOSE 8.0*  --  7.8* 7.9*   GLC 77  --  120* 97       No results found for this or any previous visit (from the past 24 hour(s)).

## 2019-05-16 NOTE — PLAN OF CARE
OT: patient assessment completed, tx started. Short term OT needs identified. POC and OT goals discussed and in agreement with patient.

## 2019-05-16 NOTE — PROGRESS NOTES
05/16/19 1000   General Information   Onset Date 04/26/19   Start of Care Date 05/16/19   Referring Physician Dr Arturo MD   Patient/Family Goals Statement to return to eating solid foods   Swallowing Evaluation Bedside swallow evaluation   Behaviorial Observations WFL (within functional limits)   Mode of current nutrition Oral diet   Type of oral diet Thin liquid;Other (Comment)  (Clear liquid)   Respiratory Status O2 Supply   Type of O2 supply Nasal cannula   Comments Pt is a 73 y.o female with a h/o of Nissen and is s/p esophageal perforation 11/2018- s/p spit fistula now s/p redo laparatomy and partial sternotomy, esophagogastrostomy on 4/26/19. Found to have anastomotic leak per CT scan 5/5/19 and is s/p EGD, flex bronch, possible esophageal stent, neck wound washout. Pt with prior VFSS 5/2, 5/8, and 5/10/19-- recommendation from 5/10-- continue with current clear liquid diet with no straw and chin tuck; diet only to be advanced by thoracic team.   Clinical Swallow Evaluation   Oral Musculature generally intact   Structural Abnormalities none present   Dentition present and adequate   Mucosal Quality good   Mandibular Strength and Mobility intact   Oral Labial Strength and Mobility WFL   Lingual Strength and Mobility WFL   Velar Elevation intact   Buccal Strength and Mobility intact   Laryngeal Function Cough;Throat clear;Swallow;Voicing initiated;Dry swallow palpated   Additional Documentation Yes   Additional evaluation(s) completed today No   Swallow Eval   Feeding Assistance no assistance needed   Clinical Swallow Eval: Thin Liquid Texture Trial   Mode of Presentation, Thin Liquids cup;self-fed   Volume of Liquid or Food Presented small sips of thin water   Oral Phase of Swallow WFL   Pharyngeal Phase of Swallow intact   Successful Strategies Trialed During Procedure chin tuck;hard swallow   Diagnostic Statement No aspiration s/s with thin liquids with use of chin tuck- did not trial without chin tuck as  per VFSS on 5/10 - this was recommended   VFSS Evaluation   VFSS Additional Documentation No   FEES Evaluation   Additional Documentation No   Esophageal Phase of Swallow   Patient reports or presents with symptoms of esophageal dysphagia Yes   Esophageal comments See comments under comment section- esophageal perforation with extensive surgeries   General Therapy Interventions   Planned Therapy Interventions Dysphagia Treatment   Dysphagia treatment Oropharyngeal exercise training;Modified diet education;Instruction of safe swallow strategies;Compensatory strategies for swallowing   Swallow Eval: Clinical Impressions   Skilled Criteria for Therapy Intervention Skilled criteria met.  Treatment indicated.   Functional Assessment Scale (FAS) 5   Dysphagia Outcome Severity Scale (ENRRIQUE) Level 5 - ENRRIQUE   Diet texture recommendations Clear liquid;Thin liquids   Recommended Feeding/Eating Techniques no straws   Demonstrates Need for Referral to Another Service dietitian   Therapy Frequency 3 times/wk   Predicted Duration of Therapy Intervention (days/wks) 10 days   Anticipated Discharge Disposition home w/ home health   Risks and Benefits of Treatment have been explained. Yes   Patient, family and/or staff in agreement with Plan of Care Yes   Clinical Impression Comments Completed clinical bedside swallow eval per MD orders- Pt has had 3 VFSS completed recently: 5/2, 5/8/ and 5/10-- recommendations from the last as well as d/c reocmmendations from SLP at hospital were to continue with Clear liquid diet with use of chin tuck and also not straws. Pt on the last VFSS demonstrated mild oral pharygneal dysphagia: swallow trigger mildly delayed, pharyngeal constriction and hyolaryngeal elevation were mild reduced,  and epiglottic inversion was incomplete and also she had consistent penetration to the level of the vocal cords with thin when no using a chin tuck but with chin tuck- improved epiglottic inversion as well as this  controlled for penetration. Based on today's bedside swallow eval- no aspiration s/s with thin liquids by cup rim with using a chin tuck strategy. Pt was also able to demonstrate the oral phayrngeal excs that were shown to her in the hospital. Pt is not allowed to advance diet or trial other textures or liquids at this point until she is medically cleared by the thoracic team to do so-- SLP notes from the hospital indicate thoracic team is to advance diet. Pt has an appointment with the thoracic team on Monday 5/20/19 so will await recommendations then. For now pt is to continue with current Clear liquid diet and PEG TF for nutritional support - pt is to continue with strategies of chin tuck and no straw, small single sips. SLP to follow up for diet tolerance and readiness once cleared by team for further diet advancement   Therapy Certification   Certification date from 05/16/19   Certification date to 06/12/19   Medical Diagnosis s/p esophageal perforation   Certification I certify the need for these services furnished under this plan of treatment and while under my care.  (Physician co-signature of this document indicates review and certification of the therapy plan).   Total Evaluation Time   Total Evaluation Time (Minutes) 20

## 2019-05-16 NOTE — PROGRESS NOTES
CLINICAL NUTRITION SERVICES - ASSESSMENT NOTE     Nutrition Prescription    RECOMMENDATIONS FOR MDs/PROVIDERS TO ORDER:  None at this time    Malnutrition Status:    Patient does not meet two of the above criteria necessary for diagnosing malnutrition but is at risk for malnutrition    Recommendations already ordered by Registered Dietitian (RD):  Peptamen 1.5 via J-tube @ 90 mL/hr x 14 hrs (6 pm- 8 am) providing 1260 mL, 1890 kcal (30 kcal/kg), 86 g protein (1.4 g/kg), 237 g CHO, 0 g fiber, and 970 mL free water per DW of 62 kg.  + 6 pkts fiber providin calories and 18 g fiber   Water Flushes: 60 mL q 4 hrs (TF + Flushes = 1330 mL water; meeting 86% hydration needs).    Future/Additional Recommendations:  1. Monitor for diet adv past CL  2. Monitor tolerance of TF cycled over 14 hours.   3. Monitor BM     REASON FOR ASSESSMENT  Myrtle Vaz is a/an 73 year old female assessed by the dietitian for Admission Nutrition Risk Screen for reduced oral intake over the last month and Provider Order - Registered Dietitian to Assess and Order TF per Medical Nutrition Therapy Protocol    NUTRITION HISTORY  Information obtained from patient  Natasha said that while at home her tube feed (February-2019) was running for 90 or 95 mL/hr for 8 hours a day and she tolerated that rate well. She was not sure what enteral nutrition product she was using at home. She was likely not meeting 100% of assessed needs with this regimen. Pt reported only having ice cubes and jello while at home with the tube feed.    Information obtained from previous RD notes  Pt on nutrition support since 2018. Trial of bolus regimen on 2018 which pt did tolerate. Initial formula was Impact Peptide post surgery, standard formula started 2018, formula changed to Peptamen 1.5 on 2019. Pt was at home w/ TF during this time. Pt w/ continued loose stools and fiber added on 5/10/19 and fiber pkts increased to 6 pkts per day on .  "Per I/O while pt was on EB Natasha has been getting ~1245 mL of formula over the past 5 days meeting 100% of assessed energy and protein needs.     PMH of laparoscopic hiatal hernia repair with mesh, complicated with esophageal perforation 2018 s/p spit fistula who was admitted to [place] for redo laparotomy and partial sternotomy, esophagogastrostomy on 2019    CURRENT NUTRITION ORDERS  Diet: Clear Liquids (SLP cleared thin liquids for pt, thoracic team still recommending clear liquids, pt has an appointment with the thoracic team on 19 so will await recommendations then.)  Intake/Tolerance: Pt is currently tolerating TF well. Says her only oral intake has been ice cubes and a little bit of pop family brought in.   Nutrition Support already ordered:  Peptamen 1.5 via J-tube @ 75 mL/hr x 18 hrs (4 pm- 10 am) providing 1350 mL, 2025 kcal (33 kcal/kg), 92 g protein (1.5 g/kg), 254 g CHO, 0 g fiber, and 1040 mL free water per DW of 62 kg.  + 6 pkts fiber providin calories and 18 g fiber   Water Flushes: none at his time    BM: None recorded at this time    LABS  Labs reviewed  CRP 91 (H)  BG -:     MEDICATIONS  Medications reviewed  Ferrous sulfate  Lasix- last dose given     ANTHROPOMETRICS  Height: 5' 5\"  Most Recent Weight: 79.4 kg  IBW: 56.8 kg (140%)  BMI: Overweight BMI 25-29.9  Weight History: Wt loss of 3.9 kg (4.6%) over the past week- diuresing -. Wt loss of 5.7 kg (6.6%) over the last 3 months.     05/15/19 0458 79.4 kg (175 lb 0.7 oz) MW   19 0100 79.5 kg (175 lb 4.3 oz) RC   19 0200 79.8 kg (175 lb 14.8 oz) HS   19 0007 79.3 kg (174 lb 13.2 oz) BB   19 0600 77.2 kg (170 lb 3.2 oz) BB   05/10/19 0845 80.7 kg (177 lb 14.6 oz) AL   19 0549 81.1 kg (178 lb 12.7 oz) CV   19 0000 83.3 kg (183 lb 11.2 oz) CVA   19 0444 81.9 kg (180 lb 8.9 oz) JH   19 0500 81.6 kg (179 lb 14.3 oz) MW   19 0543 81.6 kg (179 lb " 14.4 oz) PC   05/05/19 0004 81.2 kg (179 lb 1.6 oz) LS   05/04/19 0655 75.8 kg (167 lb 1.6 oz) AJ   05/03/19 0100 80.3 kg (177 lb 0.5 oz) MB   05/02/19 0623 80.7 kg (177 lb 14.6 oz) JH   05/01/19 0400 80.8 kg (178 lb 2.1 oz) NL   04/29/19 0400 82.8 kg (182 lb 8.7 oz) AS   04/28/19 0445 83.4 kg (183 lb 13.8 oz) EM   04/27/19 0600 81.6 kg (179 lb 14.3 oz) LG   04/26/19 0539 76.9 kg (169 lb 8.5 oz) KL     Wt Readings from Last 15 Encounters:   05/15/19 79.4 kg (175 lb 0.7 oz)   04/23/19 78 kg (171 lb 14.4 oz)   04/07/19 81.6 kg (180 lb)   03/27/19 81.7 kg (180 lb 1.6 oz)   02/11/19 85.1 kg (187 lb 9.8 oz)   02/05/19 85.1 kg (187 lb 9.6 oz)   01/10/19 84.9 kg (187 lb 1.6 oz)   12/06/18 93.8 kg (206 lb 11.2 oz)     Dosing Weight: 62 kg (adjusted wt based off of most recent wt and IBW)    ASSESSED NUTRITION NEEDS  Estimated Energy Needs: 6943-4662 kcals/day (25 - 30+ kcals/kg)  Justification: Maintenance and Post-op  Estimated Protein Needs: 62-75 grams protein/day (1 - 1.2+ grams of pro/kg)  Justification: Maintenance and Post-op  Estimated Fluid Needs: (1 mL/kcal)   Justification: Maintenance    PHYSICAL FINDINGS  See malnutrition section below.    MALNUTRITION  % Intake: Decreased intake does not meet criteria- meeting 100% assessed needs from TF  % Weight Loss: Up to 7.5% in 3 months (non-severe)  Subcutaneous Fat Loss: None observed  Muscle Loss: None observed  Fluid Accumulation/Edema: Does not meet criteria- trace  Malnutrition Diagnosis: Patient does not meet two of the above criteria necessary for diagnosing malnutrition but is at risk for malnutrition    NUTRITION DIAGNOSIS  Predicted inadequate nutrient intake (calories/protein) related to currently meeting 100% assessed needs from TF while on a clear liquid diet as evidenced by potential for TF interruptions.       INTERVENTIONS  Implementation  Nutrition Education: Discussed current and previous tube feed regimen.   Offered clear liquid ONS and pt declined at  this time  Collaboration with other providers- discussed nutrition following pt w/ provider   Enteral Nutrition - see above    Goals  Total avg nutritional intake to meet a minimum of 25 kcal/kg and 1 g PRO/kg daily (per dosing wt 62 kg).     Monitoring/Evaluation  Progress toward goals will be monitored and evaluated per protocol.    Denise Mckeon, Dietetic Intern

## 2019-05-17 ENCOUNTER — APPOINTMENT (OUTPATIENT)
Dept: LAB | Facility: CLINIC | Age: 74
End: 2019-05-17
Attending: HOSPITALIST
Payer: MEDICARE

## 2019-05-17 PROCEDURE — G0463 HOSPITAL OUTPT CLINIC VISIT: HCPCS

## 2019-05-17 PROCEDURE — 97530 THERAPEUTIC ACTIVITIES: CPT | Mod: GO | Performed by: OCCUPATIONAL THERAPIST

## 2019-05-17 PROCEDURE — 97605 NEG PRS WND THER DME<=50SQCM: CPT

## 2019-05-17 PROCEDURE — 25800030 ZZH RX IP 258 OP 636

## 2019-05-17 PROCEDURE — 25000128 H RX IP 250 OP 636: Performed by: HOSPITALIST

## 2019-05-17 PROCEDURE — 97110 THERAPEUTIC EXERCISES: CPT | Mod: GP | Performed by: PHYSICAL THERAPIST

## 2019-05-17 PROCEDURE — A9270 NON-COVERED ITEM OR SERVICE: HCPCS | Performed by: HOSPITALIST

## 2019-05-17 PROCEDURE — 27210436 ZZH NUTRITION PRODUCT SEMIELEM INTERMED CAN

## 2019-05-17 PROCEDURE — 25000132 ZZH RX MED GY IP 250 OP 250 PS 637: Performed by: HOSPITALIST

## 2019-05-17 PROCEDURE — 97116 GAIT TRAINING THERAPY: CPT | Mod: GP | Performed by: PHYSICAL THERAPIST

## 2019-05-17 PROCEDURE — 12000022 ZZH R&B SNF

## 2019-05-17 RX ORDER — ALUMINA, MAGNESIA, AND SIMETHICONE 2400; 2400; 240 MG/30ML; MG/30ML; MG/30ML
15 SUSPENSION ORAL EVERY 4 HOURS PRN
Status: DISCONTINUED | OUTPATIENT
Start: 2019-05-17 | End: 2019-05-28 | Stop reason: HOSPADM

## 2019-05-17 RX ORDER — ACETAMINOPHEN 325 MG/1
650 TABLET ORAL EVERY 4 HOURS PRN
Status: DISCONTINUED | OUTPATIENT
Start: 2019-05-17 | End: 2019-05-24

## 2019-05-17 RX ORDER — SODIUM CHLORIDE 9 MG/ML
INJECTION, SOLUTION INTRAVENOUS
Status: COMPLETED
Start: 2019-05-17 | End: 2019-05-17

## 2019-05-17 RX ADMIN — VENLAFAXINE 75 MG: 75 TABLET ORAL at 20:46

## 2019-05-17 RX ADMIN — ACETAMINOPHEN 975 MG: 325 SOLUTION ORAL at 21:53

## 2019-05-17 RX ADMIN — ACETAMINOPHEN 975 MG: 325 SOLUTION ORAL at 14:43

## 2019-05-17 RX ADMIN — ENOXAPARIN SODIUM 40 MG: 40 INJECTION SUBCUTANEOUS at 16:14

## 2019-05-17 RX ADMIN — AMPICILLIN SODIUM AND SULBACTAM SODIUM 3 G: 2; 1 INJECTION, POWDER, FOR SOLUTION INTRAMUSCULAR; INTRAVENOUS at 08:04

## 2019-05-17 RX ADMIN — Medication 2 PACKET: at 14:44

## 2019-05-17 RX ADMIN — Medication 5 ML: at 14:44

## 2019-05-17 RX ADMIN — Medication 325 MG: at 07:58

## 2019-05-17 RX ADMIN — Medication 50 MG: at 08:00

## 2019-05-17 RX ADMIN — LEVOTHYROXINE SODIUM 150 MCG: 150 TABLET ORAL at 08:03

## 2019-05-17 RX ADMIN — VENLAFAXINE 75 MG: 75 TABLET ORAL at 08:03

## 2019-05-17 RX ADMIN — Medication 2 PACKET: at 08:04

## 2019-05-17 RX ADMIN — PANTOPRAZOLE SODIUM 40 MG: 40 TABLET, DELAYED RELEASE ORAL at 05:39

## 2019-05-17 RX ADMIN — Medication 50 MG: at 20:54

## 2019-05-17 RX ADMIN — SODIUM CHLORIDE 500 ML: 9 INJECTION, SOLUTION INTRAVENOUS at 20:47

## 2019-05-17 RX ADMIN — FLUCONAZOLE 400 MG: 400 INJECTION, SOLUTION INTRAVENOUS at 09:24

## 2019-05-17 RX ADMIN — Medication 2 PACKET: at 20:46

## 2019-05-17 RX ADMIN — AMPICILLIN SODIUM AND SULBACTAM SODIUM 3 G: 2; 1 INJECTION, POWDER, FOR SOLUTION INTRAMUSCULAR; INTRAVENOUS at 20:46

## 2019-05-17 RX ADMIN — AMPICILLIN SODIUM AND SULBACTAM SODIUM 3 G: 2; 1 INJECTION, POWDER, FOR SOLUTION INTRAMUSCULAR; INTRAVENOUS at 14:43

## 2019-05-17 RX ADMIN — AMIODARONE HYDROCHLORIDE 200 MG: 200 TABLET ORAL at 08:03

## 2019-05-17 RX ADMIN — AMPICILLIN SODIUM AND SULBACTAM SODIUM 3 G: 2; 1 INJECTION, POWDER, FOR SOLUTION INTRAMUSCULAR; INTRAVENOUS at 03:42

## 2019-05-17 RX ADMIN — MINERAL SUPPLEMENT IRON 300 MG / 5 ML STRENGTH LIQUID 100 PER BOX UNFLAVORED 300 MG: at 07:58

## 2019-05-17 RX ADMIN — ACETAMINOPHEN 975 MG: 325 SOLUTION ORAL at 05:38

## 2019-05-17 NOTE — PLAN OF CARE
OT: patient reporting some concern regarding change of dressing and high BP and swelling today. OT supportive and encouraged patient to discuss these concerns with her MD and RN today.

## 2019-05-17 NOTE — PLAN OF CARE
RN: Pt alert and oriented, VSS. Pt is on clear liquids diet, TF started at 1800 as ordered without issues. Wound VAC dressing CDI, VAC functioning well. Wound dressing changed to left upper chest as ordered. IV abx infused through the PICC. Pain is well controlled with scheduled and PRN medication. Pt denies SOB and has no complaints. Continue with POC.

## 2019-05-17 NOTE — PROGRESS NOTES
05/16/19 0900   Quick Adds   Quick Adds Certification   Type of Visit Initial Occupational Therapy Evaluation   Living Environment   Lives With alone   Living Arrangements house   Home Accessibility stairs to enter home   Number of Stairs, Main Entrance 1   Transportation Anticipated car, drives self;family or friend will provide   Living Environment Comment OT daughter assists at home right now with medication and IADL activity and whatever patient may need.    Self-Care   Usual Activity Tolerance good   Current Activity Tolerance fair   Regular Exercise No   Equipment Currently Used at Home grab bar, toilet;grab bar, tub/shower;raised toilet;shower chair;walker, rolling   Activity/Exercise/Self-Care Comment OT no regulare exercise but stays active with self cares and IADL. has constant LBP that keeps her mindful of how much activity to do.    Functional Level   Ambulation 1-->assistive equipment   Transferring 1-->assistive equipment   Toileting 1-->assistive equipment   Bathing 1-->assistive equipment   Dressing 0-->independent   Eating 0-->independent   Communication 0-->understands/communicates without difficulty   Swallowing 0-->swallows foods/liquids without difficulty   Cognition 0 - no cognition issues reported   Fall history within last six months no   General Information   Onset of Illness/Injury or Date of Surgery - Date 03/26/20   Referring Physician domingo   Patient/Family Goals Statement increase strength and endurance for safe return home   Additional Occupational Profile Info/Pertinent History of Current Problem OT post surgical precautions. chronic LBP, o2 support   Precautions/Limitations fall precautions;abdominal precautions;oxygen therapy device and L/min;swallowing precautions   Weight-Bearing Status - LUE full weight-bearing   Weight-Bearing Status - RUE full weight-bearing   Weight-Bearing Status - LLE full weight-bearing   Weight-Bearing Status - RLE full weight-bearing   General  Observations OT good tolerance of OT assessment good set up and family support at home   Cognitive Status Examination   Orientation orientation to person, place and time   Level of Consciousness alert   Follows Commands (Cognition) WNL   Memory intact   Attention No deficits were identified   Organization/Problem Solving No deficits were identified   Cognitive Comment OT no issues identified at time of assessment will continue to observe as approproate for safe DC plan   Visual Perception   Visual Perception Wears glasses   Visual Perception Comments OT glasses for reading   Sensory Examination   Sensory Comments WFL   Pain Assessment   Patient Currently in Pain Yes, see Vital Sign flowsheet   Integumentary/Edema   Integumentary/Edema no deficits were identifed   Posture   Posture Comments OT incfluenced by chrinic LBP and current pain levle   Range of Motion (ROM)   ROM Comment WFL but barely patient grossly 140 in L and R UE flection. patient aware of UE HEP goeal to iprove AROM prior to OT DC   Strength   Strength Comments OT no formal assessment due to current restriction patient grossly 3+/5 strength grade with limited endurance as observed during functional task in OT assessment   Hand Strength   Hand Strength Comments WFL   Muscle Tone Assessment   Muscle Tone Comments WFL   Coordination   Coordination Comments WFL   Mobility   Bed Mobility Comments OT patient repors average standard bed set up at home   Transfer Skills   Transfer Comments OT patient reports 4ww used at baseline   Transfer Skill: Bed to Chair/Chair to Bed   Level of Collier: Bed to Chair stand-by assist   Physical Assist/Nonphysical Assist: Bed to Chair set-up required;supervision;verbal cues   Weight-Bearing Restrictions full weight-bearing   Assistive Device - Transfer Skill Bed to Chair Chair to Bed Rehab Eval rolling walker;other (see comments)   Transfer Skill: Sit to Stand   Level of Collier: Sit/Stand stand-by assist    Physical Assist/Nonphysical Assist: Sit/Stand set-up required;supervision;verbal cues   Transfer Skill: Sit to Stand full weight-bearing   Assistive Device for Transfer: Sit/Stand rolling walker;other (see comments)   Toilet Transfer   Toilet Transfer Comments OT patient has tall toilet and gra bar at home   Transfer Skill: Toilet Transfer   Level of Ayr: Toilet stand-by assist   Physical Assist/Nonphysical Assist: Toilet set-up required;supervision;verbal cues   Weight-Bearing Restrictions: Toilet full weight-bearing   Assistive Device rolling walker;grab bars;other (see comments)   Balance   Balance Comments WFL despite several tubes and cords due to medical complexity   Upper Body Dressing   Level of Ayr: Dress Upper Body independent   Lower Body Dressing   Level of Ayr: Dress Lower Body independent   Toileting   Level of Ayr: Toilet stand-by assist   Physical Assist/Nonphysical Assist: Toilet set-up required;supervision;verbal cues   Assistive Device grab bars;rolling walker;other (see comments)   Grooming   Level of Ayr: Grooming independent   Assistive Device other (see comments)   Eating/Self Feeding   Level of Ayr: Eating independent   Instrumental Activities of Daily Living (IADL)   Previous Responsibilities meal prep;housekeeping;laundry;shopping;medication management;finances;driving   Activities of Daily Living Analysis   Impairments Contributing to Impaired Activities of Daily Living balance impaired;fear and anxiety;flexibility decreased;pain;post surgical precautions;ROM decreased;strength decreased   General Therapy Interventions   Planned Therapy Interventions ADL retraining;IADL retraining;balance training;ROM;strengthening;transfer training;visual perception;home program guidelines;progressive activity/exercise;risk factor education   Clinical Impression   Criteria for Skilled Therapeutic Interventions Met yes, treatment indicated   OT  Diagnosis deconditioning   Influenced by the following impairments OT post surgical precaution, pain, chronic LB issues   Assessment of Occupational Performance 5 or more Performance Deficits   Identified Performance Deficits OT patient below baseline in shtrength, endurance, AROM of UE, BADL iADL    Clinical Decision Making (Complexity) High complexity   Therapy Frequency daily   Predicted Duration of Therapy Intervention (days/wks) OT goals to be met by 5/23/19   Anticipated Discharge Disposition Home with Assist;Home with Home Therapy   Risks and Benefits of Treatment have been explained. 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  (Physician co-signature of this document indicates review and certification of the therapy plan).   Total Evaluation Time   Total Evaluation Time (Minutes) 30

## 2019-05-17 NOTE — PROGRESS NOTES
Provided pt with Care Plan Goals and Orders today, 5/17. Pt denied any additional questions or concerns at this time. Sw will continue to assist as needed.

## 2019-05-17 NOTE — PLAN OF CARE
Patient alert and oriented x 4. No complaints of pain and discomfort all shift. TF running @ 90 ml/hr infusing via JT, patient tolerating well. Wound vac intact to mid clavicular (old spit fistula site). No alarm noted. No respiratory distress noted. Oxygen on 2 LPM via NC. Will continue with current plan of care.

## 2019-05-17 NOTE — PLAN OF CARE
Patient alert and oriented x 4,Participating in therapies.Wound Vac dressing changed by wound nurse.neck wound dressing CDI.J-tube intact and flushes okay.IV antibiotic infusing via PICC in left AC.PRN pain medication given x 1 with relief.O2 via nasal canula,no SOB.Pleasant.

## 2019-05-17 NOTE — PROGRESS NOTES
"[facility]  WO Nurse Inpatient Wound Assessment with Negative Pressure Wound Therapy     Assessment   Initial Assessment of wound(s) on pt's: left chest  Left chest wound due to dehisced surgical wound  Status: healing    Treatment Plan  Left chest wound: Negative Pressure Wound Therapy (NPWT) to be changed by WOC RN every Mon/Wed/Fri with small  black foam. Staff RN to assess pump settings set to -75 and dressing integrity every shift. Remove foam dressing and replace with BID normal saline moist gauze dressing if:  -a dressing failure which cannot be repaired within 2 hours  -patient is discharging to home without a home pump  -patient is discharging to a facility outside the local area  -if a dressing is a \"Silver Foam\", remove before Radiation Therapy or MRI    Nursing to notify the Provider(s) and re-consult the WO Nurse if wound(s) deteriorate(s) or if necessary to reevaluate the plan.      Patient History    According to medical record:  Per Heme/Onc Fellow on 5/14/19: Myrtle Vaz is a 73 year old female w/ PMH of laparoscopic hiatal hernia repair with mesh, complicated with esophageal perforation 11/2018 s/p spit fistula who was admitted for redo laparotomy and partial sternotomy, esophagogastrostomy on 4/26/2019. Found anastomotic leak per CT scan on 5/5/19, underwent EGD esophageal stent replacement, I&D of left neck on 5/6/19.    Objective Data  Current support surface: Standard , Atmos Air mattress  Current off-loading measures: Pillows  Containment of urine/stool: continent of bowel and bladder  Current diet/nutrition: Orders Placed This Encounter      Clear Liquid Diet    Output: I/O last 3 completed shifts:  In: 1510 [I.V.:100; NG/GT:240]  Out: -   Gautam: No data recorded  Labs:   Recent Labs   Lab Test 05/16/19  1008  05/14/19  0617  05/01/19  0320 04/30/19  0313   ALBUMIN  --   --   --   --  2.2*  --    HGB 7.5*   < > 7.5*   < > 7.8* 8.1*   INR  --   --   --   --   --  1.25* "   WBC 9.2   < > 10.2   < > 10.2 9.9   CRP  --   --  91.0*   < >  --   --     < > = values in this interval not displayed.       Physical Exam  Wound Location: Left chest     5/17/19    Wound History: See hH&P above  Surgical date: 5/6/19  Date Negative Pressure Wound Therapy initiated: 5/6/19  Measurements: (length x width x depth in cm):4 cm x 1.4 cm x 1 cm  Undermining: from 11-2 o'clock with max depth of 1 cm  Wound Base: clean, pink granulation tissue  Palpation of the wound bed:  normal  Periwound Skin: intact  Color: pink  Temperature  normal   Drainage: Amount: none in cannister or during dressing change  Color: none   Odor: none  Pain:  denies   Was patient premedicated prior to dressing change? No      Intervention:     Visual inspection of wound(s):  Wound was assessed.  Wound Care: was done  Orders:  written  Supplies:  At bed[facility]ie  Discussed plan of care with: patient and RN    Binta Pan RN, CWOCN

## 2019-05-17 NOTE — PLAN OF CARE
Pt did well on Nustep 10 min on RA but desatted to 88% when tried amb 50 ft RA. Wound nurse in--states vac may be d/c after pt sees surgeon Mon. Cont per POC

## 2019-05-17 NOTE — PLAN OF CARE
Patient is alert and oriented x 4. TF running and water flushes without problem-patient tolerating well. Patient is on clear fluids. Wound vac dressing on the L chest intact-no alarm this shift. L neck dressing CDI. Patient had schedule tylenol early for pain. Oxygen on 2 LPM. Oxygen sats- 95% . No respiratory distress noted. Will continue with current plan of care.

## 2019-05-18 PROCEDURE — 25000132 ZZH RX MED GY IP 250 OP 250 PS 637: Performed by: HOSPITALIST

## 2019-05-18 PROCEDURE — A9270 NON-COVERED ITEM OR SERVICE: HCPCS | Performed by: HOSPITALIST

## 2019-05-18 PROCEDURE — 92526 ORAL FUNCTION THERAPY: CPT | Mod: GN | Performed by: SPEECH-LANGUAGE PATHOLOGIST

## 2019-05-18 PROCEDURE — 25000128 H RX IP 250 OP 636: Performed by: HOSPITALIST

## 2019-05-18 PROCEDURE — 97530 THERAPEUTIC ACTIVITIES: CPT | Mod: GO

## 2019-05-18 PROCEDURE — 97116 GAIT TRAINING THERAPY: CPT | Mod: GP | Performed by: PHYSICAL THERAPIST

## 2019-05-18 PROCEDURE — 25800030 ZZH RX IP 258 OP 636

## 2019-05-18 PROCEDURE — 27210436 ZZH NUTRITION PRODUCT SEMIELEM INTERMED CAN

## 2019-05-18 PROCEDURE — 12000022 ZZH R&B SNF

## 2019-05-18 PROCEDURE — 97110 THERAPEUTIC EXERCISES: CPT | Mod: GP | Performed by: PHYSICAL THERAPIST

## 2019-05-18 RX ORDER — SODIUM CHLORIDE 9 MG/ML
INJECTION, SOLUTION INTRAVENOUS
Status: COMPLETED
Start: 2019-05-18 | End: 2019-05-18

## 2019-05-18 RX ADMIN — ENOXAPARIN SODIUM 40 MG: 40 INJECTION SUBCUTANEOUS at 17:22

## 2019-05-18 RX ADMIN — ACETAMINOPHEN 975 MG: 325 SOLUTION ORAL at 05:34

## 2019-05-18 RX ADMIN — Medication 50 MG: at 20:56

## 2019-05-18 RX ADMIN — VENLAFAXINE 75 MG: 75 TABLET ORAL at 09:11

## 2019-05-18 RX ADMIN — Medication 325 MG: at 09:11

## 2019-05-18 RX ADMIN — AMPICILLIN SODIUM AND SULBACTAM SODIUM 3 G: 2; 1 INJECTION, POWDER, FOR SOLUTION INTRAMUSCULAR; INTRAVENOUS at 20:55

## 2019-05-18 RX ADMIN — ACETAMINOPHEN 975 MG: 325 SOLUTION ORAL at 14:32

## 2019-05-18 RX ADMIN — Medication 2 PACKET: at 20:56

## 2019-05-18 RX ADMIN — MINERAL SUPPLEMENT IRON 300 MG / 5 ML STRENGTH LIQUID 100 PER BOX UNFLAVORED 300 MG: at 09:11

## 2019-05-18 RX ADMIN — Medication 50 MG: at 09:11

## 2019-05-18 RX ADMIN — PANTOPRAZOLE SODIUM 40 MG: 40 TABLET, DELAYED RELEASE ORAL at 09:13

## 2019-05-18 RX ADMIN — LEVOTHYROXINE SODIUM 150 MCG: 150 TABLET ORAL at 09:11

## 2019-05-18 RX ADMIN — Medication 2 PACKET: at 09:13

## 2019-05-18 RX ADMIN — Medication 2 PACKET: at 14:32

## 2019-05-18 RX ADMIN — AMPICILLIN SODIUM AND SULBACTAM SODIUM 3 G: 2; 1 INJECTION, POWDER, FOR SOLUTION INTRAMUSCULAR; INTRAVENOUS at 09:11

## 2019-05-18 RX ADMIN — ACETAMINOPHEN 975 MG: 325 SOLUTION ORAL at 21:01

## 2019-05-18 RX ADMIN — AMPICILLIN SODIUM AND SULBACTAM SODIUM 3 G: 2; 1 INJECTION, POWDER, FOR SOLUTION INTRAMUSCULAR; INTRAVENOUS at 03:25

## 2019-05-18 RX ADMIN — HEPARIN, PORCINE (PF) 10 UNIT/ML INTRAVENOUS SYRINGE 10 ML: at 21:00

## 2019-05-18 RX ADMIN — VENLAFAXINE 75 MG: 75 TABLET ORAL at 20:56

## 2019-05-18 RX ADMIN — SODIUM CHLORIDE 500 ML: 9 INJECTION, SOLUTION INTRAVENOUS at 20:55

## 2019-05-18 RX ADMIN — Medication 5 ML: at 14:32

## 2019-05-18 RX ADMIN — AMPICILLIN SODIUM AND SULBACTAM SODIUM 3 G: 2; 1 INJECTION, POWDER, FOR SOLUTION INTRAMUSCULAR; INTRAVENOUS at 14:33

## 2019-05-18 RX ADMIN — FLUCONAZOLE 400 MG: 400 INJECTION, SOLUTION INTRAVENOUS at 09:15

## 2019-05-18 RX ADMIN — AMIODARONE HYDROCHLORIDE 200 MG: 200 TABLET ORAL at 09:12

## 2019-05-18 NOTE — PLAN OF CARE
RN: Pt A/O x4, VSS. Pt is independent with transfer and ambulation using walker. Pt is on clear liquid diet, and takes ICE chips only for oral intake. TF started at 1800 through the J-tube without issues. Wound VAC dressing CDI, VAC at -50 mmHg. Dressing changed to left medial cervical as ordered. IV abx infused via PICC, PICC dressing CDI. Pain is well controlled with scheduled and PRN medication. Pt denies SOB and has no complaints. Continue with POC.

## 2019-05-18 NOTE — PLAN OF CARE
OT: Pt completed Mode A dynavision today to increase UE functional reaching while standing. CGA amb 150' x2 with 2ww on 1L oxygen. O2 levels dropped to 89% following amb. Recovered to 93% after 1 minute seated rest break.

## 2019-05-18 NOTE — PLAN OF CARE
RN: Pt has no c/o pain or discomfort this shift. Has scheduled Tylenol. Tolerating TF and H20 flushes via J-tube- until 0800. SBA to BR - needs assistance managing lines/tubes for safety. Woound vac intact and PICC patent for IV abx. C/o several ;loose stool /shift but not new but not improving per pt. Fiber not helping. Cdiff negative 5/5. Wondering if there is anything she can have to manage loose stool.Note left for the provider to address. Continue with plan of care.

## 2019-05-18 NOTE — PLAN OF CARE
SLP:  Pt continues to tolerate clear thin liquid diet without overt s/sx of aspiration.  Pt is independent with swallowing excs.  Recommendations: 1. Continue clear thin liquid diet.  2. Safe swallow strategies- small sips, chin tuck, no straws.  3. Plan is to see thoracic team on Monday 5/20, remain on current diet until clearance from thoracic team.

## 2019-05-18 NOTE — PLAN OF CARE
Pt tolerated room air for ex well 93% but desatted with gait to 88% (recovers in 1-2 min to 93%). SBA/IND transfers; SBA gait with ww 200 ft. Progressing per goals. Likely wound vac d/c'd on Monday.

## 2019-05-18 NOTE — PHARMACY-MEDICATION REGIMEN REVIEW
Pharmacy Medication Regimen Review  Myrtle Vaz is a 73 year old female who is currently in the Transitional Care Unit.    Assessment: Upon review of the medications and patient chart the following irregularities were found:   -Medications without appropriate indications/durations: fluconazole IV  -Significant Drug Interactions: fluconazole + amiodarone--->increased amiodarone amounts, risk of QTC prolongation & torsades; fluconazole should be ending in the next few days, so no action needed at this time  -Other Recommendations: FYI the H&P says heparin for DVT prophylaxis and patient is on Lovenox. Patient was on Lovenox prior to transfer to TCU, so I think this must be a typo.    Plan:   1. Please add a stop date for the fluconazole.    Attending provider will be sent this note for review.  If there are any emergent issues noted above, pharmacist will contact provider directly by phone.      Pharmacy will periodically review the resident's medication regimen for any PRN medications not administered in > 72 hours and discontinue them. The pharmacist will discuss gradual dose reductions of psychopharmacologic medications with interdisciplinary team on a regular basis.    Please contact pharmacy if the above does not answer specific medication questions/concerns.  Nerissa Ozuna, LuciaD, BCPS    Background:  A pharmacist has reviewed all medications and pertinent medical history today.  Medications were reviewed for appropriate use and any irregularities found are listed with recommendations.      Current Facility-Administered Medications:      - Skin Test Reading -, , Does not apply, Q21 Days, Arielle Morris MD     acetaminophen (TYLENOL) solution 975 mg, 975 mg, Per J Tube, Q8H, Arielle Morris MD, 975 mg at 05/18/19 0534     acetaminophen (TYLENOL) Suppository 650 mg, 650 mg, Rectal, Q4H PRN, Arielle Morris MD     acetaminophen (TYLENOL) tablet 650 mg, 650 mg, Per Feeding Tube, Q4H  PRN, Arielle Morris MD     albuterol (PROAIR HFA/PROVENTIL HFA/VENTOLIN HFA) 108 (90 Base) MCG/ACT inhaler 2 puff, 2 puff, Inhalation, Q4H PRN, Arielle Morris MD     alum & mag hydroxide-simethicone (MYLANTA ES/MAALOX  ES) suspension 15 mL, 15 mL, Per Feeding Tube, Q4H PRN, Arielle Morris MD     amiodarone (PACERONE/CODARONE) tablet 200 mg, 200 mg, Per G Tube, Daily, Arielle Morris MD, 200 mg at 05/18/19 0912     ampicillin-sulbactam (UNASYN) 3 g vial to attach to  mL bag, 3 g, Intravenous, Q6H, Arielle Morris MD, Last Rate: 100 mL/hr at 05/18/19 0911, 3 g at 05/18/19 0911     aspirin suspension 325 mg, 325 mg, Oral or Feeding Tube, Daily, Arielle Morris MD, 325 mg at 05/18/19 0911     enoxaparin (LOVENOX) injection 40 mg, 40 mg, Subcutaneous, Q24H, Arielle Morris MD, 40 mg at 05/17/19 1614     ferrous sulfate 300 (60 Fe) MG/5ML syrup 300 mg, 300 mg, Per J Tube, Daily, Arielle Morris MD, 300 mg at 05/18/19 0911     fiber modular (NUTRISOURCE FIBER) packet 2 packet, 2 packet, Per Feeding Tube, TID, Arielle Morris MD, 2 packet at 05/18/19 0913     fluconazole (DIFLUCAN) intermittent infusion 400 mg in NaCl, 400 mg, Intravenous, Q24H, Arielle Morris MD, Last Rate: 100 mL/hr at 05/18/19 0915, 400 mg at 05/18/19 0915     heparin lock flush 10 UNIT/ML injection 5-10 mL, 5-10 mL, Intracatheter, Q24H, Arielle Morris MD, 5 mL at 05/17/19 1444     heparin lock flush 10 UNIT/ML injection 5-10 mL, 5-10 mL, Intracatheter, Q1H PRN, Arielle Morris MD     levothyroxine (SYNTHROID/LEVOTHROID) tablet 150 mcg, 150 mcg, Per J Tube, QAM, Arielle Morris MD, 150 mcg at 05/18/19 0911     lidocaine (LMX4) cream, , Topical, Q1H PRN, Arielle Morris MD     lidocaine 1 % 0.1-1 mL, 0.1-1 mL, Other, Q1H PRN, Arielle Morris MD     medication instructions, , Does not apply,  Continuous PRN, Arielle Morris MD     naloxone (NARCAN) injection 0.1-0.4 mg, 0.1-0.4 mg, Intravenous, Q2 Min PRN, Arielle Morris MD     ondansetron (ZOFRAN-ODT) ODT tab 4 mg, 4 mg, Oral, Q6H PRN **OR** ondansetron (ZOFRAN) injection 4 mg, 4 mg, Intravenous, Q6H PRN, Arielle Morris MD     pantoprazole (PROTONIX) 2 mg/mL suspension 40 mg, 40 mg, Per Feeding Tube, QAM AC, Arielle Morris MD, 40 mg at 05/18/19 0913     Patient is already receiving anticoagulation with heparin, enoxaparin (LOVENOX), warfarin (COUMADIN)  or other anticoagulant medication, , Does not apply, Continuous PRN, Arielle Morris MD     senna-docusate (SENOKOT-S/PERICOLACE) 8.6-50 MG per tablet 2 tablet, 2 tablet, Per J Tube, BID PRN, Arielle Morris MD     simethicone (MYLICON) chewable tablet 80 mg, 80 mg, Oral, Q6H PRN, Arielle Morris MD     sodium chloride (PF) 0.9% PF flush 10-20 mL, 10-20 mL, Intracatheter, q1 min prn, Arielle Morris MD     traMADol (ULTRAM) half-tab 25-50 mg, 25-50 mg, Per Feeding Tube, Q6H PRN, Arielle Morris MD, 50 mg at 05/18/19 0911     tuberculin injection 5 Units, 5 Units, Intradermal, Q21 Days, Arielle Morris MD, 5 Units at 05/16/19 0837     venlafaxine (EFFEXOR) tablet 75 mg, 75 mg, Per G Tube, BID, Arielle Morris MD, 75 mg at 05/18/19 0911    Facility-Administered Medications Ordered in Other Encounters:      heparin lock flush 10 UNIT/ML injection 5-10 mL, 5-10 mL, Intracatheter, Q24H, Arielle Morris MD     heparin lock flush 10 UNIT/ML injection 5-10 mL, 5-10 mL, Intracatheter, Q1H PRN, Arielle Morris MD     lidocaine (LMX4) cream, , Topical, Q1H PRN, Arielle Morris MD     lidocaine 1 % 0.1-1 mL, 0.1-1 mL, Other, Q1H PRN, Arielle Morris MD     sodium chloride (PF) 0.9% PF flush 10-20 mL, 10-20 mL, Intracatheter, q1 min prn, Arielle Morris  MD  No current outpatient prescriptions on file.   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 History of laparoscopic hiatal hernia repair with mesh, complicated with esophageal perforation, htn, hld, depression, hypothyroid, hx of a fib

## 2019-05-18 NOTE — PLAN OF CARE
Pt alert and oriented x 4,up in the halls with walker and PT staff.Wound vac and J-tube intact,no issues.  IV antibiotic infusing via PICC in right AC.PRN pain medication given x 1 with relief.O2 @ 1 LPM nasal canula,no SOB.Left neck wound dressing cleansed and packed per orders.Chest incision sutures intact.

## 2019-05-19 PROCEDURE — 25000132 ZZH RX MED GY IP 250 OP 250 PS 637: Performed by: HOSPITALIST

## 2019-05-19 PROCEDURE — A9270 NON-COVERED ITEM OR SERVICE: HCPCS | Performed by: HOSPITALIST

## 2019-05-19 PROCEDURE — 25800030 ZZH RX IP 258 OP 636

## 2019-05-19 PROCEDURE — 12000022 ZZH R&B SNF

## 2019-05-19 PROCEDURE — 25000128 H RX IP 250 OP 636: Performed by: HOSPITALIST

## 2019-05-19 PROCEDURE — 27210436 ZZH NUTRITION PRODUCT SEMIELEM INTERMED CAN

## 2019-05-19 RX ORDER — SODIUM CHLORIDE 9 MG/ML
INJECTION, SOLUTION INTRAVENOUS
Status: COMPLETED
Start: 2019-05-19 | End: 2019-05-19

## 2019-05-19 RX ADMIN — LEVOTHYROXINE SODIUM 150 MCG: 150 TABLET ORAL at 08:24

## 2019-05-19 RX ADMIN — VENLAFAXINE 75 MG: 75 TABLET ORAL at 21:07

## 2019-05-19 RX ADMIN — Medication 325 MG: at 08:24

## 2019-05-19 RX ADMIN — AMIODARONE HYDROCHLORIDE 200 MG: 200 TABLET ORAL at 08:26

## 2019-05-19 RX ADMIN — ENOXAPARIN SODIUM 40 MG: 40 INJECTION SUBCUTANEOUS at 15:54

## 2019-05-19 RX ADMIN — AMPICILLIN SODIUM AND SULBACTAM SODIUM 3 G: 2; 1 INJECTION, POWDER, FOR SOLUTION INTRAMUSCULAR; INTRAVENOUS at 08:24

## 2019-05-19 RX ADMIN — AMPICILLIN SODIUM AND SULBACTAM SODIUM 3 G: 2; 1 INJECTION, POWDER, FOR SOLUTION INTRAMUSCULAR; INTRAVENOUS at 14:07

## 2019-05-19 RX ADMIN — SODIUM CHLORIDE 500 ML: 9 INJECTION, SOLUTION INTRAVENOUS at 22:35

## 2019-05-19 RX ADMIN — Medication 2 PACKET: at 08:25

## 2019-05-19 RX ADMIN — AMPICILLIN SODIUM AND SULBACTAM SODIUM 3 G: 2; 1 INJECTION, POWDER, FOR SOLUTION INTRAMUSCULAR; INTRAVENOUS at 21:08

## 2019-05-19 RX ADMIN — MINERAL SUPPLEMENT IRON 300 MG / 5 ML STRENGTH LIQUID 100 PER BOX UNFLAVORED 300 MG: at 08:24

## 2019-05-19 RX ADMIN — VENLAFAXINE 75 MG: 75 TABLET ORAL at 08:24

## 2019-05-19 RX ADMIN — Medication 50 MG: at 08:24

## 2019-05-19 RX ADMIN — FLUCONAZOLE 400 MG: 400 INJECTION, SOLUTION INTRAVENOUS at 09:34

## 2019-05-19 RX ADMIN — Medication 2 PACKET: at 21:07

## 2019-05-19 RX ADMIN — Medication 2 PACKET: at 14:07

## 2019-05-19 RX ADMIN — AMPICILLIN SODIUM AND SULBACTAM SODIUM 3 G: 2; 1 INJECTION, POWDER, FOR SOLUTION INTRAMUSCULAR; INTRAVENOUS at 03:09

## 2019-05-19 RX ADMIN — Medication 5 ML: at 14:07

## 2019-05-19 RX ADMIN — Medication 50 MG: at 21:07

## 2019-05-19 RX ADMIN — ACETAMINOPHEN 975 MG: 325 SOLUTION ORAL at 14:07

## 2019-05-19 RX ADMIN — PANTOPRAZOLE SODIUM 40 MG: 40 TABLET, DELAYED RELEASE ORAL at 05:11

## 2019-05-19 NOTE — PLAN OF CARE
"RN: Pt has no c/o pain or discomfort this shift. Refused scheduled Tylenol. Saying \" I don't think I need thAt right now:. Tolerating TF and H20 flushes via J-tube- until 0800. SBA to BR - needs assistance managing lines/tubes for safety. Wound vac intact and PICC patent for IV abx. Reported still having   Watery stool but no longer frequent.. Continue with plan of care.    "

## 2019-05-19 NOTE — PLAN OF CARE
Patient A&Ox4. VSS on 1L O2 NC. No SOB observed. Wound vac to L chest patent; no drainage collected in cannister this shift. JT patent; flushes and feeding administered as ordered; patient tolerated well. PRN Tramadol given x1 for c/o chest incision pain with moderate relief. Patient declined wet to dry dressing change to L cervical area this shift, stated it was done this afternoon and wanted it changed tomorrow morning instead. Chest sutures and dressing remained CDI. PICC to RUE patent; IV abx infused without difficulties. Continent of bladder. No BM reported this shift. Patient pleasant and able to make needs known. Continue with POC.

## 2019-05-19 NOTE — PLAN OF CARE
Pt alert and oriented x 4,Oxygen @ 1 LPM nasal canula.SBA to bathroom.  Tramadol 50 mg via j-tube x 1.Neck dressing changed,packed wet-dry,moderate drainage.Wound vac intact,hardly any drainage noted.  J-tube intact & water flushes okay.PiCC in right AC dressing changed as well as caps,iv antibiotic infusing.Pleasant.

## 2019-05-20 ENCOUNTER — APPOINTMENT (OUTPATIENT)
Dept: LAB | Facility: CLINIC | Age: 74
End: 2019-05-20
Attending: HOSPITALIST
Payer: MEDICARE

## 2019-05-20 PROCEDURE — 97602 WOUND(S) CARE NON-SELECTIVE: CPT

## 2019-05-20 PROCEDURE — 97535 SELF CARE MNGMENT TRAINING: CPT | Mod: GO | Performed by: OCCUPATIONAL THERAPIST

## 2019-05-20 PROCEDURE — 25800030 ZZH RX IP 258 OP 636

## 2019-05-20 PROCEDURE — G0463 HOSPITAL OUTPT CLINIC VISIT: HCPCS | Mod: 25

## 2019-05-20 PROCEDURE — 99207 ZZC CDG-MDM COMPONENT: MEETS MODERATE - UP CODED: CPT | Performed by: HOSPITALIST

## 2019-05-20 PROCEDURE — 25000128 H RX IP 250 OP 636: Performed by: HOSPITALIST

## 2019-05-20 PROCEDURE — 25000132 ZZH RX MED GY IP 250 OP 250 PS 637: Performed by: HOSPITALIST

## 2019-05-20 PROCEDURE — 27210436 ZZH NUTRITION PRODUCT SEMIELEM INTERMED CAN

## 2019-05-20 PROCEDURE — A9270 NON-COVERED ITEM OR SERVICE: HCPCS | Performed by: HOSPITALIST

## 2019-05-20 PROCEDURE — 97530 THERAPEUTIC ACTIVITIES: CPT | Mod: GP

## 2019-05-20 PROCEDURE — 12000022 ZZH R&B SNF

## 2019-05-20 PROCEDURE — 97110 THERAPEUTIC EXERCISES: CPT | Mod: GP

## 2019-05-20 PROCEDURE — 99309 SBSQ NF CARE MODERATE MDM 30: CPT | Performed by: HOSPITALIST

## 2019-05-20 RX ORDER — LOPERAMIDE HYDROCHLORIDE 1 MG/5ML
2 SOLUTION ORAL 4 TIMES DAILY PRN
Status: DISCONTINUED | OUTPATIENT
Start: 2019-05-20 | End: 2019-05-28 | Stop reason: HOSPADM

## 2019-05-20 RX ORDER — SODIUM CHLORIDE 9 MG/ML
INJECTION, SOLUTION INTRAVENOUS
Status: COMPLETED
Start: 2019-05-20 | End: 2019-05-20

## 2019-05-20 RX ADMIN — Medication 50 MG: at 05:48

## 2019-05-20 RX ADMIN — AMPICILLIN SODIUM AND SULBACTAM SODIUM 3 G: 2; 1 INJECTION, POWDER, FOR SOLUTION INTRAMUSCULAR; INTRAVENOUS at 09:47

## 2019-05-20 RX ADMIN — Medication 2 PACKET: at 20:49

## 2019-05-20 RX ADMIN — Medication 50 MG: at 20:49

## 2019-05-20 RX ADMIN — FLUCONAZOLE 400 MG: 400 INJECTION, SOLUTION INTRAVENOUS at 11:20

## 2019-05-20 RX ADMIN — PANTOPRAZOLE SODIUM 40 MG: 40 TABLET, DELAYED RELEASE ORAL at 05:48

## 2019-05-20 RX ADMIN — AMIODARONE HYDROCHLORIDE 200 MG: 200 TABLET ORAL at 10:12

## 2019-05-20 RX ADMIN — Medication 325 MG: at 10:10

## 2019-05-20 RX ADMIN — VENLAFAXINE 75 MG: 75 TABLET ORAL at 20:49

## 2019-05-20 RX ADMIN — VENLAFAXINE 75 MG: 75 TABLET ORAL at 10:11

## 2019-05-20 RX ADMIN — ENOXAPARIN SODIUM 40 MG: 40 INJECTION SUBCUTANEOUS at 16:48

## 2019-05-20 RX ADMIN — AMPICILLIN SODIUM AND SULBACTAM SODIUM 3 G: 2; 1 INJECTION, POWDER, FOR SOLUTION INTRAMUSCULAR; INTRAVENOUS at 03:08

## 2019-05-20 RX ADMIN — Medication 5 ML: at 14:54

## 2019-05-20 RX ADMIN — LEVOTHYROXINE SODIUM 150 MCG: 150 TABLET ORAL at 10:13

## 2019-05-20 RX ADMIN — Medication 2 PACKET: at 10:11

## 2019-05-20 RX ADMIN — Medication 2 PACKET: at 14:53

## 2019-05-20 RX ADMIN — MINERAL SUPPLEMENT IRON 300 MG / 5 ML STRENGTH LIQUID 100 PER BOX UNFLAVORED 300 MG: at 10:09

## 2019-05-20 RX ADMIN — SODIUM CHLORIDE 500 ML: 9 INJECTION, SOLUTION INTRAVENOUS at 14:52

## 2019-05-20 RX ADMIN — AMPICILLIN SODIUM AND SULBACTAM SODIUM 3 G: 2; 1 INJECTION, POWDER, FOR SOLUTION INTRAMUSCULAR; INTRAVENOUS at 14:47

## 2019-05-20 NOTE — PLAN OF CARE
PT: PT session performed on RA with ambulation in the hallways, standing exercises, and NuStep bike. O2 sats were near 90% each rest break but pt was able to recover up to 95% following short rest break. O2 placed back on pt at end of session at bedside. Pt remains SBA for most mobility d/t fatigue and need for maintenence of IV pole/O2.

## 2019-05-20 NOTE — PLAN OF CARE
OT - Patient moving well with WW in hallways with SBA - therapist maneuvers IV pole for patient.  Short OT needs identified at this time.

## 2019-05-20 NOTE — PROGRESS NOTES
TCU Care Coordinator Progress Note    Admission date: 5/24/2019    Data: Myrtle Vaz is a 74 yo female on TCU for rehab, tube feeding, IV antibiotics, and wound care following hospitalization for many procedures/complications stemming from ventral hernia repair.    Intervention: Met with patient to introduce the role of Care Coordinator and to begin discussion of anticipated discharge planning needs. Spoke to daughter Haydee by phone about the same, by patient's request. Spoke to Mari at [facility], confirmed they will be able to take patient back for home care SN/PT/OT/SLP services. She had been on services with them but was discharged due to certification expiring while hospitalized.    Assessment: Patient will have above home care needs. She had also been on tube feeding at home prior to hospitalization, provided by [facility]. Daughter Haydee will get [facility] contact information in the next couple of days. She has two wounds requiring cares, daughter willing to learn to manage, has managed much more complex cares with patient in the past. She will also stay with patient upon discharge to home, but she will be out of town and not able to take her home until 5/28. Patient will complete IV antibiotics course on TCU today. She is currently on O2 intermittently, was not on O2 at home, hopes to wean from O2 prior to discharge home.    Plan: Will continue to follow discharge planning needs throughout TCU stay. Will confirm enteral arrangements with [facility] prior to discharge home. Potential discharge early next week.    Filemon Garcia RN, BSN, Patient Care Management Coordinator  [facility]  [address], [address] [place] [address]  [email or web address]  [email or web address]   Desk: [phone] TCU Main [phone] Fax [phone] Pager [phone]

## 2019-05-20 NOTE — PLAN OF CARE
RN: Pt has no c/o pain or discomfort this shift. Refused scheduled Tylenol and requested for PRN Tramadol  50 mg tab. Tolerating TF and H20 flushes via J-tube- until 0800. SBA to BR - needs assistance managing lines/tubes for safety. Continue with plan of care.

## 2019-05-20 NOTE — PLAN OF CARE
RN: Pt AA&O x3, able to make needs known. Pt met w/ thoracic dr and wound vac was discontinued. Pt very pleased. IV ABX infused w/ no s/s of complications. Pt denied pain and SOB this shift. O2 at 2 LPM; pt wants to wean w/ PT/OT and going to BR. Friends visited. Con't POC.

## 2019-05-20 NOTE — PROGRESS NOTES
[facility]  WO Nurse Inpatient Wound Assessment with Negative Pressure Wound Therapy     Assessment   Initial Assessment of wound(s) on pt's: left chest  Left chest wound due to dehisced surgical wound  Status: healing    Treatment Plan  Discontinue Wound VAC per Thoracic Surgery JENAOR Judd.  Left chest wound: Daily: wash wound with wound cleanser and gauze. Pack gently with saline moistened gauze. Cover with 4x4 folded in half and secure with Primapore tape.    Left clavicular wound per Thoracic Surgery orders.    WO will sign off today. Thoracic Surgery will continue to monitor healing of wounds.    Nursing to notify the Provider(s) and re-consult the WOC Nurse if wound(s) deteriorate(s) or if necessary to reevaluate the plan.      Patient History    According to medical record:  Per Heme/Onc Fellow on 5/14/19: Myrtle Vaz is a 73 year old female w/ PMH of laparoscopic hiatal hernia repair with mesh, complicated with esophageal perforation 11/2018 s/p spit fistula who was admitted for redo laparotomy and partial sternotomy, esophagogastrostomy on 4/26/2019. Found anastomotic leak per CT scan on 5/5/19, underwent EGD esophageal stent replacement, I&D of left neck on 5/6/19.    Objective Data  Current support surface: Standard , Atmos Air mattress  Current off-loading measures: Pillows  Containment of urine/stool: continent of bowel and bladder  Current diet/nutrition: Orders Placed This Encounter      Clear Liquid Diet    Output: I/O last 3 completed shifts:  In: 240 [NG/GT:240]  Out: -   Gautam: No data recorded  Labs:   Recent Labs   Lab Test 05/16/19  1008  05/14/19  0617  05/01/19  0320 04/30/19  0313   ALBUMIN  --   --   --   --  2.2*  --    HGB 7.5*   < > 7.5*   < > 7.8* 8.1*   INR  --   --   --   --   --  1.25*   WBC 9.2   < > 10.2   < > 10.2 9.9   CRP  --   --  91.0*   < >  --   --     < > = values in this interval not displayed.       Physical Exam  Wound Location: Left  chest     5/17/19    Wound History: See hH&P above  Surgical date: 5/6/19  Date Negative Pressure Wound Therapy initiated: 5/6/19  Measurements: (length x width x depth in cm):4 cm x 1.4 cm x 1 cm  Undermining: from 11-2 o'clock with max depth of 1 cm  Wound Base: clean, pink granulation tissue  Palpation of the wound bed:  normal  Periwound Skin: intact  Color: pink  Temperature  normal   Drainage: Amount: none in cannister or during dressing change  Color: none   Odor: none  Pain:  denies   Was patient premedicated prior to dressing change? No      Intervention:     Visual inspection of wound(s): completed with WOC and Thoracic Surgery  Wound was assessed.  Wound Care: was done  Orders:  written  [facility]  Discussed plan of care with: patient and RN, Thoracic Surgery    Binta Pan RN, CWOCN

## 2019-05-20 NOTE — PROGRESS NOTES
[facility]    Hospitalist Progress Note    Date of Service (when I saw the patient): 05/20/2019    Assessment & Plan      73 F with Ho Afib, Hypothyroidism, Depression admitted to TCU after hospitalization at [facility]. Initially she had laparoscopic hiatal hernia repair with mesh, complicated with esophageal perforation 11/2018 s/p spit fistula who was admitted for redo laparotomy and partial sternotomy, esophagogastrostomy on 4/26/2019. Found anastomotic leak per CT scan on 5/5/19, underwent EGD esophageal stent replacement, I&D of left neck on 5/6/19. Is transferred to TCU for rehab needs, wound care and Tube feedings.      # History of laparoscopic hiatal hernia repair with mesh, complicated with esophageal perforation.   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      -she had multiple procedures performed.   - Are liste below.      1.  Flexible bronchoscopy.   2.  EGD.   3.  Left neck incision.   4.  Takedown of cervical esophagostomy.   5.  Partial manubriectomy.   6.  Redo laparotomy.   7.  Extensive enterolysis, more than 2 hours.   8.  Kocherization of duodenum.   9.  Botox injection of the pylorus.   10.  Tubularization of gastric conduit and retrosternal transposition.  11.  Partial omentectomy.   12.  End-to-end handsewn 2-layer esophagogastrostomy with omental fat buttress.  13.  Left pharyngostomy tube placement.   14.  Jejunostomy tube exchange.     -She finished course of AB (Unasyn and Diflucan) today on 05/20/19  -She is doing okay. Left medial sternal wound near the medial end of left collar bone is packed with kerlix and covered with wet to dry dressing. Dressing changed BID  -Left mid clavicular chest wound was cover with wound vac. There was a fistula at that site. Seen by thoracic surgery on 05/20/19 along with WOCN. They took her off the wound VAC.   -Now dressing changes are done by WOC after packing.   -she is on clear liquid  diet by thoracic surgery. They will advance diet slowly. So we will wait for that.   -OT, PT, Speech consulted for therapies.   -Once on good diet, start calorie count to take her off TF. Has feeding tube in place.      #Afib: HR is controlled. Ct Amiodorone and ASA  #Hypothyroidism: Ct synthroid 150 mcg daily  #Depression: Ct Effexor 75 mg BID   #.Diarrhea from tube feeding. Ct Nutrisource fiber. Add PRN imodium. Cdiff has been negative. Hopefully off Unasyn, it will get better.      Immunization: likely needs repeat Tdap      Pneumo-conj  12/02/2014  1 of 1  Prevnar 13  Full    No    Pneumo-poly  10/03/2012  1 of 1  Pneumovax 23  Full    No    Td/Tdap  05/22/2009  1 of 4  Boostrix 10+ yrs  Full    No    Zoster/shingles  11/12/2010  1 of 3  Zostavax  Full         Antipsychotics:  Ct Effexor for depression. Chronic medication.      DVT Prophylaxis: lovenox SQ  Code Status: Full Code        Disposition: Based on how she does. Likely needs 4-5 days here.     Arielle Morris MD    Interval History     She is doing well. Moving around better. Was seen by Thoracic surgery today. They started her on clears. She has not tried it out. Wound Vac is out. Seen by WOCN. She will do dressing changes.     No fevers or chills. No cough or phlegm. No nausea or vomiting.     -Data reviewed today: I reviewed all new labs and imaging results over the last 24 hours. I personally reviewed no images or EKG's today.    Physical Exam   Temp: 98.7  F (37.1  C) Temp src: Oral BP: 142/64 Pulse: 78   Resp: 18 SpO2: 96 % O2 Device: Nasal cannula Oxygen Delivery: 1 LPM  There were no vitals filed for this visit.  Vital Signs with Ranges  Temp:  [98.7  F (37.1  C)] 98.7  F (37.1  C)  Pulse:  [78] 78  Resp:  [18] 18  BP: (142)/(64) 142/64  SpO2:  [96 %] 96 %  I/O last 3 completed shifts:  In: 120 [NG/GT:120]  Out: -     Constitutional:  Awake, alert, cooperative, no apparent distress  Respiratory: Clear to auscultation bilaterally, no crackles  or wheezing  Cardiovascular: Regular rate and rhythm, normal S1 and S2, and no murmur noted  GI: Normal bowel sounds, soft, non-distended, non-tender  Skin/Integumen: No rashes, no cyanosis, no edema  Other: Left clavicle medial end deep wound is packed. Was looking clean before. Left chest midline wound  fistula site looks clean, Vac is gone. Looks clean.     Medications     - MEDICATION INSTRUCTIONS -       - MEDICATION INSTRUCTIONS -         - Skin Test Reading (tuberculin) -   Does not apply Q21 Days     acetaminophen  975 mg Per J Tube Q8H     amiodarone  200 mg Per G Tube Daily     aspirin  325 mg Oral or Feeding Tube Daily     enoxaparin  40 mg Subcutaneous Q24H     ferrous sulfate  300 mg Per J Tube Daily     fiber modular (NUTRISOURCE FIBER)  2 packet Per Feeding Tube TID     heparin lock flush  5-10 mL Intracatheter Q24H     levothyroxine  150 mcg Per J Tube QAM     pantoprazole  40 mg Per Feeding Tube QAM AC     tuberculin  5 Units Intradermal Q21 Days     venlafaxine  75 mg Per G Tube BID       Data   Recent Labs   Lab 05/16/19  1008 05/15/19  1445 05/14/19  0617   WBC 9.2 10.4 10.2   HGB 7.5* 7.4* 7.5*   MCV 93 95 94   PLT 1,020* 1,311* 1,166*     --  135   POTASSIUM 4.1  --  3.9   CHLORIDE 101  --  98   CO2 28  --  31   BUN 14  --  19   CR 0.68  --  0.76   ANIONGAP 9  --  6   JOSE 8.0*  --  7.8*   GLC 77  --  120*       No results found for this or any previous visit (from the past 24 hour(s)).

## 2019-05-20 NOTE — PLAN OF CARE
Patient A&Ox4. VSS on 1L O2 NC. No SOB observed. Wound vac to L chest patent; minimal amount of drainage observed in cannister. JT patent; flushes and feeding administered as ordered; patient tolerated well. PRN Tramadol given x1 for c/o chest incision pain with moderate relief. Wet to dry dressing/ packing applied to L cervical area with moderate amount of serous drainage noted. PICC to RUE patent; dressing CDI; IV abx infused without difficulties. Continent of bladder and bowel. No loose BMs reported this shift. Patient pleasant and able to make needs known. Continue with POC.    /58 (BP Location: Left arm)   Pulse 74   Temp 96.7  F (35.9  C) (Oral)   Resp 18   SpO2 96%

## 2019-05-21 LAB
ANION GAP SERPL CALCULATED.3IONS-SCNC: 9 MMOL/L (ref 3–14)
BUN SERPL-MCNC: 15 MG/DL (ref 7–30)
CALCIUM SERPL-MCNC: 7.7 MG/DL (ref 8.5–10.1)
CHLORIDE SERPL-SCNC: 101 MMOL/L (ref 94–109)
CO2 SERPL-SCNC: 25 MMOL/L (ref 20–32)
CREAT SERPL-MCNC: 0.65 MG/DL (ref 0.52–1.04)
ERYTHROCYTE [DISTWIDTH] IN BLOOD BY AUTOMATED COUNT: 20.4 % (ref 10–15)
GFR SERPL CREATININE-BSD FRML MDRD: 88 ML/MIN/{1.73_M2}
GLUCOSE SERPL-MCNC: 108 MG/DL (ref 70–99)
HCT VFR BLD AUTO: 27.4 % (ref 35–47)
HGB BLD-MCNC: 8.3 G/DL (ref 11.7–15.7)
MAGNESIUM SERPL-MCNC: 2.3 MG/DL (ref 1.6–2.3)
MCH RBC QN AUTO: 27.7 PG (ref 26.5–33)
MCHC RBC AUTO-ENTMCNC: 30.3 G/DL (ref 31.5–36.5)
MCV RBC AUTO: 91 FL (ref 78–100)
PHOSPHATE SERPL-MCNC: 3.3 MG/DL (ref 2.5–4.5)
PLATELET # BLD AUTO: 771 10E9/L (ref 150–450)
POTASSIUM SERPL-SCNC: 4.1 MMOL/L (ref 3.4–5.3)
RBC # BLD AUTO: 3 10E12/L (ref 3.8–5.2)
SODIUM SERPL-SCNC: 135 MMOL/L (ref 133–144)
WBC # BLD AUTO: 10.7 10E9/L (ref 4–11)

## 2019-05-21 PROCEDURE — A9270 NON-COVERED ITEM OR SERVICE: HCPCS | Performed by: HOSPITALIST

## 2019-05-21 PROCEDURE — 36592 COLLECT BLOOD FROM PICC: CPT | Performed by: HOSPITALIST

## 2019-05-21 PROCEDURE — 90715 TDAP VACCINE 7 YRS/> IM: CPT | Performed by: HOSPITALIST

## 2019-05-21 PROCEDURE — 25000128 H RX IP 250 OP 636: Performed by: HOSPITALIST

## 2019-05-21 PROCEDURE — 84100 ASSAY OF PHOSPHORUS: CPT | Performed by: HOSPITALIST

## 2019-05-21 PROCEDURE — 97535 SELF CARE MNGMENT TRAINING: CPT | Mod: GO | Performed by: OCCUPATIONAL THERAPIST

## 2019-05-21 PROCEDURE — 80048 BASIC METABOLIC PNL TOTAL CA: CPT | Performed by: HOSPITALIST

## 2019-05-21 PROCEDURE — 97110 THERAPEUTIC EXERCISES: CPT | Mod: GP

## 2019-05-21 PROCEDURE — 97116 GAIT TRAINING THERAPY: CPT | Mod: GP

## 2019-05-21 PROCEDURE — 25000132 ZZH RX MED GY IP 250 OP 250 PS 637: Performed by: HOSPITALIST

## 2019-05-21 PROCEDURE — 85027 COMPLETE CBC AUTOMATED: CPT | Performed by: HOSPITALIST

## 2019-05-21 PROCEDURE — 83735 ASSAY OF MAGNESIUM: CPT | Performed by: HOSPITALIST

## 2019-05-21 PROCEDURE — 12000022 ZZH R&B SNF

## 2019-05-21 RX ADMIN — MINERAL SUPPLEMENT IRON 300 MG / 5 ML STRENGTH LIQUID 100 PER BOX UNFLAVORED 300 MG: at 08:23

## 2019-05-21 RX ADMIN — Medication 2 PACKET: at 21:17

## 2019-05-21 RX ADMIN — Medication 2 PACKET: at 08:24

## 2019-05-21 RX ADMIN — CLOSTRIDIUM TETANI TOXOID ANTIGEN (FORMALDEHYDE INACTIVATED), CORYNEBACTERIUM DIPHTHERIAE TOXOID ANTIGEN (FORMALDEHYDE INACTIVATED), BORDETELLA PERTUSSIS TOXOID ANTIGEN (GLUTARALDEHYDE INACTIVATED), BORDETELLA PERTUSSIS FILAMENTOUS HEMAGGLUTININ ANTIGEN (FORMALDEHYDE INACTIVATED), BORDETELLA PERTUSSIS PERTACTIN ANTIGEN, AND BORDETELLA PERTUSSIS FIMBRIAE 2/3 ANTIGEN 0.5 ML: 5; 2; 2.5; 5; 3; 5 INJECTION, SUSPENSION INTRAMUSCULAR at 10:22

## 2019-05-21 RX ADMIN — ENOXAPARIN SODIUM 40 MG: 40 INJECTION SUBCUTANEOUS at 17:47

## 2019-05-21 RX ADMIN — Medication 325 MG: at 08:23

## 2019-05-21 RX ADMIN — Medication 50 MG: at 21:18

## 2019-05-21 RX ADMIN — PANTOPRAZOLE SODIUM 40 MG: 40 TABLET, DELAYED RELEASE ORAL at 06:41

## 2019-05-21 RX ADMIN — Medication 2 PACKET: at 13:54

## 2019-05-21 RX ADMIN — LEVOTHYROXINE SODIUM 150 MCG: 150 TABLET ORAL at 08:23

## 2019-05-21 RX ADMIN — Medication 10 ML: at 14:13

## 2019-05-21 RX ADMIN — HEPARIN, PORCINE (PF) 10 UNIT/ML INTRAVENOUS SYRINGE 5 ML: at 06:45

## 2019-05-21 RX ADMIN — AMIODARONE HYDROCHLORIDE 200 MG: 200 TABLET ORAL at 08:23

## 2019-05-21 RX ADMIN — VENLAFAXINE 75 MG: 75 TABLET ORAL at 08:23

## 2019-05-21 RX ADMIN — VENLAFAXINE 75 MG: 75 TABLET ORAL at 21:18

## 2019-05-21 NOTE — PLAN OF CARE
Pt is alert and oriented 4. Denies SOB and chest pain. Clear liquid diet. Standby assist. O2 via nasal cannula at 1L. WOC nurse changed left chest wound dressing early afternoon; dressing reinforced. Left medial cervical wound wet to dry dressing completed this shift. IV antibiotic infused without difficulties via right PICC double lumen. J-tube dressing completed and flushed per orders. TF Peptamen 1.5 via J-tube started at 1800 with no signs of concern noted. Continue with POC.     Temp: 97.8  F (36.6  C) Temp src: Oral BP: 133/71 Pulse: 72   Resp: 18 SpO2: 99 % O2 Device: None (Room air) Oxygen Delivery: 1 LPM

## 2019-05-21 NOTE — PLAN OF CARE
Wound cares done on thoracic site where wound vac was initially.Neck wound packing packed wet to dry,has small drainage.J-site dressing changed,scanty drainage @ site,flushes okay.PICC in right arm heparin locked,both lumens flushes okay.Walks to bathroom independently.Declined scheduled Tylenol and did not request Tramadol.

## 2019-05-21 NOTE — PROGRESS NOTES
Briefly met with patient and discussed advancing diet  Discussed with Dr Morris and Leo diggs from Thoracic surgery  Ultimate decision to advance to full liquid on SLP. Stat consulted them to make this determination  Patient is aware  Labs and vitals reviewed . Plate let level down to 771  Stable    Dr NAKUL Heaton MD, [facility]  Hospitalist ( Internal medicine)  Pager: [phone]     Stable; will continue her home regimen of aspirin and atorvastatin.

## 2019-05-21 NOTE — PLAN OF CARE
RN: Pt has no c/o pain or discomfort this shift. Refused scheduled Tylenol.  Tolerating TF and H20 flushes via J-tube- until 0800. SBA to BR - needs assistance managing lines/tubes for safety. No longer on wound vac and IV abx at night. Small red lump noted on pt's nose. Not newe per pt, being monitored.Continue with plan of care.

## 2019-05-21 NOTE — PLAN OF CARE
OT Full shower completed - patient alternately sat and stood for shower with use of grab bar. Full undress/dress IND. On 1 L O2 with activity staying > 90%

## 2019-05-22 PROCEDURE — A9270 NON-COVERED ITEM OR SERVICE: HCPCS | Performed by: HOSPITALIST

## 2019-05-22 PROCEDURE — 00220000 ZZH SNF RUG CODE OPNP

## 2019-05-22 PROCEDURE — 25000128 H RX IP 250 OP 636: Performed by: HOSPITALIST

## 2019-05-22 PROCEDURE — 12000022 ZZH R&B SNF

## 2019-05-22 PROCEDURE — 27210429 ZZH NUTRITION PRODUCT INTERMEDIATE LITER

## 2019-05-22 PROCEDURE — 25000132 ZZH RX MED GY IP 250 OP 250 PS 637: Performed by: HOSPITALIST

## 2019-05-22 PROCEDURE — 97116 GAIT TRAINING THERAPY: CPT | Mod: GP | Performed by: PHYSICAL THERAPIST

## 2019-05-22 PROCEDURE — 92526 ORAL FUNCTION THERAPY: CPT | Mod: GN | Performed by: SPEECH-LANGUAGE PATHOLOGIST

## 2019-05-22 PROCEDURE — 97530 THERAPEUTIC ACTIVITIES: CPT | Mod: GO | Performed by: OCCUPATIONAL THERAPIST

## 2019-05-22 PROCEDURE — 97110 THERAPEUTIC EXERCISES: CPT | Mod: GO | Performed by: OCCUPATIONAL THERAPIST

## 2019-05-22 PROCEDURE — 97110 THERAPEUTIC EXERCISES: CPT | Mod: GP | Performed by: PHYSICAL THERAPIST

## 2019-05-22 RX ADMIN — LEVOTHYROXINE SODIUM 150 MCG: 150 TABLET ORAL at 09:00

## 2019-05-22 RX ADMIN — ENOXAPARIN SODIUM 40 MG: 40 INJECTION SUBCUTANEOUS at 16:56

## 2019-05-22 RX ADMIN — Medication 2 PACKET: at 09:03

## 2019-05-22 RX ADMIN — Medication 50 MG: at 21:48

## 2019-05-22 RX ADMIN — AMIODARONE HYDROCHLORIDE 200 MG: 200 TABLET ORAL at 09:00

## 2019-05-22 RX ADMIN — Medication 10 ML: at 14:37

## 2019-05-22 RX ADMIN — MINERAL SUPPLEMENT IRON 300 MG / 5 ML STRENGTH LIQUID 100 PER BOX UNFLAVORED 300 MG: at 09:00

## 2019-05-22 RX ADMIN — Medication 325 MG: at 09:01

## 2019-05-22 RX ADMIN — VENLAFAXINE 75 MG: 75 TABLET ORAL at 21:48

## 2019-05-22 RX ADMIN — PANTOPRAZOLE SODIUM 40 MG: 40 TABLET, DELAYED RELEASE ORAL at 06:13

## 2019-05-22 RX ADMIN — VENLAFAXINE 75 MG: 75 TABLET ORAL at 09:00

## 2019-05-22 NOTE — PROGRESS NOTES
Faxed home care SN/PT/OT/SLP referral to [facility] (Ph [phone], Fax [phone]). Patient was using them prior to hospitalization but her certification . Spoke to [facility] ( [phone], Fax [phone]), confirmed she had been receiving home enteral formula and supplies from them. They will need new orders when patient is ready for discharge.

## 2019-05-22 NOTE — PLAN OF CARE
OT: Pt. Progressing with OT goals and demonstrating good safety with mobility with functional reaching task with kitchen activity using walker.  Pt. O2 sats 95% with activity although difficult to get reading per patient report due to raynauds. Pt. Reports feels readings not accurate due raynauds and unsure why still needs O2.  Pt. Reports daughter will be with her much of time and will be great support to her following discharge.

## 2019-05-22 NOTE — PLAN OF CARE
Pt alert and oriented x4. Able to make needs known. Denies chest pain, SOB, and discomfort. Oxygen per nasal cannula at 1 LPM. Dressings to neck and upper left chest changed at 1300. PICC to L inner arm, heparin locked. J tube intact and patent, dressing CDI. Clear liquid diet. Pt is independent in room. Call light within reach, will continue to monitor.

## 2019-05-22 NOTE — PLAN OF CARE
"SLP: Patient seen for dysphagia treatment. Patient reported she has been completing oropharyngeal swallow exercises since previous session. Note mild confusion while completing swallow exercises during session but able to complete 10 repetitions each of 2 exerccises with minimal cues. Patient also reported she has only been taking ice chips and not drinking thin liquids outside of therapy sessions. Patient consumed 3-4 small sips water by cup during session and no overt aspiration signs occurred. Patient able to state 2 strategies (no straw, chin tuck) with minimal to no prompting. Consulted with RN and MD about readiness to trial further diet textures after scheduled appointment with thoracic team 5/20/2019. MD note from 5/21/2019 written after consultation with thoracic team stated \"Ultimate decision to advance to full liquid on SLP. Stat consulted them to make this determination.\" (see MD note from 5/21/2019). Given complex medical/surgical hx and no previous instrumental swallow assessment of increased textures and esophagram findings on 5/10/2019 and hx of oropharyngeal dysphagia would recommend proceeding with video swallow study with esophagram prior to advancing diet. Recommend continue with clear liquid diet given swallow strategies (no straws, chin tuck) for now with further recommendations pending results of instrumental assessments tomorrow.  "

## 2019-05-22 NOTE — PLAN OF CARE
Patient appear sleeping during rounds. No complaints of pain an discomfort. TF running @ 90 ml/hr infusing via JT. Patient is independent in the room with transfer and ambulation. No respiratory distress noted. Oxygen on via nc. Will continue with current plan of care.

## 2019-05-22 NOTE — PROGRESS NOTES
CLINICAL NUTRITION SERVICES - REASSESSMENT NOTE     Nutrition Prescription    RECOMMENDATIONS FOR MDs/PROVIDERS TO ORDER:  ADAT pending VFSS tomorrow,     Malnutrition Status:    Patient does not meet two of the criteria necessary for diagnosing malnutrition    Recommendations already ordered by Registered Dietitian (RD):  1. Change EN formula to standardized formula pt previously tolerated well:  - IsoSource 1.5 via J-tube @ 90 mL/hr x 14 hrs (6pm-8am) providing 1260 mL, 1890 kcal (30 kcal/kg), 86 g protein (1.4 g/kg), 222 g CHO, 19 g fiber, and 958 mL free water per DW of 62 kg.  2. Discontinue NutriSource Fiber given new formula has moderate amount of fiber (current formula has 0 g fiber).    Future/Additional Recommendations:  1. If pt reports loose stools with formula change, add NutriSource Fiber 1 pkt TID back.  2. Pending VFSS , start calorie counts if pt is consuming significant calories and wean TF as able. Order PRN supplement order as pt enjoys Boost.      EVALUATION OF THE PROGRESS TOWARD GOALS   Diet: CL  Nutrition Support:   - Peptamen 1.5 via J-tube @ 90 mL/hr x 14 hrs (6 pm- 8 am) providing 1260 mL, 1890 kcal (30 kcal/kg), 86 g protein (1.4 g/kg), 237 g CHO, 0 g fiber, and 970 mL free water per DW of 62 kg.  + 6 pkts fiber providin calories and 18 g fiber   - Water Flushes: 60 mL q 4 hrs (TF + Flushes = 1330 mL water; meeting 86% hydration needs).    Intake: pt reports she has just been eating ice/water orally. Reports tolerating TF well, denies abdominal discomfort or fullness. Does state she has 3 loose stools daily, surrounding TF administration.        NEW FINDINGS   - No weights this admission despite twice weekly wt check. Previously, she lost 3.9 kg (4.6%) over the past week d/t diuresing -.  - Swallow study scheduled for  with potential to advance diet.     MALNUTRITION  % Intake: Decreased intake does not meet criteria  % Weight Loss: Weight loss does not meet  criteria  Subcutaneous Fat Loss: None observed  Muscle Loss: None observed  Fluid Accumulation/Edema: None noted  Malnutrition Diagnosis: Patient does not meet two of the above criteria necessary for diagnosing malnutrition    Previous Goals   Total avg nutritional intake to meet a minimum of 25 kcal/kg and 1 g PRO/kg daily (per dosing wt 62 kg).  Evaluation: Met via EN    Previous Nutrition Diagnosis  Predicted inadequate nutrient intake (calories/protein) related to currently meeting 100% assessed needs from TF while on a clear liquid diet as evidenced by potential for TF interruptions.     Evaluation: No change    CURRENT NUTRITION DIAGNOSIS  Predicted inadequate nutrient intake (calories/protein) related to currently meeting 100% assessed needs from TF while on a clear liquid diet as evidenced by potential for TF interruptions.       INTERVENTIONS  Implementation  - Enteral Nutrition - Modify formula to more standardized option. Pt previously on IsoSource 1.5 at home where she was not having any loose stools. Anticipate pt to tolerate well. Discussed with provider.   - Discussed likely progression once diet is advanced and explained supplement options and weaning TF as PO intakes improve. Pt would need to drink 5 Boost Plus to meet 100% nutritional needs which pt does not want to do. She is looking forward to eating soups and pudding. Encouraged pt to work on eating at her own pace and TF will be tapered accordingly.   - Discussed pts POC during interdisciplinary morning rounds.    Goals  Total avg nutritional intake to meet a minimum of 25 kcal/kg and 1 g PRO/kg daily (per dosing wt 62 kg).    Monitoring/Evaluation  Progress toward goals will be monitored and evaluated per protocol.      Karen Berry RD, LD  Unit Pager: [phone]

## 2019-05-22 NOTE — PLAN OF CARE
Pt does well on RA for ex staying >90% but desats with gait to 88%, recovered to >90% in ~1 min. On 1L pt was at 93% with gait. Pt's daughter brought pt's cane in--pt would like to work on that so will see tomorrow for cane and wean O2

## 2019-05-22 NOTE — PROGRESS NOTES
Charts reviewed.   Stable  /59 (BP Location: Right arm)   Pulse 90   Temp 95.9  F (35.5  C) (Oral)   Resp 18   SpO2 93%    Tolerating cycled tube feeds well    Discussed with SLP. Plan for Video swallow with esophagogram tomorrow before advancing to full liquids.   Labs every  M/F

## 2019-05-22 NOTE — UTILIZATION REVIEW
Pain Interview    1. Have you had pain or hurting at any time in the last 5 days?  Yes  2. How much of the time have you experienced pain or hurting over the last 5 days? Occasionally  3. Over the past 5 days, has pain made it hard for you to sleep at night?  No  4. Over the past 5 days, have you limited your day-to-day activities because of pain?Yes  5. Rate pain intensity:  Verbal: Moderate

## 2019-05-22 NOTE — PLAN OF CARE
Alert and orientedx4.  Denies SOB, chest pain, dizziness or numbness. Independent in room. Wound care was done to chest and neck per plan of care. JT site dressing CDI, patent, with on going cycled feeding at 90ml/hr flush set at 60ml every 4 hrs. PICC dressing CDI. Had BM today per pt. Able to communicate needs and used call light appropriately.  Will cont plan of care.

## 2019-05-23 PROCEDURE — 97535 SELF CARE MNGMENT TRAINING: CPT | Mod: GO

## 2019-05-23 PROCEDURE — 25000128 H RX IP 250 OP 636: Performed by: HOSPITALIST

## 2019-05-23 PROCEDURE — 92611 MOTION FLUOROSCOPY/SWALLOW: CPT | Mod: GN

## 2019-05-23 PROCEDURE — 27210429 ZZH NUTRITION PRODUCT INTERMEDIATE LITER

## 2019-05-23 PROCEDURE — A9270 NON-COVERED ITEM OR SERVICE: HCPCS | Performed by: HOSPITALIST

## 2019-05-23 PROCEDURE — 25000132 ZZH RX MED GY IP 250 OP 250 PS 637: Performed by: HOSPITALIST

## 2019-05-23 PROCEDURE — 12000022 ZZH R&B SNF

## 2019-05-23 PROCEDURE — 97116 GAIT TRAINING THERAPY: CPT | Mod: GP

## 2019-05-23 RX ADMIN — MINERAL SUPPLEMENT IRON 300 MG / 5 ML STRENGTH LIQUID 100 PER BOX UNFLAVORED 300 MG: at 07:48

## 2019-05-23 RX ADMIN — Medication 10 ML: at 15:00

## 2019-05-23 RX ADMIN — Medication 50 MG: at 21:37

## 2019-05-23 RX ADMIN — PANTOPRAZOLE SODIUM 40 MG: 40 TABLET, DELAYED RELEASE ORAL at 06:30

## 2019-05-23 RX ADMIN — VENLAFAXINE 75 MG: 75 TABLET ORAL at 21:37

## 2019-05-23 RX ADMIN — ENOXAPARIN SODIUM 40 MG: 40 INJECTION SUBCUTANEOUS at 16:43

## 2019-05-23 RX ADMIN — LEVOTHYROXINE SODIUM 150 MCG: 150 TABLET ORAL at 07:49

## 2019-05-23 RX ADMIN — AMIODARONE HYDROCHLORIDE 200 MG: 200 TABLET ORAL at 07:49

## 2019-05-23 RX ADMIN — VENLAFAXINE 75 MG: 75 TABLET ORAL at 07:49

## 2019-05-23 RX ADMIN — Medication 325 MG: at 07:48

## 2019-05-23 NOTE — PLAN OF CARE
VFSS completed.  From a pharyngeal standpoint patient did not have any episodes of penetration or aspiration across multiple trials with and without the use of chin tuck maneuver.  Patient has been encouraged to continue utilizing from a precautionary standpoint.  No difficulties noted with putting textures from a pharyngeal standpoint.  Esophagram was completed.  Please see radiologist report for further details.  Did note some slow transition time between esophagus into the small intestine area.  Per conversation with radiologist, patient is clearing this esophageal residue by a very slow rate.  Patient would likely be able to tolerate very small quantities of full liquid textures but due to the slow clearing, could potentially cause further difficulties due to back-up.  Recommend patient remain upright after all p.o. intake to prevent reflux type difficulties.  Further conversation needs to be had with thoracic team regarding full liquid versus clear liquid diet.  No difficulties anticipated from a pharyngeal standpoint.  Ongoing SLP intervention upon discharge to ensure successful transition to home as well as to advance diet as appropriate.    Conversation had with patient re: ability to have additional liquid items upon return to home including all items that are water like including juices, milk and soda. Patient verbalized understanding.

## 2019-05-23 NOTE — PROGRESS NOTES
05/23/19 1500   General Information   Onset Date 04/26/19   Start of Care Date 05/16/19   Referring Physician Dr. Heaton   Patient Profile Review/OT: Additional Occupational Profile Info See Profile for full history and prior level of function   Patient/Family Goals Statement Return to eating solid foods   Swallowing Evaluation Videofluoroscopic evaluation   Behaviorial Observations WFL (within functional limits)   Mode of current nutrition Oral diet   Type of oral diet Thin liquid   Respiratory Status O2 Supply   Type of O2 supply Nasal cannula   Comments Patient has had multiple video swallows to assess swallowing function.  Has previously been advanced to clear liquid diet with use of chin tuck.  Referred for VFSS to assess potential to advance to full liquid diet.   VFSS Eval: Thin Liquid Texture Trial   Mode of Presentation, Thin Liquid cup;straw;self-fed   Order of Presentation 1, 2, 3, 4, 5   Preparatory Phase WFL   Oral Phase, Thin Liquid WFL   Pharyngeal Phase, Thin Liquid Residue in valleculae   Rosenbek's Penetration Aspiration Scale: Thin Liquid Trial Results 1 - no aspiration, contrast does not enter airway   Successful Strategies Trialed During Procedure, Thin Liquid chin tuck   Diagnostic Statement Mild amount of vallecular residue with thin liquid but not impacting safety of swallow.  Patient utilize chin tuck maneuver independently on 4 out of 5 trials.  On the single trial without use of chin tuck, patient had forgotten but did not have any penetration or aspiration on that particular swallow.   VFSS Eval: Pudding Thick Liquid Texture Trial   Mode of Presentation, Pudding spoon;self-fed   Order of Presentation 6, 7, 8   Preparatory Phase WFL   Oral Phase, Pudding WFL   Pharyngeal Phase, Pudding WFL   Rosenbek's Penetration Aspiration Scale: Pudding-Thick Liquid Trial Results 1 - no aspiration, contrast does not enter airway   Successful Strategies Trialed During Procedure, Pudding chin tuck    Diagnostic Statement Pharyngeal standpoint, no impairments noted and fully clearing without any residue.    Esophageal Phase of Swallow   Patient reports or presents with symptoms of esophageal dysphagia Yes   Esophageal sweep performed during today s vidofluoroscopic exam  Yes;Please refer to radiologist's report for details   Esophageal comments Per esophagram results, patient having very slow transition from esophagus into small intestines.  Patient requiring significant amount of extra time in order to clear this area out.   Swallow Eval: Clinical Impressions   Skilled Criteria for Therapy Intervention Skilled criteria met.  Treatment indicated.   Functional Assessment Scale (FAS) 5   Treatment Diagnosis Mild oropharyngeal dysphagia   Diet texture recommendations Clear liquid;Thin liquids   Demonstrates Need for Referral to Another Service dietitian   Therapy Frequency 3 times/wk   Predicted Duration of Therapy Intervention (days/wks) 1 week   Anticipated Discharge Disposition home w/ home health   Risks and Benefits of Treatment have been explained. Yes   Patient, family and/or staff in agreement with Plan of Care Yes   Clinical Impression Comments VFSS completed.  From a pharyngeal standpoint patient did not have any episodes of penetration or aspiration across multiple trials with and without the use of chin tuck maneuver.  Patient has been encouraged to continue utilizing from a precautionary standpoint.  No difficulties noted with putting textures from a pharyngeal standpoint.  Esophagram was completed.  Please see radiologist report for further details.  Did note some slow transition time between esophagus into the small intestine area.  Per conversation with radiologist, patient is clearing this esophageal residue by a very slow rate.  Patient would likely be able to tolerate very small quantities of full liquid textures but due to the slow clearing, could potentially cause further difficulties due to  back-up.  Recommend patient remain upright after all p.o. intake to prevent reflux type difficulties.  Further conversation needs to be had with thoracic team regarding full liquid versus clear liquid diet.  No difficulties anticipated from a pharyngeal standpoint.  Ongoing SLP intervention upon discharge to ensure successful transition to home as well as to advance diet as appropriate.   Therapy Certification   Certification date from 05/23/19   Certification date to 06/22/19   Medical Diagnosis s/p esophageal perforation   Certification I certify the need for these services furnished under this plan of treatment and while under my care.  (Physician co-signature of this document indicates review and certification of the therapy plan).   Total Evaluation Time   Total Evaluation Time (Minutes) 30

## 2019-05-23 NOTE — PLAN OF CARE
OT: discharge planned today. All in patient OT goals met. No AE DME needs identified. Patient will have home care follow up.

## 2019-05-23 NOTE — PLAN OF CARE
"Alert and oriented, VSS. Tube feed started at 1800, running via J-tube. Neck dressing changed per order. All other dressings clean, dry, intact. Pt declined scheduled tylenol, reports \"It doesn't do anything anyways.\" Reported neck discomfort and requested PRN tramadol at bedtime. O2 set to 1 LPM. Continent of bowel and urine.   "

## 2019-05-23 NOTE — PLAN OF CARE
Occupational Therapy Discharge Summary    Reason for therapy discharge:    Discharged to home with home therapy.    Progress towards therapy goal(s). See goals on Care Plan in [facility] electronic health record for goal details.  Goals met    Therapy recommendation(s):    Continued therapy is recommended.  Rationale/Recommendations:  HH OT for home safety assessment HHA for bathing safety and assistance as needed .

## 2019-05-23 NOTE — PLAN OF CARE
Pt alert and oriented, able to make needs known. Denies SOB and chest pain. O2 at 1 LPM per nasal cannula. J tube patent and flushed. Wound care and dressing changes completed today. PICC to R. Inner arm heparin locked. Clear liquid diet. Independent in room. Call light within reach, will continue to monitor.

## 2019-05-23 NOTE — PLAN OF CARE
Physical Therapy Discharge Summary    Reason for therapy discharge:    All goals and outcomes met, no further needs identified. Pt will go home Monday when sister who will do dressing changes returns.     Progress towards therapy goal(s). See goals on Care Plan in [facility] electronic health record for goal details.  Goals met. Pt prefers ww for gait still at this time--attempted cane. Weaning off O2--maintains with exercise, drops to 88% with gait but can recover to 90% on RA.    Therapy recommendation(s):    Home safety eval; progress gait

## 2019-05-23 NOTE — PLAN OF CARE
Patient appear sleeping during rounds. No complaints of pain an discomfort. TF running @ 90 ml/hr infusing via JT. Patient is independent in the room with transfer and ambulation. No respiratory distress noted. Oxygen on via nc. Tylenol not given per patient not helping her. Will continue with current plan of care.

## 2019-05-24 LAB
ANION GAP SERPL CALCULATED.3IONS-SCNC: 8 MMOL/L (ref 3–14)
BASOPHILS # BLD AUTO: 0 10E9/L (ref 0–0.2)
BASOPHILS NFR BLD AUTO: 0.4 %
BUN SERPL-MCNC: 23 MG/DL (ref 7–30)
CALCIUM SERPL-MCNC: 7.6 MG/DL (ref 8.5–10.1)
CHLORIDE SERPL-SCNC: 101 MMOL/L (ref 94–109)
CO2 SERPL-SCNC: 26 MMOL/L (ref 20–32)
COPATH REPORT: NORMAL
COPATH REPORT: NORMAL
CREAT SERPL-MCNC: 0.63 MG/DL (ref 0.52–1.04)
DIFFERENTIAL METHOD BLD: ABNORMAL
EOSINOPHIL # BLD AUTO: 1.5 10E9/L (ref 0–0.7)
EOSINOPHIL NFR BLD AUTO: 14.5 %
ERYTHROCYTE [DISTWIDTH] IN BLOOD BY AUTOMATED COUNT: 20.6 % (ref 10–15)
GFR SERPL CREATININE-BSD FRML MDRD: 89 ML/MIN/{1.73_M2}
GLUCOSE SERPL-MCNC: 112 MG/DL (ref 70–99)
HCT VFR BLD AUTO: 27 % (ref 35–47)
HGB BLD-MCNC: 8.1 G/DL (ref 11.7–15.7)
IMM GRANULOCYTES # BLD: 0 10E9/L (ref 0–0.4)
IMM GRANULOCYTES NFR BLD: 0.3 %
LYMPHOCYTES # BLD AUTO: 2 10E9/L (ref 0.8–5.3)
LYMPHOCYTES NFR BLD AUTO: 20 %
MCH RBC QN AUTO: 27.7 PG (ref 26.5–33)
MCHC RBC AUTO-ENTMCNC: 30 G/DL (ref 31.5–36.5)
MCV RBC AUTO: 93 FL (ref 78–100)
MONOCYTES # BLD AUTO: 1.4 10E9/L (ref 0–1.3)
MONOCYTES NFR BLD AUTO: 14.2 %
NEUTROPHILS # BLD AUTO: 5.1 10E9/L (ref 1.6–8.3)
NEUTROPHILS NFR BLD AUTO: 50.6 %
NRBC # BLD AUTO: 0 10*3/UL
NRBC BLD AUTO-RTO: 0 /100
PLATELET # BLD AUTO: 727 10E9/L (ref 150–450)
POTASSIUM SERPL-SCNC: 4.4 MMOL/L (ref 3.4–5.3)
RBC # BLD AUTO: 2.92 10E12/L (ref 3.8–5.2)
SODIUM SERPL-SCNC: 135 MMOL/L (ref 133–144)
WBC # BLD AUTO: 10.2 10E9/L (ref 4–11)

## 2019-05-24 PROCEDURE — 25000128 H RX IP 250 OP 636: Performed by: HOSPITALIST

## 2019-05-24 PROCEDURE — 27210429 ZZH NUTRITION PRODUCT INTERMEDIATE LITER

## 2019-05-24 PROCEDURE — 99308 SBSQ NF CARE LOW MDM 20: CPT | Performed by: INTERNAL MEDICINE

## 2019-05-24 PROCEDURE — 99207 ZZC CDG-MDM COMPONENT: MEETS LOW - DOWN CODED: CPT | Performed by: INTERNAL MEDICINE

## 2019-05-24 PROCEDURE — A9270 NON-COVERED ITEM OR SERVICE: HCPCS | Performed by: HOSPITALIST

## 2019-05-24 PROCEDURE — 36592 COLLECT BLOOD FROM PICC: CPT | Performed by: INTERNAL MEDICINE

## 2019-05-24 PROCEDURE — 12000022 ZZH R&B SNF

## 2019-05-24 PROCEDURE — 85025 COMPLETE CBC W/AUTO DIFF WBC: CPT | Performed by: INTERNAL MEDICINE

## 2019-05-24 PROCEDURE — 00220000 ZZH SNF RUG CODE OPNP

## 2019-05-24 PROCEDURE — 25000132 ZZH RX MED GY IP 250 OP 250 PS 637: Performed by: HOSPITALIST

## 2019-05-24 PROCEDURE — 80048 BASIC METABOLIC PNL TOTAL CA: CPT | Performed by: INTERNAL MEDICINE

## 2019-05-24 RX ORDER — SIMETHICONE 80 MG
80 TABLET,CHEWABLE ORAL EVERY 6 HOURS PRN
Status: DISCONTINUED | OUTPATIENT
Start: 2019-05-24 | End: 2019-05-28 | Stop reason: HOSPADM

## 2019-05-24 RX ADMIN — VENLAFAXINE 75 MG: 75 TABLET ORAL at 08:36

## 2019-05-24 RX ADMIN — VENLAFAXINE 75 MG: 75 TABLET ORAL at 20:51

## 2019-05-24 RX ADMIN — PANTOPRAZOLE SODIUM 40 MG: 40 TABLET, DELAYED RELEASE ORAL at 05:44

## 2019-05-24 RX ADMIN — PANTOPRAZOLE SODIUM 40 MG: 40 TABLET, DELAYED RELEASE ORAL at 04:55

## 2019-05-24 RX ADMIN — LEVOTHYROXINE SODIUM 150 MCG: 150 TABLET ORAL at 08:37

## 2019-05-24 RX ADMIN — AMIODARONE HYDROCHLORIDE 200 MG: 200 TABLET ORAL at 08:36

## 2019-05-24 RX ADMIN — Medication 5 ML: at 14:17

## 2019-05-24 RX ADMIN — MINERAL SUPPLEMENT IRON 300 MG / 5 ML STRENGTH LIQUID 100 PER BOX UNFLAVORED 300 MG: at 08:36

## 2019-05-24 RX ADMIN — HEPARIN, PORCINE (PF) 10 UNIT/ML INTRAVENOUS SYRINGE 5 ML: at 08:08

## 2019-05-24 RX ADMIN — Medication 50 MG: at 20:51

## 2019-05-24 RX ADMIN — Medication 325 MG: at 08:35

## 2019-05-24 RX ADMIN — ENOXAPARIN SODIUM 40 MG: 40 INJECTION SUBCUTANEOUS at 16:15

## 2019-05-24 NOTE — PLAN OF CARE
RN: Pt has no c/o pain or discomfort this shift. Refused scheduled Tylenol.  Tolerating TF and H20 flushes via J-tube- until 0800. SBA to BR - needs assistance managing lines/tubes for safety. No longer on wound vac and IV abx at night. Small red lump noted on pt's nose. Not new per pt, being monitored.Continue with plan of care.

## 2019-05-24 NOTE — PROGRESS NOTES
"[facility], [place]   Internal Medicine Daily Note           Interval History/Events     Overnight events reviewed  Reports doing well  Pain on the right upper chest intermittent  Denies any nausea, vomiting, lightheadedness or dizziness  Tolerating clear liquid diet.   No abdominal pain. No fever, chills.        Review of Systems        4 point ROS including Respiratory, CV, GI and , other than that noted above is negative      Medications   I have reviewed current medications  in the \"current medication\" section of Epic.  Relevant changes include:     Physical Exam   General:       Vital signs:    Blood pressure 131/56, pulse 76, temperature 99.2  F (37.3  C), temperature source Oral, resp. rate 18, weight 79.2 kg (174 lb 8 oz), SpO2 95 %.  Estimated body mass index is 29.04 kg/m  as calculated from the following:    Height as of 4/26/19: 1.651 m (5' 5\").    Weight as of this encounter: 79.2 kg (174 lb 8 oz).      Intake/Output Summary (Last 24 hours) at 5/24/2019 1106  Last data filed at 5/24/2019 0808  Gross per 24 hour   Intake 50 ml   Output --   Net 50 ml      HEENT: No icterus, no pallor  Cardiovascular: S1, S2 normal.   Respiratory:  B/L CTA  Chest wall: Left upper chest wall wound has some yellow drainage. Bottom wound looks healthy  GI/Abdomen: Soft, NT, BS+. GJ tube in place.   Neurology: No tremors.   Extremities: No pretibial edema.   Skin:      Laboratory and Imaging Studies     I have reviewed  laboratory and imaging studies in the Epic. Pertinent findings are as below:    BMP  Recent Labs   Lab 05/24/19  0816 05/21/19  0646    135   POTASSIUM 4.4 4.1   CHLORIDE 101 101   JOSE 7.6* 7.7*   CO2 26 25   BUN 23 15   CR 0.63 0.65   * 108*     CBC  Recent Labs   Lab 05/24/19  0816 05/21/19  0646   WBC 10.2 10.7   RBC 2.92* 3.00*   HGB 8.1* 8.3*   HCT 27.0* 27.4*   MCV 93 91   MCH 27.7 27.7   MCHC 30.0* 30.3*   RDW 20.6* 20.4*   * 771*     INRNo lab results " found in last 7 days.  LFTsNo lab results found in last 7 days.   PANCNo lab results found in last 7 days.        Impression/Plan      73 F with Ho Afib, Hypothyroidism, Depression admitted to TCU after hospitalization at [facility] after  redo laparotomy and partial sternotomy, esophagogastrostomy on 4/26/2019. 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 She was found to have anastomotic leak per CT scan on 5/5/19, underwent EGD esophageal stent replacement, I&D of left neck on 5/6/19. She is being  transferred to TCU for rehab needs, wound care and Tube feedings.       # S/p Redo laparotomy and partial sternotomy, esophagogastrostomy on 4/26/2019.   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   + She finished course of AB (Unasyn and Diflucan) today on 05/20/19  + Left medial sternal wound near the medial end of left collar bone is packed with kerlix and covered with wet to dry dressing. Dressing changed BID  + Left mid clavicular chest  wound was treated with NPWT. Vac dressing off on 05/202/109 Now, wet to dry ressiongc.   + On clear liquid diet by thoracic surgery.   On going evaluation, and treatment by SLP  XR Video swallow study on 05/23 showed no penetration or aspiration with thin liquid or pudding consistencies.  Patent esophageal stent. No leak was  identified  -OT, PT, Speech consulted for therapies.   - Follow up with Thoracic surgery       # Afib: HR is controlled. On  Amiodorone and ASA  - On Amiodarone, and ASA  # Hypothyroidism: On Levothyroxine 150 mcg daily  # Depression: On Venlafaxine 75 mg BID   # Diarrhea: Most likely from formula feed. C diff  negative 05/05. Started on  Nutrisource fiber, Immodium PRN   - Continue       # Immunization: Completed Pneumonia vaccine. Needs  Tdap Vaccine    # Antipsychotics:  Ct Effexor for depression. Chronic medication.   # DVT Prophylaxis: Enoxaparin SQ  # Code Status: Full Code       Disposition Plan   Expected discharge in 2-3  days to prior living arrangement once advancing diet.      Entered: Jonh Coleman 05/24/2019, 11:06 AM            Pt's care was discussed with bedside RN, patient and  during Care Team Rounds.               Jonh Coleman MD  Hospitalist ( Internal medicine)  Pager: [phone]

## 2019-05-24 NOTE — PROGRESS NOTES
Met with patient to update the discharge planning discussion. She will discharge to home on 5/28/2019 once her daughter returns from out of town, if she remains medically stable. Daughter will do wound cares, home care SN/PT/OT/SLP with [facility]. Updated [facility] on patient status and discharge plan. Faxed ([phone]) new script for tube feeding to [facility], and spoke to Aziza ([phone]) there about updated orders and plan for Tuesday 5/28 discharge. They will arrange for new pump delivery and will talk to patient about how much supply she already has at home. She has been getting enteral with them since March 2019.

## 2019-05-24 NOTE — PLAN OF CARE
Speech Language Therapy Discharge Summary    Reason for therapy discharge:    Discharged to home with home therapy.    Progress towards therapy goal(s). See goals on Care Plan in [facility] electronic health record for goal details.  Goals partially met.  Barriers to achieving goals:   esophageal phase limiting ability to upgrade further .    Therapy recommendation(s):    Continued therapy is recommended.  Rationale/Recommendations:  advance diet as tolerated.    Most recent VFSS completed 5/23.  From a pharyngeal standpoint patient did not have any episodes of penetration or aspiration across multiple trials with and without the use of chin tuck maneuver.  Patient has been encouraged to continue utilizing from a precautionary standpoint.  No difficulties noted with putting textures from a pharyngeal standpoint.  Esophagram was completed.  Please see radiologist report for further details.  Did note some slow transition time between esophagus into the small intestine area.  Per conversation with radiologist, patient is clearing this esophageal residue by a very slow rate.  Patient would likely be able to tolerate very small quantities of full liquid textures but due to the slow clearing, could potentially cause further difficulties due to back-up.  Recommend patient remain upright after all p.o. intake to prevent reflux type difficulties.  Further conversation needs to be had with thoracic team regarding full liquid versus clear liquid diet.  No difficulties anticipated from a pharyngeal standpoint.  Ongoing SLP intervention upon discharge to ensure successful transition to home as well as to advance diet as appropriate.    Addendum: Pt had been [facility]- spoke with Dr Coleman in the a.m and then again in p.m today-- he had put in a call yesterday to Thoracic team for recommendations on pt's diet given results of recent esohpagram ( ie: whether or not to advance to a Full liquid diet vs staying on Clear  liquid diet due to the slow emptying from esophagus into large intestine per esophagram; from pharyngeal standpoint- pt ok to advance but deferred back to thoracic team given the nature of esophagram results with the slow emptying)MD was awaiting a call back from Thoracic team- per MD - still had not heard in the pm at the time of discharge- MD put in another call- but stated it will need to be addressed via OP.

## 2019-05-24 NOTE — DISCHARGE INSTRUCTIONS
[facility] to provide tube feeding formula and supplies-  [phone], fax [phone]    Adult Formula Isosource 1.5    Note:  List of Adult Formulas   Route Jejunostomy    Goal Rate 90    Goal Units mL/hr    From 6:00 PM    To 8:00 AM    Medication - Feeding Tube Flush Frequency      Free water 60ml flush every 4 hours At least 15-30 mL water before and after medication administration and with tube clogging            Left chest wound: Daily: wash wound with wound cleanser and gauze. Pack gently with saline moistened gauze. Cover with 4x4 folded in half and secure with Primapore tape.    Site:  Left medial cervical wound  Instructions:  Pack wound with kerlix. Twice daily wet to dry dressing changes

## 2019-05-24 NOTE — PLAN OF CARE
Pt VSS. Denies pain today. TF off at 0800. PICC RUE patent, heparin locked, blood return. Dressings to L chest and clavicle changed at 1400 per order, pt tolerated well. JT site dressing changed, WNL. Flushed 2x this shift, meds through tube. Pt on 1 L O2, sats WNL. Continues on clear liquid diet, awaiting plan from MD/SLP. Continue to monitor pt status.

## 2019-05-24 NOTE — PLAN OF CARE
Patient A&Ox4. VSS on 1L O2 NC. No SOB observed. Independent with cares in room. Dressing to TF site changed. JT patent; flushes and feeding administered as ordered; patient tolerated well. Patient refused scheduled Tylenol, stated she did not need it and requested for PRN Tramadol for c/o chest incision pain at HS. Wet to dry dressing/ packing applied to L cervical area with moderate amount of serous drainage noted. PICC to RUE patent; dressing CDI. Continent of bladder and bowel. Patient reported one large, loose BM today. Pleasant and able to make needs known. Continue with POC.

## 2019-05-25 PROCEDURE — 25000132 ZZH RX MED GY IP 250 OP 250 PS 637: Performed by: HOSPITALIST

## 2019-05-25 PROCEDURE — 27210429 ZZH NUTRITION PRODUCT INTERMEDIATE LITER

## 2019-05-25 PROCEDURE — A9270 NON-COVERED ITEM OR SERVICE: HCPCS | Performed by: HOSPITALIST

## 2019-05-25 PROCEDURE — 25000128 H RX IP 250 OP 636: Performed by: HOSPITALIST

## 2019-05-25 PROCEDURE — 12000022 ZZH R&B SNF

## 2019-05-25 RX ADMIN — MINERAL SUPPLEMENT IRON 300 MG / 5 ML STRENGTH LIQUID 100 PER BOX UNFLAVORED 300 MG: at 08:23

## 2019-05-25 RX ADMIN — Medication 10 ML: at 16:06

## 2019-05-25 RX ADMIN — PANTOPRAZOLE SODIUM 40 MG: 40 TABLET, DELAYED RELEASE ORAL at 06:34

## 2019-05-25 RX ADMIN — LEVOTHYROXINE SODIUM 150 MCG: 150 TABLET ORAL at 08:23

## 2019-05-25 RX ADMIN — Medication 325 MG: at 08:23

## 2019-05-25 RX ADMIN — VENLAFAXINE 75 MG: 75 TABLET ORAL at 20:39

## 2019-05-25 RX ADMIN — AMIODARONE HYDROCHLORIDE 200 MG: 200 TABLET ORAL at 08:23

## 2019-05-25 RX ADMIN — VENLAFAXINE 75 MG: 75 TABLET ORAL at 08:23

## 2019-05-25 RX ADMIN — Medication 50 MG: at 20:39

## 2019-05-25 RX ADMIN — ENOXAPARIN SODIUM 40 MG: 40 INJECTION SUBCUTANEOUS at 16:07

## 2019-05-25 ASSESSMENT — PAIN DESCRIPTION - DESCRIPTORS: DESCRIPTORS: ACHING;DISCOMFORT;ITCHING;SORE

## 2019-05-25 NOTE — PLAN OF CARE
SLP: Pt wanted clarification regarding her diet.  See notes from 4/23/19, VFSS/esophagram and SLP discharge.  Therapist had discussed with Dr. Coleman 5/24/19 - recommending he further consult with thoracic team, 2/2 results of esophagram if full liquid would be safe. Meanwhile, continue clear liquid diet (updated pt), and will check with MD on any updates from thoracic team prior to advancing diet further.

## 2019-05-25 NOTE — PLAN OF CARE
VS: /54 (BP Location: Left arm)   Pulse 81   Temp 98.2  F (36.8  C) (Oral)   Resp 20   Wt 79.2 kg (174 lb 8 oz)   SpO2 94%   BMI 29.04 kg/m     O2: Pt on 1L Nc. Denies SoB   Output: Pt voids spontaneously in bathroom in room.    Last BM: LBM: 5/24/2019   Activity: Mod I in room.   Skin: Skin intact besides red nodule on bridge of nose.    Pain: Pt denied pain   CMS: Alert and oriented. Calm and cooperative with cares. Calls appropriatly   Dressing: CDI   Diet: Clear liquid diet. Tube feed currently running. Done at 0800   LDA: (R) PICC. Saline locked.    Equipment: Pt has IV pole, Itz tube feeds. Call light and phone within reach   Plan: Continue with POC   Additional Info:

## 2019-05-25 NOTE — PLAN OF CARE
Patient A&Ox4. VSS on 1L O2 NC. No SOB observed. Independent with cares in room. JT patent; flushes and feeding administered as ordered; patient tolerated well. Ice chips provided per patient's request; no difficulty swallowing observed. PRN Tramadol given for pain to L chest incision at HS. Wet to dry dressing/ packing applied to L cervical area with small amount of serous drainage noted. PICC to RUE patent; dressing CDI. Continent of bladder and bowel. Patient reported two large, loose BMs today. Pleasant and able to make needs known. Continue with POC.

## 2019-05-25 NOTE — PLAN OF CARE
Pt VSS. Complains of R sided chest pain at old drain site, sticky note to MD. Pt declined pain medications/heat or ice packs. TF off at 0800 per orders, flushed well. Meds through J tube. Dressings to L chest and clavicle changed per order, pt tolerated well. JT dressing changed, site appears WNL. Pt continues on clear liquid diet, awaiting plan from throacic team per SLP, see her note. Weaned off 1 L O2, sats 90-92% on RA. Continue to monitor pt status.

## 2019-05-26 PROCEDURE — 12000022 ZZH R&B SNF

## 2019-05-26 PROCEDURE — 25000128 H RX IP 250 OP 636: Performed by: HOSPITALIST

## 2019-05-26 PROCEDURE — 27210429 ZZH NUTRITION PRODUCT INTERMEDIATE LITER

## 2019-05-26 PROCEDURE — A9270 NON-COVERED ITEM OR SERVICE: HCPCS | Performed by: HOSPITALIST

## 2019-05-26 PROCEDURE — 25000132 ZZH RX MED GY IP 250 OP 250 PS 637: Performed by: HOSPITALIST

## 2019-05-26 RX ADMIN — PANTOPRAZOLE SODIUM 40 MG: 40 TABLET, DELAYED RELEASE ORAL at 04:32

## 2019-05-26 RX ADMIN — Medication 325 MG: at 08:11

## 2019-05-26 RX ADMIN — Medication 10 ML: at 14:07

## 2019-05-26 RX ADMIN — LEVOTHYROXINE SODIUM 150 MCG: 150 TABLET ORAL at 08:11

## 2019-05-26 RX ADMIN — VENLAFAXINE 75 MG: 75 TABLET ORAL at 21:07

## 2019-05-26 RX ADMIN — MINERAL SUPPLEMENT IRON 300 MG / 5 ML STRENGTH LIQUID 100 PER BOX UNFLAVORED 300 MG: at 08:11

## 2019-05-26 RX ADMIN — Medication 50 MG: at 21:07

## 2019-05-26 RX ADMIN — AMIODARONE HYDROCHLORIDE 200 MG: 200 TABLET ORAL at 08:11

## 2019-05-26 RX ADMIN — ENOXAPARIN SODIUM 40 MG: 40 INJECTION SUBCUTANEOUS at 16:11

## 2019-05-26 RX ADMIN — VENLAFAXINE 75 MG: 75 TABLET ORAL at 08:11

## 2019-05-26 NOTE — PLAN OF CARE
Patient A&Ox4. VSS. SpO2 94% RA. No SOB observed or reported. Independent with cares in room. JT patent; flushes and feeding administered as ordered; patient tolerated well. PRN Tramadol given at HS for pain to pain to bilateral wounds on upper chest. Dressing to L cervical area changed; small amount of serous drainage noted. Dressing to L chest and JT site CDI. Double-lumen PICC to RUE patent; heparin locked. Continent of bladder and bowel. LBM today. Patient pleasant and able to make needs known. Continue with POC.

## 2019-05-26 NOTE — PLAN OF CARE
Patient alert and oriented x 4. Independent in the room with transfer and ambulation. No complaints of pin an discomfort. TF running @ 90 ml/hr infusing well via JT. Patient no respiratory distress noted. Patient sleeps in between cares. Will continue with current plan of care.

## 2019-05-26 NOTE — PLAN OF CARE
Pt is alert and oriented x4. Denies chest pan and SOB. Clear liquid diet; meds through J-Tube. Pt verbalized intermitted right sided chest pain from old drain site; already on sticky for provider to review. Pt declined medications and interventions at this time as currently not in pain but does want provider to address. TF stopped at 0800, per orders. Changed dressing to J-tube site. Dressing to left chest wound and left medial cervical wound changed this shift. Changed dressing to double lumen PICC on RUE, noted blood return on both lumens; heparin locked, per orders. Pleasant with cares. Continue w/ POC.     Temp: 96.8  F (36  C) Temp src: Oral BP: 116/53 Pulse: 82   Resp: 16 SpO2: 90 % O2 Device: None (Room air)

## 2019-05-27 LAB
ANION GAP SERPL CALCULATED.3IONS-SCNC: 7 MMOL/L (ref 3–14)
BASOPHILS # BLD AUTO: 0.1 10E9/L (ref 0–0.2)
BASOPHILS NFR BLD AUTO: 0.6 %
BUN SERPL-MCNC: 28 MG/DL (ref 7–30)
CALCIUM SERPL-MCNC: 7.6 MG/DL (ref 8.5–10.1)
CHLORIDE SERPL-SCNC: 102 MMOL/L (ref 94–109)
CO2 SERPL-SCNC: 26 MMOL/L (ref 20–32)
CREAT SERPL-MCNC: 0.67 MG/DL (ref 0.52–1.04)
DIFFERENTIAL METHOD BLD: ABNORMAL
EOSINOPHIL # BLD AUTO: 1.5 10E9/L (ref 0–0.7)
EOSINOPHIL NFR BLD AUTO: 14.9 %
ERYTHROCYTE [DISTWIDTH] IN BLOOD BY AUTOMATED COUNT: 20.2 % (ref 10–15)
GFR SERPL CREATININE-BSD FRML MDRD: 87 ML/MIN/{1.73_M2}
GLUCOSE SERPL-MCNC: 122 MG/DL (ref 70–99)
HCT VFR BLD AUTO: 28.1 % (ref 35–47)
HGB BLD-MCNC: 8.5 G/DL (ref 11.7–15.7)
IMM GRANULOCYTES # BLD: 0 10E9/L (ref 0–0.4)
IMM GRANULOCYTES NFR BLD: 0.3 %
LYMPHOCYTES # BLD AUTO: 2.2 10E9/L (ref 0.8–5.3)
LYMPHOCYTES NFR BLD AUTO: 21.4 %
MCH RBC QN AUTO: 28 PG (ref 26.5–33)
MCHC RBC AUTO-ENTMCNC: 30.2 G/DL (ref 31.5–36.5)
MCV RBC AUTO: 92 FL (ref 78–100)
MONOCYTES # BLD AUTO: 1.1 10E9/L (ref 0–1.3)
MONOCYTES NFR BLD AUTO: 10.8 %
NEUTROPHILS # BLD AUTO: 5.3 10E9/L (ref 1.6–8.3)
NEUTROPHILS NFR BLD AUTO: 52 %
NRBC # BLD AUTO: 0 10*3/UL
NRBC BLD AUTO-RTO: 0 /100
PLATELET # BLD AUTO: 831 10E9/L (ref 150–450)
POTASSIUM SERPL-SCNC: 4.3 MMOL/L (ref 3.4–5.3)
RBC # BLD AUTO: 3.04 10E12/L (ref 3.8–5.2)
SODIUM SERPL-SCNC: 135 MMOL/L (ref 133–144)
WBC # BLD AUTO: 10.2 10E9/L (ref 4–11)

## 2019-05-27 PROCEDURE — 25000132 ZZH RX MED GY IP 250 OP 250 PS 637: Performed by: HOSPITALIST

## 2019-05-27 PROCEDURE — 80048 BASIC METABOLIC PNL TOTAL CA: CPT | Performed by: INTERNAL MEDICINE

## 2019-05-27 PROCEDURE — 36415 COLL VENOUS BLD VENIPUNCTURE: CPT | Performed by: INTERNAL MEDICINE

## 2019-05-27 PROCEDURE — 12000022 ZZH R&B SNF

## 2019-05-27 PROCEDURE — 85025 COMPLETE CBC W/AUTO DIFF WBC: CPT | Performed by: INTERNAL MEDICINE

## 2019-05-27 PROCEDURE — A9270 NON-COVERED ITEM OR SERVICE: HCPCS | Performed by: HOSPITALIST

## 2019-05-27 PROCEDURE — 27210429 ZZH NUTRITION PRODUCT INTERMEDIATE LITER

## 2019-05-27 PROCEDURE — 25000128 H RX IP 250 OP 636: Performed by: HOSPITALIST

## 2019-05-27 RX ADMIN — VENLAFAXINE 75 MG: 75 TABLET ORAL at 20:44

## 2019-05-27 RX ADMIN — PANTOPRAZOLE SODIUM 40 MG: 40 TABLET, DELAYED RELEASE ORAL at 05:26

## 2019-05-27 RX ADMIN — HEPARIN, PORCINE (PF) 10 UNIT/ML INTRAVENOUS SYRINGE 5 ML: at 08:15

## 2019-05-27 RX ADMIN — Medication 50 MG: at 20:44

## 2019-05-27 RX ADMIN — ENOXAPARIN SODIUM 40 MG: 40 INJECTION SUBCUTANEOUS at 16:19

## 2019-05-27 RX ADMIN — LEVOTHYROXINE SODIUM 150 MCG: 150 TABLET ORAL at 08:43

## 2019-05-27 RX ADMIN — Medication 325 MG: at 08:43

## 2019-05-27 RX ADMIN — MINERAL SUPPLEMENT IRON 300 MG / 5 ML STRENGTH LIQUID 100 PER BOX UNFLAVORED 300 MG: at 08:43

## 2019-05-27 RX ADMIN — VENLAFAXINE 75 MG: 75 TABLET ORAL at 08:43

## 2019-05-27 RX ADMIN — AMIODARONE HYDROCHLORIDE 200 MG: 200 TABLET ORAL at 08:44

## 2019-05-27 RX ADMIN — Medication 5 ML: at 14:58

## 2019-05-27 NOTE — PROGRESS NOTES
Spoke with Thoracic surgery on call. They will call me back later in the afternoon      Jonh Coleman  Internal Medicine  Hospitalist  Pager no: [phone]

## 2019-05-27 NOTE — PLAN OF CARE
Patient alert and oriented x 4. No complaints of pain and discomfort. TF running @ 90 ml/hr infusing well via JT. Independent in the room with transfer and ambulation, uses walker. Dressing to clavicular area and L chest CDI. No respiratory distress noted. Will continue with current plan of care.

## 2019-05-27 NOTE — PLAN OF CARE
VSS. Alert and orientedx4. Independent in room. Able to used call light appropriately and communicate needs well. J-tube dressing CDI, with cycled  on going feeding at 90mls/hr started at 6pm to run until 8am with interval flushing of 60 mls every 4 hrs as ordered. On clear liquid diet. Dressing to neck changed as ordered. L chest wound dressing CDI. Pt verbalized pain to L chest incision/wound, Tramadol given with some relief.   Will cont plan of care.  Vital signs:  Temp: 98.2  F (36.8  C) Temp src: Oral BP: 135/56 Pulse: 82 Heart Rate: 77 Resp: 17 SpO2: 92 % O2 Device: None (Room air)

## 2019-05-27 NOTE — PLAN OF CARE
Patient is doing much better,up independently to the bathroom.Denies pain.Wound cares done neck,chest and J-tube site.Both chect and neck wounds packed wet to dry per orders.Pt skin dry lotion offered.

## 2019-05-28 VITALS
TEMPERATURE: 96.4 F | SYSTOLIC BLOOD PRESSURE: 124 MMHG | HEART RATE: 78 BPM | OXYGEN SATURATION: 95 % | DIASTOLIC BLOOD PRESSURE: 56 MMHG | RESPIRATION RATE: 16 BRPM | WEIGHT: 171.2 LBS | BODY MASS INDEX: 28.49 KG/M2

## 2019-05-28 PROCEDURE — A9270 NON-COVERED ITEM OR SERVICE: HCPCS | Performed by: HOSPITALIST

## 2019-05-28 PROCEDURE — 27210429 ZZH NUTRITION PRODUCT INTERMEDIATE LITER

## 2019-05-28 PROCEDURE — 25000132 ZZH RX MED GY IP 250 OP 250 PS 637: Performed by: HOSPITALIST

## 2019-05-28 PROCEDURE — 99316 NF DSCHRG MGMT 30 MIN+: CPT | Performed by: INTERNAL MEDICINE

## 2019-05-28 RX ORDER — LOPERAMIDE HYDROCHLORIDE 1 MG/5ML
2 SOLUTION ORAL 4 TIMES DAILY PRN
Qty: 500 ML | Refills: 0 | Status: SHIPPED | OUTPATIENT
Start: 2019-05-28 | End: 2020-02-12

## 2019-05-28 RX ORDER — ALBUTEROL SULFATE 90 UG/1
2 AEROSOL, METERED RESPIRATORY (INHALATION) EVERY 4 HOURS PRN
Qty: 8.5 G | Refills: 0 | Status: SHIPPED | OUTPATIENT
Start: 2019-05-28 | End: 2019-08-06

## 2019-05-28 RX ORDER — TRAMADOL HYDROCHLORIDE 50 MG/1
25-50 TABLET ORAL EVERY 6 HOURS PRN
Qty: 40 TABLET | Refills: 0 | Status: SHIPPED | OUTPATIENT
Start: 2019-05-28 | End: 2020-02-12

## 2019-05-28 RX ORDER — VENLAFAXINE 75 MG/1
75 TABLET ORAL 2 TIMES DAILY
Qty: 60 TABLET | Refills: 0 | Status: SHIPPED | OUTPATIENT
Start: 2019-05-28 | End: 2024-01-01

## 2019-05-28 RX ORDER — LEVOTHYROXINE SODIUM 150 UG/1
150 TABLET ORAL EVERY MORNING
Qty: 30 TABLET | Refills: 0 | Status: SHIPPED | OUTPATIENT
Start: 2019-05-29 | End: 2024-01-01

## 2019-05-28 RX ORDER — AMIODARONE HYDROCHLORIDE 200 MG/1
200 TABLET ORAL DAILY
Qty: 30 TABLET | Refills: 0 | Status: SHIPPED | OUTPATIENT
Start: 2019-05-28 | End: 2022-03-23

## 2019-05-28 RX ADMIN — LEVOTHYROXINE SODIUM 150 MCG: 150 TABLET ORAL at 08:12

## 2019-05-28 RX ADMIN — AMIODARONE HYDROCHLORIDE 200 MG: 200 TABLET ORAL at 08:12

## 2019-05-28 RX ADMIN — MINERAL SUPPLEMENT IRON 300 MG / 5 ML STRENGTH LIQUID 100 PER BOX UNFLAVORED 300 MG: at 08:12

## 2019-05-28 RX ADMIN — Medication 325 MG: at 08:12

## 2019-05-28 RX ADMIN — VENLAFAXINE 75 MG: 75 TABLET ORAL at 08:12

## 2019-05-28 RX ADMIN — PANTOPRAZOLE SODIUM 40 MG: 40 TABLET, DELAYED RELEASE ORAL at 06:41

## 2019-05-28 NOTE — PROGRESS NOTES
Nutrition Brief Note     Per chart review, pt continues on a clear liquid diet, although not drinking much. Recommend discharging home on current TF regimen. Anticipate pt will be reliant on TF until diet has advanced to solid foods (full liquid will likely not be sufficient as pt was not agreeable to drink more than a couple Boost daily). Home infusion team will be able to taper down TF as appropriate once diet advances in the home setting. Pt has had TF at home since March 2019 through [facility] per CC. Will continue this at discharge.     Implementations:   1. Recommend continue current TF regimen of:  - IsoSource 1.5 via J-tube @ 90 mL/hr x 14 hrs (6pm-8am) providing 1260 mL, 1890 kcal (30 kcal/kg), 86 g protein (1.4 g/kg), 222 g CHO, 19 g fiber, and 958 mL free water per DW of 62 kg.   - Water Flushes: 60 mL q 4 hrs (TF + Flushes = 1330 mL water; meeting 86% hydration needs).  2. Once diet advances to full liquid, encourage Boost as pt previously enjoyed these.       Karen Berry RD, LD  Unit Pager: [phone]

## 2019-05-28 NOTE — PLAN OF CARE
The patient is alert and oriented x 3. VSS. Medicated with Tramadol x 1 for left clavicular and left chest pain and expressed some relief. Ambulates independently with walker. Wound care and a new dressing applied to the left clavicular wound. The patient is reporting non-preference for clear liquid diet. The JT is intact and the patient is tolerating TF as ordered. Continue to monitor and encourage to try clear liquid as ordered to advance diet.

## 2019-05-28 NOTE — DISCHARGE SUMMARY
Discharge Summary    Myrtle Vaz MRN# [id number]   YOB: 1945 Age: 73 year old     Date of Admission:  5/15/2019  Date of Discharge:  5/28/2019  Admitting Physician:  Arielle Morris MD  Discharge Physician:  Jonh Coleman MD   Discharging Service:  Internal Medicine     Primary Provider: Joey Caballero          Discharge Diagnosis:     Redo laparotomy and partial sternotomy, esophagogastrostomy on 4/26/2019  Afib  Hypothyroidism  Depression  Diarrhea, tube feed induced            Brief History of Illness:     Myrtle Vaz is a 73 F with Ho Afib, Hypothyroidism, Depression admitted to TCU after hospitalization at [facility] after  redo laparotomy and partial sternotomy, esophagogastrostomy on 4/26/2019.  For more details, please refer to the admission H&P on 5/15/2019        TCU  Course:     73 F with Ho Afib, Hypothyroidism, Depression admitted to TCU after hospitalization at [facility] after  redo laparotomy and partial sternotomy, esophagogastrostomy on 4/26/2019. 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 She was found to have anastomotic leak per CT scan on 5/5/19, underwent EGD esophageal stent replacement, I&D of left neck on 5/6/19. She is being  transferred to TCU for rehab needs, wound care and Tube feedings.  Her TCU course was uneventful. She was evaluated and treated by SLP specialist,  Nutritionist, and Physical and occupation therapist, and WOCN.     Individual problems as outlined below:     # S/p Redo laparotomy and partial sternotomy, esophagogastrostomy on 4/26/2019.   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   + She  finished course of AB (Unasyn and Diflucan) today on 05/20/19  + Left medial sternal wound near the medial end of left collar bone is packed with kerlix and covered with wet to dry dressing. Dressing changed BID  + Left mid clavicular chest  wound was treated with NPWT. Vac dressing off on 05/202/109 Now, wet to dry dressing  + On clear liquid diet by thoracic surgery.   She was evaluated and treated by SLP specialist,  Nutritionist, and Physical and occupation therapist in TCU.  XR Video swallow study on 05/23 showed no penetration or aspiration with thin liquid or pudding consistencies.  Patent esophageal stent. No leak was identified. However per SLP, there was slow transit from oesophagus to stomach. They would defer advancement of diet to Thoracic surgery. Discussed with Thoracic surgery, who advised to continue with clear liquid diet at this time, until seen by them  - Clear liquid diet  - Continue tube feeds  - Follow up with Thoracic surgery as suggested in 2 weeks        # Afib: HR is controlled. On  Amiodorone and ASA  - On Amiodarone, and ASA  # Hypothyroidism: On Levothyroxine 150 mcg daily  # Depression: On Venlafaxine 75 mg BID   # Diarrhea: Most likely from formula feed. C diff negative 05/05. Started on  Nutrisource fiber, Immodium PRN   - Continue       # Immunization: Completed Pneumonia vaccine. Needs  Tdap Vaccine     # Antipsychotics:  On Effexor for depression. Chronic medication. Continue   # DVT Prophylaxis: Enoxaparin SQ  # Code Status: Full Code       Discharge Medications:     Current Discharge Medication List      START taking these medications    Details   acetaminophen (TYLENOL) 32 mg/mL liquid 30.45 mLs (975 mg) by Per J Tube route every 8 hours as needed for mild pain or fever  Qty: 1000 mL, Refills: 0    Associated Diagnoses: Esophageal perforation      loperamide (IMODIUM) 1 MG/5ML liquid 10 mLs (2 mg) by Per Feeding Tube route 4 times daily as needed for diarrhea  Qty: 500 mL,  Refills: 0    Associated Diagnoses: Diarrhea, unspecified type         CONTINUE these medications which have CHANGED    Details   albuterol (PROAIR HFA/PROVENTIL HFA/VENTOLIN HFA) 108 (90 Base) MCG/ACT inhaler Inhale 2 puffs into the lungs every 4 hours as needed for shortness of breath / dyspnea or wheezing  Qty: 8.5 g, Refills: 0    Associated Diagnoses: Dyspnea, unspecified type; Respiratory failure with hypoxia, unspecified chronicity (H)      amiodarone (PACERONE/CODARONE) 200 MG tablet 1 tablet (200 mg) by Per G Tube route daily  Qty: 30 tablet, Refills: 0    Associated Diagnoses: Mediastinitis      aspirin 325 mg/32.5 mL SUSP 32.5 mLs (325 mg) by Oral or Feeding Tube route daily  Qty: 1000 mL, Refills: 0    Associated Diagnoses: Atrial fibrillation, unspecified type (H)      ferrous sulfate 300 (60 Fe) MG/5ML syrup 5 mLs (300 mg) by Per J Tube route daily  Qty: 150 mL, Refills: 0    Associated Diagnoses: Iron deficiency      levothyroxine (SYNTHROID/LEVOTHROID) 150 MCG tablet 1 tablet (150 mcg) by Per J Tube route every morning  Qty: 30 tablet, Refills: 0    Associated Diagnoses: Hypothyroidism, unspecified type      pantoprazole (PROTONIX) 2 mg/mL SUSP suspension 20 mLs (40 mg) by Per Feeding Tube route every morning (before breakfast)  Qty: 600 mL, Refills: 0    Associated Diagnoses: Esophagectomy, anastomotic leak      traMADol (ULTRAM) 50 MG tablet 0.5-1 tablets (25-50 mg) by Per Feeding Tube route every 6 hours as needed for moderate to severe pain  Qty: 40 tablet, Refills: 0    Associated Diagnoses: Esophagectomy, anastomotic leak      venlafaxine (EFFEXOR) 75 MG tablet 1 tablet (75 mg) by Per G Tube route 2 times daily  Qty: 60 tablet, Refills: 0    Associated Diagnoses: Current mild episode of major depressive disorder without prior episode (H)         STOP taking these medications       acetaminophen (TYLENOL) 325 MG tablet Comments:   Reason for Stopping:         ampicillin-sulbactam (UNASYN) 3  (2-1) g SOLR vial Comments:   Reason for Stopping:         diclofenac (VOLTAREN) 1 % topical gel Comments:   Reason for Stopping:         enoxaparin (LOVENOX) 40 MG/0.4ML syringe Comments:   Reason for Stopping:         fluconazole (DIFLUCAN) 400-0.9 MG/200ML-% infusion Comments:   Reason for Stopping:         Guar Gum (FIBER MODULAR, NUTRISOURCE FIBER,) packet Comments:   Reason for Stopping:         Lidocaine (LIDOCARE) 4 % Patch Comments:   Reason for Stopping:         senna-docusate (SENOKOT-S/PERICOLACE) 8.6-50 MG tablet Comments:   Reason for Stopping:         simethicone (MYLICON) 80 MG chewable tablet Comments:   Reason for Stopping:                   Procedures/Consultations:   No procedures performed during this admission  Consultation during this admission received from PT/OT, SLP, RD, SW, CC           Final Day of Progress before Discharge:   Reports doing well  Expecting to discharge at 3 PM today  Reports intermittent pain on the right upper chest wall, controlled on medications  No nausea, vomiting, abdominal pain  Tolerating liquid diet  No fever, chills  No lightheadedness or dizziness  Has loose stools, not new  No burning urination  No shortness of breath, cough    Physical Exam:  Blood pressure 124/56, pulse 78, temperature 96.4  F (35.8  C), temperature source Oral, resp. rate 16, weight 77.7 kg (171 lb 3.2 oz), SpO2 95 %.      HEENT: No icterus, no pallor  Cardiovascular: S1, S2 normal.   Respiratory:  B/L CTA  Chest wall: Left upper chest wall wound has some yellow drainage. Bottom wound looks healthy  GI/Abdomen: Soft, NT, BS+. GJ tube in place.   Neurology: No tremors.   Extremities: No pretibial edema.   Skin:     Data:  All laboratory data reviewed             Condition on Discharge:   Discharge condition: Stable   Code status on discharge: Full Code                Discharge Disposition:   Discharged to home               Pending Results:   Unresulted Labs Ordered in the Past 30 Days of  this Admission     Date and Time Order Name Status Description    5/14/2019 0617 CBC with platelets In process     5/14/2019 0617 Basic metabolic panel In process     5/6/2019 1531 Fungus Culture, non-blood Preliminary     5/2/2019 1042 Fungus Culture, non-blood Preliminary                   Discharge Instructions and Follow-Up:     Discharge Procedure Orders   Home care nursing referral   Referral Priority: Routine Referral Type: Home Health Therapies & Aides   Number of Visits Requested: 1     Home Care PT Referral for Hospital Discharge   Referral Priority: Routine Referral Type: Home Health Therapies & Aides   Number of Visits Requested: 1     Home Care OT Referral for Hospital Discharge   Referral Priority: Routine   Number of Visits Requested: 1     Reason for your hospital stay   Order Comments: Admitted for rehabilitation, nutrition and wound cares     Adult [facility] Follow-up and recommended labs and tests   Order Comments: Follow up with primary care provider, Joey Caballero, within 7-10 days for hospital follow- up.  The following labs/tests are recommended: CBC, BMP.      Appointments on [place] and[place]or [facility]with [facility] or [facility] provider or service). Call [phone] if you haven't heard regarding these appointments within 7 days of discharge.     Activity   Order Comments: Your activity upon discharge: activity as tolerated     Order Specific Question Answer Comments   Is discharge order? Yes      Monitor and record   Order Comments: Watch for fever, chills, nausea, vomiting, abdominal pain, lightheadedness, dizziness  If these symptoms occurs, please call your primary care or come to ED     MD face to face encounter   Order Comments: Documentation of Face to Face and Certification for Home Health Services    I certify that patient: Myrtle Vaz is under my care and that I, or a nurse practitioner or physician's assistant working with me, had a face-to-face encounter that meets the  physician face-to-face encounter requirements with this patient on: 5/28/2019.    This encounter with the patient was in whole, or in part, for the following medical condition, which is the primary reason for home health care: Redo laparotomy and partial sternotomy, esophagogastrostomy on 4/26/2019    I certify that, based on my findings, the following services are medically necessary home health services: Nursing, Occupational Therapy, Physical Therapy and Speech Language Therapy.    My clinical findings support the need for the above services because: Nurse is needed: medication management and teaching, wound care management and teaching, nutritional assessments; PT (mobility, strengthening, home safety), OT (ADL's/IADL's, home safety), and SLP (swallowing).    Further, I certify that my clinical findings support that this patient is homebound (i.e. absences from home require considerable and taxing effort and are for medical reasons or [employer] services or infrequently or of short duration when for other reasons) because:  poor endurance, requires assistance of person and equipment to safely leave the home, infection risk. Thanks      Based on the above findings. I certify that this patient is confined to the home and needs intermittent skilled nursing care, physical therapy and/or speech therapy.  The patient is under my care, and I have initiated the establishment of the plan of care.  This patient will be followed by a physician who will periodically review the plan of care.  Physician/Provider to provide follow up care: Joey Caballero    Attending hospital physician (the Medicare certified [place] provider): Jonh Coleman  Physician Signature: See electronic signature associated with these discharge orders.  Date: 5/28/2019     Wound care and dressings   Order Comments: Instructions to care for your wound at home: as directed.    Left chest wound: Daily: wash wound with wound cleanser and gauze. Pack  gently with saline moistened gauze. Cover with 4x4 folded in half and secure with Primapore tape.    Site:  Left medial cervical wound  Instructions:  Pack wound with kerlix. BID wet to dry dressing changes     Full Code     Order Specific Question Answer Comments   Code status determined by: Discussion with patient/legal decision maker      Diet   Order Comments: Follow this diet upon discharge: Orders Placed This Encounter      Adult Formula Drip Feeding: Continuous Isosource 1.5; Jejunostomy; Goal Rate: 90; mL/hr; From: 6:00 PM; 8:00 AM; Medication - Feeding Tube Flush Frequency: At least 15-30 mL water before and after medication administration and with tube clogging; ...      Clear Liquid Diet     Order Specific Question Answer Comments   Is discharge order? Yes           Attestation:  Jonh Coleman.    Time spent on patient: 45 minutes total including face to face and coordinating care time reviewing current illness, any medication changes, and the care plan for today.

## 2019-05-28 NOTE — PLAN OF CARE
Alert and oriented X 4. Able to make needs known. Call light in reach. Offers no C/O pain so far this shift. Neck and chest dressings CDI. Abdominal incision intact, open to air. Up to BR with SBA. Continent of bowel and bladder. JT [patent, tolerating tube feeding/flushes. Up ad jose de jesus, Appears to be sleeping during rounds. Continue with plan of care

## 2019-05-28 NOTE — PLAN OF CARE
SLP: [facility] spoke with Dr Coleman in the a.m and then again in p.m today-- he had put in a call yesterday to Thoracic team for recommendations on pt's diet given results of recent esohpagram ( ie: whether or not to advance to a Full liquid diet vs staying on Clear liquid diet due to the slow emptying from esophagus into large intestine per esophagram; from pharyngeal standpoint- pt ok to advance but deferred back to thoracic team given the nature of esophagram results with the slow emptying)MD was awaiting a call back from Thoracic team- per MD - still had not heard in the pm at the time of discharge- MD put in another call- but stated it will need to be addressed via OP. See prior SLP discharge summary also.

## 2019-05-28 NOTE — PLAN OF CARE
Pt discharged home at 1625, transported by daughter. paperwork reviewed, concern & questions answered. Educated on wound care, extra wound care supplies given. Hard copy given for tramadol & Asprin.

## 2019-05-28 NOTE — PLAN OF CARE
Pt VSS. Denied pain this shift. Alert and oriented, independent in room. TF off at 0800 per orders. L clavicle/neck dressing changed per orders, pt tolerated well. RN to demonstrate dressing change to L chest and clavicle this evening with pt daughter. JT dressing changed, site WNL. Pt continues on clear liquid diet. Pt to discharge this afternoon with daughter. Continue with POC until discharge.

## 2019-05-29 ENCOUNTER — TELEPHONE (OUTPATIENT)
Dept: SURGERY | Facility: CLINIC | Age: 74
End: 2019-05-29

## 2019-05-29 ENCOUNTER — TELEPHONE (OUTPATIENT)
Dept: INFECTIOUS DISEASES | Facility: CLINIC | Age: 74
End: 2019-05-29

## 2019-05-29 NOTE — TELEPHONE ENCOUNTER
Spoke with patient to schedule procedure with Dr. Ally Ybarra    Procedure was scheduled on 06/10 at [facility] OR  Patient will have H&P with completed  Patient is aware a / is needed day of surgery.   Surgery letter was sent via [employer], patient has my direct contact information for any further questions.

## 2019-05-29 NOTE — OP NOTE
Procedure: Flexible Bronchoscopy  Findings: minimal secretions  Details:  With minimal sedation, a flexible bronchoscopy was performed. Minimal watery secretions suctionedd clean.  Patient tolerated the procedure well

## 2019-05-29 NOTE — TELEPHONE ENCOUNTER
[facility] Call Center    Phone Message    May a detailed message be left on voicemail: yes    Reason for Call: Other: Pts daughter Haydee calling to reschedule appt. Per Haydee, pt can be seen by any doctor. Per guidelines, permission is needed. Please call Haydee to follow up.      Action Taken: Message routed to:  [facility] ([facility]

## 2019-05-30 LAB
FUNGUS SPEC CULT: ABNORMAL
FUNGUS SPEC CULT: ABNORMAL
SPECIMEN SOURCE: ABNORMAL

## 2019-05-30 NOTE — UTILIZATION REVIEW
Pain Interview    1. Have you had pain or hurting at any time in the last 5 days?  Yes  2. How much of the time have you experienced pain or hurting over the last 5 days? Occasionally  3. Over the past 5 days, has pain made it hard for you to sleep at night?  No  4. Over the past 5 days, have you limited your day-to-day activities because of pain?No  5. Rate pain intensity: Numeric 5

## 2019-05-31 NOTE — TELEPHONE ENCOUNTER
Merissa Rodgers 10 minutes ago (10:34 AM)      Filiberto Tinoco I got pt scheduled for 6/19 at 1:30 with Dr. Craft.

## 2019-06-03 ENCOUNTER — RECORDS - HEALTHEAST (OUTPATIENT)
Dept: LAB | Facility: CLINIC | Age: 74
End: 2019-06-03

## 2019-06-03 LAB
BASOPHILS # BLD AUTO: 0.1 THOU/UL (ref 0–0.2)
BASOPHILS NFR BLD AUTO: 1 % (ref 0–2)
EOSINOPHIL # BLD AUTO: 0.9 THOU/UL (ref 0–0.4)
EOSINOPHIL NFR BLD AUTO: 9 % (ref 0–6)
ERYTHROCYTE [DISTWIDTH] IN BLOOD BY AUTOMATED COUNT: 19.4 % (ref 11–14.5)
FUNGUS SPEC CULT: NORMAL
HCT VFR BLD AUTO: 31.1 % (ref 35–47)
HGB BLD-MCNC: 9.3 G/DL (ref 12–16)
LYMPHOCYTES # BLD AUTO: 2 THOU/UL (ref 0.8–4.4)
LYMPHOCYTES NFR BLD AUTO: 21 % (ref 20–40)
Lab: NORMAL
MCH RBC QN AUTO: 27.1 PG (ref 27–34)
MCHC RBC AUTO-ENTMCNC: 29.9 G/DL (ref 32–36)
MCV RBC AUTO: 91 FL (ref 80–100)
MONOCYTES # BLD AUTO: 1 THOU/UL (ref 0–0.9)
MONOCYTES NFR BLD AUTO: 10 % (ref 2–10)
NEUTROPHILS # BLD AUTO: 5.8 THOU/UL (ref 2–7.7)
NEUTROPHILS NFR BLD AUTO: 60 % (ref 50–70)
PLATELET # BLD AUTO: 878 THOU/UL (ref 140–440)
PMV BLD AUTO: 8.9 FL (ref 8.5–12.5)
RBC # BLD AUTO: 3.43 MILL/UL (ref 3.8–5.4)
SPECIMEN SOURCE: NORMAL
WBC: 9.7 THOU/UL (ref 4–11)

## 2019-06-04 ENCOUNTER — TELEPHONE (OUTPATIENT)
Dept: ONCOLOGY | Facility: CLINIC | Age: 74
End: 2019-06-04

## 2019-06-04 LAB
ALBUMIN SERPL-MCNC: 3.1 G/DL (ref 3.5–5)
ALP SERPL-CCNC: 204 U/L (ref 45–120)
ALT SERPL W P-5'-P-CCNC: 17 U/L (ref 0–45)
ANION GAP SERPL CALCULATED.3IONS-SCNC: 12 MMOL/L (ref 5–18)
AST SERPL W P-5'-P-CCNC: 22 U/L (ref 0–40)
BILIRUB SERPL-MCNC: 0.3 MG/DL (ref 0–1)
BUN SERPL-MCNC: 29 MG/DL (ref 8–28)
CALCIUM SERPL-MCNC: 8.9 MG/DL (ref 8.5–10.5)
CHLORIDE BLD-SCNC: 102 MMOL/L (ref 98–107)
CO2 SERPL-SCNC: 23 MMOL/L (ref 22–31)
CREAT SERPL-MCNC: 0.82 MG/DL (ref 0.6–1.1)
GFR SERPL CREATININE-BSD FRML MDRD: >60 ML/MIN/1.73M2
GLUCOSE BLD-MCNC: 87 MG/DL (ref 70–125)
POTASSIUM BLD-SCNC: 4.7 MMOL/L (ref 3.5–5)
PROT SERPL-MCNC: 7.5 G/DL (ref 6–8)
SODIUM SERPL-SCNC: 137 MMOL/L (ref 136–145)
TSH SERPL DL<=0.005 MIU/L-ACNC: 22.22 UIU/ML (ref 0.3–5)

## 2019-06-04 NOTE — TELEPHONE ENCOUNTER
Called pt per Hillary, pt needed to stop taking her aspirin prior to her procedure on 6/10. Talked with daughter Haydee and Natasha has stopped taking the aspirin for the time being.

## 2019-06-07 ENCOUNTER — ANESTHESIA EVENT (OUTPATIENT)
Dept: SURGERY | Facility: CLINIC | Age: 74
End: 2019-06-07
Payer: MEDICARE

## 2019-06-09 NOTE — ANESTHESIA PREPROCEDURE EVALUATION
Anesthesia Pre-Procedure Evaluation    Patient: Myrtle Vaz   MRN:     [id number] Gender:   female   Age:    73 year old :      1945        Preoperative Diagnosis: Esophageal Perforation   Procedure(s):  Esophagogastroduodenoscopy Possible Stent Revision     Past Medical History:   Diagnosis Date     Depression      History of atrial fibrillation      HLD (hyperlipidemia)      HTN (hypertension)      Hypothyroidism       Past Surgical History:   Procedure Laterality Date     BRONCHOSCOPY (RIGID OR FLEXIBLE), DIAGNOSTIC N/A 2019    Procedure: BRONCHOSCOPY, WITH BAL;  Surgeon: Ally Ybarra MD;  Location: [facility]     COMBINED ESOPHAGOSCOPY, GASTROSCOPY, DUODENOSCOPY (EGD), REPLACE ESOPHAGEAL STENT N/A 2019    Procedure: ESOPHAGOGASTRODUODENOSCOPY WITH ESOPHAGEAL STENT REPLACEMENT, IRRIGATION AND DEBRIDEMENT OF LEFT NECK, WOUND VAC PLACEMENT;  Surgeon: Harley Murguia MD;  Location: [facility]     ESOPHAGOGASTRECTOMY N/A 2019    Procedure: Redo Laparotomy SUBSTERNAL Esophagogastrostomy, Pharyngostomy, Intraoperative EGD, PARTIAL STERNOTOMY, LEFT PHARYNGOSTOMY,;  Surgeon: Temitope Bullock MD;  Location: [facility]     ESOPHAGOSCOPY, GASTROSCOPY, DUODENOSCOPY (EGD), COMBINED N/A 2018    Procedure: COMBINED ESOPHAGOSCOPY, GASTROSCOPY, DUODENOSCOPY (EGD);  Surgeon: Temitope Bullock MD;  Location: [facility]     HERNIORRHAPHY HIATAL  2018     HYSTERECTOMY       LAPAROSCOPIC HERNIORRHAPHY HIATAL N/A 2018    Procedure: Midline laparotomy, Trans-hiatal esophagogasterectomy, gastrostomy, J-tube placement, G-tube placement, bilateral pleural chest tube placement, mediastinal abcess drainage, spit fistula creation, Esophagastrodueodenoscopy;  Surgeon: Temitope Bullock MD;  Location: [facility]     ROTATOR CUFF REPAIR RT/LT       THYROIDECTOMY       for hyperthroidism          Anesthesia Evaluation     . Pt has had prior anesthetic. Type: General    History of anesthetic complications    Patient  went into A fib with RVR and hypotension      ROS/MED HX    ENT/Pulmonary:     (+), recent URI resolved . .    Neurologic:  - neg neurologic ROS     Cardiovascular:     (+) hypertension----. : . . . :. . Previous cardiac testing Echodate:11/23/18results:Left ventricular function, chamber size, wall motion, and wall thickness are  normal.The EF is 60-65%  Limited views of the right ventricle that appears with normal function.  No significant valve dysfunction detected.date: results:ECG reviewed date:4/23/19 results: date: results:         (-) valvular problems/murmurs and pulmonary hypertension   METS/Exercise Tolerance: Comment: Continues to improve. Uses walker to carry her bag 1 - Eating, dressing   Hematologic:        (-) history of blood clots and History of Transfusion   Musculoskeletal: Comment: History of Lyme's disease  (+) arthritis,  other musculoskeletal- Raynaud's disease      GI/Hepatic: Comment: History of esophageal perforation  Patient with jejunostomy, gastrotomy and spit fistula    (+) Other GI/Hepatic malabsorption      Renal/Genitourinary:  - ROS Renal section negative       Endo:     (+) thyroid problem hypothyroidism, .      Psychiatric:     (+) psychiatric history depression      Infectious Disease:  - neg infectious disease ROS       Malignancy:      - no malignancy   Other:    (+) no H/O Chronic Pain,no other significant disability                        PHYSICAL EXAM:   Mental Status/Neuro: A/A/O   Airway: Facies: Feasible  Mallampati: I  Mouth/Opening: Full  TM distance: > 6 cm  Neck ROM: Full   Respiratory: Auscultation: CTAB     Resp. Rate: Normal     Resp. Effort: Normal      CV: Rhythm: Regular  Rate: Age appropriate  Heart: Normal Sounds   Comments:      Dental: Normal                  Lab Results   Component Value Date    WBC 10.2 05/27/2019    HGB 8.5 (L) 05/27/2019    HCT 28.1 (L) 05/27/2019     (H) 05/27/2019    CRP 91.0 (H) 05/14/2019    SED 84 (H) 02/04/2019      "05/27/2019    POTASSIUM 4.3 05/27/2019    CHLORIDE 102 05/27/2019    CO2 26 05/27/2019    BUN 28 05/27/2019    CR 0.67 05/27/2019     (H) 05/27/2019    JOSE 7.6 (L) 05/27/2019    PHOS 3.3 05/21/2019    MAG 2.3 05/21/2019    ALBUMIN 2.2 (L) 05/01/2019    PROTTOTAL 6.0 (L) 05/01/2019    ALT 20 05/01/2019    AST 17 05/01/2019    ALKPHOS 101 05/01/2019    BILITOTAL 0.2 05/01/2019    LIPASE 43 (L) 02/11/2019    PTT 47 (H) 04/28/2019    INR 1.25 (H) 04/30/2019       Preop Vitals  BP Readings from Last 3 Encounters:   05/28/19 124/56   05/15/19 137/73   04/23/19 121/75    Pulse Readings from Last 3 Encounters:   05/27/19 78   05/14/19 75   04/23/19 72      Resp Readings from Last 3 Encounters:   05/28/19 16   05/15/19 16   04/23/19 18    SpO2 Readings from Last 3 Encounters:   05/28/19 95%   05/15/19 98%   04/23/19 95%      Temp Readings from Last 1 Encounters:   05/28/19 35.8  C (96.4  F) (Oral)    Ht Readings from Last 1 Encounters:   04/26/19 1.651 m (5' 5\")      Wt Readings from Last 1 Encounters:   05/26/19 77.7 kg (171 lb 3.2 oz)    Estimated body mass index is 28.49 kg/m  as calculated from the following:    Height as of 4/26/19: 1.651 m (5' 5\").    Weight as of 5/26/19: 77.7 kg (171 lb 3.2 oz).     LDA:  Negative Pressure Wound Therapy Chest Left (Active)   Number of days: 34       Gastrostomy/Enterostomy Gastrostomy LUQ 1 20 fr (Active)   Number of days: 200       Gastrostomy/Enterostomy Jejunostomy LLQ 2 16 fr (Active)   Number of days: 200       Gastrostomy/Enterostomy Jejunostomy LLQ 1 16 fr (Active)   Number of days: 44       Mucous Fistula (Active)   Number of days:             Assessment:   ASA SCORE: 3    NPO Status: > 6 hours since completed Solid Foods   Documentation: H&P complete; Preop Testing complete; Consents complete   Proceeding: Proceed without further delay  Tobacco Use:  NO Active use of Tobacco/UNKNOWN Tobacco use status     Plan:   Anes. Type:  General   Pre-Induction: Midazolam IV; " Acetaminophen PO   Induction:  IV (Standard)   Airway: Oral ETT   Access/Monitoring: PIV   Maintenance: Balanced   Emergence: Procedure Site   Logistics: Same Day Surgery     Postop Pain/Sedation Strategy:  Standard-Options: Opioids PRN     PONV Management:  Adult Risk Factors: Female, Non-Smoker, Postop Opioids  Prevention: Ondansetron; Dexamethasone       Comments for Plan/Consent:    Natasha Vaz is a 73 year old female with esophageal perforation scheduled for an EGD with possible stent revision with Dr. Ybarra on 6/10/2019.                         Tony Caban MD

## 2019-06-10 ENCOUNTER — HOSPITAL ENCOUNTER (OUTPATIENT)
Facility: CLINIC | Age: 74
Discharge: HOME | End: 2019-06-10
Attending: STUDENT IN AN ORGANIZED HEALTH CARE EDUCATION/TRAINING PROGRAM | Admitting: STUDENT IN AN ORGANIZED HEALTH CARE EDUCATION/TRAINING PROGRAM
Payer: MEDICARE

## 2019-06-10 ENCOUNTER — APPOINTMENT (OUTPATIENT)
Dept: GENERAL RADIOLOGY | Facility: CLINIC | Age: 74
End: 2019-06-10
Attending: STUDENT IN AN ORGANIZED HEALTH CARE EDUCATION/TRAINING PROGRAM
Payer: MEDICARE

## 2019-06-10 ENCOUNTER — ANESTHESIA (OUTPATIENT)
Dept: SURGERY | Facility: CLINIC | Age: 74
End: 2019-06-10
Payer: MEDICARE

## 2019-06-10 VITALS
DIASTOLIC BLOOD PRESSURE: 68 MMHG | TEMPERATURE: 98.2 F | WEIGHT: 167.77 LBS | RESPIRATION RATE: 16 BRPM | OXYGEN SATURATION: 95 % | HEART RATE: 82 BPM | HEIGHT: 65 IN | SYSTOLIC BLOOD PRESSURE: 114 MMHG | BODY MASS INDEX: 27.95 KG/M2

## 2019-06-10 DIAGNOSIS — I48.91 ATRIAL FIBRILLATION, UNSPECIFIED TYPE (H): ICD-10-CM

## 2019-06-10 DIAGNOSIS — K91.89 ESOPHAGECTOMY, ANASTOMOTIC LEAK: Primary | ICD-10-CM

## 2019-06-10 LAB
ABO + RH BLD: NORMAL
ABO + RH BLD: NORMAL
BLD GP AB SCN SERPL QL: NORMAL
BLOOD BANK CMNT PATIENT-IMP: NORMAL
GLUCOSE BLDC GLUCOMTR-MCNC: 82 MG/DL (ref 70–99)
SPECIMEN EXP DATE BLD: NORMAL

## 2019-06-10 PROCEDURE — 86901 BLOOD TYPING SEROLOGIC RH(D): CPT | Performed by: STUDENT IN AN ORGANIZED HEALTH CARE EDUCATION/TRAINING PROGRAM

## 2019-06-10 PROCEDURE — 71000027 ZZH RECOVERY PHASE 2 EACH 15 MINS: Performed by: STUDENT IN AN ORGANIZED HEALTH CARE EDUCATION/TRAINING PROGRAM

## 2019-06-10 PROCEDURE — 25000125 ZZHC RX 250: Performed by: ANESTHESIOLOGY

## 2019-06-10 PROCEDURE — 25000566 ZZH SEVOFLURANE, EA 15 MIN: Performed by: STUDENT IN AN ORGANIZED HEALTH CARE EDUCATION/TRAINING PROGRAM

## 2019-06-10 PROCEDURE — 40000170 ZZH STATISTIC PRE-PROCEDURE ASSESSMENT II: Performed by: STUDENT IN AN ORGANIZED HEALTH CARE EDUCATION/TRAINING PROGRAM

## 2019-06-10 PROCEDURE — 25800030 ZZH RX IP 258 OP 636: Performed by: STUDENT IN AN ORGANIZED HEALTH CARE EDUCATION/TRAINING PROGRAM

## 2019-06-10 PROCEDURE — 86850 RBC ANTIBODY SCREEN: CPT | Performed by: STUDENT IN AN ORGANIZED HEALTH CARE EDUCATION/TRAINING PROGRAM

## 2019-06-10 PROCEDURE — 40000277 XR SURGERY CARM FLUORO LESS THAN 5 MIN W STILLS: Mod: TC

## 2019-06-10 PROCEDURE — 25000125 ZZHC RX 250: Performed by: STUDENT IN AN ORGANIZED HEALTH CARE EDUCATION/TRAINING PROGRAM

## 2019-06-10 PROCEDURE — 37000008 ZZH ANESTHESIA TECHNICAL FEE, 1ST 30 MIN: Performed by: STUDENT IN AN ORGANIZED HEALTH CARE EDUCATION/TRAINING PROGRAM

## 2019-06-10 PROCEDURE — 86900 BLOOD TYPING SEROLOGIC ABO: CPT | Performed by: STUDENT IN AN ORGANIZED HEALTH CARE EDUCATION/TRAINING PROGRAM

## 2019-06-10 PROCEDURE — 36415 COLL VENOUS BLD VENIPUNCTURE: CPT | Performed by: STUDENT IN AN ORGANIZED HEALTH CARE EDUCATION/TRAINING PROGRAM

## 2019-06-10 PROCEDURE — 82962 GLUCOSE BLOOD TEST: CPT

## 2019-06-10 PROCEDURE — 25000128 H RX IP 250 OP 636: Performed by: STUDENT IN AN ORGANIZED HEALTH CARE EDUCATION/TRAINING PROGRAM

## 2019-06-10 PROCEDURE — 71000014 ZZH RECOVERY PHASE 1 LEVEL 2 FIRST HR: Performed by: STUDENT IN AN ORGANIZED HEALTH CARE EDUCATION/TRAINING PROGRAM

## 2019-06-10 PROCEDURE — 27210794 ZZH OR GENERAL SUPPLY STERILE: Performed by: STUDENT IN AN ORGANIZED HEALTH CARE EDUCATION/TRAINING PROGRAM

## 2019-06-10 PROCEDURE — 37000009 ZZH ANESTHESIA TECHNICAL FEE, EACH ADDTL 15 MIN: Performed by: STUDENT IN AN ORGANIZED HEALTH CARE EDUCATION/TRAINING PROGRAM

## 2019-06-10 PROCEDURE — 25000128 H RX IP 250 OP 636: Performed by: THORACIC SURGERY (CARDIOTHORACIC VASCULAR SURGERY)

## 2019-06-10 PROCEDURE — 36000053 ZZH SURGERY LEVEL 2 EA 15 ADDTL MIN - UMMC: Performed by: STUDENT IN AN ORGANIZED HEALTH CARE EDUCATION/TRAINING PROGRAM

## 2019-06-10 PROCEDURE — 36000055 ZZH SURGERY LEVEL 2 W FLUORO 1ST 30 MIN - UMMC: Performed by: STUDENT IN AN ORGANIZED HEALTH CARE EDUCATION/TRAINING PROGRAM

## 2019-06-10 RX ORDER — NALOXONE HYDROCHLORIDE 0.4 MG/ML
.1-.4 INJECTION, SOLUTION INTRAMUSCULAR; INTRAVENOUS; SUBCUTANEOUS
Status: DISCONTINUED | OUTPATIENT
Start: 2019-06-10 | End: 2019-06-10 | Stop reason: HOSPADM

## 2019-06-10 RX ORDER — IPRATROPIUM BROMIDE AND ALBUTEROL SULFATE 2.5; .5 MG/3ML; MG/3ML
3 SOLUTION RESPIRATORY (INHALATION) ONCE
Status: COMPLETED | OUTPATIENT
Start: 2019-06-10 | End: 2019-06-10

## 2019-06-10 RX ORDER — ONDANSETRON 2 MG/ML
INJECTION INTRAMUSCULAR; INTRAVENOUS PRN
Status: DISCONTINUED | OUTPATIENT
Start: 2019-06-10 | End: 2019-06-10

## 2019-06-10 RX ORDER — ONDANSETRON 4 MG/1
4 TABLET, ORALLY DISINTEGRATING ORAL EVERY 30 MIN PRN
Status: DISCONTINUED | OUTPATIENT
Start: 2019-06-10 | End: 2019-06-10 | Stop reason: HOSPADM

## 2019-06-10 RX ORDER — SODIUM CHLORIDE, SODIUM LACTATE, POTASSIUM CHLORIDE, CALCIUM CHLORIDE 600; 310; 30; 20 MG/100ML; MG/100ML; MG/100ML; MG/100ML
INJECTION, SOLUTION INTRAVENOUS CONTINUOUS
Status: DISCONTINUED | OUTPATIENT
Start: 2019-06-10 | End: 2019-06-10 | Stop reason: HOSPADM

## 2019-06-10 RX ORDER — SODIUM CHLORIDE, SODIUM LACTATE, POTASSIUM CHLORIDE, CALCIUM CHLORIDE 600; 310; 30; 20 MG/100ML; MG/100ML; MG/100ML; MG/100ML
INJECTION, SOLUTION INTRAVENOUS CONTINUOUS PRN
Status: DISCONTINUED | OUTPATIENT
Start: 2019-06-10 | End: 2019-06-10

## 2019-06-10 RX ORDER — EPHEDRINE SULFATE 50 MG/ML
INJECTION, SOLUTION INTRAMUSCULAR; INTRAVENOUS; SUBCUTANEOUS PRN
Status: DISCONTINUED | OUTPATIENT
Start: 2019-06-10 | End: 2019-06-10

## 2019-06-10 RX ORDER — PROPOFOL 10 MG/ML
INJECTION, EMULSION INTRAVENOUS PRN
Status: DISCONTINUED | OUTPATIENT
Start: 2019-06-10 | End: 2019-06-10

## 2019-06-10 RX ORDER — FENTANYL CITRATE 50 UG/ML
INJECTION, SOLUTION INTRAMUSCULAR; INTRAVENOUS PRN
Status: DISCONTINUED | OUTPATIENT
Start: 2019-06-10 | End: 2019-06-10

## 2019-06-10 RX ORDER — LIDOCAINE HYDROCHLORIDE 20 MG/ML
INJECTION, SOLUTION INFILTRATION; PERINEURAL PRN
Status: DISCONTINUED | OUTPATIENT
Start: 2019-06-10 | End: 2019-06-10

## 2019-06-10 RX ORDER — MEPERIDINE HYDROCHLORIDE 25 MG/ML
12.5 INJECTION INTRAMUSCULAR; INTRAVENOUS; SUBCUTANEOUS
Status: DISCONTINUED | OUTPATIENT
Start: 2019-06-10 | End: 2019-06-10 | Stop reason: HOSPADM

## 2019-06-10 RX ORDER — IOPAMIDOL 755 MG/ML
INJECTION, SOLUTION INTRAVASCULAR PRN
Status: DISCONTINUED | OUTPATIENT
Start: 2019-06-10 | End: 2019-06-10 | Stop reason: HOSPADM

## 2019-06-10 RX ORDER — ONDANSETRON 2 MG/ML
4 INJECTION INTRAMUSCULAR; INTRAVENOUS EVERY 30 MIN PRN
Status: DISCONTINUED | OUTPATIENT
Start: 2019-06-10 | End: 2019-06-10 | Stop reason: HOSPADM

## 2019-06-10 RX ORDER — DEXAMETHASONE SODIUM PHOSPHATE 4 MG/ML
INJECTION, SOLUTION INTRA-ARTICULAR; INTRALESIONAL; INTRAMUSCULAR; INTRAVENOUS; SOFT TISSUE PRN
Status: DISCONTINUED | OUTPATIENT
Start: 2019-06-10 | End: 2019-06-10

## 2019-06-10 RX ADMIN — PHENYLEPHRINE HYDROCHLORIDE 100 MCG: 10 INJECTION INTRAVENOUS at 07:45

## 2019-06-10 RX ADMIN — ENOXAPARIN SODIUM 40 MG: 40 INJECTION SUBCUTANEOUS at 06:54

## 2019-06-10 RX ADMIN — Medication 5 MG: at 07:56

## 2019-06-10 RX ADMIN — PHENYLEPHRINE HYDROCHLORIDE 150 MCG: 10 INJECTION INTRAVENOUS at 07:59

## 2019-06-10 RX ADMIN — Medication 75 MG: at 07:35

## 2019-06-10 RX ADMIN — PROPOFOL 50 MG: 10 INJECTION, EMULSION INTRAVENOUS at 07:44

## 2019-06-10 RX ADMIN — DEXAMETHASONE SODIUM PHOSPHATE 6 MG: 4 INJECTION, SOLUTION INTRA-ARTICULAR; INTRALESIONAL; INTRAMUSCULAR; INTRAVENOUS; SOFT TISSUE at 07:40

## 2019-06-10 RX ADMIN — PROPOFOL 30 MG: 10 INJECTION, EMULSION INTRAVENOUS at 07:46

## 2019-06-10 RX ADMIN — PHENYLEPHRINE HYDROCHLORIDE 100 MCG: 10 INJECTION INTRAVENOUS at 07:49

## 2019-06-10 RX ADMIN — FENTANYL CITRATE 50 MCG: 50 INJECTION, SOLUTION INTRAMUSCULAR; INTRAVENOUS at 07:33

## 2019-06-10 RX ADMIN — Medication 5 MG: at 07:51

## 2019-06-10 RX ADMIN — PROPOFOL 120 MG: 10 INJECTION, EMULSION INTRAVENOUS at 07:35

## 2019-06-10 RX ADMIN — SUGAMMADEX 90 MG: 100 INJECTION, SOLUTION INTRAVENOUS at 08:05

## 2019-06-10 RX ADMIN — SODIUM CHLORIDE, POTASSIUM CHLORIDE, SODIUM LACTATE AND CALCIUM CHLORIDE: 600; 310; 30; 20 INJECTION, SOLUTION INTRAVENOUS at 07:35

## 2019-06-10 RX ADMIN — ROCURONIUM BROMIDE 20 MG: 10 INJECTION INTRAVENOUS at 07:43

## 2019-06-10 RX ADMIN — ONDANSETRON 4 MG: 2 INJECTION INTRAMUSCULAR; INTRAVENOUS at 07:47

## 2019-06-10 RX ADMIN — IPRATROPIUM BROMIDE AND ALBUTEROL SULFATE 3 ML: .5; 3 SOLUTION RESPIRATORY (INHALATION) at 08:57

## 2019-06-10 RX ADMIN — LIDOCAINE HYDROCHLORIDE 100 MG: 20 INJECTION, SOLUTION INFILTRATION; PERINEURAL at 07:33

## 2019-06-10 RX ADMIN — PHENYLEPHRINE HYDROCHLORIDE 150 MCG: 10 INJECTION INTRAVENOUS at 07:55

## 2019-06-10 ASSESSMENT — MIFFLIN-ST. JEOR: SCORE: 1271.88

## 2019-06-10 NOTE — DISCHARGE INSTRUCTIONS
General acute hospital  Same-Day Surgery   Adult Discharge Orders & Instructions     For 24 hours after surgery    1. Get plenty of rest.  A responsible adult must stay with you for at least 24 hours after you leave the hospital.   2. Do not drive or use heavy equipment.  If you have weakness or tingling, don't drive or use heavy equipment until this feeling goes away.  3. Do not drink alcohol.  4. Avoid strenuous or risky activities.  Ask for help when climbing stairs.   5. You may feel lightheaded.  IF so, sit for a few minutes before standing.  Have someone help you get up.   6. If you have nausea (feel sick to your stomach): Drink only clear liquids such as apple juice, ginger ale, broth or 7-Up.  Rest may also help.  Be sure to drink enough fluids.  Move to a regular diet as you feel able.  7. You may have a slight fever. Call the doctor if your fever is over 100 F (37.7 C) (taken under the tongue) or lasts longer than 24 hours.  8. You may have a dry mouth, a sore throat, muscle aches or trouble sleeping.  These should go away after 24 hours.  9. Do not make important or legal decisions.   Call your doctor for any of the followin.  Signs of infection (fever, growing tenderness at the surgery site, a large amount of drainage or bleeding, severe pain, foul-smelling drainage, redness, swelling).    2. It has been over 8 to 10 hours since surgery and you are still not able to urinate (pass water).    3.  Headache for over 24 hours.    To contact a doctor, call Dr. Ally Ybarra @ [phone] (clinic) or [phone] (office)or:        [phone] and ask for the resident on call for Thoracic Surgery (answered 24 hours a day)      Emergency Department:    [facility]: [phone]       (TTY for hearing impaired: [phone])    [facility]: [phone]       (TTY for hearing impaired: [phone])

## 2019-06-10 NOTE — ANESTHESIA POSTPROCEDURE EVALUATION
Anesthesia POST Procedure Evaluation    Patient: Myrtle Vaz   MRN:     [id number] Gender:   female   Age:    73 year old :      1945        Preoperative Diagnosis: Esophageal Perforation   Procedure(s):  Esophagogastroduodenoscopy and Stent Removal   Postop Comments: No value filed.       Anesthesia Type:  General  No value filed.    Reportable Event: NO     PAIN: Uncomplicated   Sign Out status: Comfortable, Well controlled pain     PONV: No PONV   Sign Out status:  No Nausea or Vomiting     Neuro/Psych: Uneventful perioperative course   Sign Out Status: Preoperative baseline; Age appropriate mentation     Airway/Resp.: Uneventful perioperative course   Sign Out Status: Non labored breathing, age appropriate RR; Resp. Status within EXPECTED Parameters     CV: Uneventful perioperative course   Sign Out status: Appropriate BP and perfusion indices; Appropriate HR/Rhythm     Disposition:   Sign Out in:  PACU  Recovery Course: Uneventful  Follow-Up: Not required           Last Anesthesia Record Vitals:  CRNA VITALS  6/10/2019 0744 - 6/10/2019 0844      6/10/2019             NIBP:  122/74    Pulse:  81    NIBP Mean:  87    Temp:  36.8  C (98.2  F)    SpO2:  99 %    Resp Rate (observed):  16    Resp Rate (set):  10          Last PACU Vitals:  Vitals Value Taken Time   BP 99/58 6/10/2019  9:44 AM   Temp 36.8  C (98.2  F) 6/10/2019  9:15 AM   Pulse 79 6/10/2019  9:44 AM   Resp 14 6/10/2019  9:30 AM   SpO2 95 % 6/10/2019  9:39 AM   Temp src     NIBP 122/74 6/10/2019  8:18 AM   Pulse 81 6/10/2019  8:18 AM   SpO2 99 % 6/10/2019  8:18 AM   Resp     Temp 36.8  C (98.2  F) 6/10/2019  8:18 AM   Ht Rate     Temp 2     Vitals shown include unvalidated device data.      Electronically Signed By: Krzysztof Huerta MD, Sarah 10, 2019, 1:12 PM

## 2019-06-10 NOTE — OP NOTE
Procedure Date: 06/10/2019      PREOPERATIVE DIAGNOSIS:  Esophageal anastomotic leak, status post stent placement.      POSTOPERATIVE DIAGNOSIS:  Esophageal anastomotic leak, status post stent placement.      PROCEDURE PERFORMED:  Upper endoscopy, removal of stent.  Fluoroscopic interpretation of images.      DESCRIPTION OF PROCEDURE:  After obtaining informed consent, the patient was brought to the operating room and laid supine on the operating table.  After induction of general anesthesia, and introduction of an endotracheal tube, an adult gastroscope was passed per orally.  We were able to visualize the stent.  The stent was removed using a rat-tooth forceps.  We then thoroughly examined the gastric conduit and the esophagus and the anastomosis.  There was no obvious defect.  With fluoroscopic visualization, Isovue was injected into the gastroscope and there was no leakage of contrast.  There was a small outpouching near the anastomosis, but this was in a small contained area and did not leak out.  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  We decided not to replace the stent.  The patient was extubated and transferred to the recovery room in stable condition.         BANDAR DE LEON MD             D: 06/10/2019   T: 06/10/2019   MT: KALA      Name:     CANDICE LOYA   MRN:      [id number]        Account:        [id number]   :      1945           Procedure Date: 06/10/2019      Document: [id number]

## 2019-06-10 NOTE — ANESTHESIA CARE TRANSFER NOTE
Patient: Myrtle Vaz    Procedure(s):  Esophagogastroduodenoscopy and Stent Removal    Diagnosis: Esophageal Perforation  Diagnosis Additional Information: No value filed.    Anesthesia Type:   No value filed.     Note:  Airway :Face Mask  Patient transferred to:PACU  Handoff Report: Identifed the Patient, Identified the Reponsible Provider, Reviewed the pertinent medical history, Discussed the surgical course, Reviewed Intra-OP anesthesia mangement and issues during anesthesia, Set expectations for post-procedure period and Allowed opportunity for questions and acknowledgement of understanding      Vitals: (Last set prior to Anesthesia Care Transfer)    CRNA VITALS  6/10/2019 0744 - 6/10/2019 0819      6/10/2019             NIBP:  122/74    Pulse:  81    NIBP Mean:  87    Temp:  36.8  C (98.2  F)    SpO2:  99 %    Resp Rate (observed):  16    Resp Rate (set):  10                Electronically Signed By: Tony Caban MD  Sarah 10, 2019  8:19 AM

## 2019-06-17 ENCOUNTER — TELEPHONE (OUTPATIENT)
Dept: INFECTIOUS DISEASES | Facility: CLINIC | Age: 74
End: 2019-06-17

## 2019-06-17 ASSESSMENT — ENCOUNTER SYMPTOMS
DISTURBANCES IN COORDINATION: 1
HEMOPTYSIS: 0
JAUNDICE: 0
FEVER: 0
SPUTUM PRODUCTION: 1
NERVOUS/ANXIOUS: 1
TASTE DISTURBANCE: 0
MEMORY LOSS: 1
MUSCLE WEAKNESS: 1
NECK MASS: 0
WHEEZING: 0
SINUS CONGESTION: 0
SPEECH CHANGE: 0
DOUBLE VISION: 0
DIARRHEA: 1
BOWEL INCONTINENCE: 0
TROUBLE SWALLOWING: 1
VOMITING: 1
TINGLING: 0
NIGHT SWEATS: 0
DIZZINESS: 0
SMELL DISTURBANCE: 0
COUGH DISTURBING SLEEP: 0
MYALGIAS: 1
DECREASED CONCENTRATION: 1
SHORTNESS OF BREATH: 0
BACK PAIN: 1
PARALYSIS: 0
SORE THROAT: 1
HEARTBURN: 1
STIFFNESS: 0
BLOOD IN STOOL: 0
HALLUCINATIONS: 0
LOSS OF CONSCIOUSNESS: 0
DECREASED APPETITE: 0
COUGH: 0
ARTHRALGIAS: 1
EYE REDNESS: 0
POLYPHAGIA: 1
INSOMNIA: 1
EYE WATERING: 0
HOARSE VOICE: 1
MUSCLE CRAMPS: 0
SEIZURES: 0
EYE PAIN: 0
ABDOMINAL PAIN: 0
BLOATING: 1
DYSPNEA ON EXERTION: 0
NAUSEA: 0
CONSTIPATION: 1
JOINT SWELLING: 0
RECTAL PAIN: 0
WEIGHT GAIN: 0
TREMORS: 0
FATIGUE: 1
HEADACHES: 0
WEIGHT LOSS: 1
EYE IRRITATION: 0
NECK PAIN: 1
INCREASED ENERGY: 0
ALTERED TEMPERATURE REGULATION: 0
POLYDIPSIA: 0
WEAKNESS: 1
NUMBNESS: 0
SINUS PAIN: 0
DEPRESSION: 1
PANIC: 1
CHILLS: 0
SNORES LOUDLY: 0

## 2019-06-17 NOTE — TELEPHONE ENCOUNTER
Patient contacted and reminded of upcoming appointment.  Patient confirmed they will be attending.  Patient instructed to bring updated medications list to appointment.    Frances Woods MA

## 2019-06-18 DIAGNOSIS — K22.3 ESOPHAGEAL PERFORATION: Primary | ICD-10-CM

## 2019-06-19 ENCOUNTER — ANCILLARY PROCEDURE (OUTPATIENT)
Dept: GENERAL RADIOLOGY | Facility: CLINIC | Age: 74
End: 2019-06-19
Attending: CLINICAL NURSE SPECIALIST
Payer: MEDICARE

## 2019-06-19 ENCOUNTER — OFFICE VISIT (OUTPATIENT)
Dept: INFECTIOUS DISEASES | Facility: CLINIC | Age: 74
End: 2019-06-19
Attending: INTERNAL MEDICINE
Payer: MEDICARE

## 2019-06-19 ENCOUNTER — TELEPHONE (OUTPATIENT)
Dept: SURGERY | Facility: CLINIC | Age: 74
End: 2019-06-19

## 2019-06-19 VITALS
TEMPERATURE: 97.9 F | HEART RATE: 82 BPM | OXYGEN SATURATION: 95 % | DIASTOLIC BLOOD PRESSURE: 64 MMHG | SYSTOLIC BLOOD PRESSURE: 103 MMHG

## 2019-06-19 DIAGNOSIS — K22.3 ESOPHAGEAL PERFORATION: ICD-10-CM

## 2019-06-19 DIAGNOSIS — A49.9 POLYMICROBIAL BACTERIAL INFECTION: ICD-10-CM

## 2019-06-19 DIAGNOSIS — J85.3 MEDIASTINAL ABSCESS (H): Primary | ICD-10-CM

## 2019-06-19 DIAGNOSIS — Z98.890 HISTORY OF NISSEN FUNDOPLICATION: ICD-10-CM

## 2019-06-19 DIAGNOSIS — J85.3 MEDIASTINAL ABSCESS (H): ICD-10-CM

## 2019-06-19 LAB
ALBUMIN SERPL-MCNC: 3.4 G/DL (ref 3.4–5)
BASOPHILS # BLD AUTO: 0.1 10E9/L (ref 0–0.2)
BASOPHILS NFR BLD AUTO: 0.7 %
CRP SERPL-MCNC: 24.8 MG/L (ref 0–8)
DIFFERENTIAL METHOD BLD: ABNORMAL
EOSINOPHIL # BLD AUTO: 0.3 10E9/L (ref 0–0.7)
EOSINOPHIL NFR BLD AUTO: 4 %
ERYTHROCYTE [DISTWIDTH] IN BLOOD BY AUTOMATED COUNT: 17.2 % (ref 10–15)
ERYTHROCYTE [SEDIMENTATION RATE] IN BLOOD BY WESTERGREN METHOD: 101 MM/H (ref 0–30)
HCT VFR BLD AUTO: 33.4 % (ref 35–47)
HGB BLD-MCNC: 9.7 G/DL (ref 11.7–15.7)
IMM GRANULOCYTES # BLD: 0 10E9/L (ref 0–0.4)
IMM GRANULOCYTES NFR BLD: 0.3 %
LYMPHOCYTES # BLD AUTO: 2 10E9/L (ref 0.8–5.3)
LYMPHOCYTES NFR BLD AUTO: 27.8 %
MCH RBC QN AUTO: 26.1 PG (ref 26.5–33)
MCHC RBC AUTO-ENTMCNC: 29 G/DL (ref 31.5–36.5)
MCV RBC AUTO: 90 FL (ref 78–100)
MONOCYTES # BLD AUTO: 0.8 10E9/L (ref 0–1.3)
MONOCYTES NFR BLD AUTO: 10.8 %
NEUTROPHILS # BLD AUTO: 4.1 10E9/L (ref 1.6–8.3)
NEUTROPHILS NFR BLD AUTO: 56.4 %
NRBC # BLD AUTO: 0 10*3/UL
NRBC BLD AUTO-RTO: 0 /100
PLATELET # BLD AUTO: 708 10E9/L (ref 150–450)
RBC # BLD AUTO: 3.71 10E12/L (ref 3.8–5.2)
WBC # BLD AUTO: 7.2 10E9/L (ref 4–11)

## 2019-06-19 PROCEDURE — 86140 C-REACTIVE PROTEIN: CPT

## 2019-06-19 PROCEDURE — 85025 COMPLETE CBC W/AUTO DIFF WBC: CPT

## 2019-06-19 PROCEDURE — G0463 HOSPITAL OUTPT CLINIC VISIT: HCPCS | Mod: ZF

## 2019-06-19 PROCEDURE — 85652 RBC SED RATE AUTOMATED: CPT

## 2019-06-19 RX ORDER — NUTRIT.THERAPY, MSUD WITH IRON 60 G-316
POWDER IN PACKET (EA) ORAL
COMMUNITY
Start: 2019-03-11 | End: 2019-08-06

## 2019-06-19 ASSESSMENT — PAIN SCALES - GENERAL: PAINLEVEL: SEVERE PAIN (7)

## 2019-06-19 NOTE — TELEPHONE ENCOUNTER
Call to Natasha's daughter, Haydee. Natasha's UGI shows no evidence of an esophageal leak following her stent removal. If she is tolerating clears, she can advance to full liquids. Dr. Albarado can make further recommendations when he sees her in clinic on July 3. If Natasha has trouble with fulls, she can go back to clears.    Haydee was very happy to hear this and will share the news with her mother.

## 2019-06-19 NOTE — PROGRESS NOTES
Infectious Disease Clinic Note  Patient:  Myrtle Vaz, Date of birth 1945, Medical record number [id number]  Date of Service: 6/19/2019   Consult for: Hospital follow up of mediastinal abscess         Assessment and Recommendations:   Problem List:  - H/o hiatal hernia repair with mesh reinforcement, nissen fundoplication, and gastrostomy tube placement c/b postop esophageal perforation 11/2018  - S/p Nissen takedown, drainage of mediastinal abscess, trans-hiatal partial esophagogastrectomy, spit fistula, gastrostomy and jejunostomy 11/2018. All mesh removed per discussion with thoracic surgery.   - S/p redo laparotomy, partial sternotomy, intrabdominal retrosternal esophagogastrostomy 4/26/2019  - Postop anastomotic leak now s/p EGD, flex bronch, esophageal stent, neck washout 5/6. Multiple abscesses on pre-op CT. Mediastinal abscess cultures from 5/6 with S. anginosus, S. agalactiae, Enterococcus faecalis, Parvimonas micra and Candida albicans.  - BAL with Stenotrophomonas maltophilia, S. agalactiae, Candida albicans.  - Thrombocytosis - likely reactive    Discussion:  73 year old female with PMH of laparoscopic hiatal hernia repair with mesh reinforcement, nissen fundoplication, and gastrostomy tube placement c/b postop esophageal perforation 11/2018 s/p Nissen takedown, drainage of mediastinal abscess, trans-hiatal partial esophagogastrectomy, spit fistula, gastrostomy and jejunostomy. Admitted for redo laparotomy, partial sternotomy, intrabdominal retrosternal esophagogastrostomy for continued pain and clogged G-tube, procedure done on 4/26. Found to have anastomotic leak 5/5 on CT now s/p EGD, flex bronch, esophageal stent, neck washout on 5/6.  Bronchoscopy on 5/2 with stenotrophomonas maltophilia, strep agalactiae, fungal cultures with candida albicans. Mediastinal abscess cultures from 5/6 with strep anginosus, enterococcus faecalis, parvimonas micra and candida albicans. S/p IV Unasyn and Fluconazole  5/6-5/20. CT C/A/P from 5/13/19 with largest remaining fluid collection is 2 x 2 x 1 cm and is located just to the right of the gastric pull through.   Stent removed on 6/10, with no obvious defect seen/leakage of contrast.    Pt appears to have been taken off antibiotics earlier than originally planned. Plan was to continue IV Unasyn +Fluconazole x 2 weeks after 5/6 surgery (-5/20) and then transition to oral Augmentin +Fluconazole for an additional 2 weeks (till 6/3). But she was discharged to rehab, and appears to have been off antibiotics after completing the IV course on 5/20. At today's visit, she has been doing well off antibiotics for about a month now, with no fevers, chills or sweats, and reassuring EGD and imaging findings as above. It would therefore be ok to continue off antibiotics. Will check inflammatory markers to make sure. PT will also need Throacic surgery f/u.     She also had thrombocytosis while admitted, possibly reactive. Will repeat platelets and refer to heme/onc if platelets still significantly elevated/rising.    Recommendations:  - CBC, ESR, CRP  - No antibiotics needed casandra  - Continue wound dressing as recommended before  - Thoracic surgery f/u  - Will refer to heme/onc if platelets still significantly elevated/rising    Plan of care discussed with Staff ID Physician Dr Brennon Craft  PGY4 Infectious Diseases Fellow  Pager: [phone]      History of Presenting Illness:     73 year old female with PMH of laparoscopic hiatal hernia repair with mesh reinforcement, nissen fundoplication, and gastrostomy tube placement c/b postop esophageal perforation 11/2018 s/p Nissen takedown, drainage of mediastinal abscess, trans-hiatal partial esophagogastrectomy, spit fistula, gastrostomy and jejunostomy. Admitted recently for redo laparotomy, partial sternotomy, intrabdominal retrosternal esophagogastrostomy for continued pain and clogged G-tube, procedure done on 4/26.  Found to have anastomotic leak 5/5 on CT now s/p EGD, flex bronch, esophageal stent, neck washout on 5/6.  Bronchoscopy on 5/2 with stenotrophomonas maltophilia, strep agalactiae, fungal cultures with candida albicans. Mediastinal abscess cultures from 5/6 with strep anginosus, enterococcus faecalis, Parvimonas micra and candida albicans. Stenotrophomonas from sputum was considered likely a colonizer so was not treated.  CT C/A/P from 5/13/19 was discussed with radiology - largest remaining fluid collection is 2 x 2 x 1 cm and was located just to the right of the gastric pull through. Plan was to continue IV Unasyn +Fluconazole x 2 weeks after 5/6 surgery(-5/20) and then transition to oral Augmentin +Fluconazole for an additional 2 weeks (till 6/3). She was discharged to rehab, and appears to have been off antibiotics after completing the IV course on 5/20. She was taken off the wound vac this day too. She was eventually discharged home from rehab on 5/28. Pt underwent EGD on 6/10, the stent was removed. The gastric conduit, the esophagus and the anastomosis were thoroughly examined and there was no obvious defect. With fluoroscopic visualization, Isovue was injected into the gastroscope and there was no leakage of contrast. There was a small outpouching near the anastomosis, but this was in a small contained area and did not leak out.  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  It was decided not to replace the stent.     Pt reports to have been doing well. She denies recent fevers, chills, sweats. No abdominal pain, N,V,diarrhea.  Her dressings are being changed BID by her daughter at home, and left medial clavicular wound reported to be healing well. Occasional regurgitation on lying down. Thoracic surgery f/u scheduled after July 4th weekend.     Antibiotic history:   Unasyn 5/13-5/20  Fluconazole 5/6-5/20    Pip/tazo 5/3-5/13  Vancomycin 5/7-5/8  TMP-SMX 5/6-5/9         Review of Systems:    5 point ROS negative  other than above         Physical Exam:   /64 (BP Location: Right arm, Patient Position: Sitting, Cuff Size: Adult Regular)   Pulse 82   Temp 97.9  F (36.6  C) (Oral)   SpO2 95%      Exam:  GENERAL:  Well-developed, well-nourished, no acute distress on whhellchair.   ENT:  Head is normocephalic, atraumatic. Anterior oropharynx without ulcers.  EYES:  Eyes have anicteric sclerae.    CHEST: Midline incision well healed. Left medial clavicular wound with dressing, appears to be healing well, minimal white discharge  LUNGS:  Normal respiratory effort.   ABDOMEN:  Non-distended. G tube in place without surrounding erythema  EXT: Extremities without visible edema.   SKIN:  No acute rashes.   NEUROLOGIC:  Grossly nonfocal.          Laboratory Data:     Creatinine   Date Value Ref Range Status   05/27/2019 0.67 0.52 - 1.04 mg/dL Final   05/24/2019 0.63 0.52 - 1.04 mg/dL Final   05/21/2019 0.65 0.52 - 1.04 mg/dL Final   05/16/2019 0.68 0.52 - 1.04 mg/dL Final   05/14/2019 0.76 0.52 - 1.04 mg/dL Final     WBC   Date Value Ref Range Status   05/27/2019 10.2 4.0 - 11.0 10e9/L Final   05/24/2019 10.2 4.0 - 11.0 10e9/L Final   05/21/2019 10.7 4.0 - 11.0 10e9/L Final   05/16/2019 9.2 4.0 - 11.0 10e9/L Final   05/15/2019 10.4 4.0 - 11.0 10e9/L Final     Hemoglobin   Date Value Ref Range Status   05/27/2019 8.5 (L) 11.7 - 15.7 g/dL Final     Platelet Count   Date Value Ref Range Status   05/27/2019 831 (H) 150 - 450 10e9/L Final     Lab Results   Component Value Date     05/27/2019    BUN 28 05/27/2019    CO2 26 05/27/2019     CRP Inflammation   Date Value Ref Range Status   05/14/2019 91.0 (H) 0.0 - 8.0 mg/L Final   05/12/2019 97.0 (H) 0.0 - 8.0 mg/L Final   02/04/2019 14.0 (H) 0.0 - 8.0 mg/L Final   11/25/2018 160.0 (H) 0.0 - 8.0 mg/L Final           Pertinent Recent Microbiology Data:     Mediastinal abscess cultures from 5/6 with S. anginosus, S. agalactiae, Enterococcus faecalis, Parvimonas micra and Candida  albicans.  BAL 5/2 with Stenotrophomonas maltophilia, S. agalactiae, Candida albicans.         Imaging:   CT C/A/P without contrast 5/13/19:   Improvement in prior fluid collections. Discussed with radiology. Largest remaining collection is ~2x2x1 cm.    Esophagogram 5/10:  Impression:   1. No esophageal leak demonstrated.  2. Stasis of contrast up to the superior esophagus suggesting  Dysmotility.    CXR 5/10  Impression:   1. The pharyngostomy stent is unchanged in position from prior  examination. Additional support devices are stable.  2. Trace pleural effusions and bibasilar atelectasis are unchanged.    Videoswallow 5/10  Impression: Penetration without aspiration with swallows of Omnipaque  140 contrast which is improved with chin tuck technique  Answers for HPI/ROS submitted by the patient on 6/17/2019   General Symptoms: Yes  Skin Symptoms: No  HENT Symptoms: Yes  EYE SYMPTOMS: Yes  HEART SYMPTOMS: No  LUNG SYMPTOMS: Yes  INTESTINAL SYMPTOMS: Yes  URINARY SYMPTOMS: No  GYNECOLOGIC SYMPTOMS: No  BREAST SYMPTOMS: No  SKELETAL SYMPTOMS: Yes  BLOOD SYMPTOMS: No  NERVOUS SYSTEM SYMPTOMS: Yes  MENTAL HEALTH SYMPTOMS: Yes  Fever: No  Loss of appetite: No  Weight loss: Yes  Weight gain: No  Fatigue: Yes  Night sweats: No  Chills: No  Increased stress: Yes  Excessive hunger: Yes  Excessive thirst: No  Feeling hot or cold when others believe the temperature is normal: No  Loss of height: No  Post-operative complications: Yes  Surgical site pain: Yes  Hallucinations: No  Change in or Loss of Energy: No  Hyperactivity: No  Confusion: No  Ear pain: No  Ear discharge: No  Hearing loss: Yes  Tinnitus: Yes  Nosebleeds: No  Congestion: No  Sinus pain: No  Trouble swallowing: Yes   Voice hoarseness: Yes  Mouth sores: No  Sore throat: Yes  Tooth pain: No  Gum tenderness: No  Bleeding gums: No  Change in taste: No  Change in sense of smell: No  Dry mouth: No  Hearing aid used: No  Neck lump: No  Eye pain: No  Vision loss:  Yes  Dry eyes: No  Watery eyes: No  Eye bulging: No  Double vision: No  Flashing of lights: No  Spots: No  Floaters: No  Redness: No  Crossed eyes: No  Tunnel Vision: No  Yellowing of eyes: No  Eye irritation: No  Cough: No  Sputum or phlegm: Yes  Coughing up blood: No  Difficulty breating or shortness of breath: No  Snoring: No  Wheezing: No  Difficulty breathing on exertion: No  Nighttime Cough: No  Heart burn or indigestion: Yes  Nausea: No  Vomiting: Yes  Abdominal pain: No  Bloating: Yes  Constipation: Yes  Diarrhea: Yes  Blood in stool: No  Black stools: No  Rectal or Anal pain: No  Fecal incontinence: No  Yellowing of skin or eyes: No  Vomit with blood: No  Change in stools: No  Back pain: Yes  Muscle aches: Yes  Neck pain: Yes  Swollen joints: No  Joint pain: Yes  Bone pain: No  Muscle cramps: No  Muscle weakness: Yes  Joint stiffness: No  Bone fracture: No  Trouble with coordination: Yes  Dizziness or trouble with balance: No  Fainting or black-out spells: No  Memory loss: Yes  Headache: No  Seizures: No  Speech problems: No  Tingling: No  Tremor: No  Weakness: Yes  Difficulty walking: No  Paralysis: No  Numbness: No  Nervous or Anxious: Yes  Depression: Yes  Trouble sleeping: Yes  Trouble thinking or concentrating: Yes  Mood changes: No  Panic attacks: Yes

## 2019-06-19 NOTE — NURSING NOTE
Chief Complaint   Patient presents with     RECHECK     Hospital f/u of mediastinal abscess       /64 (BP Location: Right arm, Patient Position: Sitting, Cuff Size: Adult Regular)   Pulse 82   Temp 97.9  F (36.6  C) (Oral)   SpO2 95% ]  Natalia Allison CMA    6/19/2019 1:42 PM

## 2019-06-19 NOTE — PATIENT INSTRUCTIONS
- CBC, ESR, CRP  - Thoracic surgery f/u  - No antibiotics needed casandra  - Will refer to heme/onc if platelets still significantly elevated/rising

## 2019-06-26 NOTE — PROGRESS NOTES
Infectious Disease Clinic Staff Note: Ms. Vaz was seen, examined, and the case was discussed with Dr. Moore, ID Fellow -- I agree with her interval history and examination, assessment and plan in this post-hospitalization outpatient ID Progress note. The note reflects my observations and opinions, and the plan outlined fully reflects my approach. I have reviewed the available history, laboratory results, radiography and other reports with the Fellow.

## 2019-07-02 ASSESSMENT — ENCOUNTER SYMPTOMS
NERVOUS/ANXIOUS: 1
LOSS OF CONSCIOUSNESS: 0
HEADACHES: 0
JOINT SWELLING: 0
BLOOD IN STOOL: 0
SEIZURES: 0
DIARRHEA: 0
DIZZINESS: 0
JAUNDICE: 0
NUMBNESS: 0
ARTHRALGIAS: 1
WEAKNESS: 1
MUSCLE CRAMPS: 0
NECK PAIN: 1
MUSCLE WEAKNESS: 1
PANIC: 0
VOMITING: 0
ABDOMINAL PAIN: 0
STIFFNESS: 1
MYALGIAS: 1
BOWEL INCONTINENCE: 0
BLOATING: 0
PARALYSIS: 0
MEMORY LOSS: 1
INSOMNIA: 1
TINGLING: 0
CONSTIPATION: 0
DECREASED CONCENTRATION: 1
BACK PAIN: 1
NAUSEA: 0
TREMORS: 0
DISTURBANCES IN COORDINATION: 0
DEPRESSION: 1
RECTAL PAIN: 0
SPEECH CHANGE: 0
HEARTBURN: 1

## 2019-07-03 ENCOUNTER — OFFICE VISIT (OUTPATIENT)
Dept: SURGERY | Facility: CLINIC | Age: 74
End: 2019-07-03
Attending: THORACIC SURGERY (CARDIOTHORACIC VASCULAR SURGERY)
Payer: MEDICARE

## 2019-07-03 VITALS
SYSTOLIC BLOOD PRESSURE: 91 MMHG | HEIGHT: 65 IN | DIASTOLIC BLOOD PRESSURE: 54 MMHG | HEART RATE: 82 BPM | RESPIRATION RATE: 16 BRPM | OXYGEN SATURATION: 93 % | BODY MASS INDEX: 27.92 KG/M2 | TEMPERATURE: 96.4 F

## 2019-07-03 DIAGNOSIS — Z90.49 HISTORY OF ESOPHAGECTOMY: ICD-10-CM

## 2019-07-03 DIAGNOSIS — Z98.890 HISTORY OF ESOPHAGECTOMY: ICD-10-CM

## 2019-07-03 DIAGNOSIS — K91.89 ESOPHAGECTOMY, ANASTOMOTIC LEAK: Primary | ICD-10-CM

## 2019-07-03 PROCEDURE — G0463 HOSPITAL OUTPT CLINIC VISIT: HCPCS | Mod: ZF

## 2019-07-03 ASSESSMENT — PAIN SCALES - GENERAL: PAINLEVEL: SEVERE PAIN (7)

## 2019-07-03 NOTE — PROGRESS NOTES
THORACIC SURGERY FOLLOW UP VISIT     Dear Joey Meehan  I saw Ms. Vaz in follow-up today. The clinical summary follows:      74 y/o female one month after retrosternal gastric pull-up following bipolar exclusion for esophageal perforation.     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      PROCEDURE   6/10/2019 Redo laparotomy, takedown of cervical esophagostomy with retrosternal esophagogastrostomy.      COMPLICATIONS  Anastomotic leak requiring esophageal stent.     INTERVAL STUDIES  6/19/19 Esophagram - no leak following stent removal     ETOH None  TOB None  BMI 28    Physical exam:   Neck wound almost completely healed. Sternum stable.      SUBJECTIVE   Ms. Vaz is doing well. She has been on a full liquid diet and nocturnal tube feeds. Denies dysphagia or regurgitation. She is maintaining her weight and having normal bowel movements.      From a personal perspective, she is here with her daughter Haydee.      IMPRESSION (K91.89) Esophagectomy, anastomotic leak  (primary encounter diagnosis)  (Z98.890,  Z90.49) History of esophagectomy    74 y/o female one month after retrosternal gastric pull-up following bipolar exclusion for esophageal perforation.      PLAN  I reviewed the plan as follows:  1) Advance to regular diet. Avoid raw vegetables, meat or bread.   2) Stop tube feeds. Increase ensure to bid.   3) F/u in clinic in 2 months.   4) Phone f/u with PARISA in 2 weeks for feeding tube removal.      They had all their questions answered and were in agreement with the plan.  I appreciate the opportunity to participate in the care of your patient and will keep you updated.  Sincerely,

## 2019-07-03 NOTE — LETTER
7/3/2019       RE: Myrtle Vaz  [address] [facility] [address]     Dear Colleague,    Thank you for referring your patient, Myrtle Vaz, to the [facility]. Please see a copy of my visit note below.    THORACIC SURGERY FOLLOW UP VISIT     Dear Joey Meehan,  I saw Ms. Vaz in follow-up today. The clinical summary follows:      74 y/o female one month after retrosternal gastric pull-up following bipolar exclusion for esophageal perforation.     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      PROCEDURE   6/10/2019 Redo laparotomy, takedown of cervical esophagostomy with retrosternal esophagogastrostomy.      COMPLICATIONS  Anastomotic leak requiring esophageal stent.     INTERVAL STUDIES  6/19/19 Esophagram - no leak following stent removal     ETOH None  TOB None  BMI 28    Physical exam:   Neck wound almost completely healed. Sternum stable.      SUBJECTIVE   Ms. Vaz is doing well. She has been on a full liquid diet and nocturnal tube feeds. Denies dysphagia or regurgitation. She is maintaining her weight and having normal bowel movements.      From a personal perspective, she is here with her daughter Haydee.      IMPRESSION (K91.89) Esophagectomy, anastomotic leak  (primary encounter diagnosis)  (Z98.890,  Z90.49) History of esophagectomy    74 y/o female one month after retrosternal gastric pull-up following bipolar exclusion for esophageal perforation.      PLAN  I reviewed the plan as follows:  1) Advance to regular diet. Avoid raw vegetables, meat or bread.   2) Stop tube feeds. Increase ensure to bid.   3) F/u in clinic in 2 months.   4) Phone f/u with PARISA in 2 weeks for feeding tube removal.      They had all their questions answered and were in agreement with the plan.  I appreciate the opportunity to participate in the care of your patient and will keep you updated.  Sincerely,  Marin Albarado MD

## 2019-07-03 NOTE — NURSING NOTE
"Oncology Rooming Note    July 3, 2019 3:25 PM   Myrtle Vaz is a 73 year old female who presents for:    Chief Complaint   Patient presents with     Oncology Clinic Visit     RETURN VISIT; MEDIASTINAL ABSCESS; VITALS COMPLETED BY [facility]      Initial Vitals: BP 91/54   Pulse 82   Temp 96.4  F (35.8  C) (Oral)   Resp 16   Ht 1.651 m (5' 5\")   SpO2 93%   BMI 27.92 kg/m   Estimated body mass index is 27.92 kg/m  as calculated from the following:    Height as of this encounter: 1.651 m (5' 5\").    Weight as of 6/10/19: 76.1 kg (167 lb 12.3 oz). Body surface area is 1.87 meters squared.  Severe Pain (7) Comment: Data Unavailable   No LMP recorded. Patient has had a hysterectomy.  Allergies reviewed: Yes  Medications reviewed: Yes    Medications: Medication refills not needed today.  Pharmacy name entered into [facility]:    [id number] - [place], [place] - [address] [facility] ([place]) - [place], [place] - [address] AT [facility] [address]    Clinical concerns: Patient states that she started to eat full liquids. She started doing this around 06/19/2019. Patient states her chest where the suction tube was is .   Dr. Albarado was notified.      Adrianna Lomeli              "

## 2019-07-16 ENCOUNTER — TELEPHONE (OUTPATIENT)
Dept: SURGERY | Facility: CLINIC | Age: 74
End: 2019-07-16

## 2019-07-16 NOTE — PROGRESS NOTES
THORACIC SURGERY FOLLOW UP VISIT     Dear Joey Meehan,  I saw Ms. Vaz in follow-up today. The clinical summary follows:      74 y/o female 6 weeks after retrosternal gastric pull-up following bipolar exclusion for esophageal perforation.     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      PROCEDURE   6/10/2019 Redo laparotomy, takedown of cervical esophagostomy with retrosternal esophagogastrostomy.      COMPLICATIONS  Anastomotic leak requiring esophageal stent -- Resolved.     INTERVAL STUDIES  6/19/19 Esophagram - no leak following stent removal     ETOH None  TOB None  BMI 28    Physical exam:   Neck wound almost completely healed, minimal hypergranulation tissue (cauterized with silver nitrate).      SUBJECTIVE   Ms. Vaz is doing well. She has been on a regular diet and off tube feeds. Denies dysphagia or regurgitation. She is maintaining her weight and having normal bowel movements. She is here to have her J tube removed.      From a personal perspective, she is here with her daughter Haydee.      IMPRESSION (K91.89) Esophagectomy, anastomotic leak  (primary encounter diagnosis)  (Z98.890,  Z90.49) History of esophagectomy    74 y/o female 6 weeks after retrosternal gastric pull-up following bipolar exclusion for esophageal perforation.      PLAN  I reviewed the plan as follows:    1) Jejunostomy tube removed in the office.   2) F/u in clinic in 2 months with esophagram.      They had all their questions answered and were in agreement with the plan.  I appreciate the opportunity to participate in the care of your patient and will keep you updated.  Sincerely,

## 2019-07-16 NOTE — TELEPHONE ENCOUNTER
Call from Natasha's daughter, Haydee reporting that Natasha's j-tube is painful. She is not using it and the plan was to possibly have it removed so they are wondering if they can be seen to have this done.    Dr. Albarado will see her @2:30 tomorrow in the [facility]. Haydee is in agreement with this plan.

## 2019-07-17 ENCOUNTER — OFFICE VISIT (OUTPATIENT)
Dept: SURGERY | Facility: CLINIC | Age: 74
End: 2019-07-17
Attending: THORACIC SURGERY (CARDIOTHORACIC VASCULAR SURGERY)
Payer: MEDICARE

## 2019-07-17 ENCOUNTER — RECORDS - HEALTHEAST (OUTPATIENT)
Dept: LAB | Facility: CLINIC | Age: 74
End: 2019-07-17

## 2019-07-17 VITALS
RESPIRATION RATE: 16 BRPM | HEART RATE: 81 BPM | HEIGHT: 65 IN | WEIGHT: 165.6 LBS | OXYGEN SATURATION: 95 % | SYSTOLIC BLOOD PRESSURE: 125 MMHG | BODY MASS INDEX: 27.59 KG/M2 | DIASTOLIC BLOOD PRESSURE: 70 MMHG

## 2019-07-17 DIAGNOSIS — Z98.890 HISTORY OF ESOPHAGECTOMY: Primary | ICD-10-CM

## 2019-07-17 DIAGNOSIS — Z90.49 HISTORY OF ESOPHAGECTOMY: Primary | ICD-10-CM

## 2019-07-17 LAB
ALBUMIN SERPL-MCNC: 3.3 G/DL (ref 3.5–5)
ALP SERPL-CCNC: 121 U/L (ref 45–120)
ALT SERPL W P-5'-P-CCNC: 20 U/L (ref 0–45)
ANION GAP SERPL CALCULATED.3IONS-SCNC: 13 MMOL/L (ref 5–18)
AST SERPL W P-5'-P-CCNC: 22 U/L (ref 0–40)
BILIRUB SERPL-MCNC: 0.2 MG/DL (ref 0–1)
BUN SERPL-MCNC: 20 MG/DL (ref 8–28)
CALCIUM SERPL-MCNC: 9 MG/DL (ref 8.5–10.5)
CHLORIDE BLD-SCNC: 108 MMOL/L (ref 98–107)
CO2 SERPL-SCNC: 22 MMOL/L (ref 22–31)
CREAT SERPL-MCNC: 0.8 MG/DL (ref 0.6–1.1)
GFR SERPL CREATININE-BSD FRML MDRD: >60 ML/MIN/1.73M2
GLUCOSE BLD-MCNC: 84 MG/DL (ref 70–125)
IRON SATN MFR SERPL: 5 % (ref 20–50)
IRON SERPL-MCNC: 16 UG/DL (ref 42–175)
POTASSIUM BLD-SCNC: 4.5 MMOL/L (ref 3.5–5)
PROT SERPL-MCNC: 6.9 G/DL (ref 6–8)
SODIUM SERPL-SCNC: 143 MMOL/L (ref 136–145)
TIBC SERPL-MCNC: 348 UG/DL (ref 313–563)
TRANSFERRIN SERPL-MCNC: 279 MG/DL (ref 212–360)
TSH SERPL DL<=0.005 MIU/L-ACNC: 0.27 UIU/ML (ref 0.3–5)

## 2019-07-17 PROCEDURE — G0463 HOSPITAL OUTPT CLINIC VISIT: HCPCS | Mod: ZF

## 2019-07-17 ASSESSMENT — PAIN SCALES - GENERAL: PAINLEVEL: MILD PAIN (3)

## 2019-07-17 ASSESSMENT — MIFFLIN-ST. JEOR: SCORE: 1262.04

## 2019-07-17 NOTE — Clinical Note
7/17/2019       RE: Myrtle Vaz  [address] [facility] [address]     Dear Colleague,    Thank you for referring your patient, Myrtle Vaz, to the [facility]. Please see a copy of my visit note below.    THORACIC SURGERY FOLLOW UP VISIT     Dear Joey Meehan,  I saw Ms. Vaz in follow-up today. The clinical summary follows:      74 y/o female 6 weeks after retrosternal gastric pull-up following bipolar exclusion for esophageal perforation.     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      PROCEDURE   6/10/2019 Redo laparotomy, takedown of cervical esophagostomy with retrosternal esophagogastrostomy.      COMPLICATIONS  Anastomotic leak requiring esophageal stent -- Resolved.     INTERVAL STUDIES  6/19/19 Esophagram - no leak following stent removal     ETOH None  TOB None  BMI 28    Physical exam:   Neck wound almost completely healed, minimal hypergranulation tissue (cauterized with silver nitrate).      SUBJECTIVE   Ms. Vaz is doing well. She has been on a regular diet and off tube feeds. Denies dysphagia or regurgitation. She is maintaining her weight and having normal bowel movements. She is here to have her J tube removed.      From a personal perspective, she is here with her daughter Haydee.      IMPRESSION (K91.89) Esophagectomy, anastomotic leak  (primary encounter diagnosis)  (Z98.890,  Z90.49) History of esophagectomy    74 y/o female 6 weeks after retrosternal gastric pull-up following bipolar exclusion for esophageal perforation.      PLAN  I reviewed the plan as follows:    1) Jejunostomy tube removed in the office.   2) F/u in clinic in 2 months with esophagram.      They had all their questions answered and were in agreement with the plan.  I appreciate the opportunity to participate in the care of your patient and will keep you updated.  Sincerely,      Again, thank you for allowing me to participate in the care of  your patient.      Sincerely,    Marin Albarado MD

## 2019-07-17 NOTE — NURSING NOTE
"Oncology Rooming Note    July 17, 2019 2:32 PM   Myrtle Vaz is a 73 year old female who presents for:    Chief Complaint   Patient presents with     Oncology Clinic Visit     [facility] RETURN- MEDIASTINAL ABSCESS     Initial Vitals: /70 (BP Location: Right arm, Patient Position: Chair, Cuff Size: Adult Regular)   Pulse 81   Resp 16   Ht 1.651 m (5' 5\")   Wt 75.1 kg (165 lb 9.6 oz)   SpO2 95%   BMI 27.56 kg/m   Estimated body mass index is 27.56 kg/m  as calculated from the following:    Height as of this encounter: 1.651 m (5' 5\").    Weight as of this encounter: 75.1 kg (165 lb 9.6 oz). Body surface area is 1.86 meters squared.  Mild Pain (3) Comment: Data Unavailable   No LMP recorded. Patient has had a hysterectomy.  Allergies reviewed: Yes  Medications reviewed: Yes    Medications: Medication refills not needed today.  Pharmacy name entered into [facility]:    [facility] - [place], [place] - [address] [facility] [address] ([place]) - [place], [place] - [address] AT [facility] OF [address]    Clinical concerns: No new concerns. Per was notified.      Adilson Blevins LPN            "

## 2019-07-18 LAB — 25(OH)D3 SERPL-MCNC: 24.3 NG/ML (ref 30–80)

## 2019-07-26 ENCOUNTER — TELEPHONE (OUTPATIENT)
Dept: SURGERY | Facility: CLINIC | Age: 74
End: 2019-07-26

## 2019-07-26 NOTE — TELEPHONE ENCOUNTER
Person calling: patient    Reason for call: symptoms:   1. weight loss from lack of appetite, weighs 156 lbs today and was 165 in clinic on 7/17. Wears clothes, no shoes to weigh.  2. increased acid reflux  3. feels like since she's been on regular foods, difficulty swallowing at times. She is eating crushed ice all day long, drinks 1-2 Ensures a day and has small amounts of oatmeal and toast. Has not tried steak, soft breads or tougher meats.    Denied any pain or incision issues. Next follow-up with Dr. Albarado is 9/11 and thought he was going to schedule another swallow test or another stent if needed. Wonders if she should be seen sooner.    Action taken: aliyahet sent to the following provider(s):  Hillary Anne NP, Kailey Olmstead NP and thoracic care coordinator Haughton. Pt advised to continue pushing fluids, if she feels she is getting dehydrated or weak she should seek care at  or ED. She verbalized understanding.

## 2019-07-29 ENCOUNTER — TELEPHONE (OUTPATIENT)
Dept: ONCOLOGY | Facility: CLINIC | Age: 74
End: 2019-07-29

## 2019-07-29 DIAGNOSIS — K22.3 ESOPHAGEAL PERFORATION: ICD-10-CM

## 2019-07-29 DIAGNOSIS — R63.4 WEIGHT LOSS: Primary | ICD-10-CM

## 2019-07-29 NOTE — TELEPHONE ENCOUNTER
Talked with pt about increasing the cans of insure to 3 per day, breakfast lunch and dinner. She can eat whatever she wants. Pt mentioned the silver nitrate on her neck may be making it hard for her to swallow or if she takes too big of a bite of food it can be hard for her to swallow. I told her Dr. Albarado recommended that she see one of the CNS nurses with nutrition labs. Pt agreed and understood.

## 2019-08-06 ENCOUNTER — OFFICE VISIT (OUTPATIENT)
Dept: SURGERY | Facility: CLINIC | Age: 74
End: 2019-08-06
Attending: CLINICAL NURSE SPECIALIST
Payer: MEDICARE

## 2019-08-06 ENCOUNTER — APPOINTMENT (OUTPATIENT)
Dept: LAB | Facility: CLINIC | Age: 74
End: 2019-08-06
Attending: CLINICAL NURSE SPECIALIST
Payer: MEDICARE

## 2019-08-06 VITALS
RESPIRATION RATE: 16 BRPM | TEMPERATURE: 97.4 F | WEIGHT: 159.6 LBS | OXYGEN SATURATION: 94 % | HEART RATE: 83 BPM | BODY MASS INDEX: 26.56 KG/M2 | SYSTOLIC BLOOD PRESSURE: 115 MMHG | DIASTOLIC BLOOD PRESSURE: 60 MMHG

## 2019-08-06 DIAGNOSIS — K22.3 ESOPHAGEAL PERFORATION: ICD-10-CM

## 2019-08-06 PROBLEM — R10.9 ABDOMINAL PAIN: Status: RESOLVED | Noted: 2019-02-05 | Resolved: 2019-08-06

## 2019-08-06 PROBLEM — I10 ESSENTIAL HYPERTENSION: Status: ACTIVE | Noted: 2018-11-08

## 2019-08-06 PROBLEM — N17.9 AKI (ACUTE KIDNEY INJURY) (H): Status: ACTIVE | Noted: 2019-04-12

## 2019-08-06 PROBLEM — K52.9 COLITIS: Status: RESOLVED | Noted: 2019-02-05 | Resolved: 2019-08-06

## 2019-08-06 PROBLEM — Z93.4 JEJUNOSTOMY TUBE PRESENT (H): Status: ACTIVE | Noted: 2019-01-07

## 2019-08-06 PROBLEM — J43.9 MILD EMPHYSEMA (H): Status: ACTIVE | Noted: 2019-01-07

## 2019-08-06 PROBLEM — J96.90 RESPIRATORY FAILURE (H): Status: RESOLVED | Noted: 2018-11-21 | Resolved: 2019-08-06

## 2019-08-06 PROBLEM — F33.9 EPISODE OF RECURRENT MAJOR DEPRESSIVE DISORDER (H): Status: ACTIVE | Noted: 2019-01-02

## 2019-08-06 PROBLEM — E78.5 HYPERLIPIDEMIA: Status: ACTIVE | Noted: 2018-11-08

## 2019-08-06 PROBLEM — M12.9 ARTHROPATHY: Status: ACTIVE | Noted: 2019-08-06

## 2019-08-06 PROBLEM — A49.9 GRAM-NEGATIVE BACTERIAL INFECTION: Status: ACTIVE | Noted: 2018-12-31

## 2019-08-06 PROBLEM — E03.8 OTHER SPECIFIED ACQUIRED HYPOTHYROIDISM: Status: ACTIVE | Noted: 2018-11-08

## 2019-08-06 PROBLEM — E87.0 CHRONIC HYPERNATREMIA: Status: ACTIVE | Noted: 2018-12-30

## 2019-08-06 PROBLEM — K80.20 CALCULUS OF GALLBLADDER WITHOUT CHOLECYSTITIS WITHOUT OBSTRUCTION: Status: ACTIVE | Noted: 2018-12-11

## 2019-08-06 LAB
ALBUMIN SERPL-MCNC: 3.2 G/DL (ref 3.4–5)
PREALB SERPL IA-MCNC: 21 MG/DL (ref 15–45)

## 2019-08-06 PROCEDURE — G0463 HOSPITAL OUTPT CLINIC VISIT: HCPCS | Mod: ZF

## 2019-08-06 PROCEDURE — 84134 ASSAY OF PREALBUMIN: CPT | Performed by: CLINICAL NURSE SPECIALIST

## 2019-08-06 PROCEDURE — 82040 ASSAY OF SERUM ALBUMIN: CPT | Performed by: CLINICAL NURSE SPECIALIST

## 2019-08-06 ASSESSMENT — PAIN SCALES - GENERAL: PAINLEVEL: MODERATE PAIN (4)

## 2019-08-06 NOTE — NURSING NOTE
"Oncology Rooming Note    August 6, 2019 10:23 AM   Myrtle Vaz is a 73 year old female who presents for:    Chief Complaint   Patient presents with     Blood Draw     Labs drawn via  by RN in lab. VS taken. Patient checked in for next appt.     Oncology Clinic Visit     Return Esophageal Perforation     Initial Vitals: /60 (BP Location: Left arm, Patient Position: Sitting, Cuff Size: Adult Regular)   Pulse 83   Temp 97.4  F (36.3  C) (Oral)   Resp 16   Wt 72.4 kg (159 lb 9.6 oz)   SpO2 94%   BMI 26.56 kg/m   Estimated body mass index is 26.56 kg/m  as calculated from the following:    Height as of 7/17/19: 1.651 m (5' 5\").    Weight as of this encounter: 72.4 kg (159 lb 9.6 oz). Body surface area is 1.82 meters squared.  Moderate Pain (4) Comment: Data Unavailable   No LMP recorded. Patient has had a hysterectomy.  Allergies reviewed: Yes  Medications reviewed: Yes    Medications: Medication refills not needed today.  Pharmacy name entered into [employer]: [facility] #[id number] - [place], [place] - [address] AT [facility] [address]    Clinical concerns: neck and chest; weight loss;       Yadira Ortiz CMA              "

## 2019-08-06 NOTE — NURSING NOTE
Chief Complaint   Patient presents with     Blood Draw     Labs drawn via  by RN in lab. VS taken. Patient checked in for next appt.     Labs collected from venipuncture by RN. Vitals taken. Checked in for appointment.    Krysten Nice RN

## 2019-08-06 NOTE — PROGRESS NOTES
THORACIC SURGERY FOLLOW UP VISIT    Dear Dr. Albarado,  I saw Ms. Myrtle Vaz in follow-up today. The clinical summary follows:     PREOP DIAGNOSIS   Esophageal perforation following hiatal hernia repair  PROCEDURE   6/20/2019: redo laparotomy, takedown of cervical esophagostomy with retrosternal esophagogastrostomy  DATE OF PROCEDURE  06/20/2019    COMPLICATIONS  Anastomotic leak requiring esophageal stent.    INTERVAL STUDIES  Nutrition labs pending at time of clinic visit.    ETOH no  TOB no  BMI 26.6    SUBJECTIVE  Natasha is here with her daughter, Haydee. She reports a sensation of food sticking when she takes too big of a bite or eats too quickly. She also has some sharp shooting panis and numbness in the surgical area. She reports some weight loss initially after her j-tube was removed but has now leveled off.    From a personal perspective, she is here with her daughter, Hyadee.    IMPRESSION (K22.3) Esophageal perforation  Comment: food sticking  Plan: keep follow up with Dr. Albarado as scheduled    73 year-old female s/p repair of esophageal perforation following hiatal hernia repair.    PLAN  I spent a total of 15 minutes with Ms. Myrtle Vaz and Haydee, more than 50% of which were spent in counseling, coordination of care, and face-to-face time. I reviewed the plan as follows:  It is essential for Natasha to take her time when eating and not to take too big of a bite when eating. Both of these actions contribute to her sensation of food sticking.  She can add in soft meats such as ground beef, ground turkey and chicken. She should still avoid raw vegetables until she sees Dr. Albarado in September for updated dietary restrictions.  If she beings to lose weight again, we will refer her to a Registered Dietician.   We talked about post operative neuropathic symptoms and she verbalized understanding that these symptoms can take months to improve.    Necessary Tests & Appointments: keep appointment with Dr. Albarado as  schedueld  Pain Control Plan: N/A  Anticoagulation Plan: N/A  Smoking Cessation: N/A  All questions were answered and the patient and present family were in agreement with the plan.  I appreciate the opportunity to participate in the care of your patient and will keep you updated.  Sincerely,

## 2019-08-06 NOTE — LETTER
8/6/2019       RE: Myrtle Vaz  [address] [facility] [address]     Dear Colleague,    Thank you for referring your patient, Myrtle Vaz, to the [facility]. Please see a copy of my visit note below.    THORACIC SURGERY FOLLOW UP VISIT    Dear Dr. Albarado,  I saw Ms. Myrtle Vaz in follow-up today. The clinical summary follows:     PREOP DIAGNOSIS   Esophageal perforation following hiatal hernia repair  PROCEDURE   6/20/2019: redo laparotomy, takedown of cervical esophagostomy with retrosternal esophagogastrostomy  DATE OF PROCEDURE  06/20/2019    COMPLICATIONS  Anastomotic leak requiring esophageal stent.    INTERVAL STUDIES  Nutrition labs pending at time of clinic visit.    ETOH no  TOB no  BMI 26.6    SUBJECTIVE  Natasha is here with her daughter, Haydee. She reports a sensation of food sticking when she takes too big of a bite or eats too quickly. She also has some sharp shooting panis and numbness in the surgical area. She reports some weight loss initially after her j-tube was removed but has now leveled off.    From a personal perspective, she is here with her daughter, Haydee.    IMPRESSION (K22.3) Esophageal perforation  Comment: food sticking  Plan: keep follow up with Dr. Albarado as scheduled    73 year-old female s/p repair of esophageal perforation following hiatal hernia repair.    PLAN  I spent a total of 15 minutes with Ms. Myrtle Vaz and Haydee, more than 50% of which were spent in counseling, coordination of care, and face-to-face time. I reviewed the plan as follows:  It is essential for Natasha to take her time when eating and not to take too big of a bite when eating. Both of these actions contribute to her sensation of food sticking.  She can add in soft meats such as ground beef, ground turkey and chicken. She should still avoid raw vegetables until she sees Dr. Albarado in September for updated dietary restrictions.  If she beings to lose weight again, we will refer her to a Registered  Dietician.   We talked about post operative neuropathic symptoms and she verbalized understanding that these symptoms can take months to improve.    Necessary Tests & Appointments: keep appointment with Dr. Per alanis  Pain Control Plan: N/A  Anticoagulation Plan: N/A  Smoking Cessation: N/A  All questions were answered and the patient and present family were in agreement with the plan.  I appreciate the opportunity to participate in the care of your patient and will keep you updated.  Sincerely,    NARCISO Shah CNS

## 2019-08-13 NOTE — PROGRESS NOTES
Retinal tear and detachment warning symptoms reviewed and patient instructed to call immediately if increasing floaters, flashes, or decreasing peripheral vision. THORACIC SURGERY FOLLOW UP VISIT    Dear Dr. Caballero,  I saw Ms. Myrtle Vaz in follow-up today. The clinical summary follows:      PREOP DIAGNOSIS   - Possible esophageal perforation  - Recent giant hiatal hernia repair with mesh   - Sepsis   - New onset Afib   - Acute kidney failure     PROCEDURE   - EGD  - Laparotomy  - Takedown of Nissen fundoplication  - Takedown of cruroplasty   - Prosthetic mesh explantation  - Takedown of gastrostomy tube  - Drainage of mediastinal abscess  - Excision of hiatal hernia sac  - Transhiatal esophagogastrectomy   - Closure of esophageal hiatus   - Surgical Gastrostomy  - Surgical Jejunostomy  - Bilateral pleural tubes placement   - Left neck incision and cervical esophagostomy     DATE OF PROCEDURE  11/21/2018    COMPLICATIONS  Intermittent SVT requiring DCCV x 4    INTERVAL STUDIES  CXR 1/10: Pending    BMI 31.14    SAUL Kaur was discharged from the hospital to a TCU on 12/7/2018. Patient has been doing well at the TCU. Over the [place] holiday patient wanted to go on Hospice, but she then changed her mind. She is in better spirits now. She had a fall over the weekend and has some residual lower back pain, which is present as at baseline. She was evaluated and was told she is fine. Se has been tolerating TF with some loose stools and taking lomotil for that.     From a personal perspective, she is here with her daughter, Haydee. She is eager to have the esophagostomy reversed.     IMPRESSION (K44.9) Hiatal hernia  (primary encounter diagnosis)      73 year-old female with type IV hiatal hernia who underwent laparoscopic hiatal hernia repair with Nissen fundoplication at an outside hospital, complicated by esophageal perforation requiring esophagectomy and cervical esophagostomy. Doing well post op.     PLAN  I spent a total of 40 minutes with Ms. Myrtle Vaz , more than 50% of which were spent in counseling, coordination of care, and face-to-face time. I reviewed the  plan as follows:  - Obtain nutrition labs and CXR today  - Will communicate with the facility to add benefiber to her tube feeds to help with her loose stools.  - Nutritionist in TCU to help with switching the tube feeds to be done through the G-tube. G tube was flushed and was found to be patent.   - Plan to follow up in clinic in 6 weeks.   -I also counseled her today about how she was making good recovery and that if she felt the need to discuss something, then to get in touch with us. She seems to be in good spirits today     All questions were answered and the patient and present family were in agreement with the plan.  I appreciate the opportunity to participate in the care of your patient and will keep you updated.  Sincerely,

## 2019-08-15 DIAGNOSIS — L03.221 CELLULITIS OF NECK: ICD-10-CM

## 2019-08-15 DIAGNOSIS — K22.3 ESOPHAGEAL PERFORATION: Primary | ICD-10-CM

## 2019-09-11 DIAGNOSIS — Z98.890 HISTORY OF ESOPHAGECTOMY: Primary | ICD-10-CM

## 2019-09-11 DIAGNOSIS — Z90.49 HISTORY OF ESOPHAGECTOMY: Primary | ICD-10-CM

## 2019-09-15 ASSESSMENT — ENCOUNTER SYMPTOMS
DISTURBANCES IN COORDINATION: 1
SYNCOPE: 0
MEMORY LOSS: 1
JOINT SWELLING: 0
TINGLING: 0
STIFFNESS: 0
EXERCISE INTOLERANCE: 0
HYPOTENSION: 0
CHILLS: 1
JAUNDICE: 0
RECTAL PAIN: 0
POOR WOUND HEALING: 1
SPEECH CHANGE: 0
ORTHOPNEA: 0
POLYDIPSIA: 0
MUSCLE CRAMPS: 0
BACK PAIN: 1
PARALYSIS: 0
SEIZURES: 0
DECREASED CONCENTRATION: 1
SLEEP DISTURBANCES DUE TO BREATHING: 0
NERVOUS/ANXIOUS: 1
INCREASED ENERGY: 1
NECK PAIN: 0
VOMITING: 1
BLOOD IN STOOL: 0
NAIL CHANGES: 0
FATIGUE: 1
FEVER: 0
BLOATING: 0
POLYPHAGIA: 0
LIGHT-HEADEDNESS: 0
LOSS OF CONSCIOUSNESS: 0
ARTHRALGIAS: 0
PANIC: 0
NAUSEA: 0
INSOMNIA: 1
HYPERTENSION: 1
SKIN CHANGES: 0
CONSTIPATION: 0
PALPITATIONS: 1
HEARTBURN: 1
HEADACHES: 1
MUSCLE WEAKNESS: 1
LEG PAIN: 0
ABDOMINAL PAIN: 0
WEIGHT GAIN: 0
ALTERED TEMPERATURE REGULATION: 1
HALLUCINATIONS: 0
MYALGIAS: 1
NUMBNESS: 1
DIZZINESS: 0
DECREASED APPETITE: 0
NIGHT SWEATS: 0
WEIGHT LOSS: 0
WEAKNESS: 1
DIARRHEA: 1
DEPRESSION: 1
TREMORS: 0

## 2019-09-16 NOTE — PROGRESS NOTES
THORACIC SURGERY FOLLOW UP VISIT    Dear Joey Mehean,    I saw Ms. Vaz in follow-up today. The clinical summary follows:    PREOP DIAGNOSIS   Esophageal perforation following hiatal hernia repair  PROCEDURE   11/21/18: Re-do laparotomy, takedown of Nissen fundoplication, prosthetic mesh explantation, transhiatal drainage of mediastinal abscess, transhiatal partial esophagectomy for perforation, G/J tubes, cervical esophagostomy  4/26/19: redo laparotomy, takedown of cervical esophagostomy with retrosternal esophagogastrostomy  5/6/19: neck debridement, esophageal stent placement  6/10/19: EGD, esophageal stent removal    COMPLICATIONS  Anastomotic leak requiring esophageal stent    INTERVAL STUDIES  8/6 albumin 3.2, prealbumin 21  9/18 Esophagram: narrowing at the pylorus with slow passage of contrast.         ETOH no  TOB no      SUBJECTIVE  Natasha is here with her daughter, Haydee. She states that she has been doing well, although she has 2 wounds on her anterior chest that have been draining for more than a month. An outpatient provider placed some silver nitrate on the medial wound, which is very tender. She complains of some gastric reflux and early satiety since the operation.     From a personal perspective, she is here with her daughter, Haydee.    IMPRESSION (K22.3) Esophageal perforation    73 year-old female 5 months after retrosternal gastric pull-up, now with dysphagia regurgitation and draining wound.      PLAN  I spent a total of 30 minutes with Ms. Myrtle Vaz and Haydee, more than 50% of which were spent in counseling, coordination of care, and face-to-face time. I reviewed the plan as follows:  -Procedure planned: EGD with balloon dilation of pylorus and local wound exploration.   -Necessary Tests & Appointments: None.   -Anticoagulation Plan: Enoxaparin after surgery.   All questions were answered and the patient and present family were in agreement with the plan.  I appreciate the  opportunity to participate in the care of your patient and will keep you updated.  Sincerely,

## 2019-09-18 ENCOUNTER — OFFICE VISIT (OUTPATIENT)
Dept: SURGERY | Facility: CLINIC | Age: 74
End: 2019-09-18
Attending: THORACIC SURGERY (CARDIOTHORACIC VASCULAR SURGERY)
Payer: MEDICARE

## 2019-09-18 ENCOUNTER — ANCILLARY PROCEDURE (OUTPATIENT)
Dept: GENERAL RADIOLOGY | Facility: CLINIC | Age: 74
End: 2019-09-18
Attending: CLINICAL NURSE SPECIALIST
Payer: MEDICARE

## 2019-09-18 VITALS
HEART RATE: 76 BPM | DIASTOLIC BLOOD PRESSURE: 69 MMHG | HEIGHT: 65 IN | OXYGEN SATURATION: 97 % | SYSTOLIC BLOOD PRESSURE: 120 MMHG | BODY MASS INDEX: 26.56 KG/M2

## 2019-09-18 DIAGNOSIS — Z98.890 HISTORY OF ESOPHAGECTOMY: ICD-10-CM

## 2019-09-18 DIAGNOSIS — Z90.49 HISTORY OF ESOPHAGECTOMY: ICD-10-CM

## 2019-09-18 DIAGNOSIS — K22.3 ESOPHAGEAL PERFORATION: Primary | ICD-10-CM

## 2019-09-18 PROCEDURE — G0463 HOSPITAL OUTPT CLINIC VISIT: HCPCS | Mod: ZF

## 2019-09-18 RX ORDER — CEFAZOLIN SODIUM 2 G/50ML
2 SOLUTION INTRAVENOUS
Status: CANCELLED | OUTPATIENT
Start: 2019-09-18

## 2019-09-18 RX ORDER — CEFAZOLIN SODIUM 1 G/50ML
1 INJECTION, SOLUTION INTRAVENOUS SEE ADMIN INSTRUCTIONS
Status: CANCELLED | OUTPATIENT
Start: 2019-09-18

## 2019-09-18 ASSESSMENT — PAIN SCALES - GENERAL: PAINLEVEL: SEVERE PAIN (7)

## 2019-09-18 NOTE — NURSING NOTE
"Oncology Rooming Note    September 18, 2019 1:58 PM   Myrtle Vaz is a 74 year old female who presents for:    Chief Complaint   Patient presents with     Oncology Clinic Visit     RETURN VISIT; MEDIASTINAL ABSESS; VITALS TAKEN      Initial Vitals: /69   Pulse 76   Ht 1.651 m (5' 5\")   SpO2 97%   BMI 26.56 kg/m   Estimated body mass index is 26.56 kg/m  as calculated from the following:    Height as of this encounter: 1.651 m (5' 5\").    Weight as of 8/6/19: 72.4 kg (159 lb 9.6 oz). Body surface area is 1.82 meters squared.  Severe Pain (7) Comment: Data Unavailable   No LMP recorded. Patient has had a hysterectomy.  Allergies reviewed: Yes  Medications reviewed: Yes    Medications: Medication refills not needed today.  Pharmacy name entered into [employer]: [facility] #[id number] - [place], [place] - [address] AT [facility] OF [address]    Clinical concerns: Patient complains of the abscess getting worse and not healing.  Dr Albarado was notified.      Adrianna Arechiga              "

## 2019-09-23 ENCOUNTER — ANCILLARY PROCEDURE (OUTPATIENT)
Dept: CT IMAGING | Facility: CLINIC | Age: 74
End: 2019-09-23
Attending: CLINICAL NURSE SPECIALIST
Payer: MEDICARE

## 2019-09-23 DIAGNOSIS — K22.3 ESOPHAGEAL PERFORATION: ICD-10-CM

## 2019-09-23 DIAGNOSIS — Z98.890 HISTORY OF ESOPHAGECTOMY: ICD-10-CM

## 2019-09-23 DIAGNOSIS — Z90.49 HISTORY OF ESOPHAGECTOMY: ICD-10-CM

## 2019-09-23 LAB
CREAT BLD-MCNC: 0.8 MG/DL (ref 0.52–1.04)
GFR SERPL CREATININE-BSD FRML MDRD: 70 ML/MIN/{1.73_M2}

## 2019-09-23 RX ORDER — IOPAMIDOL 755 MG/ML
97 INJECTION, SOLUTION INTRAVASCULAR ONCE
Status: COMPLETED | OUTPATIENT
Start: 2019-09-23 | End: 2019-09-23

## 2019-09-23 RX ADMIN — IOPAMIDOL 97 ML: 755 INJECTION, SOLUTION INTRAVASCULAR at 16:16

## 2019-09-30 ENCOUNTER — TELEPHONE (OUTPATIENT)
Dept: SURGERY | Facility: CLINIC | Age: 74
End: 2019-09-30

## 2019-10-03 ENCOUNTER — MYC MEDICAL ADVICE (OUTPATIENT)
Dept: SURGERY | Facility: CLINIC | Age: 74
End: 2019-10-03

## 2019-10-03 ENCOUNTER — TRANSFERRED RECORDS (OUTPATIENT)
Dept: HEALTH INFORMATION MANAGEMENT | Facility: CLINIC | Age: 74
End: 2019-10-03

## 2019-10-03 ENCOUNTER — PREP FOR PROCEDURE (OUTPATIENT)
Dept: SURGERY | Facility: CLINIC | Age: 74
End: 2019-10-03

## 2019-10-03 DIAGNOSIS — K22.3 ESOPHAGEAL PERFORATION: Primary | ICD-10-CM

## 2019-10-03 NOTE — TELEPHONE ENCOUNTER
Spoke with patient to schedule procedure with Dr. Temitope Funes   Procedure was scheduled on 10/07 at [facility] OR  Patient will have H&P with Completed, or Dr. Albarado will update DOS  Patient is aware a / is needed day of surgery.   Surgery letter was sent via [employer], patient has my direct contact information for any further questions.

## 2019-10-04 NOTE — TELEPHONE ENCOUNTER
Spoke with Haydee,   Patient was rescheduled due to starting antibiotics yesterday.   Patient was rescheduled to 10/10 with Dr. Albarado.     She confirmed new Date/Time.

## 2019-10-09 ENCOUNTER — ANESTHESIA EVENT (OUTPATIENT)
Dept: SURGERY | Facility: CLINIC | Age: 74
End: 2019-10-09
Payer: MEDICARE

## 2019-10-10 ENCOUNTER — HOSPITAL ENCOUNTER (OUTPATIENT)
Facility: CLINIC | Age: 74
Setting detail: OBSERVATION
Discharge: HOME | End: 2019-10-11
Attending: THORACIC SURGERY (CARDIOTHORACIC VASCULAR SURGERY) | Admitting: THORACIC SURGERY (CARDIOTHORACIC VASCULAR SURGERY)
Payer: MEDICARE

## 2019-10-10 ENCOUNTER — ANESTHESIA (OUTPATIENT)
Dept: SURGERY | Facility: CLINIC | Age: 74
End: 2019-10-10
Payer: MEDICARE

## 2019-10-10 DIAGNOSIS — K22.3 ESOPHAGEAL PERFORATION: ICD-10-CM

## 2019-10-10 PROBLEM — K22.2 ESOPHAGEAL STRICTURE: Status: ACTIVE | Noted: 2019-10-10

## 2019-10-10 PROBLEM — Z98.890 HISTORY OF THORACIC SURGERY: Status: ACTIVE | Noted: 2019-10-10

## 2019-10-10 LAB
ABO + RH BLD: NORMAL
ABO + RH BLD: NORMAL
BLD GP AB SCN SERPL QL: NORMAL
BLOOD BANK CMNT PATIENT-IMP: NORMAL
CREAT SERPL-MCNC: 0.83 MG/DL (ref 0.52–1.04)
GFR SERPL CREATININE-BSD FRML MDRD: 70 ML/MIN/{1.73_M2}
GLUCOSE BLDC GLUCOMTR-MCNC: 81 MG/DL (ref 70–99)
HGB BLD-MCNC: 11.3 G/DL (ref 11.7–15.7)
INR PPP: 1.09 (ref 0.86–1.14)
POTASSIUM SERPL-SCNC: 3.6 MMOL/L (ref 3.4–5.3)
SPECIMEN EXP DATE BLD: NORMAL

## 2019-10-10 PROCEDURE — 25000565 ZZH ISOFLURANE, EA 15 MIN: Performed by: THORACIC SURGERY (CARDIOTHORACIC VASCULAR SURGERY)

## 2019-10-10 PROCEDURE — 25000125 ZZHC RX 250: Performed by: NURSE ANESTHETIST, CERTIFIED REGISTERED

## 2019-10-10 PROCEDURE — 25000128 H RX IP 250 OP 636: Performed by: NURSE ANESTHETIST, CERTIFIED REGISTERED

## 2019-10-10 PROCEDURE — 36000057 ZZH SURGERY LEVEL 3 1ST 30 MIN - UMMC: Performed by: THORACIC SURGERY (CARDIOTHORACIC VASCULAR SURGERY)

## 2019-10-10 PROCEDURE — 40000169 ZZH STATISTIC PRE-PROCEDURE ASSESSMENT I: Performed by: THORACIC SURGERY (CARDIOTHORACIC VASCULAR SURGERY)

## 2019-10-10 PROCEDURE — 71000014 ZZH RECOVERY PHASE 1 LEVEL 2 FIRST HR: Performed by: THORACIC SURGERY (CARDIOTHORACIC VASCULAR SURGERY)

## 2019-10-10 PROCEDURE — 12000001 ZZH R&B MED SURG/OB UMMC

## 2019-10-10 PROCEDURE — 86850 RBC ANTIBODY SCREEN: CPT | Performed by: THORACIC SURGERY (CARDIOTHORACIC VASCULAR SURGERY)

## 2019-10-10 PROCEDURE — 27210794 ZZH OR GENERAL SUPPLY STERILE: Performed by: THORACIC SURGERY (CARDIOTHORACIC VASCULAR SURGERY)

## 2019-10-10 PROCEDURE — 25800030 ZZH RX IP 258 OP 636: Performed by: NURSE ANESTHETIST, CERTIFIED REGISTERED

## 2019-10-10 PROCEDURE — 86900 BLOOD TYPING SEROLOGIC ABO: CPT | Performed by: THORACIC SURGERY (CARDIOTHORACIC VASCULAR SURGERY)

## 2019-10-10 PROCEDURE — 37000008 ZZH ANESTHESIA TECHNICAL FEE, 1ST 30 MIN: Performed by: THORACIC SURGERY (CARDIOTHORACIC VASCULAR SURGERY)

## 2019-10-10 PROCEDURE — 36415 COLL VENOUS BLD VENIPUNCTURE: CPT | Performed by: THORACIC SURGERY (CARDIOTHORACIC VASCULAR SURGERY)

## 2019-10-10 PROCEDURE — 87077 CULTURE AEROBIC IDENTIFY: CPT | Performed by: THORACIC SURGERY (CARDIOTHORACIC VASCULAR SURGERY)

## 2019-10-10 PROCEDURE — 84132 ASSAY OF SERUM POTASSIUM: CPT | Performed by: ANESTHESIOLOGY

## 2019-10-10 PROCEDURE — 36000059 ZZH SURGERY LEVEL 3 EA 15 ADDTL MIN UMMC: Performed by: THORACIC SURGERY (CARDIOTHORACIC VASCULAR SURGERY)

## 2019-10-10 PROCEDURE — 87070 CULTURE OTHR SPECIMN AEROBIC: CPT | Performed by: THORACIC SURGERY (CARDIOTHORACIC VASCULAR SURGERY)

## 2019-10-10 PROCEDURE — 86901 BLOOD TYPING SEROLOGIC RH(D): CPT | Performed by: THORACIC SURGERY (CARDIOTHORACIC VASCULAR SURGERY)

## 2019-10-10 PROCEDURE — 25000132 ZZH RX MED GY IP 250 OP 250 PS 637: Mod: GY | Performed by: STUDENT IN AN ORGANIZED HEALTH CARE EDUCATION/TRAINING PROGRAM

## 2019-10-10 PROCEDURE — 25000128 H RX IP 250 OP 636: Performed by: ANESTHESIOLOGY

## 2019-10-10 PROCEDURE — 37000009 ZZH ANESTHESIA TECHNICAL FEE, EACH ADDTL 15 MIN: Performed by: THORACIC SURGERY (CARDIOTHORACIC VASCULAR SURGERY)

## 2019-10-10 PROCEDURE — G0378 HOSPITAL OBSERVATION PER HR: HCPCS

## 2019-10-10 PROCEDURE — 00000146 ZZHCL STATISTIC GLUCOSE BY METER IP

## 2019-10-10 PROCEDURE — 25000128 H RX IP 250 OP 636: Performed by: THORACIC SURGERY (CARDIOTHORACIC VASCULAR SURGERY)

## 2019-10-10 PROCEDURE — 88305 TISSUE EXAM BY PATHOLOGIST: CPT | Performed by: THORACIC SURGERY (CARDIOTHORACIC VASCULAR SURGERY)

## 2019-10-10 PROCEDURE — 25800030 ZZH RX IP 258 OP 636: Performed by: ANESTHESIOLOGY

## 2019-10-10 PROCEDURE — 82565 ASSAY OF CREATININE: CPT | Performed by: ANESTHESIOLOGY

## 2019-10-10 PROCEDURE — 85610 PROTHROMBIN TIME: CPT | Performed by: ANESTHESIOLOGY

## 2019-10-10 PROCEDURE — 87075 CULTR BACTERIA EXCEPT BLOOD: CPT | Performed by: THORACIC SURGERY (CARDIOTHORACIC VASCULAR SURGERY)

## 2019-10-10 PROCEDURE — 71000015 ZZH RECOVERY PHASE 1 LEVEL 2 EA ADDTL HR: Performed by: THORACIC SURGERY (CARDIOTHORACIC VASCULAR SURGERY)

## 2019-10-10 PROCEDURE — 25000125 ZZHC RX 250: Performed by: THORACIC SURGERY (CARDIOTHORACIC VASCULAR SURGERY)

## 2019-10-10 PROCEDURE — 25000576 ZZH RX IP 250 OP 636 J0585: Performed by: THORACIC SURGERY (CARDIOTHORACIC VASCULAR SURGERY)

## 2019-10-10 PROCEDURE — 85018 HEMOGLOBIN: CPT | Performed by: ANESTHESIOLOGY

## 2019-10-10 RX ORDER — OXYCODONE HYDROCHLORIDE 5 MG/1
5-10 TABLET ORAL EVERY 4 HOURS PRN
Status: DISCONTINUED | OUTPATIENT
Start: 2019-10-10 | End: 2019-10-11 | Stop reason: HOSPADM

## 2019-10-10 RX ORDER — ACETAMINOPHEN 325 MG/1
975 TABLET ORAL EVERY 8 HOURS
Status: DISCONTINUED | OUTPATIENT
Start: 2019-10-10 | End: 2019-10-11 | Stop reason: HOSPADM

## 2019-10-10 RX ORDER — FERROUS SULFATE 325(65) MG
325 TABLET ORAL DAILY
Status: DISCONTINUED | OUTPATIENT
Start: 2019-10-11 | End: 2019-10-11 | Stop reason: HOSPADM

## 2019-10-10 RX ORDER — ONDANSETRON 2 MG/ML
INJECTION INTRAMUSCULAR; INTRAVENOUS PRN
Status: DISCONTINUED | OUTPATIENT
Start: 2019-10-10 | End: 2019-10-10

## 2019-10-10 RX ORDER — ALBUTEROL SULFATE 0.83 MG/ML
2.5 SOLUTION RESPIRATORY (INHALATION) EVERY 4 HOURS PRN
Status: DISCONTINUED | OUTPATIENT
Start: 2019-10-10 | End: 2019-10-10 | Stop reason: HOSPADM

## 2019-10-10 RX ORDER — FENTANYL CITRATE 50 UG/ML
25-50 INJECTION, SOLUTION INTRAMUSCULAR; INTRAVENOUS
Status: DISCONTINUED | OUTPATIENT
Start: 2019-10-10 | End: 2019-10-10 | Stop reason: HOSPADM

## 2019-10-10 RX ORDER — CEFAZOLIN SODIUM 2 G/100ML
2 INJECTION, SOLUTION INTRAVENOUS
Status: COMPLETED | OUTPATIENT
Start: 2019-10-10 | End: 2019-10-10

## 2019-10-10 RX ORDER — FENTANYL CITRATE 50 UG/ML
INJECTION, SOLUTION INTRAMUSCULAR; INTRAVENOUS PRN
Status: DISCONTINUED | OUTPATIENT
Start: 2019-10-10 | End: 2019-10-10

## 2019-10-10 RX ORDER — ACETAMINOPHEN 325 MG/1
650 TABLET ORAL EVERY 4 HOURS PRN
Status: DISCONTINUED | OUTPATIENT
Start: 2019-10-13 | End: 2019-10-11 | Stop reason: HOSPADM

## 2019-10-10 RX ORDER — NALOXONE HYDROCHLORIDE 0.4 MG/ML
.1-.4 INJECTION, SOLUTION INTRAMUSCULAR; INTRAVENOUS; SUBCUTANEOUS
Status: DISCONTINUED | OUTPATIENT
Start: 2019-10-10 | End: 2019-10-11

## 2019-10-10 RX ORDER — ONDANSETRON 2 MG/ML
4 INJECTION INTRAMUSCULAR; INTRAVENOUS EVERY 6 HOURS PRN
Status: DISCONTINUED | OUTPATIENT
Start: 2019-10-10 | End: 2019-10-11 | Stop reason: HOSPADM

## 2019-10-10 RX ORDER — METOPROLOL TARTRATE 1 MG/ML
1-2 INJECTION, SOLUTION INTRAVENOUS EVERY 5 MIN PRN
Status: DISCONTINUED | OUTPATIENT
Start: 2019-10-10 | End: 2019-10-10 | Stop reason: HOSPADM

## 2019-10-10 RX ORDER — LIDOCAINE 40 MG/G
CREAM TOPICAL
Status: DISCONTINUED | OUTPATIENT
Start: 2019-10-10 | End: 2019-10-10 | Stop reason: HOSPADM

## 2019-10-10 RX ORDER — ONDANSETRON 2 MG/ML
4 INJECTION INTRAMUSCULAR; INTRAVENOUS EVERY 30 MIN PRN
Status: DISCONTINUED | OUTPATIENT
Start: 2019-10-10 | End: 2019-10-10 | Stop reason: HOSPADM

## 2019-10-10 RX ORDER — ONDANSETRON 4 MG/1
4 TABLET, ORALLY DISINTEGRATING ORAL EVERY 6 HOURS PRN
Status: DISCONTINUED | OUTPATIENT
Start: 2019-10-10 | End: 2019-10-11 | Stop reason: HOSPADM

## 2019-10-10 RX ORDER — LEVOTHYROXINE SODIUM 150 UG/1
150 TABLET ORAL EVERY MORNING
Status: DISCONTINUED | OUTPATIENT
Start: 2019-10-11 | End: 2019-10-11 | Stop reason: HOSPADM

## 2019-10-10 RX ORDER — SODIUM CHLORIDE, SODIUM LACTATE, POTASSIUM CHLORIDE, CALCIUM CHLORIDE 600; 310; 30; 20 MG/100ML; MG/100ML; MG/100ML; MG/100ML
INJECTION, SOLUTION INTRAVENOUS CONTINUOUS
Status: DISCONTINUED | OUTPATIENT
Start: 2019-10-10 | End: 2019-10-10 | Stop reason: HOSPADM

## 2019-10-10 RX ORDER — EPHEDRINE SULFATE 50 MG/ML
INJECTION, SOLUTION INTRAMUSCULAR; INTRAVENOUS; SUBCUTANEOUS PRN
Status: DISCONTINUED | OUTPATIENT
Start: 2019-10-10 | End: 2019-10-10

## 2019-10-10 RX ORDER — HYDROMORPHONE HYDROCHLORIDE 1 MG/ML
.3-.5 INJECTION, SOLUTION INTRAMUSCULAR; INTRAVENOUS; SUBCUTANEOUS EVERY 5 MIN PRN
Status: DISCONTINUED | OUTPATIENT
Start: 2019-10-10 | End: 2019-10-10 | Stop reason: HOSPADM

## 2019-10-10 RX ORDER — VENLAFAXINE 75 MG/1
75 TABLET ORAL 2 TIMES DAILY
Status: DISCONTINUED | OUTPATIENT
Start: 2019-10-10 | End: 2019-10-11 | Stop reason: HOSPADM

## 2019-10-10 RX ORDER — HYDRALAZINE HYDROCHLORIDE 20 MG/ML
2.5-5 INJECTION INTRAMUSCULAR; INTRAVENOUS EVERY 10 MIN PRN
Status: DISCONTINUED | OUTPATIENT
Start: 2019-10-10 | End: 2019-10-10 | Stop reason: HOSPADM

## 2019-10-10 RX ORDER — ONDANSETRON 4 MG/1
4 TABLET, ORALLY DISINTEGRATING ORAL EVERY 30 MIN PRN
Status: DISCONTINUED | OUTPATIENT
Start: 2019-10-10 | End: 2019-10-10 | Stop reason: HOSPADM

## 2019-10-10 RX ORDER — NALOXONE HYDROCHLORIDE 0.4 MG/ML
.1-.4 INJECTION, SOLUTION INTRAMUSCULAR; INTRAVENOUS; SUBCUTANEOUS
Status: DISCONTINUED | OUTPATIENT
Start: 2019-10-10 | End: 2019-10-11 | Stop reason: HOSPADM

## 2019-10-10 RX ORDER — CEFAZOLIN SODIUM 1 G/3ML
1 INJECTION, POWDER, FOR SOLUTION INTRAMUSCULAR; INTRAVENOUS SEE ADMIN INSTRUCTIONS
Status: DISCONTINUED | OUTPATIENT
Start: 2019-10-10 | End: 2019-10-10 | Stop reason: HOSPADM

## 2019-10-10 RX ORDER — AMIODARONE HYDROCHLORIDE 200 MG/1
200 TABLET ORAL DAILY
Status: DISCONTINUED | OUTPATIENT
Start: 2019-10-11 | End: 2019-10-11 | Stop reason: HOSPADM

## 2019-10-10 RX ORDER — DEXAMETHASONE SODIUM PHOSPHATE 4 MG/ML
INJECTION, SOLUTION INTRA-ARTICULAR; INTRALESIONAL; INTRAMUSCULAR; INTRAVENOUS; SOFT TISSUE PRN
Status: DISCONTINUED | OUTPATIENT
Start: 2019-10-10 | End: 2019-10-10

## 2019-10-10 RX ORDER — LIDOCAINE HYDROCHLORIDE 20 MG/ML
INJECTION, SOLUTION INFILTRATION; PERINEURAL PRN
Status: DISCONTINUED | OUTPATIENT
Start: 2019-10-10 | End: 2019-10-10

## 2019-10-10 RX ORDER — PROPOFOL 10 MG/ML
INJECTION, EMULSION INTRAVENOUS PRN
Status: DISCONTINUED | OUTPATIENT
Start: 2019-10-10 | End: 2019-10-10

## 2019-10-10 RX ADMIN — DEXAMETHASONE SODIUM PHOSPHATE 4 MG: 4 INJECTION, SOLUTION INTRA-ARTICULAR; INTRALESIONAL; INTRAMUSCULAR; INTRAVENOUS; SOFT TISSUE at 14:18

## 2019-10-10 RX ADMIN — ROCURONIUM BROMIDE 50 MG: 10 INJECTION INTRAVENOUS at 14:18

## 2019-10-10 RX ADMIN — FENTANYL CITRATE 50 MCG: 50 INJECTION, SOLUTION INTRAMUSCULAR; INTRAVENOUS at 15:13

## 2019-10-10 RX ADMIN — HYDROMORPHONE HYDROCHLORIDE 0.5 MG: 1 INJECTION, SOLUTION INTRAMUSCULAR; INTRAVENOUS; SUBCUTANEOUS at 16:47

## 2019-10-10 RX ADMIN — OXYCODONE HYDROCHLORIDE 5 MG: 5 TABLET ORAL at 19:08

## 2019-10-10 RX ADMIN — PROPOFOL 120 MG: 10 INJECTION, EMULSION INTRAVENOUS at 14:05

## 2019-10-10 RX ADMIN — FENTANYL CITRATE 25 MCG: 50 INJECTION, SOLUTION INTRAMUSCULAR; INTRAVENOUS at 14:58

## 2019-10-10 RX ADMIN — Medication 10 MG: at 14:40

## 2019-10-10 RX ADMIN — ONDANSETRON 4 MG: 2 INJECTION INTRAMUSCULAR; INTRAVENOUS at 15:14

## 2019-10-10 RX ADMIN — HYDROMORPHONE HYDROCHLORIDE 0.5 MG: 1 INJECTION, SOLUTION INTRAMUSCULAR; INTRAVENOUS; SUBCUTANEOUS at 16:58

## 2019-10-10 RX ADMIN — VENLAFAXINE 75 MG: 75 TABLET ORAL at 20:28

## 2019-10-10 RX ADMIN — FENTANYL CITRATE 50 MCG: 50 INJECTION, SOLUTION INTRAMUSCULAR; INTRAVENOUS at 15:11

## 2019-10-10 RX ADMIN — SUGAMMADEX 200 MG: 100 INJECTION, SOLUTION INTRAVENOUS at 15:37

## 2019-10-10 RX ADMIN — FENTANYL CITRATE 50 MCG: 50 INJECTION, SOLUTION INTRAMUSCULAR; INTRAVENOUS at 16:00

## 2019-10-10 RX ADMIN — PHENYLEPHRINE HYDROCHLORIDE 100 MCG: 10 INJECTION INTRAVENOUS at 14:40

## 2019-10-10 RX ADMIN — Medication 5 MG: at 14:37

## 2019-10-10 RX ADMIN — OMEPRAZOLE 20 MG: 20 CAPSULE, DELAYED RELEASE ORAL at 19:08

## 2019-10-10 RX ADMIN — Medication 80 MG: at 14:05

## 2019-10-10 RX ADMIN — SODIUM CHLORIDE, POTASSIUM CHLORIDE, SODIUM LACTATE AND CALCIUM CHLORIDE: 600; 310; 30; 20 INJECTION, SOLUTION INTRAVENOUS at 13:59

## 2019-10-10 RX ADMIN — ACETAMINOPHEN 975 MG: 325 TABLET, FILM COATED ORAL at 19:09

## 2019-10-10 RX ADMIN — SODIUM CHLORIDE, POTASSIUM CHLORIDE, SODIUM LACTATE AND CALCIUM CHLORIDE 100 ML/HR: 600; 310; 30; 20 INJECTION, SOLUTION INTRAVENOUS at 16:52

## 2019-10-10 RX ADMIN — CEFAZOLIN SODIUM 2 G: 2 INJECTION, SOLUTION INTRAVENOUS at 14:17

## 2019-10-10 RX ADMIN — Medication 10 MG: at 14:34

## 2019-10-10 RX ADMIN — HYDROMORPHONE HYDROCHLORIDE 0.5 MG: 1 INJECTION, SOLUTION INTRAMUSCULAR; INTRAVENOUS; SUBCUTANEOUS at 16:21

## 2019-10-10 RX ADMIN — LIDOCAINE HYDROCHLORIDE 60 MG: 20 INJECTION, SOLUTION INFILTRATION; PERINEURAL at 14:05

## 2019-10-10 RX ADMIN — FENTANYL CITRATE 75 MCG: 50 INJECTION, SOLUTION INTRAMUSCULAR; INTRAVENOUS at 14:05

## 2019-10-10 ASSESSMENT — ACTIVITIES OF DAILY LIVING (ADL): ADLS_ACUITY_SCORE: 17

## 2019-10-10 ASSESSMENT — MIFFLIN-ST. JEOR: SCORE: 1227.75

## 2019-10-10 ASSESSMENT — LIFESTYLE VARIABLES: TOBACCO_USE: 1

## 2019-10-10 ASSESSMENT — PAIN DESCRIPTION - DESCRIPTORS: DESCRIPTORS: SHARP;SORE

## 2019-10-10 NOTE — BRIEF OP NOTE
[facility], [place]    Brief Operative Note    Pre-operative diagnosis: Esophageal perforation [K22.3]  Post-operative diagnosis * No post-op diagnosis entered *  Procedure: Procedure(s):  Esophagogastroduodenoscopy with Botox injection of the Pylorus  Chest wound exploration  Surgeon: Surgeon(s) and Role:     * Temitope Bullock MD - Primary     * Krzysztof Martin MD - Resident - Assisting  Anesthesia: General   Estimated blood loss: Minimal  Drains: Wound VAC  Specimens:   ID Type Source Tests Collected by Time Destination   1 : chest wound Wound Other WOUND CULTURE AEROBIC BACTERIAL Temitope Bullock MD 10/10/2019  3:09 PM    2 :  Wound Other WOUND CULTURE AEROBIC BACTERIAL Temitope Bullock MD 10/10/2019  3:14 PM    A : chest wound Tissue Chest SURGICAL PATHOLOGY EXAM Temitope Bullock MD 10/10/2019  3:18 PM      Findings:   Stricture at the anastamosis, injected with botox. Two superficial wounds on anterior chest, medial wound was associated with 2 ethibond sutures that were removed..  Wound dimensions:  Wound 1: 1 cm wide x 1 cm long x 1.5 cm deep  Wound 2: 2 cm wide x 3 cm long x 1 cm deep  Complications: None.  Implants:  * No implants in log *

## 2019-10-10 NOTE — LETTER
Transition Communication Hand-off for Care Transitions to Next Level of Care Provider    Name: Myrtle Vaz  : 1945  MRN #: [id number]  Primary Care Provider: Joey Caballero     Primary Clinic: [facility] [address] [place] [address]     Reason for Hospitalization:  Esophageal perforation [K22.3]  Admit Date/Time: 10/10/2019 11:21 AM  Discharge Date:  2019  Payor Source: Payor: MEDICARE / Plan: MEDICARE / Product Type: Medicare /   Discharge Plan:  Discharge to home with home care services.  Discharge Needs Assessment:  Needs      Most Recent Value   Home Care  [facility] [phone], Fax: [phone]        Any outstanding tests or procedures:        Referrals     Future Labs/Procedures    Home care nursing referral     Comments:    Please fax discharge to [facility]    Ph:  [phone]    Fax: [phone]    Skilled home care RN for initial home safety evaluation and 1-3 times a week to evaluate medication management, nutrition and hydration evaluation, endurance evaluation, and general status evaluation after discharge from the acute care hospital setting.    Skilled home care RN to assist with management and education reinforcement with chest wall dressing changes three times a week (Monday,  and ) using a [employer] Wound Vac.            GILBERTO Marvin.S.N., R.N., P.H.N./Care Coordinator     Pager   [facility]

## 2019-10-10 NOTE — ANESTHESIA CARE TRANSFER NOTE
Patient: Myrtle Vaz    Procedure(s):  Esophagogastroduodenoscopy with Botox injection of the Pylorus  Chest wound exploration    Diagnosis: Esophageal perforation [K22.3]  Diagnosis Additional Information: No value filed.    Anesthesia Type:   General     Note:  Airway :Face Mask  Patient transferred to:PACU  Comments: VSS. Report to RN. Patient awake. 159/74. HR 68.  100%. Handoff Report: Identifed the Patient, Identified the Reponsible Provider, Reviewed the pertinent medical history, Discussed the surgical course, Reviewed Intra-OP anesthesia mangement and issues during anesthesia, Set expectations for post-procedure period and Allowed opportunity for questions and acknowledgement of understanding      Vitals: (Last set prior to Anesthesia Care Transfer)    CRNA VITALS  10/10/2019 1522 - 10/10/2019 1602      10/10/2019             EKG:  Sinus rhythm                Electronically Signed By: NARCISO Vlilareal CRNA  October 10, 2019  4:02 PM

## 2019-10-10 NOTE — ANESTHESIA POSTPROCEDURE EVALUATION
Anesthesia POST Procedure Evaluation    Patient: Myrtle Vaz   MRN:     [id number] Gender:   female   Age:    74 year old :      1945        Preoperative Diagnosis: Esophageal perforation [K22.3]   Procedure(s):  Esophagogastroduodenoscopy with Botox injection of the Pylorus  Chest wound exploration   Postop Comments: No value filed.       Anesthesia Type:  Not documented  General    Reportable Event: NO     PAIN: Uncomplicated   Sign Out status: Comfortable, Well controlled pain     PONV: No PONV   Sign Out status:  No Nausea or Vomiting     Neuro/Psych: Uneventful perioperative course   Sign Out Status: Preoperative baseline; Age appropriate mentation     Airway/Resp.: Uneventful perioperative course   Sign Out Status: Non labored breathing, age appropriate RR; Resp. Status within EXPECTED Parameters     CV: Uneventful perioperative course   Sign Out status: Appropriate BP and perfusion indices; Appropriate HR/Rhythm     Disposition:   Sign Out in:  PACU  Disposition:  Floor  Recovery Course: Uneventful  Follow-Up: Not required           Last Anesthesia Record Vitals:  CRNA VITALS  10/10/2019 1522 - 10/10/2019 1612      10/10/2019             EKG:  Sinus rhythm          Last PACU Vitals:  Vitals Value Taken Time   /78 10/10/2019  4:10 PM   Temp     Pulse 73 10/10/2019  4:10 PM   Resp     SpO2 97 % 10/10/2019  4:11 PM   Temp src     NIBP 159/74 10/10/2019  3:58 PM   Pulse 73 10/10/2019  3:58 PM   SpO2 100 % 10/10/2019  3:58 PM   Resp     Temp     Ht Rate     Temp 2     Vitals shown include unvalidated device data.      Electronically Signed By: Iona Vela MD, October 10, 2019, 4:12 PM

## 2019-10-10 NOTE — ANESTHESIA PREPROCEDURE EVALUATION
Anesthesia Pre-Procedure Evaluation    Patient: Myrtle Vaz   MRN:     [id number] Gender:   female   Age:    74 year old :      1945        Preoperative Diagnosis: Esophageal perforation [K22.3]   Procedure(s):  Esophagogastroduodenoscopy, possible stent revision     Past Medical History:   Diagnosis Date     Depression      History of atrial fibrillation      HLD (hyperlipidemia)      HTN (hypertension)      Hypothyroidism       Past Surgical History:   Procedure Laterality Date     BRONCHOSCOPY (RIGID OR FLEXIBLE), DIAGNOSTIC N/A 2019    Procedure: BRONCHOSCOPY, WITH BAL;  Surgeon: Ally Ybarra MD;  Location: [facility]     COMBINED ESOPHAGOSCOPY, GASTROSCOPY, DUODENOSCOPY (EGD), REPLACE ESOPHAGEAL STENT N/A 2019    Procedure: ESOPHAGOGASTRODUODENOSCOPY WITH ESOPHAGEAL STENT REPLACEMENT, IRRIGATION AND DEBRIDEMENT OF LEFT NECK, WOUND VAC PLACEMENT;  Surgeon: Harley Murguia MD;  Location: [facility]     COMBINED ESOPHAGOSCOPY, GASTROSCOPY, DUODENOSCOPY (EGD), REPLACE ESOPHAGEAL STENT N/A 6/10/2019    Procedure: Esophagogastroduodenoscopy and Stent Removal;  Surgeon: Ally Ybarra MD;  Location: [facility]     ESOPHAGOGASTRECTOMY N/A 2019    Procedure: Redo Laparotomy SUBSTERNAL Esophagogastrostomy, Pharyngostomy, Intraoperative EGD, PARTIAL STERNOTOMY, LEFT PHARYNGOSTOMY,;  Surgeon: Temitope Bullock MD;  Location: [facility]     ESOPHAGOSCOPY, GASTROSCOPY, DUODENOSCOPY (EGD), COMBINED N/A 2018    Procedure: COMBINED ESOPHAGOSCOPY, GASTROSCOPY, DUODENOSCOPY (EGD);  Surgeon: Temitope Bullock MD;  Location: [facility]     HERNIORRHAPHY HIATAL  2018     HYSTERECTOMY       LAPAROSCOPIC HERNIORRHAPHY HIATAL N/A 2018    Procedure: Midline laparotomy, Trans-hiatal esophagogasterectomy, gastrostomy, J-tube placement, G-tube placement, bilateral pleural chest tube placement, mediastinal abcess drainage, spit fistula creation, Esophagastrodueodenoscopy;  Surgeon: Temitope Bullock MD;   Location: [facility]     ROTATOR CUFF REPAIR RT/LT       THYROIDECTOMY       for hyperthroidism          Anesthesia Evaluation     . Pt has had prior anesthetic. Type: General and Regional           ROS/MED HX    ENT/Pulmonary:     (+)tobacco use, Past use , . Other pulmonary disease .    Neurologic:  - neg neurologic ROS     Cardiovascular:         METS/Exercise Tolerance:     Hematologic:  - neg hematologic  ROS       Musculoskeletal:  - neg musculoskeletal ROS       GI/Hepatic:     (+) Other GI/Hepatic esophagectomy      Renal/Genitourinary:     (+) Pt has no history of transplant,       Endo:         Psychiatric:     (+) psychiatric history depression      Infectious Disease:  - neg infectious disease ROS       Malignancy:   (+) Malignancy History of GI  GI CA status post Surgery,         Other:                         PHYSICAL EXAM:   Mental Status/Neuro: A/A/O   Airway: Facies: Feasible  Mallampati: I  Mouth/Opening: Full  TM distance: > 6 cm  Neck ROM: Full   Respiratory: Auscultation: CTAB     Resp. Rate: Normal     Resp. Effort: Normal      CV: Rhythm: Regular  Rate: Age appropriate  Heart: Normal Sounds  Edema: None   Comments:      Dental: Normal Dentition                LABS:  CBC:   Lab Results   Component Value Date    WBC 7.2 06/19/2019    WBC 10.2 05/27/2019    HGB 11.3 (L) 10/10/2019    HGB 9.7 (L) 06/19/2019    HCT 33.4 (L) 06/19/2019    HCT 28.1 (L) 05/27/2019     (H) 06/19/2019     (H) 05/27/2019     BMP:   Lab Results   Component Value Date     05/27/2019     05/24/2019    POTASSIUM 3.6 10/10/2019    POTASSIUM 4.3 05/27/2019    CHLORIDE 102 05/27/2019    CHLORIDE 101 05/24/2019    CO2 26 05/27/2019    CO2 26 05/24/2019    BUN 28 05/27/2019    BUN 23 05/24/2019    CR 0.83 10/10/2019    CR 0.67 05/27/2019     (H) 05/27/2019     (H) 05/24/2019     COAGS:   Lab Results   Component Value Date    PTT 47 (H) 04/28/2019    INR 1.09 10/10/2019     POC:   Lab Results  "  Component Value Date    BGM 81 10/10/2019     OTHER:   Lab Results   Component Value Date    PH 7.35 04/28/2019    LACT 1.1 05/06/2019    JOSE 7.6 (L) 05/27/2019    PHOS 3.3 05/21/2019    MAG 2.3 05/21/2019    ALBUMIN 3.2 (L) 08/06/2019    PROTTOTAL 6.0 (L) 05/01/2019    ALT 20 05/01/2019    AST 17 05/01/2019    ALKPHOS 101 05/01/2019    BILITOTAL 0.2 05/01/2019    LIPASE 43 (L) 02/11/2019    CRP 24.8 (H) 06/19/2019     (H) 06/19/2019        Preop Vitals    BP Readings from Last 3 Encounters:   10/10/19 (!) 154/78   09/18/19 120/69   08/06/19 115/60    Pulse Readings from Last 3 Encounters:   10/10/19 70   09/18/19 76   08/06/19 83      Resp Readings from Last 3 Encounters:   10/10/19 18   08/06/19 16   07/17/19 16    SpO2 Readings from Last 3 Encounters:   10/10/19 97%   09/18/19 97%   08/06/19 94%      Temp Readings from Last 1 Encounters:   10/10/19 36.9  C (98.4  F) (Oral)    Ht Readings from Last 1 Encounters:   10/10/19 1.676 m (5' 6\")      Wt Readings from Last 1 Encounters:   10/10/19 70.6 kg (155 lb 10.3 oz)    Estimated body mass index is 25.12 kg/m  as calculated from the following:    Height as of this encounter: 1.676 m (5' 6\").    Weight as of this encounter: 70.6 kg (155 lb 10.3 oz).     LDA:  Gastrostomy/Enterostomy Gastrostomy LUQ 1 20 fr (Active)   Number of days: 323       Gastrostomy/Enterostomy Jejunostomy LLQ 2 16 fr (Active)   Number of days: 323       Gastrostomy/Enterostomy Jejunostomy LLQ 1 16 fr (Active)   Number of days: 167       Mucous Fistula (Active)   Number of days:         Assessment:   ASA SCORE: 3    H&P: History and physical reviewed and following examination, relevant changes include:   Smoking Status:  Non-Smoker/Unknown   NPO Status: NPO Appropriate     Plan:   Anes. Type:  General   Pre-Medication: None   Induction:  IV (RSI)   Airway: ETT; Oral   Access/Monitoring: PIV   Maintenance: Balanced     Postop Plan:   Postop Pain: Opioids  Postop Sedation/Airway: Not " planned     PONV Management:   Adult Risk Factors: Female, Non-Smoker, Postop Opioids   Prevention: Ondansetron, Dexamethasone     CONSENT: Direct conversation   Plan and risks discussed with: Patient   Blood Products: Consent Deferred (Minimal Blood Loss)                   Raheem Peter MD

## 2019-10-11 ENCOUNTER — HOME CARE/HOSPICE - HEALTHEAST (OUTPATIENT)
Dept: HOME HEALTH SERVICES | Facility: HOME HEALTH | Age: 74
End: 2019-10-11

## 2019-10-11 ENCOUNTER — RECORDS - HEALTHEAST (OUTPATIENT)
Dept: ADMINISTRATIVE | Facility: OTHER | Age: 74
End: 2019-10-11

## 2019-10-11 VITALS
SYSTOLIC BLOOD PRESSURE: 110 MMHG | DIASTOLIC BLOOD PRESSURE: 58 MMHG | TEMPERATURE: 96.4 F | WEIGHT: 155.65 LBS | OXYGEN SATURATION: 92 % | BODY MASS INDEX: 25.01 KG/M2 | HEART RATE: 68 BPM | RESPIRATION RATE: 16 BRPM | HEIGHT: 66 IN

## 2019-10-11 PROBLEM — Z48.89 ENCOUNTER FOR POSTOPERATIVE WOUND CARE: Status: ACTIVE | Noted: 2019-10-11

## 2019-10-11 LAB — GLUCOSE BLDC GLUCOMTR-MCNC: 102 MG/DL (ref 70–99)

## 2019-10-11 PROCEDURE — G0378 HOSPITAL OBSERVATION PER HR: HCPCS

## 2019-10-11 PROCEDURE — 00000146 ZZHCL STATISTIC GLUCOSE BY METER IP

## 2019-10-11 PROCEDURE — 25000132 ZZH RX MED GY IP 250 OP 250 PS 637: Mod: GY | Performed by: STUDENT IN AN ORGANIZED HEALTH CARE EDUCATION/TRAINING PROGRAM

## 2019-10-11 RX ORDER — ACETAMINOPHEN 325 MG/1
975 TABLET ORAL EVERY 8 HOURS
COMMUNITY
Start: 2019-10-11 | End: 2020-02-12

## 2019-10-11 RX ADMIN — AMIODARONE HYDROCHLORIDE 200 MG: 200 TABLET ORAL at 07:47

## 2019-10-11 RX ADMIN — OXYCODONE HYDROCHLORIDE 5 MG: 5 TABLET ORAL at 02:06

## 2019-10-11 RX ADMIN — ACETAMINOPHEN 975 MG: 325 TABLET, FILM COATED ORAL at 10:29

## 2019-10-11 RX ADMIN — LEVOTHYROXINE SODIUM 150 MCG: 150 TABLET ORAL at 07:48

## 2019-10-11 RX ADMIN — OMEPRAZOLE 20 MG: 20 CAPSULE, DELAYED RELEASE ORAL at 07:48

## 2019-10-11 RX ADMIN — ACETAMINOPHEN 975 MG: 325 TABLET, FILM COATED ORAL at 02:06

## 2019-10-11 RX ADMIN — VENLAFAXINE 75 MG: 75 TABLET ORAL at 07:48

## 2019-10-11 ASSESSMENT — ACTIVITIES OF DAILY LIVING (ADL)
ADLS_ACUITY_SCORE: 17
DRESS: 0-->INDEPENDENT
RETIRED_EATING: 0-->INDEPENDENT
ADLS_ACUITY_SCORE: 15
TOILETING: 0-->INDEPENDENT
ADLS_ACUITY_SCORE: 17
ADLS_ACUITY_SCORE: 17
FALL_HISTORY_WITHIN_LAST_SIX_MONTHS: NO
TRANSFERRING: 0-->INDEPENDENT
COGNITION: 0 - NO COGNITION ISSUES REPORTED
RETIRED_COMMUNICATION: 0-->UNDERSTANDS/COMMUNICATES WITHOUT DIFFICULTY
AMBULATION: 1-->ASSISTIVE EQUIPMENT
BATHING: 1-->ASSISTIVE EQUIPMENT
SWALLOWING: 0-->SWALLOWS FOODS/LIQUIDS WITHOUT DIFFICULTY

## 2019-10-11 ASSESSMENT — PAIN DESCRIPTION - DESCRIPTORS: DESCRIPTORS: SHARP;SORE

## 2019-10-11 NOTE — PROVIDER NOTIFICATION
Notified Resident at 0010 AM regarding Pt declining capnography monitoring.      Spoke with: [phone] Denise Miramontes    Orders were not obtained.    Comments: Will monitor with continuous pulse ox

## 2019-10-11 NOTE — PROGRESS NOTES
"POST OP CHECK     S: Patient seen at the bedside awake, alert, and interactive. Says she ate all of a dinner and feels well. She says she feels better than she remembers in a very long time.     O:   BP (!) 154/90   Pulse 68   Temp 96.3  F (35.7  C) (Axillary)   Resp 17   Ht 1.676 m (5' 6\")   Wt 70.6 kg (155 lb 10.3 oz)   SpO2 93%   BMI 25.12 kg/m      GEN: NAD  CV: Non-cyanotic, Peripheral pulses intact  RESP: Nonlabored breathing on 2LNC  ABD: soft, NTTP   Ext: wwp. No edema. No calf tenderness  PSYCH: cooperative     A/P: Myrtle Vaz is a 74 year old female with history of esophageal perforation now POD 0 from EGD with botox injection.     Tylenol, PRN oxycodone   amio 200mg  PRN zofran   ADAT      Remainder of cares per primary team.     Denise Miramontes MD  [phone]     "

## 2019-10-11 NOTE — UTILIZATION REVIEW
"Admission Status; Secondary Review Determination     Under the authority of the Utilization Management Committee, the utilization review process indicated a secondary review on the above patient.  The review outcome is based on review of the medical records, discussions with staff, and applying clinical experience noted on the date of the review.          (x) Observation Status Appropriate - This patient does not meet hospital inpatient criteria and is placed in observation status. If this patient's primary payer is Medicare and was admitted as an inpatient, Condition Code 44 should be used and patient status changed to \"observation\".     RATIONALE FOR DETERMINATION   73 yo female with esophageal perforation, s/p esophagogastroduodenoscopy with botox injection of pylorus, chest wound exploration. Discharging home today with wound vac and outpatient follow up. Outpatient procedure per coding guidelines.     The severity of illness, intensity of service provided, expected LOS and risk for adverse outcome make the care appropriate for further observation; however, doesn't meet criteria for hospital inpatient admission. Guero Sebastian PA-C notified of this determination.    This document was produced using voice recognition software.      The information on this document is developed by the utilization review team in order for the business office to ensure compliance.  This only denotes the appropriateness of proper admission status and does not reflect the quality of care rendered.         The definitions of Inpatient Status and Observation Status used in making the determination above are those provided in the CMS Coverage Manual, Chapter 1 and Chapter 6, section 70.4.      Sincerely,  Denise Elizabeth MD  Utilization Review  Physician Advisor  [facility].    "

## 2019-10-11 NOTE — OP NOTE
Procedure Date: 10/10/2019      PREOPERATIVE DIAGNOSES:   1.  Dysphagia after a retrosternal gastric pull-up.   2.  Sternal wound drainage.      POSTOPERATIVE DIAGNOSES:     1.  Dysphagia after retrosternal gastric pull-up.   2.  Sternal wound drainage.      PROCEDURES PERFORMED:   1.  Esophagogastroduodenoscopy.   2.  Botox injection of pylorus.   3.  Local wound exploration of the sternum and removal of foreign body.      ANESTHESIA:  General endotracheal anesthesia.      SURGEON:  Temitope Funes MD      ASSISTANT:  Krzysztof Martin MD, General Surgery resident      COMPLICATIONS:  None.      ESTIMATED BLOOD LOSS:  Less than 10 mL.      IMPLANTS:  None.      SPECIMENS:  Sternal wound and drainage for Gram stain and culture.      OPERATIVE FINDINGS:  EGD:  The patient's anastomosis was found at 32 cm from the incisors and appeared to be intact.  The gastric conduit appeared to be straight without any major angulation.  The pylorus was widely open and the adult scope was able to be advanced without difficulty into the duodenum, 100 units of Botox were injected in 4 quadrants in the pylorus to promote emptying of the neoesophagus.  There was a minimal amount of retained food in the gastric conduit.   Sternal wound:  The patient had a stitch granuloma caused by an Ethibond suture.  Two stitches were removed.  A wound VAC was applied at the end of the case.      DESCRIPTION OF PROCEDURE IN DETAIL:  The patient was taken to the operating room, placed in a supine position.  All pressure points were adequately padded.  General endotracheal anesthesia was induced.  A formal timeout was carried out confirming the name of the patient and correct procedure.  She had SCDs in place and functioning prior to induction of anesthesia.  She received antibiotics within 30 minutes of incision.  After the timeout was complete, we started by performing an EGD.  We introduced the adult endoscope into the esophagus advanced to  the level of the anastomosis which was at 32 cm from the incisors.  This appeared to be intact.  We then advanced to the level of the pylorus.  There was no evidence of medial angulation of the gastric conduit.  The pylorus was opened and we were able to pass the adult scope into the duodenum without any problems.  We then decided to inject 100 units of Botox to promote emptying of the gastric conduit and this was done in 4 quadrants using a needle in submucosal injections.  Hemostasis was confirmed and the excess insufflation was suctioned out.      We then proceeded to perform a local wound exploration.  The chest was prepped with Betadine and draped in normal sterile fashion.  A formal timeout was carried out again and we then made a skin incision excising the previous draining sinus.  The patient actually had 2 draining sinuses that were excised, one in the midline and one more laterally on the left side.  We followed the granulation tissue down to the level of the sternum where the patient had an Ethibond stitch.  We found 2 stitches that were cut and removed.  We then used a curet to perform mechanical debridement.  The wound was then copiously irrigated with a mixture of saline and Techni-Care.  Hemostasis was confirmed.  We then placed a wound VAC device protecting the skin with a plastic adhesive dressing.  The sponge was used to bridge both incisions and used the same device.  The patient tolerated the procedure well.      All instrument and sponge counts were correct at the end of the case.         GARCÍA QIU MD             D: 10/10/2019   T: 10/11/2019   MT: CRAIG      Name:     CANDICE LOYA   MRN:      [id number]        Account:        [id number]   :      1945           Procedure Date: 10/10/2019      Document: [id number]

## 2019-10-11 NOTE — PLAN OF CARE
VSS on 2L O2 via NC, Pt refused capno post op, continuous pulse ox on. Pain controlled with oxycodone X1. MIV infusing through PIV. Voids spont with adequate UOP. Wound vac to -125 mm/hg with scant amount of serosang drainage dressing intact. Tolerating regular diet. Cont. POC.

## 2019-10-11 NOTE — PROGRESS NOTES
[facility] Liaison met with pt to discuss plans for HC.  Pt to be discharged home likely 10/11 and has agreed to have [facility] follow with services of . Patient care support center processing referral.  Pt verbalized understanding that initial visit is scheduled for    Pt has 24 hour phone number for [facility] for any questions or concerns.    Patient will be provided home care by [facility]. (ph) [phone].   (Fax) [phone].  Care team and patient notified.  All patient demographics and orders will be sent to [facility].    Thank you  Julia Hernández RN, BSN  [facility] Liaison  [facility]  [phone]

## 2019-10-11 NOTE — DISCHARGE SUMMARY
NAME: Myrtle Vaz   MRN: [id number]   : 1945     DATE OF ADMISSION: 10/10/2019     PRE/POSTOPERATIVE DIAGNOSES: Esophageal perforation    PROCEDURES PERFORMED:   1. Esophagogastroduodenoscopy with Botox injection of the Pylorus  2. Chest wound exploration    PATHOLOGY RESULTS: pending     CULTURE RESULTS: pending    INTRAOPERATIVE COMPLICATIONS: None     POSTOPERATIVE COMPLICATIONS: None     DRAINS/TUBES PRESENT AT DISCHARGE: wound vac    DATE OF DISCHARGE:  2019     HOSPITAL COURSE: Myrtle Vaz is a 74 year old female who is s/p retrosternal gastric pull-up following esophagostomy for esophageal perforation, who on 10/10/2019 underwent the above-named procedures.  She tolerated the operation well and postoperatively was transferred to the general post-surgical unit.  The remainder of her course was essentially uncomplicated.  Prior to discharge, her pain was controlled well, she was able to perform ADLs and ambulate independently without difficulty, and had full return of bowel and bladder function.  On 2019, she was discharged to home in stable condition with wound vac in place.    DISCHARGE EXAM:  A&O, NAD  Resp non-labored  Distal extremities warm    Incisions with vac in place     DISCHARGE INSTRUCTIONS:  Discharge Procedure Orders   Follow Up and recommended labs and tests   Order Comments: 1.) Follow up with primary care physician, Joey Caballero, in 1-2 weeks.  2.) Follow up with a thoracic surgery Clinical Nurse Specialist in Thoracic Surgery clinic in 2 weeks for a wound check     Discharge Instructions   Order Comments: THORACIC SURGERY DISCHARGE INSTRUCTIONS    DIET: Regular diet - as prior to admission     If your plans upon discharge include prolonged periods of sitting (i.e a lengthy car or plane ride), it is highly beneficial to get up and walk at least once per hour to help prevent swelling and blood clots.     Activity as tolerated     Stay hydrated. Take over  "the counter fiber (metamucil or benefiber) and stool softeners (Miralax, docusate or senna) if becoming constipated.     Call for fever greater than 101.5, chills, increased size of incision, red skin around incision, vision changes, muscle strength changes, sensation changes, shortness of breath, or other concerns.    No driving while taking narcotic pain medication.    Transition to ibuprofen or tylenol/acetaminophen for pain control. Do not take tylenol/acetaminophen and acetaminophen containing narcotic (e.g., percocet or vicodin) at the same time. If you have known ulcer problems, or kidney trouble (elevated creatinine) do not take the ibuprofen.    In emergencies, call 911    For other Questions or Concerns;   A.) During weekday working hours (Monday through Friday 8am to 4:30pm)   call [phone] and ask to speak to a clinical nurse specialist.     B.) At nights (after 4:30pm), on weekends, or if urgent call [phone] and   tell the  \"I would like to page job code [phone], the thoracic surgery   fellow on call, please.\"       DISCHARGE MEDICATIONS:   Current Discharge Medication List      START taking these medications    Details   acetaminophen (TYLENOL) 325 MG tablet Take 3 tablets (975 mg) by mouth every 8 hours    Associated Diagnoses: Esophageal perforation         CONTINUE these medications which have NOT CHANGED    Details   amiodarone (PACERONE/CODARONE) 200 MG tablet 1 tablet (200 mg) by Per G Tube route daily  Qty: 30 tablet, Refills: 0    Associated Diagnoses: Mediastinitis      levothyroxine (SYNTHROID/LEVOTHROID) 150 MCG tablet 1 tablet (150 mcg) by Per J Tube route every morning  Qty: 30 tablet, Refills: 0    Associated Diagnoses: Hypothyroidism, unspecified type      OMEPRAZOLE PO Take 20 mg by mouth 2 times daily (before meals)      venlafaxine (EFFEXOR) 75 MG tablet 1 tablet (75 mg) by Per G Tube route 2 times daily  Qty: 60 tablet, Refills: 0    Associated Diagnoses: Current " mild episode of major depressive disorder without prior episode (H)      ferrous sulfate 300 (60 Fe) MG/5ML syrup 5 mLs (300 mg) by Per J Tube route daily  Qty: 150 mL, Refills: 0    Associated Diagnoses: Iron deficiency      loperamide (IMODIUM) 1 MG/5ML liquid 10 mLs (2 mg) by Per Feeding Tube route 4 times daily as needed for diarrhea  Qty: 500 mL, Refills: 0    Associated Diagnoses: Diarrhea, unspecified type      traMADol (ULTRAM) 50 MG tablet 0.5-1 tablets (25-50 mg) by Per Feeding Tube route every 6 hours as needed for moderate to severe pain  Qty: 40 tablet, Refills: 0    Associated Diagnoses: Esophagectomy, anastomotic leak

## 2019-10-11 NOTE — PROGRESS NOTES
Care Coordinator - Discharge Planning    Admission Date/Time:  10/10/2019  Attending MD:  Temitope Bullock, *     Data  Date of initial CC assessment:  Today per request of  MD team to arrange for patient to have a home RN for [employer] Wound Vac dressing changes.  Chart reviewed, discussed with interdisciplinary team.   Patient was admitted for:   1. Esophageal perforation         Assessment     The following home care plans of care have been arranged on behalf of Ms. Vaz who will discharge to her home setting today:    Please fax discharge to [facility]     Ph:  [phone]     Fax: [phone]     Skilled home care RN for initial home safety evaluation and 1-3 times a week to evaluate medication management, nutrition and hydration evaluation, endurance evaluation, and general status evaluation after discharge from the acute care hospital setting.     Skilled home care RN to assist with management and education reinforcement with chest wall dressing changes three times a week (Monday, Wednesdays and Fridays) using a [employer] Wound Vac.     Coordination of Care and Referrals: Provided patient/family with options for home care agency of choice who can assist with the discharge plans of care.    Plan  Anticipated Discharge Date:  Today.  Anticipated Discharge Plan:  As above.  Patient will follow up in clinic as designated in discharger orders.    CTS Handoff completed:  (Clinic Letter)  To be sent.    Kimberly Magaña,  GILBERTO.S.N., R.N., P.H.N./Care Coordinator     Pager   [facility]

## 2019-10-11 NOTE — PLAN OF CARE
Pt c/o pain managed using scheduled tylenol and PRN Oxycodone. Tolerating regular diet well; denies N/V. No BM/gas. UOP adequate- pt not always saving. PIV SL once LR bag finished around 0230. Pt up with SBA in room. Wound vac to chest intact and continues top run at 125 mmHg continuous with scant drainage noted. PLAN: continue POC.

## 2019-10-12 LAB
BACTERIA SPEC CULT: ABNORMAL
SPECIMEN SOURCE: ABNORMAL

## 2019-10-12 NOTE — PLAN OF CARE
Pt was given education about wound vac care, how to get help with wound questions, future appointments, medication changes, reasons to go to ER, recommended activity and diet. Pt verbalized understanding. Pt was waiting for ride.

## 2019-10-14 ENCOUNTER — PATIENT OUTREACH (OUTPATIENT)
Dept: CARE COORDINATION | Facility: CLINIC | Age: 74
End: 2019-10-14

## 2019-10-14 ENCOUNTER — HOME CARE/HOSPICE - HEALTHEAST (OUTPATIENT)
Dept: HOME HEALTH SERVICES | Facility: HOME HEALTH | Age: 74
End: 2019-10-14

## 2019-10-14 ENCOUNTER — MEDICAL CORRESPONDENCE (OUTPATIENT)
Dept: HEALTH INFORMATION MANAGEMENT | Facility: CLINIC | Age: 74
End: 2019-10-14

## 2019-10-14 LAB — COPATH REPORT: NORMAL

## 2019-10-16 ENCOUNTER — HOME CARE/HOSPICE - HEALTHEAST (OUTPATIENT)
Dept: HOME HEALTH SERVICES | Facility: HOME HEALTH | Age: 74
End: 2019-10-16

## 2019-10-17 LAB
BACTERIA SPEC CULT: NORMAL
Lab: NORMAL
SPECIMEN SOURCE: NORMAL

## 2019-10-18 ENCOUNTER — HOME CARE/HOSPICE - HEALTHEAST (OUTPATIENT)
Dept: HOME HEALTH SERVICES | Facility: HOME HEALTH | Age: 74
End: 2019-10-18

## 2019-10-21 ENCOUNTER — HOME CARE/HOSPICE - HEALTHEAST (OUTPATIENT)
Dept: HOME HEALTH SERVICES | Facility: HOME HEALTH | Age: 74
End: 2019-10-21

## 2019-10-23 ENCOUNTER — HOME CARE/HOSPICE - HEALTHEAST (OUTPATIENT)
Dept: HOME HEALTH SERVICES | Facility: HOME HEALTH | Age: 74
End: 2019-10-23

## 2019-10-25 ENCOUNTER — HOME CARE/HOSPICE - HEALTHEAST (OUTPATIENT)
Dept: HOME HEALTH SERVICES | Facility: HOME HEALTH | Age: 74
End: 2019-10-25

## 2019-10-28 ENCOUNTER — HOME CARE/HOSPICE - HEALTHEAST (OUTPATIENT)
Dept: HOME HEALTH SERVICES | Facility: HOME HEALTH | Age: 74
End: 2019-10-28

## 2019-10-29 ENCOUNTER — OFFICE VISIT (OUTPATIENT)
Dept: SURGERY | Facility: CLINIC | Age: 74
End: 2019-10-29
Attending: CLINICAL NURSE SPECIALIST
Payer: MEDICARE

## 2019-10-29 VITALS
WEIGHT: 155.8 LBS | BODY MASS INDEX: 25.04 KG/M2 | TEMPERATURE: 97.8 F | OXYGEN SATURATION: 96 % | HEART RATE: 80 BPM | HEIGHT: 66 IN | RESPIRATION RATE: 14 BRPM | SYSTOLIC BLOOD PRESSURE: 128 MMHG | DIASTOLIC BLOOD PRESSURE: 63 MMHG

## 2019-10-29 DIAGNOSIS — J98.51 MEDIASTINITIS: Primary | ICD-10-CM

## 2019-10-29 DIAGNOSIS — Z48.01 DRESSING CHANGE OR REMOVAL, SURGICAL WOUND: ICD-10-CM

## 2019-10-29 PROBLEM — D12.6 TUBULAR ADENOMA OF COLON: Status: ACTIVE | Noted: 2019-10-29

## 2019-10-29 PROBLEM — L98.8: Status: ACTIVE | Noted: 2019-10-29

## 2019-10-29 PROBLEM — M17.9 OSTEOARTHRITIS OF KNEE: Status: ACTIVE | Noted: 2019-10-29

## 2019-10-29 PROBLEM — E55.9 VITAMIN D DEFICIENCY: Status: ACTIVE | Noted: 2019-10-29

## 2019-10-29 PROBLEM — K90.9 INTESTINAL MALABSORPTION: Status: ACTIVE | Noted: 2019-10-29

## 2019-10-29 PROBLEM — K21.00 GASTROESOPHAGEAL REFLUX DISEASE WITH ESOPHAGITIS: Status: ACTIVE | Noted: 2019-10-29

## 2019-10-29 PROBLEM — I48.91 ATRIAL FIBRILLATION (H): Status: ACTIVE | Noted: 2019-10-29

## 2019-10-29 PROBLEM — G93.41 METABOLIC ENCEPHALOPATHY: Status: ACTIVE | Noted: 2019-10-29

## 2019-10-29 PROBLEM — M85.80 OSTEOPENIA: Status: ACTIVE | Noted: 2019-10-29

## 2019-10-29 PROBLEM — H26.229 CATARACT SECONDARY TO OCULAR DISEASE: Status: ACTIVE | Noted: 2019-10-29

## 2019-10-29 PROBLEM — M47.817 LUMBOSACRAL SPONDYLOSIS WITHOUT MYELOPATHY: Status: ACTIVE | Noted: 2019-10-29

## 2019-10-29 PROBLEM — R60.9 EDEMA: Status: ACTIVE | Noted: 2019-10-29

## 2019-10-29 PROBLEM — G89.29 CHRONIC PAIN: Status: ACTIVE | Noted: 2019-10-29

## 2019-10-29 PROCEDURE — G0463 HOSPITAL OUTPT CLINIC VISIT: HCPCS | Mod: ZF

## 2019-10-29 RX ORDER — VIT C/E/CUPERIC/ZINC/LUTEIN 226-90-0.8
1 CAPSULE ORAL DAILY
COMMUNITY
Start: 2019-10-14

## 2019-10-29 RX ORDER — PANTOPRAZOLE SODIUM 40 MG/1
TABLET, DELAYED RELEASE ORAL
Refills: 3 | COMMUNITY
Start: 2019-10-18 | End: 2020-03-05

## 2019-10-29 RX ORDER — GABAPENTIN 300 MG/1
CAPSULE ORAL
COMMUNITY
Start: 2016-09-13 | End: 2019-10-29

## 2019-10-29 RX ORDER — DIPHENHYDRAMINE HYDROCHLORIDE 25 MG/1
1 TABLET ORAL EVERY EVENING
COMMUNITY
Start: 2019-10-14 | End: 2022-03-23

## 2019-10-29 ASSESSMENT — PAIN SCALES - GENERAL: PAINLEVEL: MODERATE PAIN (4)

## 2019-10-29 ASSESSMENT — MIFFLIN-ST. JEOR: SCORE: 1228.2

## 2019-10-29 NOTE — LETTER
10/29/2019       RE: Myrtle Vaz  [address] [facility] [address]     Dear Colleague,    Thank you for referring your patient, Myrtle Vaz, to the [facility]. Please see a copy of my visit note below.    REASON FOR VISIT: Wound evaluation    WOUND TYPE: Chest wall    SUBJECTIVE: Myrtle is doing well. She is tolerating her wound VAC. She has a home care nurse who visits every other day to change her wound VAC dressing. She is here with her daughter, Haydee.    Of note, her dysphagia is improved although when she lies down she does get acid reflux. She is taking omeprazole BID.    OBJECTIVE: The medial wound site is healing well. The wound bed is covered in beefy red granulation tissue. Current measurements are 2 cm in length and 1.5 cm in width. There is minimal depth to the wound, ~ 0.5 cm.  There is no tunneling and there is no drainage. The left lateral wound (healing spit fistula) is closed but does have a small bump of granulation tissue.    Surrounding skin is clean, dry and intact.     SITE CARE INSTRUCTIONS: Continue with wound VAC dressing changes every other day. Haydee will send me pictures at each dressing change so we can determine if we can discontinue the wound VAC and switch to wet to dry gauze dressings.    PLAN: Haydee will send pictures of the wound via My Chart and we will keep in contact so we can get Natasha back here when it is time to discontinue the wound VAC.     Dr. Albarado would like to see her back in 2 months.    Total time spent, 25 minutes, all in counseling and coordination of care.    Kailey Olmstead, CNS  Thoracic Surgery

## 2019-10-29 NOTE — NURSING NOTE
"Oncology Rooming Note    October 29, 2019 2:20 PM   Myrtle Vaz is a 74 year old female who presents for:    Chief Complaint   Patient presents with     Oncology Clinic Visit     Return;      Initial Vitals: /63   Pulse 80   Temp 97.8  F (36.6  C) (Oral)   Resp 14   Ht 1.676 m (5' 5.98\")   Wt 70.7 kg (155 lb 12.8 oz)   SpO2 96%   BMI 25.16 kg/m   Estimated body mass index is 25.16 kg/m  as calculated from the following:    Height as of this encounter: 1.676 m (5' 5.98\").    Weight as of this encounter: 70.7 kg (155 lb 12.8 oz). Body surface area is 1.81 meters squared.  Moderate Pain (4) Comment: Data Unavailable   No LMP recorded. Patient has had a hysterectomy.  Allergies reviewed: Yes  Medications reviewed: Yes    Medications: Medication refills not needed today.  Pharmacy name entered into [employer]: [id number] - [place], [place] - [address] AT [facility] [address]    Clinical concerns: Chronic Pain        Oksana Leach CMA              "

## 2019-10-29 NOTE — PROGRESS NOTES
REASON FOR VISIT: Wound evaluation    WOUND TYPE: Chest wall    SUBJECTIVE: Myrtle is doing well. She is tolerating her wound VAC. She has a home care nurse who visits every other day to change her wound VAC dressing. She is here with her daughter, Haydee.    Of note, her dysphagia is improved although when she lies down she does get acid reflux. She is taking omeprazole BID.    OBJECTIVE: The medial wound site is healing well. The wound bed is covered in beefy red granulation tissue. Current measurements are 2 cm in length and 1.5 cm in width. There is minimal depth to the wound, ~ 0.5 cm.  There is no tunneling and there is no drainage. The left lateral wound (healing spit fistula) is closed but does have a small bump of granulation tissue.    Surrounding skin is clean, dry and intact.     SITE CARE INSTRUCTIONS: Continue with wound VAC dressing changes every other day. Haydee will send me pictures at each dressing change so we can determine if we can discontinue the wound VAC and switch to wet to dry gauze dressings.    PLAN: Haydee will send pictures of the wound via My Chart and we will keep in contact so we can get Natasha back here when it is time to discontinue the wound VAC.     Dr. Albarado would like to see her back in 2 months.    Total time spent, 25 minutes, all in counseling and coordination of care.    Kailey Olmstead, [facility]  Thoracic Surgery    Answers for HPI/ROS submitted by the patient on 10/22/2019   General Symptoms: No  Skin Symptoms: No  HENT Symptoms: No  EYE SYMPTOMS: No  HEART SYMPTOMS: No  LUNG SYMPTOMS: No  INTESTINAL SYMPTOMS: No  URINARY SYMPTOMS: No  GYNECOLOGIC SYMPTOMS: No  BREAST SYMPTOMS: No  SKELETAL SYMPTOMS: No  BLOOD SYMPTOMS: No  NERVOUS SYSTEM SYMPTOMS: No  MENTAL HEALTH SYMPTOMS: No

## 2019-10-30 ENCOUNTER — HOME CARE/HOSPICE - HEALTHEAST (OUTPATIENT)
Dept: HOME HEALTH SERVICES | Facility: HOME HEALTH | Age: 74
End: 2019-10-30

## 2019-11-01 ENCOUNTER — TELEPHONE (OUTPATIENT)
Dept: ONCOLOGY | Facility: CLINIC | Age: 74
End: 2019-11-01

## 2019-11-01 ENCOUNTER — HOME CARE/HOSPICE - HEALTHEAST (OUTPATIENT)
Dept: HOME HEALTH SERVICES | Facility: HOME HEALTH | Age: 74
End: 2019-11-01

## 2019-11-01 NOTE — TELEPHONE ENCOUNTER
Returned a call and LVM to Renata with [facility], stating that we need the pictures of the wound that was to be sent via [employer]. After we receive the images we will decide if the wound VAC can be discontinued or not.

## 2019-11-01 NOTE — TELEPHONE ENCOUNTER
Person calling: Renata with [facility]     Reason for call: Requesting verbal orders to discontinue wound vac as wound is very shallow. Would like to change to dressing changed three times/week.    Action taken: Encounter routed to Thoracic Care Coordination [place]     [phone] OK to leave detailed message (secure VM)    Anni Duran, EDERN, RN, PHN  Triage

## 2019-11-04 ENCOUNTER — TELEPHONE (OUTPATIENT)
Dept: SURGERY | Facility: CLINIC | Age: 74
End: 2019-11-04

## 2019-11-04 ENCOUNTER — TELEPHONE (OUTPATIENT)
Dept: ONCOLOGY | Facility: CLINIC | Age: 74
End: 2019-11-04

## 2019-11-04 ENCOUNTER — HOME CARE/HOSPICE - HEALTHEAST (OUTPATIENT)
Dept: HOME HEALTH SERVICES | Facility: HOME HEALTH | Age: 74
End: 2019-11-04

## 2019-11-04 NOTE — TELEPHONE ENCOUNTER
Pt called in to c/o a persistent cough that began over the weekend. As of 3 am this AM she felt like she was coughing up bile, and mucus that was very sticky, was white, foamy. Pt also has a sore throat. Is only SOB when has a long coughing bout. Pt Saw a provider 10/14 for bronchitis and cough, provider Rx'd an antibiotic. No fevers/chills, edema, n/v/c/d, abdominal pain, rash. Is worn out after being up so long. Has a home care RN coming out to help with wound vac. Dicussed 3 items:    1) I would route to thoracic team (she really likes y'all and has great confidence in your care). Thoracic team will reach out with any further recommendations. I did discuss that since she had already begun seeing her PCP for this, that my feeling is that the best care would be provided by follow-up with them, therefor:    2) Pt should call and follow-up with PCP as well    3) If any airway Sx occur, dizziness, lightheadedness, SOB, etc. Should occur, or if home care RN advises due to her assessments, see out immediate aid from an ED.    Pt verbalized understanding, will proactively contact PCP.

## 2019-11-05 ENCOUNTER — MEDICAL CORRESPONDENCE (OUTPATIENT)
Dept: HEALTH INFORMATION MANAGEMENT | Facility: CLINIC | Age: 74
End: 2019-11-05

## 2019-11-09 ENCOUNTER — HOME CARE/HOSPICE - HEALTHEAST (OUTPATIENT)
Dept: HOME HEALTH SERVICES | Facility: HOME HEALTH | Age: 74
End: 2019-11-09

## 2019-11-10 ENCOUNTER — HOME CARE/HOSPICE - HEALTHEAST (OUTPATIENT)
Dept: HOME HEALTH SERVICES | Facility: HOME HEALTH | Age: 74
End: 2019-11-10

## 2019-11-11 ENCOUNTER — TELEPHONE (OUTPATIENT)
Dept: SURGERY | Facility: CLINIC | Age: 74
End: 2019-11-11

## 2019-11-11 NOTE — TELEPHONE ENCOUNTER
I spoke to Renata from [facility] at [phone].  She is doing wound care for Natasha and asked for a verbal order to change from wet to dry dressing.   She said there is a lot of over granulation tissue and she things using silver algenate dressings 2-3 times/week x several weeks would work will for her.  I gave her a verbal order to proceed with this new wound treatment.

## 2019-11-12 ENCOUNTER — MEDICAL CORRESPONDENCE (OUTPATIENT)
Dept: HEALTH INFORMATION MANAGEMENT | Facility: CLINIC | Age: 74
End: 2019-11-12

## 2019-11-13 ENCOUNTER — HOME CARE/HOSPICE - HEALTHEAST (OUTPATIENT)
Dept: HOME HEALTH SERVICES | Facility: HOME HEALTH | Age: 74
End: 2019-11-13

## 2019-11-15 ENCOUNTER — HOME CARE/HOSPICE - HEALTHEAST (OUTPATIENT)
Dept: HOME HEALTH SERVICES | Facility: HOME HEALTH | Age: 74
End: 2019-11-15

## 2019-11-16 ENCOUNTER — HOME CARE/HOSPICE - HEALTHEAST (OUTPATIENT)
Dept: HOME HEALTH SERVICES | Facility: HOME HEALTH | Age: 74
End: 2019-11-16

## 2019-11-19 ENCOUNTER — HOME CARE/HOSPICE - HEALTHEAST (OUTPATIENT)
Dept: HOME HEALTH SERVICES | Facility: HOME HEALTH | Age: 74
End: 2019-11-19

## 2019-11-19 ENCOUNTER — RECORDS - HEALTHEAST (OUTPATIENT)
Dept: ADMINISTRATIVE | Facility: OTHER | Age: 74
End: 2019-11-19

## 2019-11-21 ENCOUNTER — HOME CARE/HOSPICE - HEALTHEAST (OUTPATIENT)
Dept: HOME HEALTH SERVICES | Facility: HOME HEALTH | Age: 74
End: 2019-11-21

## 2019-11-22 ENCOUNTER — HOME CARE/HOSPICE - HEALTHEAST (OUTPATIENT)
Dept: HOME HEALTH SERVICES | Facility: HOME HEALTH | Age: 74
End: 2019-11-22

## 2019-11-26 ENCOUNTER — HOME CARE/HOSPICE - HEALTHEAST (OUTPATIENT)
Dept: HOME HEALTH SERVICES | Facility: HOME HEALTH | Age: 74
End: 2019-11-26

## 2019-11-27 ENCOUNTER — HOME CARE/HOSPICE - HEALTHEAST (OUTPATIENT)
Dept: HOME HEALTH SERVICES | Facility: HOME HEALTH | Age: 74
End: 2019-11-27

## 2019-11-29 ENCOUNTER — HOME CARE/HOSPICE - HEALTHEAST (OUTPATIENT)
Dept: HOME HEALTH SERVICES | Facility: HOME HEALTH | Age: 74
End: 2019-11-29

## 2019-12-03 ENCOUNTER — TELEPHONE (OUTPATIENT)
Dept: SURGERY | Facility: CLINIC | Age: 74
End: 2019-12-03

## 2019-12-03 DIAGNOSIS — K22.3 ESOPHAGEAL PERFORATION: Primary | ICD-10-CM

## 2019-12-03 NOTE — TELEPHONE ENCOUNTER
I had a VM from Kiki, home care nurse at [facility] who does wound care for Natasha.   She said Natasha has been c/o of right chest pains @ previous drain sites.  This pain only occurs if she reaches her right arm across her body toward the left (to  a glass, for example).   When she resumes a normal position, the pain is gone.   She denies any fever, new drainage, or erythema.          She has a f/u appointment with Dr. Albarado in 2 weeks (12/18).   I will arrange for a new chest CT scan to evaluate this further.   I spoke to Natasha about this plan and she is in agreement.

## 2019-12-04 ENCOUNTER — HOME CARE/HOSPICE - HEALTHEAST (OUTPATIENT)
Dept: HOME HEALTH SERVICES | Facility: HOME HEALTH | Age: 74
End: 2019-12-04

## 2019-12-06 ENCOUNTER — HOME CARE/HOSPICE - HEALTHEAST (OUTPATIENT)
Dept: HOME HEALTH SERVICES | Facility: HOME HEALTH | Age: 74
End: 2019-12-06

## 2019-12-09 ENCOUNTER — HOME CARE/HOSPICE - HEALTHEAST (OUTPATIENT)
Dept: HOME HEALTH SERVICES | Facility: HOME HEALTH | Age: 74
End: 2019-12-09

## 2019-12-13 ENCOUNTER — HOME CARE/HOSPICE - HEALTHEAST (OUTPATIENT)
Dept: HOME HEALTH SERVICES | Facility: HOME HEALTH | Age: 74
End: 2019-12-13

## 2019-12-15 ASSESSMENT — ENCOUNTER SYMPTOMS
POOR WOUND HEALING: 1
HALLUCINATIONS: 0
NIGHT SWEATS: 0
SORE THROAT: 1
PANIC: 0
COUGH: 1
TASTE DISTURBANCE: 0
DECREASED APPETITE: 1
CHILLS: 0
FEVER: 0
DECREASED CONCENTRATION: 1
INSOMNIA: 1
SINUS PAIN: 0
DEPRESSION: 1
NECK MASS: 0
DYSPNEA ON EXERTION: 1
SPUTUM PRODUCTION: 1
NAIL CHANGES: 0
FATIGUE: 1
INCREASED ENERGY: 1
ALTERED TEMPERATURE REGULATION: 0
COUGH DISTURBING SLEEP: 1
POLYPHAGIA: 0
SKIN CHANGES: 0
SMELL DISTURBANCE: 0
TROUBLE SWALLOWING: 1
SINUS CONGESTION: 1
HEMOPTYSIS: 0
POSTURAL DYSPNEA: 0
NERVOUS/ANXIOUS: 1
HOARSE VOICE: 0

## 2019-12-16 ENCOUNTER — HOME CARE/HOSPICE - HEALTHEAST (OUTPATIENT)
Dept: HOME HEALTH SERVICES | Facility: HOME HEALTH | Age: 74
End: 2019-12-16

## 2019-12-17 ENCOUNTER — HOME CARE/HOSPICE - HEALTHEAST (OUTPATIENT)
Dept: HOME HEALTH SERVICES | Facility: HOME HEALTH | Age: 74
End: 2019-12-17

## 2019-12-18 ENCOUNTER — ANCILLARY PROCEDURE (OUTPATIENT)
Dept: CT IMAGING | Facility: CLINIC | Age: 74
End: 2019-12-18
Attending: CLINICAL NURSE SPECIALIST
Payer: MEDICARE

## 2019-12-18 ENCOUNTER — OFFICE VISIT (OUTPATIENT)
Dept: SURGERY | Facility: CLINIC | Age: 74
End: 2019-12-18
Attending: THORACIC SURGERY (CARDIOTHORACIC VASCULAR SURGERY)
Payer: MEDICARE

## 2019-12-18 VITALS
HEIGHT: 66 IN | BODY MASS INDEX: 24.12 KG/M2 | SYSTOLIC BLOOD PRESSURE: 110 MMHG | RESPIRATION RATE: 16 BRPM | DIASTOLIC BLOOD PRESSURE: 65 MMHG | TEMPERATURE: 98.4 F | HEART RATE: 79 BPM | WEIGHT: 150.1 LBS | OXYGEN SATURATION: 94 %

## 2019-12-18 DIAGNOSIS — K22.3 ESOPHAGEAL PERFORATION: ICD-10-CM

## 2019-12-18 DIAGNOSIS — L98.8 DRAINING CUTANEOUS SINUS TRACT: ICD-10-CM

## 2019-12-18 DIAGNOSIS — Z98.890 HISTORY OF ESOPHAGECTOMY: Primary | ICD-10-CM

## 2019-12-18 DIAGNOSIS — Z90.49 HISTORY OF ESOPHAGECTOMY: Primary | ICD-10-CM

## 2019-12-18 PROCEDURE — G0463 HOSPITAL OUTPT CLINIC VISIT: HCPCS | Mod: ZF

## 2019-12-18 ASSESSMENT — MIFFLIN-ST. JEOR: SCORE: 1202.35

## 2019-12-18 ASSESSMENT — PAIN SCALES - GENERAL: PAINLEVEL: NO PAIN (0)

## 2019-12-18 NOTE — PROGRESS NOTES
THORACIC SURGERY FOLLOW UP VISIT     Dear Joey Meehan I saw Ms. Vaz in follow-up today. The clinical summary follows:      PREOP DIAGNOSIS   1.  Dysphagia after a retrosternal gastric pull-up.   2.  Sternal wound chronic draining sinus.     PROCEDURE   10/10/2019  1.  Esophagogastroduodenoscopy.   2.  Botox injection of pylorus.   3.  Local wound exploration of the sternum and removal of foreign body.         COMPLICATIONS  None     INTERVAL STUDIES  CT Chest w/o contrast 12/18        Physical examination  BMI 24  Sternal wound: 2 areas of hypergranulation tissue one measuring 2 x 1 cm (medial) and one 1 x 1 cm (latera). Mild drainage. Silver alginate dressing on.      ETOH None   TOB None    PMH  Past Medical History:   Diagnosis Date     Depression      History of atrial fibrillation      HLD (hyperlipidemia)      HTN (hypertension)      Hypothyroidism        PSH  Past Surgical History:   Procedure Laterality Date     BRONCHOSCOPY (RIGID OR FLEXIBLE), DIAGNOSTIC N/A 5/2/2019    Procedure: BRONCHOSCOPY, WITH BAL;  Surgeon: Ally Ybarra MD;  Location: [facility]     COMBINED ESOPHAGOSCOPY, GASTROSCOPY, DUODENOSCOPY (EGD), REPLACE ESOPHAGEAL STENT N/A 5/6/2019    Procedure: ESOPHAGOGASTRODUODENOSCOPY WITH ESOPHAGEAL STENT REPLACEMENT, IRRIGATION AND DEBRIDEMENT OF LEFT NECK, WOUND VAC PLACEMENT;  Surgeon: Harley Murguia MD;  Location: [facility]     COMBINED ESOPHAGOSCOPY, GASTROSCOPY, DUODENOSCOPY (EGD), REPLACE ESOPHAGEAL STENT N/A 6/10/2019    Procedure: Esophagogastroduodenoscopy and Stent Removal;  Surgeon: Ally Ybarra MD;  Location: [facility]     ESOPHAGOGASTRECTOMY N/A 4/26/2019    Procedure: Redo Laparotomy SUBSTERNAL Esophagogastrostomy, Pharyngostomy, Intraoperative EGD, PARTIAL STERNOTOMY, LEFT PHARYNGOSTOMY,;  Surgeon: Temitope Bullock MD;  Location: [facility]     ESOPHAGOSCOPY, GASTROSCOPY, DUODENOSCOPY (EGD), COMBINED N/A 11/21/2018    Procedure: COMBINED ESOPHAGOSCOPY, GASTROSCOPY,  DUODENOSCOPY (EGD);  Surgeon: Temitope Bullock MD;  Location: [facility]     ESOPHAGOSCOPY, GASTROSCOPY, DUODENOSCOPY (EGD), DILATATION, COMBINED N/A 10/10/2019    Procedure: Esophagogastroduodenoscopy with Botox injection of the Pylorus;  Surgeon: Temitope Bullock MD;  Location: [facility]     HERNIORRHAPHY HIATAL  11/2018     HYSTERECTOMY       IRRIGATION AND DEBRIDEMENT CHEST WASHOUT, COMBINED N/A 10/10/2019    Procedure: Chest wound exploration;  Surgeon: Temitope Bullock MD;  Location: [facility]     LAPAROSCOPIC HERNIORRHAPHY HIATAL N/A 11/21/2018    Procedure: Midline laparotomy, Trans-hiatal esophagogasterectomy, gastrostomy, J-tube placement, G-tube placement, bilateral pleural chest tube placement, mediastinal abcess drainage, spit fistula creation, Esophagastrodueodenoscopy;  Surgeon: Temitope Bullock MD;  Location: [facility]     ROTATOR CUFF REPAIR RT/LT       THYROIDECTOMY       for hyperthroidism        SUBJECTIVE   Ms. Vaz is doing well but continues to have issues with the chronic draining sinus. She denies any fever or chills. She is having a normal diet, with occasional dysphagia.      From a personal perspective, she comes to clinic with there daughter.      IMPRESSION (Z98.890,  Z90.49) History of esophagectomy  (primary encounter diagnosis)  (L98.8) Draining cutaneous sinus tract    74 year old female with retrosternal gastric pull-up, 2 month after Botox injection of pylorus and wound exploration with removal of foreign body for chronic draining sinus.      PLAN  I reviewed the plan as follows:  1) Silver nitrate cauterization of hypergranulation tissue here in the office.   2) Continue applying silver nitrate by home health nurse for 2-3 weeks.   3) Return to clinic in 2 months. If she is still having wound issues we will contact plastic surgery for re-exploration and advancement flap.   They had all their questions answered and were in agreement with the plan.  I appreciate the  opportunity to participate in the care of your patient and will keep you updated.  Sincerely,

## 2019-12-18 NOTE — NURSING NOTE
"Oncology Rooming Note    December 18, 2019 2:33 PM   Myrtle Vaz is a 74 year old female who presents for:    Chief Complaint   Patient presents with     Oncology Clinic Visit     [facility] RETURN- MEDIASTINAL ABSCESS     Initial Vitals: /65 (BP Location: Right arm, Patient Position: Chair, Cuff Size: Adult Regular)   Pulse 79   Temp 98.4  F (36.9  C) (Oral)   Resp 16   Ht 1.676 m (5' 5.98\")   Wt 68.1 kg (150 lb 1.6 oz)   SpO2 94%   BMI 24.24 kg/m   Estimated body mass index is 24.24 kg/m  as calculated from the following:    Height as of this encounter: 1.676 m (5' 5.98\").    Weight as of this encounter: 68.1 kg (150 lb 1.6 oz). Body surface area is 1.78 meters squared.  No Pain (0) Comment: Data Unavailable   No LMP recorded. Patient has had a hysterectomy.  Allergies reviewed: Yes  Medications reviewed: Yes    Medications: Medication refills not needed today.  Pharmacy name entered into [employer]: [id number] - [place], [place] - [address] AT [facility] [address]    Clinical concerns: No new concerns. Per was notified.      Adilson Blevnis LPN            "

## 2019-12-20 ENCOUNTER — HOME CARE/HOSPICE - HEALTHEAST (OUTPATIENT)
Dept: HOME HEALTH SERVICES | Facility: HOME HEALTH | Age: 74
End: 2019-12-20

## 2019-12-20 ENCOUNTER — MEDICAL CORRESPONDENCE (OUTPATIENT)
Dept: HEALTH INFORMATION MANAGEMENT | Facility: CLINIC | Age: 74
End: 2019-12-20

## 2019-12-20 DIAGNOSIS — S21.109A WOUND OF STERNAL REGION: Primary | ICD-10-CM

## 2019-12-23 ENCOUNTER — HOME CARE/HOSPICE - HEALTHEAST (OUTPATIENT)
Dept: HOME HEALTH SERVICES | Facility: HOME HEALTH | Age: 74
End: 2019-12-23

## 2019-12-23 DIAGNOSIS — J40 BRONCHITIS: Primary | ICD-10-CM

## 2019-12-23 RX ORDER — DOXYCYCLINE 100 MG/1
100 CAPSULE ORAL 2 TIMES DAILY
Qty: 14 CAPSULE | Refills: 0 | Status: SHIPPED | OUTPATIENT
Start: 2019-12-23 | End: 2020-02-12

## 2019-12-27 ENCOUNTER — HOME CARE/HOSPICE - HEALTHEAST (OUTPATIENT)
Dept: HOME HEALTH SERVICES | Facility: HOME HEALTH | Age: 74
End: 2019-12-27

## 2019-12-28 ENCOUNTER — HOME CARE/HOSPICE - HEALTHEAST (OUTPATIENT)
Dept: HOME HEALTH SERVICES | Facility: HOME HEALTH | Age: 74
End: 2019-12-28

## 2019-12-30 ENCOUNTER — HOME CARE/HOSPICE - HEALTHEAST (OUTPATIENT)
Dept: HOME HEALTH SERVICES | Facility: HOME HEALTH | Age: 74
End: 2019-12-30

## 2020-01-03 ENCOUNTER — HOME CARE/HOSPICE - HEALTHEAST (OUTPATIENT)
Dept: HOME HEALTH SERVICES | Facility: HOME HEALTH | Age: 75
End: 2020-01-03

## 2020-01-06 ENCOUNTER — TELEPHONE (OUTPATIENT)
Dept: SURGERY | Facility: CLINIC | Age: 75
End: 2020-01-06

## 2020-01-06 ENCOUNTER — HOME CARE/HOSPICE - HEALTHEAST (OUTPATIENT)
Dept: HOME HEALTH SERVICES | Facility: HOME HEALTH | Age: 75
End: 2020-01-06

## 2020-01-06 NOTE — TELEPHONE ENCOUNTER
"Person calling: patient and homecare RN Kiki    Reason for call: symptoms: reporting 3 days of \"burning, stabbing\" pain at medial sternal wound. State pain is constant, skin is tender to touch but burning seems deeper. Pt took Excedrin on Friday but nothing for pain over the weekend. Nurse reporting both wound beds are pink and smaller this week than last, no drainage, edges look clean and free of infection. No fevers or chills to report. Pt has been refusing silver dressing so they've been using silver nitrate applicators and covering with gauze. Wound RN will return to the home Friday 1/10. Should they discontinue silver dressing? Recommendation for pain?     Action taken: routed this encounter to the following provider(s):  Thoracic care coordinator pool    "

## 2020-01-07 ENCOUNTER — HOME CARE/HOSPICE - HEALTHEAST (OUTPATIENT)
Dept: HOME HEALTH SERVICES | Facility: HOME HEALTH | Age: 75
End: 2020-01-07

## 2020-01-07 NOTE — TELEPHONE ENCOUNTER
Kiki called back and stated that she had yet to hear anything from the Pt's provider's with regards to her questions yesterday. Please call Kiki and discuss the previously documented issues and questions.

## 2020-01-10 ENCOUNTER — TELEPHONE (OUTPATIENT)
Dept: SURGERY | Facility: CLINIC | Age: 75
End: 2020-01-10

## 2020-01-10 ENCOUNTER — HOME CARE/HOSPICE - HEALTHEAST (OUTPATIENT)
Dept: HOME HEALTH SERVICES | Facility: HOME HEALTH | Age: 75
End: 2020-01-10

## 2020-01-10 ENCOUNTER — MEDICAL CORRESPONDENCE (OUTPATIENT)
Dept: HEALTH INFORMATION MANAGEMENT | Facility: CLINIC | Age: 75
End: 2020-01-10

## 2020-01-10 NOTE — TELEPHONE ENCOUNTER
"Pt calling in to C/O pain in her chest located just to the right of her clavicular notch. The pain is described as \"sometimes stabbing, sometimes burning\" but pain is never absent. Pain was previously rated 4-5/10, but is now a 7/10 with silver nitrate usage.   Pt is not experiencing a fever, SOB, lightheadedness, or dizziness.  Pt requesting someone from the team to call and discuss with Haydee, her daughter, if there is a \"floating, migrating suture\" because she experienced this previously.  "

## 2020-01-13 ENCOUNTER — HOME CARE/HOSPICE - HEALTHEAST (OUTPATIENT)
Dept: HOME HEALTH SERVICES | Facility: HOME HEALTH | Age: 75
End: 2020-01-13

## 2020-01-13 DIAGNOSIS — L98.8 DRAINING CUTANEOUS SINUS TRACT: Primary | ICD-10-CM

## 2020-01-15 NOTE — TELEPHONE ENCOUNTER
FUTURE VISIT INFORMATION      FUTURE VISIT INFORMATION:    Date: 2/12/20    Time: 9:00am    [place] [facility] [place]  REFERRAL INFORMATION:    Referring providers clinic:  [facility]    Reason for visit/diagnosis  wound exploration and chronic draining sinus    RECORDS REQUESTED FROM:       Clinic name Comments Records Status Imaging Status   [facility] Order/phone note 1/13/20 EPIC

## 2020-01-17 ENCOUNTER — HOME CARE/HOSPICE - HEALTHEAST (OUTPATIENT)
Dept: HOME HEALTH SERVICES | Facility: HOME HEALTH | Age: 75
End: 2020-01-17

## 2020-01-17 DIAGNOSIS — Z98.890 HISTORY OF THORACIC SURGERY: Primary | ICD-10-CM

## 2020-01-19 NOTE — PROGRESS NOTES
THORACIC SURGERY FOLLOW UP VISIT     Dear Joey Meehan I saw Ms. Vaz in follow-up today (last seen 12/18/2019). The clinical summary follows:    At last visit, 12/18/2019, silver nitrate cauterization of hypergranulation tissue was done in the office and she was instructed to have home nurse continue silver nitrate for 2-3 weeks.       PREOP DIAGNOSIS   1.  Dysphagia after a retrosternal gastric pull-up.   2.  Sternal wound chronic draining sinus.     PROCEDURE   1.  Esophagogastroduodenoscopy.   2.  Botox injection of pylorus.   3.  Local wound exploration of the sternum and removal of foreign body.      DATE OF PROCEDURE  10/10/2019     HISTOPATHOLOGY   10/10/2019  Soft tissue, Chest wall, biopsy:   - Skin and subcutaneous tissue with scarring, chronic inflammation, and   hemosiderin-laden macrophages     COMPLICATIONS  None      INTERVAL STUDIES  CT 1/22/2020      Physical examination  BMI 24  Sternal wound: 2 areas of hypergranulation tissue, approximately 1-2cm in diameter. Small amount of drainage on overlying gauze, removed and recovered with gauze and tape.      ETOH none   TOB former smoker     PMH  Past Medical History:   Diagnosis Date     Depression      History of atrial fibrillation      HLD (hyperlipidemia)      HTN (hypertension)      Hypothyroidism        PSH  Past Surgical History:   Procedure Laterality Date     BRONCHOSCOPY (RIGID OR FLEXIBLE), DIAGNOSTIC N/A 5/2/2019    Procedure: BRONCHOSCOPY, WITH BAL;  Surgeon: Ally Ybarra MD;  Location: [facility]     COMBINED ESOPHAGOSCOPY, GASTROSCOPY, DUODENOSCOPY (EGD), REPLACE ESOPHAGEAL STENT N/A 5/6/2019    Procedure: ESOPHAGOGASTRODUODENOSCOPY WITH ESOPHAGEAL STENT REPLACEMENT, IRRIGATION AND DEBRIDEMENT OF LEFT NECK, WOUND VAC PLACEMENT;  Surgeon: Harley Murguia MD;  Location[facility]     COMBINED ESOPHAGOSCOPY, GASTROSCOPY, DUODENOSCOPY (EGD), REPLACE ESOPHAGEAL STENT N/A 6/10/2019    Procedure: Esophagogastroduodenoscopy and Stent  Removal;  Surgeon: Ally Ybarra MD;  [facility]     ESOPHAGOGASTRECTOMY N/A 4/26/2019    Procedure: Redo Laparotomy SUBSTERNAL Esophagogastrostomy, Pharyngostomy, Intraoperative EGD, PARTIAL STERNOTOMY, LEFT PHARYNGOSTOMY,;  Surgeon: Temitope Bullock MD;  Location[facility]     ESOPHAGOSCOPY, GASTROSCOPY, DUODENOSCOPY (EGD), COMBINED N/A 11/21/2018    Procedure: COMBINED ESOPHAGOSCOPY, GASTROSCOPY, DUODENOSCOPY (EGD);  Surgeon: Temitope Bullock MD;  [facility]     ESOPHAGOSCOPY, GASTROSCOPY, DUODENOSCOPY (EGD), DILATATION, COMBINED N/A 10/10/2019    Procedure: Esophagogastroduodenoscopy with Botox injection of the Pylorus;  Surgeon: Temitope Bullock MD;  Location: [facility]     HERNIORRHAPHY HIATAL  11/2018     HYSTERECTOMY       IRRIGATION AND DEBRIDEMENT CHEST WASHOUT, COMBINED N/A 10/10/2019    Procedure: Chest wound exploration;  Surgeon: Temitope Bullock MD;  Location: [facility]     LAPAROSCOPIC HERNIORRHAPHY HIATAL N/A 11/21/2018    Procedure: Midline laparotomy, Trans-hiatal esophagogasterectomy, gastrostomy, J-tube placement, G-tube placement, bilateral pleural chest tube placement, mediastinal abcess drainage, spit fistula creation, Esophagastrodueodenoscopy;  Surgeon: Temitope Bullock MD;  Location: [facility]     ROTATOR CUFF REPAIR RT/LT       THYROIDECTOMY       for hyperthroidism        SUBJECTIVE   Ms. Vaz is doing well, however she continues to have to issues with chronic draining sinus. She has  sharp pain in that area with movement and it continues to drain. She is eating normally. She has continued to have silver nitrate applied at home and notes that this is painful.        From a personal perspective, she comes to clinic with her daughter.      IMPRESSION (L98.8) Draining cutaneous sinus tract  (primary encounter diagnosis)  (K91.89) Esophagectomy, anastomotic leak    74 year old female 4 months after  wound exploration with removal of foreign body (ethibond  stitch) following retrosternal gastric pull up.  I had an extensive discussion with the patient and her family regarding the rationale for surgery, the alternatives risks and benefits of the procedure. We discussed the need to perform another wound exploration and removal of all foreign bodies. This will be a combined case with plastic surgery in case we need to perform a pectoralis major muscle advancement flap. I explained the expected hospital stay, postoperative course, recovery time, diet and activity restrictions. Informed consent was obtained and they agreed to proceed.       PLAN  I reviewed the plan as follows:  1) She is scheduled to see Dr. Mcqueen in a couple of weeks. We will choose an OR date after she has seen him.   2) Stop application of silver nitrate   3) Planned procedure: Local wound exploration (sternum), possible pectoralis major muscle advancement flap.   4) Necessary tests and appointments: PAC  5) Anticoagulation plan: Enoxaparin post op.     They had all their questions answered and were in agreement with the plan.  I appreciate the opportunity to participate in the care of your patient and will keep you updated.  Sincerely,

## 2020-01-20 ENCOUNTER — HOME CARE/HOSPICE - HEALTHEAST (OUTPATIENT)
Dept: HOME HEALTH SERVICES | Facility: HOME HEALTH | Age: 75
End: 2020-01-20

## 2020-01-22 ENCOUNTER — ANCILLARY PROCEDURE (OUTPATIENT)
Dept: CT IMAGING | Facility: CLINIC | Age: 75
End: 2020-01-22
Attending: CLINICAL NURSE SPECIALIST
Payer: MEDICARE

## 2020-01-22 ENCOUNTER — OFFICE VISIT (OUTPATIENT)
Dept: SURGERY | Facility: CLINIC | Age: 75
End: 2020-01-22
Attending: THORACIC SURGERY (CARDIOTHORACIC VASCULAR SURGERY)
Payer: MEDICARE

## 2020-01-22 ENCOUNTER — APPOINTMENT (OUTPATIENT)
Dept: LAB | Facility: CLINIC | Age: 75
End: 2020-01-22
Payer: MEDICARE

## 2020-01-22 VITALS
SYSTOLIC BLOOD PRESSURE: 121 MMHG | HEART RATE: 70 BPM | RESPIRATION RATE: 16 BRPM | OXYGEN SATURATION: 92 % | DIASTOLIC BLOOD PRESSURE: 70 MMHG | HEIGHT: 66 IN | BODY MASS INDEX: 24.24 KG/M2

## 2020-01-22 DIAGNOSIS — Z98.890 HISTORY OF THORACIC SURGERY: ICD-10-CM

## 2020-01-22 DIAGNOSIS — K91.89 ESOPHAGECTOMY, ANASTOMOTIC LEAK: ICD-10-CM

## 2020-01-22 DIAGNOSIS — L98.8 DRAINING CUTANEOUS SINUS TRACT: Primary | ICD-10-CM

## 2020-01-22 LAB
CREAT BLD-MCNC: 1 MG/DL (ref 0.52–1.04)
GFR SERPL CREATININE-BSD FRML MDRD: 54 ML/MIN/{1.73_M2}

## 2020-01-22 PROCEDURE — 40000114 ZZH STATISTIC NO CHARGE CLINIC VISIT

## 2020-01-22 RX ORDER — IOPAMIDOL 755 MG/ML
74 INJECTION, SOLUTION INTRAVASCULAR ONCE
Status: COMPLETED | OUTPATIENT
Start: 2020-01-22 | End: 2020-01-22

## 2020-01-22 RX ADMIN — IOPAMIDOL 74 ML: 755 INJECTION, SOLUTION INTRAVASCULAR at 18:08

## 2020-01-22 ASSESSMENT — PAIN SCALES - GENERAL: PAINLEVEL: SEVERE PAIN (7)

## 2020-01-22 NOTE — Clinical Note
1/22/2020       RE: Myrtle Vaz  [address] [facility] [address]     Dear Colleague,    Thank you for referring your patient, Myrtle Vaz, to the [facility]. Please see a copy of my visit note below.    THORACIC SURGERY FOLLOW UP VISIT     Dear Joey Meehan,  I saw Ms. Vaz in follow-up today (last seen 12/18/2019). The clinical summary follows:    At last visit, 12/18/2019, silver nitrate cauterization of hypergranulation tissue was done in the office and she was instructed to have home nurse continue silver nitrate for 2-3 weeks.       PREOP DIAGNOSIS   1.  Dysphagia after a retrosternal gastric pull-up.   2.  Sternal wound chronic draining sinus.     PROCEDURE   1.  Esophagogastroduodenoscopy.   2.  Botox injection of pylorus.   3.  Local wound exploration of the sternum and removal of foreign body.      DATE OF PROCEDURE  10/10/2019     HISTOPATHOLOGY   10/10/2019  Soft tissue, Chest wall, biopsy:   - Skin and subcutaneous tissue with scarring, chronic inflammation, and   hemosiderin-laden macrophages     COMPLICATIONS  None      INTERVAL STUDIES  CT 1/22/2020      Physical examination  BMI 24.2  Gen: alert, NAD  CV: non cyanotic  Resp: unlabored breathing   Sternal wound: 2 areas of hypergranulation tissue, approximately 1-2cm in diameter. Small amount of drainage on overlying gauze, removed and recovered with gauze and tape.      ETOH none   TOB former smoker     PMH  Past Medical History:   Diagnosis Date     Depression      History of atrial fibrillation      HLD (hyperlipidemia)      HTN (hypertension)      Hypothyroidism        PSH  Past Surgical History:   Procedure Laterality Date     BRONCHOSCOPY (RIGID OR FLEXIBLE), DIAGNOSTIC N/A 5/2/2019    Procedure: BRONCHOSCOPY, WITH BAL;  Surgeon: Ally Ybarra MD;  Location:  GI     COMBINED ESOPHAGOSCOPY, GASTROSCOPY, DUODENOSCOPY (EGD), REPLACE ESOPHAGEAL STENT N/A 5/6/2019    Procedure: ESOPHAGOGASTRODUODENOSCOPY WITH  ESOPHAGEAL STENT REPLACEMENT, IRRIGATION AND DEBRIDEMENT OF LEFT NECK, WOUND VAC PLACEMENT;  Surgeon: Harley Murguia MD;  Location: [facility] OR     COMBINED ESOPHAGOSCOPY, GASTROSCOPY, DUODENOSCOPY (EGD), REPLACE ESOPHAGEAL STENT N/A 6/10/2019    Procedure: Esophagogastroduodenoscopy and Stent Removal;  Surgeon: Ally Ybarra MD;  Location: [facility] OR     ESOPHAGOGASTRECTOMY N/A 4/26/2019    Procedure: Redo Laparotomy SUBSTERNAL Esophagogastrostomy, Pharyngostomy, Intraoperative EGD, PARTIAL STERNOTOMY, LEFT PHARYNGOSTOMY,;  Surgeon: Temitope Bullock MD;  Location: [facility] OR     ESOPHAGOSCOPY, GASTROSCOPY, DUODENOSCOPY (EGD), COMBINED N/A 11/21/2018    Procedure: COMBINED ESOPHAGOSCOPY, GASTROSCOPY, DUODENOSCOPY (EGD);  Surgeon: Temitope Bullock MD;  Location: [facility]     ESOPHAGOSCOPY, GASTROSCOPY, DUODENOSCOPY (EGD), DILATATION, COMBINED N/A 10/10/2019    Procedure: Esophagogastroduodenoscopy with Botox injection of the Pylorus;  Surgeon: Temitope Bullock MD;  Location: [facility]     HERNIORRHAPHY HIATAL  11/2018     HYSTERECTOMY       IRRIGATION AND DEBRIDEMENT CHEST WASHOUT, COMBINED N/A 10/10/2019    Procedure: Chest wound exploration;  Surgeon: Temitope Bullock MD;  [facility]: [facility]     LAPAROSCOPIC HERNIORRHAPHY HIATAL N/A 11/21/2018    Procedure: Midline laparotomy, Trans-hiatal esophagogasterectomy, gastrostomy, J-tube placement, G-tube placement, bilateral pleural chest tube placement, mediastinal abcess drainage, spit fistula creation, Esophagastrodueodenoscopy;  Surgeon: Temitope Bullock MD;  Location: [facility]     ROTATOR CUFF REPAIR RT/LT       THYROIDECTOMY       for hyperthroidism        SUBJECTIVE   Ms. Vaz is doing well, however she continues to have to issues with chronic draining sinus. She has  sharp pain in that area with movement and it continues to drain. She is eating normally. She has continued to have silver nitrate applied at home and notes that this is painful.        From  a personal perspective, she comes to clinic with her daughter.      IMPRESSION ***  74 year old female 4 month after Botox injection of pylorus and wound exploration with removal of foreign body for chronic draining sinus.      PLAN  I reviewed the plan as follows:  1) Follow-up: Keep scheduled visit with Dr. Mcqueen. After this we will follow up with her to schedule surgery.   2) Stop application of silver nitrate      They had all their questions answered and were in agreement with the plan.  I appreciate the opportunity to participate in the care of your patient and will keep you updated.  Sincerely,      THORACIC SURGERY FOLLOW UP VISIT     Dear Joey Meehan,  I saw Ms. Vaz in follow-up today (last seen 12/18/2019). The clinical summary follows:    At last visit, 12/18/2019, silver nitrate cauterization of hypergranulation tissue was done in the office and she was instructed to have home nurse continue silver nitrate for 2-3 weeks.       PREOP DIAGNOSIS   1.  Dysphagia after a retrosternal gastric pull-up.   2.  Sternal wound chronic draining sinus.     PROCEDURE   1.  Esophagogastroduodenoscopy.   2.  Botox injection of pylorus.   3.  Local wound exploration of the sternum and removal of foreign body.      DATE OF PROCEDURE  10/10/2019     HISTOPATHOLOGY   10/10/2019  Soft tissue, Chest wall, biopsy:   - Skin and subcutaneous tissue with scarring, chronic inflammation, and   hemosiderin-laden macrophages     COMPLICATIONS  None      INTERVAL STUDIES  CT 1/22/2020      Physical examination  BMI 24  Sternal wound: 2 areas of hypergranulation tissue, approximately 1-2cm in diameter. Small amount of drainage on overlying gauze, removed and recovered with gauze and tape.      ETOH none   TOB former smoker     PMH  Past Medical History:   Diagnosis Date     Depression      History of atrial fibrillation      HLD (hyperlipidemia)      HTN (hypertension)      Hypothyroidism        PSH  Past Surgical  History:   Procedure Laterality Date     BRONCHOSCOPY (RIGID OR FLEXIBLE), DIAGNOSTIC N/A 5/2/2019    Procedure: BRONCHOSCOPY, WITH BAL;  Surgeon: Ally Ybarra MD;  [facility]: [facility]     COMBINED ESOPHAGOSCOPY, GASTROSCOPY, DUODENOSCOPY (EGD), REPLACE ESOPHAGEAL STENT N/A 5/6/2019    Procedure: ESOPHAGOGASTRODUODENOSCOPY WITH ESOPHAGEAL STENT REPLACEMENT, IRRIGATION AND DEBRIDEMENT OF LEFT NECK, WOUND VAC PLACEMENT;  Surgeon: Harley Murguia MD;  [facility]     COMBINED ESOPHAGOSCOPY, GASTROSCOPY, DUODENOSCOPY (EGD), REPLACE ESOPHAGEAL STENT N/A 6/10/2019    Procedure: Esophagogastroduodenoscopy and Stent Removal;  Surgeon: Ally Ybarra MD;  [facility]     ESOPHAGOGASTRECTOMY N/A 4/26/2019    Procedure: Redo Laparotomy SUBSTERNAL Esophagogastrostomy, Pharyngostomy, Intraoperative EGD, PARTIAL STERNOTOMY, LEFT PHARYNGOSTOMY,;  Surgeon: Temitope Bullock MD;  [facility]     ESOPHAGOSCOPY, GASTROSCOPY, DUODENOSCOPY (EGD), COMBINED N/A 11/21/2018    Procedure: COMBINED ESOPHAGOSCOPY, GASTROSCOPY, DUODENOSCOPY (EGD);  Surgeon: Temitope Bullock MD;  [facility]     ESOPHAGOSCOPY, GASTROSCOPY, DUODENOSCOPY (EGD), DILATATION, COMBINED N/A 10/10/2019    Procedure: Esophagogastroduodenoscopy with Botox injection of the Pylorus;  Surgeon: Temitope Bullock MD;  [facility]     HERNIORRHAPHY HIATAL  11/2018     HYSTERECTOMY       IRRIGATION AND DEBRIDEMENT CHEST WASHOUT, COMBINED N/A 10/10/2019    Procedure: Chest wound exploration;  Surgeon: Temitope Bullock MD;  [facility]     LAPAROSCOPIC HERNIORRHAPHY HIATAL N/A 11/21/2018    Procedure: Midline laparotomy, Trans-hiatal esophagogasterectomy, gastrostomy, J-tube placement, G-tube placement, bilateral pleural chest tube placement, mediastinal abcess drainage, spit fistula creation, Esophagastrodueodenoscopy;  Surgeon: Temitoep Bullock MD;  [facility]: [facility]     ROTATOR CUFF REPAIR RT/LT       THYROIDECTOMY        for hyperthroidism        SUBJECTIVE   Ms. Vaz is doing well, however she continues to have to issues with chronic draining sinus. She has  sharp pain in that area with movement and it continues to drain. She is eating normally. She has continued to have silver nitrate applied at home and notes that this is painful.        From a personal perspective, she comes to clinic with her daughter.      IMPRESSION (L98.8) Draining cutaneous sinus tract  (primary encounter diagnosis)  (K91.89) Esophagectomy, anastomotic leak    74 year old female 4 months after  wound exploration with removal of foreign body (ethibond stitch) following retrosternal gastric pull up.  I had an extensive discussion with the patient and her family regarding the rationale for surgery, the alternatives risks and benefits of the procedure. We discussed the need to perform another wound exploration and removal of all foreign bodies. This will be a combined case with plastic surgery in case we need to perform a pectoralis major muscle advancement flap. I explained the expected hospital stay, postoperative course, recovery time, diet and activity restrictions. Informed consent was obtained and they agreed to proceed.       PLAN  I reviewed the plan as follows:  1) She is scheduled to see Dr. Mcqueen in a couple of weeks. We will choose an OR date after she has seen him.   2) Stop application of silver nitrate   3) Planned procedure: Local wound exploration (sternum), possible pectoralis major muscle advancement flap.   4) Necessary tests and appointments: PAC  5) Anticoagulation plan: Enoxaparin post op.     They had all their questions answered and were in agreement with the plan.  I appreciate the opportunity to participate in the care of your patient and will keep you updated.  Sincerely,      Again, thank you for allowing me to participate in the care of your patient.      Sincerely,    Marin Albarado MD

## 2020-01-23 ENCOUNTER — TELEPHONE (OUTPATIENT)
Dept: CARE COORDINATION | Facility: CLINIC | Age: 75
End: 2020-01-23

## 2020-01-23 NOTE — TELEPHONE ENCOUNTER
"Oncology Distress Screening Follow-up  Clinical Social Work  [facility]    Identified Concern and Score From Distress Screening:     3. How concerned are you about feeling depressed or very sad?   9Abnormal            4. How concerned are you about feeling anxious or very scared?   8Abnormal              Date of Distress Screenin2020      Data: Patient is a 74 year old female seen by thoracic surgery for follow up of last visit:    \"At last visit, 2019, silver nitrate cauterization of hypergranulation tissue was done in the office and she was instructed to have home nurse continue silver nitrate for 2-3 weeks. \"    No oncology diagnosis.    Intervention/Education Provided:: SW attempted to contact patient by phone to follow up on oncology distress screening. 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  Daughter indicated patient had denied any concerns during screening and \"will deny anything being wrong but then she cries at home.\" Daughter indicated attempting to want to help patient and having offered to connect patient with therapy but patient refuses.  Daughter did indicate patient is on medications and daughter intends to speak with pt's PCP at her next appointment about possible adjustments to dosage.  SW provided support, gave contact information for writer for any additional conversations.       Follow-up Required: SW available to assist with any additional identified needs.       Soo Yeon Han, MSW, LICSW  Pager: [phone]  Phone: [phone]    "

## 2020-01-24 ENCOUNTER — HOME CARE/HOSPICE - HEALTHEAST (OUTPATIENT)
Dept: HOME HEALTH SERVICES | Facility: HOME HEALTH | Age: 75
End: 2020-01-24

## 2020-01-27 ENCOUNTER — HOME CARE/HOSPICE - HEALTHEAST (OUTPATIENT)
Dept: HOME HEALTH SERVICES | Facility: HOME HEALTH | Age: 75
End: 2020-01-27

## 2020-01-30 ENCOUNTER — TELEPHONE (OUTPATIENT)
Dept: SURGERY | Facility: CLINIC | Age: 75
End: 2020-01-30

## 2020-01-30 NOTE — TELEPHONE ENCOUNTER
Called patients daughter to discuss what Dr. Albarado said about patients CT Scan. There was nothing seen that they weren't expecting to see and to follow up with Dr. Mcqueen on 2/12. Patient is seeing PCP tomorrow 1/31 to discuss pain and chronic cough that is painful. No further questions.    Dr. Albarado and Dr. Mcqueen will talk after appointment on 2/12.

## 2020-01-31 ENCOUNTER — HOME CARE/HOSPICE - HEALTHEAST (OUTPATIENT)
Dept: HOME HEALTH SERVICES | Facility: HOME HEALTH | Age: 75
End: 2020-01-31

## 2020-01-31 ENCOUNTER — MEDICAL CORRESPONDENCE (OUTPATIENT)
Dept: HEALTH INFORMATION MANAGEMENT | Facility: CLINIC | Age: 75
End: 2020-01-31

## 2020-02-03 ENCOUNTER — HOME CARE/HOSPICE - HEALTHEAST (OUTPATIENT)
Dept: HOME HEALTH SERVICES | Facility: HOME HEALTH | Age: 75
End: 2020-02-03

## 2020-02-05 ENCOUNTER — HOME CARE/HOSPICE - HEALTHEAST (OUTPATIENT)
Dept: HOME HEALTH SERVICES | Facility: HOME HEALTH | Age: 75
End: 2020-02-05

## 2020-02-07 ENCOUNTER — HOME CARE/HOSPICE - HEALTHEAST (OUTPATIENT)
Dept: HOME HEALTH SERVICES | Facility: HOME HEALTH | Age: 75
End: 2020-02-07

## 2020-02-10 ENCOUNTER — HOME CARE/HOSPICE - HEALTHEAST (OUTPATIENT)
Dept: HOME HEALTH SERVICES | Facility: HOME HEALTH | Age: 75
End: 2020-02-10

## 2020-02-12 ENCOUNTER — PRE VISIT (OUTPATIENT)
Dept: SURGERY | Facility: CLINIC | Age: 75
End: 2020-02-12

## 2020-02-12 ENCOUNTER — PATIENT OUTREACH (OUTPATIENT)
Dept: PLASTIC SURGERY | Facility: CLINIC | Age: 75
End: 2020-02-12

## 2020-02-12 ENCOUNTER — HOME CARE/HOSPICE - HEALTHEAST (OUTPATIENT)
Dept: HOME HEALTH SERVICES | Facility: HOME HEALTH | Age: 75
End: 2020-02-12

## 2020-02-12 ENCOUNTER — OFFICE VISIT (OUTPATIENT)
Dept: PLASTIC SURGERY | Facility: CLINIC | Age: 75
End: 2020-02-12
Payer: MEDICARE

## 2020-02-12 VITALS
BODY MASS INDEX: 24.27 KG/M2 | HEART RATE: 92 BPM | SYSTOLIC BLOOD PRESSURE: 136 MMHG | HEIGHT: 66 IN | OXYGEN SATURATION: 91 % | WEIGHT: 151 LBS | DIASTOLIC BLOOD PRESSURE: 77 MMHG

## 2020-02-12 DIAGNOSIS — L98.8 DRAINING CUTANEOUS SINUS TRACT: Primary | ICD-10-CM

## 2020-02-12 DIAGNOSIS — T81.89XD NON-HEALING SURGICAL WOUND, SUBSEQUENT ENCOUNTER: Primary | ICD-10-CM

## 2020-02-12 DIAGNOSIS — L98.8 DRAINING CUTANEOUS SINUS TRACT: ICD-10-CM

## 2020-02-12 LAB
ALBUMIN SERPL-MCNC: 3 G/DL (ref 3.4–5)
PREALB SERPL IA-MCNC: 25 MG/DL (ref 15–45)

## 2020-02-12 RX ORDER — TRAMADOL HYDROCHLORIDE 50 MG/1
50 TABLET ORAL
COMMUNITY
Start: 2020-01-03 | End: 2022-03-23

## 2020-02-12 RX ORDER — HYDROCODONE BITARTRATE AND ACETAMINOPHEN 5; 325 MG/1; MG/1
1 TABLET ORAL EVERY 4 HOURS PRN
Status: ON HOLD | COMMUNITY
End: 2024-01-01

## 2020-02-12 RX ORDER — CEFAZOLIN SODIUM 2 G/50ML
2 SOLUTION INTRAVENOUS
Status: CANCELLED | OUTPATIENT
Start: 2020-02-12

## 2020-02-12 RX ORDER — CEFAZOLIN SODIUM 1 G/50ML
1 INJECTION, SOLUTION INTRAVENOUS SEE ADMIN INSTRUCTIONS
Status: CANCELLED | OUTPATIENT
Start: 2020-02-12

## 2020-02-12 ASSESSMENT — PAIN SCALES - GENERAL: PAINLEVEL: SEVERE PAIN (7)

## 2020-02-12 ASSESSMENT — MIFFLIN-ST. JEOR: SCORE: 1206.68

## 2020-02-12 NOTE — PATIENT INSTRUCTIONS
Spoke with pt regarding lab results. Pt requests these be relayed to her daughter, Haydee. Contacted pts daughter and left message with direct contact information requesting call back to discuss. Inessa MENDOZA RNCC

## 2020-02-12 NOTE — NURSING NOTE
"Chief Complaint   Patient presents with     Consult     new pt here for chest woudn closure, referred by Dr. Albarado       Vitals:    02/12/20 0840   BP: 136/77   BP Location: Right arm   Patient Position: Chair   Cuff Size: Adult Regular   Pulse: 92   SpO2: 91%   Weight: 68.5 kg (151 lb)   Height: 1.676 m (5' 6\")       Body mass index is 24.37 kg/m .    Bull Ivan, EMT    "

## 2020-02-12 NOTE — PROGRESS NOTES
CONSULTATION NOTE      REFERRING PROVIDER:  Dr. Meghana Funes      PRESENTING COMPLAINT:  Consultation for chest wall reconstruction.      HISTORY OF PRESENTING COMPLAINT:  Ms. Vaz is 74 years old.  Back in 11/2018, had a hiatal hernia repair.  Unfortunately, developed a leak.  Required an open thoracotomy with mediastinal cleanout.  Required multiple surgeries for that.  Had a spit fistula as well.  All of that has been reversed now and she has a couple of nonhealing wounds in the mid portion of her sternal region that are draining constantly.  Here to have it looked at.  She is not on any antibiotics.  Feels well.      PAST MEDICAL HISTORY:  Atrial fibrillation, hypothyroidism.      PAST SURGICAL HISTORY:  Thyroidectomy, hysterectomy and as per HPI.      MEDICATIONS:     1.  Amiodarone.    2.  Levothyroxine.    3.  Venlafaxine.      ALLERGIES:  Gabapentin.      SOCIAL HISTORY:  Used to smoke about a pack a day for over 50 years, quit in 2018.  Does not drink alcohol.      REVIEW OF SYSTEMS:  Denies active chest pain, shortness of breath, MI, CVA, DVT and PE.      PHYSICAL EXAMINATION:     VITAL SIGNS:  Stable.  She is afebrile, in no obvious distress.  She is 5 feet 10 inches, 150 pounds, BMI of 24 kg/m2.     CHEST:  On examination of her chest wall, in the upper mid chest area around the manubrium and sub-manubrial area, has some redness in the old scar.  She has 2 draining sinuses on either side of the sternum.  There are no scars over the mid portion of her chest on either side.      ASSESSMENT AND PLAN:  Based on above findings, a diagnosis of nonhealing chest wall wounds and draining sinuses was made.  I had a kenyon discussion with the patient and daughter about findings.  Explained that the best course of action is to go to the operating room, open up this area which is red and has draining sinuses, debride the underlying bone, follow these sinuses down to the nidus which probably is either the bone or  cartilage, remove all of that and then swing a pectoralis major muscle over and close the skin primarily over the skin graft on the muscle.  This was discussed with them in detail.  I will discuss with Dr. Funes the debridement and closure at the same time as long as there is no active infection or purulence.  She understood the plan.  We will get some baseline nutritional labs today to ensure that she has the capability of healing well.  All risks, benefits and alternatives of potential procedure including pain, infection, bleeding, scarring, asymmetry, seromas, hematomas, wound breakdown, wound dehiscence, loss of the flaps, requirement of further surgeries, reinfection, DVT, PE, MI, CVA, pneumonia, renal failure and death were explained.  She understood them all and wants to proceed.  Look forward to helping her out in the near future.      Total time spent with the patient was 30 minutes, more than half counseling.      cc:   Meghana Funes MD    [address] [address]

## 2020-02-12 NOTE — LETTER
2/12/2020       RE: Myrtle Vaz  [address] [facility] [address]     Dear Colleague,    Thank you for referring your patient, Myrtle Vaz, to the [facility] PLASTIC AND RECONSTRUCTIVE SURGERY at [facility]. Please see a copy of my visit note below.    CONSULTATION NOTE      REFERRING PROVIDER:  Dr. Meghana Funes      PRESENTING COMPLAINT:  Consultation for chest wall reconstruction.      HISTORY OF PRESENTING COMPLAINT:  Ms. Vaz is 74 years old.  Back in 11/2018, had a hiatal hernia repair.  Unfortunately, developed a leak.  Required an open thoracotomy with mediastinal cleanout.  Required multiple surgeries for that.  Had a spit fistula as well.  All of that has been reversed now and she has a couple of nonhealing wounds in the mid portion of her sternal region that are draining constantly.  Here to have it looked at.  She is not on any antibiotics.  Feels well.      PAST MEDICAL HISTORY:  Atrial fibrillation, hypothyroidism.      PAST SURGICAL HISTORY:  Thyroidectomy, hysterectomy and as per HPI.      MEDICATIONS:     1.  Amiodarone.    2.  Levothyroxine.    3.  Venlafaxine.      ALLERGIES:  Gabapentin.      SOCIAL HISTORY:  Used to smoke about a pack a day for over 50 years, quit in 2018.  Does not drink alcohol.      REVIEW OF SYSTEMS:  Denies active chest pain, shortness of breath, MI, CVA, DVT and PE.      PHYSICAL EXAMINATION:     VITAL SIGNS:  Stable.  She is afebrile, in no obvious distress.  She is 5 feet 10 inches, 150 pounds, BMI of 24 kg/m2.     CHEST:  On examination of her chest wall, in the upper mid chest area around the manubrium and sub-manubrial area, has some redness in the old scar.  She has 2 draining sinuses on either side of the sternum.  There are no scars over the mid portion of her chest on either side.      ASSESSMENT AND PLAN:  Based on above findings, a diagnosis of nonhealing chest wall wounds and draining sinuses was made.  I had a kenyon  discussion with the patient and daughter about findings.  Explained that the best course of action is to go to the operating room, open up this area which is red and has draining sinuses, debride the underlying bone, follow these sinuses down to the nidus which probably is either the bone or cartilage, remove all of that and then swing a pectoralis major muscle over and close the skin primarily over the skin graft on the muscle.  This was discussed with them in detail.  I will discuss with Dr. Funes the debridement and closure at the same time as long as there is no active infection or purulence.  She understood the plan.  We will get some baseline nutritional labs today to ensure that she has the capability of healing well.  All risks, benefits and alternatives of potential procedure including pain, infection, bleeding, scarring, asymmetry, seromas, hematomas, wound breakdown, wound dehiscence, loss of the flaps, requirement of further surgeries, reinfection, DVT, PE, MI, CVA, pneumonia, renal failure and death were explained.  She understood them all and wants to proceed.  Look forward to helping her out in the near future.      Total time spent with the patient was 30 minutes, more than half counseling.      cc:   Meghana Funes MD    [address] Patria Mcqueen MD

## 2020-02-14 ENCOUNTER — HOME CARE/HOSPICE - HEALTHEAST (OUTPATIENT)
Dept: HOME HEALTH SERVICES | Facility: HOME HEALTH | Age: 75
End: 2020-02-14

## 2020-02-17 ENCOUNTER — HOME CARE/HOSPICE - HEALTHEAST (OUTPATIENT)
Dept: HOME HEALTH SERVICES | Facility: HOME HEALTH | Age: 75
End: 2020-02-17

## 2020-02-18 ENCOUNTER — MEDICAL CORRESPONDENCE (OUTPATIENT)
Dept: HEALTH INFORMATION MANAGEMENT | Facility: CLINIC | Age: 75
End: 2020-02-18

## 2020-02-19 ENCOUNTER — HOME CARE/HOSPICE - HEALTHEAST (OUTPATIENT)
Dept: HOME HEALTH SERVICES | Facility: HOME HEALTH | Age: 75
End: 2020-02-19

## 2020-02-20 ENCOUNTER — HOSPITAL ENCOUNTER (INPATIENT)
Facility: CLINIC | Age: 75
Setting detail: SURGERY ADMIT
End: 2020-02-20
Attending: THORACIC SURGERY (CARDIOTHORACIC VASCULAR SURGERY) | Admitting: THORACIC SURGERY (CARDIOTHORACIC VASCULAR SURGERY)
Payer: MEDICARE

## 2020-02-20 ENCOUNTER — TELEPHONE (OUTPATIENT)
Dept: SURGERY | Facility: CLINIC | Age: 75
End: 2020-02-20

## 2020-02-20 ENCOUNTER — PREP FOR PROCEDURE (OUTPATIENT)
Dept: SURGERY | Facility: CLINIC | Age: 75
End: 2020-02-20

## 2020-02-20 DIAGNOSIS — T81.89XD NON-HEALING SURGICAL WOUND, SUBSEQUENT ENCOUNTER: ICD-10-CM

## 2020-02-20 NOTE — TELEPHONE ENCOUNTER
Spoke with patient/family to schedule surgery with Dr Patria Mcqueen and Dr Albarado    Surgery was scheduled on 3/23 at [place] OR    Patient will have pre-surgery visit with PAC       -Patient advised H&P is needed or surgery cancellation may occur    Post-Op care appointment scheduled?  YES on 4/1    Patient is aware a / is needed day of surgery.     Surgery packet was sent via [employer], patient has my direct contact information for any further questions.     Aracelis Solomon  Surgical Erika-Op Coordinator  [phone]

## 2020-02-20 NOTE — TELEPHONE ENCOUNTER
Spoke with patient to schedule surgery with Dr Albarado and Dr Patria Mcqueen    Surgery date has been requested by OR for:  3/23 at [place] OR    PAC is scheduled for 3/5     Surgery packet and follow up appointments will be completed once surgery date is confirmed.    Aracelis Solomon  Surgical Erika-Op Coordinator  [phone]

## 2020-02-21 ENCOUNTER — HOME CARE/HOSPICE - HEALTHEAST (OUTPATIENT)
Dept: HOME HEALTH SERVICES | Facility: HOME HEALTH | Age: 75
End: 2020-02-21

## 2020-02-21 NOTE — TELEPHONE ENCOUNTER
FUTURE VISIT INFORMATION      SURGERY INFORMATION:    Date: 3/23/20    Location: [facility]    Surgeon:  Temitope Bullock MD Choudry, M Umar Hasan, MD    Anesthesia Type:  General    Procedure: ESOPHAGOGASTRODUODENOSCOPY (EGD) Debridement of chest wall wound closure with muscle flap and possible skin graft    RECORDS REQUESTED FROM:       Primary Care Provider: Joey Caballero MD- [place]    Pertinent Medical History: Hypertension, atrial fibrillation    Most recent EKG+ Tracin19    Most recent ECHO: 18    Most recent Cardiac Stress Test: 2007

## 2020-02-24 ENCOUNTER — HOME CARE/HOSPICE - HEALTHEAST (OUTPATIENT)
Dept: HOME HEALTH SERVICES | Facility: HOME HEALTH | Age: 75
End: 2020-02-24

## 2020-02-26 ENCOUNTER — HOME CARE/HOSPICE - HEALTHEAST (OUTPATIENT)
Dept: HOME HEALTH SERVICES | Facility: HOME HEALTH | Age: 75
End: 2020-02-26

## 2020-02-28 ENCOUNTER — HOME CARE/HOSPICE - HEALTHEAST (OUTPATIENT)
Dept: HOME HEALTH SERVICES | Facility: HOME HEALTH | Age: 75
End: 2020-02-28

## 2020-03-02 ENCOUNTER — HOME CARE/HOSPICE - HEALTHEAST (OUTPATIENT)
Dept: HOME HEALTH SERVICES | Facility: HOME HEALTH | Age: 75
End: 2020-03-02

## 2020-03-04 ENCOUNTER — HOME CARE/HOSPICE - HEALTHEAST (OUTPATIENT)
Dept: HOME HEALTH SERVICES | Facility: HOME HEALTH | Age: 75
End: 2020-03-04

## 2020-03-05 ENCOUNTER — PRE VISIT (OUTPATIENT)
Dept: SURGERY | Facility: CLINIC | Age: 75
End: 2020-03-05

## 2020-03-05 ENCOUNTER — OFFICE VISIT (OUTPATIENT)
Dept: SURGERY | Facility: CLINIC | Age: 75
End: 2020-03-05
Payer: MEDICARE

## 2020-03-05 ENCOUNTER — ANESTHESIA EVENT (OUTPATIENT)
Dept: SURGERY | Facility: CLINIC | Age: 75
End: 2020-03-05

## 2020-03-05 VITALS
OXYGEN SATURATION: 95 % | HEIGHT: 66 IN | HEART RATE: 74 BPM | TEMPERATURE: 98.3 F | WEIGHT: 150 LBS | RESPIRATION RATE: 16 BRPM | BODY MASS INDEX: 24.11 KG/M2 | DIASTOLIC BLOOD PRESSURE: 73 MMHG | SYSTOLIC BLOOD PRESSURE: 123 MMHG

## 2020-03-05 DIAGNOSIS — Z01.818 PREOP EXAMINATION: ICD-10-CM

## 2020-03-05 DIAGNOSIS — T81.89XD NON-HEALING SURGICAL WOUND, SUBSEQUENT ENCOUNTER: ICD-10-CM

## 2020-03-05 DIAGNOSIS — Z01.818 PREOP EXAMINATION: Primary | ICD-10-CM

## 2020-03-05 PROBLEM — I48.91 ATRIAL FIBRILLATION (H): Status: RESOLVED | Noted: 2019-10-29 | Resolved: 2020-03-05

## 2020-03-05 LAB
ANION GAP SERPL CALCULATED.3IONS-SCNC: 7 MMOL/L (ref 3–14)
BASOPHILS # BLD AUTO: 0 10E9/L (ref 0–0.2)
BASOPHILS NFR BLD AUTO: 0.5 %
BUN SERPL-MCNC: 25 MG/DL (ref 7–30)
CALCIUM SERPL-MCNC: 8.6 MG/DL (ref 8.5–10.1)
CHLORIDE SERPL-SCNC: 104 MMOL/L (ref 94–109)
CO2 SERPL-SCNC: 30 MMOL/L (ref 20–32)
CREAT SERPL-MCNC: 0.86 MG/DL (ref 0.52–1.04)
DIFFERENTIAL METHOD BLD: ABNORMAL
EOSINOPHIL # BLD AUTO: 0.2 10E9/L (ref 0–0.7)
EOSINOPHIL NFR BLD AUTO: 2.4 %
ERYTHROCYTE [DISTWIDTH] IN BLOOD BY AUTOMATED COUNT: 18.4 % (ref 10–15)
GFR SERPL CREATININE-BSD FRML MDRD: 66 ML/MIN/{1.73_M2}
GLUCOSE SERPL-MCNC: 90 MG/DL (ref 70–99)
HCT VFR BLD AUTO: 41 % (ref 35–47)
HGB BLD-MCNC: 12.8 G/DL (ref 11.7–15.7)
IMM GRANULOCYTES # BLD: 0 10E9/L (ref 0–0.4)
IMM GRANULOCYTES NFR BLD: 0.3 %
LYMPHOCYTES # BLD AUTO: 1.5 10E9/L (ref 0.8–5.3)
LYMPHOCYTES NFR BLD AUTO: 16.9 %
MCH RBC QN AUTO: 28.4 PG (ref 26.5–33)
MCHC RBC AUTO-ENTMCNC: 31.2 G/DL (ref 31.5–36.5)
MCV RBC AUTO: 91 FL (ref 78–100)
MONOCYTES # BLD AUTO: 0.9 10E9/L (ref 0–1.3)
MONOCYTES NFR BLD AUTO: 10 %
NEUTROPHILS # BLD AUTO: 6.1 10E9/L (ref 1.6–8.3)
NEUTROPHILS NFR BLD AUTO: 69.9 %
NRBC # BLD AUTO: 0 10*3/UL
NRBC BLD AUTO-RTO: 0 /100
NT-PROBNP SERPL-MCNC: 374 PG/ML (ref 0–125)
PLATELET # BLD AUTO: 462 10E9/L (ref 150–450)
POTASSIUM SERPL-SCNC: 3.4 MMOL/L (ref 3.4–5.3)
RBC # BLD AUTO: 4.51 10E12/L (ref 3.8–5.2)
SODIUM SERPL-SCNC: 140 MMOL/L (ref 133–144)
WBC # BLD AUTO: 8.7 10E9/L (ref 4–11)

## 2020-03-05 RX ORDER — ALBUTEROL SULFATE 90 UG/1
2 AEROSOL, METERED RESPIRATORY (INHALATION) PRN
COMMUNITY
Start: 2020-02-20 | End: 2022-03-23

## 2020-03-05 ASSESSMENT — LIFESTYLE VARIABLES: TOBACCO_USE: 1

## 2020-03-05 ASSESSMENT — COPD QUESTIONNAIRES
CAT_SEVERITY: MILD
COPD: 1

## 2020-03-05 ASSESSMENT — ENCOUNTER SYMPTOMS: SEIZURES: 0

## 2020-03-05 ASSESSMENT — MIFFLIN-ST. JEOR: SCORE: 1202.15

## 2020-03-05 ASSESSMENT — PAIN SCALES - GENERAL: PAINLEVEL: SEVERE PAIN (7)

## 2020-03-05 NOTE — PATIENT INSTRUCTIONS
Preparing for Your Surgery      Name:  Myrtle Vaz   MRN:  [id number]   :  1945   Today's Date:  3/5/2020     Arriving for surgery:  Surgery date:  3/23/2020  Arrival time:  5:45AM  Please come to:       [facility] [address]  [address] [place]  [address]    - ? parking is available in front of the hospital      -    Please proceed to the Surgery Lounge on the 3rd floor. [phone]?     - ?If you are in need of directions, wheelchair or escort please stop at the Information Desk in the lobby.  Inform the information person that you are here for surgery; a wheelchair and escort will be provided to the Surgery Lounge .?     What can I eat or drink?  -  You may have solid food or milk products until 8 hours prior to your surgery.  3/22/2020, 11:45PM  -  You may have water, apple juice or 7up/Sprite until 2 hours prior to your surgery. 3/23/2020, 5:45AM    Which medicines can I take?  Hold Aspirin, Excedrin, Multivitamins and supplements one week prior to surgery.  Hold Ibuprofen for 24 hours and/or Naproxen for 48 hours prior to surgery.   -  Do NOT take these medications in the morning, the day of surgery:    Biotin    -  Please take these medications the day of surgery:    Amiodarone    Levothyroxine    Omeprazole(Prilosec)  Venlafaxine(Effexor)  -As Needed:    Albuterol as needed and bring the day of surgery    Hydrocodone-Acetaminophen or Tramadol as needed    How do I prepare myself?  -  Take two showers: one the night before surgery; and one the morning of surgery.         Use Scrubcare or Hibiclens to wash from neck down, leave soap on your skin for up to one minute.  Do not get soap in your eyes or ears.  You may use your own shampoo and conditioner; no other hair products.   -  Do NOT use lotion, powder, deodorant, or antiperspirant the day of your surgery.  -  Do NOT wear any makeup, fingernail polish or jewelry.  - Do not bring your own medications  to the hospital, except for inhalers and eye   drops.  -  Bring your I/D and insurance card.    Questions or Concerns:  -If you are scheduled on the [facility] or [facility] campus and have questions or concerns regarding the day of surgery, please call Preadmission Nursing at [phone].     -If you have health changes between today and your surgery please call your surgeon. For questions after surgery please call your surgeons office.           AFTER YOUR SURGERY  Breathing exercises   Breathing exercises help you recover faster. Take deep breaths and let the air out slowly. This will:     Help you wake up after surgery.    Help prevent complications like pneumonia.  Preventing complications will help you go home sooner.   We may give you a breathing device (incentive spirometer) to encourage you to breathe deeply.   Nausea and vomiting   You may feel sick to your stomach after surgery; if so, let your nurse know.    Pain control:  After surgery, you may have pain. Our goal is to help you manage your pain. Pain medicine will help you feel comfortable enough to do activities that will help you heal.  These activities may include breathing exercises, walking and physical therapy.   To help your health care team treat your pain we will ask: 1) If you have pain  2) where it is located 3) describe your pain in your words  Methods of pain control include medications given by mouth, vein or by nerve block for some surgeries.  Sequential Compression Device (SCD):  You may need to wear SCD S (also called pneumo boots)on your legs or feet. These are wraps connected to a machine that pumps in air and releases it. The repeated pumping helps prevent blood clots from forming.

## 2020-03-05 NOTE — H&P
Pre-Operative H & P     CC:  Preoperative exam to assess for increased cardiopulmonary risk while undergoing surgery and anesthesia.    Date of Encounter: 3/5/2020  Primary Care Physician:  Joey Caballero  Reason for Visit: Non-healing surgical wound, subsequent encounter    HPI  Myrtle Vaz is a 75 y/o female who presents for pre-operative H&P in preparation for ESOPHAGOGASTRODUODENOSCOPY (EGD) & Debridement of chest wall wound, closure with muscle flap and possible skin graft with Christiano Funes MD & JEAN PAUL Mcqueen MD on 3/23/20 at [facility] for treatment of a Non-healing surgical wound.    Ms. Vaz underwent an operation for a large hiatal hernia in 11/2018 at an outside hospital. She had a very complicated post-operative course with distal esophageal perforation causing purulent mediastinitis, septic shock, HANG and respiratory failure. She was transferred to [facility] and Dr. Per Funes did a takedown of the Nissen, explatation of mesh, drainage of mediastinal abscess, transhiatal partial esophagogastrectomy, spit fistula, gastrostomy and jejunostomy tube on 11/21/18. All of that has been reversed now and she has a couple of nonhealing wounds in the mid portion of her sternal region that are draining constantly. She has been counseled on the above procedure and wishes to proceed.    PMH is also significant for a 50 pk yr smoking hx (quit 2018), mile emphysema, depression, s/p thyroidectomy w/ secondary hypothyroidism, anemia, dysphagia, s/p hysterectomy.    History was obtained from patient & chart review. She is accompanied by her daughter.    Past Medical History  Past Medical History:   Diagnosis Date     Depression      History of atrial fibrillation      HLD (hyperlipidemia)      HTN (hypertension)      Hypothyroidism        Past Surgical History  Past Surgical History:   Procedure Laterality Date     BRONCHOSCOPY (RIGID OR FLEXIBLE), DIAGNOSTIC  N/A 5/2/2019    Procedure: BRONCHOSCOPY, WITH BAL;  Surgeon: Ally Ybarra MD;  Location: [facility]     COMBINED ESOPHAGOSCOPY, GASTROSCOPY, DUODENOSCOPY (EGD), REPLACE ESOPHAGEAL STENT N/A 5/6/2019    Procedure: ESOPHAGOGASTRODUODENOSCOPY WITH ESOPHAGEAL STENT REPLACEMENT, IRRIGATION AND DEBRIDEMENT OF LEFT NECK, WOUND VAC PLACEMENT;  Surgeon: Harley Murguia MD;  Location: [facility]     COMBINED ESOPHAGOSCOPY, GASTROSCOPY, DUODENOSCOPY (EGD), REPLACE ESOPHAGEAL STENT N/A 6/10/2019    Procedure: Esophagogastroduodenoscopy and Stent Removal;  Surgeon: Ally Ybarra MD;  Location: [facility]     ESOPHAGOGASTRECTOMY N/A 4/26/2019    Procedure: Redo Laparotomy SUBSTERNAL Esophagogastrostomy, Pharyngostomy, Intraoperative EGD, PARTIAL STERNOTOMY, LEFT PHARYNGOSTOMY,;  Surgeon: Temitope Bullock MD;  Location: [facility]     ESOPHAGOSCOPY, GASTROSCOPY, DUODENOSCOPY (EGD), COMBINED N/A 11/21/2018    Procedure: COMBINED ESOPHAGOSCOPY, GASTROSCOPY, DUODENOSCOPY (EGD);  Surgeon: Temitope Bullock MD;  Location: [facility]     ESOPHAGOSCOPY, GASTROSCOPY, DUODENOSCOPY (EGD), DILATATION, COMBINED N/A 10/10/2019    Procedure: Esophagogastroduodenoscopy with Botox injection of the Pylorus;  Surgeon: Temitope Bullock MD;  Location: [facility]     HERNIORRHAPHY HIATAL  11/2018     HYSTERECTOMY       IRRIGATION AND DEBRIDEMENT CHEST WASHOUT, COMBINED N/A 10/10/2019    Procedure: Chest wound exploration;  Surgeon: Temitope Bullock MD;  Location: [facility]     LAPAROSCOPIC HERNIORRHAPHY HIATAL N/A 11/21/2018    Procedure: Midline laparotomy, Trans-hiatal esophagogasterectomy, gastrostomy, J-tube placement, G-tube placement, bilateral pleural chest tube placement, mediastinal abcess drainage, spit fistula creation, Esophagastrodueodenoscopy;  Surgeon: Temitope Bullock MD;  Location: [facility]     ROTATOR CUFF REPAIR RT/LT       THYROIDECTOMY       for hyperthroidism       Hx of Blood transfusions/reactions: no     Hx of abnormal  bleeding or anti-platelet use: no    Menstrual history: No LMP recorded. Patient has had a hysterectomy.:      Steroid use in the last year: no    Personal or FH with difficulty with Anesthesia:  Had A-Fib intraoperatively 11/2018    Prior to Admission Medications  Current Outpatient Medications   Medication Sig Dispense Refill     albuterol (PROAIR HFA/PROVENTIL HFA/VENTOLIN HFA) 108 (90 Base) MCG/ACT inhaler Inhale 2 puffs into the lungs as needed        amiodarone (PACERONE/CODARONE) 200 MG tablet 1 tablet (200 mg) by Per G Tube route daily (Patient taking differently: Take 200 mg by mouth every morning ) 30 tablet 0     aspirin-acetaminophen-caffeine (EXCEDRIN MIGRAINE) 250-250-65 MG tablet Take 1 tablet by mouth every 6 hours as needed for headaches       biotin (MERIBIN) 5 MG CAPS Take 1 tablet by mouth every evening        HYDROcodone-acetaminophen (NORCO) 5-325 MG tablet Take 1 tablet by mouth 2 times daily        levothyroxine (SYNTHROID/LEVOTHROID) 150 MCG tablet 1 tablet (150 mcg) by Per J Tube route every morning (Patient taking differently: Take 225 mcg by mouth every morning Pt takes 1and 1/2 pills per day) 30 tablet 0     Multiple Vitamins-Minerals (PRESERVISION/LUTEIN) CAPS Take 1 tablet by mouth every evening        OMEPRAZOLE PO Take 20 mg by mouth 2 times daily (before meals)       venlafaxine (EFFEXOR) 75 MG tablet 1 tablet (75 mg) by Per G Tube route 2 times daily (Patient taking differently: Take 75 mg by mouth 2 times daily ) 60 tablet 0     traMADol (ULTRAM) 50 MG tablet Take 50 mg by mouth          Allergies  Allergies   Allergen Reactions     Gabapentin Other (See Comments)       Social History  Social History     Socioeconomic History     Marital status:      Spouse name: Not on file     Number of children: Not on file     Years of education: Not on file     Highest education level: Not on file   Occupational History     Not on file   Social Needs     Financial resource strain: Not  "on file     Food insecurity:     Worry: Not on file     Inability: Not on file     Transportation needs:     Medical: Not on file     Non-medical: Not on file   Tobacco Use     Smoking status: Former Smoker     Years: 60.00     Types: Cigarettes     Last attempt to quit: 2018     Years since quittin.3     Smokeless tobacco: Never Used   Substance and Sexual Activity     Alcohol use: No     Frequency: Never     Drug use: No     Sexual activity: Not on file   Lifestyle     Physical activity:     Days per week: Not on file     Minutes per session: Not on file     Stress: Not on file   Relationships     Social connections:     Talks on phone: Not on file     Gets together: Not on file     Attends Congregation service: Not on file     Active member of club or organization: Not on file     Attends meetings of clubs or organizations: Not on file     Relationship status: Not on file     Intimate partner violence:     Fear of current or ex partner: Not on file     Emotionally abused: Not on file     Physically abused: Not on file     Forced sexual activity: Not on file   Other Topics Concern     Not on file   Social History Narrative     Not on file       Family History  Family History   Problem Relation Age of Onset     Cerebrovascular Disease Father      Myocardial Infarction Father 49     Sudden Death Sister      Anesthesia Reaction No family hx of      Deep Vein Thrombosis No family hx of          Preop Vitals    BP Readings from Last 3 Encounters:   20 123/73   20 136/77   20 121/70    Pulse Readings from Last 3 Encounters:   20 74   20 92   20 70      Resp Readings from Last 3 Encounters:   20 16   20 16   19 16    SpO2 Readings from Last 3 Encounters:   20 95%   20 91%   20 92%      Temp Readings from Last 1 Encounters:   20 98.3  F (36.8  C) (Oral)    Ht Readings from Last 1 Encounters:   20 1.676 m (5' 6\")      Wt Readings from " "Last 1 Encounters:   03/05/20 68 kg (150 lb)    Estimated body mass index is 24.21 kg/m  as calculated from the following:    Height as of this encounter: 1.676 m (5' 6\").    Weight as of this encounter: 68 kg (150 lb).     ROS/MED HX  The complete review of systems is negative other than noted in the HPI or here.  Patient denies recent illness, fever and respiratory infection during past month.  Pt denies steroid use during past year.    ENT/Pulmonary:     (+)DORY risk factors snores loudly, tobacco use, Past use 50 pk yr hx, quit 11/2018 packs/day  mild COPD, , . .    Neurologic:     (+)neuropathy - feet,    (-) seizures and CVA   Cardiovascular: Comment: Hx A-fib intraoperatively 11/2018. Taking amiodarone.    (+) ----. : . . . :. . Previous cardiac testing Echodate:11/2018results:date: results:ECG reviewed date:5/6/19 results: date: results:         (-) hypertension   METS/Exercise Tolerance: Comment: Unable to walk one block due to back pain. Deconditioned, mild SOB when ambulating, denies CP. Uses cane for walking longer distances. 1 - Eating, dressing   Hematologic:     (+) Anemia, -     (-) History of Transfusion   Musculoskeletal: Comment: Raynaud's    Chronic LBP        GI/Hepatic:     (+) GERD Asymptomatic on medication,      (-) liver disease   Renal/Genitourinary: Comment: S/p hysterectomy        Endo: Comment: S/P thyroidectomy in 1960s, toxic thyroid    (+) thyroid problem hypothyroidism, .   (-) Type II DM   Psychiatric:     (+) psychiatric history depression      Infectious Disease:  - neg infectious disease ROS       Malignancy:      - no malignancy   Other: Comment: Taking Norco prn (not daily)   (+) H/O Chronic Pain,H/O chronic opiod use ,                PHYSICAL EXAM:   Mental Status/Neuro: A/A/O; Age Appropriate   Airway: Facies: Feasible  Mallampati: I  Mouth/Opening: Full  TM distance: > 6 cm  Neck ROM: Full   Respiratory: Auscultation: CTAB (distant BS)     Resp. Rate: Normal     Resp. " "Effort: Normal      CV: Rhythm: Regular  Rate: Age appropriate  Heart: Normal Sounds  Edema: None   Comments:      Dental: Normal Dentition            Temp: 98.3  F (36.8  C) Temp src: Oral BP: 123/73 Pulse: 74   Resp: 16 SpO2: 95 %         150 lbs 0 oz  5' 6\"   Body mass index is 24.21 kg/m .    Physical Exam  Constitutional: Awake, alert, cooperative, no apparent distress, and appears stated age.  Eyes: Pupils equal, round and reactive to light, extra ocular muscles intact, sclera clear, conjunctiva normal.  HENT: Normocephalic, oral pharynx with moist mucus membranes, good dentition. No goiter appreciated. No removable dental hardware.  Respiratory: Distant BS but clear to auscultation bilaterally, no crackles or wheezing. No SOB when supine.  Cardiovascular: Distant heart sounds, difficult to assess due to bandage on upper chest. Regular rate and rhythm, normal S1 and S2, and no murmur noted.  Carotids +2, no bruits. No edema. Palpable pulses to radial & DP arteries.   GI: Normal bowel sounds, soft, non-distended, non-tender  Lymph/Hematologic: No cervical lymphadenopathy and no supraclavicular lymphadenopathy.  Genitourinary:  deferred  Skin: Warm and dry.  No rashes.   Musculoskeletal: Very limited extension ROM of neck due to adhesions from prior chest surgeries. There is no redness, warmth, or swelling of the joints. Gross motor strength is normal.    Neurologic: Awake, alert, oriented to name, place and time. Cranial nerves II-XII are grossly intact. Gait is antalgic, dalia forward w/ ambulation. Ambulates from chair to exam table, seats self, lies supine and sits back up w/o assistance.  Neuropsychiatric: Calm, cooperative. Normal affect. Pleasant. Answers questions appropriately, follows commands w/o difficulty.    PRIOR LABS/DIAGNOSTIC STUDIES:  All labs and imaging personally reviewed    EKG 5/6/19  Sinus rhythm  Low voltage QRS  Nonspecific T wave abnormality  Abnormal ECG  When compared with ECG of " 23-APR-2019 16:59,  No significant change was found  Ventricular rate 79 bpm    ECHOCARDIOGRAM 11/2018  Interpretation Summary  Left ventricular function, chamber size, wall motion, and wall thickness are  normal.The EF is 60-65%  Limited views of the right ventricle that appears with normal function.  No significant valve dysfunction detected.    Labs today: (personally reviewed)  BMP, CBC, BNP, T&S  Sodium 140   133 - 144 mmol/L Final 03/05/2020  8:50 AM [facility]   Potassium 3.4   3.4 - 5.3 mmol/L Final 03/05/2020  8:50 AM [facility]   Chloride 104   94 - 109 mmol/L Final 03/05/2020  8:50 AM [facility]   Carbon Dioxide 30   20 - 32 mmol/L Final 03/05/2020  8:50 AM [facility]   Anion Gap 7   3 - 14 mmol/L Final 03/05/2020  8:50 AM [facility]   Glucose 90   70 - 99 mg/dL Final 03/05/2020  8:50 AM [facility]   Urea Nitrogen 25   7 - 30 mg/dL Final 03/05/2020  8:50 AM [facility]   Creatinine 0.86   0.52 - 1.04 mg/dL Final 03/05/2020  8:50 AM [facility]   GFR Estimate 66   >60 mL/min/ Final 03/05/2020  8:50 AM [facility]   Comment:   Non  GFR Calc   Starting 12/18/2018, serum creatinine based estimated GFR (eGFR) will be   calculated using the Chronic Kidney Disease Epidemiology Collaboration   (CKD-EPI) equation.    GFR Estimate If Black 77   >60 mL/min/ Final 03/05/2020  8:50 AM [facility]   Comment:    GFR Calc   Starting 12/18/2018, serum creatinine based estimated GFR (eGFR) will be   calculated using the Chronic Kidney Disease Epidemiology Collaboration   (CKD-EPI) equation.    Calcium 8.6   8.5 - 10.1 mg/dL Final 03/05/2020  8:50 AM [facility]       WBC  8.7   4.0 - 11.0 10e9/L Final 03/05/2020  8:50 AM 1740   RBC Count 4.51   3.8 - 5.2 10e12/L Final 03/05/2020  8:50 AM 1740   Hemoglobin 12.8   11.7 - 15.7 g/dL Final 03/05/2020  8:50 AM 1740   Hematocrit 41.0   35.0 - 47.0 % Final 03/05/2020  8:50 AM 1740   MCV 91   78 - 100 fl Final 03/05/2020  8:50 AM 1740   MCH 28.4   26.5 - 33.0 pg Final 03/05/2020  8:50 AM 1740   MCHC 31.2  Low   31.5 - 36.5 g/dL Final 03/05/2020  8:50 AM 1740   RDW 18.4  High   10.0 - 15.0 % Final 03/05/2020  8:50 AM 1740   Platelet Count 462  High   150 - 450 10e9/L Final 03/05/2020  8:50 AM 1740   Diff Method     Final 03/05/2020  8:50 AM 1740   Automated Method    % Neutrophils 69.9    % Final 03/05/2020  8:50 AM 1740   % Lymphocytes 16.9    % Final 03/05/2020  8:50 AM 1740   % Monocytes 10.0    % Final 03/05/2020  8:50 AM 1740   % Eosinophils 2.4    % Final 03/05/2020  8:50 AM 1740   % Basophils 0.5    % Final 03/05/2020  8:50 AM 1740   % Immature Granulocytes 0.3    % Final 03/05/2020  8:50 AM 1740   Nucleated RBCs 0   0 /100 Final 03/05/2020  8:50 AM 1740   Absolute Neutrophil 6.1   1.6 - 8.3 10e9/L Final 03/05/2020  8:50 AM 1740   Absolute Lymphocytes 1.5   0.8 - 5.3 10e9/L Final 03/05/2020  8:50 AM 1740   Absolute Monocytes 0.9   0.0 - 1.3 10e9/L Final 03/05/2020  8:50 AM 1740   Absolute Eosinophils 0.2   0.0 - 0.7 10e9/L Final 03/05/2020  8:50 AM 1740   Absolute Basophils 0.0   0.0 - 0.2 10e9/L Final 03/05/2020  8:50 AM 1740   Abs Immature Granulocytes 0.0   0 - 0.4 10e9/L Final 03/05/2020  8:50 AM 1740   Absolute Nucleated RBC 0.0     Final 03/05/2020  8:50 AM 1740       N-Terminal Pro Bnp 374  High   0 - 125 pg/mL Final 03/05/2020  8:50 AM 1740       Outside records reviewed from: Care Everywhere    ASSESSMENT and PLAN  Myrtle Vaz is a 74 year old female scheduled to undergo ESOPHAGOGASTRODUODENOSCOPY (EGD) & Debridement of chest wall wound, closure with muscle flap and possible skin graft with Christiano Funes MD & M Patria  MD Charisse on 3/23/20 at [facility] for treatment of a Non-healing surgical wound.    Pt has had prior anesthetic. Type: General and MAC    No history of anesthetic complications    She has the following specific operative considerations:   # DORY 2/8 = low risk  # VTE risk: 0.5%  # Risk of PONV score = 3.  If > 2, anti-emetic intervention recommended.  # Anesthesia considerations:  Refer to PAC assessment in anesthesia records    # Increased risk of postoperative nausea/vomiting: Recommend use of antiemetic agents in the perioperative period.      CARDIAC: METS 1,  Unable to walk one block due to back pain. Deconditioned, mild SOB when ambulating, denies CP. Uses cane for walking longer distances.     # RCRI : High risk surgery (>5% cardiac complication risk).  0.9% risk of major adverse cardiac event.     #  Echo 11/2018:  EF 60-65%, normal     #  Hx of A-Fib w/ RVR intraoperatively 11/2018, on amiodarone     #  EKG 5/6/19: sinus, low voltage QRS, no change from prior     #  BNP today is 374, will order postop troponins    PULMONARY:     # Former smoker, 50 pk yr hx, quit 11/2018    # Mild emphysema, untreated, no inhaler use    GI:   GERD, taking prilosec     # S/P large hiatal hernia repair in 11/2018 at OSH. Complicated post-operative course with distal esophageal perforation causing purulent mediastinitis, septic shock, HANG and respiratory failure. She was transferred to [facility] and Dr. Per Funes did a takedown of the Nissen, explatation of mesh, drainage of mediastinal abscess, transhiatal partial esophagogastrectomy, spit fistula, gastrostomy and jejunostomy tube on 11/21/18. All of that has been reversed now and she has a couple of nonhealing wounds in the mid portion of her sternal region that are draining constantly.    /RENAL:     # Hx HANG w/ sepsis 11/2018. Creatinine 1.0, GFR 54 on 1/22/20. Will recheck today.    ** Will require close monitoring of renal  function during periop period **    ENDO: BMI 24    # Hx toxic thryroid, s/p thyroidectomy in 1960s, secondary hypothyroidism    # No DM    ORTHO: Very limited extension ROM of neck due to adhesions from prior chest surgeries.    # No TMJ       Patient was discussed with Dr King. Patient is optimized and is acceptable candidate for the proposed procedure. No further diagnostic evaluation is needed.    Arrival time, NPO, shower and medication instructions provided by nursing staff today.  Preparing For Your Surgery handout given.    Blanca Espinosa PA-C  Preoperative Assessment Center  [facility]  Clinic and Surgery Center  Phone: [phone]  Fax: [phone]

## 2020-03-05 NOTE — ANESTHESIA PREPROCEDURE EVALUATION
"Anesthesia Pre-Procedure Evaluation    Patient: Myrtle Vaz   MRN:     [id number] Gender:   female   Age:    74 year old :      1945        Preoperative Diagnosis: * No surgery found *        LABS:  CBC:   Lab Results   Component Value Date    WBC 7.2 2019    WBC 10.2 2019    HGB 11.3 (L) 10/10/2019    HGB 9.7 (L) 2019    HCT 33.4 (L) 2019    HCT 28.1 (L) 2019     (H) 2019     (H) 2019     BMP:   Lab Results   Component Value Date     2019     2019    POTASSIUM 3.6 10/10/2019    POTASSIUM 4.3 2019    CHLORIDE 102 2019    CHLORIDE 101 2019    CO2 26 2019    CO2 26 2019    BUN 28 2019    BUN 23 2019    CR 0.83 10/10/2019    CR 0.67 2019     (H) 2019     (H) 2019     COAGS:   Lab Results   Component Value Date    PTT 47 (H) 2019    INR 1.09 10/10/2019     POC:   Lab Results   Component Value Date     (H) 10/11/2019     OTHER:   Lab Results   Component Value Date    PH 7.35 2019    LACT 1.1 2019    JOSE 7.6 (L) 2019    PHOS 3.3 2019    MAG 2.3 2019    ALBUMIN 3.0 (L) 2020    PROTTOTAL 6.0 (L) 2019    ALT 20 2019    AST 17 2019    ALKPHOS 101 2019    BILITOTAL 0.2 2019    LIPASE 43 (L) 2019    CRP 24.8 (H) 2019     (H) 2019        Preop Vitals    BP Readings from Last 3 Encounters:   20 123/73   20 136/77   20 121/70    Pulse Readings from Last 3 Encounters:   20 74   20 92   20 70      Resp Readings from Last 3 Encounters:   20 16   20 16   19 16    SpO2 Readings from Last 3 Encounters:   20 95%   20 91%   20 92%      Temp Readings from Last 1 Encounters:   20 98.3  F (36.8  C) (Oral)    Ht Readings from Last 1 Encounters:   20 1.676 m (5' 6\")      Wt Readings from Last 1 " "Encounters:   03/05/20 68 kg (150 lb)    Estimated body mass index is 24.21 kg/m  as calculated from the following:    Height as of this encounter: 1.676 m (5' 6\").    Weight as of this encounter: 68 kg (150 lb).     LDA:  Negative Pressure Wound Therapy Chest Anterior;Midline (Active)   Number of days: 147       Gastrostomy/Enterostomy Gastrostomy LUQ 1 20 fr (Active)   Number of days: 470       Gastrostomy/Enterostomy Jejunostomy LLQ 2 16 fr (Active)   Number of days: 470       Gastrostomy/Enterostomy Jejunostomy LLQ 1 16 fr (Active)   Number of days: 314       Mucous Fistula (Active)   Number of days:         Past Medical History:   Diagnosis Date     Depression      History of atrial fibrillation      HLD (hyperlipidemia)      HTN (hypertension)      Hypothyroidism       Past Surgical History:   Procedure Laterality Date     BRONCHOSCOPY (RIGID OR FLEXIBLE), DIAGNOSTIC N/A 5/2/2019    Procedure: BRONCHOSCOPY, WITH BAL;  Surgeon: Ally Ybarra MD;  [facility]     COMBINED ESOPHAGOSCOPY, GASTROSCOPY, DUODENOSCOPY (EGD), REPLACE ESOPHAGEAL STENT N/A 5/6/2019    Procedure: ESOPHAGOGASTRODUODENOSCOPY WITH ESOPHAGEAL STENT REPLACEMENT, IRRIGATION AND DEBRIDEMENT OF LEFT NECK, WOUND VAC PLACEMENT;  Surgeon: Harley Murguia MD;  [facility]     COMBINED ESOPHAGOSCOPY, GASTROSCOPY, DUODENOSCOPY (EGD), REPLACE ESOPHAGEAL STENT N/A 6/10/2019    Procedure: Esophagogastroduodenoscopy and Stent Removal;  Surgeon: Ally Ybarra MD;  [facility]     ESOPHAGOGASTRECTOMY N/A 4/26/2019    Procedure: Redo Laparotomy SUBSTERNAL Esophagogastrostomy, Pharyngostomy, Intraoperative EGD, PARTIAL STERNOTOMY, LEFT PHARYNGOSTOMY,;  Surgeon: Temitope Bullock MD;  [facility]     ESOPHAGOSCOPY, GASTROSCOPY, DUODENOSCOPY (EGD), COMBINED N/A 11/21/2018    Procedure: COMBINED ESOPHAGOSCOPY, GASTROSCOPY, DUODENOSCOPY (EGD);  Surgeon: Temitope Bullock MD;  [facility]     ESOPHAGOSCOPY, GASTROSCOPY, " DUODENOSCOPY (EGD), DILATATION, COMBINED N/A 10/10/2019    Procedure: Esophagogastroduodenoscopy with Botox injection of the Pylorus;  Surgeon: Temitope Bullock MD;  [facility]     HERNIORRHAPHY HIATAL  11/2018     HYSTERECTOMY       IRRIGATION AND DEBRIDEMENT CHEST WASHOUT, COMBINED N/A 10/10/2019    Procedure: Chest wound exploration;  Surgeon: Temitope Bullock MD;  [facility]     LAPAROSCOPIC HERNIORRHAPHY HIATAL N/A 11/21/2018    Procedure: Midline laparotomy, Trans-hiatal esophagogasterectomy, gastrostomy, J-tube placement, G-tube placement, bilateral pleural chest tube placement, mediastinal abcess drainage, spit fistula creation, Esophagastrodueodenoscopy;  Surgeon: Temitope Bullock MD;  [facility]     ROTATOR CUFF REPAIR RT/LT       THYROIDECTOMY       for hyperthroidism      Allergies   Allergen Reactions     Gabapentin Other (See Comments)        Anesthesia Evaluation     . Pt has had prior anesthetic. Type: General and MAC    No history of anesthetic complications          ROS/MED HX    ENT/Pulmonary:     (+)DORY risk factors snores loudly, tobacco use, Past use 50 pk yr hx, quit 11/2018 packs/day  mild COPD, , . .    Neurologic:     (+)neuropathy - feet,    (-) seizures and CVA   Cardiovascular: Comment: Hx A-fib intraoperatively 11/2018. Taking amiodarone.    (+) ----. : . . . :. . Previous cardiac testing Echodate:11/2018results:date: results:ECG reviewed date:5/6/19 results: date: results:         (-) hypertension   METS/Exercise Tolerance: Comment: Unable to walk one block due to back pain. Deconditioned, mild SOB when ambulating, denies CP. Uses cane for walking longer distances. 1 - Eating, dressing   Hematologic:     (+) Anemia, -     (-) History of Transfusion   Musculoskeletal: Comment: Raynaud's    Chronic LBP        GI/Hepatic:     (+) GERD Asymptomatic on medication,      (-) liver disease   Renal/Genitourinary: Comment: S/p hysterectomy        Endo:  Comment: S/P thyroidectomy in 1960s, toxic thyroid    (+) thyroid problem hypothyroidism, .   (-) Type II DM   Psychiatric:     (+) psychiatric history depression      Infectious Disease:  - neg infectious disease ROS       Malignancy:      - no malignancy   Other: Comment: Taking Norco prn (not daily)   (+) H/O Chronic Pain,H/O chronic opiod use ,                        PHYSICAL EXAM:   Mental Status/Neuro: A/A/O; Age Appropriate   Airway: Facies: Feasible  Mallampati: I  Mouth/Opening: Full  TM distance: > 6 cm  Neck ROM: Full   Respiratory: Auscultation: CTAB (distant BS)     Resp. Rate: Normal     Resp. Effort: Normal      CV: Rhythm: Regular  Rate: Age appropriate  Heart: Normal Sounds  Edema: None   Comments:      Dental: Normal Dentition                JZG FV AN PLAN NO PONV RULE       PAC Discussion and Assessment    ASA Classification: 3  Case is suitable for: [place]  Anesthetic techniques and relevant risks discussed: GA  Invasive monitoring and risk discussed: No  Types:   Possibility and Risk of blood transfusion discussed: No  NPO instructions given:   Additional anesthetic preparation and risks discussed:   Needs early admission to pre-op area:   Other:     PAC Resident/NP Anesthesia Assessment:  Myrtle Vaz is a 74 year old female scheduled to undergo ESOPHAGOGASTRODUODENOSCOPY (EGD) & Debridement of chest wall wound, closure with muscle flap and possible skin graft with Christiano Funes MD & JEAN PAUL Mcqueen MD on 3/23/20 at [facility] for treatment of a Non-healing surgical wound.    Pt has had prior anesthetic. Type: General and MAC    No history of anesthetic complications    She has the following specific operative considerations:   # DORY 2/8 = low risk  # VTE risk: 0.5%  # Risk of PONV score = 3.  If > 2, anti-emetic intervention recommended.  # Anesthesia considerations:  Refer to PAC assessment in anesthesia records    # Increased risk of  postoperative nausea/vomiting: Recommend use of antiemetic agents in the perioperative period.      CARDIAC: METS 1,  Unable to walk one block due to back pain. Deconditioned, mild SOB when ambulating, denies CP. Uses cane for walking longer distances.     # RCRI : High risk surgery (>5% cardiac complication risk).  0.9% risk of major adverse cardiac event.     #  Echo 11/2018:  EF 60-65%, normal     #  Hx of A-Fib w/ RVR intraoperatively 11/2018, on amiodarone     #  EKG 5/6/19: sinus, low voltage QRS, no change from prior    PULMONARY:     # Former smoker, 50 pk yr hx, quit 11/2018    # Mild emphysema, untreated, no inhaler use    GI:   GERD, taking prilosec     # S/P large hiatal hernia repair in 11/2018 at OSH. Complicated post-operative course with distal esophageal perforation causing purulent mediastinitis, septic shock, HANG and respiratory failure. She was transferred to [facility] and Dr. Per Funes did a takedown of the Nissen, explatation of mesh, drainage of mediastinal abscess, transhiatal partial esophagogastrectomy, spit fistula, gastrostomy and jejunostomy tube on 11/21/18. All of that has been reversed now and she has a couple of nonhealing wounds in the mid portion of her sternal region that are draining constantly.    /RENAL:     # Hx HANG w/ sepsis 11/2018. Creatinine 1.0, GFR 54 on 1/22/20. Will recheck today.    ** Will require close monitoring of renal function during periop period **    ENDO: BMI 24    # Hx toxic thryroid, s/p thyroidectomy in 1960s, secondary hypothyroidism    # No DM    ORTHO: Very limited extension ROM of neck due to adhesions from prior chest surgeries.    # No TMJ       Patient was discussed with Dr King. Patient is optimized and is acceptable candidate for the proposed procedure. No further diagnostic evaluation is needed.      Reviewed and Signed by PAC Mid-Level Provider/Resident  Mid-Level Provider/Resident: Blanca Espinosa PA-C  Date: 3/5/20  Time: 0851    Attending  Anesthesiologist Anesthesia Assessment:        Anesthesiologist:   Date:   Time:   Pass/Fail:   Disposition:     PAC Pharmacist Assessment:        Pharmacist:   Date:   Time:    Blanca Espinosa PA-C

## 2020-03-06 ENCOUNTER — HOME CARE/HOSPICE - HEALTHEAST (OUTPATIENT)
Dept: HOME HEALTH SERVICES | Facility: HOME HEALTH | Age: 75
End: 2020-03-06

## 2020-03-09 ENCOUNTER — HOME CARE/HOSPICE - HEALTHEAST (OUTPATIENT)
Dept: HOME HEALTH SERVICES | Facility: HOME HEALTH | Age: 75
End: 2020-03-09

## 2020-03-11 ENCOUNTER — HEALTH MAINTENANCE LETTER (OUTPATIENT)
Age: 75
End: 2020-03-11

## 2020-03-11 ENCOUNTER — HOME CARE/HOSPICE - HEALTHEAST (OUTPATIENT)
Dept: HOME HEALTH SERVICES | Facility: HOME HEALTH | Age: 75
End: 2020-03-11

## 2020-03-13 ENCOUNTER — HOME CARE/HOSPICE - HEALTHEAST (OUTPATIENT)
Dept: HOME HEALTH SERVICES | Facility: HOME HEALTH | Age: 75
End: 2020-03-13

## 2020-03-16 ENCOUNTER — MYC MEDICAL ADVICE (OUTPATIENT)
Dept: SURGERY | Facility: CLINIC | Age: 75
End: 2020-03-16

## 2020-03-16 ENCOUNTER — HOME CARE/HOSPICE - HEALTHEAST (OUTPATIENT)
Dept: HOME HEALTH SERVICES | Facility: HOME HEALTH | Age: 75
End: 2020-03-16

## 2020-03-18 ENCOUNTER — HOME CARE/HOSPICE - HEALTHEAST (OUTPATIENT)
Dept: HOME HEALTH SERVICES | Facility: HOME HEALTH | Age: 75
End: 2020-03-18

## 2020-03-18 NOTE — TELEPHONE ENCOUNTER
I called Ms Vaz today to notify her of cancelling her surgery. I told her we would contact her in the future once we are re-scheduling patients.     She was relieved and in agreement with the plan.     Temitope      ----- Message from Ivette Smith LPN sent at 3/17/2020  7:38 AM CDT -----  Regarding: cancelling her?  Hey,    I can't remember if we were cancelling her on 3/23?    Thanks,  Ivette

## 2020-03-20 ENCOUNTER — HOME CARE/HOSPICE - HEALTHEAST (OUTPATIENT)
Dept: HOME HEALTH SERVICES | Facility: HOME HEALTH | Age: 75
End: 2020-03-20

## 2020-03-23 LAB
ABO + RH BLD: NORMAL
ABO + RH BLD: NORMAL
BLD GP AB SCN SERPL QL: NORMAL
BLOOD BANK CMNT PATIENT-IMP: NORMAL
BLOOD BANK CMNT PATIENT-IMP: NORMAL
SPECIMEN EXP DATE BLD: NORMAL

## 2020-03-24 ENCOUNTER — HOME CARE/HOSPICE - HEALTHEAST (OUTPATIENT)
Dept: HOME HEALTH SERVICES | Facility: HOME HEALTH | Age: 75
End: 2020-03-24

## 2020-04-01 ENCOUNTER — RECORDS - HEALTHEAST (OUTPATIENT)
Dept: LAB | Facility: CLINIC | Age: 75
End: 2020-04-01

## 2020-04-02 LAB — BACTERIA SPEC CULT: NO GROWTH

## 2020-06-11 ENCOUNTER — RECORDS - HEALTHEAST (OUTPATIENT)
Dept: LAB | Facility: CLINIC | Age: 75
End: 2020-06-11

## 2020-06-11 LAB
ALBUMIN SERPL-MCNC: 3.3 G/DL (ref 3.5–5)
ALP SERPL-CCNC: 112 U/L (ref 45–120)
ALT SERPL W P-5'-P-CCNC: 21 U/L (ref 0–45)
ANION GAP SERPL CALCULATED.3IONS-SCNC: 9 MMOL/L (ref 5–18)
AST SERPL W P-5'-P-CCNC: 22 U/L (ref 0–40)
BILIRUB SERPL-MCNC: 0.4 MG/DL (ref 0–1)
BUN SERPL-MCNC: 16 MG/DL (ref 8–28)
CALCIUM SERPL-MCNC: 8.5 MG/DL (ref 8.5–10.5)
CHLORIDE BLD-SCNC: 100 MMOL/L (ref 98–107)
CO2 SERPL-SCNC: 31 MMOL/L (ref 22–31)
CREAT SERPL-MCNC: 0.85 MG/DL (ref 0.6–1.1)
GFR SERPL CREATININE-BSD FRML MDRD: >60 ML/MIN/1.73M2
GLUCOSE BLD-MCNC: 84 MG/DL (ref 70–125)
IRON SATN MFR SERPL: 8 % (ref 20–50)
IRON SERPL-MCNC: 26 UG/DL (ref 42–175)
POTASSIUM BLD-SCNC: 3 MMOL/L (ref 3.5–5)
PROT SERPL-MCNC: 6.8 G/DL (ref 6–8)
SODIUM SERPL-SCNC: 140 MMOL/L (ref 136–145)
TIBC SERPL-MCNC: 310 UG/DL (ref 313–563)
TRANSFERRIN SERPL-MCNC: 248 MG/DL (ref 212–360)
TSH SERPL DL<=0.005 MIU/L-ACNC: <0.01 UIU/ML (ref 0.3–5)

## 2020-09-08 ENCOUNTER — TELEPHONE (OUTPATIENT)
Dept: SURGERY | Facility: CLINIC | Age: 75
End: 2020-09-08

## 2020-09-08 DIAGNOSIS — T81.89XD NON-HEALING SURGICAL WOUND, SUBSEQUENT ENCOUNTER: Primary | ICD-10-CM

## 2020-09-08 NOTE — TELEPHONE ENCOUNTER
----- Message from NARCISO Marcial sent at 9/8/2020 10:45 AM CDT -----  Regarding: Albarado appt needed  Marine De Los Santos, she needs an in-person visit with Per in 2-3 weeks with a chest CT prior.   She likely will be in a wheelchair and will need assistance in radiology to transfer to table.    Her daughter, Haydee, arranges appts and can be reached at [phone]    Reason for visit-   residual issue with chronic draining manubrial sinus.    Thanks  TIGRE

## 2020-10-06 ASSESSMENT — ENCOUNTER SYMPTOMS
MEMORY LOSS: 1
HEMATURIA: 0
DECREASED CONCENTRATION: 1
TINGLING: 0
INSOMNIA: 0
EYE WATERING: 0
BACK PAIN: 1
PANIC: 1
DEPRESSION: 1
HEADACHES: 1
SPEECH CHANGE: 0
STIFFNESS: 1
DIZZINESS: 1
ARTHRALGIAS: 1
DIFFICULTY URINATING: 1
DOUBLE VISION: 0
FLANK PAIN: 0
MUSCLE CRAMPS: 0
NUMBNESS: 0
DISTURBANCES IN COORDINATION: 1
TREMORS: 0
DYSURIA: 0
EYE PAIN: 0
SEIZURES: 0
EYE REDNESS: 0
NECK PAIN: 0
EYE IRRITATION: 0
NERVOUS/ANXIOUS: 1
MUSCLE WEAKNESS: 1
WEAKNESS: 1
MYALGIAS: 1
JOINT SWELLING: 0
LOSS OF CONSCIOUSNESS: 0
PARALYSIS: 0

## 2020-10-07 ENCOUNTER — ANCILLARY PROCEDURE (OUTPATIENT)
Dept: CT IMAGING | Facility: CLINIC | Age: 75
End: 2020-10-07
Attending: CLINICAL NURSE SPECIALIST
Payer: MEDICARE

## 2020-10-07 ENCOUNTER — OFFICE VISIT (OUTPATIENT)
Dept: SURGERY | Facility: CLINIC | Age: 75
End: 2020-10-07
Attending: THORACIC SURGERY (CARDIOTHORACIC VASCULAR SURGERY)
Payer: MEDICARE

## 2020-10-07 VITALS
HEIGHT: 66 IN | BODY MASS INDEX: 25.13 KG/M2 | DIASTOLIC BLOOD PRESSURE: 66 MMHG | SYSTOLIC BLOOD PRESSURE: 122 MMHG | RESPIRATION RATE: 16 BRPM | WEIGHT: 156.4 LBS | HEART RATE: 68 BPM | OXYGEN SATURATION: 99 %

## 2020-10-07 DIAGNOSIS — Z90.49 HISTORY OF ESOPHAGECTOMY: ICD-10-CM

## 2020-10-07 DIAGNOSIS — T81.89XD NON-HEALING SURGICAL WOUND, SUBSEQUENT ENCOUNTER: ICD-10-CM

## 2020-10-07 DIAGNOSIS — Z98.890 HISTORY OF ESOPHAGECTOMY: ICD-10-CM

## 2020-10-07 DIAGNOSIS — T81.89XD NON-HEALING SURGICAL WOUND, SUBSEQUENT ENCOUNTER: Primary | ICD-10-CM

## 2020-10-07 PROCEDURE — 99214 OFFICE O/P EST MOD 30 MIN: CPT | Performed by: THORACIC SURGERY (CARDIOTHORACIC VASCULAR SURGERY)

## 2020-10-07 PROCEDURE — G0463 HOSPITAL OUTPT CLINIC VISIT: HCPCS

## 2020-10-07 PROCEDURE — 999N000102 HC STATISTIC NO CHARGE CLINIC VISIT

## 2020-10-07 PROCEDURE — 71250 CT THORAX DX C-: CPT | Mod: GC | Performed by: RADIOLOGY

## 2020-10-07 RX ORDER — OXYBUTYNIN CHLORIDE 5 MG/1
TABLET ORAL
COMMUNITY
Start: 2020-09-09 | End: 2024-01-01

## 2020-10-07 RX ORDER — LEVOTHYROXINE SODIUM 125 UG/1
TABLET ORAL
COMMUNITY
Start: 2020-08-31 | End: 2022-03-23 | Stop reason: DRUGHIGH

## 2020-10-07 RX ORDER — POTASSIUM CHLORIDE 1500 MG/1
TABLET, EXTENDED RELEASE ORAL
COMMUNITY
Start: 2020-09-01 | End: 2024-01-01

## 2020-10-07 ASSESSMENT — MIFFLIN-ST. JEOR: SCORE: 1226.18

## 2020-10-07 ASSESSMENT — PAIN SCALES - GENERAL: PAINLEVEL: NO PAIN (0)

## 2020-10-07 NOTE — LETTER
10/7/2020         RE: Myrtle Vaz  [address] [facility] [address]        Dear Colleague,    Thank you for referring your patient, Myrtle Vaz, to the [facility]. Please see a copy of my visit note below.    THORACIC SURGERY FOLLOW UP VISIT     Dear Joey Meehan,  I saw Ms. Vaz in follow-up today (last seen 01/22/20). The clinical summary follows:        PREOP DIAGNOSIS   1.  Dysphagia after a retrosternal gastric pull-up.   2.  Sternal wound chronic draining sinus.      PROCEDURE   1.  Esophagogastroduodenoscopy.   2.  Botox injection of pylorus.   3.  Local wound exploration of the sternum and removal of foreign body.      DATE OF PROCEDURE  10/10/2019     HISTOPATHOLOGY   10/10/2019  Soft tissue, Chest wall, biopsy:   - Skin and subcutaneous tissue with scarring, chronic inflammation, and   hemosiderin-laden macrophages     COMPLICATIONS  None     INTERVAL STUDIES  CT scan 10/7/20: No residual fluid collection.         Physical examination  BMI 24  Sternal wound has completely healed. No more drainage or surrounding erythema.     ETOH none   TOB former smoker   BMI 25    Past Medical History:   Diagnosis Date     Depression      History of atrial fibrillation      HLD (hyperlipidemia)      HTN (hypertension)      Hypothyroidism      Past Surgical History:   Procedure Laterality Date     BRONCHOSCOPY (RIGID OR FLEXIBLE), DIAGNOSTIC N/A 5/2/2019    Procedure: BRONCHOSCOPY, WITH BAL;  Surgeon: Ally Ybarra MD;  [facility]     COMBINED ESOPHAGOSCOPY, GASTROSCOPY, DUODENOSCOPY (EGD), REPLACE ESOPHAGEAL STENT N/A 5/6/2019    Procedure: ESOPHAGOGASTRODUODENOSCOPY WITH ESOPHAGEAL STENT REPLACEMENT, IRRIGATION AND DEBRIDEMENT OF LEFT NECK, WOUND VAC PLACEMENT;  Surgeon: Harley Murguia MD;  [facility]     COMBINED ESOPHAGOSCOPY, GASTROSCOPY, DUODENOSCOPY (EGD), REPLACE ESOPHAGEAL STENT N/A 6/10/2019    Procedure: Esophagogastroduodenoscopy and Stent Removal;   Surgeon: Ally Ybarra MD;  [facility]     ESOPHAGOGASTRECTOMY N/A 4/26/2019    Procedure: Redo Laparotomy SUBSTERNAL Esophagogastrostomy, Pharyngostomy, Intraoperative EGD, PARTIAL STERNOTOMY, LEFT PHARYNGOSTOMY,;  Surgeon: Temitope Bullock MD;  [facility]     ESOPHAGOSCOPY, GASTROSCOPY, DUODENOSCOPY (EGD), COMBINED N/A 11/21/2018    Procedure: COMBINED ESOPHAGOSCOPY, GASTROSCOPY, DUODENOSCOPY (EGD);  Surgeon: Temitope Bullock MD;  [facility]     ESOPHAGOSCOPY, GASTROSCOPY, DUODENOSCOPY (EGD), DILATATION, COMBINED N/A 10/10/2019    Procedure: Esophagogastroduodenoscopy with Botox injection of the Pylorus;  Surgeon: Temitope Bullock MD;  [facility]     HERNIORRHAPHY HIATAL  11/2018     HYSTERECTOMY       IRRIGATION AND DEBRIDEMENT CHEST WASHOUT, COMBINED N/A 10/10/2019    Procedure: Chest wound exploration;  Surgeon: Temitope Bullock MD;  [facility]     LAPAROSCOPIC HERNIORRHAPHY HIATAL N/A 11/21/2018    Procedure: Midline laparotomy, Trans-hiatal esophagogasterectomy, gastrostomy, J-tube placement, G-tube placement, bilateral pleural chest tube placement, mediastinal abcess drainage, spit fistula creation, Esophagastrodueodenoscopy;  Surgeon: Temitope Bullock MD;  [facility]     ROTATOR CUFF REPAIR RT/LT       THYROIDECTOMY       for hyperthroidism        Allergies   Allergen Reactions     Gabapentin Other (See Comments)     Current Outpatient Medications   Medication     albuterol (PROAIR HFA/PROVENTIL HFA/VENTOLIN HFA) 108 (90 Base) MCG/ACT inhaler     amiodarone (PACERONE/CODARONE) 200 MG tablet     aspirin-acetaminophen-caffeine (EXCEDRIN MIGRAINE) 250-250-65 MG tablet     biotin (MERIBIN) 5 MG CAPS     HYDROcodone-acetaminophen (NORCO) 5-325 MG tablet     levothyroxine (SYNTHROID/LEVOTHROID) 150 MCG tablet     Multiple Vitamins-Minerals (PRESERVISION/LUTEIN) CAPS     OMEPRAZOLE PO     traMADol (ULTRAM) 50 MG tablet     venlafaxine (EFFEXOR) 75  MG tablet     No current facility-administered medications for this visit.      Facility-Administered Medications Ordered in Other Visits   Medication     heparin lock flush 10 UNIT/ML injection 5-10 mL     heparin lock flush 10 UNIT/ML injection 5-10 mL     lidocaine (LMX4) cream     lidocaine 1 % 0.1-1 mL     sodium chloride (PF) 0.9% PF flush 10-20 mL       SUBJECTIVE  Ms Vaz is doing better now. She has not had any more drainage for a couple of months now. She is swallowing well without difficulty and maintaining her weight.     From a personal perspective, she comes to clinic with her daughter Haydee.     IMPRESSION (T81.89XD) Non-healing surgical wound, subsequent encounter  (primary encounter diagnosis)  (Z98.890,  Z90.49) History of esophagectomy    75 year old female with chronic draining sinus now healed after esophagectomy.     PLAN  I spent a total of 30 minutes with Ms. Myrtle Vaz and Haydee, more than 50% of which were spent in counseling, coordination of care, and face-to-face time. I reviewed the plan as follows:  F/u with thoracic surgery PRN.  All questions were answered and the patient and present family were in agreement with the plan.  I appreciate the opportunity to participate in the care of your patient and will keep you updated.  Sincerely,        Marin Albarado MD

## 2020-10-07 NOTE — NURSING NOTE
"Oncology Rooming Note    October 7, 2020 2:24 PM   Myrtle Vaz is a 75 year old female who presents for:    Chief Complaint   Patient presents with     Oncology Clinic Visit     Initial Vitals: /66   Pulse 68   Resp 16   Ht 1.676 m (5' 6\")   Wt 70.9 kg (156 lb 6.4 oz)   SpO2 99%   BMI 25.24 kg/m   Estimated body mass index is 25.24 kg/m  as calculated from the following:    Height as of this encounter: 1.676 m (5' 6\").    Weight as of this encounter: 70.9 kg (156 lb 6.4 oz). Body surface area is 1.82 meters squared.  No Pain (0) Comment: Data Unavailable   No LMP recorded. Patient has had a hysterectomy.  Allergies reviewed: Yes  Medications reviewed: Yes    Medications: Medication refills not needed today.  Pharmacy name entered into [facility]: [id number] - [place], [place] - [address] AT [facility] [address]    Clinical concerns: No new concerns today .      Courtney Mayo MA            "

## 2020-10-07 NOTE — PROGRESS NOTES
THORACIC SURGERY FOLLOW UP VISIT     Dear Joey Meehan I saw Ms. Vaz in follow-up today (last seen 01/22/20). The clinical summary follows:        PREOP DIAGNOSIS   1.  Dysphagia after a retrosternal gastric pull-up.   2.  Sternal wound chronic draining sinus.      PROCEDURE   1.  Esophagogastroduodenoscopy.   2.  Botox injection of pylorus.   3.  Local wound exploration of the sternum and removal of foreign body.      DATE OF PROCEDURE  10/10/2019     HISTOPATHOLOGY   10/10/2019  Soft tissue, Chest wall, biopsy:   - Skin and subcutaneous tissue with scarring, chronic inflammation, and   hemosiderin-laden macrophages     COMPLICATIONS  None     INTERVAL STUDIES  CT scan 10/7/20: No residual fluid collection.         Physical examination  BMI 24  Sternal wound has completely healed. No more drainage or surrounding erythema.     ETOH none   TOB former smoker   BMI 25    Past Medical History:   Diagnosis Date     Depression      History of atrial fibrillation      HLD (hyperlipidemia)      HTN (hypertension)      Hypothyroidism      Past Surgical History:   Procedure Laterality Date     BRONCHOSCOPY (RIGID OR FLEXIBLE), DIAGNOSTIC N/A 5/2/2019    Procedure: BRONCHOSCOPY, WITH BAL;  Surgeon: Ally Ybarra MD;  [facility]     COMBINED ESOPHAGOSCOPY, GASTROSCOPY, DUODENOSCOPY (EGD), REPLACE ESOPHAGEAL STENT N/A 5/6/2019    Procedure: ESOPHAGOGASTRODUODENOSCOPY WITH ESOPHAGEAL STENT REPLACEMENT, IRRIGATION AND DEBRIDEMENT OF LEFT NECK, WOUND VAC PLACEMENT;  Surgeon: Harley Murguia MD;  [facility]     COMBINED ESOPHAGOSCOPY, GASTROSCOPY, DUODENOSCOPY (EGD), REPLACE ESOPHAGEAL STENT N/A 6/10/2019    Procedure: Esophagogastroduodenoscopy and Stent Removal;  Surgeon: Ally Ybarra MD;  [facility]     ESOPHAGOGASTRECTOMY N/A 4/26/2019    Procedure: Redo Laparotomy SUBSTERNAL Esophagogastrostomy, Pharyngostomy, Intraoperative EGD, PARTIAL STERNOTOMY, LEFT PHARYNGOSTOMY,;  Surgeon: Per  Temitope Funes MD;  [facility]     ESOPHAGOSCOPY, GASTROSCOPY, DUODENOSCOPY (EGD), COMBINED N/A 11/21/2018    Procedure: COMBINED ESOPHAGOSCOPY, GASTROSCOPY, DUODENOSCOPY (EGD);  Surgeon: Temitope Bullock MD;  [facility]     ESOPHAGOSCOPY, GASTROSCOPY, DUODENOSCOPY (EGD), DILATATION, COMBINED N/A 10/10/2019    Procedure: Esophagogastroduodenoscopy with Botox injection of the Pylorus;  Surgeon: Temitope Bullock MD;  [facility]     HERNIORRHAPHY HIATAL  11/2018     HYSTERECTOMY       IRRIGATION AND DEBRIDEMENT CHEST WASHOUT, COMBINED N/A 10/10/2019    Procedure: Chest wound exploration;  Surgeon: Temitope Bullock MD;  [facility]     LAPAROSCOPIC HERNIORRHAPHY HIATAL N/A 11/21/2018    Procedure: Midline laparotomy, Trans-hiatal esophagogasterectomy, gastrostomy, J-tube placement, G-tube placement, bilateral pleural chest tube placement, mediastinal abcess drainage, spit fistula creation, Esophagastrodueodenoscopy;  Surgeon: Temitope Bullock MD;  [facility]     ROTATOR CUFF REPAIR RT/LT       THYROIDECTOMY       for hyperthroidism        Allergies   Allergen Reactions     Gabapentin Other (See Comments)     Current Outpatient Medications   Medication     albuterol (PROAIR HFA/PROVENTIL HFA/VENTOLIN HFA) 108 (90 Base) MCG/ACT inhaler     amiodarone (PACERONE/CODARONE) 200 MG tablet     aspirin-acetaminophen-caffeine (EXCEDRIN MIGRAINE) 250-250-65 MG tablet     biotin (MERIBIN) 5 MG CAPS     HYDROcodone-acetaminophen (NORCO) 5-325 MG tablet     levothyroxine (SYNTHROID/LEVOTHROID) 150 MCG tablet     Multiple Vitamins-Minerals (PRESERVISION/LUTEIN) CAPS     OMEPRAZOLE PO     traMADol (ULTRAM) 50 MG tablet     venlafaxine (EFFEXOR) 75 MG tablet     No current facility-administered medications for this visit.      Facility-Administered Medications Ordered in Other Visits   Medication     heparin lock flush 10 UNIT/ML injection 5-10 mL     heparin lock flush 10 UNIT/ML  injection 5-10 mL     lidocaine (LMX4) cream     lidocaine 1 % 0.1-1 mL     sodium chloride (PF) 0.9% PF flush 10-20 mL       SUBJECTIVE  Ms Vaz is doing better now. She has not had any more drainage for a couple of months now. She is swallowing well without difficulty and maintaining her weight.     From a personal perspective, she comes to clinic with her daughter Haydee.     IMPRESSION (T81.89XD) Non-healing surgical wound, subsequent encounter  (primary encounter diagnosis)  (Z98.890,  Z90.49) History of esophagectomy    75 year old female with chronic draining sinus now healed after esophagectomy.     PLAN  I spent a total of 30 minutes with Ms. Myrtle Vaz and Haydee, more than 50% of which were spent in counseling, coordination of care, and face-to-face time. I reviewed the plan as follows:  F/u with thoracic surgery PRN.  All questions were answered and the patient and present family were in agreement with the plan.  I appreciate the opportunity to participate in the care of your patient and will keep you updated.  Sincerely,

## 2020-11-01 NOTE — PLAN OF CARE
OT/6B - Cancel. Pt OOR and unavailable for therapy. Will check back and/or reschedule as appropriate.    room air

## 2020-12-03 ENCOUNTER — RECORDS - HEALTHEAST (OUTPATIENT)
Dept: LAB | Facility: CLINIC | Age: 75
End: 2020-12-03

## 2020-12-03 LAB
ALBUMIN SERPL-MCNC: 3.9 G/DL (ref 3.5–5)
ANION GAP SERPL CALCULATED.3IONS-SCNC: 14 MMOL/L (ref 5–18)
BUN SERPL-MCNC: 19 MG/DL (ref 8–28)
CALCIUM SERPL-MCNC: 8.6 MG/DL (ref 8.5–10.5)
CHLORIDE BLD-SCNC: 100 MMOL/L (ref 98–107)
CO2 SERPL-SCNC: 27 MMOL/L (ref 22–31)
CREAT SERPL-MCNC: 1.11 MG/DL (ref 0.6–1.1)
GFR SERPL CREATININE-BSD FRML MDRD: 48 ML/MIN/1.73M2
GLUCOSE BLD-MCNC: 87 MG/DL (ref 70–125)
IRON SERPL-MCNC: 79 UG/DL (ref 42–175)
PHOSPHATE SERPL-MCNC: 3.9 MG/DL (ref 2.5–4.5)
POTASSIUM BLD-SCNC: 4.2 MMOL/L (ref 3.5–5)
SODIUM SERPL-SCNC: 141 MMOL/L (ref 136–145)
T3FREE SERPL-MCNC: 1.3 PG/ML (ref 1.9–3.9)
T4 FREE SERPL-MCNC: 1.4 NG/DL (ref 0.7–1.8)
TSH SERPL DL<=0.005 MIU/L-ACNC: 3.06 UIU/ML (ref 0.3–5)

## 2020-12-18 ENCOUNTER — RECORDS - HEALTHEAST (OUTPATIENT)
Dept: ADMINISTRATIVE | Facility: OTHER | Age: 75
End: 2020-12-18

## 2021-01-27 ENCOUNTER — OFFICE VISIT - HEALTHEAST (OUTPATIENT)
Dept: CARDIOLOGY | Facility: CLINIC | Age: 76
End: 2021-01-27

## 2021-01-27 DIAGNOSIS — I48.91 POSTOPERATIVE ATRIAL FIBRILLATION (H): ICD-10-CM

## 2021-01-27 DIAGNOSIS — I97.89 POSTOPERATIVE ATRIAL FIBRILLATION (H): ICD-10-CM

## 2021-01-27 ASSESSMENT — MIFFLIN-ST. JEOR: SCORE: 1188.07

## 2021-04-25 ENCOUNTER — HEALTH MAINTENANCE LETTER (OUTPATIENT)
Age: 76
End: 2021-04-25

## 2021-05-25 ENCOUNTER — RECORDS - HEALTHEAST (OUTPATIENT)
Dept: ADMINISTRATIVE | Facility: CLINIC | Age: 76
End: 2021-05-25

## 2021-05-26 ENCOUNTER — RECORDS - HEALTHEAST (OUTPATIENT)
Dept: ADMINISTRATIVE | Facility: CLINIC | Age: 76
End: 2021-05-26

## 2021-05-26 VITALS
OXYGEN SATURATION: 93 % | HEART RATE: 78 BPM | DIASTOLIC BLOOD PRESSURE: 78 MMHG | RESPIRATION RATE: 18 BRPM | TEMPERATURE: 99.9 F | SYSTOLIC BLOOD PRESSURE: 144 MMHG

## 2021-05-26 VITALS
DIASTOLIC BLOOD PRESSURE: 84 MMHG | RESPIRATION RATE: 20 BRPM | HEART RATE: 78 BPM | SYSTOLIC BLOOD PRESSURE: 160 MMHG | OXYGEN SATURATION: 89 % | TEMPERATURE: 98.7 F

## 2021-05-26 VITALS
SYSTOLIC BLOOD PRESSURE: 120 MMHG | DIASTOLIC BLOOD PRESSURE: 64 MMHG | TEMPERATURE: 99.2 F | HEART RATE: 76 BPM | RESPIRATION RATE: 20 BRPM | OXYGEN SATURATION: 95 %

## 2021-05-26 VITALS
SYSTOLIC BLOOD PRESSURE: 138 MMHG | RESPIRATION RATE: 18 BRPM | TEMPERATURE: 98.6 F | OXYGEN SATURATION: 95 % | DIASTOLIC BLOOD PRESSURE: 78 MMHG | HEART RATE: 75 BPM

## 2021-05-26 VITALS
SYSTOLIC BLOOD PRESSURE: 122 MMHG | RESPIRATION RATE: 17 BRPM | DIASTOLIC BLOOD PRESSURE: 70 MMHG | HEART RATE: 73 BPM | OXYGEN SATURATION: 93 % | TEMPERATURE: 98 F

## 2021-05-26 VITALS
RESPIRATION RATE: 20 BRPM | OXYGEN SATURATION: 95 % | SYSTOLIC BLOOD PRESSURE: 128 MMHG | SYSTOLIC BLOOD PRESSURE: 128 MMHG | HEART RATE: 74 BPM | TEMPERATURE: 96.7 F | OXYGEN SATURATION: 94 % | RESPIRATION RATE: 20 BRPM | DIASTOLIC BLOOD PRESSURE: 64 MMHG | TEMPERATURE: 99 F | HEART RATE: 71 BPM | DIASTOLIC BLOOD PRESSURE: 72 MMHG

## 2021-05-26 VITALS
HEART RATE: 74 BPM | TEMPERATURE: 97.2 F | OXYGEN SATURATION: 94 % | DIASTOLIC BLOOD PRESSURE: 70 MMHG | SYSTOLIC BLOOD PRESSURE: 138 MMHG | RESPIRATION RATE: 18 BRPM

## 2021-05-26 VITALS
DIASTOLIC BLOOD PRESSURE: 92 MMHG | HEART RATE: 72 BPM | RESPIRATION RATE: 18 BRPM | OXYGEN SATURATION: 96 % | SYSTOLIC BLOOD PRESSURE: 148 MMHG | DIASTOLIC BLOOD PRESSURE: 77 MMHG | SYSTOLIC BLOOD PRESSURE: 141 MMHG | TEMPERATURE: 97.3 F | HEART RATE: 72 BPM

## 2021-05-26 VITALS
OXYGEN SATURATION: 95 % | RESPIRATION RATE: 18 BRPM | HEART RATE: 77 BPM | DIASTOLIC BLOOD PRESSURE: 72 MMHG | TEMPERATURE: 98.6 F | SYSTOLIC BLOOD PRESSURE: 136 MMHG

## 2021-05-26 VITALS
SYSTOLIC BLOOD PRESSURE: 130 MMHG | OXYGEN SATURATION: 90 % | DIASTOLIC BLOOD PRESSURE: 70 MMHG | HEART RATE: 75 BPM | TEMPERATURE: 98.2 F | RESPIRATION RATE: 14 BRPM

## 2021-05-26 VITALS
OXYGEN SATURATION: 96 % | HEART RATE: 82 BPM | TEMPERATURE: 98.1 F | SYSTOLIC BLOOD PRESSURE: 124 MMHG | DIASTOLIC BLOOD PRESSURE: 68 MMHG

## 2021-05-26 VITALS
OXYGEN SATURATION: 94 % | HEART RATE: 66 BPM | SYSTOLIC BLOOD PRESSURE: 140 MMHG | DIASTOLIC BLOOD PRESSURE: 72 MMHG | TEMPERATURE: 96.6 F | RESPIRATION RATE: 20 BRPM | RESPIRATION RATE: 16 BRPM | TEMPERATURE: 97.6 F | DIASTOLIC BLOOD PRESSURE: 62 MMHG | HEART RATE: 78 BPM | SYSTOLIC BLOOD PRESSURE: 114 MMHG | OXYGEN SATURATION: 92 %

## 2021-05-26 VITALS
DIASTOLIC BLOOD PRESSURE: 66 MMHG | SYSTOLIC BLOOD PRESSURE: 132 MMHG | TEMPERATURE: 97.8 F | OXYGEN SATURATION: 94 % | RESPIRATION RATE: 18 BRPM | HEART RATE: 74 BPM

## 2021-05-26 VITALS
RESPIRATION RATE: 18 BRPM | HEART RATE: 71 BPM | HEART RATE: 79 BPM | TEMPERATURE: 98 F | OXYGEN SATURATION: 96 % | TEMPERATURE: 97.3 F | DIASTOLIC BLOOD PRESSURE: 68 MMHG | DIASTOLIC BLOOD PRESSURE: 70 MMHG | OXYGEN SATURATION: 91 % | SYSTOLIC BLOOD PRESSURE: 128 MMHG | RESPIRATION RATE: 18 BRPM | SYSTOLIC BLOOD PRESSURE: 140 MMHG

## 2021-05-26 VITALS
RESPIRATION RATE: 18 BRPM | SYSTOLIC BLOOD PRESSURE: 122 MMHG | HEART RATE: 79 BPM | TEMPERATURE: 98.5 F | DIASTOLIC BLOOD PRESSURE: 62 MMHG | OXYGEN SATURATION: 95 %

## 2021-05-26 VITALS
OXYGEN SATURATION: 94 % | RESPIRATION RATE: 18 BRPM | SYSTOLIC BLOOD PRESSURE: 134 MMHG | TEMPERATURE: 98.2 F | HEART RATE: 69 BPM | DIASTOLIC BLOOD PRESSURE: 74 MMHG

## 2021-05-26 VITALS
RESPIRATION RATE: 20 BRPM | TEMPERATURE: 97 F | DIASTOLIC BLOOD PRESSURE: 62 MMHG | OXYGEN SATURATION: 92 % | SYSTOLIC BLOOD PRESSURE: 110 MMHG | HEART RATE: 72 BPM

## 2021-05-26 VITALS
TEMPERATURE: 98.3 F | SYSTOLIC BLOOD PRESSURE: 112 MMHG | DIASTOLIC BLOOD PRESSURE: 66 MMHG | OXYGEN SATURATION: 91 % | HEART RATE: 71 BPM | RESPIRATION RATE: 20 BRPM

## 2021-05-26 VITALS
SYSTOLIC BLOOD PRESSURE: 120 MMHG | OXYGEN SATURATION: 98 % | DIASTOLIC BLOOD PRESSURE: 70 MMHG | TEMPERATURE: 97.8 F | HEART RATE: 77 BPM

## 2021-05-26 VITALS
RESPIRATION RATE: 18 BRPM | OXYGEN SATURATION: 91 % | DIASTOLIC BLOOD PRESSURE: 70 MMHG | SYSTOLIC BLOOD PRESSURE: 138 MMHG | HEART RATE: 87 BPM | TEMPERATURE: 98.7 F

## 2021-05-26 VITALS — OXYGEN SATURATION: 92 % | DIASTOLIC BLOOD PRESSURE: 73 MMHG | SYSTOLIC BLOOD PRESSURE: 127 MMHG | HEART RATE: 82 BPM

## 2021-05-26 VITALS
OXYGEN SATURATION: 94 % | HEART RATE: 78 BPM | DIASTOLIC BLOOD PRESSURE: 70 MMHG | SYSTOLIC BLOOD PRESSURE: 142 MMHG | TEMPERATURE: 97.8 F | RESPIRATION RATE: 20 BRPM

## 2021-05-26 VITALS — DIASTOLIC BLOOD PRESSURE: 77 MMHG | SYSTOLIC BLOOD PRESSURE: 127 MMHG | OXYGEN SATURATION: 95 % | HEART RATE: 73 BPM

## 2021-05-26 VITALS
TEMPERATURE: 97.2 F | OXYGEN SATURATION: 96 % | SYSTOLIC BLOOD PRESSURE: 130 MMHG | RESPIRATION RATE: 20 BRPM | HEART RATE: 74 BPM | DIASTOLIC BLOOD PRESSURE: 68 MMHG

## 2021-05-26 VITALS
RESPIRATION RATE: 20 BRPM | HEART RATE: 82 BPM | OXYGEN SATURATION: 94 % | TEMPERATURE: 97.2 F | DIASTOLIC BLOOD PRESSURE: 78 MMHG | SYSTOLIC BLOOD PRESSURE: 138 MMHG

## 2021-05-26 VITALS
HEART RATE: 74 BPM | SYSTOLIC BLOOD PRESSURE: 140 MMHG | DIASTOLIC BLOOD PRESSURE: 70 MMHG | TEMPERATURE: 98 F | OXYGEN SATURATION: 94 %

## 2021-05-26 VITALS
OXYGEN SATURATION: 93 % | SYSTOLIC BLOOD PRESSURE: 142 MMHG | DIASTOLIC BLOOD PRESSURE: 72 MMHG | RESPIRATION RATE: 18 BRPM | TEMPERATURE: 98.5 F | HEART RATE: 77 BPM

## 2021-05-26 VITALS — OXYGEN SATURATION: 94 % | HEART RATE: 71 BPM | DIASTOLIC BLOOD PRESSURE: 88 MMHG | SYSTOLIC BLOOD PRESSURE: 117 MMHG

## 2021-05-26 VITALS — DIASTOLIC BLOOD PRESSURE: 81 MMHG | HEART RATE: 78 BPM | SYSTOLIC BLOOD PRESSURE: 140 MMHG

## 2021-05-26 VITALS
RESPIRATION RATE: 20 BRPM | DIASTOLIC BLOOD PRESSURE: 72 MMHG | OXYGEN SATURATION: 91 % | HEART RATE: 68 BPM | SYSTOLIC BLOOD PRESSURE: 140 MMHG | TEMPERATURE: 97.3 F

## 2021-05-26 VITALS — OXYGEN SATURATION: 97 % | HEART RATE: 66 BPM

## 2021-05-26 VITALS
SYSTOLIC BLOOD PRESSURE: 148 MMHG | HEART RATE: 77 BPM | TEMPERATURE: 97.4 F | RESPIRATION RATE: 18 BRPM | OXYGEN SATURATION: 95 % | DIASTOLIC BLOOD PRESSURE: 78 MMHG

## 2021-05-26 VITALS
RESPIRATION RATE: 17 BRPM | DIASTOLIC BLOOD PRESSURE: 80 MMHG | TEMPERATURE: 97 F | SYSTOLIC BLOOD PRESSURE: 160 MMHG | HEART RATE: 65 BPM | OXYGEN SATURATION: 94 %

## 2021-05-26 VITALS — DIASTOLIC BLOOD PRESSURE: 81 MMHG | SYSTOLIC BLOOD PRESSURE: 140 MMHG | HEART RATE: 75 BPM

## 2021-05-27 ENCOUNTER — RECORDS - HEALTHEAST (OUTPATIENT)
Dept: ADMINISTRATIVE | Facility: CLINIC | Age: 76
End: 2021-05-27

## 2021-05-27 VITALS
RESPIRATION RATE: 20 BRPM | HEART RATE: 71 BPM | TEMPERATURE: 96.7 F | SYSTOLIC BLOOD PRESSURE: 140 MMHG | OXYGEN SATURATION: 97 % | DIASTOLIC BLOOD PRESSURE: 72 MMHG

## 2021-05-27 VITALS — DIASTOLIC BLOOD PRESSURE: 83 MMHG | SYSTOLIC BLOOD PRESSURE: 136 MMHG | OXYGEN SATURATION: 95 % | HEART RATE: 71 BPM

## 2021-05-27 VITALS
OXYGEN SATURATION: 92 % | DIASTOLIC BLOOD PRESSURE: 72 MMHG | TEMPERATURE: 97.1 F | HEART RATE: 72 BPM | RESPIRATION RATE: 18 BRPM | SYSTOLIC BLOOD PRESSURE: 138 MMHG

## 2021-05-27 VITALS
SYSTOLIC BLOOD PRESSURE: 131 MMHG | DIASTOLIC BLOOD PRESSURE: 89 MMHG | OXYGEN SATURATION: 97 % | HEART RATE: 77 BPM | TEMPERATURE: 98.4 F

## 2021-05-27 VITALS
TEMPERATURE: 97.6 F | OXYGEN SATURATION: 94 % | DIASTOLIC BLOOD PRESSURE: 79 MMHG | SYSTOLIC BLOOD PRESSURE: 136 MMHG | HEART RATE: 76 BPM

## 2021-05-27 VITALS
TEMPERATURE: 98.2 F | DIASTOLIC BLOOD PRESSURE: 76 MMHG | HEART RATE: 68 BPM | OXYGEN SATURATION: 97 % | RESPIRATION RATE: 18 BRPM | SYSTOLIC BLOOD PRESSURE: 138 MMHG

## 2021-05-27 VITALS
HEART RATE: 72 BPM | DIASTOLIC BLOOD PRESSURE: 61 MMHG | TEMPERATURE: 98.5 F | SYSTOLIC BLOOD PRESSURE: 137 MMHG | OXYGEN SATURATION: 93 %

## 2021-05-27 VITALS — OXYGEN SATURATION: 94 % | DIASTOLIC BLOOD PRESSURE: 83 MMHG | HEART RATE: 66 BPM | SYSTOLIC BLOOD PRESSURE: 123 MMHG

## 2021-05-27 VITALS
OXYGEN SATURATION: 94 % | RESPIRATION RATE: 18 BRPM | DIASTOLIC BLOOD PRESSURE: 68 MMHG | TEMPERATURE: 98.3 F | HEART RATE: 81 BPM | SYSTOLIC BLOOD PRESSURE: 118 MMHG

## 2021-05-27 VITALS
OXYGEN SATURATION: 93 % | DIASTOLIC BLOOD PRESSURE: 58 MMHG | HEART RATE: 74 BPM | TEMPERATURE: 98.8 F | RESPIRATION RATE: 18 BRPM | SYSTOLIC BLOOD PRESSURE: 104 MMHG

## 2021-05-27 VITALS
DIASTOLIC BLOOD PRESSURE: 69 MMHG | OXYGEN SATURATION: 95 % | HEART RATE: 75 BPM | SYSTOLIC BLOOD PRESSURE: 119 MMHG | TEMPERATURE: 98.4 F

## 2021-05-27 VITALS — SYSTOLIC BLOOD PRESSURE: 106 MMHG | HEART RATE: 72 BPM | OXYGEN SATURATION: 95 % | DIASTOLIC BLOOD PRESSURE: 69 MMHG

## 2021-05-27 VITALS
SYSTOLIC BLOOD PRESSURE: 146 MMHG | TEMPERATURE: 98.2 F | OXYGEN SATURATION: 94 % | HEART RATE: 78 BPM | RESPIRATION RATE: 20 BRPM | DIASTOLIC BLOOD PRESSURE: 88 MMHG

## 2021-05-27 VITALS
RESPIRATION RATE: 18 BRPM | DIASTOLIC BLOOD PRESSURE: 74 MMHG | SYSTOLIC BLOOD PRESSURE: 130 MMHG | HEART RATE: 77 BPM | OXYGEN SATURATION: 95 %

## 2021-05-27 VITALS
TEMPERATURE: 99.3 F | OXYGEN SATURATION: 96 % | DIASTOLIC BLOOD PRESSURE: 82 MMHG | RESPIRATION RATE: 18 BRPM | HEART RATE: 76 BPM | SYSTOLIC BLOOD PRESSURE: 142 MMHG

## 2021-05-27 VITALS — HEART RATE: 73 BPM | DIASTOLIC BLOOD PRESSURE: 84 MMHG | SYSTOLIC BLOOD PRESSURE: 145 MMHG | OXYGEN SATURATION: 97 %

## 2021-05-27 VITALS
TEMPERATURE: 98.4 F | SYSTOLIC BLOOD PRESSURE: 126 MMHG | HEART RATE: 79 BPM | DIASTOLIC BLOOD PRESSURE: 64 MMHG | RESPIRATION RATE: 18 BRPM | OXYGEN SATURATION: 92 %

## 2021-05-27 VITALS — DIASTOLIC BLOOD PRESSURE: 83 MMHG | OXYGEN SATURATION: 97 % | SYSTOLIC BLOOD PRESSURE: 136 MMHG | HEART RATE: 71 BPM

## 2021-05-27 VITALS
RESPIRATION RATE: 20 BRPM | OXYGEN SATURATION: 94 % | HEART RATE: 80 BPM | TEMPERATURE: 97.4 F | SYSTOLIC BLOOD PRESSURE: 128 MMHG | DIASTOLIC BLOOD PRESSURE: 72 MMHG

## 2021-05-27 VITALS — SYSTOLIC BLOOD PRESSURE: 146 MMHG | DIASTOLIC BLOOD PRESSURE: 81 MMHG | HEART RATE: 71 BPM

## 2021-05-28 ENCOUNTER — RECORDS - HEALTHEAST (OUTPATIENT)
Dept: ADMINISTRATIVE | Facility: CLINIC | Age: 76
End: 2021-05-28

## 2021-05-30 ENCOUNTER — RECORDS - HEALTHEAST (OUTPATIENT)
Dept: ADMINISTRATIVE | Facility: CLINIC | Age: 76
End: 2021-05-30

## 2021-06-05 VITALS
RESPIRATION RATE: 14 BRPM | SYSTOLIC BLOOD PRESSURE: 124 MMHG | HEART RATE: 64 BPM | WEIGHT: 148 LBS | DIASTOLIC BLOOD PRESSURE: 88 MMHG | BODY MASS INDEX: 23.78 KG/M2 | HEIGHT: 66 IN

## 2021-06-09 ENCOUNTER — RECORDS - HEALTHEAST (OUTPATIENT)
Dept: ADMINISTRATIVE | Facility: CLINIC | Age: 76
End: 2021-06-09

## 2021-06-14 NOTE — PATIENT INSTRUCTIONS - HE
Myrtle Vaz,    It was a pleasure to see you today at the [facility].     My recommendations after this visit include:     Stop amiodarone    Call if you get alerts on apple watch of atrial fibrillation as discussed it is not life threatening but carries stroke risk.  If you have episodes of atrial fibrillation you will need to go on a blood thinner.    To followup with me as needed.      My contact information:  Jesse Pereyra, [place]  After Hours or Scheduling  [phone]  My Nurse---Milla Devine [phone]

## 2021-06-30 NOTE — PROGRESS NOTES
Progress Notes by Shantelle Pereyra CNP at 1/27/2021  1:30 PM     Author: Shantelle Pereyra CNP Service: -- Author Type: Nurse Practitioner    Filed: 1/27/2021  4:07 PM Encounter Date: 1/27/2021 Status: Signed    : Shantelle Pereyra CNP (Nurse Practitioner)            Thank you, Dr. Caballero, for asking the [facility] team to see Ms. Myrtle Vaz to evaluate intraoperative atrial fibrillation.    Assessment/Recommendations     Assessment/Plan:    Diagnoses and all orders for this visit:    Postoperative atrial fibrillation (H) and no known atrial fibrillation since then.  On amiodarone 200 mg p.o. daily.  Discussed long half-life of amiodarone and to stop it.  Discussed that it will take 4 to 6 weeks to clear.  I recommended that the daughter figure out where the heart rate monitoring is in the apple watch and to use this for monitoring of recurrence of atrial fibrillation.  She has not been on anticoagulation.  I discussed if she has recurrence of atrial fib off of amiodarone I first will concern will be to place her on anticoagulation to cover her for stroke risk.  I discussed amiodarone is not a life-threatening rhythm.  If they are unable to figure out how to monitor for recurrence of atrial fibrillation I recommend a 1 month symptom monitor to check for any atrial fibrillation 2 months after stopping amiodarone.  The daughter will call if monitor is needed.  To see Dr. Beard in 3 months for reassessment of thyroid levels as may need adjustment of levothyroxine dose.      [id number] score of 2 and not on aspirin or anticoagulation.  Follow up in clinic with me as needed.     History of Present Illness/Subjective     Myrtle Vaz is a very pleasant 75 y.o. female who comes in today for EP consult regarding intraoperative atrial fibrillation in 2018.  Myrtle Vaz has a known history of hyperlipidemia and large hiatal hernia.  In 2018 she had repair of hiatal hernia.  She  then became septic and it was noted that she had nicking of her esophagus likely at time of surgery.  She had esophagectomy and stomach was placed high up in her chest.  Her daughter states that she had atrial fibrillation during her surgery.  She was started on amiodarone and believes she has been on it since then.  Amiodarone is suppressing her thyroid so she is on thyroid replacement.  She reports that Dr. Albarado signed off on her only this fall.  She had a nonhealing wound in her neck.  This has finally resolved.  Dr. Beard has sent her for referral to see if she could come off of amiodarone.  She is accompanied by her daughter, Haydee.  She has an apple watch.  They currently do not use it for heart rates but has the capacity to and discussed do this and discussed that it could monitor for recurrence of atrial fib.  Myrtle wears it all the time so she could make a phone call if she fell.  She is quite weak.  Her goal is to maintain her weight.  This is difficult since she has to eat frequent small meals.             Problem List:  Patient Active Problem List   Diagnosis   ? Postinfectious Hypothyroidism   ? Hyperlipidemia   ? Arthritis   ? Primary osteoarthritis of both knees   ? Spondylosis of lumbar region without myelopathy or radiculopathy   ? Bilateral chronic knee pain   ? Bilateral low back pain without sciatica   ? Postoperative atrial fibrillation (H)     Revi  e  Physical Examination Review of Systems   shannan [facility]  Vitals:    01/27/21 1334   BP: 124/88   Pulse: 64   Resp: 14     Body mass index is 23.89 kg/m .  Wt Readings from Last 3 Encounters:   01/27/21 148 lb (67.1 kg)   09/13/16 188 lb (85.3 kg)   06/07/16 199 lb (90.3 kg)     General Appearance:   Alert, well-appearing and in no acute distress.   HEENT: Atraumatic, normocephalic.  No scleral icterus, normal conjunctivae; mucous membranes pink and moist.     Chest: Chest symmetric, spine straight.   Lungs:   Respirations unlabored: Lungs are clear  to auscultation.   Cardiovascular:   Normal first and second heart sounds with no murmurs, rubs, or gallops.  Regular, regular.  Radial and posterior tibial pulses are intact.  Normal JVD, no edema.       Extremities: No cyanosis or clubbing   Musculoskeletal: Moves all extremities   Skin: Warm, dry, intact.    Neurologic: Mood and affect are appropriate, alert and oriented to person, place, time, and situation    General: WNL  Eyes: Visual Distubance  Ears/Nose/Throat: Hearing Loss  Lungs: Snoring  Heart: Shortness of Breath with activity  Stomach: WNL  Bladder: WNL  Muscle/Joints: Joint Pain, Muscle Weakness  Skin: WNL  Nervous System: Daytime Sleepiness, Loss of Balance  Mental Health: Anxiety, Depression     Blood: WNL       Medical History  Surgical History Family History Social History     Past Medical History:   Diagnosis Date   ? Breast injury     years ago stearing wheel across the chest    Past Surgical History:   Procedure Laterality Date   ? HYSTERECTOMY  1998   ? OOPHORECTOMY Bilateral 1998   ? DE TOTAL ABDOM HYSTERECTOMY      Description: Total Abdominal Hysterectomy;  Recorded: 08/31/2012;    Family History   Problem Relation Age of Onset   ? Cancer Mother         lung    Social History     Tobacco Use   Smoking Status Current Every Day Smoker   Tobacco Comment    3 A DAY AFTER EATING     Social History     Substance and Sexual Activity   Alcohol Use Yes    Comment: SELDOM        Medications  Allergies     Current Outpatient Medications   Medication Sig Dispense Refill   ? acetaminophen (TYLENOL) 325 MG tablet Take 975 mg by mouth every 8 (eight) hours as needed for pain. Indications: pain     ? amoxicillin-clavulanate (AUGMENTIN) 875-125 mg per tablet Take 1 tablet by mouth every 12 (twelve) hours. With Yogurt or Probiotic   Indications: Cough 20 tablet 0   ? ferrous sulfate 300 mg (60 mg iron)/5 mL syrup Take 300 mg by mouth daily. Indications: anemia from inadequate iron     ?  HYDROcodone-acetaminophen 5-325 mg per tablet Take 1 tablet by mouth every 6 (six) hours. Indications: pain     ? levothyroxine (SYNTHROID, LEVOTHROID) 150 MCG tablet Take 150 mcg by mouth Daily at 6:00 am.  0   ? omeprazole (PRILOSEC) 20 MG capsule Take 20 mg by mouth 2 (two) times a day before meals. Indications: gastroesophageal reflux disease     ? venlafaxine (EFFEXOR-XR) 75 MG 24 hr capsule Take 75 mg by mouth daily. Indications: major depressive disorder       No current facility-administered medications for this visit.       Allergies   Allergen Reactions   ? Gabapentin Other (See Comments) and Unknown      Medical, surgical, family, social history, and medications were all reviewed and updated as necessary.   Lab Results    Chemistry/lipid CBC Cardiac Enzymes/BNP/TSH/INR     Lab Results   Component Value Date    CREATININE 1.11 (H) 12/03/2020    BUN 19 12/03/2020     12/03/2020    K 4.2 12/03/2020     12/03/2020    CO2 27 12/03/2020     Creatinine (mg/dL)   Date Value   12/03/2020 1.11 (H)   06/11/2020 0.85   07/17/2019 0.80   06/03/2019 0.82     No results found for: BNP Lab Results   Component Value Date    WBC 9.7 06/03/2019    HGB 9.3 (L) 06/03/2019    HCT 31.1 (L) 06/03/2019    MCV 91 06/03/2019     (H) 06/03/2019     No results found for: INR   Lab Results   Component Value Date    CHOL 183 06/27/2017    HDL 54 06/27/2017    LDLCALC 100 06/27/2017    TRIG 144 06/27/2017          Total Time- 40 minutes spent on date of encounter doing chart review, history and exam, documentation and further activities as noted above.  This note has been dictated using voice recognition software. Any grammatical, typographical, or context distortions are unintentional and inherent to the software.

## 2021-07-13 ENCOUNTER — RECORDS - HEALTHEAST (OUTPATIENT)
Dept: ADMINISTRATIVE | Facility: CLINIC | Age: 76
End: 2021-07-13

## 2021-07-21 ENCOUNTER — RECORDS - HEALTHEAST (OUTPATIENT)
Dept: ADMINISTRATIVE | Facility: CLINIC | Age: 76
End: 2021-07-21

## 2021-07-22 ENCOUNTER — RECORDS - HEALTHEAST (OUTPATIENT)
Dept: SCHEDULING | Facility: CLINIC | Age: 76
End: 2021-07-22

## 2021-07-22 DIAGNOSIS — Z12.31 OTHER SCREENING MAMMOGRAM: ICD-10-CM

## 2021-09-09 ENCOUNTER — LAB REQUISITION (OUTPATIENT)
Dept: LAB | Facility: CLINIC | Age: 76
End: 2021-09-09
Payer: MEDICARE

## 2021-09-09 DIAGNOSIS — I48.91 UNSPECIFIED ATRIAL FIBRILLATION (H): ICD-10-CM

## 2021-09-09 LAB
ALBUMIN SERPL-MCNC: 3.3 G/DL (ref 3.5–5)
ALP SERPL-CCNC: 113 U/L (ref 45–120)
ALT SERPL W P-5'-P-CCNC: 22 U/L (ref 0–45)
ANION GAP SERPL CALCULATED.3IONS-SCNC: 10 MMOL/L (ref 5–18)
AST SERPL W P-5'-P-CCNC: 27 U/L (ref 0–40)
BILIRUB SERPL-MCNC: 0.2 MG/DL (ref 0–1)
BUN SERPL-MCNC: 21 MG/DL (ref 8–28)
CALCIUM SERPL-MCNC: 8 MG/DL (ref 8.5–10.5)
CHLORIDE BLD-SCNC: 107 MMOL/L (ref 98–107)
CO2 SERPL-SCNC: 24 MMOL/L (ref 22–31)
CREAT SERPL-MCNC: 0.93 MG/DL (ref 0.6–1.1)
GFR SERPL CREATININE-BSD FRML MDRD: 60 ML/MIN/1.73M2
GLUCOSE BLD-MCNC: 101 MG/DL (ref 70–125)
POTASSIUM BLD-SCNC: 4.1 MMOL/L (ref 3.5–5)
PROT SERPL-MCNC: 6.8 G/DL (ref 6–8)
SODIUM SERPL-SCNC: 141 MMOL/L (ref 136–145)
TSH SERPL DL<=0.005 MIU/L-ACNC: 34.21 UIU/ML (ref 0.3–5)

## 2021-09-09 PROCEDURE — 84443 ASSAY THYROID STIM HORMONE: CPT | Mod: ORL | Performed by: FAMILY MEDICINE

## 2021-09-09 PROCEDURE — 80053 COMPREHEN METABOLIC PANEL: CPT | Mod: ORL | Performed by: FAMILY MEDICINE

## 2021-10-10 ENCOUNTER — HEALTH MAINTENANCE LETTER (OUTPATIENT)
Age: 76
End: 2021-10-10

## 2021-11-04 ENCOUNTER — LAB REQUISITION (OUTPATIENT)
Dept: LAB | Facility: CLINIC | Age: 76
End: 2021-11-04
Payer: COMMERCIAL

## 2021-11-04 DIAGNOSIS — E83.51 HYPOCALCEMIA: ICD-10-CM

## 2021-11-04 DIAGNOSIS — E03.8 OTHER SPECIFIED HYPOTHYROIDISM: ICD-10-CM

## 2021-11-04 PROCEDURE — 82306 VITAMIN D 25 HYDROXY: CPT | Mod: ORL | Performed by: FAMILY MEDICINE

## 2021-11-04 PROCEDURE — 82310 ASSAY OF CALCIUM: CPT | Mod: ORL | Performed by: FAMILY MEDICINE

## 2021-11-04 PROCEDURE — 84443 ASSAY THYROID STIM HORMONE: CPT | Mod: ORL | Performed by: FAMILY MEDICINE

## 2021-11-05 LAB
CALCIUM SERPL-MCNC: 8.5 MG/DL (ref 8.5–10.5)
TSH SERPL DL<=0.005 MIU/L-ACNC: 25.72 UIU/ML (ref 0.3–5)

## 2021-11-08 LAB — DEPRECATED CALCIDIOL+CALCIFEROL SERPL-MC: 21 UG/L (ref 30–80)

## 2022-03-01 ENCOUNTER — PATIENT OUTREACH (OUTPATIENT)
Dept: SURGERY | Facility: CLINIC | Age: 77
End: 2022-03-01
Payer: MEDICARE

## 2022-03-01 ENCOUNTER — NURSE TRIAGE (OUTPATIENT)
Dept: ONCOLOGY | Facility: CLINIC | Age: 77
End: 2022-03-01
Payer: MEDICARE

## 2022-03-01 DIAGNOSIS — R13.10 DYSPHAGIA: Primary | ICD-10-CM

## 2022-03-01 NOTE — TELEPHONE ENCOUNTER
[facility] Triage    Incoming call:    Patient is having issues swallowing and feels as though the food gets stuck.  She feels as though her stomach is shrinking and not swallow her food properly.  Complaining of issues of eating and swallowing.    Losing weight  No abnormal pain  Eating, drinking, voiding and stooling    Recommended to start with her PCC, Natasha rolone not have a primary care MD as he retured and would like to see Dr. Albarado.    Routing to Radha Piña and Dr. Albarado to advise

## 2022-03-01 NOTE — TELEPHONE ENCOUNTER
Pranavr received the following message from Triage;  [facility] Triage  Incoming call:     Patient is having issues swallowing and feels as though the food gets stuck.  She feels as though her stomach is shrinking and not swallow her food properly.  Complaining of issues of eating and swallowing.     Losing weight  No abnormal pain  Eating, drinking, voiding and stooling     Recommended to start with her PCC, Natasha renzo not have a primary care MD as he retured and would like to see Dr. Albarado.     Routing to Radha Piña and Dr. Albarado to advise    Writer attempted to reach out to patient. No answer.  Writer left voicemail message on daughterHaydee's, number (as approved) informing them that writer will have scheduling contact them to set up an in-person appointment with Dr Albarado in the next couple of weeks and that patient will need to have an Esophagram and Upper GI done prior to the appointment. Writer's contact information provided.

## 2022-03-18 ENCOUNTER — ANCILLARY PROCEDURE (OUTPATIENT)
Dept: GENERAL RADIOLOGY | Facility: CLINIC | Age: 77
End: 2022-03-18
Attending: CLINICAL NURSE SPECIALIST
Payer: MEDICARE

## 2022-03-18 DIAGNOSIS — R13.10 DYSPHAGIA: ICD-10-CM

## 2022-03-18 PROCEDURE — 74240 X-RAY XM UPR GI TRC 1CNTRST: CPT | Mod: GC | Performed by: RADIOLOGY

## 2022-03-23 ENCOUNTER — OFFICE VISIT (OUTPATIENT)
Dept: SURGERY | Facility: CLINIC | Age: 77
End: 2022-03-23
Attending: THORACIC SURGERY (CARDIOTHORACIC VASCULAR SURGERY)
Payer: MEDICARE

## 2022-03-23 ENCOUNTER — PREP FOR PROCEDURE (OUTPATIENT)
Dept: SURGERY | Facility: CLINIC | Age: 77
End: 2022-03-23

## 2022-03-23 VITALS
DIASTOLIC BLOOD PRESSURE: 82 MMHG | BODY MASS INDEX: 21.95 KG/M2 | OXYGEN SATURATION: 99 % | SYSTOLIC BLOOD PRESSURE: 164 MMHG | TEMPERATURE: 98.3 F | HEART RATE: 63 BPM | WEIGHT: 136 LBS

## 2022-03-23 DIAGNOSIS — Z90.49 HISTORY OF ESOPHAGECTOMY: ICD-10-CM

## 2022-03-23 DIAGNOSIS — Z98.890 HISTORY OF ESOPHAGECTOMY: ICD-10-CM

## 2022-03-23 DIAGNOSIS — R13.10 DYSPHAGIA, UNSPECIFIED TYPE: Primary | ICD-10-CM

## 2022-03-23 PROCEDURE — G0463 HOSPITAL OUTPT CLINIC VISIT: HCPCS

## 2022-03-23 PROCEDURE — 99214 OFFICE O/P EST MOD 30 MIN: CPT | Performed by: THORACIC SURGERY (CARDIOTHORACIC VASCULAR SURGERY)

## 2022-03-23 RX ORDER — ACETAMINOPHEN 325 MG/1
975 TABLET ORAL ONCE
Status: CANCELLED | OUTPATIENT
Start: 2022-03-23 | End: 2022-03-23

## 2022-03-23 ASSESSMENT — PAIN SCALES - GENERAL: PAINLEVEL: SEVERE PAIN (7)

## 2022-03-23 NOTE — PROGRESS NOTES
THORACIC SURGERY FOLLOW UP VISIT    I saw Ms. Myrtle Vaz in follow-up today. The clinical summary follows:     DIAGNOSIS  Esophageal perforation following hiatal hernia repair     PROCEDURES  11/21/18: Re-do laparotomy, takedown of Nissen fundoplication, prosthetic mesh explantation, transhiatal drainage of mediastinal abscess, transhiatal partial esophagectomy for perforation, G/J tubes, cervical esophagostomy  4/26/19: redo laparotomy, takedown of cervical esophagostomy with retrosternal esophagogastrostomy  5/6/19: neck debridement, esophageal stent placement  6/10/19: EGD, esophageal stent removal  10/10/19 Local wound exploration of the sternum and removal of foreign body.      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      INTERVAL STUDIES  Esophagram 3/18/22: Postsurgical changes of distal esophagectomy with gastric pull-up  with improved narrowing of the distal stomach with slight delay in contrast passage with larger volumes.      Past Medical History:   Diagnosis Date     Depression      History of atrial fibrillation      HLD (hyperlipidemia)      HTN (hypertension)      Hypothyroidism      Past Surgical History:   Procedure Laterality Date     BRONCHOSCOPY (RIGID OR FLEXIBLE), DIAGNOSTIC N/A 5/2/2019    Procedure: BRONCHOSCOPY, WITH BAL;  Surgeon: Ally Ybarra MD;  Location: [facility]     COMBINED ESOPHAGOSCOPY, GASTROSCOPY, DUODENOSCOPY (EGD), REPLACE ESOPHAGEAL STENT N/A 5/6/2019    Procedure: ESOPHAGOGASTRODUODENOSCOPY WITH ESOPHAGEAL STENT REPLACEMENT, IRRIGATION AND DEBRIDEMENT OF LEFT NECK, WOUND VAC PLACEMENT;  Surgeon: Harley Murguia MD;  Location: [facility]     COMBINED ESOPHAGOSCOPY, GASTROSCOPY, DUODENOSCOPY (EGD), REPLACE ESOPHAGEAL STENT  N/A 6/10/2019    Procedure: Esophagogastroduodenoscopy and Stent Removal;  Surgeon: Ally Ybarra MD;  Location: [facility]     ESOPHAGOGASTRECTOMY N/A 4/26/2019    Procedure: Redo Laparotomy SUBSTERNAL Esophagogastrostomy, Pharyngostomy, Intraoperative EGD, PARTIAL STERNOTOMY, LEFT PHARYNGOSTOMY,;  Surgeon: Temitope Bullock MD;  Location: [facility]     ESOPHAGOSCOPY, GASTROSCOPY, DUODENOSCOPY (EGD), COMBINED N/A 11/21/2018    Procedure: COMBINED ESOPHAGOSCOPY, GASTROSCOPY, DUODENOSCOPY (EGD);  Surgeon: Temitope Bullock MD;  Location: [facility]     ESOPHAGOSCOPY, GASTROSCOPY, DUODENOSCOPY (EGD), DILATATION, COMBINED N/A 10/10/2019    Procedure: Esophagogastroduodenoscopy with Botox injection of the Pylorus;  Surgeon: Temitope Bullock MD;  Location: [facility]     HERNIORRHAPHY HIATAL  11/2018     HYSTERECTOMY       HYSTERECTOMY  1998     IRRIGATION AND DEBRIDEMENT CHEST WASHOUT, COMBINED N/A 10/10/2019    Procedure: Chest wound exploration;  Surgeon: Temitope Bullock MD;  Location: [facility]     LAPAROSCOPIC HERNIORRHAPHY HIATAL N/A 11/21/2018    Procedure: Midline laparotomy, Trans-hiatal esophagogasterectomy, gastrostomy, J-tube placement, G-tube placement, bilateral pleural chest tube placement, mediastinal abcess drainage, spit fistula creation, Esophagastrodueodenoscopy;  Surgeon: Temitope Bullock MD;  Location: [facility]     OOPHORECTOMY Bilateral 1998     ROTATOR CUFF REPAIR RT/LT       THYROIDECTOMY       for hyperthroidism     ZZC TOTAL ABDOM HYSTERECTOMY      Description: Total Abdominal Hysterectomy;  Recorded: 08/31/2012;        Allergies   Allergen Reactions     Gabapentin Other (See Comments)     Current Outpatient Medications   Medication     albuterol (PROAIR HFA/PROVENTIL HFA/VENTOLIN HFA) 108 (90 Base) MCG/ACT inhaler     amiodarone (PACERONE/CODARONE) 200 MG tablet     aspirin-acetaminophen-caffeine (EXCEDRIN MIGRAINE) 250-250-65 MG tablet     biotin (MERIBIN) 5 MG CAPS      HYDROcodone-acetaminophen (NORCO) 5-325 MG tablet     levothyroxine (SYNTHROID/LEVOTHROID) 125 MCG tablet     levothyroxine (SYNTHROID/LEVOTHROID) 150 MCG tablet     Multiple Vitamins-Minerals (PRESERVISION/LUTEIN) CAPS     omeprazole (PRILOSEC) 20 MG DR capsule     oxybutynin (DITROPAN) 5 MG tablet     potassium chloride ER (KLOR-CON M) 20 MEQ CR tablet     traMADol (ULTRAM) 50 MG tablet     venlafaxine (EFFEXOR) 75 MG tablet     No current facility-administered medications for this visit.     Facility-Administered Medications Ordered in Other Visits   Medication     barium sulfate (EZ-DISK) tablet 700 mg     heparin lock flush 10 UNIT/ML injection 5-10 mL     heparin lock flush 10 UNIT/ML injection 5-10 mL     lidocaine (LMX4) cream     lidocaine 1 % 0.1-1 mL     sodium chloride (PF) 0.9% PF flush 10-20 mL       ETOH none   TOB none     Exam  BP (!) 164/82   Pulse 63   Temp 98.3  F (36.8  C) (Oral)   Wt 61.7 kg (136 lb)   SpO2 99%   BMI 21.95 kg/m    Alert and oriented. NAD  Bilateral breath sounds.   Chest scar well healed.     SAUL Kaur comes to clinic because she has been having dysphagia to solids over the last few weeks. She denies odynophagia. No fever or chills. No nausea, regurgitation, emesis or abdominal pain. She is having normal BMs.     She recently had some dental work done.     From a personal perspective, she comes to clinic with her daughter Haydee.     IMPRESSION   76 year old female patient with history of esophageal injury requiring esophagectomy, now 3 years after esophagostomy closure.     PLAN  I spent 60 min on the date of the encounter in chart review, patient visit, review of tests, documentation and/or discussion with other providers about the issues documented above. I reviewed the plan as follows:  Procedure planned: EGD, possible esophageal dilation.   Necessary tests and appointements: H&P by PCP. We need to coordinate with her dentist to make sure she is healed from her  dental work.   All questions were answered and the patient and present family were in agreement with the plan.  I appreciate the opportunity to participate in the care of your patient and will keep you updated.  Sincerely,    Temitope Randle MD

## 2022-03-23 NOTE — LETTER
3/23/2022     RE: Myrtle Vaz  [address] [facility] [address]    Dear Colleague,    Thank you for referring your patient, Myrtle Vaz, to the [facility]. Please see a copy of my visit note below.    THORACIC SURGERY FOLLOW UP VISIT    I saw Ms. Myrtle Vaz in follow-up today. The clinical summary follows:     DIAGNOSIS  Esophageal perforation following hiatal hernia repair     PROCEDURES  11/21/18: Re-do laparotomy, takedown of Nissen fundoplication, prosthetic mesh explantation, transhiatal drainage of mediastinal abscess, transhiatal partial esophagectomy for perforation, G/J tubes, cervical esophagostomy  4/26/19: redo laparotomy, takedown of cervical esophagostomy with retrosternal esophagogastrostomy  5/6/19: neck debridement, esophageal stent placement  6/10/19: EGD, esophageal stent removal  10/10/19 Local wound exploration of the sternum and removal of foreign body.      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      INTERVAL STUDIES  Esophagram 3/18/22: Postsurgical changes of distal esophagectomy with gastric pull-up  with improved narrowing of the distal stomach with slight delay in contrast passage with larger volumes.      Past Medical History:   Diagnosis Date     Depression      History of atrial fibrillation      HLD (hyperlipidemia)      HTN (hypertension)      Hypothyroidism      Past Surgical History:   Procedure Laterality Date     BRONCHOSCOPY (RIGID OR FLEXIBLE), DIAGNOSTIC N/A 5/2/2019    Procedure: BRONCHOSCOPY, WITH BAL;  Surgeon: Ally Ybarra MD;  Location: [facility]     COMBINED ESOPHAGOSCOPY, GASTROSCOPY, DUODENOSCOPY (EGD), REPLACE ESOPHAGEAL STENT N/A 5/6/2019    Procedure:  ESOPHAGOGASTRODUODENOSCOPY WITH ESOPHAGEAL STENT REPLACEMENT, IRRIGATION AND DEBRIDEMENT OF LEFT NECK, WOUND VAC PLACEMENT;  Surgeon: Harley Murguia MD;  Location: [facility]     COMBINED ESOPHAGOSCOPY, GASTROSCOPY, DUODENOSCOPY (EGD), REPLACE ESOPHAGEAL STENT N/A 6/10/2019    Procedure: Esophagogastroduodenoscopy and Stent Removal;  Surgeon: Ally Ybarra MD;  Location: [facility]     ESOPHAGOGASTRECTOMY N/A 4/26/2019    Procedure: Redo Laparotomy SUBSTERNAL Esophagogastrostomy, Pharyngostomy, Intraoperative EGD, PARTIAL STERNOTOMY, LEFT PHARYNGOSTOMY,;  Surgeon: Temitope Bullock MD;  Location[facility]     ESOPHAGOSCOPY, GASTROSCOPY, DUODENOSCOPY (EGD), COMBINED N/A 11/21/2018    Procedure: COMBINED ESOPHAGOSCOPY, GASTROSCOPY, DUODENOSCOPY (EGD);  Surgeon: Temitope Bullock MD;  [facility]     ESOPHAGOSCOPY, GASTROSCOPY, DUODENOSCOPY (EGD), DILATATION, COMBINED N/A 10/10/2019    Procedure: Esophagogastroduodenoscopy with Botox injection of the Pylorus;  Surgeon: Temitope Bullock MD;  [facility]     HERNIORRHAPHY HIATAL  11/2018     HYSTERECTOMY       HYSTERECTOMY  1998     IRRIGATION AND DEBRIDEMENT CHEST WASHOUT, COMBINED N/A 10/10/2019    Procedure: Chest wound exploration;  Surgeon: Temitope Bullock MD;  [facility]     LAPAROSCOPIC HERNIORRHAPHY HIATAL N/A 11/21/2018    Procedure: Midline laparotomy, Trans-hiatal esophagogasterectomy, gastrostomy, J-tube placement, G-tube placement, bilateral pleural chest tube placement, mediastinal abcess drainage, spit fistula creation, Esophagastrodueodenoscopy;  Surgeon: Temitope Bullock MD;  Location: [facility]     OOPHORECTOMY Bilateral 1998     ROTATOR CUFF REPAIR RT/LT       THYROIDECTOMY       for hyperthroidism     ZZC TOTAL ABDOM HYSTERECTOMY      Description: Total Abdominal Hysterectomy;  Recorded: 08/31/2012;        Allergies   Allergen Reactions     Gabapentin Other (See Comments)     Current Outpatient Medications   Medication      albuterol (PROAIR HFA/PROVENTIL HFA/VENTOLIN HFA) 108 (90 Base) MCG/ACT inhaler     amiodarone (PACERONE/CODARONE) 200 MG tablet     aspirin-acetaminophen-caffeine (EXCEDRIN MIGRAINE) 250-250-65 MG tablet     biotin (MERIBIN) 5 MG CAPS     HYDROcodone-acetaminophen (NORCO) 5-325 MG tablet     levothyroxine (SYNTHROID/LEVOTHROID) 125 MCG tablet     levothyroxine (SYNTHROID/LEVOTHROID) 150 MCG tablet     Multiple Vitamins-Minerals (PRESERVISION/LUTEIN) CAPS     omeprazole (PRILOSEC) 20 MG DR capsule     oxybutynin (DITROPAN) 5 MG tablet     potassium chloride ER (KLOR-CON M) 20 MEQ CR tablet     traMADol (ULTRAM) 50 MG tablet     venlafaxine (EFFEXOR) 75 MG tablet     No current facility-administered medications for this visit.     Facility-Administered Medications Ordered in Other Visits   Medication     barium sulfate (EZ-DISK) tablet 700 mg     heparin lock flush 10 UNIT/ML injection 5-10 mL     heparin lock flush 10 UNIT/ML injection 5-10 mL     lidocaine (LMX4) cream     lidocaine 1 % 0.1-1 mL     sodium chloride (PF) 0.9% PF flush 10-20 mL       ETOH none   TOB none     Exam  BP (!) 164/82   Pulse 63   Temp 98.3  F (36.8  C) (Oral)   Wt 61.7 kg (136 lb)   SpO2 99%   BMI 21.95 kg/m    Alert and oriented. NAD  Bilateral breath sounds.   Chest scar well healed.     SAUL Kaur comes to clinic because she has been having dysphagia to solids over the last few weeks. She denies odynophagia. No fever or chills. No nausea, regurgitation, emesis or abdominal pain. She is having normal BMs.     She recently had some dental work done.     From a personal perspective, she comes to clinic with her daughter Haydee.     IMPRESSION   76 year old female patient with history of esophageal injury requiring esophagectomy, now 3 years after esophagostomy closure.     PLAN  I spent 60 min on the date of the encounter in chart review, patient visit, review of tests, documentation and/or discussion with other providers  about the issues documented above. I reviewed the plan as follows:  Procedure planned: EGD, possible esophageal dilation.   Necessary tests and appointements: H&P by PCP. We need to coordinate with her dentist to make sure she is healed from her dental work.   All questions were answered and the patient and present family were in agreement with the plan.  I appreciate the opportunity to participate in the care of your patient and will keep you updated.  Sincerely,    Temitope Randle MD

## 2022-03-23 NOTE — NURSING NOTE
"Oncology Rooming Note    March 23, 2022 10:14 AM   Myrtle Vaz is a 76 year old female who presents for:    Chief Complaint   Patient presents with     Oncology Clinic Visit     Dysphagia     Initial Vitals: BP (!) 164/82   Pulse 63   Temp 98.3  F (36.8  C) (Oral)   Wt 61.7 kg (136 lb)   SpO2 99%   BMI 21.95 kg/m   Estimated body mass index is 21.95 kg/m  as calculated from the following:    Height as of 1/27/21: 1.676 m (5' 6\").    Weight as of this encounter: 61.7 kg (136 lb). Body surface area is 1.7 meters squared.  Severe Pain (7) Comment: Data Unavailable   No LMP recorded. Patient has had a hysterectomy.  Allergies reviewed: Yes  Medications reviewed: Yes    Medications: Medication refills not needed today.  Pharmacy name entered into [employer]: [id number] - [place], [place] - [address] AT [facility] [address]    Clinical concerns: none       Mariola Lind            "

## 2022-03-26 ENCOUNTER — HEALTH MAINTENANCE LETTER (OUTPATIENT)
Age: 77
End: 2022-03-26

## 2022-03-29 ENCOUNTER — PATIENT OUTREACH (OUTPATIENT)
Dept: SURGERY | Facility: CLINIC | Age: 77
End: 2022-03-29
Payer: MEDICARE

## 2022-03-29 NOTE — PROGRESS NOTES
Called patient's daughter, Haydee, at the request of Dr Albarado, to find out recommendations from patient's Dentist regarding when they feel it is safe for patient to move forward with having an EGD Dilation done. Daughter reports that patient is having permanent dentures in on 4/19/2022 and the dentist said that anytime after that would be fine to have the dilation. Daughter verbalized desire to have patient wait one week after dentures placed to insure mouth has healed. Writer verbalized understanding. Informed daughter that patient will need a pre-op H&P done prior to the dilation. Daughter reported that patient's PCP recently retired and they will be finding a new provider to have H&P completed.   Writer informed daughter that our surgery scheduler will be contacting her finalize the date for the Dilation. Daughter verbalized her understanding and appreciated writer's call.

## 2022-04-26 DIAGNOSIS — Z11.59 ENCOUNTER FOR SCREENING FOR OTHER VIRAL DISEASES: Primary | ICD-10-CM

## 2022-05-02 ENCOUNTER — TELEPHONE (OUTPATIENT)
Dept: SURGERY | Facility: CLINIC | Age: 77
End: 2022-05-02
Payer: MEDICARE

## 2022-05-02 NOTE — TELEPHONE ENCOUNTER
Called daughter to schedule procedure with Dr. Temitope Funes  There was no answer, detail message was left.    Procedure was scheduled on 05/10 at [facility], [place]  Patient will have H&P with Joey Caballero (PCP)    Patient is aware a COVID-19 test is needed before their procedure. The test should be with-in 4 days of their procedure.   Test Details: Patient will schedule    Patient is aware a / is needed day of surgery.   Surgery letter was sent via [employer], patient has my direct contact information for any further questions.

## 2022-05-03 ENCOUNTER — LAB REQUISITION (OUTPATIENT)
Dept: LAB | Facility: CLINIC | Age: 77
End: 2022-05-03
Payer: MEDICARE

## 2022-05-03 DIAGNOSIS — I10 ESSENTIAL (PRIMARY) HYPERTENSION: ICD-10-CM

## 2022-05-03 DIAGNOSIS — E03.8 OTHER SPECIFIED HYPOTHYROIDISM: ICD-10-CM

## 2022-05-03 LAB
ANION GAP SERPL CALCULATED.3IONS-SCNC: 11 MMOL/L (ref 5–18)
BUN SERPL-MCNC: 20 MG/DL (ref 8–28)
CALCIUM SERPL-MCNC: 8.3 MG/DL (ref 8.5–10.5)
CHLORIDE BLD-SCNC: 104 MMOL/L (ref 98–107)
CO2 SERPL-SCNC: 26 MMOL/L (ref 22–31)
CREAT SERPL-MCNC: 0.82 MG/DL (ref 0.6–1.1)
GFR SERPL CREATININE-BSD FRML MDRD: 74 ML/MIN/1.73M2
GLUCOSE BLD-MCNC: 79 MG/DL (ref 70–125)
POTASSIUM BLD-SCNC: 3.9 MMOL/L (ref 3.5–5)
SODIUM SERPL-SCNC: 141 MMOL/L (ref 136–145)
TSH SERPL DL<=0.005 MIU/L-ACNC: 32.32 UIU/ML (ref 0.3–5)

## 2022-05-03 PROCEDURE — 84443 ASSAY THYROID STIM HORMONE: CPT | Mod: ORL | Performed by: PHYSICIAN ASSISTANT

## 2022-05-03 PROCEDURE — 80048 BASIC METABOLIC PNL TOTAL CA: CPT | Mod: ORL | Performed by: PHYSICIAN ASSISTANT

## 2022-05-05 ENCOUNTER — PATIENT OUTREACH (OUTPATIENT)
Dept: SURGERY | Facility: CLINIC | Age: 77
End: 2022-05-05
Payer: MEDICARE

## 2022-05-05 NOTE — PROGRESS NOTES
Called patient's DaughterHaydee, to inquire about date of patient's pre-op H&P with her PCP. Patient is scheduled to have EGD with Dilation on 5/10/2022.   [facility]/Voicemail    Clinical Data: Care Coordinator Outreach    Outreach attempted x 1.  Left message on Haydee Herrmann's voicemail with call back information and requested return call.    Plan: Care Coordinator will try to reach patient again in 1-2 business days.    Radha Piña RN, BSN  Thoracic Surgery RN Care Coordinator    Addendum:  Haydee Herrmann, returned writer's call. Patient had pre-op H&P with her PCP yesterday, May 4th. Reported that they will be faxing the visit notes to the [facility].

## 2022-05-06 ENCOUNTER — LAB REQUISITION (OUTPATIENT)
Dept: LAB | Facility: CLINIC | Age: 77
End: 2022-05-06
Payer: MEDICARE

## 2022-05-06 DIAGNOSIS — Z01.812 ENCOUNTER FOR PREPROCEDURAL LABORATORY EXAMINATION: ICD-10-CM

## 2022-05-06 PROCEDURE — U0003 INFECTIOUS AGENT DETECTION BY NUCLEIC ACID (DNA OR RNA); SEVERE ACUTE RESPIRATORY SYNDROME CORONAVIRUS 2 (SARS-COV-2) (CORONAVIRUS DISEASE [COVID-19]), AMPLIFIED PROBE TECHNIQUE, MAKING USE OF HIGH THROUGHPUT TECHNOLOGIES AS DESCRIBED BY CMS-2020-01-R: HCPCS | Mod: ORL | Performed by: STUDENT IN AN ORGANIZED HEALTH CARE EDUCATION/TRAINING PROGRAM

## 2022-05-07 LAB — SARS-COV-2 RNA RESP QL NAA+PROBE: NEGATIVE

## 2022-05-09 ENCOUNTER — ANESTHESIA EVENT (OUTPATIENT)
Dept: SURGERY | Facility: CLINIC | Age: 77
End: 2022-05-09
Payer: MEDICARE

## 2022-05-09 NOTE — OR NURSING
"Spoke to Natasha who requested I speak to her daughter Haydee regarding upcoming procedure. Natasha stated that \"Haydee deals with all of my stuff like that\". I asked if Haydee was familiar with her medication routine and Natasha stated \"yes, she knows all about my stuff\". I thanked Natasha and told her I would be reaching out to Haydee to discuss her upcoming procedure.   "

## 2022-05-10 ENCOUNTER — ANESTHESIA (OUTPATIENT)
Dept: SURGERY | Facility: CLINIC | Age: 77
End: 2022-05-10
Payer: MEDICARE

## 2022-05-10 ENCOUNTER — HOSPITAL ENCOUNTER (OUTPATIENT)
Facility: CLINIC | Age: 77
Discharge: HOME | End: 2022-05-10
Attending: THORACIC SURGERY (CARDIOTHORACIC VASCULAR SURGERY) | Admitting: THORACIC SURGERY (CARDIOTHORACIC VASCULAR SURGERY)
Payer: MEDICARE

## 2022-05-10 ENCOUNTER — APPOINTMENT (OUTPATIENT)
Dept: GENERAL RADIOLOGY | Facility: CLINIC | Age: 77
End: 2022-05-10
Attending: THORACIC SURGERY (CARDIOTHORACIC VASCULAR SURGERY)
Payer: MEDICARE

## 2022-05-10 VITALS
SYSTOLIC BLOOD PRESSURE: 143 MMHG | WEIGHT: 134.7 LBS | RESPIRATION RATE: 14 BRPM | HEART RATE: 64 BPM | HEIGHT: 65 IN | TEMPERATURE: 98.6 F | DIASTOLIC BLOOD PRESSURE: 81 MMHG | OXYGEN SATURATION: 96 % | BODY MASS INDEX: 22.44 KG/M2

## 2022-05-10 DIAGNOSIS — K22.2 ESOPHAGEAL STRICTURE: Primary | ICD-10-CM

## 2022-05-10 LAB — GLUCOSE BLDC GLUCOMTR-MCNC: 65 MG/DL (ref 70–99)

## 2022-05-10 PROCEDURE — 360N000082 HC SURGERY LEVEL 2 W/ FLUORO, PER MIN: Performed by: THORACIC SURGERY (CARDIOTHORACIC VASCULAR SURGERY)

## 2022-05-10 PROCEDURE — 250N000025 HC SEVOFLURANE, PER MIN: Performed by: THORACIC SURGERY (CARDIOTHORACIC VASCULAR SURGERY)

## 2022-05-10 PROCEDURE — 370N000017 HC ANESTHESIA TECHNICAL FEE, PER MIN: Performed by: THORACIC SURGERY (CARDIOTHORACIC VASCULAR SURGERY)

## 2022-05-10 PROCEDURE — 710N000012 HC RECOVERY PHASE 2, PER MINUTE: Performed by: THORACIC SURGERY (CARDIOTHORACIC VASCULAR SURGERY)

## 2022-05-10 PROCEDURE — 250N000009 HC RX 250: Performed by: NURSE ANESTHETIST, CERTIFIED REGISTERED

## 2022-05-10 PROCEDURE — 710N000010 HC RECOVERY PHASE 1, LEVEL 2, PER MIN: Performed by: THORACIC SURGERY (CARDIOTHORACIC VASCULAR SURGERY)

## 2022-05-10 PROCEDURE — 250N000011 HC RX IP 250 OP 636: Performed by: NURSE ANESTHETIST, CERTIFIED REGISTERED

## 2022-05-10 PROCEDURE — 258N000003 HC RX IP 258 OP 636: Performed by: NURSE ANESTHETIST, CERTIFIED REGISTERED

## 2022-05-10 PROCEDURE — 272N000001 HC OR GENERAL SUPPLY STERILE: Performed by: THORACIC SURGERY (CARDIOTHORACIC VASCULAR SURGERY)

## 2022-05-10 PROCEDURE — 999N000179 XR SURGERY CARM FLUORO LESS THAN 5 MIN W STILLS: Mod: TC

## 2022-05-10 PROCEDURE — 43248 EGD GUIDE WIRE INSERTION: CPT | Performed by: THORACIC SURGERY (CARDIOTHORACIC VASCULAR SURGERY)

## 2022-05-10 PROCEDURE — 999N000141 HC STATISTIC PRE-PROCEDURE NURSING ASSESSMENT: Performed by: THORACIC SURGERY (CARDIOTHORACIC VASCULAR SURGERY)

## 2022-05-10 PROCEDURE — 250N000013 HC RX MED GY IP 250 OP 250 PS 637: Performed by: THORACIC SURGERY (CARDIOTHORACIC VASCULAR SURGERY)

## 2022-05-10 PROCEDURE — 82962 GLUCOSE BLOOD TEST: CPT

## 2022-05-10 PROCEDURE — 74360 X-RAY GUIDE GI DILATION: CPT | Mod: 26 | Performed by: THORACIC SURGERY (CARDIOTHORACIC VASCULAR SURGERY)

## 2022-05-10 RX ORDER — DEXAMETHASONE SODIUM PHOSPHATE 4 MG/ML
INJECTION, SOLUTION INTRA-ARTICULAR; INTRALESIONAL; INTRAMUSCULAR; INTRAVENOUS; SOFT TISSUE PRN
Status: DISCONTINUED | OUTPATIENT
Start: 2022-05-10 | End: 2022-05-10

## 2022-05-10 RX ORDER — LIDOCAINE HYDROCHLORIDE 20 MG/ML
INJECTION, SOLUTION INFILTRATION; PERINEURAL PRN
Status: DISCONTINUED | OUTPATIENT
Start: 2022-05-10 | End: 2022-05-10

## 2022-05-10 RX ORDER — EPHEDRINE SULFATE 50 MG/ML
INJECTION, SOLUTION INTRAMUSCULAR; INTRAVENOUS; SUBCUTANEOUS PRN
Status: DISCONTINUED | OUTPATIENT
Start: 2022-05-10 | End: 2022-05-10

## 2022-05-10 RX ORDER — FENTANYL CITRATE 50 UG/ML
INJECTION, SOLUTION INTRAMUSCULAR; INTRAVENOUS PRN
Status: DISCONTINUED | OUTPATIENT
Start: 2022-05-10 | End: 2022-05-10

## 2022-05-10 RX ORDER — OXYCODONE HYDROCHLORIDE 5 MG/1
5 TABLET ORAL EVERY 4 HOURS PRN
Qty: 10 TABLET | Refills: 0 | Status: SHIPPED | OUTPATIENT
Start: 2022-05-10 | End: 2023-01-10

## 2022-05-10 RX ORDER — SODIUM CHLORIDE, SODIUM LACTATE, POTASSIUM CHLORIDE, CALCIUM CHLORIDE 600; 310; 30; 20 MG/100ML; MG/100ML; MG/100ML; MG/100ML
INJECTION, SOLUTION INTRAVENOUS CONTINUOUS PRN
Status: DISCONTINUED | OUTPATIENT
Start: 2022-05-10 | End: 2022-05-10

## 2022-05-10 RX ORDER — ONDANSETRON 4 MG/1
4 TABLET, ORALLY DISINTEGRATING ORAL EVERY 30 MIN PRN
Status: DISCONTINUED | OUTPATIENT
Start: 2022-05-10 | End: 2022-05-10 | Stop reason: HOSPADM

## 2022-05-10 RX ORDER — HYDRALAZINE HYDROCHLORIDE 20 MG/ML
2.5-5 INJECTION INTRAMUSCULAR; INTRAVENOUS EVERY 10 MIN PRN
Status: DISCONTINUED | OUTPATIENT
Start: 2022-05-10 | End: 2022-05-10 | Stop reason: HOSPADM

## 2022-05-10 RX ORDER — ONDANSETRON 2 MG/ML
4 INJECTION INTRAMUSCULAR; INTRAVENOUS EVERY 30 MIN PRN
Status: DISCONTINUED | OUTPATIENT
Start: 2022-05-10 | End: 2022-05-10 | Stop reason: HOSPADM

## 2022-05-10 RX ORDER — HALOPERIDOL 5 MG/ML
1 INJECTION INTRAMUSCULAR
Status: DISCONTINUED | OUTPATIENT
Start: 2022-05-10 | End: 2022-05-10 | Stop reason: HOSPADM

## 2022-05-10 RX ORDER — PROPOFOL 10 MG/ML
INJECTION, EMULSION INTRAVENOUS PRN
Status: DISCONTINUED | OUTPATIENT
Start: 2022-05-10 | End: 2022-05-10

## 2022-05-10 RX ORDER — FENTANYL CITRATE 50 UG/ML
25 INJECTION, SOLUTION INTRAMUSCULAR; INTRAVENOUS
Status: DISCONTINUED | OUTPATIENT
Start: 2022-05-10 | End: 2022-05-10 | Stop reason: HOSPADM

## 2022-05-10 RX ORDER — SODIUM CHLORIDE, SODIUM LACTATE, POTASSIUM CHLORIDE, CALCIUM CHLORIDE 600; 310; 30; 20 MG/100ML; MG/100ML; MG/100ML; MG/100ML
INJECTION, SOLUTION INTRAVENOUS CONTINUOUS
Status: DISCONTINUED | OUTPATIENT
Start: 2022-05-10 | End: 2022-05-10 | Stop reason: HOSPADM

## 2022-05-10 RX ORDER — ONDANSETRON 2 MG/ML
INJECTION INTRAMUSCULAR; INTRAVENOUS PRN
Status: DISCONTINUED | OUTPATIENT
Start: 2022-05-10 | End: 2022-05-10

## 2022-05-10 RX ORDER — OXYCODONE HYDROCHLORIDE 5 MG/1
5 TABLET ORAL EVERY 4 HOURS PRN
Status: DISCONTINUED | OUTPATIENT
Start: 2022-05-10 | End: 2022-05-10 | Stop reason: HOSPADM

## 2022-05-10 RX ORDER — HYDROMORPHONE HCL IN WATER/PF 6 MG/30 ML
0.2 PATIENT CONTROLLED ANALGESIA SYRINGE INTRAVENOUS EVERY 5 MIN PRN
Status: DISCONTINUED | OUTPATIENT
Start: 2022-05-10 | End: 2022-05-10 | Stop reason: HOSPADM

## 2022-05-10 RX ORDER — LABETALOL HYDROCHLORIDE 5 MG/ML
10 INJECTION, SOLUTION INTRAVENOUS
Status: DISCONTINUED | OUTPATIENT
Start: 2022-05-10 | End: 2022-05-10 | Stop reason: HOSPADM

## 2022-05-10 RX ORDER — FENTANYL CITRATE 50 UG/ML
25 INJECTION, SOLUTION INTRAMUSCULAR; INTRAVENOUS EVERY 5 MIN PRN
Status: DISCONTINUED | OUTPATIENT
Start: 2022-05-10 | End: 2022-05-10 | Stop reason: HOSPADM

## 2022-05-10 RX ORDER — ACETAMINOPHEN 325 MG/1
975 TABLET ORAL ONCE
Status: COMPLETED | OUTPATIENT
Start: 2022-05-10 | End: 2022-05-10

## 2022-05-10 RX ADMIN — FENTANYL CITRATE 50 MCG: 50 INJECTION, SOLUTION INTRAMUSCULAR; INTRAVENOUS at 08:18

## 2022-05-10 RX ADMIN — PROPOFOL 150 MG: 10 INJECTION, EMULSION INTRAVENOUS at 08:18

## 2022-05-10 RX ADMIN — PROPOFOL 30 MG: 10 INJECTION, EMULSION INTRAVENOUS at 08:30

## 2022-05-10 RX ADMIN — SODIUM CHLORIDE, POTASSIUM CHLORIDE, SODIUM LACTATE AND CALCIUM CHLORIDE: 600; 310; 30; 20 INJECTION, SOLUTION INTRAVENOUS at 08:10

## 2022-05-10 RX ADMIN — ACETAMINOPHEN 975 MG: 325 TABLET ORAL at 07:32

## 2022-05-10 RX ADMIN — ONDANSETRON 4 MG: 2 INJECTION INTRAMUSCULAR; INTRAVENOUS at 08:38

## 2022-05-10 RX ADMIN — PHENYLEPHRINE HYDROCHLORIDE 100 MCG: 10 INJECTION INTRAVENOUS at 08:26

## 2022-05-10 RX ADMIN — LIDOCAINE HYDROCHLORIDE 80 MG: 20 INJECTION, SOLUTION INFILTRATION; PERINEURAL at 08:18

## 2022-05-10 RX ADMIN — Medication 5 MG: at 08:24

## 2022-05-10 RX ADMIN — PHENYLEPHRINE HYDROCHLORIDE 100 MCG: 10 INJECTION INTRAVENOUS at 08:27

## 2022-05-10 RX ADMIN — PROPOFOL 20 MG: 10 INJECTION, EMULSION INTRAVENOUS at 08:29

## 2022-05-10 RX ADMIN — DEXAMETHASONE SODIUM PHOSPHATE 4 MG: 4 INJECTION, SOLUTION INTRA-ARTICULAR; INTRALESIONAL; INTRAMUSCULAR; INTRAVENOUS; SOFT TISSUE at 08:18

## 2022-05-10 RX ADMIN — Medication 5 MG: at 08:26

## 2022-05-10 RX ADMIN — Medication 100 MG: at 08:18

## 2022-05-10 RX ADMIN — NOREPINEPHRINE BITARTRATE 12.8 MCG: 1 INJECTION, SOLUTION, CONCENTRATE INTRAVENOUS at 08:27

## 2022-05-10 ASSESSMENT — ENCOUNTER SYMPTOMS: DYSRHYTHMIAS: 1

## 2022-05-10 NOTE — DISCHARGE INSTRUCTIONS
[facility], [place]  Same-Day EGD Procedure  Adult Discharge Orders & Instructions     You had a procedure known as an Esophagogastroduodenoscopy (EGD) of either the upper gastrointestinal (GI) tract. An UPPER EGD will place a camera into either your mouth or nose to examine your esophagus, stomach, and/or small intestines. Biopsies, small samples of tissue, are often taken to help diagnose and/or classify stages of disease growth. The EGD is also used to help locate areas of the GI tract that may require further treatment (dilation, stenting, clipping, removal, etc.) or medical interventions (medication specific).      After your procedure   Make sure to clarify with your healthcare provider any diet restrictions (For example, clear liquid, low fat, no caffeine, etc.)   Do NOT take aspirin containing medications or any other blood-thinning medicines (anticoagulants) until your healthcare provider says it's OK.   You MAY be prescribed antibiotics, depending on what was done and/or found during your EUS, make sure to take antibiotics as prescribed by your healthcare provider    For 24 hours after surgery  Get plenty of rest.  A responsible adult must stay with you for at least 24 hours after you leave the hospital.   Do not drive or use heavy equipment.  If you have weakness or tingling, don't drive or use heavy equipment until this feeling goes away.  Do not drink alcohol.  Avoid strenuous or risky activities (gym, yoga, cycling, etc.).  Ask for help when climbing stairs.   You may feel lightheaded.  IF so, sit for a few minutes before standing.  Have someone help you get up.   If you have nausea (feel sick to your stomach): Drink only clear liquids such as apple juice, ginger ale, broth or 7-Up.  Rest may also help.  Be sure to drink enough fluids.  Move to a regular diet as you feel able.  If you feel bloated or have too much gas, use a heating pad on your belly to help reduce the  discomfort. This should help you feel better.   You may have a slight fever. This is normal for the first 24 hours.   You may have a dry mouth, a sore throat, muscle aches or trouble sleeping.  These are normal and will go away after 24 hours. A sore throat is most common. Use lozenges or gargle with salt water to ease the discomfort.   Do not make important or legal decisions.      Call your doctor for any of the following:  Chest pain, and/or shortness of breath  Abdominal  pain, bloating or cramping that has not improved or does not respond to pain reliving medications (Tylenol or narcotics if prescribed)   Difficulty swallowing or feeling as though food or liquids are stuck in your throat   Sore throat lasting more than 2 days or pain that has worsened over time   Black or tarry stools   Nausea and/or vomiting that is not resolving or has not responded to anti-nausea medications prescribed to you   It has been over 8 to 10 hours since surgery and you are still not able to urinate (pass water)   Headache for over 24 hours   Fever over 100.5 F (38 C) lasting more than 24 hours after the procedure   Signs of jaundice or blockage (fever, chills, abdominal pain, yellowing of the whites of your eyes, yellowing of your skin, and/or passing darker than normal urine)     To contact a doctor, call:   [ ] Dr. Albarado's clinic number at [phone]  (Monday thru Friday 8:00am to 4:30pm)   [ ] [phone] and ask for the Thoracic or Gastroenterology resident on call (answered 24 hours a day)   [ ] Emergency Department: [facility]: [phone]   Take it easy when you get home.  Remember, same day surgery DOES NOT MEAN SAME DAY RECOVERY!  Healing is a gradual process.  You will need some time to recover - you may be more tired than you realize at first.  Rest and relax for at least the first 24 hours at home.  You'll feel better and heal faster if you take good care of yourself.

## 2022-05-10 NOTE — ANESTHESIA PREPROCEDURE EVALUATION
Anesthesia Pre-Procedure Evaluation    Patient: Myrtle Vaz   MRN: [id number] : 1945        Procedure : Procedure(s):  ESOPHAGOGASTRODUODENOSCOPY, WITH DILATION          Past Medical History:   Diagnosis Date     Depression      History of atrial fibrillation      HLD (hyperlipidemia)      HTN (hypertension)      Hypothyroidism       Past Surgical History:   Procedure Laterality Date     BRONCHOSCOPY (RIGID OR FLEXIBLE), DIAGNOSTIC N/A 2019    Procedure: BRONCHOSCOPY, WITH BAL;  Surgeon: Ally Ybarra MD;  Location: [facility]     COMBINED ESOPHAGOSCOPY, GASTROSCOPY, DUODENOSCOPY (EGD), REPLACE ESOPHAGEAL STENT N/A 2019    Procedure: ESOPHAGOGASTRODUODENOSCOPY WITH ESOPHAGEAL STENT REPLACEMENT, IRRIGATION AND DEBRIDEMENT OF LEFT NECK, WOUND VAC PLACEMENT;  Surgeon: Harley Murguia MD;  Location: [facility] OR     COMBINED ESOPHAGOSCOPY, GASTROSCOPY, DUODENOSCOPY (EGD), REPLACE ESOPHAGEAL STENT N/A 6/10/2019    Procedure: Esophagogastroduodenoscopy and Stent Removal;  Surgeon: Ally Ybarra MD;  Location: [facility] OR     ESOPHAGOGASTRECTOMY N/A 2019    Procedure: Redo Laparotomy SUBSTERNAL Esophagogastrostomy, Pharyngostomy, Intraoperative EGD, PARTIAL STERNOTOMY, LEFT PHARYNGOSTOMY,;  Surgeon: Temitope Bullock MD;  Location: [facility] OR     ESOPHAGOSCOPY, GASTROSCOPY, DUODENOSCOPY (EGD), COMBINED N/A 2018    Procedure: COMBINED ESOPHAGOSCOPY, GASTROSCOPY, DUODENOSCOPY (EGD);  Surgeon: Temitope Bullock MD;  Location: [facility] OR     ESOPHAGOSCOPY, GASTROSCOPY, DUODENOSCOPY (EGD), DILATATION, COMBINED N/A 10/10/2019    Procedure: Esophagogastroduodenoscopy with Botox injection of the Pylorus;  Surgeon: Temitope Bullock MD;  Location: [facility]     HERNIORRHAPHY HIATAL  2018     HYSTERECTOMY       HYSTERECTOMY  1998     IRRIGATION AND DEBRIDEMENT CHEST WASHOUT, COMBINED N/A 10/10/2019    Procedure: Chest wound exploration;  Surgeon: Temitope Bullock MD;  Location: [facility] OR     LAPAROSCOPIC  HERNIORRHAPHY HIATAL N/A 11/21/2018    Procedure: Midline laparotomy, Trans-hiatal esophagogasterectomy, gastrostomy, J-tube placement, G-tube placement, bilateral pleural chest tube placement, mediastinal abcess drainage, spit fistula creation, Esophagastrodueodenoscopy;  Surgeon: Temitope Bullock MD;  Location: [facility]     OOPHORECTOMY Bilateral 1998     ROTATOR CUFF REPAIR RT/LT       THYROIDECTOMY       for hyperthroidism     ZZC TOTAL ABDOM HYSTERECTOMY      Description: Total Abdominal Hysterectomy;  Recorded: 08/31/2012;      Allergies   Allergen Reactions     Gabapentin Other (See Comments)      Social History     Tobacco Use     Smoking status: Former Smoker     Years: 60.00     Types: Cigarettes     Quit date: 11/2018     Years since quitting: 3.5     Smokeless tobacco: Never Used   Substance Use Topics     Alcohol use: No      Wt Readings from Last 1 Encounters:   05/10/22 61.1 kg (134 lb 11.2 oz)        Anesthesia Evaluation   Pt has had prior anesthetic.     No history of anesthetic complications       ROS/MED HX  ENT/Pulmonary:  - neg pulmonary ROS     Neurologic:  - neg neurologic ROS     Cardiovascular:     (+) hypertension-----dysrhythmias (history of paroxysmal afib, in SR currently), Previous cardiac testing   Echo: Date: 2018 Results:   Echo 2018-11-13 16:07. Sinus rhythm. Technically difficult study      despite contrast use.      1. Normal LV size and wall thickness. Estimated LVEF 75%. Grade 1      diastolic dysfunction.    2. Normal RV size and systolic function.    3. No valvular heart disease.    4. No previous studies for comparison.     Stress Test: Date: Results:    ECG Reviewed: Date: Results:    Cath: Date: Results:      METS/Exercise Tolerance:     Hematologic:  - neg hematologic  ROS     Musculoskeletal:       GI/Hepatic: Comment: Hiatal hernia repair complicated by esophageal perforation s/p esophagectomy, now s/p esophagogastrostomy      Renal/Genitourinary:  - neg Renal ROS      Endo:     (+) thyroid problem, hypothyroidism,     Psychiatric/Substance Use:  - neg psychiatric ROS     Infectious Disease:  - neg infectious disease ROS     Malignancy:  - neg malignancy ROS     Other:               OUTSIDE LABS:  CBC:   Lab Results   Component Value Date    WBC 8.7 03/05/2020    WBC 7.2 06/19/2019    HGB 12.8 03/05/2020    HGB 11.3 (L) 10/10/2019    HCT 41.0 03/05/2020    HCT 33.4 (L) 06/19/2019     (H) 03/05/2020     (H) 06/19/2019     BMP:   Lab Results   Component Value Date     05/03/2022     09/09/2021    POTASSIUM 3.9 05/03/2022    POTASSIUM 4.1 09/09/2021    CHLORIDE 104 05/03/2022    CHLORIDE 107 09/09/2021    CO2 26 05/03/2022    CO2 24 09/09/2021    BUN 20 05/03/2022    BUN 21 09/09/2021    CR 0.82 05/03/2022    CR 0.93 09/09/2021    GLC 65 (L) 05/10/2022    GLC 79 05/03/2022     COAGS:   Lab Results   Component Value Date    PTT 47 (H) 04/28/2019    INR 1.09 10/10/2019     POC:   Lab Results   Component Value Date     (H) 10/11/2019     HEPATIC:   Lab Results   Component Value Date    ALBUMIN 3.3 (L) 09/09/2021    PROTTOTAL 6.8 09/09/2021    ALT 22 09/09/2021    AST 27 09/09/2021    ALKPHOS 113 09/09/2021    BILITOTAL 0.2 09/09/2021     OTHER:   Lab Results   Component Value Date    PH 7.35 04/28/2019    LACT 1.1 05/06/2019    JOSE 8.3 (L) 05/03/2022    PHOS 3.9 12/03/2020    MAG 2.3 05/21/2019    LIPASE 43 (L) 02/11/2019    TSH 32.32 (H) 05/03/2022    CRP 24.8 (H) 06/19/2019     (H) 06/19/2019       Anesthesia Plan    ASA Status:  3   NPO Status:  NPO Appropriate    Anesthesia Type: General.     - Airway: ETT   Induction: Intravenous, RSI.   Maintenance: Inhalation.   Techniques and Equipment:     - Lines/Monitors: BIS     Consents    Anesthesia Plan(s) and associated risks, benefits, and realistic alternatives discussed. Questions answered and patient/representative(s) expressed understanding.    - Discussed:     - Discussed with:  Patient          Postoperative Care    Pain management: IV analgesics, Oral pain medications.   PONV prophylaxis: Ondansetron (or other 5HT-3), Dexamethasone or Solumedrol     Comments:                Trudy Whyte MD

## 2022-05-10 NOTE — ANESTHESIA PROCEDURE NOTES
Airway       Patient location during procedure: OR       Procedure Start/Stop Times: 5/10/2022 8:20 AM  Staff -        CRNA: Mk Orr APRN CRNA       Other Anesthesia Staff: Misty Salter       Performed By: MICHAEL and with CRNAs       Procedure performed by resident/fellow/CRNA in presence of a teaching physician.    Consent for Airway        Urgency: elective  Indications and Patient Condition       Indications for airway management: gonzalez-procedural       Induction type:RSI      Final Airway Details       Final airway type: endotracheal airway       Successful airway: ETT - single  Endotracheal Airway Details        ETT size (mm): 7.0       Cuffed: yes       Cuff volume (mL): 8       Successful intubation technique: direct laryngoscopy and video laryngoscopy       VL Blade Size: MAC 3       Grade View of Cords: 1       Adjucts: stylet       Position: Right       Measured from: gums/teeth       Secured at (cm): 21       Bite block used: None    Post intubation assessment        Placement verified by: capnometry, equal breath sounds and chest rise        Number of attempts at approach: 1       Number of other approaches attempted: 0       Secured with: silk tape       Ease of procedure: easy       Dentition: Intact and Unchanged    Medication(s) Administered   Medication Administration Time: 5/10/2022 8:20 AM

## 2022-05-10 NOTE — OR NURSING
Dr. Whyte notified patient ready for discharge to Phase II.  Dr. Whyte stated she will enter anesthesia sign out.

## 2022-05-10 NOTE — OR NURSING
Bilateral upper thigh, groin, and abdomen blotchy rash faintly present upon discharge from PACU.

## 2022-05-10 NOTE — BRIEF OP NOTE
[facility]    Brief Operative Note    Pre-operative diagnosis: Esophageal stricture  Post-operative diagnosis Same as pre-operative diagnosis    Procedure: Procedure(s):  ESOPHAGOGASTRODUODENOSCOPY, WITH DILATION  Surgeon: Surgeon(s) and Role:     * Temitope Bullock MD - Primary  Anesthesia: General   Estimated Blood Loss: None    Drains: None  Specimens: * No specimens in log *  Findings:   Successful dilation to 18F. No immediate concerns. .  Complications: None.  Implants: * No implants in log *

## 2022-05-10 NOTE — ANESTHESIA CARE TRANSFER NOTE
Patient: Myrtle Vaz    Procedure: Procedure(s):  ESOPHAGOGASTRODUODENOSCOPY, WITH DILATION       Diagnosis: Dysphagia, unspecified type [R13.10]  Diagnosis Additional Information: No value filed.    Anesthesia Type:   General     Note:    Oropharynx: spontaneously breathing  Level of Consciousness: awake  Oxygen Supplementation: nasal cannula  Level of Supplemental Oxygen (L/min / FiO2): 3  Independent Airway: airway patency satisfactory and stable    Vital Signs Stable: post-procedure vital signs reviewed and stable  Report to RN Given: handoff report given  Patient transferred to: PACU    Handoff Report: Identifed the Patient, Identified the Reponsible Provider, Reviewed the pertinent medical history, Discussed the surgical course, Reviewed Intra-OP anesthesia mangement and issues during anesthesia, Set expectations for post-procedure period and Allowed opportunity for questions and acknowledgement of understanding      Vitals:  Vitals Value Taken Time   /73 05/10/22 0850   Temp 97.8    Pulse 56 05/10/22 0851   Resp 10 05/10/22 0851   SpO2 99 % 05/10/22 0851   Vitals shown include unvalidated device data.    Electronically Signed By: NARCISO Pemberton CRNA  May 10, 2022  8:52 AM

## 2022-05-10 NOTE — OR NURSING
Notified Marcos EASON that patient had granola bar in phase II (and tolerated well) before liquid diet instructions put in, verified patient ok to go home if feeling ok and he doesn't need to see her.  Per Marcos EASON: instructed patient to have liquid diet the rest of the day and work up to thick/full liquids in evening (yogurt/soup).

## 2022-05-10 NOTE — OP NOTE
Procedure Date: 05/10/2022    PREOPERATIVE DIAGNOSES:    1.  History of esophagectomy and retrosternal gastric pull-up.  2.  Dysphagia secondary to anastomotic stricture.    POSTOPERATIVE DIAGNOSES:     1.  History of esophagectomy and retrosternal gastric pull-up.  2.  Dysphagia secondary to anastomotic stricture.    PROCEDURES PERFORMED:   1.  Esophagogastroduodenoscopy.  2.  Esophageal dilation from 15-18 mm using Savary dilators and fluoroscopy.  3.  Supervision and interpretation of intraoperative imaging.    ANESTHESIA:  General.    SURGEON:  Temitope Funes MD    ASSISTANT:  None.    COMPLICATIONS:  None.    ESTIMATED BLOOD LOSS:  Minimal.    OPERATIVE FINDINGS:  The anastomotic stricture was located at 30 cm from the incisors.  We used the pediatric scope for the beginning.  This was able to be advanced without difficulty.  The patient had moderate stenosis at the anastomosis.  There were no suspicious mucosal lesions.  The rest of the conduit was straight with minimal distal dilation, but not severe angulation.  The pylorus was traversed without difficulty with a pediatric scope.    We then dilated serially from 15-18 mm with minimal resistance.  Completion endoscopic view revealed no full-thickness injury.    DESCRIPTION OF PROCEDURE IN DETAIL:  The patient was taken to the operating room, laid supine.  General anesthesia was induced.  A formal timeout was carried out confirming the name of the patient and correct procedure.  She had SCDs in place and functioning prior to induction of anesthesia.  She received antibiotics within 30 minutes of incision.    After the timeout was complete, we started by introducing the pediatric scope into the esophagus and advanced to the level of the anastomosis, which was at 30 cm from the incisors.  We did not find any suspicious mucosal lesions.  We then advanced through the pylorus, left an Amplatz Super Stiff wire.  Using fluoroscopy, we then dilated serially  from 15 to 18 mm using Savary dilators.  Completion endoscopic view revealed no evidence of full-thickness injury.  The anastomosis was successfully dilated.  We then terminated the procedure.  All instrument and sponge counts were correct at the end of the case.  The patient was then extubated and transferred to PACU for recovery.    Temitope Funes MD        D: 05/10/2022   T: 05/10/2022   MT: BROOK    Name:     SHALINIAREN LINDSAYCORRINA ROSARIO  MRN:      [id number]        Account:        [id number]   :      1945           Procedure Date: 05/10/2022     Document: [id number]

## 2022-05-10 NOTE — OR NURSING
Dr. Brittney Ramirez notified patient has blotchy redness upper bilateral thighs, groin area and lower abdomen.  Patient denies pain or itching.  Plan: Monitor and if symptoms change prior to discharge, notify anesthesia.  Patient informed and agrees with plan of care.

## 2022-05-10 NOTE — ANESTHESIA POSTPROCEDURE EVALUATION
Patient: Myrtle Vaz    Procedure: Procedure(s):  ESOPHAGOGASTRODUODENOSCOPY, WITH DILATION       Anesthesia Type:  General    Note:  Disposition: Outpatient   Postop Pain Control: Uneventful            Sign Out: Well controlled pain   PONV: No   Neuro/Psych: Uneventful            Sign Out: Acceptable/Baseline neuro status   Airway/Respiratory: Uneventful            Sign Out: Acceptable/Baseline resp. status   CV/Hemodynamics: Uneventful            Sign Out: Acceptable CV status   Other NRE: NONE   DID A NON-ROUTINE EVENT OCCUR? No           Last vitals:  Vitals Value Taken Time   /70 05/10/22 0930   Temp 36.2  C (97.1  F) 05/10/22 0915   Pulse 55 05/10/22 0935   Resp 18 05/10/22 0935   SpO2 94 % 05/10/22 0935   Vitals shown include unvalidated device data.    Electronically Signed By: Trudy Whyte MD  May 10, 2022  9:35 AM

## 2022-05-21 ENCOUNTER — HEALTH MAINTENANCE LETTER (OUTPATIENT)
Age: 77
End: 2022-05-21

## 2022-06-08 ENCOUNTER — LAB REQUISITION (OUTPATIENT)
Dept: LAB | Facility: CLINIC | Age: 77
End: 2022-06-08
Payer: MEDICARE

## 2022-06-08 DIAGNOSIS — E03.8 OTHER SPECIFIED HYPOTHYROIDISM: ICD-10-CM

## 2022-06-08 LAB — TSH SERPL DL<=0.005 MIU/L-ACNC: 55.62 UIU/ML (ref 0.3–5)

## 2022-06-08 PROCEDURE — 84443 ASSAY THYROID STIM HORMONE: CPT | Mod: ORL | Performed by: STUDENT IN AN ORGANIZED HEALTH CARE EDUCATION/TRAINING PROGRAM

## 2022-06-26 NOTE — TELEPHONE ENCOUNTER
Thank you, I have canceled with the OR.    Aracelis Solomon  PeriOperative Surgical Coordinator  [phone]               This was a shared visit with the NOHEMY. I reviewed and verified the documentation and independently performed the documented:

## 2022-09-02 ENCOUNTER — LAB REQUISITION (OUTPATIENT)
Dept: LAB | Facility: CLINIC | Age: 77
End: 2022-09-02
Payer: MEDICARE

## 2022-09-02 DIAGNOSIS — E03.8 OTHER SPECIFIED HYPOTHYROIDISM: ICD-10-CM

## 2022-09-02 PROCEDURE — 84443 ASSAY THYROID STIM HORMONE: CPT | Mod: ORL | Performed by: STUDENT IN AN ORGANIZED HEALTH CARE EDUCATION/TRAINING PROGRAM

## 2022-09-03 LAB — TSH SERPL DL<=0.005 MIU/L-ACNC: 61.5 UIU/ML (ref 0.3–4.2)

## 2022-09-18 ENCOUNTER — HEALTH MAINTENANCE LETTER (OUTPATIENT)
Age: 77
End: 2022-09-18

## 2022-10-27 DIAGNOSIS — K22.2 ESOPHAGEAL STRICTURE: Primary | ICD-10-CM

## 2022-10-27 DIAGNOSIS — R13.10 DYSPHAGIA, UNSPECIFIED TYPE: ICD-10-CM

## 2022-11-01 NOTE — PROGRESS NOTES
THORACIC SURGERY FOLLOW UP VISIT     I saw Ms. Myrtle Vaz in follow-up today. The clinical summary follows:     DIAGNOSIS  Esophageal perforation following hiatal hernia repair     PROCEDURES  11/21/18: Re-do laparotomy, takedown of Nissen fundoplication, prosthetic mesh explantation, transhiatal drainage of mediastinal abscess, transhiatal partial esophagectomy for perforation, G/J tubes, cervical esophagostomy  4/26/19: redo laparotomy, takedown of cervical esophagostomy with retrosternal esophagogastrostomy  5/6/19: neck debridement, esophageal stent placement  6/10/19: EGD, esophageal stent removal  10/10/19 Local wound exploration of the sternum and removal of foreign body.   5/10/22 EGD with dilation of esophageal stricture     HISTOPATHOLOGY   10/10/2019  Soft tissue, Chest wall, biopsy:   - Skin and subcutaneous tissue with scarring, chronic inflammation, and   hemosiderin-laden macrophages     COMPLICATIONS  Anastomotic leak after retrosternal gastric pull up   Chronic draining sinus requiring local wound exploration.      INTERVAL STUDIES  Esophagram 11/02/22:   Delayed emptying of gastric conduit with some distal narrowing.        Past Medical History:   Diagnosis Date     Depression      History of atrial fibrillation      HLD (hyperlipidemia)      HTN (hypertension)      Hypothyroidism      Past Surgical History:   Procedure Laterality Date     BRONCHOSCOPY (RIGID OR FLEXIBLE), DIAGNOSTIC N/A 5/2/2019    Procedure: BRONCHOSCOPY, WITH BAL;  Surgeon: Ally Ybarra MD;  Location: [facility]     COMBINED ESOPHAGOSCOPY, GASTROSCOPY, DUODENOSCOPY (EGD), REPLACE ESOPHAGEAL STENT N/A 5/6/2019    Procedure: ESOPHAGOGASTRODUODENOSCOPY WITH ESOPHAGEAL STENT REPLACEMENT, IRRIGATION AND DEBRIDEMENT OF LEFT NECK, WOUND VAC PLACEMENT;  Surgeon: Harley Murguia MD;  [facility]     COMBINED ESOPHAGOSCOPY, GASTROSCOPY, DUODENOSCOPY (EGD), REPLACE ESOPHAGEAL STENT N/A 6/10/2019    Procedure:  Esophagogastroduodenoscopy and Stent Removal;  Surgeon: Ally Ybarra MD;  [facility]     ESOPHAGOGASTRECTOMY N/A 4/26/2019    Procedure: Redo Laparotomy SUBSTERNAL Esophagogastrostomy, Pharyngostomy, Intraoperative EGD, PARTIAL STERNOTOMY, LEFT PHARYNGOSTOMY,;  Surgeon: Temitope Bullock MD;  [facility]     ESOPHAGOSCOPY, GASTROSCOPY, DUODENOSCOPY (EGD), COMBINED N/A 11/21/2018    Procedure: COMBINED ESOPHAGOSCOPY, GASTROSCOPY, DUODENOSCOPY (EGD);  Surgeon: Temitope Bullock MD;  [facility]     ESOPHAGOSCOPY, GASTROSCOPY, DUODENOSCOPY (EGD), DILATATION, COMBINED N/A 10/10/2019    Procedure: Esophagogastroduodenoscopy with Botox injection of the Pylorus;  Surgeon: Temitope Bullock MD;  [facility]     ESOPHAGOSCOPY, GASTROSCOPY, DUODENOSCOPY (EGD), DILATATION, COMBINED N/A 5/10/2022    Procedure: ESOPHAGOGASTRODUODENOSCOPY, WITH DILATION;  Surgeon: Temitope Bullock MD;  [facility]     HERNIORRHAPHY HIATAL  11/2018     HYSTERECTOMY       HYSTERECTOMY  1998     IRRIGATION AND DEBRIDEMENT CHEST WASHOUT, COMBINED N/A 10/10/2019    Procedure: Chest wound exploration;  Surgeon: Temitope Bullock MD;  [facility]     LAPAROSCOPIC HERNIORRHAPHY HIATAL N/A 11/21/2018    Procedure: Midline laparotomy, Trans-hiatal esophagogasterectomy, gastrostomy, J-tube placement, G-tube placement, bilateral pleural chest tube placement, mediastinal abcess drainage, spit fistula creation, Esophagastrodueodenoscopy;  Surgeon: Temitope Bullock MD;  [facility]     OOPHORECTOMY Bilateral 1998     ROTATOR CUFF REPAIR RT/LT       THYROIDECTOMY       for hyperthroidism     ZZC TOTAL ABDOM HYSTERECTOMY      Description: Total Abdominal Hysterectomy;  Recorded: 08/31/2012;           Allergies   Allergen Reactions     Gabapentin Other (See Comments)            Current Outpatient Medications   Medication     albuterol (PROAIR HFA/PROVENTIL HFA/VENTOLIN HFA) 108 (90 Base) MCG/ACT  inhaler     amiodarone (PACERONE/CODARONE) 200 MG tablet     aspirin-acetaminophen-caffeine (EXCEDRIN MIGRAINE) 250-250-65 MG tablet     biotin (MERIBIN) 5 MG CAPS     HYDROcodone-acetaminophen (NORCO) 5-325 MG tablet     levothyroxine (SYNTHROID/LEVOTHROID) 125 MCG tablet     levothyroxine (SYNTHROID/LEVOTHROID) 150 MCG tablet     Multiple Vitamins-Minerals (PRESERVISION/LUTEIN) CAPS     omeprazole (PRILOSEC) 20 MG DR capsule     oxybutynin (DITROPAN) 5 MG tablet     potassium chloride ER (KLOR-CON M) 20 MEQ CR tablet     traMADol (ULTRAM) 50 MG tablet     venlafaxine (EFFEXOR) 75 MG tablet      No current facility-administered medications for this visit.          Facility-Administered Medications Ordered in Other Visits   Medication     barium sulfate (EZ-DISK) tablet 700 mg     heparin lock flush 10 UNIT/ML injection 5-10 mL     heparin lock flush 10 UNIT/ML injection 5-10 mL     lidocaine (LMX4) cream     lidocaine 1 % 0.1-1 mL     sodium chloride (PF) 0.9% PF flush 10-20 mL         ETOH none   TOB none      Exam  There were no vitals taken for this visit.  Alert and oriented. NAD  Bilateral breath sounds.   Chest scar well healed.      SAUL Kaur comes to clinic with swallowing issues. She feels food is getting stuck causing her to cough. The feels resolves with time. However her appetite has been low due to her difficulty swallowing. Denies any reflux. She also expressed trouble with her memory recently.         From a personal perspective, she comes to clinic with her daughter Haydee.      IMPRESSION   76 year old female patient with history of esophageal injury requiring esophagectomy, now 3.5 years after esophagostomy closure.      PLAN  I spent 60 min on the date of the encounter in chart review, patient visit, review of tests, documentation and/or discussion with other providers about the issues documented above. I reviewed the plan as follows:    Procedure planned: EGD, possible esophageal  dilation  Necessary tests and appointments:  H&P by PCP  Recommended she also see her PCP to address these memory issues. The patient and daughter agreed with this plan.    All questions were answered and the patient and present family were in agreement with the plan.  I appreciate the opportunity to participate in the care of your patient and will keep you updated.    Sincerely,     Temitope Randle MD

## 2022-11-02 ENCOUNTER — OFFICE VISIT (OUTPATIENT)
Dept: SURGERY | Facility: CLINIC | Age: 77
End: 2022-11-02
Attending: THORACIC SURGERY (CARDIOTHORACIC VASCULAR SURGERY)
Payer: MEDICARE

## 2022-11-02 ENCOUNTER — ANCILLARY PROCEDURE (OUTPATIENT)
Dept: GENERAL RADIOLOGY | Facility: CLINIC | Age: 77
End: 2022-11-02
Attending: CLINICAL NURSE SPECIALIST
Payer: MEDICARE

## 2022-11-02 VITALS
HEART RATE: 58 BPM | DIASTOLIC BLOOD PRESSURE: 80 MMHG | WEIGHT: 138.3 LBS | SYSTOLIC BLOOD PRESSURE: 176 MMHG | HEIGHT: 65 IN | BODY MASS INDEX: 23.04 KG/M2 | TEMPERATURE: 97.4 F | RESPIRATION RATE: 16 BRPM | OXYGEN SATURATION: 97 %

## 2022-11-02 DIAGNOSIS — R13.10 DYSPHAGIA, UNSPECIFIED TYPE: ICD-10-CM

## 2022-11-02 DIAGNOSIS — K22.2 ESOPHAGEAL STRICTURE: Primary | ICD-10-CM

## 2022-11-02 PROCEDURE — 99214 OFFICE O/P EST MOD 30 MIN: CPT | Performed by: THORACIC SURGERY (CARDIOTHORACIC VASCULAR SURGERY)

## 2022-11-02 PROCEDURE — G0463 HOSPITAL OUTPT CLINIC VISIT: HCPCS

## 2022-11-02 PROCEDURE — 74220 X-RAY XM ESOPHAGUS 1CNTRST: CPT | Mod: GC | Performed by: RADIOLOGY

## 2022-11-02 ASSESSMENT — PAIN SCALES - GENERAL: PAINLEVEL: NO PAIN (1)

## 2022-11-02 NOTE — NURSING NOTE
"Oncology Rooming Note    November 2, 2022 3:39 PM   Myrtle Vaz is a 77 year old female who presents for:    Chief Complaint   Patient presents with     Oncology Clinic Visit     UMP RETURN - ESOPHAGEAL STRICTURE - DIFFICULTY SWALLOWING     Initial Vitals: BP (!) 186/99   Pulse 58   Temp 97.4  F (36.3  C) (Tympanic)   Resp 16   Ht 1.651 m (5' 5\")   Wt 62.7 kg (138 lb 4.8 oz)   SpO2 97%   BMI 23.01 kg/m   Estimated body mass index is 23.01 kg/m  as calculated from the following:    Height as of this encounter: 1.651 m (5' 5\").    Weight as of this encounter: 62.7 kg (138 lb 4.8 oz). Body surface area is 1.7 meters squared.  No Pain (1) Comment: bothersome   No LMP recorded. Patient has had a hysterectomy.  Allergies reviewed: Yes  Medications reviewed: Yes    Medications: Medication refills not needed today.  Pharmacy name entered into [employer]: [id number] - [place], [place] - [address] AT [facility] [address]    Clinical concerns: Blood pressure 186/99.      Adilson Blevins LPN            "

## 2022-11-02 NOTE — LETTER
11/2/2022         RE: Myrtle Vaz  [address] [facility] [address]        Dear Colleague,    Thank you for referring your patient, Myrtle Vaz, to the [facility]. Please see a copy of my visit note below.    THORACIC SURGERY FOLLOW UP VISIT     I saw Ms. Myrtle Vaz in follow-up today. The clinical summary follows:     DIAGNOSIS  Esophageal perforation following hiatal hernia repair     PROCEDURES  11/21/18: Re-do laparotomy, takedown of Nissen fundoplication, prosthetic mesh explantation, transhiatal drainage of mediastinal abscess, transhiatal partial esophagectomy for perforation, G/J tubes, cervical esophagostomy  4/26/19: redo laparotomy, takedown of cervical esophagostomy with retrosternal esophagogastrostomy  5/6/19: neck debridement, esophageal stent placement  6/10/19: EGD, esophageal stent removal  10/10/19 Local wound exploration of the sternum and removal of foreign body.   5/10/22 EGD with dilation of esophageal stricture     HISTOPATHOLOGY   10/10/2019  Soft tissue, Chest wall, biopsy:   - Skin and subcutaneous tissue with scarring, chronic inflammation, and   hemosiderin-laden macrophages     COMPLICATIONS  Anastomotic leak after retrosternal gastric pull up   Chronic draining sinus requiring local wound exploration.      INTERVAL STUDIES  Esophagram 11/02/22:   Delayed emptying of gastric conduit with some distal narrowing.        Past Medical History:   Diagnosis Date     Depression      History of atrial fibrillation      HLD (hyperlipidemia)      HTN (hypertension)      Hypothyroidism      Past Surgical History:   Procedure Laterality Date     BRONCHOSCOPY (RIGID OR FLEXIBLE), DIAGNOSTIC N/A 5/2/2019    Procedure: BRONCHOSCOPY, WITH BAL;  Surgeon: Ally Ybarra MD;  Location:  [facility]     COMBINED ESOPHAGOSCOPY, GASTROSCOPY, DUODENOSCOPY (EGD), REPLACE ESOPHAGEAL STENT N/A 5/6/2019    Procedure: ESOPHAGOGASTRODUODENOSCOPY WITH ESOPHAGEAL STENT REPLACEMENT,  IRRIGATION AND DEBRIDEMENT OF LEFT NECK, WOUND VAC PLACEMENT;  Surgeon: Harley Murguia MD;  [facility]     COMBINED ESOPHAGOSCOPY, GASTROSCOPY, DUODENOSCOPY (EGD), REPLACE ESOPHAGEAL STENT N/A 6/10/2019    Procedure: Esophagogastroduodenoscopy and Stent Removal;  Surgeon: Ally Ybarra MD;  [facility]     ESOPHAGOGASTRECTOMY N/A 4/26/2019    Procedure: Redo Laparotomy SUBSTERNAL Esophagogastrostomy, Pharyngostomy, Intraoperative EGD, PARTIAL STERNOTOMY, LEFT PHARYNGOSTOMY,;  Surgeon: Temitope Bullock MD;  [facility]     ESOPHAGOSCOPY, GASTROSCOPY, DUODENOSCOPY (EGD), COMBINED N/A 11/21/2018    Procedure: COMBINED ESOPHAGOSCOPY, GASTROSCOPY, DUODENOSCOPY (EGD);  Surgeon: Temitope Bullock MD;  [facility]     ESOPHAGOSCOPY, GASTROSCOPY, DUODENOSCOPY (EGD), DILATATION, COMBINED N/A 10/10/2019    Procedure: Esophagogastroduodenoscopy with Botox injection of the Pylorus;  Surgeon: Temitope Bullock MD;  [facility]     ESOPHAGOSCOPY, GASTROSCOPY, DUODENOSCOPY (EGD), DILATATION, COMBINED N/A 5/10/2022    Procedure: ESOPHAGOGASTRODUODENOSCOPY, WITH DILATION;  Surgeon: Temitope Bullock MD;  [facility]     HERNIORRHAPHY HIATAL  11/2018     HYSTERECTOMY       HYSTERECTOMY  1998     IRRIGATION AND DEBRIDEMENT CHEST WASHOUT, COMBINED N/A 10/10/2019    Procedure: Chest wound exploration;  Surgeon: Temitope Bullock MD;  [facility]     LAPAROSCOPIC HERNIORRHAPHY HIATAL N/A 11/21/2018    Procedure: Midline laparotomy, Trans-hiatal esophagogasterectomy, gastrostomy, J-tube placement, G-tube placement, bilateral pleural chest tube placement, mediastinal abcess drainage, spit fistula creation, Esophagastrodueodenoscopy;  Surgeon: Temitope Bullock MD;  [facility]     OOPHORECTOMY Bilateral 1998     ROTATOR CUFF REPAIR RT/LT       THYROIDECTOMY       for hyperthroidism     ZZC TOTAL ABDOM HYSTERECTOMY      Description: Total Abdominal Hysterectomy;   Recorded: 08/31/2012;        Allergies   Allergen Reactions     Gabapentin Other (See Comments)            Current Outpatient Medications   Medication     albuterol (PROAIR HFA/PROVENTIL HFA/VENTOLIN HFA) 108 (90 Base) MCG/ACT inhaler     amiodarone (PACERONE/CODARONE) 200 MG tablet     aspirin-acetaminophen-caffeine (EXCEDRIN MIGRAINE) 250-250-65 MG tablet     biotin (MERIBIN) 5 MG CAPS     HYDROcodone-acetaminophen (NORCO) 5-325 MG tablet     levothyroxine (SYNTHROID/LEVOTHROID) 125 MCG tablet     levothyroxine (SYNTHROID/LEVOTHROID) 150 MCG tablet     Multiple Vitamins-Minerals (PRESERVISION/LUTEIN) CAPS     omeprazole (PRILOSEC) 20 MG DR capsule     oxybutynin (DITROPAN) 5 MG tablet     potassium chloride ER (KLOR-CON M) 20 MEQ CR tablet     traMADol (ULTRAM) 50 MG tablet     venlafaxine (EFFEXOR) 75 MG tablet      No current facility-administered medications for this visit.          Facility-Administered Medications Ordered in Other Visits   Medication     barium sulfate (EZ-DISK) tablet 700 mg     heparin lock flush 10 UNIT/ML injection 5-10 mL     heparin lock flush 10 UNIT/ML injection 5-10 mL     lidocaine (LMX4) cream     lidocaine 1 % 0.1-1 mL     sodium chloride (PF) 0.9% PF flush 10-20 mL      ETOH none   TOB none      Exam  There were no vitals taken for this visit.  Alert and oriented. NAD  Bilateral breath sounds.   Chest scar well healed.      SAUL Kaur comes to clinic with swallowing issues. She feels food is getting stuck causing her to cough. The feels resolves with time. However her appetite has been low due to her difficulty swallowing. Denies any reflux. She also expressed trouble with her memory recently.      From a personal perspective, she comes to clinic with her daughter Haydee.      IMPRESSION   76 year old female patient with history of esophageal injury requiring esophagectomy, now 3.5 years after esophagostomy closure.      PLAN  I spent 60 min on the date of the encounter in  chart review, patient visit, review of tests, documentation and/or discussion with other providers about the issues documented above. I reviewed the plan as follows:    Procedure planned: EGD, possible esophageal dilation  Necessary tests and appointments:  H&P by PCP  Recommended she also see her PCP to address these memory issues. The patient and daughter agreed with this plan.    All questions were answered and the patient and present family were in agreement with the plan.  I appreciate the opportunity to participate in the care of your patient and will keep you updated.    Sincerely,     Temitope Randle MD

## 2022-11-10 ENCOUNTER — PREP FOR PROCEDURE (OUTPATIENT)
Dept: SURGERY | Facility: CLINIC | Age: 77
End: 2022-11-10

## 2022-11-10 DIAGNOSIS — K22.2 ESOPHAGEAL STRICTURE: Primary | ICD-10-CM

## 2022-11-16 ENCOUNTER — TELEPHONE (OUTPATIENT)
Dept: SURGERY | Facility: CLINIC | Age: 77
End: 2022-11-16

## 2022-11-16 NOTE — TELEPHONE ENCOUNTER
Spoke with patient to schedule procedure with Dr. Temitope Funes   Procedure was scheduled on 12/06 at [facility]  Patient will have H&P with: Brigitte Torres     Patient is aware a COVID-19 test is needed before their procedure.   Patient will have a home test due to outpatient status    Patient is aware a / is needed day of surgery.   Surgery letter was sent via [employer], patient has my direct contact information for any further questions.

## 2022-12-01 ENCOUNTER — TRANSFERRED RECORDS (OUTPATIENT)
Dept: HEALTH INFORMATION MANAGEMENT | Facility: CLINIC | Age: 77
End: 2022-12-01

## 2022-12-01 ENCOUNTER — LAB REQUISITION (OUTPATIENT)
Dept: LAB | Facility: CLINIC | Age: 77
End: 2022-12-01
Payer: MEDICARE

## 2022-12-01 DIAGNOSIS — E03.8 OTHER SPECIFIED HYPOTHYROIDISM: ICD-10-CM

## 2022-12-01 LAB — PHQ9 SCORE: 8

## 2022-12-01 PROCEDURE — 84439 ASSAY OF FREE THYROXINE: CPT | Mod: ORL | Performed by: FAMILY MEDICINE

## 2022-12-01 PROCEDURE — 84443 ASSAY THYROID STIM HORMONE: CPT | Mod: ORL | Performed by: FAMILY MEDICINE

## 2022-12-02 LAB
T4 FREE SERPL-MCNC: 0.4 NG/DL (ref 0.9–1.7)
TSH SERPL DL<=0.005 MIU/L-ACNC: 73.7 UIU/ML (ref 0.3–4.2)

## 2022-12-05 NOTE — TELEPHONE ENCOUNTER
Spoke with daughter to re-schedule procedure with Dr. Temitope Funes   Procedure was scheduled on 01/17/2023 at [facility]  Patient will have H&P at [facility]    Patient is aware a COVID-19 test is needed before their procedure.   Patient will have a home test due to outpatient status    Patient is aware a / is needed day of surgery.   Surgery letter was sent via [employer], patient has my direct contact information for any further questions.     NOTE: thoracic surgery will still require COVID testing, daughter is aware.

## 2023-01-01 ENCOUNTER — LAB REQUISITION (OUTPATIENT)
Dept: LAB | Facility: CLINIC | Age: 78
End: 2023-01-01
Payer: MEDICARE

## 2023-01-01 DIAGNOSIS — E03.8 OTHER SPECIFIED HYPOTHYROIDISM: ICD-10-CM

## 2023-01-01 DIAGNOSIS — I10 ESSENTIAL (PRIMARY) HYPERTENSION: ICD-10-CM

## 2023-01-01 LAB
ANION GAP SERPL CALCULATED.3IONS-SCNC: 14 MMOL/L (ref 7–15)
BUN SERPL-MCNC: 20 MG/DL (ref 8–23)
CALCIUM SERPL-MCNC: 8.7 MG/DL (ref 8.8–10.2)
CHLORIDE SERPL-SCNC: 100 MMOL/L (ref 98–107)
CREAT SERPL-MCNC: 0.74 MG/DL (ref 0.51–0.95)
DEPRECATED HCO3 PLAS-SCNC: 27 MMOL/L (ref 22–29)
EGFRCR SERPLBLD CKD-EPI 2021: 83 ML/MIN/1.73M2
GLUCOSE SERPL-MCNC: 98 MG/DL (ref 70–99)
POTASSIUM SERPL-SCNC: 3.8 MMOL/L (ref 3.4–5.3)
SODIUM SERPL-SCNC: 141 MMOL/L (ref 135–145)
T4 FREE SERPL-MCNC: 2.46 NG/DL (ref 0.9–1.7)
TSH SERPL DL<=0.005 MIU/L-ACNC: 0.14 UIU/ML (ref 0.3–4.2)
TSH SERPL DL<=0.005 MIU/L-ACNC: 2.87 UIU/ML (ref 0.3–4.2)

## 2023-01-01 PROCEDURE — 84443 ASSAY THYROID STIM HORMONE: CPT | Mod: ORL | Performed by: PHYSICIAN ASSISTANT

## 2023-01-01 PROCEDURE — 80048 BASIC METABOLIC PNL TOTAL CA: CPT | Mod: ORL | Performed by: PHYSICIAN ASSISTANT

## 2023-01-01 PROCEDURE — 84443 ASSAY THYROID STIM HORMONE: CPT | Mod: ORL | Performed by: STUDENT IN AN ORGANIZED HEALTH CARE EDUCATION/TRAINING PROGRAM

## 2023-01-01 PROCEDURE — 84439 ASSAY OF FREE THYROXINE: CPT | Mod: ORL | Performed by: PHYSICIAN ASSISTANT

## 2023-01-05 ENCOUNTER — LAB REQUISITION (OUTPATIENT)
Dept: LAB | Facility: CLINIC | Age: 78
End: 2023-01-05
Payer: MEDICARE

## 2023-01-05 DIAGNOSIS — E03.8 OTHER SPECIFIED HYPOTHYROIDISM: ICD-10-CM

## 2023-01-05 LAB — TSH SERPL DL<=0.005 MIU/L-ACNC: 0.18 UIU/ML (ref 0.3–4.2)

## 2023-01-05 PROCEDURE — 84443 ASSAY THYROID STIM HORMONE: CPT | Mod: ORL | Performed by: STUDENT IN AN ORGANIZED HEALTH CARE EDUCATION/TRAINING PROGRAM

## 2023-01-17 ENCOUNTER — ANESTHESIA EVENT (OUTPATIENT)
Dept: SURGERY | Facility: AMBULATORY SURGERY CENTER | Age: 78
End: 2023-01-17
Payer: MEDICARE

## 2023-01-17 ENCOUNTER — HOSPITAL ENCOUNTER (OUTPATIENT)
Facility: AMBULATORY SURGERY CENTER | Age: 78
Discharge: HOME | End: 2023-01-17
Attending: THORACIC SURGERY (CARDIOTHORACIC VASCULAR SURGERY)
Payer: MEDICARE

## 2023-01-17 ENCOUNTER — ANCILLARY PROCEDURE (OUTPATIENT)
Dept: RADIOLOGY | Facility: AMBULATORY SURGERY CENTER | Age: 78
End: 2023-01-17
Attending: THORACIC SURGERY (CARDIOTHORACIC VASCULAR SURGERY)
Payer: MEDICARE

## 2023-01-17 ENCOUNTER — ANESTHESIA (OUTPATIENT)
Dept: SURGERY | Facility: AMBULATORY SURGERY CENTER | Age: 78
End: 2023-01-17
Payer: MEDICARE

## 2023-01-17 ENCOUNTER — TRANSFERRED RECORDS (OUTPATIENT)
Dept: SURGERY | Facility: AMBULATORY SURGERY CENTER | Age: 78
End: 2023-01-17

## 2023-01-17 VITALS
SYSTOLIC BLOOD PRESSURE: 157 MMHG | HEIGHT: 64 IN | DIASTOLIC BLOOD PRESSURE: 88 MMHG | TEMPERATURE: 97.1 F | OXYGEN SATURATION: 95 % | WEIGHT: 125 LBS | RESPIRATION RATE: 14 BRPM | HEART RATE: 79 BPM | BODY MASS INDEX: 21.34 KG/M2

## 2023-01-17 DIAGNOSIS — R52 PAIN: ICD-10-CM

## 2023-01-17 PROCEDURE — 43248 EGD GUIDE WIRE INSERTION: CPT | Performed by: THORACIC SURGERY (CARDIOTHORACIC VASCULAR SURGERY)

## 2023-01-17 PROCEDURE — 43248 EGD GUIDE WIRE INSERTION: CPT | Mod: GC | Performed by: THORACIC SURGERY (CARDIOTHORACIC VASCULAR SURGERY)

## 2023-01-17 RX ORDER — HYDROMORPHONE HYDROCHLORIDE 1 MG/ML
0.4 INJECTION, SOLUTION INTRAMUSCULAR; INTRAVENOUS; SUBCUTANEOUS EVERY 5 MIN PRN
Status: DISCONTINUED | OUTPATIENT
Start: 2023-01-17 | End: 2023-01-18 | Stop reason: HOSPADM

## 2023-01-17 RX ORDER — FENTANYL CITRATE 50 UG/ML
50 INJECTION, SOLUTION INTRAMUSCULAR; INTRAVENOUS EVERY 5 MIN PRN
Status: DISCONTINUED | OUTPATIENT
Start: 2023-01-17 | End: 2023-01-18 | Stop reason: HOSPADM

## 2023-01-17 RX ORDER — CEFAZOLIN SODIUM 2 G/50ML
2 SOLUTION INTRAVENOUS
Status: COMPLETED | OUTPATIENT
Start: 2023-01-17 | End: 2023-01-17

## 2023-01-17 RX ORDER — ONDANSETRON 2 MG/ML
INJECTION INTRAMUSCULAR; INTRAVENOUS PRN
Status: DISCONTINUED | OUTPATIENT
Start: 2023-01-17 | End: 2023-01-17

## 2023-01-17 RX ORDER — ONDANSETRON 4 MG/1
4 TABLET, ORALLY DISINTEGRATING ORAL EVERY 30 MIN PRN
Status: DISCONTINUED | OUTPATIENT
Start: 2023-01-17 | End: 2023-01-18 | Stop reason: HOSPADM

## 2023-01-17 RX ORDER — SODIUM CHLORIDE, SODIUM LACTATE, POTASSIUM CHLORIDE, CALCIUM CHLORIDE 600; 310; 30; 20 MG/100ML; MG/100ML; MG/100ML; MG/100ML
INJECTION, SOLUTION INTRAVENOUS CONTINUOUS
Status: DISCONTINUED | OUTPATIENT
Start: 2023-01-17 | End: 2023-01-18 | Stop reason: HOSPADM

## 2023-01-17 RX ORDER — DEXAMETHASONE SODIUM PHOSPHATE 4 MG/ML
INJECTION, SOLUTION INTRA-ARTICULAR; INTRALESIONAL; INTRAMUSCULAR; INTRAVENOUS; SOFT TISSUE PRN
Status: DISCONTINUED | OUTPATIENT
Start: 2023-01-17 | End: 2023-01-17

## 2023-01-17 RX ORDER — FENTANYL CITRATE 50 UG/ML
INJECTION, SOLUTION INTRAMUSCULAR; INTRAVENOUS PRN
Status: DISCONTINUED | OUTPATIENT
Start: 2023-01-17 | End: 2023-01-17

## 2023-01-17 RX ORDER — FENTANYL CITRATE 50 UG/ML
25 INJECTION, SOLUTION INTRAMUSCULAR; INTRAVENOUS
Status: DISCONTINUED | OUTPATIENT
Start: 2023-01-17 | End: 2023-01-18 | Stop reason: HOSPADM

## 2023-01-17 RX ORDER — FENTANYL CITRATE 50 UG/ML
25 INJECTION, SOLUTION INTRAMUSCULAR; INTRAVENOUS EVERY 5 MIN PRN
Status: DISCONTINUED | OUTPATIENT
Start: 2023-01-17 | End: 2023-01-18 | Stop reason: HOSPADM

## 2023-01-17 RX ORDER — HYDROMORPHONE HYDROCHLORIDE 1 MG/ML
0.2 INJECTION, SOLUTION INTRAMUSCULAR; INTRAVENOUS; SUBCUTANEOUS EVERY 5 MIN PRN
Status: DISCONTINUED | OUTPATIENT
Start: 2023-01-17 | End: 2023-01-18 | Stop reason: HOSPADM

## 2023-01-17 RX ORDER — PROPOFOL 10 MG/ML
INJECTION, EMULSION INTRAVENOUS CONTINUOUS PRN
Status: DISCONTINUED | OUTPATIENT
Start: 2023-01-17 | End: 2023-01-17

## 2023-01-17 RX ORDER — CEFAZOLIN SODIUM 2 G/50ML
2 SOLUTION INTRAVENOUS SEE ADMIN INSTRUCTIONS
Status: DISCONTINUED | OUTPATIENT
Start: 2023-01-17 | End: 2023-01-18 | Stop reason: HOSPADM

## 2023-01-17 RX ORDER — ONDANSETRON 2 MG/ML
4 INJECTION INTRAMUSCULAR; INTRAVENOUS EVERY 30 MIN PRN
Status: DISCONTINUED | OUTPATIENT
Start: 2023-01-17 | End: 2023-01-18 | Stop reason: HOSPADM

## 2023-01-17 RX ORDER — MEPERIDINE HYDROCHLORIDE 25 MG/ML
12.5 INJECTION INTRAMUSCULAR; INTRAVENOUS; SUBCUTANEOUS
Status: DISCONTINUED | OUTPATIENT
Start: 2023-01-17 | End: 2023-01-18 | Stop reason: HOSPADM

## 2023-01-17 RX ORDER — GLYCOPYRROLATE 0.2 MG/ML
INJECTION, SOLUTION INTRAMUSCULAR; INTRAVENOUS PRN
Status: DISCONTINUED | OUTPATIENT
Start: 2023-01-17 | End: 2023-01-17

## 2023-01-17 RX ORDER — LIDOCAINE HYDROCHLORIDE 20 MG/ML
INJECTION, SOLUTION INFILTRATION; PERINEURAL PRN
Status: DISCONTINUED | OUTPATIENT
Start: 2023-01-17 | End: 2023-01-17

## 2023-01-17 RX ORDER — PROPOFOL 10 MG/ML
INJECTION, EMULSION INTRAVENOUS PRN
Status: DISCONTINUED | OUTPATIENT
Start: 2023-01-17 | End: 2023-01-17

## 2023-01-17 RX ORDER — SODIUM CHLORIDE, SODIUM LACTATE, POTASSIUM CHLORIDE, CALCIUM CHLORIDE 600; 310; 30; 20 MG/100ML; MG/100ML; MG/100ML; MG/100ML
INJECTION, SOLUTION INTRAVENOUS CONTINUOUS PRN
Status: DISCONTINUED | OUTPATIENT
Start: 2023-01-17 | End: 2023-01-17

## 2023-01-17 RX ADMIN — DEXAMETHASONE SODIUM PHOSPHATE 4 MG: 4 INJECTION, SOLUTION INTRA-ARTICULAR; INTRALESIONAL; INTRAMUSCULAR; INTRAVENOUS; SOFT TISSUE at 13:22

## 2023-01-17 RX ADMIN — FENTANYL CITRATE 25 MCG: 50 INJECTION, SOLUTION INTRAMUSCULAR; INTRAVENOUS at 14:34

## 2023-01-17 RX ADMIN — FENTANYL CITRATE 25 MCG: 50 INJECTION, SOLUTION INTRAMUSCULAR; INTRAVENOUS at 14:48

## 2023-01-17 RX ADMIN — PROPOFOL 150 MG: 10 INJECTION, EMULSION INTRAVENOUS at 13:29

## 2023-01-17 RX ADMIN — GLYCOPYRROLATE 0.2 MG: 0.2 INJECTION, SOLUTION INTRAMUSCULAR; INTRAVENOUS at 13:22

## 2023-01-17 RX ADMIN — FENTANYL CITRATE 100 MCG: 50 INJECTION, SOLUTION INTRAMUSCULAR; INTRAVENOUS at 13:35

## 2023-01-17 RX ADMIN — SODIUM CHLORIDE, SODIUM LACTATE, POTASSIUM CHLORIDE, CALCIUM CHLORIDE: 600; 310; 30; 20 INJECTION, SOLUTION INTRAVENOUS at 13:19

## 2023-01-17 RX ADMIN — FENTANYL CITRATE 25 MCG: 50 INJECTION, SOLUTION INTRAMUSCULAR; INTRAVENOUS at 14:41

## 2023-01-17 RX ADMIN — PROPOFOL 200 MCG/KG/MIN: 10 INJECTION, EMULSION INTRAVENOUS at 13:30

## 2023-01-17 RX ADMIN — ONDANSETRON 4 MG: 2 INJECTION INTRAMUSCULAR; INTRAVENOUS at 13:22

## 2023-01-17 RX ADMIN — CEFAZOLIN SODIUM 2 G: 2 SOLUTION INTRAVENOUS at 13:19

## 2023-01-17 RX ADMIN — LIDOCAINE HYDROCHLORIDE 100 MG: 20 INJECTION, SOLUTION INFILTRATION; PERINEURAL at 13:28

## 2023-01-17 RX ADMIN — Medication 30 MG: at 13:30

## 2023-01-17 RX ADMIN — FENTANYL CITRATE 25 MCG: 50 INJECTION, SOLUTION INTRAMUSCULAR; INTRAVENOUS at 14:58

## 2023-01-17 ASSESSMENT — COPD QUESTIONNAIRES
COPD: 1
CAT_SEVERITY: MILD

## 2023-01-17 NOTE — ANESTHESIA CARE TRANSFER NOTE
Patient: Myrtle Vaz    Procedure: Procedure(s):  ESOPHAGOGASTRODUODENOSCOPY, WITH DILATION       Diagnosis: Esophageal stricture [K22.2]  Diagnosis Additional Information: No value filed.    Anesthesia Type:   General     Note:    Oropharynx: oropharynx clear of all foreign objects and spontaneously breathing  Level of Consciousness: drowsy  Oxygen Supplementation: face mask  Level of Supplemental Oxygen (L/min / FiO2): 8  Independent Airway: airway patency satisfactory and stable  Dentition: dentition unchanged  Vital Signs Stable: post-procedure vital signs reviewed and stable  Report to RN Given: handoff report given  Patient transferred to: PACU    Handoff Report: Identifed the Patient, Identified the Reponsible Provider, Reviewed the pertinent medical history, Discussed the surgical course, Reviewed Intra-OP anesthesia mangement and issues during anesthesia, Set expectations for post-procedure period and Allowed opportunity for questions and acknowledgement of understanding      Vitals:  Vitals Value Taken Time   BP     Temp     Pulse     Resp     SpO2         Electronically Signed By: NARCISO Hale CRNA  January 17, 2023  2:05 PM

## 2023-01-17 NOTE — ANESTHESIA PREPROCEDURE EVALUATION
Anesthesia Pre-Procedure Evaluation    Patient: Myrtle Vza   MRN: [id number] : 1945        Procedure : Procedure(s):  ESOPHAGOGASTRODUODENOSCOPY, WITH DILATION          Past Medical History:   Diagnosis Date     Depression      History of atrial fibrillation      HLD (hyperlipidemia)      HTN (hypertension)      Hypothyroidism       Past Surgical History:   Procedure Laterality Date     BRONCHOSCOPY (RIGID OR FLEXIBLE), DIAGNOSTIC N/A 2019    Procedure: BRONCHOSCOPY, WITH BAL;  Surgeon: Ally Ybarra MD;  [facility]     COMBINED ESOPHAGOSCOPY, GASTROSCOPY, DUODENOSCOPY (EGD), REPLACE ESOPHAGEAL STENT N/A 2019    Procedure: ESOPHAGOGASTRODUODENOSCOPY WITH ESOPHAGEAL STENT REPLACEMENT, IRRIGATION AND DEBRIDEMENT OF LEFT NECK, WOUND VAC PLACEMENT;  Surgeon: Harley Murguia MD;  [facility]     COMBINED ESOPHAGOSCOPY, GASTROSCOPY, DUODENOSCOPY (EGD), REPLACE ESOPHAGEAL STENT N/A 6/10/2019    Procedure: Esophagogastroduodenoscopy and Stent Removal;  Surgeon: Ally Ybarra MD;  [facility]     ESOPHAGOGASTRECTOMY N/A 2019    Procedure: Redo Laparotomy SUBSTERNAL Esophagogastrostomy, Pharyngostomy, Intraoperative EGD, PARTIAL STERNOTOMY, LEFT PHARYNGOSTOMY,;  Surgeon: Temitope Bullock MD;  [facility]     ESOPHAGOSCOPY, GASTROSCOPY, DUODENOSCOPY (EGD), COMBINED N/A 2018    Procedure: COMBINED ESOPHAGOSCOPY, GASTROSCOPY, DUODENOSCOPY (EGD);  Surgeon: Temitope Bullock MD;  [facility]     ESOPHAGOSCOPY, GASTROSCOPY, DUODENOSCOPY (EGD), DILATATION, COMBINED N/A 10/10/2019    Procedure: Esophagogastroduodenoscopy with Botox injection of the Pylorus;  Surgeon: Temitope Bullock MD;  [facility]     ESOPHAGOSCOPY, GASTROSCOPY, DUODENOSCOPY (EGD), DILATATION, COMBINED N/A 5/10/2022    Procedure: ESOPHAGOGASTRODUODENOSCOPY, WITH DILATION;  Surgeon: Temitope Bullock MD;  [facility]     HERNIORRHAPHY HIATAL  2018     HYSTERECTOMY        HYSTERECTOMY       IRRIGATION AND DEBRIDEMENT CHEST WASHOUT, COMBINED N/A 10/10/2019    Procedure: Chest wound exploration;  Surgeon: Temitope Bullock MD;  [facility]     LAPAROSCOPIC HERNIORRHAPHY HIATAL N/A 2018    Procedure: Midline laparotomy, Trans-hiatal esophagogasterectomy, gastrostomy, J-tube placement, G-tube placement, bilateral pleural chest tube placement, mediastinal abcess drainage, spit fistula creation, Esophagastrodueodenoscopy;  Surgeon: Temitope Bullock MD;  [facility]     OOPHORECTOMY Bilateral      ROTATOR CUFF REPAIR RT/LT       THYROIDECTOMY       for hyperthroidism     ZZC TOTAL ABDOM HYSTERECTOMY      Description: Total Abdominal Hysterectomy;  Recorded: 2012;      Allergies   Allergen Reactions     Gabapentin Other (See Comments)      Social History     Tobacco Use     Smoking status: Former     Years: 60.00     Types: Cigarettes     Quit date: 2018     Years since quittin.2     Smokeless tobacco: Never   Substance Use Topics     Alcohol use: No      Wt Readings from Last 1 Encounters:   23 56.7 kg (125 lb)        Anesthesia Evaluation            ROS/MED HX  ENT/Pulmonary:     (+) mild,  COPD,     Neurologic:  - neg neurologic ROS     Cardiovascular:     (+) hypertension-----    METS/Exercise Tolerance:     Hematologic:  - neg hematologic  ROS     Musculoskeletal:  - neg musculoskeletal ROS     GI/Hepatic:       Renal/Genitourinary:     (+) renal disease, type: CRI,     Endo:     (+) thyroid problem,     Psychiatric/Substance Use:       Infectious Disease:  - neg infectious disease ROS     Malignancy:  - neg malignancy ROS     Other:               OUTSIDE LABS:  CBC:   Lab Results   Component Value Date    WBC 8.7 2020    WBC 7.2 2019    HGB 12.8 2020    HGB 11.3 (L) 10/10/2019    HCT 41.0 2020    HCT 33.4 (L) 2019     (H) 2020     (H) 2019     BMP:   Lab Results   Component  Value Date     05/03/2022     09/09/2021    POTASSIUM 3.9 05/03/2022    POTASSIUM 4.1 09/09/2021    CHLORIDE 104 05/03/2022    CHLORIDE 107 09/09/2021    CO2 26 05/03/2022    CO2 24 09/09/2021    BUN 20 05/03/2022    BUN 21 09/09/2021    CR 0.82 05/03/2022    CR 0.93 09/09/2021    GLC 65 (L) 05/10/2022    GLC 79 05/03/2022     COAGS:   Lab Results   Component Value Date    PTT 47 (H) 04/28/2019    INR 1.09 10/10/2019     POC:   Lab Results   Component Value Date     (H) 10/11/2019     HEPATIC:   Lab Results   Component Value Date    ALBUMIN 3.3 (L) 09/09/2021    PROTTOTAL 6.8 09/09/2021    ALT 22 09/09/2021    AST 27 09/09/2021    ALKPHOS 113 09/09/2021    BILITOTAL 0.2 09/09/2021     OTHER:   Lab Results   Component Value Date    PH 7.35 04/28/2019    LACT 1.1 05/06/2019    JOSE 8.3 (L) 05/03/2022    PHOS 3.9 12/03/2020    MAG 2.3 05/21/2019    LIPASE 43 (L) 02/11/2019    TSH 0.18 (L) 01/05/2023    T4 0.40 (L) 12/01/2022    CRP 24.8 (H) 06/19/2019     (H) 06/19/2019       Anesthesia Plan    ASA Status:  3      Anesthesia Type: General.     - Airway: ETT              Consents    Anesthesia Plan(s) and associated risks, benefits, and realistic alternatives discussed. Questions answered and patient/representative(s) expressed understanding.     - Discussed: Risks, Benefits and Alternatives for BOTH SEDATION and the PROCEDURE were discussed     - Discussed with:  Patient      - Extended Intubation/Ventilatory Support Discussed: No.      - Patient is DNR/DNI Status: No    Use of blood products discussed: No .     Postoperative Care    Pain management: IV analgesics, Oral pain medications.   PONV prophylaxis: Ondansetron (or other 5HT-3), Dexamethasone or Solumedrol     Comments:                David Roberts MD, MD

## 2023-01-17 NOTE — DISCHARGE INSTRUCTIONS
[facility]  Home Care Following Anesthesia  For 24 hours after surgery:  Get plenty of rest.  A responsible adult must stay with you for at least 24 hours after you leave the surgery center.  Do not drive or use heavy equipment.  If you have weakness or tingling, don't drive or use heavy equipment until this feeling goes away.   Do not drink alcohol.   Avoid strenuous or risky activities.  Ask for help when climbing stairs.  You may feel lightheaded.  IF so, sit for a few minutes before standing.  Have someone help you get up.   If you have nausea (feel sick to your stomach): Drink only clear liquids such as apple juice, ginger ale, broth or 7-Up.  Rest may also help.  Be sure to drink enough fluids.  Move to a regular diet as you feel able.   You may have a slight fever.  Call the doctor if your fever is over 100 F (37.7 C) (taken under the tongue) or lasts longer than 24 hours.  You may have a dry mouth, a sore throat, muscle aches or trouble sleeping. These should go away after 24 hours.  Do not make important or legal decisions.   It is recommended to avoid smoking.               Tips for taking pain medications  To get the best pain relief possible, remember these points:  Take pain medications as directed, before pain becomes severe.  Pain medication can upset your stomach: taking it with food may help.  Constipation is a common side effect of pain medication. Drink plenty of  fluids.  Eat foods high in fiber. Take a stool softener if recommended by your doctor or pharmacist.  Do not drink alcohol, drive or operate machinery while taking pain medications.  Ask about other ways to control pain, such as with heat, ice or relaxation.    Tylenol/Acetaminophen Consumption  To help encourage the safe use of acetaminophen, the makers of TYLENOL  have lowered the maximum daily dose for single-ingredient Extra Strength TYLENOL  (acetaminophen) products sold in the [place] from 8 pills  per day (4,000 mg) to 6 pills per day (3,000 mg). The dosing interval has also changed from 2 pills every 4-6 hours to 2 pills every 6 hours.  If you feel your pain relief is insufficient, you may take Tylenol/Acetaminophen in addition to your narcotic pain medication.   Be careful not to exceed 3,000 mg of Tylenol/Acetaminophen in a 24 hour period from all sources.  If you are taking extra strength Tylenol/acetaminophen (500 mg), the maximum dose is 6 tablets in 24 hours.  If you are taking regular strength acetaminophen (325 mg), the maximum dose is 9 tablets in 24 hours.    Call a doctor for any of the following:  Signs of infection (fever, growing tenderness at the surgery site, a large amount of drainage or bleeding, severe pain, foul-smelling drainage, redness, swelling).  It has been over 8 to 10 hours since surgery and you are still not able to urinate (pass water).  Headache for over 24 hours.  Numbness, tingling or weakness the day after surgery (if you had spinal anesthesia).  Signs of Covid-19 infection (temperature over 100 degrees, shortness of breath, cough, loss of taste/smell, generalized body aches, persistent headache, chills, sore throat, nausea/vomiting/diarrhea)  Your doctor is:  Dr. Temitope Funes, Thoracic: [phone]                    Or dial [phone] and ask for the resident on call for:  Thoracic Surgery  For emergency care, call the:  [facility]:  [phone] (TTY for hearing impaired: [phone])

## 2023-01-17 NOTE — ANESTHESIA POSTPROCEDURE EVALUATION
Patient: Myrtle Vaz    Procedure: Procedure(s):  ESOPHAGOGASTRODUODENOSCOPY, WITH DILATION       Anesthesia Type:  General    Note:  Disposition: Outpatient   Postop Pain Control: Uneventful            Sign Out: Well controlled pain   PONV: No   Neuro/Psych: Uneventful            Sign Out: Acceptable/Baseline neuro status   Airway/Respiratory: Uneventful            Sign Out: Acceptable/Baseline resp. status   CV/Hemodynamics: Uneventful            Sign Out: Acceptable CV status; No obvious hypovolemia; No obvious fluid overload   Other NRE: NONE   DID A NON-ROUTINE EVENT OCCUR? No           Last vitals:  Vitals Value Taken Time   /82 01/17/23 1500   Temp 36.2  C (97.1  F) 01/17/23 1403   Pulse 76 01/17/23 1500   Resp 16 01/17/23 1500   SpO2 100 % 01/17/23 1500       Electronically Signed By: David Roberts MD, MD  January 17, 2023  3:31 PM

## 2023-01-18 NOTE — OP NOTE
Procedure Date: 01/17/2023    PREOPERATIVE DIAGNOSES:  1.  History of end-stage benign disease and esophagectomy.  2.  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  3.  Anastomotic stricture.    POSTOPERATIVE DIAGNOSES:    1.  History of end-stage benign disease and esophagectomy.  2.  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  3.  Anastomotic stricture.    PROCEDURES PERFORMED:    1.  EGD.  2.  Esophageal dilation using Savary dilators under fluoroscopic guidance.  3.  Supervision and interpretation of intraoperative imaging.    ANESTHESIA:  General.    SURGEON:  Temitope Funes MD    ASSISTANT:  Patrick Moreno MD General Surgery resident.    COMPLICATIONS:  None.    ESTIMATED BLOOD LOSS:  Minimal.    COMPLICATIONS:  None.    ESTIMATED BLOOD LOSS:  Minimal.    SPECIMENS:  None.    IMPLANTS:  None.    DESCRIPTION OF PROCEDURE IN DETAIL:  The patient was taken to the operating room, laid supine.  General anesthesia was induced.  A formal timeout was carried out confirming the name of the patient and correct procedure.  She had SCDs in place and functioning prior to induction of anesthesia.  She received antibiotics within 30 minutes of incision.    After the timeout was complete, we started by introducing an adult gastroscope, we introduced and advanced to the level of the anastomosis, which was widely patent.  We advanced an Amplatz Super Stiff wire down to the duodenum and over the wire, we dilated serially from 17-20 mm using Savary dilators.  There was minimal resistance.  Completion endoscopic view revealed no tears in the mucosa.  The patient tolerated the procedure well.  She was awakened and extubated in the operating room and transferred to PACU for recovery.    Temitope Funes MD        D: 01/17/2023   T: 01/17/2023   MT: [email or web address]    Name:     CANDICE LOYARebecca  MRN:      [id number]        Account:         [id number]   :      1945           Procedure Date: 2023     Document: [id number]

## 2023-06-04 ENCOUNTER — HEALTH MAINTENANCE LETTER (OUTPATIENT)
Age: 78
End: 2023-06-04

## 2023-06-12 ENCOUNTER — LAB REQUISITION (OUTPATIENT)
Dept: LAB | Facility: CLINIC | Age: 78
End: 2023-06-12
Payer: MEDICARE

## 2023-06-12 DIAGNOSIS — F03.90 UNSPECIFIED DEMENTIA, UNSPECIFIED SEVERITY, WITHOUT BEHAVIORAL DISTURBANCE, PSYCHOTIC DISTURBANCE, MOOD DISTURBANCE, AND ANXIETY (H): ICD-10-CM

## 2023-06-13 LAB
ANION GAP SERPL CALCULATED.3IONS-SCNC: 13 MMOL/L (ref 7–15)
BUN SERPL-MCNC: 26.9 MG/DL (ref 8–23)
CALCIUM SERPL-MCNC: 8.6 MG/DL (ref 8.8–10.2)
CHLORIDE SERPL-SCNC: 101 MMOL/L (ref 98–107)
CREAT SERPL-MCNC: 0.83 MG/DL (ref 0.51–0.95)
DEPRECATED HCO3 PLAS-SCNC: 22 MMOL/L (ref 22–29)
FOLATE SERPL-MCNC: 4.3 NG/ML (ref 4.6–34.8)
GFR SERPL CREATININE-BSD FRML MDRD: 73 ML/MIN/1.73M2
GLUCOSE SERPL-MCNC: 85 MG/DL (ref 70–99)
HGB BLD-MCNC: 13.8 G/DL (ref 11.7–15.7)
POTASSIUM SERPL-SCNC: 4.1 MMOL/L (ref 3.4–5.3)
SODIUM SERPL-SCNC: 136 MMOL/L (ref 136–145)
VIT B12 SERPL-MCNC: 442 PG/ML (ref 232–1245)

## 2023-06-13 PROCEDURE — 82607 VITAMIN B-12: CPT | Performed by: INTERNAL MEDICINE

## 2023-06-13 PROCEDURE — 36415 COLL VENOUS BLD VENIPUNCTURE: CPT | Performed by: INTERNAL MEDICINE

## 2023-06-13 PROCEDURE — P9604 ONE-WAY ALLOW PRORATED TRIP: HCPCS | Performed by: INTERNAL MEDICINE

## 2023-06-13 PROCEDURE — 80048 BASIC METABOLIC PNL TOTAL CA: CPT | Performed by: INTERNAL MEDICINE

## 2023-06-13 PROCEDURE — 82746 ASSAY OF FOLIC ACID SERUM: CPT | Performed by: INTERNAL MEDICINE

## 2023-06-13 PROCEDURE — 85018 HEMOGLOBIN: CPT | Performed by: INTERNAL MEDICINE

## 2023-07-17 ENCOUNTER — LAB REQUISITION (OUTPATIENT)
Dept: LAB | Facility: CLINIC | Age: 78
End: 2023-07-17
Payer: MEDICARE

## 2023-07-17 DIAGNOSIS — E03.8 OTHER SPECIFIED HYPOTHYROIDISM: ICD-10-CM

## 2023-07-17 DIAGNOSIS — I10 ESSENTIAL (PRIMARY) HYPERTENSION: ICD-10-CM

## 2023-07-17 LAB
ALBUMIN SERPL BCG-MCNC: 4.1 G/DL (ref 3.5–5.2)
ALP SERPL-CCNC: 101 U/L (ref 35–104)
ALT SERPL W P-5'-P-CCNC: 29 U/L (ref 0–50)
ANION GAP SERPL CALCULATED.3IONS-SCNC: 11 MMOL/L (ref 7–15)
AST SERPL W P-5'-P-CCNC: 20 U/L (ref 0–45)
BILIRUB SERPL-MCNC: 0.2 MG/DL
BUN SERPL-MCNC: 23.1 MG/DL (ref 8–23)
CALCIUM SERPL-MCNC: 8.3 MG/DL (ref 8.8–10.2)
CHLORIDE SERPL-SCNC: 103 MMOL/L (ref 98–107)
CREAT SERPL-MCNC: 0.78 MG/DL (ref 0.51–0.95)
DEPRECATED HCO3 PLAS-SCNC: 26 MMOL/L (ref 22–29)
GFR SERPL CREATININE-BSD FRML MDRD: 78 ML/MIN/1.73M2
GLUCOSE SERPL-MCNC: 80 MG/DL (ref 70–99)
POTASSIUM SERPL-SCNC: 4.1 MMOL/L (ref 3.4–5.3)
PROT SERPL-MCNC: 6.7 G/DL (ref 6.4–8.3)
SODIUM SERPL-SCNC: 140 MMOL/L (ref 136–145)
T4 FREE SERPL-MCNC: 2.17 NG/DL (ref 0.9–1.7)
TSH SERPL DL<=0.005 MIU/L-ACNC: 0.05 UIU/ML (ref 0.3–4.2)

## 2023-07-17 PROCEDURE — 84443 ASSAY THYROID STIM HORMONE: CPT | Mod: ORL | Performed by: STUDENT IN AN ORGANIZED HEALTH CARE EDUCATION/TRAINING PROGRAM

## 2023-07-17 PROCEDURE — 84439 ASSAY OF FREE THYROXINE: CPT | Mod: ORL | Performed by: STUDENT IN AN ORGANIZED HEALTH CARE EDUCATION/TRAINING PROGRAM

## 2023-07-17 PROCEDURE — 80053 COMPREHEN METABOLIC PANEL: CPT | Mod: ORL | Performed by: STUDENT IN AN ORGANIZED HEALTH CARE EDUCATION/TRAINING PROGRAM

## 2023-08-09 ENCOUNTER — LAB REQUISITION (OUTPATIENT)
Dept: LAB | Facility: CLINIC | Age: 78
End: 2023-08-09
Payer: MEDICARE

## 2023-08-09 DIAGNOSIS — E03.8 OTHER SPECIFIED HYPOTHYROIDISM: ICD-10-CM

## 2023-08-09 LAB
T4 FREE SERPL-MCNC: 1.5 NG/DL (ref 0.9–1.7)
TSH SERPL DL<=0.005 MIU/L-ACNC: 0.02 UIU/ML (ref 0.3–4.2)

## 2023-08-09 PROCEDURE — 84439 ASSAY OF FREE THYROXINE: CPT | Mod: ORL | Performed by: STUDENT IN AN ORGANIZED HEALTH CARE EDUCATION/TRAINING PROGRAM

## 2023-08-09 PROCEDURE — 84443 ASSAY THYROID STIM HORMONE: CPT | Mod: ORL | Performed by: STUDENT IN AN ORGANIZED HEALTH CARE EDUCATION/TRAINING PROGRAM

## 2023-12-27 NOTE — PROGRESS NOTES
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 Albarado. PICC 4/27/2019 for pressors and frequent IV access. Pt has been seen by SLP department in the past. Most recently 1/19/19 she completed a video swallow which reported - Natasha Vaz demonstrates normal oropharyngeal swallow. No aspiration/penetration noted. Adequate oral manipulation and timely swallow demonstrated. No pharyngeal residue noted after the completed swallow. Recommend diet per GI due to recent esophageal surgery and spit fistula. No further SLP services indicated at this time. Pt has pharyngostomy in place with nutrition via J tube.    VFSS Eval: Radiology   Radiologist Resident    Views Taken left lateral   Physical Location of Procedure [facility] radiology suite [address]    VFSS Eval: Thin Liquid Texture Trial   Mode of Presentation, Thin Liquid cup;spoon;self-fed;fed by clinician   Order of Presentation 1 cup sip, 1 spoon sip with chin tuck, 1 cup sip with chin tuck    Preparatory Phase WFL   Oral Phase, Thin Liquid WFL   Pharyngeal Phase, Thin Liquid Delayed swallow reflex;Residue in valleculae;Residue in pyriform sinus   Rosenbek's Penetration Aspiration Scale: Thin Liquid Trial Results 8 - contrast passes glottis, visible subglottic residue remains, absent patient  response (aspiration)   Response to Aspiration absent response, silent aspiration   Diagnostic Statement Chin tuck ineffective in protecting airway. Edwin aspiration occured, aspiration was essentially silent. Delayed cough x 1, however based on remainder of evaluation this cough is not a reliable indication of aspiration    Esophageal Phase of Swallow   Patient reports or presents with symptoms of esophageal dysphagia Yes   Esophageal comments See radiology report and pt's hx for details    General Therapy Interventions   Planned Therapy Interventions Dysphagia Treatment   Dysphagia treatment Oropharyngeal exercise training   Swallow Eval: Clinical Impressions   Skilled Criteria for Therapy Intervention Skilled criteria met.  Treatment indicated.   Dysphagia Outcome Severity Scale (ENRRIQUE) Level 1 - ENRRIQUE   Treatment Diagnosis Severe dysphagia    Diet texture recommendations NPO   Therapy Frequency 5 times/wk   Predicted Duration of Therapy Intervention (days/wks) 1 week   Anticipated Discharge Disposition extended care facility   Risks and Benefits of Treatment have been explained. Yes   Patient, family and/or staff in agreement with Plan of Care Yes   Clinical Impression Comments Video swallow study completed to determine pts ability to participate in esophagram to rule out esophageal leak s/p esophagectomy. Unfortunately d/t technical issues the images were not saved. Pt was cued to take single cup sip. Adequate oral motor control with liquids. Slightly delayed swallow initiation. Reduced tongue base retraction, hyoid excursion, and laryngeal elevation. Potential for pharyngostomy tube to be impacting pharyngeal phase of swallow and epiglottic inversion. Silent aspiration occurs during and after the swallow. Liquid was again attempted as pt was provided a teaspoon and cued to complete a chin tuck maneuver, shallow penetration persisted without full clearance of the laryngeal vestibule or pharynx. A cup sip and chin  tuck was also attempted and unsuccessful. Recommend NPO status based on severity of oropharyngeal phases of swallow. Esophagram not completed given aspiration occurred on video swallow. Please complete excellent oral cares. Discussed pt with provider, received SLP consult to continue to follow to complete oropharyngeal exercises and repeat VFSS after removal of pharyngostomy tube and/or as deemed appropriate by thoracic team. Per provider no ice chips, only exercises at this time. Education was provided to pt in radiology suite.    Total Evaluation Time   Total Evaluation Time (Minutes) 20      Opt out

## 2024-01-01 ENCOUNTER — HOSPITAL ENCOUNTER (OUTPATIENT)
Dept: RADIOLOGY | Facility: CLINIC | Age: 79
Discharge: HOME | End: 2024-02-22
Attending: STUDENT IN AN ORGANIZED HEALTH CARE EDUCATION/TRAINING PROGRAM
Payer: MEDICARE

## 2024-01-01 ENCOUNTER — TRANSCRIBE ORDERS (OUTPATIENT)
Dept: FAMILY MEDICINE | Facility: CLINIC | Age: 79
End: 2024-01-01
Payer: MEDICARE

## 2024-01-01 ENCOUNTER — HOSPITAL ENCOUNTER (OUTPATIENT)
Dept: SPEECH THERAPY | Facility: CLINIC | Age: 79
Discharge: HOME | End: 2024-02-22
Attending: STUDENT IN AN ORGANIZED HEALTH CARE EDUCATION/TRAINING PROGRAM
Payer: MEDICARE

## 2024-01-01 ENCOUNTER — APPOINTMENT (OUTPATIENT)
Dept: RADIOLOGY | Facility: CLINIC | Age: 79
DRG: 480 | End: 2024-01-01
Attending: STUDENT IN AN ORGANIZED HEALTH CARE EDUCATION/TRAINING PROGRAM
Payer: MEDICARE

## 2024-01-01 ENCOUNTER — APPOINTMENT (OUTPATIENT)
Dept: CT IMAGING | Facility: CLINIC | Age: 79
DRG: 480 | End: 2024-01-01
Attending: STUDENT IN AN ORGANIZED HEALTH CARE EDUCATION/TRAINING PROGRAM
Payer: MEDICARE

## 2024-01-01 ENCOUNTER — LAB REQUISITION (OUTPATIENT)
Dept: LAB | Facility: CLINIC | Age: 79
End: 2024-01-01
Payer: MEDICARE

## 2024-01-01 ENCOUNTER — ANESTHESIA (OUTPATIENT)
Dept: SURGERY | Facility: CLINIC | Age: 79
DRG: 480 | End: 2024-01-01
Payer: MEDICARE

## 2024-01-01 ENCOUNTER — APPOINTMENT (OUTPATIENT)
Dept: OCCUPATIONAL THERAPY | Facility: CLINIC | Age: 79
DRG: 480 | End: 2024-01-01
Payer: MEDICARE

## 2024-01-01 ENCOUNTER — APPOINTMENT (OUTPATIENT)
Dept: PHYSICAL THERAPY | Facility: CLINIC | Age: 79
DRG: 480 | End: 2024-01-01
Payer: MEDICARE

## 2024-01-01 ENCOUNTER — HEALTH MAINTENANCE LETTER (OUTPATIENT)
Age: 79
End: 2024-01-01

## 2024-01-01 ENCOUNTER — HOSPITAL ENCOUNTER (INPATIENT)
Facility: CLINIC | Age: 79
LOS: 6 days | Discharge: NURSING, REHAB OR LONG-TERM CARE | DRG: 480 | End: 2024-08-03
Attending: EMERGENCY MEDICINE | Admitting: INTERNAL MEDICINE
Payer: MEDICARE

## 2024-01-01 ENCOUNTER — ANESTHESIA EVENT (OUTPATIENT)
Dept: SURGERY | Facility: CLINIC | Age: 79
DRG: 480 | End: 2024-01-01
Payer: MEDICARE

## 2024-01-01 ENCOUNTER — APPOINTMENT (OUTPATIENT)
Dept: RADIOLOGY | Facility: CLINIC | Age: 79
DRG: 480 | End: 2024-01-01
Attending: INTERNAL MEDICINE
Payer: MEDICARE

## 2024-01-01 ENCOUNTER — DOCUMENTATION ONLY (OUTPATIENT)
Dept: OTHER | Facility: CLINIC | Age: 79
End: 2024-01-01
Payer: MEDICARE

## 2024-01-01 ENCOUNTER — APPOINTMENT (OUTPATIENT)
Dept: CT IMAGING | Facility: CLINIC | Age: 79
DRG: 480 | End: 2024-01-01
Payer: MEDICARE

## 2024-01-01 ENCOUNTER — APPOINTMENT (OUTPATIENT)
Dept: RADIOLOGY | Facility: CLINIC | Age: 79
DRG: 480 | End: 2024-01-01
Attending: EMERGENCY MEDICINE
Payer: MEDICARE

## 2024-01-01 ENCOUNTER — APPOINTMENT (OUTPATIENT)
Dept: SPEECH THERAPY | Facility: CLINIC | Age: 79
DRG: 480 | End: 2024-01-01
Attending: INTERNAL MEDICINE
Payer: MEDICARE

## 2024-01-01 ENCOUNTER — APPOINTMENT (OUTPATIENT)
Dept: CT IMAGING | Facility: CLINIC | Age: 79
DRG: 480 | End: 2024-01-01
Attending: EMERGENCY MEDICINE
Payer: MEDICARE

## 2024-01-01 VITALS
RESPIRATION RATE: 16 BRPM | WEIGHT: 111.99 LBS | TEMPERATURE: 97.2 F | BODY MASS INDEX: 16.97 KG/M2 | DIASTOLIC BLOOD PRESSURE: 72 MMHG | HEART RATE: 82 BPM | SYSTOLIC BLOOD PRESSURE: 141 MMHG | HEIGHT: 68 IN | OXYGEN SATURATION: 95 %

## 2024-01-01 DIAGNOSIS — D72.829 ELEVATED WHITE BLOOD CELL COUNT, UNSPECIFIED: ICD-10-CM

## 2024-01-01 DIAGNOSIS — J18.9 PNEUMONIA, UNSPECIFIED ORGANISM: ICD-10-CM

## 2024-01-01 DIAGNOSIS — R41.0 DISORIENTATION, UNSPECIFIED: ICD-10-CM

## 2024-01-01 DIAGNOSIS — E03.9 HYPOTHYROIDISM, UNSPECIFIED: ICD-10-CM
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
## 2024-01-01 DIAGNOSIS — S72.002A CLOSED FRACTURE OF NECK OF LEFT FEMUR, INITIAL ENCOUNTER (H): Primary | ICD-10-CM

## 2024-01-01 DIAGNOSIS — I10 ESSENTIAL (PRIMARY) HYPERTENSION: ICD-10-CM

## 2024-01-01 DIAGNOSIS — S72.002A FRACTURE OF UNSPECIFIED PART OF NECK OF LEFT FEMUR, INITIAL ENCOUNTER FOR CLOSED FRACTURE (H): ICD-10-CM

## 2024-01-01 DIAGNOSIS — T18.128A ESOPHAGEAL OBSTRUCTION DUE TO FOOD IMPACTION: ICD-10-CM

## 2024-01-01 DIAGNOSIS — R05.3 CHRONIC COUGH: ICD-10-CM

## 2024-01-01 DIAGNOSIS — R05.3 CHRONIC COUGH: Primary | ICD-10-CM

## 2024-01-01 DIAGNOSIS — R19.7 DIARRHEA, UNSPECIFIED: ICD-10-CM

## 2024-01-01 LAB
ABO/RH(D): NORMAL
ABO/RH(D): NORMAL
ALBUMIN SERPL BCG-MCNC: 3.1 G/DL (ref 3.5–5.2)
ALBUMIN SERPL BCG-MCNC: 3.9 G/DL (ref 3.5–5.2)
ALBUMIN SERPL BCG-MCNC: 4 G/DL (ref 3.5–5.2)
ALBUMIN UR-MCNC: 50 MG/DL
ALP SERPL-CCNC: 106 U/L (ref 40–150)
ALP SERPL-CCNC: 109 U/L (ref 40–150)
ALP SERPL-CCNC: 137 U/L (ref 40–150)
ALT SERPL W P-5'-P-CCNC: 24 U/L (ref 0–50)
ALT SERPL W P-5'-P-CCNC: 6 U/L (ref 0–50)
ALT SERPL W P-5'-P-CCNC: 6 U/L (ref 0–50)
ANION GAP SERPL CALCULATED.3IONS-SCNC: 12 MMOL/L (ref 7–15)
ANION GAP SERPL CALCULATED.3IONS-SCNC: 13 MMOL/L (ref 7–15)
ANION GAP SERPL CALCULATED.3IONS-SCNC: 13 MMOL/L (ref 7–15)
ANION GAP SERPL CALCULATED.3IONS-SCNC: 15 MMOL/L (ref 7–15)
ANION GAP SERPL CALCULATED.3IONS-SCNC: 6 MMOL/L (ref 7–15)
ANION GAP SERPL CALCULATED.3IONS-SCNC: 7 MMOL/L (ref 7–15)
ANION GAP SERPL CALCULATED.3IONS-SCNC: 8 MMOL/L (ref 7–15)
ANTIBODY SCREEN: NEGATIVE
ANTIBODY SCREEN: NEGATIVE
APPEARANCE UR: CLEAR
AST SERPL W P-5'-P-CCNC: 20 U/L (ref 0–45)
AST SERPL W P-5'-P-CCNC: 21 U/L (ref 0–45)
AST SERPL W P-5'-P-CCNC: 21 U/L (ref 0–45)
BASOPHILS # BLD AUTO: 0 10E3/UL (ref 0–0.2)
BASOPHILS # BLD AUTO: 0 10E3/UL (ref 0–0.2)
BASOPHILS # BLD AUTO: 0.1 10E3/UL (ref 0–0.2)
BASOPHILS NFR BLD AUTO: 0 %
BASOPHILS NFR BLD AUTO: 0 %
BASOPHILS NFR BLD AUTO: 1 %
BILIRUB SERPL-MCNC: 0.2 MG/DL
BILIRUB SERPL-MCNC: 0.2 MG/DL
BILIRUB SERPL-MCNC: 0.3 MG/DL
BILIRUB UR QL STRIP: NEGATIVE
BUN SERPL-MCNC: 22.2 MG/DL (ref 8–23)
BUN SERPL-MCNC: 22.3 MG/DL (ref 8–23)
BUN SERPL-MCNC: 22.9 MG/DL (ref 8–23)
BUN SERPL-MCNC: 27.6 MG/DL (ref 8–23)
BUN SERPL-MCNC: 32.8 MG/DL (ref 8–23)
BUN SERPL-MCNC: 33 MG/DL (ref 8–23)
BUN SERPL-MCNC: 33.6 MG/DL (ref 8–23)
BUN SERPL-MCNC: 36.1 MG/DL (ref 8–23)
BUN SERPL-MCNC: 37.9 MG/DL (ref 8–23)
CALCIUM SERPL-MCNC: 7.9 MG/DL (ref 8.8–10.2)
CALCIUM SERPL-MCNC: 8.2 MG/DL (ref 8.8–10.4)
CALCIUM SERPL-MCNC: 8.2 MG/DL (ref 8.8–10.4)
CALCIUM SERPL-MCNC: 8.3 MG/DL (ref 8.8–10.4)
CALCIUM SERPL-MCNC: 8.4 MG/DL (ref 8.8–10.4)
CALCIUM SERPL-MCNC: 8.6 MG/DL (ref 8.8–10.2)
CALCIUM SERPL-MCNC: 8.6 MG/DL (ref 8.8–10.4)
CALCIUM SERPL-MCNC: 8.6 MG/DL (ref 8.8–10.4)
CALCIUM SERPL-MCNC: 8.9 MG/DL (ref 8.8–10.4)
CHLORIDE SERPL-SCNC: 100 MMOL/L (ref 98–107)
CHLORIDE SERPL-SCNC: 101 MMOL/L (ref 98–107)
CHLORIDE SERPL-SCNC: 104 MMOL/L (ref 98–107)
CHLORIDE SERPL-SCNC: 104 MMOL/L (ref 98–107)
CHLORIDE SERPL-SCNC: 97 MMOL/L (ref 98–107)
CHLORIDE SERPL-SCNC: 99 MMOL/L (ref 98–107)
CHLORIDE SERPL-SCNC: 99 MMOL/L (ref 98–107)
COLOR UR AUTO: YELLOW
CREAT SERPL-MCNC: 0.8 MG/DL (ref 0.51–0.95)
CREAT SERPL-MCNC: 0.82 MG/DL (ref 0.51–0.95)
CREAT SERPL-MCNC: 0.82 MG/DL (ref 0.51–0.95)
CREAT SERPL-MCNC: 0.96 MG/DL (ref 0.51–0.95)
CREAT SERPL-MCNC: 0.99 MG/DL (ref 0.51–0.95)
CREAT SERPL-MCNC: 1.01 MG/DL (ref 0.51–0.95)
CREAT SERPL-MCNC: 1.02 MG/DL (ref 0.51–0.95)
CREAT SERPL-MCNC: 1.13 MG/DL (ref 0.51–0.95)
CREAT SERPL-MCNC: 1.14 MG/DL (ref 0.51–0.95)
CRP SERPL-MCNC: 18.1 MG/L
DEPRECATED HCO3 PLAS-SCNC: 23 MMOL/L (ref 22–29)
DEPRECATED HCO3 PLAS-SCNC: 24 MMOL/L (ref 22–29)
EGFRCR SERPLBLD CKD-EPI 2021: 49 ML/MIN/1.73M2
EGFRCR SERPLBLD CKD-EPI 2021: 50 ML/MIN/1.73M2
EGFRCR SERPLBLD CKD-EPI 2021: 56 ML/MIN/1.73M2
EGFRCR SERPLBLD CKD-EPI 2021: 57 ML/MIN/1.73M2
EGFRCR SERPLBLD CKD-EPI 2021: 58 ML/MIN/1.73M2
EGFRCR SERPLBLD CKD-EPI 2021: 60 ML/MIN/1.73M2
EGFRCR SERPLBLD CKD-EPI 2021: 73 ML/MIN/1.73M2
EGFRCR SERPLBLD CKD-EPI 2021: 73 ML/MIN/1.73M2
EGFRCR SERPLBLD CKD-EPI 2021: 75 ML/MIN/1.73M2
EOSINOPHIL # BLD AUTO: 0.1 10E3/UL (ref 0–0.7)
EOSINOPHIL # BLD AUTO: 0.2 10E3/UL (ref 0–0.7)
EOSINOPHIL # BLD AUTO: 1.2 10E3/UL (ref 0–0.7)
EOSINOPHIL NFR BLD AUTO: 1 %
EOSINOPHIL NFR BLD AUTO: 15 %
EOSINOPHIL NFR BLD AUTO: 2 %
ERYTHROCYTE [DISTWIDTH] IN BLOOD BY AUTOMATED COUNT: 14.9 % (ref 10–15)
ERYTHROCYTE [DISTWIDTH] IN BLOOD BY AUTOMATED COUNT: 15 % (ref 10–15)
ERYTHROCYTE [DISTWIDTH] IN BLOOD BY AUTOMATED COUNT: 15 % (ref 10–15)
ERYTHROCYTE [DISTWIDTH] IN BLOOD BY AUTOMATED COUNT: 15.1 % (ref 10–15)
ERYTHROCYTE [DISTWIDTH] IN BLOOD BY AUTOMATED COUNT: 15.4 % (ref 10–15)
GLUCOSE SERPL-MCNC: 100 MG/DL (ref 70–99)
GLUCOSE SERPL-MCNC: 101 MG/DL (ref 70–99)
GLUCOSE SERPL-MCNC: 103 MG/DL (ref 70–99)
GLUCOSE SERPL-MCNC: 110 MG/DL (ref 70–99)
GLUCOSE SERPL-MCNC: 144 MG/DL (ref 70–99)
GLUCOSE SERPL-MCNC: 90 MG/DL (ref 70–99)
GLUCOSE SERPL-MCNC: 91 MG/DL (ref 70–99)
GLUCOSE SERPL-MCNC: 94 MG/DL (ref 70–99)
GLUCOSE SERPL-MCNC: 95 MG/DL (ref 70–99)
GLUCOSE SERPL-MCNC: 99 MG/DL (ref 70–99)
GLUCOSE UR STRIP-MCNC: NEGATIVE MG/DL
HCO3 SERPL-SCNC: 25 MMOL/L (ref 22–29)
HCO3 SERPL-SCNC: 25 MMOL/L (ref 22–29)
HCO3 SERPL-SCNC: 26 MMOL/L (ref 22–29)
HCO3 SERPL-SCNC: 27 MMOL/L (ref 22–29)
HCO3 SERPL-SCNC: 29 MMOL/L (ref 22–29)
HCO3 SERPL-SCNC: 29 MMOL/L (ref 22–29)
HCO3 SERPL-SCNC: 30 MMOL/L (ref 22–29)
HCT VFR BLD AUTO: 35.8 % (ref 35–47)
HCT VFR BLD AUTO: 38.2 % (ref 35–47)
HCT VFR BLD AUTO: 38.6 % (ref 35–47)
HCT VFR BLD AUTO: 38.7 % (ref 35–47)
HCT VFR BLD AUTO: 40.1 % (ref 35–47)
HCT VFR BLD AUTO: 40.2 % (ref 35–47)
HCT VFR BLD AUTO: 41.5 % (ref 35–47)
HGB BLD-MCNC: 11.9 G/DL (ref 11.7–15.7)
HGB BLD-MCNC: 12 G/DL (ref 11.7–15.7)
HGB BLD-MCNC: 12.2 G/DL (ref 11.7–15.7)
HGB BLD-MCNC: 12.6 G/DL (ref 11.7–15.7)
HGB BLD-MCNC: 12.8 G/DL (ref 11.7–15.7)
HGB BLD-MCNC: 13 G/DL (ref 11.7–15.7)
HGB BLD-MCNC: 13.2 G/DL (ref 11.7–15.7)
HGB BLD-MCNC: 13.6 G/DL (ref 11.7–15.7)
HGB UR QL STRIP: NEGATIVE
HOLD SPECIMEN: NORMAL
IMM GRANULOCYTES # BLD: 0 10E3/UL
IMM GRANULOCYTES NFR BLD: 0 %
INR PPP: 0.98 (ref 0.85–1.15)
KETONES UR STRIP-MCNC: ABNORMAL MG/DL
LEUKOCYTE ESTERASE UR QL STRIP: NEGATIVE
LYMPHOCYTES # BLD AUTO: 1.4 10E3/UL (ref 0.8–5.3)
LYMPHOCYTES # BLD AUTO: 1.6 10E3/UL (ref 0.8–5.3)
LYMPHOCYTES # BLD AUTO: 1.7 10E3/UL (ref 0.8–5.3)
LYMPHOCYTES NFR BLD AUTO: 18 %
LYMPHOCYTES NFR BLD AUTO: 20 %
LYMPHOCYTES NFR BLD AUTO: 21 %
MCH RBC QN AUTO: 30.4 PG (ref 26.5–33)
MCH RBC QN AUTO: 30.4 PG (ref 26.5–33)
MCH RBC QN AUTO: 30.5 PG (ref 26.5–33)
MCH RBC QN AUTO: 30.5 PG (ref 26.5–33)
MCH RBC QN AUTO: 30.6 PG (ref 26.5–33)
MCH RBC QN AUTO: 30.8 PG (ref 26.5–33)
MCH RBC QN AUTO: 31.7 PG (ref 26.5–33)
MCHC RBC AUTO-ENTMCNC: 31.3 G/DL (ref 31.5–36.5)
MCHC RBC AUTO-ENTMCNC: 31.4 G/DL (ref 31.5–36.5)
MCHC RBC AUTO-ENTMCNC: 32.6 G/DL (ref 31.5–36.5)
MCHC RBC AUTO-ENTMCNC: 32.8 G/DL (ref 31.5–36.5)
MCHC RBC AUTO-ENTMCNC: 33.1 G/DL (ref 31.5–36.5)
MCHC RBC AUTO-ENTMCNC: 33.2 G/DL (ref 31.5–36.5)
MCHC RBC AUTO-ENTMCNC: 33.9 G/DL (ref 31.5–36.5)
MCV RBC AUTO: 91 FL (ref 78–100)
MCV RBC AUTO: 92 FL (ref 78–100)
MCV RBC AUTO: 92 FL (ref 78–100)
MCV RBC AUTO: 93 FL (ref 78–100)
MCV RBC AUTO: 97 FL (ref 78–100)
MONOCYTES # BLD AUTO: 0.8 10E3/UL (ref 0–1.3)
MONOCYTES # BLD AUTO: 1 10E3/UL (ref 0–1.3)
MONOCYTES # BLD AUTO: 1 10E3/UL (ref 0–1.3)
MONOCYTES NFR BLD AUTO: 10 %
MONOCYTES NFR BLD AUTO: 11 %
MONOCYTES NFR BLD AUTO: 14 %
MUCOUS THREADS #/AREA URNS LPF: PRESENT /LPF
NEUTROPHILS # BLD AUTO: 4 10E3/UL (ref 1.6–8.3)
NEUTROPHILS # BLD AUTO: 4.7 10E3/UL (ref 1.6–8.3)
NEUTROPHILS # BLD AUTO: 6.6 10E3/UL (ref 1.6–8.3)
NEUTROPHILS NFR BLD AUTO: 52 %
NEUTROPHILS NFR BLD AUTO: 65 %
NEUTROPHILS NFR BLD AUTO: 70 %
NITRATE UR QL: NEGATIVE
NRBC # BLD AUTO: 0 10E3/UL
NRBC BLD AUTO-RTO: 0 /100
OSMOLALITY SERPL: 290 MMOL/KG (ref 280–301)
OSMOLALITY UR: 631 MMOL/KG (ref 100–1200)
PATH REPORT.COMMENTS IMP SPEC: NORMAL
PATH REPORT.COMMENTS IMP SPEC: NORMAL
PATH REPORT.FINAL DX SPEC: NORMAL
PATH REPORT.GROSS SPEC: NORMAL
PATH REPORT.MICROSCOPIC SPEC OTHER STN: NORMAL
PATH REPORT.RELEVANT HX SPEC: NORMAL
PH UR STRIP: 5.5 [PH] (ref 5–7)
PHOTO IMAGE: NORMAL
PLATELET # BLD AUTO: 324 10E3/UL (ref 150–450)
PLATELET # BLD AUTO: 326 10E3/UL (ref 150–450)
PLATELET # BLD AUTO: 336 10E3/UL (ref 150–450)
PLATELET # BLD AUTO: 340 10E3/UL (ref 150–450)
PLATELET # BLD AUTO: 380 10E3/UL (ref 150–450)
PLATELET # BLD AUTO: 510 10E3/UL (ref 150–450)
PLATELET # BLD AUTO: 604 10E3/UL (ref 150–450)
POTASSIUM SERPL-SCNC: 3.5 MMOL/L (ref 3.4–5.3)
POTASSIUM SERPL-SCNC: 3.8 MMOL/L (ref 3.4–5.3)
POTASSIUM SERPL-SCNC: 4 MMOL/L (ref 3.4–5.3)
POTASSIUM SERPL-SCNC: 4.1 MMOL/L (ref 3.4–5.3)
POTASSIUM SERPL-SCNC: 4.2 MMOL/L (ref 3.4–5.3)
POTASSIUM SERPL-SCNC: 4.3 MMOL/L (ref 3.4–5.3)
PROT SERPL-MCNC: 6.5 G/DL (ref 6.4–8.3)
PROT SERPL-MCNC: 7.3 G/DL (ref 6.4–8.3)
PROT SERPL-MCNC: 7.3 G/DL (ref 6.4–8.3)
RBC # BLD AUTO: 3.89 10E6/UL (ref 3.8–5.2)
RBC # BLD AUTO: 3.95 10E6/UL (ref 3.8–5.2)
RBC # BLD AUTO: 3.98 10E6/UL (ref 3.8–5.2)
RBC # BLD AUTO: 4.19 10E6/UL (ref 3.8–5.2)
RBC # BLD AUTO: 4.26 10E6/UL (ref 3.8–5.2)
RBC # BLD AUTO: 4.34 10E6/UL (ref 3.8–5.2)
RBC # BLD AUTO: 4.42 10E6/UL (ref 3.8–5.2)
RBC URINE: 1 /HPF
SODIUM SERPL-SCNC: 134 MMOL/L (ref 135–145)
SODIUM SERPL-SCNC: 136 MMOL/L (ref 135–145)
SODIUM SERPL-SCNC: 137 MMOL/L (ref 135–145)
SODIUM SERPL-SCNC: 139 MMOL/L (ref 135–145)
SODIUM SERPL-SCNC: 139 MMOL/L (ref 135–145)
SODIUM SERPL-SCNC: 140 MMOL/L (ref 135–145)
SODIUM SERPL-SCNC: 143 MMOL/L (ref 135–145)
SODIUM UR-SCNC: 65 MMOL/L
SP GR UR STRIP: 1.02 (ref 1–1.03)
SPECIMEN EXPIRATION DATE: NORMAL
SPECIMEN EXPIRATION DATE: NORMAL
T4 FREE SERPL-MCNC: 1.08 NG/DL (ref 0.9–1.7)
TSH SERPL DL<=0.005 MIU/L-ACNC: 0.47 UIU/ML (ref 0.3–4.2)
TSH SERPL DL<=0.005 MIU/L-ACNC: 1.8 UIU/ML (ref 0.3–4.2)
TSH SERPL DL<=0.005 MIU/L-ACNC: 12 UIU/ML (ref 0.3–4.2)
TSH SERPL DL<=0.005 MIU/L-ACNC: 17.2 UIU/ML (ref 0.3–4.2)
UPPER GI ENDOSCOPY: NORMAL
UROBILINOGEN UR STRIP-MCNC: <2 MG/DL
WBC # BLD AUTO: 10.7 10E3/UL (ref 4–11)
WBC # BLD AUTO: 12.4 10E3/UL (ref 4–11)
WBC # BLD AUTO: 7.2 10E3/UL (ref 4–11)
WBC # BLD AUTO: 7.2 10E3/UL (ref 4–11)
WBC # BLD AUTO: 7.7 10E3/UL (ref 4–11)
WBC # BLD AUTO: 9 10E3/UL (ref 4–11)
WBC # BLD AUTO: 9.5 10E3/UL (ref 4–11)
WBC URINE: <1 /HPF

## 2024-01-01 PROCEDURE — 250N000013 HC RX MED GY IP 250 OP 250 PS 637: Performed by: INTERNAL MEDICINE

## 2024-01-01 PROCEDURE — 97530 THERAPEUTIC ACTIVITIES: CPT | Mod: GP

## 2024-01-01 PROCEDURE — 0D748ZZ DILATION OF ESOPHAGOGASTRIC JUNCTION, VIA NATURAL OR ARTIFICIAL OPENING ENDOSCOPIC: ICD-10-PCS | Performed by: INTERNAL MEDICINE

## 2024-01-01 PROCEDURE — 710N000010 HC RECOVERY PHASE 1, LEVEL 2, PER MIN: Performed by: STUDENT IN AN ORGANIZED HEALTH CARE EDUCATION/TRAINING PROGRAM

## 2024-01-01 PROCEDURE — 86900 BLOOD TYPING SEROLOGIC ABO: CPT | Performed by: INTERNAL MEDICINE

## 2024-01-01 PROCEDURE — 250N000009 HC RX 250: Performed by: STUDENT IN AN ORGANIZED HEALTH CARE EDUCATION/TRAINING PROGRAM

## 2024-01-01 PROCEDURE — 99233 SBSQ HOSP IP/OBS HIGH 50: CPT | Performed by: STUDENT IN AN ORGANIZED HEALTH CARE EDUCATION/TRAINING PROGRAM

## 2024-01-01 PROCEDURE — 250N000011 HC RX IP 250 OP 636

## 2024-01-01 PROCEDURE — 272N000001 HC OR GENERAL SUPPLY STERILE: Performed by: INTERNAL MEDICINE

## 2024-01-01 PROCEDURE — 258N000003 HC RX IP 258 OP 636: Performed by: NURSE ANESTHETIST, CERTIFIED REGISTERED

## 2024-01-01 PROCEDURE — 97116 GAIT TRAINING THERAPY: CPT | Mod: GP

## 2024-01-01 PROCEDURE — 250N000011 HC RX IP 250 OP 636: Performed by: INTERNAL MEDICINE

## 2024-01-01 PROCEDURE — 85027 COMPLETE CBC AUTOMATED: CPT | Performed by: STUDENT IN AN ORGANIZED HEALTH CARE EDUCATION/TRAINING PROGRAM

## 2024-01-01 PROCEDURE — 36415 COLL VENOUS BLD VENIPUNCTURE: CPT | Performed by: INTERNAL MEDICINE

## 2024-01-01 PROCEDURE — 360N000075 HC SURGERY LEVEL 2, PER MIN: Performed by: INTERNAL MEDICINE

## 2024-01-01 PROCEDURE — 250N000009 HC RX 250: Performed by: NURSE ANESTHETIST, CERTIFIED REGISTERED

## 2024-01-01 PROCEDURE — 0DB18ZX EXCISION OF UPPER ESOPHAGUS, VIA NATURAL OR ARTIFICIAL OPENING ENDOSCOPIC, DIAGNOSTIC: ICD-10-PCS | Performed by: INTERNAL MEDICINE

## 2024-01-01 PROCEDURE — 250N000011 HC RX IP 250 OP 636: Performed by: NURSE ANESTHETIST, CERTIFIED REGISTERED

## 2024-01-01 PROCEDURE — 0QS704Z REPOSITION LEFT UPPER FEMUR WITH INTERNAL FIXATION DEVICE, OPEN APPROACH: ICD-10-PCS | Performed by: STUDENT IN AN ORGANIZED HEALTH CARE EDUCATION/TRAINING PROGRAM

## 2024-01-01 PROCEDURE — 88305 TISSUE EXAM BY PATHOLOGIST: CPT | Mod: 26 | Performed by: PATHOLOGY

## 2024-01-01 PROCEDURE — 250N000025 HC SEVOFLURANE, PER MIN: Performed by: INTERNAL MEDICINE

## 2024-01-01 PROCEDURE — 120N000001 HC R&B MED SURG/OB

## 2024-01-01 PROCEDURE — 80053 COMPREHEN METABOLIC PANEL: CPT | Performed by: EMERGENCY MEDICINE

## 2024-01-01 PROCEDURE — 92611 MOTION FLUOROSCOPY/SWALLOW: CPT | Mod: GN

## 2024-01-01 PROCEDURE — 36415 COLL VENOUS BLD VENIPUNCTURE: CPT | Performed by: STUDENT IN AN ORGANIZED HEALTH CARE EDUCATION/TRAINING PROGRAM

## 2024-01-01 PROCEDURE — 80053 COMPREHEN METABOLIC PANEL: CPT | Performed by: INTERNAL MEDICINE

## 2024-01-01 PROCEDURE — 83930 ASSAY OF BLOOD OSMOLALITY: CPT | Performed by: INTERNAL MEDICINE

## 2024-01-01 PROCEDURE — 258N000003 HC RX IP 258 OP 636: Performed by: INTERNAL MEDICINE

## 2024-01-01 PROCEDURE — 85610 PROTHROMBIN TIME: CPT | Performed by: INTERNAL MEDICINE

## 2024-01-01 PROCEDURE — C1769 GUIDE WIRE: HCPCS | Performed by: STUDENT IN AN ORGANIZED HEALTH CARE EDUCATION/TRAINING PROGRAM

## 2024-01-01 PROCEDURE — 84443 ASSAY THYROID STIM HORMONE: CPT | Mod: ORL | Performed by: FAMILY MEDICINE

## 2024-01-01 PROCEDURE — 370N000017 HC ANESTHESIA TECHNICAL FEE, PER MIN: Performed by: INTERNAL MEDICINE

## 2024-01-01 PROCEDURE — 97166 OT EVAL MOD COMPLEX 45 MIN: CPT | Mod: GO

## 2024-01-01 PROCEDURE — 360N000077 HC SURGERY LEVEL 4, PER MIN: Performed by: STUDENT IN AN ORGANIZED HEALTH CARE EDUCATION/TRAINING PROGRAM

## 2024-01-01 PROCEDURE — 92610 EVALUATE SWALLOWING FUNCTION: CPT | Mod: GN

## 2024-01-01 PROCEDURE — 74230 X-RAY XM SWLNG FUNCJ C+: CPT

## 2024-01-01 PROCEDURE — 85025 COMPLETE CBC W/AUTO DIFF WBC: CPT | Performed by: EMERGENCY MEDICINE

## 2024-01-01 PROCEDURE — 80048 BASIC METABOLIC PNL TOTAL CA: CPT | Performed by: INTERNAL MEDICINE

## 2024-01-01 PROCEDURE — 84443 ASSAY THYROID STIM HORMONE: CPT | Performed by: INTERNAL MEDICINE

## 2024-01-01 PROCEDURE — 272N000001 HC OR GENERAL SUPPLY STERILE: Performed by: STUDENT IN AN ORGANIZED HEALTH CARE EDUCATION/TRAINING PROGRAM

## 2024-01-01 PROCEDURE — 97161 PT EVAL LOW COMPLEX 20 MIN: CPT | Mod: GP

## 2024-01-01 PROCEDURE — P9604 ONE-WAY ALLOW PRORATED TRIP: HCPCS | Performed by: INTERNAL MEDICINE

## 2024-01-01 PROCEDURE — 74220 X-RAY XM ESOPHAGUS 1CNTRST: CPT

## 2024-01-01 PROCEDURE — 258N000003 HC RX IP 258 OP 636: Performed by: ANESTHESIOLOGY

## 2024-01-01 PROCEDURE — 73700 CT LOWER EXTREMITY W/O DYE: CPT | Mod: LT,ME

## 2024-01-01 PROCEDURE — 80048 BASIC METABOLIC PNL TOTAL CA: CPT | Performed by: STUDENT IN AN ORGANIZED HEALTH CARE EDUCATION/TRAINING PROGRAM

## 2024-01-01 PROCEDURE — 85004 AUTOMATED DIFF WBC COUNT: CPT | Performed by: INTERNAL MEDICINE

## 2024-01-01 PROCEDURE — 70450 CT HEAD/BRAIN W/O DYE: CPT | Mod: ME

## 2024-01-01 PROCEDURE — 85027 COMPLETE CBC AUTOMATED: CPT | Performed by: INTERNAL MEDICINE

## 2024-01-01 PROCEDURE — 250N000013 HC RX MED GY IP 250 OP 250 PS 637

## 2024-01-01 PROCEDURE — 99233 SBSQ HOSP IP/OBS HIGH 50: CPT | Performed by: INTERNAL MEDICINE

## 2024-01-01 PROCEDURE — 999N000182 XR SURGERY CARM FLUORO GREATER THAN 5 MIN: Mod: TC

## 2024-01-01 PROCEDURE — 73502 X-RAY EXAM HIP UNI 2-3 VIEWS: CPT

## 2024-01-01 PROCEDURE — 710N000010 HC RECOVERY PHASE 1, LEVEL 2, PER MIN: Performed by: INTERNAL MEDICINE

## 2024-01-01 PROCEDURE — 250N000013 HC RX MED GY IP 250 OP 250 PS 637: Performed by: ORTHOPAEDIC SURGERY

## 2024-01-01 PROCEDURE — 83935 ASSAY OF URINE OSMOLALITY: CPT | Performed by: INTERNAL MEDICINE

## 2024-01-01 PROCEDURE — 97110 THERAPEUTIC EXERCISES: CPT | Mod: GP

## 2024-01-01 PROCEDURE — 80053 COMPREHEN METABOLIC PANEL: CPT | Mod: ORL | Performed by: FAMILY MEDICINE

## 2024-01-01 PROCEDURE — 85018 HEMOGLOBIN: CPT

## 2024-01-01 PROCEDURE — 97535 SELF CARE MNGMENT TRAINING: CPT | Mod: GO

## 2024-01-01 PROCEDURE — 250N000025 HC SEVOFLURANE, PER MIN: Performed by: STUDENT IN AN ORGANIZED HEALTH CARE EDUCATION/TRAINING PROGRAM

## 2024-01-01 PROCEDURE — 36415 COLL VENOUS BLD VENIPUNCTURE: CPT | Performed by: EMERGENCY MEDICINE

## 2024-01-01 PROCEDURE — 84300 ASSAY OF URINE SODIUM: CPT | Performed by: INTERNAL MEDICINE

## 2024-01-01 PROCEDURE — 99239 HOSP IP/OBS DSCHRG MGMT >30: CPT | Performed by: INTERNAL MEDICINE

## 2024-01-01 PROCEDURE — 81001 URINALYSIS AUTO W/SCOPE: CPT | Performed by: INTERNAL MEDICINE

## 2024-01-01 PROCEDURE — 80048 BASIC METABOLIC PNL TOTAL CA: CPT | Mod: ORL | Performed by: FAMILY MEDICINE

## 2024-01-01 PROCEDURE — 97530 THERAPEUTIC ACTIVITIES: CPT | Mod: GO

## 2024-01-01 PROCEDURE — C1713 ANCHOR/SCREW BN/BN,TIS/BN: HCPCS | Performed by: STUDENT IN AN ORGANIZED HEALTH CARE EDUCATION/TRAINING PROGRAM

## 2024-01-01 PROCEDURE — 71250 CT THORAX DX C-: CPT | Mod: MF

## 2024-01-01 PROCEDURE — 85025 COMPLETE CBC W/AUTO DIFF WBC: CPT | Mod: ORL | Performed by: FAMILY MEDICINE

## 2024-01-01 PROCEDURE — 250N000011 HC RX IP 250 OP 636: Performed by: EMERGENCY MEDICINE

## 2024-01-01 PROCEDURE — 86140 C-REACTIVE PROTEIN: CPT | Mod: ORL | Performed by: FAMILY MEDICINE

## 2024-01-01 PROCEDURE — 82310 ASSAY OF CALCIUM: CPT | Performed by: INTERNAL MEDICINE

## 2024-01-01 PROCEDURE — 71045 X-RAY EXAM CHEST 1 VIEW: CPT

## 2024-01-01 PROCEDURE — 86900 BLOOD TYPING SEROLOGIC ABO: CPT | Performed by: ORTHOPAEDIC SURGERY

## 2024-01-01 PROCEDURE — 250N000009 HC RX 250: Performed by: NURSE PRACTITIONER

## 2024-01-01 PROCEDURE — P9603 ONE-WAY ALLOW PRORATED MILES: HCPCS | Performed by: INTERNAL MEDICINE

## 2024-01-01 PROCEDURE — 999N000141 HC STATISTIC PRE-PROCEDURE NURSING ASSESSMENT: Performed by: INTERNAL MEDICINE

## 2024-01-01 PROCEDURE — 258N000003 HC RX IP 258 OP 636

## 2024-01-01 PROCEDURE — 84439 ASSAY OF FREE THYROXINE: CPT | Mod: ORL | Performed by: FAMILY MEDICINE

## 2024-01-01 PROCEDURE — 88305 TISSUE EXAM BY PATHOLOGIST: CPT | Mod: TC | Performed by: INTERNAL MEDICINE

## 2024-01-01 PROCEDURE — 999N000141 HC STATISTIC PRE-PROCEDURE NURSING ASSESSMENT: Performed by: STUDENT IN AN ORGANIZED HEALTH CARE EDUCATION/TRAINING PROGRAM

## 2024-01-01 PROCEDURE — 370N000017 HC ANESTHESIA TECHNICAL FEE, PER MIN: Performed by: STUDENT IN AN ORGANIZED HEALTH CARE EDUCATION/TRAINING PROGRAM

## 2024-01-01 PROCEDURE — 82947 ASSAY GLUCOSE BLOOD QUANT: CPT | Performed by: STUDENT IN AN ORGANIZED HEALTH CARE EDUCATION/TRAINING PROGRAM

## 2024-01-01 PROCEDURE — 99223 1ST HOSP IP/OBS HIGH 75: CPT | Performed by: INTERNAL MEDICINE

## 2024-01-01 PROCEDURE — 96374 THER/PROPH/DIAG INJ IV PUSH: CPT

## 2024-01-01 PROCEDURE — 99207 PR NO BILLABLE SERVICE THIS VISIT: CPT | Performed by: STUDENT IN AN ORGANIZED HEALTH CARE EDUCATION/TRAINING PROGRAM

## 2024-01-01 PROCEDURE — 99285 EMERGENCY DEPT VISIT HI MDM: CPT | Mod: 25

## 2024-01-01 DEVICE — IMPLANTABLE DEVICE: Type: IMPLANTABLE DEVICE | Site: HIP | Status: FUNCTIONAL

## 2024-01-01 DEVICE — PLATE BN TI 1 HL STRL FEM NK: Type: IMPLANTABLE DEVICE | Site: HIP | Status: FUNCTIONAL

## 2024-01-01 RX ORDER — LIDOCAINE 40 MG/G
CREAM TOPICAL
Status: DISCONTINUED | OUTPATIENT
Start: 2024-01-01 | End: 2024-01-01 | Stop reason: HOSPADM

## 2024-01-01 RX ORDER — FENTANYL CITRATE 50 UG/ML
INJECTION, SOLUTION INTRAMUSCULAR; INTRAVENOUS PRN
Status: DISCONTINUED | OUTPATIENT
Start: 2024-01-01 | End: 2024-01-01

## 2024-01-01 RX ORDER — SODIUM CHLORIDE, SODIUM LACTATE, POTASSIUM CHLORIDE, CALCIUM CHLORIDE 600; 310; 30; 20 MG/100ML; MG/100ML; MG/100ML; MG/100ML
INJECTION, SOLUTION INTRAVENOUS CONTINUOUS
Status: DISCONTINUED | OUTPATIENT
Start: 2024-01-01 | End: 2024-01-01 | Stop reason: HOSPADM

## 2024-01-01 RX ORDER — NALOXONE HYDROCHLORIDE 0.4 MG/ML
0.1 INJECTION, SOLUTION INTRAMUSCULAR; INTRAVENOUS; SUBCUTANEOUS
Status: DISCONTINUED | OUTPATIENT
Start: 2024-01-01 | End: 2024-01-01 | Stop reason: HOSPADM

## 2024-01-01 RX ORDER — POLYETHYLENE GLYCOL 3350 17 G/17G
17 POWDER, FOR SOLUTION ORAL DAILY
Status: DISCONTINUED | OUTPATIENT
Start: 2024-01-01 | End: 2024-01-01 | Stop reason: HOSPADM

## 2024-01-01 RX ORDER — LIDOCAINE HYDROCHLORIDE 10 MG/ML
INJECTION, SOLUTION INFILTRATION; PERINEURAL PRN
Status: DISCONTINUED | OUTPATIENT
Start: 2024-01-01 | End: 2024-01-01

## 2024-01-01 RX ORDER — ASPIRIN 81 MG/1
81 TABLET ORAL 2 TIMES DAILY
Qty: 60 TABLET | Refills: 0 | Status: SHIPPED | OUTPATIENT
Start: 2024-01-01

## 2024-01-01 RX ORDER — HYDROMORPHONE HCL IN WATER/PF 6 MG/30 ML
0.4 PATIENT CONTROLLED ANALGESIA SYRINGE INTRAVENOUS EVERY 5 MIN PRN
Status: DISCONTINUED | OUTPATIENT
Start: 2024-01-01 | End: 2024-01-01 | Stop reason: HOSPADM

## 2024-01-01 RX ORDER — TRAZODONE HYDROCHLORIDE 50 MG/1
25-50 TABLET, FILM COATED ORAL DAILY PRN
COMMUNITY
Start: 2024-01-01

## 2024-01-01 RX ORDER — PROPOFOL 10 MG/ML
INJECTION, EMULSION INTRAVENOUS CONTINUOUS PRN
Status: DISCONTINUED | OUTPATIENT
Start: 2024-01-01 | End: 2024-01-01

## 2024-01-01 RX ORDER — BISACODYL 10 MG
10 SUPPOSITORY, RECTAL RECTAL DAILY PRN
Status: DISCONTINUED | OUTPATIENT
Start: 2024-01-01 | End: 2024-01-01 | Stop reason: HOSPADM

## 2024-01-01 RX ORDER — NALOXONE HYDROCHLORIDE 0.4 MG/ML
0.4 INJECTION, SOLUTION INTRAMUSCULAR; INTRAVENOUS; SUBCUTANEOUS
Status: DISCONTINUED | OUTPATIENT
Start: 2024-01-01 | End: 2024-01-01 | Stop reason: HOSPADM

## 2024-01-01 RX ORDER — NALOXONE HYDROCHLORIDE 0.4 MG/ML
0.2 INJECTION, SOLUTION INTRAMUSCULAR; INTRAVENOUS; SUBCUTANEOUS
Status: DISCONTINUED | OUTPATIENT
Start: 2024-01-01 | End: 2024-01-01 | Stop reason: HOSPADM

## 2024-01-01 RX ORDER — HYDROMORPHONE HCL IN WATER/PF 6 MG/30 ML
0.1 PATIENT CONTROLLED ANALGESIA SYRINGE INTRAVENOUS
Status: DISCONTINUED | OUTPATIENT
Start: 2024-01-01 | End: 2024-01-01 | Stop reason: HOSPADM

## 2024-01-01 RX ORDER — SODIUM CHLORIDE, SODIUM LACTATE, POTASSIUM CHLORIDE, CALCIUM CHLORIDE 600; 310; 30; 20 MG/100ML; MG/100ML; MG/100ML; MG/100ML
INJECTION, SOLUTION INTRAVENOUS CONTINUOUS
Status: DISCONTINUED | OUTPATIENT
Start: 2024-01-01 | End: 2024-01-01

## 2024-01-01 RX ORDER — MEPERIDINE HYDROCHLORIDE 25 MG/ML
12.5 INJECTION INTRAMUSCULAR; INTRAVENOUS; SUBCUTANEOUS EVERY 5 MIN PRN
Status: DISCONTINUED | OUTPATIENT
Start: 2024-01-01 | End: 2024-01-01 | Stop reason: HOSPADM

## 2024-01-01 RX ORDER — SODIUM CHLORIDE 9 MG/ML
INJECTION, SOLUTION INTRAVENOUS CONTINUOUS
Status: DISCONTINUED | OUTPATIENT
Start: 2024-01-01 | End: 2024-01-01

## 2024-01-01 RX ORDER — ACETAMINOPHEN 325 MG/1
975 TABLET ORAL EVERY 8 HOURS
Status: COMPLETED | OUTPATIENT
Start: 2024-01-01 | End: 2024-01-01

## 2024-01-01 RX ORDER — AMOXICILLIN 250 MG
1 CAPSULE ORAL 2 TIMES DAILY
Status: DISCONTINUED | OUTPATIENT
Start: 2024-01-01 | End: 2024-01-01 | Stop reason: HOSPADM

## 2024-01-01 RX ORDER — ONDANSETRON 2 MG/ML
4 INJECTION INTRAMUSCULAR; INTRAVENOUS EVERY 30 MIN PRN
Status: DISCONTINUED | OUTPATIENT
Start: 2024-01-01 | End: 2024-01-01 | Stop reason: HOSPADM

## 2024-01-01 RX ORDER — LEVETIRACETAM 500 MG/1
500 TABLET ORAL 2 TIMES DAILY
COMMUNITY
Start: 2024-01-01 | End: 2024-01-01

## 2024-01-01 RX ORDER — ONDANSETRON 4 MG/1
4 TABLET, ORALLY DISINTEGRATING ORAL EVERY 30 MIN PRN
Status: DISCONTINUED | OUTPATIENT
Start: 2024-01-01 | End: 2024-01-01 | Stop reason: HOSPADM

## 2024-01-01 RX ORDER — FENTANYL CITRATE 50 UG/ML
25 INJECTION, SOLUTION INTRAMUSCULAR; INTRAVENOUS EVERY 5 MIN PRN
Status: DISCONTINUED | OUTPATIENT
Start: 2024-01-01 | End: 2024-01-01 | Stop reason: HOSPADM

## 2024-01-01 RX ORDER — ACETAMINOPHEN 325 MG/1
975 TABLET ORAL EVERY 8 HOURS
Status: DISCONTINUED | OUTPATIENT
Start: 2024-01-01 | End: 2024-01-01

## 2024-01-01 RX ORDER — HALOPERIDOL 5 MG/ML
1 INJECTION INTRAMUSCULAR EVERY 6 HOURS PRN
Status: DISCONTINUED | OUTPATIENT
Start: 2024-01-01 | End: 2024-01-01 | Stop reason: HOSPADM

## 2024-01-01 RX ORDER — SODIUM CHLORIDE, SODIUM LACTATE, POTASSIUM CHLORIDE, CALCIUM CHLORIDE 600; 310; 30; 20 MG/100ML; MG/100ML; MG/100ML; MG/100ML
INJECTION, SOLUTION INTRAVENOUS CONTINUOUS PRN
Status: DISCONTINUED | OUTPATIENT
Start: 2024-01-01 | End: 2024-01-01

## 2024-01-01 RX ORDER — PROCHLORPERAZINE MALEATE 5 MG
5 TABLET ORAL EVERY 6 HOURS PRN
Status: DISCONTINUED | OUTPATIENT
Start: 2024-01-01 | End: 2024-01-01 | Stop reason: HOSPADM

## 2024-01-01 RX ORDER — ACETAMINOPHEN 325 MG/1
650 TABLET ORAL EVERY 4 HOURS PRN
Status: DISCONTINUED | OUTPATIENT
Start: 2024-01-01 | End: 2024-01-01 | Stop reason: HOSPADM

## 2024-01-01 RX ORDER — HYDROMORPHONE HYDROCHLORIDE 1 MG/ML
0.2 INJECTION, SOLUTION INTRAMUSCULAR; INTRAVENOUS; SUBCUTANEOUS
Status: DISCONTINUED | OUTPATIENT
Start: 2024-01-01 | End: 2024-01-01

## 2024-01-01 RX ORDER — MAGNESIUM HYDROXIDE 1200 MG/15ML
LIQUID ORAL PRN
Status: DISCONTINUED | OUTPATIENT
Start: 2024-01-01 | End: 2024-01-01 | Stop reason: HOSPADM

## 2024-01-01 RX ORDER — DEXAMETHASONE SODIUM PHOSPHATE 4 MG/ML
4 INJECTION, SOLUTION INTRA-ARTICULAR; INTRALESIONAL; INTRAMUSCULAR; INTRAVENOUS; SOFT TISSUE
Status: DISCONTINUED | OUTPATIENT
Start: 2024-01-01 | End: 2024-01-01 | Stop reason: HOSPADM

## 2024-01-01 RX ORDER — PIPERACILLIN SODIUM, TAZOBACTAM SODIUM 3; .375 G/15ML; G/15ML
3.38 INJECTION, POWDER, LYOPHILIZED, FOR SOLUTION INTRAVENOUS EVERY 8 HOURS
Status: DISCONTINUED | OUTPATIENT
Start: 2024-01-01 | End: 2024-01-01

## 2024-01-01 RX ORDER — ASPIRIN 81 MG/1
81 TABLET ORAL 2 TIMES DAILY
Status: DISCONTINUED | OUTPATIENT
Start: 2024-01-01 | End: 2024-01-01 | Stop reason: HOSPADM

## 2024-01-01 RX ORDER — ONDANSETRON 4 MG/1
4 TABLET, ORALLY DISINTEGRATING ORAL EVERY 30 MIN PRN
Status: CANCELLED | OUTPATIENT
Start: 2024-01-01

## 2024-01-01 RX ORDER — LOSARTAN POTASSIUM 50 MG/1
50 TABLET ORAL DAILY
Status: DISCONTINUED | OUTPATIENT
Start: 2024-01-01 | End: 2024-01-01 | Stop reason: HOSPADM

## 2024-01-01 RX ORDER — VENLAFAXINE 75 MG/1
75 TABLET ORAL 2 TIMES DAILY
Status: DISCONTINUED | OUTPATIENT
Start: 2024-01-01 | End: 2024-01-01 | Stop reason: HOSPADM

## 2024-01-01 RX ORDER — OXYCODONE HYDROCHLORIDE 5 MG/1
5-10 TABLET ORAL EVERY 4 HOURS PRN
Qty: 20 TABLET | Refills: 0 | Status: SHIPPED | OUTPATIENT
Start: 2024-01-01

## 2024-01-01 RX ORDER — TRANEXAMIC ACID 650 MG/1
1950 TABLET ORAL
Status: DISCONTINUED | OUTPATIENT
Start: 2024-01-01 | End: 2024-01-01 | Stop reason: HOSPADM

## 2024-01-01 RX ORDER — CEFAZOLIN SODIUM 1 G/3ML
1 INJECTION, POWDER, FOR SOLUTION INTRAMUSCULAR; INTRAVENOUS EVERY 8 HOURS
Status: COMPLETED | OUTPATIENT
Start: 2024-01-01 | End: 2024-01-01

## 2024-01-01 RX ORDER — EPHEDRINE SULFATE 50 MG/ML
INJECTION, SOLUTION INTRAMUSCULAR; INTRAVENOUS; SUBCUTANEOUS PRN
Status: DISCONTINUED | OUTPATIENT
Start: 2024-01-01 | End: 2024-01-01

## 2024-01-01 RX ORDER — HYDROMORPHONE HCL IN WATER/PF 6 MG/30 ML
0.2 PATIENT CONTROLLED ANALGESIA SYRINGE INTRAVENOUS
Status: DISCONTINUED | OUTPATIENT
Start: 2024-01-01 | End: 2024-01-01 | Stop reason: HOSPADM

## 2024-01-01 RX ORDER — FENTANYL CITRATE 50 UG/ML
50 INJECTION, SOLUTION INTRAMUSCULAR; INTRAVENOUS ONCE
Status: COMPLETED | OUTPATIENT
Start: 2024-01-01 | End: 2024-01-01

## 2024-01-01 RX ORDER — LOSARTAN POTASSIUM 50 MG/1
50 TABLET ORAL DAILY
COMMUNITY
Start: 2023-01-01

## 2024-01-01 RX ORDER — PIPERACILLIN SODIUM, TAZOBACTAM SODIUM 3; .375 G/15ML; G/15ML
3.38 INJECTION, POWDER, LYOPHILIZED, FOR SOLUTION INTRAVENOUS ONCE
Status: COMPLETED | OUTPATIENT
Start: 2024-01-01 | End: 2024-01-01

## 2024-01-01 RX ORDER — DEXAMETHASONE SODIUM PHOSPHATE 4 MG/ML
4 INJECTION, SOLUTION INTRA-ARTICULAR; INTRALESIONAL; INTRAMUSCULAR; INTRAVENOUS; SOFT TISSUE
Status: CANCELLED | OUTPATIENT
Start: 2024-01-01

## 2024-01-01 RX ORDER — CALCIUM CARBONATE 500 MG/1
1000 TABLET, CHEWABLE ORAL 4 TIMES DAILY PRN
Status: DISCONTINUED | OUTPATIENT
Start: 2024-01-01 | End: 2024-01-01 | Stop reason: HOSPADM

## 2024-01-01 RX ORDER — LIDOCAINE 40 MG/G
CREAM TOPICAL
Status: DISCONTINUED | OUTPATIENT
Start: 2024-01-01 | End: 2024-01-01

## 2024-01-01 RX ORDER — LEVOTHYROXINE SODIUM 175 UG/1
175 TABLET ORAL DAILY
COMMUNITY
Start: 2024-01-01

## 2024-01-01 RX ORDER — ONDANSETRON 4 MG/1
4 TABLET, ORALLY DISINTEGRATING ORAL EVERY 6 HOURS PRN
Status: DISCONTINUED | OUTPATIENT
Start: 2024-01-01 | End: 2024-01-01 | Stop reason: HOSPADM

## 2024-01-01 RX ORDER — ONDANSETRON 4 MG/1
4 TABLET, ORALLY DISINTEGRATING ORAL EVERY 6 HOURS PRN
Status: DISCONTINUED | OUTPATIENT
Start: 2024-01-01 | End: 2024-01-01

## 2024-01-01 RX ORDER — HYDROMORPHONE HCL IN WATER/PF 6 MG/30 ML
0.2 PATIENT CONTROLLED ANALGESIA SYRINGE INTRAVENOUS EVERY 5 MIN PRN
Status: DISCONTINUED | OUTPATIENT
Start: 2024-01-01 | End: 2024-01-01 | Stop reason: HOSPADM

## 2024-01-01 RX ORDER — TRAZODONE HYDROCHLORIDE 50 MG/1
50 TABLET, FILM COATED ORAL AT BEDTIME
Status: DISCONTINUED | OUTPATIENT
Start: 2024-01-01 | End: 2024-01-01 | Stop reason: HOSPADM

## 2024-01-01 RX ORDER — PROPOFOL 10 MG/ML
INJECTION, EMULSION INTRAVENOUS PRN
Status: DISCONTINUED | OUTPATIENT
Start: 2024-01-01 | End: 2024-01-01

## 2024-01-01 RX ORDER — ACETAMINOPHEN 325 MG/1
650 TABLET ORAL EVERY 4 HOURS PRN
Qty: 100 TABLET | Refills: 0 | Status: SHIPPED | OUTPATIENT
Start: 2024-01-01

## 2024-01-01 RX ORDER — TRAZODONE HYDROCHLORIDE 100 MG/1
50 TABLET ORAL AT BEDTIME
COMMUNITY
Start: 2024-01-01

## 2024-01-01 RX ORDER — OXYCODONE HYDROCHLORIDE 5 MG/1
5 TABLET ORAL
Status: CANCELLED | OUTPATIENT
Start: 2024-01-01

## 2024-01-01 RX ORDER — ONDANSETRON 2 MG/ML
INJECTION INTRAMUSCULAR; INTRAVENOUS PRN
Status: DISCONTINUED | OUTPATIENT
Start: 2024-01-01 | End: 2024-01-01

## 2024-01-01 RX ORDER — PROCHLORPERAZINE MALEATE 5 MG
5 TABLET ORAL EVERY 6 HOURS PRN
Status: DISCONTINUED | OUTPATIENT
Start: 2024-01-01 | End: 2024-01-01

## 2024-01-01 RX ORDER — CEFAZOLIN SODIUM/WATER 2 G/20 ML
2 SYRINGE (ML) INTRAVENOUS SEE ADMIN INSTRUCTIONS
Status: DISCONTINUED | OUTPATIENT
Start: 2024-01-01 | End: 2024-01-01 | Stop reason: HOSPADM

## 2024-01-01 RX ORDER — TRANEXAMIC ACID 650 MG/1
1950 TABLET ORAL ONCE
Status: COMPLETED | OUTPATIENT
Start: 2024-01-01 | End: 2024-01-01

## 2024-01-01 RX ORDER — ACETAMINOPHEN 325 MG/1
650 TABLET ORAL EVERY 4 HOURS PRN
Status: DISCONTINUED | OUTPATIENT
Start: 2024-01-01 | End: 2024-01-01

## 2024-01-01 RX ORDER — AMOXICILLIN 250 MG
1 CAPSULE ORAL 2 TIMES DAILY PRN
Status: DISCONTINUED | OUTPATIENT
Start: 2024-01-01 | End: 2024-01-01

## 2024-01-01 RX ORDER — PANTOPRAZOLE SODIUM 40 MG/1
40 TABLET, DELAYED RELEASE ORAL
Status: DISCONTINUED | OUTPATIENT
Start: 2024-01-01 | End: 2024-01-01 | Stop reason: HOSPADM

## 2024-01-01 RX ORDER — ONDANSETRON 2 MG/ML
4 INJECTION INTRAMUSCULAR; INTRAVENOUS EVERY 6 HOURS PRN
Status: DISCONTINUED | OUTPATIENT
Start: 2024-01-01 | End: 2024-01-01 | Stop reason: HOSPADM

## 2024-01-01 RX ORDER — ONDANSETRON 2 MG/ML
4 INJECTION INTRAMUSCULAR; INTRAVENOUS EVERY 6 HOURS PRN
Status: DISCONTINUED | OUTPATIENT
Start: 2024-01-01 | End: 2024-01-01

## 2024-01-01 RX ORDER — CEFAZOLIN SODIUM/WATER 2 G/20 ML
SYRINGE (ML) INTRAVENOUS PRN
Status: DISCONTINUED | OUTPATIENT
Start: 2024-01-01 | End: 2024-01-01

## 2024-01-01 RX ORDER — NALOXONE HYDROCHLORIDE 0.4 MG/ML
0.1 INJECTION, SOLUTION INTRAMUSCULAR; INTRAVENOUS; SUBCUTANEOUS
Status: CANCELLED | OUTPATIENT
Start: 2024-01-01

## 2024-01-01 RX ORDER — PROCHLORPERAZINE 25 MG
12.5 SUPPOSITORY, RECTAL RECTAL EVERY 12 HOURS PRN
Status: DISCONTINUED | OUTPATIENT
Start: 2024-01-01 | End: 2024-01-01

## 2024-01-01 RX ORDER — BARIUM SULFATE 400 MG/ML
SUSPENSION ORAL ONCE
Status: COMPLETED | OUTPATIENT
Start: 2024-01-01 | End: 2024-01-01

## 2024-01-01 RX ORDER — AMOXICILLIN 250 MG
2 CAPSULE ORAL 2 TIMES DAILY PRN
Status: DISCONTINUED | OUTPATIENT
Start: 2024-01-01 | End: 2024-01-01

## 2024-01-01 RX ORDER — OXYCODONE HYDROCHLORIDE 5 MG/1
5 TABLET ORAL EVERY 4 HOURS PRN
Status: DISCONTINUED | OUTPATIENT
Start: 2024-01-01 | End: 2024-01-01

## 2024-01-01 RX ORDER — OXYCODONE HYDROCHLORIDE 5 MG/1
10 TABLET ORAL
Status: CANCELLED | OUTPATIENT
Start: 2024-01-01

## 2024-01-01 RX ORDER — HYDROMORPHONE HYDROCHLORIDE 1 MG/ML
0.1 INJECTION, SOLUTION INTRAMUSCULAR; INTRAVENOUS; SUBCUTANEOUS
Status: DISCONTINUED | OUTPATIENT
Start: 2024-01-01 | End: 2024-01-01

## 2024-01-01 RX ORDER — ONDANSETRON 2 MG/ML
4 INJECTION INTRAMUSCULAR; INTRAVENOUS EVERY 30 MIN PRN
Status: CANCELLED | OUTPATIENT
Start: 2024-01-01

## 2024-01-01 RX ORDER — VANCOMYCIN HYDROCHLORIDE 1 G/20ML
1 INJECTION, POWDER, LYOPHILIZED, FOR SOLUTION INTRAVENOUS ONCE
Status: COMPLETED | OUTPATIENT
Start: 2024-01-01 | End: 2024-01-01

## 2024-01-01 RX ORDER — FENTANYL CITRATE 50 UG/ML
50 INJECTION, SOLUTION INTRAMUSCULAR; INTRAVENOUS EVERY 5 MIN PRN
Status: DISCONTINUED | OUTPATIENT
Start: 2024-01-01 | End: 2024-01-01 | Stop reason: HOSPADM

## 2024-01-01 RX ORDER — CEFAZOLIN SODIUM/WATER 2 G/20 ML
2 SYRINGE (ML) INTRAVENOUS
Status: DISCONTINUED | OUTPATIENT
Start: 2024-01-01 | End: 2024-01-01 | Stop reason: HOSPADM

## 2024-01-01 RX ORDER — VENLAFAXINE 75 MG/1
75 TABLET ORAL 2 TIMES DAILY
COMMUNITY

## 2024-01-01 RX ORDER — OXYCODONE HYDROCHLORIDE 5 MG/1
5 TABLET ORAL EVERY 4 HOURS PRN
Status: DISCONTINUED | OUTPATIENT
Start: 2024-01-01 | End: 2024-01-01 | Stop reason: HOSPADM

## 2024-01-01 RX ADMIN — TRAZODONE HYDROCHLORIDE 50 MG: 50 TABLET ORAL at 21:54

## 2024-01-01 RX ADMIN — PANTOPRAZOLE SODIUM 40 MG: 40 TABLET, DELAYED RELEASE ORAL at 15:35

## 2024-01-01 RX ADMIN — TRAZODONE HYDROCHLORIDE 50 MG: 50 TABLET ORAL at 22:10

## 2024-01-01 RX ADMIN — PIPERACILLIN AND TAZOBACTAM 3.38 G: 3; .375 INJECTION, POWDER, FOR SOLUTION INTRAVENOUS at 20:18

## 2024-01-01 RX ADMIN — Medication 2 G: at 20:47

## 2024-01-01 RX ADMIN — ASPIRIN 81 MG: 81 TABLET, COATED ORAL at 14:04

## 2024-01-01 RX ADMIN — Medication 10 MG: at 21:22

## 2024-01-01 RX ADMIN — Medication 80 MG: at 10:29

## 2024-01-01 RX ADMIN — SENNOSIDES AND DOCUSATE SODIUM 1 TABLET: 50; 8.6 TABLET ORAL at 20:50

## 2024-01-01 RX ADMIN — SENNOSIDES AND DOCUSATE SODIUM 1 TABLET: 50; 8.6 TABLET ORAL at 20:17

## 2024-01-01 RX ADMIN — SENNOSIDES AND DOCUSATE SODIUM 1 TABLET: 50; 8.6 TABLET ORAL at 08:23

## 2024-01-01 RX ADMIN — LEVOTHYROXINE SODIUM 175 MCG: 0.12 TABLET ORAL at 10:43

## 2024-01-01 RX ADMIN — ONDANSETRON 4 MG: 2 INJECTION INTRAMUSCULAR; INTRAVENOUS at 21:20

## 2024-01-01 RX ADMIN — VENLAFAXINE 75 MG: 75 TABLET ORAL at 08:23

## 2024-01-01 RX ADMIN — ACETAMINOPHEN 975 MG: 325 TABLET ORAL at 14:04

## 2024-01-01 RX ADMIN — TRAZODONE HYDROCHLORIDE 50 MG: 50 TABLET ORAL at 21:36

## 2024-01-01 RX ADMIN — PANTOPRAZOLE SODIUM 40 MG: 40 TABLET, DELAYED RELEASE ORAL at 06:27

## 2024-01-01 RX ADMIN — LEVOTHYROXINE SODIUM 175 MCG: 0.12 TABLET ORAL at 08:22

## 2024-01-01 RX ADMIN — OXYCODONE HYDROCHLORIDE 2.5 MG: 5 TABLET ORAL at 21:05

## 2024-01-01 RX ADMIN — SODIUM CHLORIDE: 9 INJECTION, SOLUTION INTRAVENOUS at 23:39

## 2024-01-01 RX ADMIN — PHENYLEPHRINE HYDROCHLORIDE 100 MCG: 10 INJECTION INTRAVENOUS at 21:22

## 2024-01-01 RX ADMIN — OXYCODONE 5 MG: 5 TABLET ORAL at 12:35

## 2024-01-01 RX ADMIN — PANTOPRAZOLE SODIUM 40 MG: 40 TABLET, DELAYED RELEASE ORAL at 15:51

## 2024-01-01 RX ADMIN — POLYETHYLENE GLYCOL 3350 17 G: 17 POWDER, FOR SOLUTION ORAL at 08:26

## 2024-01-01 RX ADMIN — VENLAFAXINE 75 MG: 75 TABLET ORAL at 20:17

## 2024-01-01 RX ADMIN — ASPIRIN 81 MG: 81 TABLET, COATED ORAL at 21:05

## 2024-01-01 RX ADMIN — POLYETHYLENE GLYCOL 3350 17 G: 17 POWDER, FOR SOLUTION ORAL at 10:37

## 2024-01-01 RX ADMIN — VENLAFAXINE 75 MG: 75 TABLET ORAL at 08:27

## 2024-01-01 RX ADMIN — ROCURONIUM BROMIDE 30 MG: 10 INJECTION, SOLUTION INTRAVENOUS at 20:53

## 2024-01-01 RX ADMIN — OXYCODONE 5 MG: 5 TABLET ORAL at 01:57

## 2024-01-01 RX ADMIN — OXYCODONE 5 MG: 5 TABLET ORAL at 04:01

## 2024-01-01 RX ADMIN — OXYCODONE HYDROCHLORIDE 2.5 MG: 5 TABLET ORAL at 21:36

## 2024-01-01 RX ADMIN — ASPIRIN 81 MG: 81 TABLET, COATED ORAL at 08:22

## 2024-01-01 RX ADMIN — ACETAMINOPHEN 975 MG: 325 TABLET ORAL at 21:05

## 2024-01-01 RX ADMIN — PHENYLEPHRINE HYDROCHLORIDE 100 MCG: 10 INJECTION INTRAVENOUS at 10:51

## 2024-01-01 RX ADMIN — VENLAFAXINE 75 MG: 75 TABLET ORAL at 20:50

## 2024-01-01 RX ADMIN — PHENYLEPHRINE HYDROCHLORIDE 100 MCG: 10 INJECTION INTRAVENOUS at 22:07

## 2024-01-01 RX ADMIN — PROPOFOL 80 MG: 10 INJECTION, EMULSION INTRAVENOUS at 10:29

## 2024-01-01 RX ADMIN — LOSARTAN POTASSIUM 50 MG: 50 TABLET, FILM COATED ORAL at 08:22

## 2024-01-01 RX ADMIN — PHENYLEPHRINE HYDROCHLORIDE 0.4 MCG/KG/MIN: 10 INJECTION INTRAVENOUS at 21:37

## 2024-01-01 RX ADMIN — ACETAMINOPHEN 975 MG: 325 TABLET ORAL at 21:36

## 2024-01-01 RX ADMIN — PHENYLEPHRINE HYDROCHLORIDE 200 MCG: 10 INJECTION INTRAVENOUS at 21:27

## 2024-01-01 RX ADMIN — SODIUM CHLORIDE, POTASSIUM CHLORIDE, SODIUM LACTATE AND CALCIUM CHLORIDE: 600; 310; 30; 20 INJECTION, SOLUTION INTRAVENOUS at 09:54

## 2024-01-01 RX ADMIN — DEXMEDETOMIDINE HYDROCHLORIDE 8 MCG: 100 INJECTION, SOLUTION INTRAVENOUS at 21:54

## 2024-01-01 RX ADMIN — SODIUM CHLORIDE, POTASSIUM CHLORIDE, SODIUM LACTATE AND CALCIUM CHLORIDE: 600; 310; 30; 20 INJECTION, SOLUTION INTRAVENOUS at 17:07

## 2024-01-01 RX ADMIN — LEVOTHYROXINE SODIUM 175 MCG: 0.12 TABLET ORAL at 14:04

## 2024-01-01 RX ADMIN — PANTOPRAZOLE SODIUM 40 MG: 40 TABLET, DELAYED RELEASE ORAL at 06:06

## 2024-01-01 RX ADMIN — CEFAZOLIN 1 G: 1 INJECTION, POWDER, FOR SOLUTION INTRAMUSCULAR; INTRAVENOUS at 10:01

## 2024-01-01 RX ADMIN — PIPERACILLIN AND TAZOBACTAM 3.38 G: 3; .375 INJECTION, POWDER, FOR SOLUTION INTRAVENOUS at 00:39

## 2024-01-01 RX ADMIN — OXYCODONE HYDROCHLORIDE 2.5 MG: 5 TABLET ORAL at 14:49

## 2024-01-01 RX ADMIN — LOSARTAN POTASSIUM 50 MG: 50 TABLET, FILM COATED ORAL at 08:31

## 2024-01-01 RX ADMIN — VENLAFAXINE 75 MG: 75 TABLET ORAL at 10:38

## 2024-01-01 RX ADMIN — POLYETHYLENE GLYCOL 3350 17 G: 17 POWDER, FOR SOLUTION ORAL at 08:23

## 2024-01-01 RX ADMIN — LIDOCAINE HYDROCHLORIDE 3 ML: 10 INJECTION, SOLUTION EPIDURAL; INFILTRATION; INTRACAUDAL; PERINEURAL at 10:29

## 2024-01-01 RX ADMIN — PIPERACILLIN AND TAZOBACTAM 3.38 G: 3; .375 INJECTION, POWDER, FOR SOLUTION INTRAVENOUS at 04:54

## 2024-01-01 RX ADMIN — ASPIRIN 81 MG: 81 TABLET, COATED ORAL at 20:17

## 2024-01-01 RX ADMIN — SUGAMMADEX 150 MG: 100 INJECTION, SOLUTION INTRAVENOUS at 22:14

## 2024-01-01 RX ADMIN — ONDANSETRON 4 MG: 2 INJECTION INTRAMUSCULAR; INTRAVENOUS at 10:34

## 2024-01-01 RX ADMIN — LEVOTHYROXINE SODIUM 175 MCG: 0.12 TABLET ORAL at 07:37

## 2024-01-01 RX ADMIN — PHENYLEPHRINE HYDROCHLORIDE 100 MCG: 10 INJECTION INTRAVENOUS at 21:25

## 2024-01-01 RX ADMIN — PANTOPRAZOLE SODIUM 40 MG: 40 TABLET, DELAYED RELEASE ORAL at 06:45

## 2024-01-01 RX ADMIN — VENLAFAXINE 75 MG: 75 TABLET ORAL at 14:03

## 2024-01-01 RX ADMIN — PANTOPRAZOLE SODIUM 40 MG: 40 TABLET, DELAYED RELEASE ORAL at 06:30

## 2024-01-01 RX ADMIN — PHENYLEPHRINE HYDROCHLORIDE 100 MCG: 10 INJECTION INTRAVENOUS at 21:35

## 2024-01-01 RX ADMIN — ACETAMINOPHEN 975 MG: 325 TABLET ORAL at 06:44

## 2024-01-01 RX ADMIN — BARIUM SULFATE: 400 SUSPENSION ORAL at 10:31

## 2024-01-01 RX ADMIN — ASPIRIN 81 MG: 81 TABLET, COATED ORAL at 20:50

## 2024-01-01 RX ADMIN — ASPIRIN 81 MG: 81 TABLET, COATED ORAL at 08:26

## 2024-01-01 RX ADMIN — Medication 10 MG: at 21:26

## 2024-01-01 RX ADMIN — SENNOSIDES AND DOCUSATE SODIUM 1 TABLET: 50; 8.6 TABLET ORAL at 08:26

## 2024-01-01 RX ADMIN — TRANEXAMIC ACID 1 G: 1 INJECTION, SOLUTION INTRAVENOUS at 21:20

## 2024-01-01 RX ADMIN — FENTANYL CITRATE 50 MCG: 50 INJECTION INTRAMUSCULAR; INTRAVENOUS at 21:16

## 2024-01-01 RX ADMIN — CEFAZOLIN 1 G: 1 INJECTION, POWDER, FOR SOLUTION INTRAMUSCULAR; INTRAVENOUS at 17:07

## 2024-01-01 RX ADMIN — OXYCODONE 5 MG: 5 TABLET ORAL at 06:26

## 2024-01-01 RX ADMIN — VENLAFAXINE 75 MG: 75 TABLET ORAL at 21:36

## 2024-01-01 RX ADMIN — VENLAFAXINE 75 MG: 75 TABLET ORAL at 21:05

## 2024-01-01 RX ADMIN — FENTANYL CITRATE 100 MCG: 50 INJECTION INTRAMUSCULAR; INTRAVENOUS at 10:30

## 2024-01-01 RX ADMIN — SENNOSIDES AND DOCUSATE SODIUM 1 TABLET: 50; 8.6 TABLET ORAL at 21:05

## 2024-01-01 RX ADMIN — OXYCODONE HYDROCHLORIDE 2.5 MG: 5 TABLET ORAL at 15:31

## 2024-01-01 RX ADMIN — SODIUM CHLORIDE, POTASSIUM CHLORIDE, SODIUM LACTATE AND CALCIUM CHLORIDE: 600; 310; 30; 20 INJECTION, SOLUTION INTRAVENOUS at 22:09

## 2024-01-01 RX ADMIN — ASPIRIN 81 MG: 81 TABLET, COATED ORAL at 10:38

## 2024-01-01 RX ADMIN — ACETAMINOPHEN 975 MG: 325 TABLET ORAL at 10:37

## 2024-01-01 RX ADMIN — AMOXICILLIN AND CLAVULANATE POTASSIUM 1 TABLET: 875; 125 TABLET, FILM COATED ORAL at 13:44

## 2024-01-01 RX ADMIN — PIPERACILLIN AND TAZOBACTAM 3.38 G: 3; .375 INJECTION, POWDER, FOR SOLUTION INTRAVENOUS at 12:15

## 2024-01-01 RX ADMIN — PIPERACILLIN AND TAZOBACTAM 3.38 G: 3; .375 INJECTION, POWDER, FOR SOLUTION INTRAVENOUS at 10:27

## 2024-01-01 RX ADMIN — PROPOFOL 100 MG: 10 INJECTION, EMULSION INTRAVENOUS at 20:53

## 2024-01-01 RX ADMIN — LOSARTAN POTASSIUM 50 MG: 50 TABLET, FILM COATED ORAL at 10:38

## 2024-01-01 RX ADMIN — TRAZODONE HYDROCHLORIDE 50 MG: 50 TABLET ORAL at 20:51

## 2024-01-01 RX ADMIN — SENNOSIDES AND DOCUSATE SODIUM 1 TABLET: 50; 8.6 TABLET ORAL at 14:05

## 2024-01-01 RX ADMIN — LEVOTHYROXINE SODIUM 175 MCG: 0.12 TABLET ORAL at 08:26

## 2024-01-01 RX ADMIN — PIPERACILLIN AND TAZOBACTAM 3.38 G: 3; .375 INJECTION, POWDER, FOR SOLUTION INTRAVENOUS at 16:50

## 2024-01-01 RX ADMIN — OXYCODONE 5 MG: 5 TABLET ORAL at 13:23

## 2024-01-01 RX ADMIN — SODIUM CHLORIDE: 9 INJECTION, SOLUTION INTRAVENOUS at 16:49

## 2024-01-01 RX ADMIN — PHENYLEPHRINE HYDROCHLORIDE 50 MCG: 10 INJECTION INTRAVENOUS at 10:53

## 2024-01-01 RX ADMIN — PROPOFOL 50 MCG/KG/MIN: 10 INJECTION, EMULSION INTRAVENOUS at 21:03

## 2024-01-01 RX ADMIN — LOSARTAN POTASSIUM 50 MG: 50 TABLET, FILM COATED ORAL at 14:04

## 2024-01-01 RX ADMIN — VENLAFAXINE 75 MG: 75 TABLET ORAL at 07:37

## 2024-01-01 RX ADMIN — FENTANYL CITRATE 50 MCG: 50 INJECTION, SOLUTION INTRAMUSCULAR; INTRAVENOUS at 17:41

## 2024-01-01 RX ADMIN — FENTANYL CITRATE 25 MCG: 50 INJECTION INTRAMUSCULAR; INTRAVENOUS at 21:54

## 2024-01-01 RX ADMIN — LIDOCAINE HYDROCHLORIDE 5 ML: 10 INJECTION, SOLUTION INFILTRATION; PERINEURAL at 20:53

## 2024-01-01 RX ADMIN — PHENYLEPHRINE HYDROCHLORIDE 200 MCG: 10 INJECTION INTRAVENOUS at 22:05

## 2024-01-01 RX ADMIN — SODIUM CHLORIDE, POTASSIUM CHLORIDE, SODIUM LACTATE AND CALCIUM CHLORIDE: 600; 310; 30; 20 INJECTION, SOLUTION INTRAVENOUS at 20:47

## 2024-01-01 RX ADMIN — ACETAMINOPHEN 975 MG: 325 TABLET ORAL at 04:04

## 2024-01-01 RX ADMIN — OXYCODONE 5 MG: 5 TABLET ORAL at 20:54

## 2024-01-01 RX ADMIN — PROPOFOL 100 MCG/KG/MIN: 10 INJECTION, EMULSION INTRAVENOUS at 10:34

## 2024-01-01 RX ADMIN — PHENYLEPHRINE HYDROCHLORIDE 100 MCG: 10 INJECTION INTRAVENOUS at 10:48

## 2024-01-01 RX ADMIN — TRAZODONE HYDROCHLORIDE 50 MG: 50 TABLET ORAL at 15:35

## 2024-01-01 RX ADMIN — PANTOPRAZOLE SODIUM 40 MG: 40 TABLET, DELAYED RELEASE ORAL at 17:07

## 2024-01-01 RX ADMIN — ACETAMINOPHEN 975 MG: 325 TABLET ORAL at 12:35

## 2024-01-01 RX ADMIN — Medication 5 MG: at 21:36

## 2024-01-01 RX ADMIN — SENNOSIDES AND DOCUSATE SODIUM 1 TABLET: 50; 8.6 TABLET ORAL at 10:38

## 2024-01-01 RX ADMIN — PHENYLEPHRINE HYDROCHLORIDE 100 MCG: 10 INJECTION INTRAVENOUS at 10:40

## 2024-01-01 ASSESSMENT — ACTIVITIES OF DAILY LIVING (ADL)
ADLS_ACUITY_SCORE: 51
ADLS_ACUITY_SCORE: 57
ADLS_ACUITY_SCORE: 42
ADLS_ACUITY_SCORE: 53
ADLS_ACUITY_SCORE: 47
ADLS_ACUITY_SCORE: 53
ADLS_ACUITY_SCORE: 43
ADLS_ACUITY_SCORE: 42
ADLS_ACUITY_SCORE: 43
ADLS_ACUITY_SCORE: 53
ADLS_ACUITY_SCORE: 43
ADLS_ACUITY_SCORE: 43
ADLS_ACUITY_SCORE: 53
ADLS_ACUITY_SCORE: 42
ADLS_ACUITY_SCORE: 43
ADLS_ACUITY_SCORE: 43
ADLS_ACUITY_SCORE: 42
ADLS_ACUITY_SCORE: 53
ADLS_ACUITY_SCORE: 42
ADLS_ACUITY_SCORE: 53
ADLS_ACUITY_SCORE: 52
ADLS_ACUITY_SCORE: 42
ADLS_ACUITY_SCORE: 53
ADLS_ACUITY_SCORE: 47
DEPENDENT_IADLS:: CLEANING;COOKING;LAUNDRY;SHOPPING;MEAL PREPARATION;MEDICATION MANAGEMENT;TRANSPORTATION
ADLS_ACUITY_SCORE: 38
ADLS_ACUITY_SCORE: 53
ADLS_ACUITY_SCORE: 40
ADLS_ACUITY_SCORE: 43
ADLS_ACUITY_SCORE: 55
ADLS_ACUITY_SCORE: 53
ADLS_ACUITY_SCORE: 47
ADLS_ACUITY_SCORE: 53
ADLS_ACUITY_SCORE: 47
ADLS_ACUITY_SCORE: 53
ADLS_ACUITY_SCORE: 42
ADLS_ACUITY_SCORE: 52
ADLS_ACUITY_SCORE: 53
ADLS_ACUITY_SCORE: 40
ADLS_ACUITY_SCORE: 51
ADLS_ACUITY_SCORE: 57
ADLS_ACUITY_SCORE: 43
ADLS_ACUITY_SCORE: 56
ADLS_ACUITY_SCORE: 42
ADLS_ACUITY_SCORE: 55
ADLS_ACUITY_SCORE: 53
ADLS_ACUITY_SCORE: 56
ADLS_ACUITY_SCORE: 47
ADLS_ACUITY_SCORE: 56
ADLS_ACUITY_SCORE: 42
ADLS_ACUITY_SCORE: 42
ADLS_ACUITY_SCORE: 55
ADLS_ACUITY_SCORE: 43
ADLS_ACUITY_SCORE: 47
ADLS_ACUITY_SCORE: 43
ADLS_ACUITY_SCORE: 43
ADLS_ACUITY_SCORE: 57
ADLS_ACUITY_SCORE: 47
ADLS_ACUITY_SCORE: 57
ADLS_ACUITY_SCORE: 47
ADLS_ACUITY_SCORE: 43
ADLS_ACUITY_SCORE: 47
ADLS_ACUITY_SCORE: 40
ADLS_ACUITY_SCORE: 47
ADLS_ACUITY_SCORE: 43
ADLS_ACUITY_SCORE: 53
ADLS_ACUITY_SCORE: 43
ADLS_ACUITY_SCORE: 53
ADLS_ACUITY_SCORE: 43
ADLS_ACUITY_SCORE: 42
ADLS_ACUITY_SCORE: 47
ADLS_ACUITY_SCORE: 43
ADLS_ACUITY_SCORE: 43
ADLS_ACUITY_SCORE: 42
ADLS_ACUITY_SCORE: 47
ADLS_ACUITY_SCORE: 43
ADLS_ACUITY_SCORE: 43
ADLS_ACUITY_SCORE: 47
ADLS_ACUITY_SCORE: 43
ADLS_ACUITY_SCORE: 43
ADLS_ACUITY_SCORE: 47
ADLS_ACUITY_SCORE: 49
ADLS_ACUITY_SCORE: 47
ADLS_ACUITY_SCORE: 55
ADLS_ACUITY_SCORE: 43
ADLS_ACUITY_SCORE: 47
ADLS_ACUITY_SCORE: 42
ADLS_ACUITY_SCORE: 43
ADLS_ACUITY_SCORE: 57
ADLS_ACUITY_SCORE: 42
ADLS_ACUITY_SCORE: 43
ADLS_ACUITY_SCORE: 40
ADLS_ACUITY_SCORE: 47
ADLS_ACUITY_SCORE: 53
ADLS_ACUITY_SCORE: 47
ADLS_ACUITY_SCORE: 42
ADLS_ACUITY_SCORE: 38
ADLS_ACUITY_SCORE: 43
ADLS_ACUITY_SCORE: 47
ADLS_ACUITY_SCORE: 56
ADLS_ACUITY_SCORE: 56
ADLS_ACUITY_SCORE: 38
ADLS_ACUITY_SCORE: 42
ADLS_ACUITY_SCORE: 43
ADLS_ACUITY_SCORE: 53
ADLS_ACUITY_SCORE: 52
ADLS_ACUITY_SCORE: 53
ADLS_ACUITY_SCORE: 47
ADLS_ACUITY_SCORE: 42
ADLS_ACUITY_SCORE: 56
ADLS_ACUITY_SCORE: 43
ADLS_ACUITY_SCORE: 42
ADLS_ACUITY_SCORE: 43
ADLS_ACUITY_SCORE: 56
ADLS_ACUITY_SCORE: 43
ADLS_ACUITY_SCORE: 43
ADLS_ACUITY_SCORE: 53
ADLS_ACUITY_SCORE: 53
ADLS_ACUITY_SCORE: 43
ADLS_ACUITY_SCORE: 53
ADLS_ACUITY_SCORE: 53
ADLS_ACUITY_SCORE: 56
ADLS_ACUITY_SCORE: 43
ADLS_ACUITY_SCORE: 56
ADLS_ACUITY_SCORE: 53
ADLS_ACUITY_SCORE: 53
ADLS_ACUITY_SCORE: 49
ADLS_ACUITY_SCORE: 47
ADLS_ACUITY_SCORE: 47
ADLS_ACUITY_SCORE: 56
ADLS_ACUITY_SCORE: 42
ADLS_ACUITY_SCORE: 53

## 2024-01-01 ASSESSMENT — COLUMBIA-SUICIDE SEVERITY RATING SCALE - C-SSRS
1. IN THE PAST MONTH, HAVE YOU WISHED YOU WERE DEAD OR WISHED YOU COULD GO TO SLEEP AND NOT WAKE UP?: NO
6. HAVE YOU EVER DONE ANYTHING, STARTED TO DO ANYTHING, OR PREPARED TO DO ANYTHING TO END YOUR LIFE?: NO
2. HAVE YOU ACTUALLY HAD ANY THOUGHTS OF KILLING YOURSELF IN THE PAST MONTH?: NO

## 2024-01-01 ASSESSMENT — LIFESTYLE VARIABLES: TOBACCO_USE: 1

## 2024-01-01 ASSESSMENT — COPD QUESTIONNAIRES
COPD: 1
COPD: 1

## 2024-02-22 NOTE — PROGRESS NOTES
"SPEECH LANGUAGE PATHOLOGY EVALUATION    See electronic medical record for Abuse and Falls Screening details.    Subjective   Presenting condition or subjective complaint:  chronic cough  Date of onset: 2/22/24  Relevant medical history:   Hx esophagectomy with retrosternal gastric pull through. Per esophagram dated 11/1/22 pt did have \"tracheal aspiration\". No aspiration noted during VFSS on 5/23/19. Pt has had pna x2 in the last 18 months but did not appear to have \"aspiration pna\". No cough reported with eating. Pt caregiver, Jazmine, reports that when she naps, she will wake up and not be able to breath or cough.         Objective     SWALLOW EVALUTION  Current Diet/Method of Nutritional Intake: oral diet, thin liquids (level 0), regular diet        CLINICAL SWALLOW EVALUATION  Oral Motor Function: generally intact  Secretion management: WFL  Mucosal quality: good  Mandibular function: intact  Oral labial function: WFL  Lingual function: WFL  Velar function: intact      No oral motor deficit observed that could cause an oral dysphagia. Oral Motor structures appear intact to support safe swallowing.      VIDEOFLUOROSCOPIC SWALLOW STUDY  Views Taken: left lateral   Physical location of procedure: [facility]    VFSS textures trialed:   VFSS Eval: Thin Liquids  Mode of Presentation: cup   Preparatory Phase: WFL  Oral Phase: premature pharyngeal entry  Bolus Location When Swallow Initiated: posterior laryngeal surface of epiglottis  Pharyngeal Phase: impaired hyolaryngel excursion, impaired epiglottic movement  Rosenbeck's Penetration Aspiration Scale: 5 - contrast contacts vocal cords, visible residue remains (penetration)  Response to Aspiration: absent response  Strategies and Compensations: chin tuck  Diagnostic Statement: Pt with delayed swallow trigger/timing causing inconsistent transient, shallow penetration to deep penetration to the vocal cords with no cough elicited. Chin tuck " strategy did reduce deep penetration risk.    VFSS Eval: Mildly Thick Liquids  Mode of Presentation: cup, self-fed   Preparatory Phase: WFL  Oral Phase: premature pharyngeal entry  Bolus Location When Swallow Initiated: posterior laryngeal surface of epiglottis  Pharyngeal Phase: impaired epiglottic movement  Rosenbeck's Penetration Aspiration Scale: 5 - contrast contacts vocal cords, visible residue remains (penetration)  Response to Aspiration: absent response  Diagnostic Statement: Pt with delayed swallow trigger/timing causing inconsistent transient, shallow penetration to deep penetration to the vocal cords with no cough elicited.     VFSS Eval: Purees  Mode of Presentation: spoon, fed by clinician   Preparatory Phase: WFL  Oral Phase: WFL  Bolus Location When Swallow Initiated: posterior laryngeal surface of epiglottis  Pharyngeal Phase: impaired epiglottic movement  Rosenbeck s Penetration Aspiration Scale: 1 - no aspiration, contrast does not enter airway  Diagnostic Statement: delayed swallow trigger with no penetration or aspiration.    VFSS Eval: Solids  Mode of Presentation: spoon, fed by clinician barium coated saltine cracker  Preparatory Phase: WFL  Oral Phase: WFL  Bolus Location When Swallow Initiated: posterior laryngeal surface of epiglottis  Pharyngeal Phase: impaired epiglottic movement   Rosenbeck s Penetration Aspiration Scale: 1 - no aspiration, contrast does not enter airway  Diagnostic Statement: delayed swallow trigger with no penetration or aspiration.    ESOPHAGEAL PHASE OF SWALLOW  Pt has a known esophageal dysphagia, per hx documented above. This can cause pharyngeal dysphagia. There was a significant amount of air observed in the upper esophagus during this study. Baruim of all textures did appear to pass through the upper esophageal sphincter with no major concern as there was only trace amount of stasis.      SWALLOW ASSESSMENT CLINICAL IMPRESSIONS AND RATIONALE  Diet Consistency  Recommendations: oral diet, thin liquids (level 0), regular diet    Recommended Feeding/Eating Techniques: small bolus size   Medication Administration Recommendations: per pt preference    Assessment & Plan   CLINICAL IMPRESSIONS     Impression/Assessment: Modified Clinical Bedside Assessment along with Video Fluoroscopic Swallow Study completed today. No oral motor deficit observed that could cause an oral dysphagia. Oral Motor structures appear intact to support safe swallowing. VFSS results/Impressions showed pre-spill of oral bolus into pharynx prior swallow with all textures assessed with intact tongue base retraction. She had delayed swallow trigger with all textures assessed. This caused inconsistent transient, shallow penetration to deep penetration to the vocal cords (? An episode of trace aspiration x1 with mildly thick) with no cough elicited. Chin tuck did reduce penetration risk with mildly thick. Little to no pharyngeal stasis noted after the swallow with all textures analyzed. There was significant amount of air noted in the upper esophagus consistent with esophageal dysphagia following esophagectomy. Unfortunately, this will cause pharyngeal dysphagia which will and has placed her at an aspiration risk (this was noted during her esophagram in 2022). Pt has been hospitalized for pna 2x's in the last 18 months, though it was not clear if it was an aspiration pna. Per discussion with pt and caregiver, it is recommended that pt continue current diet. If she gets hospitalized again for pna, it is recommended to determine if aspiration could be culprit and to try and teach strategy of chin tuck. However, pt is a poor candidate for learning this strategy given her memory deficit.      Education Assessment: Pt and caregiver, Jazmine educated on results and recommendations. They verbalized understanding.       Risks and benefits of evaluation/treatment have been explained.   Patient/Family/caregiver agrees  with Plan of Care.     Evaluation Time: Clinical Bedside Eval = 10 minutes; VFSS= 20 minutes         Signing Clinician: Maine Ortez, FAM    [facility]                                                                                   OUTPATIENT SPEECH LANGUAGE PATHOLOGY    Referring Provider:  Leila Philip V    Initial Assessment  See Epic Evaluation-

## 2024-07-28 PROBLEM — S72.002A CLOSED FRACTURE OF NECK OF LEFT FEMUR, INITIAL ENCOUNTER (H): Status: ACTIVE | Noted: 2024-01-01

## 2024-07-28 NOTE — ED PROVIDER NOTES
EMERGENCY DEPARTMENT ENCOUNTER      NAME: Myrtle Vaz  AGE: 78 year old female  YOB: 1945  MRN: [id number]  EVALUATION DATE & TIME: 2024  4:07 PM    PCP: Leila Philip    ED PROVIDER: Leila Lane M.D.      Chief Complaint   Patient presents with    Hip Pain       FINAL IMPRESSION:  1. Closed fracture of neck of left femur, initial encounter (H)        ED COURSE & MEDICAL DECISION MAKIN. Closed left femur fracture.  Sent in for recurrent fall today with known left femoral neck fracture.  CT head and XR pelvis and left hip ordered due to patient's hx of SAH 24 and unwitnessed fall again today, cannot see hip images from earlier today or yesterday from [facility] so had to repeat.  I ordered basic lab work and urinalysis.  I reviewed results. No UTI. No significant change to Bun/Cr from previous. Neg head CT. Pelvis and left hip XR shows impacted left femoral neck fracture.  I spoke to Orthopedic surgery and hospitalist. Plan is to go to OR tomorrow.  I discussed findings with patient's family.  I admitted to medicine.        4:28 PM I met with the patient to gather history and to perform my initial exam. I discussed the plan for care while in the Emergency Department.  1711: I asked [facility] to try to obtain [facility] records, they paged Dr. [facility] who cannot help push images but confirms left femoral neck fracture on imaging.  7:12 PM I decided the patient needs to be admitted.  1848: I spoke to Dr. Yvette Hart who states patient will go to OR tomorrow.  8:15 PM I spoke to Dr. Carter, hospitalist, for admission.     Pertinent Labs & Imaging studies reviewed. (See chart for details).    Medical Decision Making  Obtained supplemental history:Supplemental history obtained?: Documented in chart and Family Member/Significant Other  Reviewed external records: External records reviewed?: Documented in chart and Other: admission records [facility] 24 for SAH  Care impacted by chronic  illness:Hyperlipidemia and Hypertension hx of hypothyroidism, afib, not on blood thinners, dementia, hx of SAH  Care significantly affected by social determinants of health:Access to Medical Care  Did you consider but not order tests?: Work up considered but not performed and documented in chart, if applicable  Did you interpret images independently?: Independent interpretation of ECG and images noted in documentation, when applicable.  Consultation discussion with other provider:Did you involve another provider (consultant, , pharmacy, etc.)?: I discussed the care with another health care provider, see documentation for details.  Admit.    At the conclusion of the encounter I discussed the results of all of the tests and the disposition. The questions were answered. The patient or family acknowledged understanding and was agreeable with the care plan.      CRITICAL CARE:  N/A    HPI    Patient information was obtained from: patient and patient's family member    Use of : N/A.       Myrtle Vaz is a 78 year old female who presents with hip pain.    Per patient and patient's daughter, on either the 16th or 17th of July she went to sit on the edge of her tub and fell backwards and hit her head. Thus she had to stay in the hospital but was discharged. Then over the past week leading up to the visit she has began to move around slower and complain that her left hip hurts. She went to urgent care this morning (07/28/2024) and before leaving she fell again. When asked about where her hip hurts she pointed to the outer portion or her left hip.    Her pain does not radiates from her hip. Normally at home she gets around via a walker but often tries to walk without it. She does have dementia but her daughter said her mental state doesn't seem any different from baseline. She had a CT scan done yesterday (07/27/2024) on her hip. No daily medications were mentioned.    Per Chart Review:  The patient's family  member had the results from her CT scan from yesterday (these results were not available in the chart review tab). It was found that she has a closed femoral neck fracture in her left side.      REVIEW OF SYSTEMS  All other systems negative unless noted in HPI.    PAST MEDICAL HISTORY:  Past Medical History:   Diagnosis Date    Depression     History of atrial fibrillation     HLD (hyperlipidemia)     HTN (hypertension)     Hypothyroidism        PAST SURGICAL HISTORY:  Past Surgical History:   Procedure Laterality Date    BRONCHOSCOPY (RIGID OR FLEXIBLE), DIAGNOSTIC N/A 5/2/2019    Procedure: BRONCHOSCOPY, WITH BAL;  Surgeon: Ally Ybarra MD;  Location: [facility]    COMBINED ESOPHAGOSCOPY, GASTROSCOPY, DUODENOSCOPY (EGD), REPLACE ESOPHAGEAL STENT N/A 5/6/2019    Procedure: ESOPHAGOGASTRODUODENOSCOPY WITH ESOPHAGEAL STENT REPLACEMENT, IRRIGATION AND DEBRIDEMENT OF LEFT NECK, WOUND VAC PLACEMENT;  Surgeon: Harley Murguia MD;  Location: [facility]    COMBINED ESOPHAGOSCOPY, GASTROSCOPY, DUODENOSCOPY (EGD), REPLACE ESOPHAGEAL STENT N/A 6/10/2019    Procedure: Esophagogastroduodenoscopy and Stent Removal;  Surgeon: Ally Ybarra MD;  Location: [facility]    ESOPHAGOGASTRECTOMY N/A 4/26/2019    Procedure: Redo Laparotomy SUBSTERNAL Esophagogastrostomy, Pharyngostomy, Intraoperative EGD, PARTIAL STERNOTOMY, LEFT PHARYNGOSTOMY,;  Surgeon: Temitope Bullock MD;  Location: [facility]    ESOPHAGOSCOPY, GASTROSCOPY, DUODENOSCOPY (EGD), COMBINED N/A 11/21/2018    Procedure: COMBINED ESOPHAGOSCOPY, GASTROSCOPY, DUODENOSCOPY (EGD);  Surgeon: Temitope Bullock MD;  Location: [facility]    ESOPHAGOSCOPY, GASTROSCOPY, DUODENOSCOPY (EGD), DILATATION, COMBINED N/A 10/10/2019    Procedure: Esophagogastroduodenoscopy with Botox injection of the Pylorus;  Surgeon: Temitope Bullock MD;  Location: [facility]    ESOPHAGOSCOPY, GASTROSCOPY, DUODENOSCOPY (EGD), DILATATION, COMBINED N/A 5/10/2022    Procedure: ESOPHAGOGASTRODUODENOSCOPY, WITH  DILATION;  Surgeon: Temitope Bullock MD;  Location: [facility]    ESOPHAGOSCOPY, GASTROSCOPY, DUODENOSCOPY (EGD), DILATATION, COMBINED N/A 1/17/2023    Procedure: ESOPHAGOGASTRODUODENOSCOPY, WITH DILATION;  Surgeon: eTmitope Bullock MD;  Location: [facility]    HERNIORRHAPHY HIATAL  11/2018    HYSTERECTOMY      HYSTERECTOMY  1998    IRRIGATION AND DEBRIDEMENT CHEST WASHOUT, COMBINED N/A 10/10/2019    Procedure: Chest wound exploration;  Surgeon: Temitope Bullock MD;  Location: [facility]    LAPAROSCOPIC HERNIORRHAPHY HIATAL N/A 11/21/2018    Procedure: Midline laparotomy, Trans-hiatal esophagogasterectomy, gastrostomy, J-tube placement, G-tube placement, bilateral pleural chest tube placement, mediastinal abcess drainage, spit fistula creation, Esophagastrodueodenoscopy;  Surgeon: Temitope Bullock MD;  Location: [facility]    OOPHORECTOMY Bilateral 1998    ROTATOR CUFF REPAIR RT/LT      THYROIDECTOMY       for hyperthroidism    ZZC TOTAL ABDOM HYSTERECTOMY      Description: Total Abdominal Hysterectomy;  Recorded: 08/31/2012;         CURRENT MEDICATIONS:    Current Facility-Administered Medications   Medication Dose Route Frequency Provider Last Rate Last Admin    [START ON 7/31/2024] acetaminophen (TYLENOL) tablet 650 mg  650 mg Oral Q4H PRN Marcos Carter DO        acetaminophen (TYLENOL) tablet 975 mg  975 mg Oral Q8H Marcos Carter DO   975 mg at 07/28/24 2136    calcium carbonate (TUMS) chewable tablet 1,000 mg  1,000 mg Oral 4x Daily PRN Marcos Carter DO        haloperidol lactate (HALDOL) injection 1 mg  1 mg Intravenous Q6H PRN Marcos Carter DO        HOLD: ALL Anticoagulant medications until AFTER surgery   Does not apply HOLD Marcos Carter DO        HYDROmorphone (PF) (DILAUDID) injection 0.1 mg  0.1 mg Intravenous Q2H PRN Marcos Carter DO        Or    HYDROmorphone (PF) (DILAUDID) injection 0.2 mg  0.2 mg Intravenous Q2H PRN Marcos Carter DO         levothyroxine (SYNTHROID/LEVOTHROID) tablet 175 mcg  175 mcg Oral Daily Marcos Carter DO        lidocaine (LMX4) cream   Topical Q1H PRN Marcos Carter DO        lidocaine 1 % 0.1-1 mL  0.1-1 mL Other Q1H PRN Marcos Carter DO        [START ON 7/30/2024] losartan (COZAAR) tablet 50 mg  50 mg Oral Daily Marcos Carter DO        ondansetron (ZOFRAN ODT) ODT tab 4 mg  4 mg Oral Q6H PRN Marcos Carter DO        Or    ondansetron (ZOFRAN) injection 4 mg  4 mg Intravenous Q6H PRN Marcos Carter DO        ondansetron (ZOFRAN ODT) ODT tab 4 mg  4 mg Oral Q6H PRN Marcos Carter DO        Or    ondansetron (ZOFRAN) injection 4 mg  4 mg Intravenous Q6H PRN Marcos Carter DO        oxyCODONE IR (ROXICODONE) half-tab 2.5 mg  2.5 mg Oral Q4H PRN Marcos Carter DO   2.5 mg at 07/28/24 2136    Or    oxyCODONE (ROXICODONE) tablet 5 mg  5 mg Oral Q4H PRN Marcos Carter DO        pantoprazole (PROTONIX) EC tablet 40 mg  40 mg Oral BID AC Marcos Carter DO        prochlorperazine (COMPAZINE) injection 5 mg  5 mg Intravenous Q6H PRN Marcos Carter DO        Or    prochlorperazine (COMPAZINE) tablet 5 mg  5 mg Oral Q6H PRN Marcos Carter DO        Or    prochlorperazine (COMPAZINE) suppository 12.5 mg  12.5 mg Rectal Q12H PRN Marcos Carter DO        senna-docusate (SENOKOT-S/PERICOLACE) 8.6-50 MG per tablet 1 tablet  1 tablet Oral BID PRN Marcos Carter DO        Or    senna-docusate (SENOKOT-S/PERICOLACE) 8.6-50 MG per tablet 2 tablet  2 tablet Oral BID PRN Marcos Carter DO        sodium chloride (PF) 0.9% PF flush 3 mL  3 mL Intracatheter Q8H Marcos Carter DO        sodium chloride (PF) 0.9% PF flush 3 mL  3 mL Intracatheter q1 min prn Marcos Carter DO        sodium chloride 0.9 % infusion   Intravenous Continuous Marcos Carter DO 75 mL/hr at 07/28/24 2339 New Bag at 07/28/24 2339    traZODone (DESYREL) half-tab 25-50 mg  25-50 mg Oral  Daily PRN Marcos Carter DO        traZODone (DESYREL) tablet 50 mg  50 mg Oral At Bedtime Marcos Carter DO   50 mg at 24    venlafaxine (EFFEXOR) tablet 75 mg  75 mg Oral BID Marcos Carter DO   75 mg at 24     Facility-Administered Medications Ordered in Other Encounters   Medication Dose Route Frequency Provider Last Rate Last Admin    heparin lock flush 10 UNIT/ML injection 5-10 mL  5-10 mL Intracatheter Q24H Arielle Morris MD        heparin lock flush 10 UNIT/ML injection 5-10 mL  5-10 mL Intracatheter Q1H PRN Arielle Morris MD        lidocaine (LMX4) cream   Topical Q1H PRN Arielle Morris MD        lidocaine 1 % 0.1-1 mL  0.1-1 mL Other Q1H PRN Arielle Morris MD        sodium chloride (PF) 0.9% PF flush 10-20 mL  10-20 mL Intracatheter q1 min prn Arielle Morris MD             ALLERGIES:  Allergies   Allergen Reactions    Gabapentin Other (See Comments)     Tics       FAMILY HISTORY:  Family History   Problem Relation Age of Onset    Cerebrovascular Disease Father     Myocardial Infarction Father 49    Sudden Death Sister     Anesthesia Reaction No family hx of     Deep Vein Thrombosis No family hx of     Cancer Mother         lung       SOCIAL HISTORY:  Social History     Socioeconomic History    Marital status:    Tobacco Use    Smoking status: Former     Current packs/day: 0.00     Types: Cigarettes     Start date: 1958     Quit date: 2018     Years since quittin.7    Smokeless tobacco: Never   Substance and Sexual Activity    Alcohol use: No    Drug use: No     Social Determinants of Health     Food Insecurity: No Food Insecurity (2024)    Received from [employer]    Hunger Vital Sign     Worried About Running Out of Food in the Last Year: Never true     Ran Out of Food in the Last Year: Never true   Transportation Needs: No Transportation Needs (2024)    [facility]    PRAPARE - Transportation     Lack of  "Transportation (Medical): No     Lack of Transportation (Non-Medical): No   Interpersonal Safety: Unknown (7/17/2024)    Received from [facility]    Humiliation, Afraid, Rape, and Kick questionnaire     Emotionally Abused: No     Physically Abused: No     Sexually Abused: No   Housing Stability: Unknown (7/17/2024)    Received from [facility]    Housing Stability Vital Sign     Unable to Pay for Housing in the Last Year: No     In the last 12 months, was there a time when you did not have a steady place to sleep or slept in a shelter (including now)?: No       VITALS:  Patient Vitals for the past 24 hrs:   BP Temp Temp src Pulse Resp SpO2 Height Weight   07/28/24 2357 107/59 97.5  F (36.4  C) Oral 77 16 91 % -- --   07/28/24 2027 (!) 169/79 98.6  F (37  C) Oral 73 -- 95 % -- --   07/28/24 1800 (!) 159/72 -- -- 75 -- 94 % -- --   07/28/24 1656 120/65 -- -- 75 -- 92 % -- --   07/28/24 1641 133/67 -- -- 71 -- 90 % -- --   07/28/24 1615 139/67 99.1  F (37.3  C) Oral 82 18 94 % 1.727 m (5' 8\") 50.3 kg (111 lb)       PHYSICAL EXAM    VITAL SIGNS: /59 (BP Location: Left arm)   Pulse 77   Temp 97.5  F (36.4  C) (Oral)   Resp 16   Ht 1.727 m (5' 8\")   Wt 50.3 kg (111 lb)   SpO2 91%   BMI 16.88 kg/m    Physical Exam  Vitals and nursing note reviewed.   Constitutional:       General: She is not in acute distress.     Appearance: She is not toxic-appearing.   HENT:      Head: Normocephalic and atraumatic.      Comments: No obvious trauma to head or neck, notable.  Eyes:      General: No scleral icterus.        Right eye: No discharge.         Left eye: No discharge.      Extraocular Movements: Extraocular movements intact.      Pupils: Pupils are equal, round, and reactive to light.   Cardiovascular:      Rate and Rhythm: Normal rate and regular rhythm.      Pulses: Normal pulses.   Pulmonary:      Effort: Pulmonary effort is normal. No respiratory distress.      Breath sounds: Normal breath sounds. "   Abdominal:      General: There is no distension.      Palpations: Abdomen is soft.      Tenderness: There is no abdominal tenderness.   Musculoskeletal:      Cervical back: Neck supple. No rigidity.      Comments: No significant rotation or shortening of left leg. No bruising to lateral hip.    Skin:     General: Skin is warm and dry.      Capillary Refill: Capillary refill takes less than 2 seconds.   Neurological:      Mental Status: She is alert. Mental status is at baseline.      Comments: No slurred speech, following commands spontaneously. No facial droop. Confused to situation but recognizes family members.   Psychiatric:      Comments: Slightly anxious at times but cooperative and redirectable.         LABS  Labs Ordered and Resulted from Time of ED Arrival to Time of ED Departure   COMPREHENSIVE METABOLIC PANEL - Abnormal       Result Value    Sodium 134 (*)     Potassium 4.0      Carbon Dioxide (CO2) 29      Anion Gap 8      Urea Nitrogen 36.1 (*)     Creatinine 1.13 (*)     GFR Estimate 50 (*)     Calcium 8.4 (*)     Chloride 97 (*)     Glucose 90      Alkaline Phosphatase 137      AST 21      ALT 6      Protein Total 7.3      Albumin 3.9      Bilirubin Total 0.2     CBC WITH PLATELETS AND DIFFERENTIAL    WBC Count 7.2      RBC Count 4.42      Hemoglobin 13.6      Hematocrit 40.1      MCV 91      MCH 30.8      MCHC 33.9      RDW 14.9      Platelet Count 326      % Neutrophils 65      % Lymphocytes 20      % Monocytes 14      % Eosinophils 1      % Basophils 0      % Immature Granulocytes 0      NRBCs per 100 WBC 0      Absolute Neutrophils 4.7      Absolute Lymphocytes 1.4      Absolute Monocytes 1.0      Absolute Eosinophils 0.1      Absolute Basophils 0.0      Absolute Immature Granulocytes 0.0      Absolute NRBCs 0.0     GLUCOSE MONITOR NURSING POCT   TYPE AND SCREEN, ADULT    ABO/RH(D) A NEG      Antibody Screen Negative      SPECIMEN EXPIRATION DATE [id number]           RADIOLOGY  XR Pelvis and  Hip Left 2 Views   Final Result   IMPRESSION:       There is an impacted fracture of the left femoral neck which has an acute or subacute appearance.      There is diffuse osseous demineralization. Visualized portion of the pelvis is negative for displaced fracture. Mild left hip degenerative changes. There are also degenerative changes at the sacroiliac joints.      NOTE: ABNORMAL REPORT      THE DICTATION ABOVE DESCRIBES AN ABNORMALITY FOR WHICH FOLLOW-UP IS NEEDED.       Head CT w/o contrast   Final Result   IMPRESSION:   1.  No CT evidence for acute intracranial process.   2.  Brain atrophy and presumed chronic microvascular ischemic changes as above.         I have independently reviewed the above image. See radiology report for detail.      EKG:    NA       PROCEDURES:  N/A      MEDICATIONS GIVEN IN THE EMERGENCY:  Medications   levothyroxine (SYNTHROID/LEVOTHROID) tablet 175 mcg (has no administration in time range)   losartan (COZAAR) tablet 50 mg (has no administration in time range)   pantoprazole (PROTONIX) EC tablet 40 mg (has no administration in time range)   traZODone (DESYREL) tablet 50 mg (50 mg Oral $Given 7/28/24 2136)   traZODone (DESYREL) half-tab 25-50 mg (has no administration in time range)   venlafaxine (EFFEXOR) tablet 75 mg (75 mg Oral $Given 7/28/24 2136)   lidocaine 1 % 0.1-1 mL (has no administration in time range)   lidocaine (LMX4) cream (has no administration in time range)   sodium chloride (PF) 0.9% PF flush 3 mL (3 mLs Intracatheter Not Given 7/28/24 2139)   sodium chloride (PF) 0.9% PF flush 3 mL (has no administration in time range)   senna-docusate (SENOKOT-S/PERICOLACE) 8.6-50 MG per tablet 1 tablet (has no administration in time range)     Or   senna-docusate (SENOKOT-S/PERICOLACE) 8.6-50 MG per tablet 2 tablet (has no administration in time range)   calcium carbonate (TUMS) chewable tablet 1,000 mg (has no administration in time range)   HOLD: ALL Anticoagulant medications  until AFTER surgery (has no administration in time range)   ondansetron (ZOFRAN ODT) ODT tab 4 mg (has no administration in time range)     Or   ondansetron (ZOFRAN) injection 4 mg (has no administration in time range)   prochlorperazine (COMPAZINE) injection 5 mg (has no administration in time range)     Or   prochlorperazine (COMPAZINE) tablet 5 mg (has no administration in time range)     Or   prochlorperazine (COMPAZINE) suppository 12.5 mg (has no administration in time range)   ondansetron (ZOFRAN ODT) ODT tab 4 mg (has no administration in time range)     Or   ondansetron (ZOFRAN) injection 4 mg (has no administration in time range)   sodium chloride 0.9 % infusion ( Intravenous $New Bag 7/28/24 2339)   haloperidol lactate (HALDOL) injection 1 mg (has no administration in time range)   HYDROmorphone (PF) (DILAUDID) injection 0.1 mg (has no administration in time range)     Or   HYDROmorphone (PF) (DILAUDID) injection 0.2 mg (has no administration in time range)   oxyCODONE IR (ROXICODONE) half-tab 2.5 mg (2.5 mg Oral $Given 7/28/24 2136)     Or   oxyCODONE (ROXICODONE) tablet 5 mg ( Oral See Alternative 7/28/24 2136)   acetaminophen (TYLENOL) tablet 975 mg (975 mg Oral $Given 7/28/24 2136)   acetaminophen (TYLENOL) tablet 650 mg (has no administration in time range)   fentaNYL (PF) (SUBLIMAZE) injection 50 mcg (50 mcg Intravenous $Given 7/28/24 1741)       NEW PRESCRIPTIONS STARTED AT TODAY'S ER VISIT  Current Discharge Medication List           I, Jv Chavarria, am serving as a scribe to document services personally performed by Leila Lane MD, based on my observations and the provider's statements to me.  I, Leila Lane MD, attest that Jv Chavarria is acting in a scribe capacity, has observed my performance of the services and has documented them in accordance with my direction.     Leila Lane MD  Emergency Medicine  [facility] EMERGENCY ROOM  [address]  [facility] [address]  [phone]  Dept: [phone]            Leila Lane MD  07/29/24 6491

## 2024-07-28 NOTE — ED TRIAGE NOTES
Pt with referral from [facility] for hip fx. Fell ~2 weeks ago and was evaluated at [facility] in [place] but did not have hip imaging done at that time. Pt fell again today but does not c/o new pain.      Triage Assessment (Adult)       Row Name 07/28/24 1618          Triage Assessment    Airway WDL WDL        Respiratory WDL    Respiratory WDL WDL        Skin Circulation/Temperature WDL    Skin Circulation/Temperature WDL WDL        Cardiac WDL    Cardiac WDL WDL        Peripheral/Neurovascular WDL    Peripheral Neurovascular WDL WDL        Cognitive/Neuro/Behavioral WDL    Cognitive/Neuro/Behavioral WDL WDL

## 2024-07-28 NOTE — ED NOTES
Expected Patient Referral to ED  2:33 PM    Referring Clinic/Provider:  [facility]    Reason for referral/Clinical facts:  Dr. Crawford is on call for [place], patient has known femoral neck fracture, fell again.   PACS images trying to be pushed. Trouble with IT.  Uncertain regarding repair. UC can't manage it today.  Nonweight bearing until evaluation.    Recommendations provided:  Send to ED for further evaluation    Caller was informed that this institution does possess the capabilities and/or resources to provide for patient and should be transferred to our facility.    Discussed that if direct admit is sought and any hurdles are encountered, this ED would be happy to see the patient and evaluate.    Informed caller that recommendations provided are recommendations based only on the facts provided and that they responsible to accept or reject the advice, or to seek a formal in person consultation as needed and that this ED will see/treat patient should they arrive.      LEILA LANE MD  [facility] EMERGENCY ROOM  Critical access hospital[address] [facility] [address] [phone]       Leila Lane MD  07/28/24 5636

## 2024-07-29 NOTE — PLAN OF CARE
Patient is A&Ox2, VSS. Rates left hip pain 5-7/10, managed with PRNs. CHG completed this AM. 1200 surgery time cancelled and rescheduled for 1800. Confusion seemed to increase throught the day as family left. Agitation managed with PRNs. Patient going to OR now.    Muna Mcmahan on 7/29/2024 at 6:26 PM      Goal Outcome Evaluation:

## 2024-07-29 NOTE — PROGRESS NOTES
[facility]    Medicine Progress Note - Hospitalist Service    Date of Admission:  7/28/2024    Assessment & Plan      Myrtel Vaz is a 78 year old female with history of dementia, hypertension, atrial fibrillation, hypothyroidism, GERD, depression, previously documented femoral neck fracture who was recently hospitalized at [facility] and discharged on 7/18/2024 after a fall and diagnosed with subarachnoid hemorrhage, admitted on 7/28/2024 after recurrent fall, and planned hip fracture repair with [facility] on 7/29/2024.     Changes today:  -OR today with Ortho  -Continue all other current management    Left femoral neck fracture  Fall initial encounter  Preop hemoglobin normal at 13.6 g/dL  - Fall precautions  - Nonweightbearing left hip  - Left hip fracture order set   -Tylenol 975 mg every 8 hours, oxycodone 2.5 to 5 mg every 4 hours as needed.  Dilaudid 0.1 2.2 mg every 2 hours as needed   -Order EKG, PT/INR, type and screen.  - [facility] consult for planned surgical correction on 7/29/2024    Hyponatremia-Resolved  Sodium 134 on admission  -Normal saline 75 cc/h while n.p.o.    Chronic kidney disease stage III A  Baseline creatinine 1.0 mg/dL.  Currently 1.1 mg/dL.  - Avoid NSAIDs and nephrotoxins as able.    History of subarachnoid hemorrhage on 7/17/2024  Repeat CT head normal on 7/28/2024    Hypothyroidism  PTA medication: Levothyroxine 175 mcg daily    Essential hypertension  PTA medication: Losartan 50 mg daily with hold parameters.  Repeat creatinine and potassium level in a.m.    GERD  History of esophagectomy  PTA medication: Omeprazole 20 mg twice daily    Insomnia, anxiety, depression  Senile dementia with history of behavioral disturbance.  Patient high risk for hospital-acquired delirium  PTA medication: Trazodone 50 mg at bedtime, venlafaxine 75 mg twice daily.  -Haldol 1 mg IV every 6 hours as needed for agitation.  -Encephalopathy preventative  "strategies    Cachexia  RD consultation    Raynaud's syndrome          Diet: NPO per Anesthesia Guidelines for Procedure/Surgery Except for: Meds    DVT Prophylaxis: VTE Prophylaxis contraindicated due to surgery today  Mora Catheter: Not present  Lines: None     Cardiac Monitoring: None  Code Status: No CPR- Do NOT Intubate      Clinically Significant Risk Factors Present on Admission          # Hypocalcemia: Lowest Ca = 8.2 mg/dL in last 2 days, will monitor and replace as appropriate         # Hypertension: Noted on problem list         # Cachexia: Estimated body mass index is 16.88 kg/m  as calculated from the following:    Height as of this encounter: 1.727 m (5' 8\").    Weight as of this encounter: 50.3 kg (111 lb).       # Financial/Environmental Concerns: none         Disposition Plan     Medically Ready for Discharge: Anticipated in 2-4 Days             Emiliano Poole MD  Hospitalist Service  [facility]  Securely message with [employer] (more info)  Text page via [employer] Paging/Directory   ______________________________________________________________________    Interval History   NAEO. Pain controlled, denied any questions, surgery today    Physical Exam   Vital Signs: Temp: 97.9  F (36.6  C) Temp src: Oral BP: (!) 160/77 Pulse: 67   Resp: 18 SpO2: 95 % O2 Device: None (Room air)    Weight: 111 lbs 0 oz    Gen: Appears well, NAD, on RA  Card:Warm well perfused, pulses assumed patient talking  Pulm: Normal I/E effort on RA  Abd:Non distended  Skin:No obvious rashes or lesions on exposed areas of skin  Neuro:  S/S grossly intact, no obvious FND,   Psych:Pleasant, answering questions appropriately, insight good, judgement good, does not appear to be responding to I/E stimuli     Medical Decision Making       50 MINUTES SPENT BY ME on the date of service doing chart review, history, exam, documentation & further activities per the note.      Data   ------------------------- PAST 24 HR DATA " REVIEWED -----------------------------------------------    I have personally reviewed the following data over the past 24 hrs:    7.2  \   13.2   / 336     136 100 37.9 (H) /  110 (H)   3.5 30 (H) 1.14 (H) \     ALT: 6 AST: 21 AP: 137 TBILI: 0.2   ALB: 3.9 TOT PROTEIN: 7.3 LIPASE: N/A     TSH: 1.80 T4: N/A A1C: N/A     INR:  0.98 PTT:  N/A   D-dimer:  N/A Fibrinogen:  N/A       Imaging results reviewed over the past 24 hrs:   Recent Results (from the past 24 hour(s))   Head CT w/o contrast    Narrative    EXAM: CT HEAD W/O CONTRAST  LOCATION: [facility]  DATE: 7/28/2024    INDICATION: fall  COMPARISON: CT head 7/17/2024  TECHNIQUE: Routine CT Head without IV contrast. Multiplanar reformats. Dose reduction techniques were used.    FINDINGS:  INTRACRANIAL CONTENTS: No intracranial hemorrhage, extraaxial collection, or mass effect.  No CT evidence of acute infarct. Moderate to advanced presumed chronic small vessel ischemic changes. Mild to moderate generalized volume loss. No hydrocephalus.     VISUALIZED ORBITS/SINUSES/MASTOIDS: Prior bilateral cataract surgery. Visualized portions of the orbits are otherwise unremarkable. Chronic appearing mucosal thickening the inferior left maxillary sinus. No middle ear or mastoid effusion.    BONES/SOFT TISSUES: No scalp hematoma. No skull fracture.      Impression    IMPRESSION:  1.  No CT evidence for acute intracranial process.  2.  Brain atrophy and presumed chronic microvascular ischemic changes as above.   XR Pelvis and Hip Left 2 Views    Narrative    EXAM: XR PELVIS AND HIP LEFT 2 VIEWS  LOCATION: [facility]  DATE: 7/28/2024    INDICATION: left hip pain  COMPARISON: None.      Impression    IMPRESSION:     There is an impacted fracture of the left femoral neck which has an acute or subacute appearance.    There is diffuse osseous demineralization. Visualized portion of the pelvis is negative for displaced fracture.  Mild left hip degenerative changes. There are also degenerative changes at the sacroiliac joints.    NOTE: ABNORMAL REPORT    THE DICTATION ABOVE DESCRIBES AN ABNORMALITY FOR WHICH FOLLOW-UP IS NEEDED.    CT Hip Left w/o Contrast    Narrative    EXAM: CT HIP LEFT W/O CONTRAST  LOCATION: [facility]  DATE: 7/29/2024    INDICATION: Surgical planning for left femoral neck fracture.  COMPARISON: Pelvis and left hip radiographic exam 7/28/2024.  TECHNIQUE: Noncontrast. Axial, sagittal and coronal thin-section reconstruction. Dose reduction techniques were used.     FINDINGS:     BONES:  -Redemonstrated is an impacted mildly displaced subcapital predominant left femoral neck fracture. Diffuse bone demineralization. Anatomic alignment left hip. Degenerative change with vacuum joint phenomenon left SI joint. Mild left hip osteoarthritis.   Moderate arthrosis at the symphysis pubis. No concerning destructive bone lesion.    SOFT TISSUES:  -Distended urinary bladder. Pessary device. Perivesicular surgical clips and material. Colonic diverticulosis. Arterial calcification. Probable hip joint effusion.      Impression    IMPRESSION:  1.  Mildly impacted and displaced subcapital predominant left femoral neck fracture.  2.  Mild left hip osteoarthritis.  3.  Diffuse bone demineralization.    NOTE: ABNORMAL REPORT    THE DICTATION ABOVE DESCRIBES AN ABNORMALITY FOR WHICH FOLLOW-UP IS NEEDED.

## 2024-07-29 NOTE — PROGRESS NOTES
"CLINICAL NUTRITION SERVICES - ASSESSMENT NOTE     Nutrition Prescription    RECOMMENDATIONS FOR MDs/PROVIDERS TO ORDER:  none    Malnutrition Status:    % Weight Loss:  Weight loss does not meet criteria for malnutrition 11% in a year  % Intake:  No decreased intake noted  Subcutaneous Fat Loss:  Orbital region mild depletion, Upper arm region moderate depletion, and Thoracic region moderate depletion  Muscle Loss:  Temporal region moderate depletion and Clavicle bone region moderate depletion  Fluid Retention:  None noted    Malnutrition Diagnosis: Moderate malnutrition  In Context of:  Chronic illness or disease    Recommendations already ordered by Registered Dietitian (RD):  When diet adv give Ensure chocolate and magic cup daily     Future/Additional Recommendations:  Will monitor progress towards goals     REASON FOR ASSESSMENT  Myrtle Vaz is a/an 78 year old female assessed by the dietitian for Provider Order - new onset delirium, cachexia    Pertinent Medical Admission/History: left fremoral neck fracture, fall, CKD, GERD, HTN, Insomnia, dementia, cachexia    NUTRITION HISTORY  Allergies: NKFA  Pt lives with grandson, daughter helps during the day with meals. Daughter states pt eats well at home and has a good appetite. She has no issues getting food. She drinks 1/2 a chocolate Ensure daily.       CURRENT NUTRITION ORDERS  Diet: NPO    PHYSICAL FINDINGS  See malnutrition section below.  Per flowsheet:  Edema- no edema noted  GI- WDL  Skin- no skin breakdown  Pain- 5/10 pain   Oral- pt wears dentures, occasionally will have trouble chewing some foods     LABS  Labs reviewed, BUN-37.9, Cr-1.14    MEDICATIONS  Medications reviewed, synthroid, protonix, NACL at 75ml/hr    ANTHROPOMETRICS  Height: 172.7 cm (5' 8\")  Most Recent Weight: 50.3 kg (111 lb)    IBW: 64 kg  BMI: Underweight BMI <18.5  Weight History:   Wt Readings from Last 10 Encounters:   07/28/24 50.3 kg (111 lb)   01/17/23 56.7 kg (125 lb) "   11/02/22 62.7 kg (138 lb 4.8 oz)   05/10/22 61.1 kg (134 lb 11.2 oz)   03/23/22 61.7 kg (136 lb)   01/27/21 67.1 kg (148 lb)   10/07/20 70.9 kg (156 lb 6.4 oz)   03/05/20 68 kg (150 lb)   02/12/20 68.5 kg (151 lb)   12/18/19 68.1 kg (150 lb 1.6 oz)     Pt is down 14# (11%)  in a year     ASSESSED NUTRITION NEEDS  Dosing Weight: 50 kg  Estimated Energy Needs: 8242-5669 kcals/day (30 - 35 kcals/kg )  Justification: Underweight  Estimated Protein Needs: 50-60 grams protein/day (1 - 1.2 grams of pro/kg)  Justification: Repletion  Estimated Fluid Needs: 8882-0458 mL/day (25 - 30 mL/kg)   Justification: Maintenance      MALNUTRITION:  % Weight Loss:  Weight loss does not meet criteria for malnutrition 11% in a year  % Intake:  No decreased intake noted  Subcutaneous Fat Loss:  Orbital region mild depletion, Upper arm region moderate depletion, and Thoracic region moderate depletion  Muscle Loss:  Temporal region moderate depletion and Clavicle bone region moderate depletion  Fluid Retention:  None noted    Malnutrition Diagnosis: Moderate malnutrition  In Context of:  Chronic illness or disease      NUTRITION DIAGNOSIS  Malnutrition related to chronic as evidenced by moderate fat and muscle loss, low BMI      INTERVENTIONS  Implementation  When diet adv give Ensure chocolate and magic cup daily   Education Needs- none at this time     Goals  Diet advancement vs nutrition support within 2-3 days.  Patient to consume % of nutritionally adequate meals three times per day, or the equivalent with supplements/snacks.  Maintain wt at or above 50kg     Monitoring/Evaluation  Will monitor progress towards goals

## 2024-07-29 NOTE — PROGRESS NOTES
Pt getting increasingly agitated and anxious throughout day. Calling out for staff. Family at bedside, unable to redirect pt. Attempting to climb out of bed, struggling to redirect. PRN 50mg trazodone given, per family that is what they give pt at home and medication is effective. Will continue to monitor.    Emmanuel Hsieh RN on 7/29/2024 at 3:42 PM

## 2024-07-29 NOTE — PHARMACY-ADMISSION MEDICATION HISTORY
Pharmacist Admission Medication History    Admission medication history is complete. The information provided in this note is only as accurate as the sources available at the time of the update.    Information Source(s): Family member, Patient's pharmacy, and Hospital records via in-person    Pertinent Information: Finished the course of levetiracetam post discharge from [facility] for [facility]     Changes made to PTA medication list:  Added: Trazodone  Deleted: None  Changed: None    Allergies reviewed with patient and updates made in EHR: yes    Medication History Completed By: Homer Mock RPH 7/28/2024 7:06 PM    PTA Med List   Medication Sig Last Dose    HYDROcodone-acetaminophen (NORCO) 5-325 MG tablet Take 1 tablet by mouth every 4 hours as needed for pain Past Month    levothyroxine (SYNTHROID/LEVOTHROID) 175 MCG tablet Take 175 mcg by mouth daily 7/28/2024 at 0800    losartan (COZAAR) 50 MG tablet Take 50 mg by mouth daily 7/28/2024 at 0800    Multiple Vitamins-Minerals (PRESERVISION/LUTEIN) CAPS Take 1 tablet by mouth daily 7/28/2024 at 0800    omeprazole (PRILOSEC) 20 MG DR capsule Take 20 mg by mouth 2 times daily (before meals) 7/28/2024 at 0800    traZODone (DESYREL) 100 MG tablet Take 50 mg by mouth at bedtime 7/27/2024 at 2100    traZODone (DESYREL) 50 MG tablet Take 25-50 mg by mouth daily as needed for other (Agitation/anxiety) Past Week    venlafaxine (EFFEXOR) 75 MG tablet Take 75 mg by mouth 2 times daily 7/28/2024 at 0800    vitamin D3 (CHOLECALCIFEROL) 125 MCG (5000 UT) tablet Take 125 mcg by mouth daily 7/28/2024 at 0800

## 2024-07-29 NOTE — PLAN OF CARE
Problem: Orthopaedic Fracture  Goal: Absence of Bleeding  Outcome: Progressing  Goal: Fracture Stability  Outcome: Progressing  Goal: Optimal Functional Ability  Outcome: Progressing  Intervention: Optimize Functional Ability  Recent Flowsheet Documentation  Taken 7/28/2024 234 by Tashia Gillespie RN  Activity Management: bedrest  Problem: Dementia Signs/Symptoms  Goal: Improved Behavioral Control (Dementia Signs/Symptoms)  Outcome: Progressing   Goal Outcome Evaluation:       A&Ox2. Pleasantly confused and redirectable. Remains on Bedrest with alarms on for safety. Oxycodone and scheduled tylenol given for hip pain. IVF infusing. NPO midnight for OR today.                   Vikas Colin (: 1974) is a 56 y.o. male, established patient, here for evaluation of the following chief complaint(s):  Follow-up (moderna vaccs no dates, bp check)       ASSESSMENT/PLAN:  Below is the assessment and plan developed based on review of pertinent history, physical exam, labs, studies, and medications. 1. Essential hypertension  -     losartan-hydroCHLOROthiazide (HYZAAR) 50-12.5 mg per tablet; Take 1 Tablet by mouth daily. , Normal, Disp-90 Tablet, R-1  BP is at goal. I do not recommend any change in medications. Continue with ongoing regimen of Losartan-HCTZ. 2. Screening for colon cancer  -     REFERRAL TO GASTROENTEROLOGY  I recommended that the pt schedule a routine colonoscopy. 3. Mixed hyperlipidemia  Presumed stable, will recheck labs today. I counseled the pt on the dangers of long term elevated cholesterol and urged him to consider taking a regimen in the future if his blood work is not WNL today. Encouraged an exercise regimen and healthy diet. No follow-ups on file. SUBJECTIVE/OBJECTIVE:  HPI    Hypertension ROS: taking medications as instructed, no medication side effects noted, no TIA's, no chest pain on exertion, no dyspnea on exertion, no swelling of ankles. BP in office today is 132/74. Hyperlipidemia:  Cardiovascular risk analysis - 56 y.o. male LDL goal is under 130. ROS: taking medications as instructed, no TIA's, no chest pain on exertion, no dyspnea on exertion, no swelling of ankles. Tolerating meds, no myalgias or other side effects noted. New concerns: Pt's last LDL was 140 on 21. Pt does not believe that his cholesterol will be drastically different today. He thinks that he will be WNL, but on the higher side. Pt notes that he has decreased sweet tea intake. Other: Pt states that work is busy, which keeps him active. He does not formally exercise, but feels fit through projects at home. Pt denies a flu shot.  He agrees to schedule a colonoscopy. Pt believes that his food choices are \"decent,\" but he is beginning to eat out more since restaurants are opening back up. Review of Systems   All other systems reviewed and are negative. Physical Exam  Constitutional:       Appearance: Normal appearance. HENT:      Right Ear: Tympanic membrane and external ear normal.      Left Ear: Tympanic membrane and external ear normal.      Mouth/Throat:      Mouth: Mucous membranes are moist.      Pharynx: Oropharynx is clear. Cardiovascular:      Rate and Rhythm: Normal rate and regular rhythm. Pulses: Normal pulses. Heart sounds: Normal heart sounds. Pulmonary:      Effort: Pulmonary effort is normal.      Breath sounds: Normal breath sounds. Musculoskeletal:         General: Normal range of motion. Skin:     General: Skin is warm and dry. Neurological:      General: No focal deficit present. Mental Status: He is alert and oriented to person, place, and time. Psychiatric:         Mood and Affect: Mood normal.         Behavior: Behavior normal.           On this date 10/08/2021 I have spent 30 minutes reviewing previous notes, test results and face to face with the patient discussing the diagnosis and importance of compliance with the treatment plan as well as documenting on the day of the visit. An electronic signature was used to authenticate this note. Written by Shelli Barry as dictated by Dr. Ochoa Winchester.    -- Shelli Barry

## 2024-07-29 NOTE — PLAN OF CARE
Pt admitted to unit from ED at 2030. Daughter updated at bedside. Regular diet. Start NPO at midnight. Urine sample collected with strait cath. Pt oriented to self. BP elevated 169/79. Oral pain management given (see MAR). Purewic in place. Blanchable redness noted on  sacrum and coxxyx. Barrier cream applied. Pt takes pills whole with water.       Leila Diaz RN on 7/28/2024 at 11:06 PM

## 2024-07-29 NOTE — ANESTHESIA PREPROCEDURE EVALUATION
Anesthesia Pre-Procedure Evaluation    Patient: Myrtle Vaz   MRN: [id number] : 1945        Procedure : Procedure(s):  INTERNAL FIXATION, FRACTURE, TROCHANTERIC, HIP, USING PINS OR RODS          Past Medical History:   Diagnosis Date    Depression     History of atrial fibrillation     HLD (hyperlipidemia)     HTN (hypertension)     Hypothyroidism       Past Surgical History:   Procedure Laterality Date    BRONCHOSCOPY (RIGID OR FLEXIBLE), DIAGNOSTIC N/A 2019    Procedure: BRONCHOSCOPY, WITH BAL;  Surgeon: Ally Ybarra MD;  Location: [facility]    COMBINED ESOPHAGOSCOPY, GASTROSCOPY, DUODENOSCOPY (EGD), REPLACE ESOPHAGEAL STENT N/A 2019    Procedure: ESOPHAGOGASTRODUODENOSCOPY WITH ESOPHAGEAL STENT REPLACEMENT, IRRIGATION AND DEBRIDEMENT OF LEFT NECK, WOUND VAC PLACEMENT;  Surgeon: Harley Murguia MD;  Location: [facility]    COMBINED ESOPHAGOSCOPY, GASTROSCOPY, DUODENOSCOPY (EGD), REPLACE ESOPHAGEAL STENT N/A 6/10/2019    Procedure: Esophagogastroduodenoscopy and Stent Removal;  Surgeon: Ally Ybarra MD;  Location: [facility]    ESOPHAGOGASTRECTOMY N/A 2019    Procedure: Redo Laparotomy SUBSTERNAL Esophagogastrostomy, Pharyngostomy, Intraoperative EGD, PARTIAL STERNOTOMY, LEFT PHARYNGOSTOMY,;  Surgeon: Temitope Bullock MD;  Location: [facility]    ESOPHAGOSCOPY, GASTROSCOPY, DUODENOSCOPY (EGD), COMBINED N/A 2018    Procedure: COMBINED ESOPHAGOSCOPY, GASTROSCOPY, DUODENOSCOPY (EGD);  Surgeon: Temitope Bullock MD;  [facility]    ESOPHAGOSCOPY, GASTROSCOPY, DUODENOSCOPY (EGD), DILATATION, COMBINED N/A 10/10/2019    Procedure: Esophagogastroduodenoscopy with Botox injection of the Pylorus;  Surgeon: Temitope Bullock MD;  Location: [facility]    ESOPHAGOSCOPY, GASTROSCOPY, DUODENOSCOPY (EGD), DILATATION, COMBINED N/A 5/10/2022    Procedure: ESOPHAGOGASTRODUODENOSCOPY, WITH DILATION;  Surgeon: Temitope Bullock MD;  [facility]    ESOPHAGOSCOPY, GASTROSCOPY, DUODENOSCOPY  (EGD), DILATATION, COMBINED N/A 2023    Procedure: ESOPHAGOGASTRODUODENOSCOPY, WITH DILATION;  Surgeon: Temitope Bullock MD;  Location: [facility]    HERNIORRHAPHY HIATAL  2018    HYSTERECTOMY      HYSTERECTOMY  1998    IRRIGATION AND DEBRIDEMENT CHEST WASHOUT, COMBINED N/A 10/10/2019    Procedure: Chest wound exploration;  Surgeon: Temitope Bullock MD;  [facility]    LAPAROSCOPIC HERNIORRHAPHY HIATAL N/A 2018    Procedure: Midline laparotomy, Trans-hiatal esophagogasterectomy, gastrostomy, J-tube placement, G-tube placement, bilateral pleural chest tube placement, mediastinal abcess drainage, spit fistula creation, Esophagastrodueodenoscopy;  Surgeon: Temitope Bullock MD;  Location: [facility]    OOPHORECTOMY Bilateral     ROTATOR CUFF REPAIR RT/LT      THYROIDECTOMY       for hyperthroidism    ZZC TOTAL ABDOM HYSTERECTOMY      Description: Total Abdominal Hysterectomy;  Recorded: 2012;      Allergies   Allergen Reactions    Gabapentin Other (See Comments)     Tics      Social History     Tobacco Use    Smoking status: Former     Current packs/day: 0.00     Types: Cigarettes     Start date: 1958     Quit date: 2018     Years since quittin.7    Smokeless tobacco: Never   Substance Use Topics    Alcohol use: No      Wt Readings from Last 1 Encounters:   24 50.3 kg (111 lb)        Anesthesia Evaluation   Pt has had prior anesthetic.     No history of anesthetic complications       ROS/MED HX  ENT/Pulmonary:     (+)                          COPD,              Neurologic: Comment: Pt recently hospitalized with subarachnoid hemorrhage with undetermined mechanism.      (+)   dementia,                             Cardiovascular:     (+)  hypertension- -   -  - -                                      METS/Exercise Tolerance:     Hematologic:       Musculoskeletal:       GI/Hepatic: Comment: Pt has history of esophageal cancer      Renal/Genitourinary:     (+) renal  disease, type: CRI,            Endo:     (+)          thyroid problem, hypothyroidism,           Psychiatric/Substance Use:       Infectious Disease:       Malignancy:       Other:            Physical Exam    Airway        Mallampati: II   TM distance: > 3 FB   Neck ROM: full   Mouth opening: > 3 cm    Respiratory Devices and Support         Dental           Cardiovascular   cardiovascular exam normal          Pulmonary   pulmonary exam normal                OUTSIDE LABS:  CBC:   Lab Results   Component Value Date    WBC 7.2 07/29/2024    WBC 7.2 07/28/2024    HGB 13.2 07/29/2024    HGB 13.6 07/28/2024    HCT 40.2 07/29/2024    HCT 40.1 07/28/2024     07/29/2024     07/28/2024     BMP:   Lab Results   Component Value Date     07/29/2024     (L) 07/28/2024    POTASSIUM 3.5 07/29/2024    POTASSIUM 4.0 07/28/2024    CHLORIDE 100 07/29/2024    CHLORIDE 97 (L) 07/28/2024    CO2 30 (H) 07/29/2024    CO2 29 07/28/2024    BUN 37.9 (H) 07/29/2024    BUN 36.1 (H) 07/28/2024    CR 1.14 (H) 07/29/2024    CR 1.13 (H) 07/28/2024     (H) 07/29/2024    GLC 90 07/28/2024     COAGS:   Lab Results   Component Value Date    PTT 47 (H) 04/28/2019    INR 0.98 07/28/2024     POC:   Lab Results   Component Value Date     (H) 10/11/2019     HEPATIC:   Lab Results   Component Value Date    ALBUMIN 3.9 07/28/2024    PROTTOTAL 7.3 07/28/2024    ALT 6 07/28/2024    AST 21 07/28/2024    ALKPHOS 137 07/28/2024    BILITOTAL 0.2 07/28/2024     OTHER:   Lab Results   Component Value Date    PH 7.35 04/28/2019    LACT 1.1 05/06/2019    JOSE 8.2 (L) 07/29/2024    PHOS 3.9 12/03/2020    MAG 2.3 05/21/2019    LIPASE 43 (L) 02/11/2019    TSH 1.80 07/28/2024    T4 1.08 04/04/2024    CRP 24.8 (H) 06/19/2019     (H) 06/19/2019       Anesthesia Plan    ASA Status:  3, emergent       Anesthesia Type: General.     - Airway: ETT   Induction: Intravenous.   Maintenance: TIVA.        Consents            Postoperative  "Care    Pain management: IV analgesics, Oral pain medications.   PONV prophylaxis: Ondansetron (or other 5HT-3), Dexamethasone or Solumedrol     Comments:    Other Comments: Pt with recent hospitalization for subarachnoid hemorrhage.  Plan general anesthetic with ETT             Nam Soria MD    I have reviewed the pertinent notes and labs in the chart from the past 30 days and (re)examined the patient.  Any updates or changes from those notes are reflected in this note.     # Hyponatremia: Lowest Na = 134 mmol/L in last 30 days, will monitor as appropriate   # Hypocalcemia: Lowest Ca = 8.2 mg/dL in last 2 days, will monitor and replace as appropriate        # Cachexia: Estimated body mass index is 16.88 kg/m  as calculated from the following:    Height as of this encounter: 1.727 m (5' 8\").    Weight as of this encounter: 50.3 kg (111 lb).      "

## 2024-07-29 NOTE — CONSULTS
Care Management Initial Consult    General Information  Assessment completed with: Patient, Children, Caregiver, Patient, daughter Haydee and caregiver Nina at bedside  Type of CM/SW Visit: Initial Assessment    Primary Care Provider verified and updated as needed: Yes   Readmission within the last 30 days: no previous admission in last 30 days      Reason for Consult: discharge planning, facility placement  Advance Care Planning: Advance Care Planning Reviewed: present on chart, other (see comments) (Scanned to email to [facility] and paper copy placed on ED paper chart.)          Communication Assessment  Patient's communication style: spoken language (English or Bilingual)    Hearing Difficulty or Deaf: yes   Wear Glasses or Blind: yes    Cognitive  Cognitive/Neuro/Behavioral: WDL                      Living Environment:   People in home: grandchild(db) (Grandnephew lives with patient)     Current living Arrangements: town home (One level)      Able to return to prior arrangements: no (Needs TCU)  Living Arrangement Comments: Grandnephew lives with patient and has daily caregivers    Family/Social Support:  Care provided by: self, child(db), friend, other (see comments) (Friend caregiver Mon-Fri and [facility])  Provides care for: no one, unable/limited ability to care for self  Marital Status:   Children, Friend, Other (specify) (Guardian [employer] on weekends)          Description of Support System: Supportive, Involved    Support Assessment: Adequate family and caregiver support    Current Resources:   Patient receiving home care services: No     Community Resources: PCA (Private pay PCA)  Equipment currently used at home: grab bar, tub/shower, raised toilet seat, shower chair, walker, rolling  Supplies currently used at home: Incontinence Supplies, Hearing Aid Batteries    Employment/Financial:  Employment Status: retired        Financial Concerns: none   Referral to Financial Worker:  No     Does the patient's insurance plan have a 3 day qualifying hospital stay waiver?  Yes     Which insurance plan 3 day waiver is available? [employer]    Will the waiver be used for post-acute placement? Undetermined at this time    Lifestyle & Psychosocial Needs:  Social Determinants of Health     Food Insecurity: No Food Insecurity (7/17/2024)    Received from [facility]    Hunger Vital Sign     Worried About Running Out of Food in the Last Year: Never true     Ran Out of Food in the Last Year: Never true   Depression: Not at risk (3/5/2020)    PHQ-2     PHQ-2 Score: 1   Housing Stability: Unknown (7/17/2024)    Received from [facility]    Housing Stability Vital Sign     Unable to Pay for Housing in the Last Year: No     Number of Places Lived in the Last Year: Not on file     In the last 12 months, was there a time when you did not have a steady place to sleep or slept in a shelter (including now)?: No   Tobacco Use: Medium Risk (6/22/2023)    Received from [facility]    Patient History     Smoking Tobacco Use: Former     Smokeless Tobacco Use: Never     Passive Exposure: Not on file   Financial Resource Strain: Not on file   Alcohol Use: Not At Risk (1/10/2019)    AUDIT-C     Frequency of Alcohol Consumption: Never     Average Number of Drinks: Not on file     Frequency of Binge Drinking: Not on file   Transportation Needs: No Transportation Needs (7/17/2024)    Received from [facility]    PRAPARE - Transportation     Lack of Transportation (Medical): No     Lack of Transportation (Non-Medical): No   Physical Activity: Not on file   Interpersonal Safety: Unknown (7/17/2024)    Received from [facility]    Humiliation, Afraid, Rape, and Kick questionnaire     Fear of Current or Ex-Partner: Not on file     Emotionally Abused: No     Physically Abused: No     Sexually Abused: No   Stress: Not on file   Social Connections: Not on file   Health Literacy: Not on file       Functional  Status:  Prior to admission patient needed assistance:   Dependent ADLs:: Ambulation-walker, Bathing, Grooming, Transfers, Toileting  Dependent IADLs:: Cleaning, Cooking, Laundry, Shopping, Meal Preparation, Medication Management, Transportation  Assesssment of Functional Status: Not at  functional baseline, Needs placement in a SNF/TCF for rehabilitation    Mental Health Status:          Chemical Dependency Status:              Values/Beliefs:  Spiritual, Cultural Beliefs, Protestant Practices, Values that affect care:                 Additional Information:   78-year-old female who presents with hip pain. Per patient and patient's daughter, on either the 16th or 17th of July she went to sit on the edge of her tub and fell backwards and hit her head. Thus, she had to stay in the hospital but was discharged. Then over the past week leading up to the visit she has begun to move around slower and complain that her left hip hurts. She went to urgent care this morning (07/28/2024) and before leaving she fell again. When asked about where her hip hurts, she pointed to the outer portion or her left hip .    Patient lives in a one-level town home; grandnephew lives with patient. Uses a roller-walker for ambulation; has friend/caregiver Nina who stays with patient during the day Mon-Fri; on weekends has private pay PCA from [employer] from 1000 to 1600. Prior to this fall patient ambulated independently with a walker, did her own laundry, and helped with meal preparation. Patient has an automatic medication dispenser, and she takes them as indicated. Since fall patient has required more assistance with her I/ADLs.    Discussed discharge planning with patient's daughter Haydee ([phone]) and caregiver Nina at bedside. Both patient and daughter would like TCU placement for rehabilitation. Patient and daughter agreed to have referrals sent to facilities. They would like [facility] as their number one choice  because of prior stay there with a private room. Other options are [facility] [place] [facility] [place], and [facility] [place] [place].     If patient doesn't have a three night stay, referrals to [facility], [facility], [facility], [facility], [facility], and [facility] which are [facility] [employer] participating facilities. Transportation on discharge is pending patient's progression of care but may need medical transport. Haydee will review TCU choices overnight and will let unit CM know if there are any changes.    TWIN MCCRACKEN, RN/CM

## 2024-07-29 NOTE — CONSULTS
ORTHOPEDIC CONSULTATION    Consultation  Myrtle Vaz,  1945, MRN [id number]    [facility]  Closed fracture of neck of left femur, initial encounter (H) [S72.002A]    PCP: Leila Philip, [phone]   Code status:  No CPR- Do NOT Intubate       Extended Emergency Contact Information  Primary Emergency Contact: Haydee Gray  Address: [address] [place] [address] [facility]  Mobile Phone: [phone]  Relation: Daughter  Secondary Emergency Contact: Haydee Mccoy  Address: [address] [address] [facility]  Home Phone: [phone]  Mobile Phone: [phone]  Relation: Daughter         IMPRESSION:  Left femoral neck hip fracture following mechanical fall, initial encounter     PLAN:  This patient was discussed with Dr. Tate, on-call surgeon for [facility] and they are in agreement with the following plan.     - NPO surgery today at noon   - bed rest until surgery  - pain control  - CT hip for surgical planning     Thank you for including [facility] in the care of Myrtle Vaz. It has been a pleasure participating in [facility] care.        CHIEF COMPLAINT: <principal problem not specified>    HISTORY OF PRESENT ILLNESS:  The patient is seen in orthopedic consultation at the request of Dr. Poole.  The patient is a 78 year old female with mild pain of the left  hip. The patient reports that on either the  or  she went to sit on the edge of her tub and fell backwards and hit her head. Thus she had to stay in the hospital but was discharged. Then over the past week leading up to the visit she has began to move around slower and complain that her left hip hurts. She went to urgent care this morning (2024) and before leaving she fell again. When asked about where her hip hurts she pointed to the outer portion or her left hip.     Her pain does not radiates from her hip. Normally at home she gets around via a walker but often  tries to walk without it. She does have dementia but her daughter said her mental state doesn't seem any different from baseline. She had a CT scan done yesterday (07/27/2024) on her hip. No daily medications were mentioned.     Per Chart Review:  The patient's family member had the results from her CT scan from yesterday (these results were not available in the chart review tab). It was found that she has a closed femoral neck fracture in her left side.    PAST MEDICAL HISTORY:  Past Medical History:   Diagnosis Date    Depression     History of atrial fibrillation     HLD (hyperlipidemia)     HTN (hypertension)     Hypothyroidism           ALLERGIES:   Review of patient's allergies indicates   Allergies   Allergen Reactions    Gabapentin Other (See Comments)     Tics         MEDICATIONS UPON ADMISSION:  Medications were reviewed.  They include:   Medications Prior to Admission   Medication Sig Dispense Refill Last Dose    HYDROcodone-acetaminophen (NORCO) 5-325 MG tablet Take 1 tablet by mouth every 4 hours as needed for pain   Past Month    levothyroxine (SYNTHROID/LEVOTHROID) 175 MCG tablet Take 175 mcg by mouth daily   7/28/2024 at 0800    losartan (COZAAR) 50 MG tablet Take 50 mg by mouth daily   7/28/2024 at 0800    Multiple Vitamins-Minerals (PRESERVISION/LUTEIN) CAPS Take 1 tablet by mouth daily   7/28/2024 at 0800    omeprazole (PRILOSEC) 20 MG DR capsule Take 20 mg by mouth 2 times daily (before meals)   7/28/2024 at 0800    traZODone (DESYREL) 100 MG tablet Take 50 mg by mouth at bedtime   7/27/2024 at 2100    traZODone (DESYREL) 50 MG tablet Take 25-50 mg by mouth daily as needed for other (Agitation/anxiety)   Past Week    venlafaxine (EFFEXOR) 75 MG tablet Take 75 mg by mouth 2 times daily   7/28/2024 at 0800    vitamin D3 (CHOLECALCIFEROL) 125 MCG (5000 UT) tablet Take 125 mcg by mouth daily   7/28/2024 at 0800         SOCIAL HISTORY:   she  reports that she quit smoking about 5 years ago. Her  "smoking use included cigarettes. She started smoking about 65 years ago. She has never used smokeless tobacco. She reports that she does not drink alcohol and does not use drugs.    FAMILY HISTORY:  family history includes Cancer in her mother; Cerebrovascular Disease in her father; Myocardial Infarction (age of onset: 49) in her father; Sudden Death in her sister.      REVIEW OF SYSTEMS:   Reviewed with patient. See HPI, otherwise negative       PHYSICAL EXAMINATION:  Vitals: Temp:  [97.5  F (36.4  C)-99.1  F (37.3  C)] 97.9  F (36.6  C)  Pulse:  [65-82] 65  Resp:  [16-18] 18  BP: (107-185)/(59-84) 185/84  SpO2:  [90 %-97 %] 97 %  General: On examination, the patient is resting comfortably, NAD, awake, and alert and oriented to person, place, time, and, and general circumstances   SKIN: There is no evidence of erythema, ecchymosis, abrasion  Pulses:  dorsalis pedis and posterior tibial pulse is intact and equal bilaterally  Sensation: intact and equal bilaterally to the distal lower extremities.  Tenderness: tenderness to the lateral hip  ROM: able to plantar and dorsi flex without issues  Motor: 5/5  Contralateral side= Full range of motion, Negative joint instability findings, 5/5 motor groups about the joint, Non-tender.       RADIOGRAPHIC EVALUATION:  Personally reviewed    PERTINENT LABS:  Lab Results: personally reviewed.   @BRIEFLAB[NA,K,CL,CO2,BUN,CREATININE,GLUCOSE,GLUCOSE @  Lab Results   Component Value Date    WBC 7.2 07/29/2024    WBC 8.7 03/05/2020    HGB 13.2 07/29/2024    HGB 12.8 03/05/2020    HCT 40.2 07/29/2024    HCT 41.0 03/05/2020    MCV 93 07/29/2024    MCV 91 03/05/2020     07/29/2024     03/05/2020       Clinically Significant Risk Factors Present on Admission         # Cachexia: Estimated body mass index is 16.88 kg/m  as calculated from the following:    Height as of this encounter: 1.727 m (5' 8\").    Weight as of this encounter: 50.3 kg (111 lb).       Risk Factors for " Delirium:       #Cognitive impairment:  Noted on problem list     Melia Hamm PA-C, [facility]  Date: 7/29/2024  Time: 8:21 AM    CC1:   Marcos Carter DO    CC2:   Leila Philip

## 2024-07-29 NOTE — H&P
[facility]    History and Physical - Hospitalist Service       Date of Admission:  7/28/2024    Assessment & Plan      Myrtle Vaz is a 78 year old female with history of dementia, hypertension, atrial fibrillation, hypothyroidism, GERD, depression, previously documented femoral neck fracture who was recently hospitalized at [facility] and discharged on 7/18/2024 after a fall and diagnosed with subarachnoid hemorrhage, admitted on 7/28/2024 after recurrent fall, and planned hip fracture repair with [facility] on 7/29/2024.     Left femoral neck fracture  Fall initial encounter  Preop hemoglobin normal at 13.6 g/dL  - Fall precautions  - Nonweightbearing left hip  - Left hip fracture order set   -Tylenol 975 mg every 8 hours, oxycodone 2.5 to 5 mg every 4 hours as needed.  Dilaudid 0.1 2.2 mg every 2 hours as needed   -Order EKG, PT/INR, type and screen.  - [facility] consult for planned surgical correction on 7/29/2024    Hyponatremia  Sodium 134  - Order TSH and free T4  - Order urine sodium, urine osmolality, serum osmolality  - Repeat sodium level in AM.  -Normal saline 75 cc/h while n.p.o.    Chronic kidney disease stage III A  Baseline creatinine 1.0 mg/dL.  Currently 1.1 mg/dL.  - Avoid NSAIDs and nephrotoxins as able.    History of subarachnoid hemorrhage on 7/17/2024  Repeat CT head normal on 7/28/2024    Hypothyroidism  PTA medication: Levothyroxine 175 mcg daily    Essential hypertension  PTA medication: Losartan 50 mg daily with hold parameters.  Repeat creatinine and potassium level in a.m.    GERD  History of esophagectomy  PTA medication: Omeprazole 20 mg twice daily    Insomnia, anxiety, depression  Senile dementia with history of behavioral disturbance.  Patient high risk for hospital-acquired delirium  PTA medication: Trazodone 50 mg at bedtime, venlafaxine 75 mg twice daily.  -Haldol 1 mg IV every 6 hours as needed for agitation.  -Encephalopathy  "preventative strategies    Cachexia  RD consultation    Raynaud's syndrome          Diet: NPO per Anesthesia Guidelines for Procedure/Surgery Except for: Meds  Combination Diet  DVT Prophylaxis: Pneumatic Compression Devices  Mora Catheter: Not present  Lines: None     Cardiac Monitoring: None  Code Status: No CPR- Do NOT Intubate    Clinically Significant Risk Factors Present on Admission          # Hypocalcemia: Lowest Ca = 8.4 mg/dL in last 2 days, will monitor and replace as appropriate         # Hypertension: Noted on problem list         # Cachexia: Estimated body mass index is 16.88 kg/m  as calculated from the following:    Height as of this encounter: 1.727 m (5' 8\").    Weight as of this encounter: 50.3 kg (111 lb).       # Financial/Environmental Concerns: none         Disposition Plan     Medically Ready for Discharge: Anticipated in 2-4 Days           Marcos Carter DO  Hospitalist Service  [facility]  Securely message with [employer] (more info)  Text page via [employer] Paging/Directory     ______________________________________________________________________    Chief Complaint   Fall    History is obtained from the patient  And her daughter, Haydee.    History of Present Illness   Myrtle Vaz is a 78 year old female with history of dementia, hypertension, atrial fibrillation, hypothyroidism, GERD and depression who was recently hospitalized at [facility] and discharged on 7/18/2024 after a fall and diagnosed with subarachnoid hemorrhage, and senile dementia with behavioral disturbance.  Patient has been residing in a transitional care facility reportedly had a recurrent fall.  [facility] on-call provider was contacted who recommended evaluation in the emergency department.    In the emergency department patient's vital signs were stable.  Laboratory findings remarkable for sodium of 134, creatinine of 1.13.  CBC normal.  Head CT without contrast with no evidence " of acute intracranial process.  X-ray of pelvis and left hip showed impacted fracture of the left femoral neck which is likely acute versus subacute.  Patient received fentanyl for pain control.  Urinalysis was in process.      Past Medical History    Past Medical History:   Diagnosis Date    Depression     History of atrial fibrillation     HLD (hyperlipidemia)     HTN (hypertension)     Hypothyroidism        Past Surgical History   Past Surgical History:   Procedure Laterality Date    BRONCHOSCOPY (RIGID OR FLEXIBLE), DIAGNOSTIC N/A 5/2/2019    Procedure: BRONCHOSCOPY, WITH BAL;  Surgeon: Ally Ybarra MD;  Location: [facility]    COMBINED ESOPHAGOSCOPY, GASTROSCOPY, DUODENOSCOPY (EGD), REPLACE ESOPHAGEAL STENT N/A 5/6/2019    Procedure: ESOPHAGOGASTRODUODENOSCOPY WITH ESOPHAGEAL STENT REPLACEMENT, IRRIGATION AND DEBRIDEMENT OF LEFT NECK, WOUND VAC PLACEMENT;  Surgeon: Harley Murguia MD;  Location: [facility]    COMBINED ESOPHAGOSCOPY, GASTROSCOPY, DUODENOSCOPY (EGD), REPLACE ESOPHAGEAL STENT N/A 6/10/2019    Procedure: Esophagogastroduodenoscopy and Stent Removal;  Surgeon: Ally Ybarra MD;  Location: [facility]    ESOPHAGOGASTRECTOMY N/A 4/26/2019    Procedure: Redo Laparotomy SUBSTERNAL Esophagogastrostomy, Pharyngostomy, Intraoperative EGD, PARTIAL STERNOTOMY, LEFT PHARYNGOSTOMY,;  Surgeon: Temitope Bullock MD;  Location: [facility]    ESOPHAGOSCOPY, GASTROSCOPY, DUODENOSCOPY (EGD), COMBINED N/A 11/21/2018    Procedure: COMBINED ESOPHAGOSCOPY, GASTROSCOPY, DUODENOSCOPY (EGD);  Surgeon: Temitope Bullock MD;  Location: [facility]    ESOPHAGOSCOPY, GASTROSCOPY, DUODENOSCOPY (EGD), DILATATION, COMBINED N/A 10/10/2019    Procedure: Esophagogastroduodenoscopy with Botox injection of the Pylorus;  Surgeon: Temitope Bullock MD;  Location: [facility]    ESOPHAGOSCOPY, GASTROSCOPY, DUODENOSCOPY (EGD), DILATATION, COMBINED N/A 5/10/2022    Procedure: ESOPHAGOGASTRODUODENOSCOPY, WITH DILATION;  Surgeon: Per Funes  MD Temitope;  Location: [facility]    ESOPHAGOSCOPY, GASTROSCOPY, DUODENOSCOPY (EGD), DILATATION, COMBINED N/A 1/17/2023    Procedure: ESOPHAGOGASTRODUODENOSCOPY, WITH DILATION;  Surgeon: Temitope Bullock MD;  Location: [facility] OR    HERNIORRHAPHY HIATAL  11/2018    HYSTERECTOMY      HYSTERECTOMY  1998    IRRIGATION AND DEBRIDEMENT CHEST WASHOUT, COMBINED N/A 10/10/2019    Procedure: Chest wound exploration;  Surgeon: Temitope Bullock MD;  Location: [facility]    LAPAROSCOPIC HERNIORRHAPHY HIATAL N/A 11/21/2018    Procedure: Midline laparotomy, Trans-hiatal esophagogasterectomy, gastrostomy, J-tube placement, G-tube placement, bilateral pleural chest tube placement, mediastinal abcess drainage, spit fistula creation, Esophagastrodueodenoscopy;  Surgeon: Temitope Bullock MD;  Location: [facility]    OOPHORECTOMY Bilateral 1998    ROTATOR CUFF REPAIR RT/LT      THYROIDECTOMY       for hyperthroidism    ZZC TOTAL ABDOM HYSTERECTOMY      Description: Total Abdominal Hysterectomy;  Recorded: 08/31/2012;       Prior to Admission Medications   Prior to Admission Medications   Prescriptions Last Dose Informant Patient Reported? Taking?   HYDROcodone-acetaminophen (NORCO) 5-325 MG tablet Past Month  Yes Yes   Sig: Take 1 tablet by mouth every 4 hours as needed for pain   Multiple Vitamins-Minerals (PRESERVISION/LUTEIN) CAPS 7/28/2024 at 0800  Yes Yes   Sig: Take 1 tablet by mouth daily   levothyroxine (SYNTHROID/LEVOTHROID) 175 MCG tablet 7/28/2024 at 0800  Yes Yes   Sig: Take 175 mcg by mouth daily   losartan (COZAAR) 50 MG tablet 7/28/2024 at 0800  Yes Yes   Sig: Take 50 mg by mouth daily   omeprazole (PRILOSEC) 20 MG DR capsule 7/28/2024 at 0800  Yes Yes   Sig: Take 20 mg by mouth 2 times daily (before meals)   traZODone (DESYREL) 100 MG tablet 7/27/2024 at 2100  Yes Yes   Sig: Take 50 mg by mouth at bedtime   traZODone (DESYREL) 50 MG tablet Past Week  Yes Yes   Sig: Take 25-50 mg by mouth daily as needed for other  (Agitation/anxiety)   venlafaxine (EFFEXOR) 75 MG tablet 7/28/2024 at 0800  Yes Yes   Sig: Take 75 mg by mouth 2 times daily   vitamin D3 (CHOLECALCIFEROL) 125 MCG (5000 UT) tablet 7/28/2024 at 0800  Yes Yes   Sig: Take 125 mcg by mouth daily      Facility-Administered Medications: None           Physical Exam   Vital Signs: Temp: 99.1  F (37.3  C) Temp src: Oral BP: (!) 159/72 Pulse: 75   Resp: 18 SpO2: 94 % O2 Device: None (Room air)    Weight: 111 lbs 0 oz        Medical Decision Making       80 MINUTES SPENT BY ME on the date of service doing chart review, history, exam, documentation & further activities per the note.      Data     I have personally reviewed the following data over the past 24 hrs:    7.2  \   13.6   / 326     134 (L) 97 (L) 36.1 (H) /  90   4.0 29 1.13 (H) \     ALT: 6 AST: 21 AP: 137 TBILI: 0.2   ALB: 3.9 TOT PROTEIN: 7.3 LIPASE: N/A       Imaging results reviewed over the past 24 hrs:   Recent Results (from the past 24 hour(s))   Head CT w/o contrast    Narrative    EXAM: CT HEAD W/O CONTRAST  LOCATION: [facility]  DATE: 7/28/2024    INDICATION: fall  COMPARISON: CT head 7/17/2024  TECHNIQUE: Routine CT Head without IV contrast. Multiplanar reformats. Dose reduction techniques were used.    FINDINGS:  INTRACRANIAL CONTENTS: No intracranial hemorrhage, extraaxial collection, or mass effect.  No CT evidence of acute infarct. Moderate to advanced presumed chronic small vessel ischemic changes. Mild to moderate generalized volume loss. No hydrocephalus.     VISUALIZED ORBITS/SINUSES/MASTOIDS: Prior bilateral cataract surgery. Visualized portions of the orbits are otherwise unremarkable. Chronic appearing mucosal thickening the inferior left maxillary sinus. No middle ear or mastoid effusion.    BONES/SOFT TISSUES: No scalp hematoma. No skull fracture.      Impression    IMPRESSION:  1.  No CT evidence for acute intracranial process.  2.  Brain atrophy and presumed  chronic microvascular ischemic changes as above.   XR Pelvis and Hip Left 2 Views    Narrative    EXAM: XR PELVIS AND HIP LEFT 2 VIEWS  LOCATION: [facility]  DATE: 7/28/2024    INDICATION: left hip pain  COMPARISON: None.      Impression    IMPRESSION:     There is an impacted fracture of the left femoral neck which has an acute or subacute appearance.    There is diffuse osseous demineralization. Visualized portion of the pelvis is negative for displaced fracture. Mild left hip degenerative changes. There are also degenerative changes at the sacroiliac joints.    NOTE: ABNORMAL REPORT    THE DICTATION ABOVE DESCRIBES AN ABNORMALITY FOR WHICH FOLLOW-UP IS NEEDED.

## 2024-07-30 NOTE — PLAN OF CARE
Problem: Orthopaedic Fracture  Goal: Optimal Functional Ability  Intervention: Optimize Functional Ability  Recent Flowsheet Documentation  Taken 7/30/2024 0000 by Wanda Schmitz RN  Activity Management:   activity adjusted per tolerance   bedrest  Positioning/Transfer Devices:   pillows   in use     Problem: Dementia Signs/Symptoms  Goal: Improved Mood Symptoms (Dementia Signs/Symptoms)  Intervention: Optimize Emotion and Mood  Recent Flowsheet Documentation  Taken 7/30/2024 0000 by Wanda Schmitz RN  Supportive Measures:   active listening utilized   positive reinforcement provided   problem-solving facilitated   relaxation techniques promoted   verbalization of feelings encouraged     Alert, disoriented to time and situation, frequently confused but verbalizes needs. Fluctuating elevated BP, on 3-4L via nasal cannula. Purewick in place. Not OOB. Denies pain, appears comfortable in bed. CMS intact. Dressing is CDI.

## 2024-07-30 NOTE — PROGRESS NOTES
Care Management Follow Up    Length of Stay (days): 2    Expected Discharge Date: 07/31/2024     Concerns to be Addressed: discharge planning, home safety     Patient plan of care discussed at interdisciplinary rounds: Yes    Anticipated Discharge Disposition: Skilled Nursing Facility     Anticipated Discharge Services: PCA (Private Pay PCA)  Anticipated Discharge DME: Other (see comment) (Pending clinical progress)    Patient/family educated on Medicare website which has current facility and service quality ratings:    Education Provided on the Discharge Plan:    Patient/Family in Agreement with the Plan: yes    Referrals Placed by CM/SW:    Private pay costs discussed: Not applicable    Additional Information:  2:49 PM  SW met with pt and daughter Haydee in room.  Pt was sleeping during conversation.  Haydee requesting pt go to [facility] (1st choice), [facility], [facility] or [facility].  SW reached out to [facility] however admissions left for the day.  SW to follow up with [facility] tomorrow.    ALETHEA Byrne

## 2024-07-30 NOTE — PROGRESS NOTES
07/30/24 1500   Appointment Info   Signing Clinician's Name / Credentials (PT) Shawn Mckeon PT   Living Environment   People in Home other relative(s)  (grandnephew lives with patient)   Current Living Arrangements other (see comments)  ([facility])   Home Accessibility no concerns   Transportation Anticipated family or friend will provide   Living Environment Comments at baseline, patient receives assistance for Bathing, Grooming, Transfers, Toileting; Cleaning, Cooking, Laundry, Shopping, Meal Preparation, Medication Management, Transportation  (per case mgmt note)   Self-Care   Usual Activity Tolerance good   Current Activity Tolerance fair   Regular Exercise No   Equipment Currently Used at Home grab bar, tub/shower;raised toilet seat;shower chair;walker, rolling  (per case mgmt note)   Fall history within last six months yes   Number of times patient has fallen within last six months 2   Activity/Exercise/Self-Care Comment at baseline, pt ambulates with FWW short distances. 4WW used for community mobility. Pt receives assistance M-F from [facility] and Sat-Sun from [facility].   General Information   Onset of Illness/Injury or Date of Surgery 07/28/24   Referring Physician Emiliano Poole MD   Patient/Family Therapy Goals Statement (PT) get stronger   Pertinent History of Current Problem (include personal factors and/or comorbidities that impact the POC) per chart review: 78-year-old female with past medical history of dementia, hypertension, A-fib, hypothyroidism, GERD, depression who sustained a recent fall approximately July 16th or 17th when slipping off the edge of her bathtub, with progressively worsening left hip pain. Patient has been ambulating with walker since her initial fall, albeit with pain. She had another fall 7/28/24 while presenting to orthopedic urgent care. Pt now s/p L ORIF.   Existing Precautions/Restrictions fall;weight bearing   Weight-Bearing Status - LLE weight-bearing as tolerated    Posture    Posture Forward head position   Range of Motion (ROM)   ROM Comment decreased ROM s/p L femur fixation   Strength (Manual Muscle Testing)   Strength (Manual Muscle Testing) Deficits observed during functional mobility   Bed Mobility   Bed Mobility scooting/bridging;supine-sit   Scooting/Bridging [place] (Bed Mobility) verbal cues;minimum assist (75% patient effort);1 person assist   Supine-Sit [place] (Bed Mobility) contact guard;verbal cues;1 person assist  (HOB raised for ease)   Impairments Contributing to Impaired Bed Mobility pain;decreased strength   Assistive Device (Bed Mobility) draw sheet   Transfers   Transfers sit-stand transfer   Maintains Weight-bearing Status (Transfers) able to maintain;verbal cues to maintain   Impairments Contributing to Impaired Transfers pain;decreased strength   Sit-Stand Transfer   Sit-Stand [place] (Transfers) contact guard;1 person assist;verbal cues   Assistive Device (Sit-Stand Transfers) walker, front-wheeled   Gait/Stairs (Locomotion)   [place] Level (Gait) contact guard   Assistive Device (Gait) walker, front-wheeled   Distance in Feet (Gait) stand pivot transfer   Pattern (Gait) step-to   Deviations/Abnormal Patterns (Gait) base of support, narrow   Maintains Weight-bearing Status (Gait) able to maintain;verbal cues to maintain   Clinical Impression   Criteria for Skilled Therapeutic Intervention Yes, treatment indicated   PT Diagnosis (PT) impaired functional mobility   Influenced by the following impairments pain, decreased strength   Functional limitations due to impairments gait, transfers   Clinical Presentation (PT Evaluation Complexity) stable   Clinical Presentation Rationale pt presents as medically diagnosed   Clinical Decision Making (Complexity) low complexity   Planned Therapy Interventions (PT) gait training;home exercise program;patient/family education;stair training;strengthening;transfer training   Risk & Benefits of  therapy have been explained care plan/treatment goals reviewed;patient;caregiver   PT Total Evaluation Time   PT Eval, Low Complexity Minutes (01540) 10   Physical Therapy Goals   PT Frequency 5x/week   PT Predicted Duration/Target Date for Goal Attainment 08/06/24   PT Goals PT Goal 1;Gait;Transfers   PT: Transfers Supervision/stand-by assist;Assistive device;Sit to/from stand   PT: Gait Rolling walker;Supervision/stand-by assist;50 feet   PT: Goal 1 Pt will participate in HEP for LE with min A.   Interventions   Interventions Quick Adds Therapeutic Procedure;Therapeutic Activity;Gait Training   Therapeutic Activity   Therapeutic Activities: dynamic activities to improve functional performance Minutes (92943) 15   Treatment Detail/Skilled Intervention supine to sit with HOB raised. VC needed for leg and arm placement. Pt able to scoot to EOB with cues for weightshifting, extra time needed. sit to/from stand EOB with FWW, CGA, verbal cueing for safe hand placement and LE positioning. Assisted pt in applying brief prior to transfer to bedside chair. Pt able to take a couple of steps towards right with VC for walker and foot placement, CGA-min A x2. Stand to sit in bedside chair with tacticle and verbal cues for hand placement. Pt positioned with call light in reach, chair alarm engaged, and LE elevated.   PT Discharge Planning   PT Plan transfers, LE therex, progress gait   PT Discharge Recommendation (DC Rec) (S)  Transitional Care Facility   PT Rationale for DC Rec Pt receives assistance for ADLs and IADLs. Very supportive family with PCA assistance M-F and [facility]. Pt below baseline level of functioning.   PT Brief overview of current status Transfers: stand pivot, FWW, CGA-min A x 2   PT Equipment Needed at Discharge walker, rolling   Total Session Time   Timed Code Treatment Minutes 15   Total Session Time (sum of timed and untimed services) 25

## 2024-07-30 NOTE — INTERVAL H&P NOTE
I have reviewed the surgical (or preoperative) H&P that is linked to this encounter, and examined the patient. There are no significant changes    Clinical Conditions Present on Arrival:  Clinically Significant Risk Factors Present on Admission                     I have reviewed the surgical (or preoperative) H&P that is linked to this encounter, reviewed the patient's labs, imaging, and independently examined the patient. There are no significant interval changes.       Assessment:  78-year-old female with past medical history of dementia, hypertension, A-fib, hypothyroidism, GERD, depression who sustained a recent fall approximately 2 weeks ago when slipping off the edge of her bathtub, with progressively worsening left hip pain. Patient has been ambulating with walker since her initial fall, albeit with pain. She had another fall yesterday while presenting to orthopedic urgent care.  Workup including x-rays and CT scan demonstrate subacute valgus impacted left femoral neck fracture.    Plan:   Preoperatively, the nature of the procedure, risks and benefits, as well as alternatives including nonsurgical management were discussed in detail with the patient and her daughters (POA).  I reviewed and discussed the patient's condition and relevant images with the patient. We discussed options for further evaluation and treatment, including conservative non-operative management versus surgical intervention.      From a conservative standpoint, the patient can pursue non-operative management, which would include protected weight bearing to the lower extremity, which carries a high risk of morbidity and mortality due to prolonged and diminished mobilization/ambulation and it's associated complications, which include but are not limited to blood clots (DVT/PE), skin ulcerations and pressure sores, and pneumonia. In the long term, there is also the risk of chronic pain, fracture nonunion, fracture malunion, and avascular  necrosis of the femoral head.    From a surgical standpoint, we discussed  surgical option of in situ fixation of left femoral neck fracture with femoral neck system.  We did review given the stable nature of her valgus impacted femoral neck fracture, as demonstrated both on radiographs and CT scan, in addition to the fact she has been ambulating on the hip, I do feel that this is a reasonable surgical option as a means to providing fracture stability while bony healing takes place, and allow for early mobilization.  We did discuss this procedure would likely be quicker, requiring less time under anesthesia and blood loss when compared to hip arthroplasty.  We did discuss risks including but not limited to complications with anesthesia, infection, bleeding, damage to surrounding structures, DVT/PE, nonunion, malunion, loss of fixation, screw cut out, gonzalez-implant fracture, need for potential future surgery.    We also discussed surgical option of left hip hemiarthroplasty, as well as the anticipated postoperative recovery and rehabilitation, the risks and benefits of arthroplasty surgery. Our discussion included but was not limited to the risk of pain, bleeding, infection, blood clots (DVT, PE), wound issues, continued chronic pain in the hip, post-operative joint stiffness, painful arc of motion, difficulty with ambulation, iatrogenic fracture, damage to nearby neurovascular structures, hip instability/dislocation, implant wear, loosening and/or failure requiring revision, and possible post-operative leg length discrepancy (apparent or actual). In the case of infection of the joint, the patient understands that this will require prolonged IV antibiotic therapy and possible multiple operative procedures in the future.  We did review this procedure would comfortably allow for early mobilization, but does come with longer surgical time, additional dissection, potential increased blood loss.    Ultimately, following  our conversation, her and her daughter stated expressed a good understanding.  Through shared decision-making, ultimately they did wish to proceed with left hip in situ fixation with femoral neck system for her stable valgus impacted femoral neck fracture, which I feel is reasonable. All of patients questions were answered preoperatively, and following our conversation, her daughter (POA) provided written informed consent to proceed with surgery.  The left hip was marked by myself.      Janak Tate MD   Orthopedic Surgery   [facility]

## 2024-07-30 NOTE — PLAN OF CARE
Problem: Dementia Signs/Symptoms  Goal: Improved Behavioral Control (Dementia Signs/Symptoms)  Outcome: Progressing  Intervention: Manage Behavior  Recent Flowsheet Documentation  Taken 7/30/2024 1400 by Fang Maradiaga RN  Environmental Support:   calm environment promoted   distractions minimized   rest periods encouraged     Problem: Fall Injury Risk  Goal: Absence of Fall and Fall-Related Injury  Intervention: Promote Injury-Free Environment  Recent Flowsheet Documentation  Taken 7/30/2024 1400 by Fang Maradiaga RN  Safety Promotion/Fall Prevention:   activity supervised   clutter free environment maintained   nonskid shoes/slippers when out of bed   room near nurse's station   room organization consistent   safety round/check completed  Taken 7/30/2024 1110 by Fang Maradiaga RN  Safety Promotion/Fall Prevention: safety round/check completed    Pt on 4L of O2 via nasal cannula. Patient slept for most of the morning. Patient alert and oriented to person and place, and disoriented to time and situation. Patient is intermittently confused. Incision dressing is clean, dry, and intact. Currently denies pain in extremity. Patient able to sit up in chair when working with therapy. Pt on regular diet and using pull-up.

## 2024-07-30 NOTE — PROGRESS NOTES
Patient vital signs are at baseline: No,  Reason:  on 1L o2 NC, not baseline  Patient able to ambulate as they were prior to admission or with assist devices provided by therapies during their stay:  No,  Reason:  assist of 2, impaired, slow to move  Patient MUST void prior to discharge:  Yes  voided  Patient able to tolerate oral intake:  Yes  Pain has adequate pain control using Oral analgesics:  Yes  Does patient have an identified :  Yes  Has goal D/C date and time been discussed with patient:  Yes    Alert to self only  TCU tomorrow  Transport pending

## 2024-07-30 NOTE — ANESTHESIA CARE TRANSFER NOTE
Patient: Myrtle Vaz    Procedure: Procedure(s):  INTERNAL FIXATION, FRACTURE, TROCHANTERIC, HIP, USING PINS OR RODS       Diagnosis: Closed fracture of neck of left femur, initial encounter (H) [S72.002A]  Diagnosis Additional Information: No value filed.    Anesthesia Type:   General     Note:    Oropharynx: oropharynx clear of all foreign objects and spontaneously breathing  Level of Consciousness: drowsy  Oxygen Supplementation: face mask  Level of Supplemental Oxygen (L/min / FiO2): 8  Independent Airway: airway patency satisfactory and stable  Dentition: dentition unchanged  Vital Signs Stable: post-procedure vital signs reviewed and stable  Report to RN Given: handoff report given  Patient transferred to: PACU    Handoff Report: Identifed the Patient, Identified the Reponsible Provider, Reviewed the pertinent medical history, Discussed the surgical course, Reviewed Intra-OP anesthesia mangement and issues during anesthesia, Set expectations for post-procedure period and Allowed opportunity for questions and acknowledgement of understanding      Vitals:  Vitals Value Taken Time   /74 07/29/24 2224   Temp 37  C (98.6  F) 07/29/24 2224   Pulse 71 07/29/24 2224   Resp 18 07/29/24 2224   SpO2 98 % 07/29/24 2224       Electronically Signed By: NARCISO JOHNSON CRNA  July 29, 2024  10:29 PM

## 2024-07-30 NOTE — PROGRESS NOTES
07/30/24 1530   Appointment Info   Signing Clinician's Name / Credentials (OT) Kendal Amezcua, OTR/L   Living Environment   People in Home grandchild(db)  (grandson.  Pt has a caregiver mon-friday and another on the weekends.)   Current Living Arrangements other (see comments)  ([facility])   Living Environment Comments Pt has assist with all ADLS and IADLS.  She is incontinent at baseline and wears briefs.  Her furniture is all padded and watrrproof   Self-Care   Usual Activity Tolerance good   Current Activity Tolerance moderate   Activity/Exercise/Self-Care Comment has rts with vanity, tub shower with chair   Instrumental Activities of Daily Living (IADL)   IADL Comments caregivers do all   General Information   Onset of Illness/Injury or Date of Surgery 07/28/24   Referring Physician Leena Pena   Patient/Family Therapy Goal Statement (OT) Get stronger at TCU   Additional Occupational Profile Info/Pertinent History of Current Problem Myrtle Vaz is a 78 year old female with history of dementia, hypertension, atrial fibrillation, hypothyroidism, GERD, depression, previously documented femoral neck fracture who was recently hospitalized at [facility] and discharged on 7/18/2024 after a fall and diagnosed with subarachnoid hemorrhage, admitted on 7/28/2024 after recurrent fall, and planned hip fracture repair with [place] on 7/29/2024.   Existing Precautions/Restrictions fall   Left Lower Extremity (Weight-bearing Status) weight-bearing as tolerated (WBAT)   Cognitive Status Examination   Cognitive Status Comments Pt has dementia at baseline.  She can follow directins, but gets confused at times   Range of Motion Comprehensive   General Range of Motion no range of motion deficits identified   Strength Comprehensive (MMT)   General Manual Muscle Testing (MMT) Assessment no strength deficits identified   Bed Mobility   Comment (Bed Mobility) mod   Transfers   Transfer Comments mod    Balance   Balance Comments stnding balance mod assist with FWW   Activities of Daily Living   BADL Assessment/Intervention lower body dressing;toileting   Lower Body Dressing Assessment/Training   Randolph Level (Lower Body Dressing) maximum assist (25% patient effort)   Toileting   Randolph Level (Toileting) maximum assist (25% patient effort)   Clinical Impression   Criteria for Skilled Therapeutic Interventions Met (OT) Yes, treatment indicated   OT Diagnosis decreased ind with adls   OT Problem List-Impairments impacting ADL activity tolerance impaired;cognition;mobility;strength;pain   Assessment of Occupational Performance 3-5 Performance Deficits   Identified Performance Deficits dressing, toileting, transfers, bed mobility, cognition   Planned Therapy Interventions (OT) ADL retraining;bed mobility training;transfer training;cognition   Clinical Decision Making Complexity (OT) detailed assessment/moderate complexity   Risk & Benefits of therapy have been explained evaluation/treatment results reviewed;care plan/treatment goals reviewed;risks/benefits reviewed;current/potential barriers reviewed;participants voiced agreement with care plan;participants included;patient   OT Total Evaluation Time   OT Eval, Moderate Complexity Minutes (34062) 15   OT Goals   Therapy Frequency (OT) Daily   OT Predicted Duration/Target Date for Goal Attainment 08/02/24   OT Goals Lower Body Dressing;Bed Mobility;Toilet Transfer/Toileting   OT: Lower Body Dressing Minimal assist   OT: Bed Mobility Minimal assist   OT: Toilet Transfer/Toileting Minimal assist   Interventions   Interventions Quick Adds Self-Care/Home Management   Self-Care/Home Management   Self-Care/Home Mgmt/ADL, Compensatory, Meal Prep Minutes (66336) 25   Symptoms Noted During/After Treatment (Meal Preparation/Planning Training) none   Treatment Detail/Skilled Intervention Pts caregiver present for session. Pt pleasant, sitting in the chair.  She was  agreeable to therapy.  Worked on transfers to and from chair and commode.  Extra time and repetition needed due to cognition.  Pt responded well to verbal and tactile cues combined.  She worked on standing at the commode without holding on and wiping her bottom, but that seemed to confuse her.  She did better when given the task to pull her brief up.  She needed min assist to get it up over her surgical site but was able to stand without holding on the walker with min assist of therapist for standing balance. She walked with the walker and min assist with a narrow base of support and complained of mild pain in the hip. She needed much cueing with correct hand placement and needed assist to touch the chair armrests before sitting. Chair alarm on and call light in her lap at end of session.   OT Discharge Planning   OT Plan adls and func mob aftr left hip pinning-no hip precautions, WBAT.  Has dementia   OT Discharge Recommendation (DC Rec) Transitional Care Facility   OT Rationale for DC Rec Pt will tee silva OT at TCU for ADLS and cognition after hip fracture and subsequent hip pinning. She is below her usual bsaseline.   OT Brief overview of current status mod assist for adls and func mob.   Total Session Time   Timed Code Treatment Minutes 25   Total Session Time (sum of timed and untimed services) 40

## 2024-07-30 NOTE — PROGRESS NOTES
"Orthopedic Progress Note      Assessment: 1 Day Post-Op  S/P Procedure(s):  INTERNAL FIXATION, FRACTURE, TROCHANTERIC, LEFT HIP, USING RODS @    Plan:   - Continue PT/OT.   - Weightbearing status: WBAT LLE  - Anticoagulation: ASA in addition to SCDs, sudeep stockings and early ambulation.  - Discharge planning: pending alertness, progression in therapy and pain control     Subjective:  Patient arrived to the floor at midnight last night and is very sleepy in the room this morning. Arousable but falls asleep quickly after arousing.      Objective:  BP (!) 169/77 (BP Location: Left arm)   Pulse 80   Temp 98.5  F (36.9  C) (Axillary)   Resp 20   Ht 1.727 m (5' 8\")   Wt 50.8 kg (111 lb 15.9 oz)   SpO2 95%   BMI 17.03 kg/m    The patient is A&Ox3. Appears comfortable, sitting up at bedside.  Calves are soft and non-tender bilaterally  Brisk capillary refill in the toes.   Palpable left dorsalis pedis pulse. Foot warm & well-perfused.  ROM: to sleepy to do full exam  Leg lengths equal.  left hip dressing C/D/I without strikethrough, no surrounding erythema.       5/5 TA/GSC/EHL.         Pertinent Labs   Lab Results: personally reviewed.   Lab Results   Component Value Date    INR 0.98 07/28/2024    INR 1.09 10/10/2019    INR 1.25 (H) 04/30/2019     Lab Results   Component Value Date    WBC 10.7 07/30/2024    HGB 12.8 07/30/2024    HCT 38.7 07/30/2024    MCV 92 07/30/2024     07/30/2024     Lab Results   Component Value Date     07/30/2024    CO2 25 07/30/2024         Report completed by:  Melia Hamm PA-C/[facility]    Date: 7/30/2024  Time: 9:56 AM    "

## 2024-07-30 NOTE — ANESTHESIA PROCEDURE NOTES
Airway       Patient location during procedure: OR       Procedure Start/Stop Times: 7/29/2024 8:55 PM  Staff -        CRNA: Ashli Espino APRN CRNA       Performed By: CRNA  Consent for Airway        Urgency: elective  Indications and Patient Condition       Indications for airway management: gonzalez-procedural         Mask difficulty assessment: 1 - vent by mask    Final Airway Details       Final airway type: endotracheal airway       Successful airway: ETT - single  Endotracheal Airway Details        ETT size (mm): 7.0       Cuffed: yes       Successful intubation technique: direct laryngoscopy       DL Blade Type: MAC 3       Grade View of Cords: 1       Adjucts: stylet       Position: Right       Measured from: gums/teeth       Secured at (cm): 20       Bite block used: None    Post intubation assessment        Placement verified by: capnometry, equal breath sounds and chest rise        Number of attempts at approach: 1       Number of other approaches attempted: 0       Secured with: tape       Ease of procedure: easy       Dentition: Intact       Dental guard used and removed.    Medication(s) Administered   Medication Administration Time: 7/29/2024 8:55 PM    Additional Comments       Particulate food (intact pickle round) suctioned from hypopharynx         no

## 2024-07-30 NOTE — ANESTHESIA POSTPROCEDURE EVALUATION
Patient: Myrtle Vaz    Procedure: Procedure(s):  INTERNAL FIXATION, FRACTURE, TROCHANTERIC, HIP, USING PINS OR RODS       Anesthesia Type:  General    Note:  Disposition: Inpatient   Postop Pain Control: Uneventful            Sign Out: Well controlled pain   PONV: No   Neuro/Psych: Uneventful            Sign Out: Acceptable/Baseline neuro status   Airway/Respiratory: Uneventful            Sign Out: Acceptable/Baseline resp. status; O2 supplementation   CV/Hemodynamics: Uneventful            Sign Out: Acceptable CV status; No obvious hypovolemia; No obvious fluid overload   Other NRE: NONE   DID A NON-ROUTINE EVENT OCCUR? No           Last vitals:  Vitals Value Taken Time   /76 07/29/24 2250   Temp 37  C (98.6  F) 07/29/24 2224   Pulse 67 07/29/24 2251   Resp 19 07/29/24 2251   SpO2 90 % 07/29/24 2251   Vitals shown include unfiled device data.    Electronically Signed By: Pankaj Miller MD  July 29, 2024  10:53 PM

## 2024-07-30 NOTE — PROGRESS NOTES
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

## 2024-07-30 NOTE — PROGRESS NOTES
Patient presented today for a blood pressure check. Within normal limits after allowing the patient to sit and rest for a few minutes.    [facility]    Medicine Progress Note - Hospitalist Service    Date of Admission:  7/28/2024    Assessment & Plan      Myrtle Vaz is a 78 year old female with history of dementia, hypertension, atrial fibrillation, hypothyroidism, GERD, depression, previously documented femoral neck fracture who was recently hospitalized at [facility] and discharged on 7/18/2024 after a fall and diagnosed with subarachnoid hemorrhage, admitted on 7/28/2024 after recurrent fall, and planned hip fracture repair with [facility] on 7/29/2024.     Changes today:  -Did not get much sleep overnight, surgery at 9pm, will see how much she is able to participate in therapy today  -Cr. Improved, below baseline now  -Continue all other current management, will likely need TCU on discharge    Left femoral neck fracture  Fall initial encounter  S/P OR 7/29  Preop hemoglobin normal at 13.6 g/dL  - Fall precautions  - Nonweightbearing left hip  - Left hip fracture order set   -Tylenol 975 mg every 8 hours, oxycodone 2.5 to 5 mg every 4 hours as needed.  Dilaudid 0.1 2.2 mg every 2 hours as needed   -Order EKG, PT/INR, type and screen.  - [facility] consult for planned surgical correction on 7/29/2024    Hyponatremia-Resolved  Sodium 134 on admission  -Normal saline 75 cc/h while n.p.o.    Chronic kidney disease stage III A  Baseline creatinine 1.0 mg/dL.  Currently 1.1 mg/dL.  - Avoid NSAIDs and nephrotoxins as able.    History of subarachnoid hemorrhage on 7/17/2024  Repeat CT head normal on 7/28/2024    Hypothyroidism  PTA medication: Levothyroxine 175 mcg daily    Essential hypertension  PTA medication: Losartan 50 mg daily with hold parameters.      GERD  History of esophagectomy  PTA medication: Omeprazole 20 mg twice daily    Insomnia, anxiety, depression  Senile dementia with history of behavioral disturbance.  Patient high risk for hospital-acquired delirium  PTA medication: Trazodone 50 mg  "at bedtime, venlafaxine 75 mg twice daily.  -Haldol 1 mg IV every 6 hours as needed for agitation.  -Encephalopathy preventative strategies    Moderate malnutrition  Cachexia  RD consultation    Raynaud's syndrome          Diet: Advance Diet as Tolerated: Regular Diet Adult  Diet  Snacks/Supplements Adult: Ensure Enlive; With Meals  Snacks/Supplements Adult: Magic Cup; With Meals    DVT Prophylaxis: VTE Prophylaxis contraindicated due to surgery today  Mora Catheter: Not present  Lines: None     Cardiac Monitoring: None  Code Status: No CPR- Do NOT Intubate      Clinically Significant Risk Factors          # Hypocalcemia: Lowest Ca = 8.2 mg/dL in last 2 days, will monitor and replace as appropriate         # Hypertension: Noted on problem list            # Cachexia: Estimated body mass index is 17.03 kg/m  as calculated from the following:    Height as of this encounter: 1.727 m (5' 8\").    Weight as of this encounter: 50.8 kg (111 lb 15.9 oz)., PRESENT ON ADMISSION  # Moderate Malnutrition: based on nutrition assessment, PRESENT ON ADMISSION     # Financial/Environmental Concerns: none         Disposition Plan     Medically Ready for Discharge: Anticipated in 2-4 Days             Emiliano Poole MD  Hospitalist Service  [facility]  Securely message with [employer] (more info)  Text page via [employer] Paging/Directory   ______________________________________________________________________    Interval History   NAEO. Patient sleeping through my eval, family at bedside, report patient was finally able to rest, did not get much sleep last night, otherweise they feel as though she is comfortable and not in pain    Physical Exam   Vital Signs: Temp: 98.5  F (36.9  C) Temp src: Axillary BP: (!) 169/70 Pulse: 80   Resp: 20 SpO2: 96 % O2 Device: Nasal cannula Oxygen Delivery: 2 LPM  Weight: 111 lbs 15.9 oz    Gen: NAD, resting comfortably  Card:Warm well perfused, pulses present  Pulm: Normal I/E effort on " NC while sleeping  Abd:Non distended  Skin:No obvious rashes or lesions on exposed areas of skin  Neuro:  S/S grossly intact, no obvious FND,   Psych:Pleasant, answering questions appropriately, insight good, judgement good, does not appear to be responding to I/E stimuli     Medical Decision Making       50 MINUTES SPENT BY ME on the date of service doing chart review, history, exam, documentation & further activities per the note.      Data   ------------------------- PAST 24 HR DATA REVIEWED -----------------------------------------------    I have personally reviewed the following data over the past 24 hrs:    10.7  \   12.8   / 324     136 99 22.2 /  100 (H)   4.1 25 0.82 \       Imaging results reviewed over the past 24 hrs:   Recent Results (from the past 24 hour(s))   XR Surgery LEBRON  Fluoro G/T 5 Min    Narrative    This exam was marked as non-reportable because it will not be read by a   radiologist or a [facility] non-radiologist provider.

## 2024-07-31 NOTE — PROGRESS NOTES
[facility]    Medicine Progress Note - Hospitalist Service    Date of Admission:  7/28/2024    Assessment & Plan      Myrtle Vaz is a 78 year old female with history of dementia, hypertension, atrial fibrillation, hypothyroidism, GERD, depression, previously documented femoral neck fracture who was recently hospitalized at [facility] and discharged on 7/18/2024 after a fall and diagnosed with subarachnoid hemorrhage, admitted on 7/28/2024 after recurrent fall, and planned hip fracture repair with [facility] on 7/29/2024.     Changes today:  -Was on 4L O2 this morning but not documented, unable to wean oxygen today, will obtain CXR, provide IS to patient  -Continue all other current management, planning for TCU discharge, likely tomorrow given current inability to wean    Addendum   XR imaging inconclusive, will obtain a CT Chest     Left femoral neck fracture  Fall initial encounter  S/P OR 7/29  -Cares per orthopedics    Hyponatremia-Resolved  Sodium 134 on admission  -Normal saline 75 cc/h while n.p.o.    Chronic kidney disease stage III A  Baseline creatinine 1.0 mg/dL.  Currently 1.1 mg/dL.  - Avoid NSAIDs and nephrotoxins as able.    History of subarachnoid hemorrhage on 7/17/2024  Repeat CT head normal on 7/28/2024    Hypothyroidism  PTA medication: Levothyroxine 175 mcg daily    Essential hypertension  PTA medication: Losartan 50 mg daily with hold parameters.      GERD  History of esophagectomy  PTA medication: Omeprazole 20 mg twice daily    Insomnia, anxiety, depression  Senile dementia with history of behavioral disturbance.  Patient high risk for hospital-acquired delirium  PTA medication: Trazodone 50 mg at bedtime, venlafaxine 75 mg twice daily.  -Haldol 1 mg IV every 6 hours as needed for agitation.  -Encephalopathy preventative strategies    Moderate malnutrition  Cachexia  RD consultation    Raynaud's syndrome          Diet: Advance Diet as Tolerated:  "Regular Diet Adult  Diet  Snacks/Supplements Adult: Ensure Enlive; With Meals  Snacks/Supplements Adult: Magic Cup; With Meals  Diet    DVT Prophylaxis: Aspirin  Mora Catheter: Not present  Lines: None     Cardiac Monitoring: None  Code Status: No CPR- Do NOT Intubate      Clinically Significant Risk Factors                  # Hypertension: Noted on problem list            # Cachexia: Estimated body mass index is 17.03 kg/m  as calculated from the following:    Height as of this encounter: 1.727 m (5' 8\").    Weight as of this encounter: 50.8 kg (111 lb 15.9 oz)., PRESENT ON ADMISSION  # Moderate Malnutrition: based on nutrition assessment, PRESENT ON ADMISSION     # Financial/Environmental Concerns: none         Disposition Plan     Medically Ready for Discharge: Anticipated in 2-4 Days             Emiliano Poole MD  Hospitalist Service  [facility]  Securely message with [employer] (more info)  Text page via [employer] Paging/Directory   ______________________________________________________________________    Interval History   NAEO. Denies any SOB, was on 4L NC this morning, unclear reasons, pulse ox was not on patient/working. Unable to further wean, I suspect due to lack of ambulation/movement, will give IS and encourage use, will obtain CXR but low suspicion given minimal oxygen requirements. Suspect can wean and discharge tomorrow, discussed with patient and family at bedside.    Physical Exam   Vital Signs: Temp: 98.7  F (37.1  C) Temp src: Oral BP: (!) 149/67 Pulse: 96   Resp: 18 SpO2: 94 % O2 Device: Nasal cannula Oxygen Delivery: 1/2 LPM  Weight: 111 lbs 15.9 oz    Gen: NAD, resting comfortably  Card:Warm well perfused, pulses present  Pulm: Normal I/E effort on NC while sleeping  Abd:Non distended  Skin:No obvious rashes or lesions on exposed areas of skin  Neuro:  S/S grossly intact, no obvious FND,   Psych:Pleasant, answering questions appropriately, insight good, judgement good, does " not appear to be responding to I/E stimuli     Medical Decision Making       60 MINUTES SPENT BY ME on the date of service doing chart review, history, exam, documentation & further activities per the note.      Data   ------------------------- PAST 24 HR DATA REVIEWED -----------------------------------------------    I have personally reviewed the following data over the past 24 hrs:    N/A  \   12.2   / N/A     N/A N/A N/A /  95   N/A N/A N/A \       Imaging results reviewed over the past 24 hrs:   No results found for this or any previous visit (from the past 24 hour(s)).

## 2024-07-31 NOTE — PROGRESS NOTES
"Orthopedic Progress Note      Assessment: 2 Days Post-Op  S/P Procedure(s):  INTERNAL FIXATION, FRACTURE, TROCHANTERIC, LEFT HIP, USING RODS @    Plan:   - Continue PT/OT.   - Weightbearing status: WBAT  - Anticoagulation: ASA in addition to SCDs, sudeep stockings and early ambulation.  - Discharge planning: Discharge pending medical clearance likely Tcu this afternoon      Subjective:  Pain: Well controlled on oral meds  Nausea, Vomiting:  No  Chest pain: No  Lightheadedness, Dizziness:  No  Neuro:  Patient denies new onset numbness or paresthesias     Patient doing well on POD #2. Ambulating, tolerating oral intake, voiding & pain is controlled with oral medications.      Objective:  /57 (BP Location: Left arm)   Pulse 92   Temp 98.7  F (37.1  C) (Oral)   Resp 18   Ht 1.727 m (5' 8\")   Wt 50.8 kg (111 lb 15.9 oz)   SpO2 92%   BMI 17.03 kg/m    The patient is A&Ox3. Appears comfortable, sitting up at bedside.  Calves are soft and non-tender bilaterally  Brisk capillary refill in the toes.   Palpable left dorsalis pedis pulse. Foot warm & well-perfused.  Sensation is intact to light touch & equal bilaterally in the femoral, DP, SP & tibial nerve distributions.  ROM: Flexes at left hip. Appropriately flexes & extends all toes bilaterally.   Motor: +5/5 dorsiflexion, plantar flexion & EHL bilaterally. Fires quad.   Leg lengths equal.  left hip dressing C/D/I without strikethrough, no surrounding erythema.       5/5 TA/GSC/EHL.         Pertinent Labs   Lab Results: personally reviewed.   Lab Results   Component Value Date    INR 0.98 07/28/2024    INR 1.09 10/10/2019    INR 1.25 (H) 04/30/2019     Lab Results   Component Value Date    WBC 10.7 07/30/2024    HGB 12.2 07/31/2024    HCT 38.7 07/30/2024    MCV 92 07/30/2024     07/30/2024     Lab Results   Component Value Date     07/30/2024    CO2 25 07/30/2024         Report completed by:  Melia Hamm PA-C/Dr. Beb[facility]    Date: 7/31/2024  Time: 9:47 AM

## 2024-07-31 NOTE — PROGRESS NOTES
Care Management Follow Up    Length of Stay (days): 3    Expected Discharge Date: 07/31/2024     Concerns to be Addressed: discharge planning, home safety     Patient plan of care discussed at interdisciplinary rounds: Yes    Anticipated Discharge Disposition: Skilled Nursing Facility     Anticipated Discharge Services: PCA (Private Pay PCA)  Anticipated Discharge DME: Other (see comment) (Pending clinical progress)    Patient/family educated on Medicare website which has current facility and service quality ratings:    Education Provided on the Discharge Plan:    Patient/Family in Agreement with the Plan: yes    Referrals Placed by CM/SW:    Private pay costs discussed: Not applicable    Additional Information:  9:00 AM  SW spoke with admissions at [facility].  TCU can accept pt today.  Will charge $40 per day for private room fee.  SW to discuss with pt/family.    12:43 PM  SW met with patient and daughter Nina to update around acceptance to [facility] and private room fee.  They are in agreement to TCU and cost.  Daughter is requesting medical transport at discharge and is agreeable to potential cost.      ANA LILIA informed by provider that pt is not medically ready for discharge today.  SW updated TCU.

## 2024-07-31 NOTE — PLAN OF CARE
Pt alert and oriented to self. VSS on 0.5 L of O2 NC, attempted to wean and unable. PRN 5 mg oxy given to manage pain which patient stated was severe. Dressing clean, dry, and intact. Patient experiencing baseline numbness/tingling. Incentive spirometer encouraged to patient and family member.

## 2024-08-01 NOTE — PLAN OF CARE
Pt having issues with eating/drinking see previous notes. Started on IV antibiotics for aspiration pneumonia. Speech consulted and esophagram ordered. Pt NPO right now until we determine results of esophagram exam. Swallow study to be done tomorrow 8/2 at 730am. Pt having increased coughing. 2 liters of 02. Only alert to self. Assist of 2 with belt and walker. Dressing C/D/I. Took pills in applesauce this AM without difficulty. Pt to remain NPO due to results of esophagram, GI now consulted as well. GI now wants to do a EDG tomorrow time undetermined. Fluids now added going at 50ml/hr.  Nerissa Sharma RN on 8/1/2024 at 5:23 PM   Problem: Dementia Signs/Symptoms  Goal: Improved Behavioral Control (Dementia Signs/Symptoms)  Outcome: Not Progressing  Goal: Improved Mood Symptoms (Dementia Signs/Symptoms)  Outcome: Not Progressing  Goal: Optimized Cognitive Function (Dementia Signs/Symptoms)  Outcome: Not Progressing  Goal: Optimized Oral Intake (Dementia Signs/Symptoms)  Outcome: Not Progressing  Goal: Improved Sleep (Dementia Signs/Symptoms)  Outcome: Not Progressing  Goal: Enhanced Social or Functional Skills and Ability (Dementia Signs/Symptoms)  Outcome: Not Progressing     Problem: Swallowing Impairment  Goal: Optimal Eating and Swallowing Without Aspiration  Outcome: Not Progressing   Goal Outcome Evaluation:

## 2024-08-01 NOTE — PROGRESS NOTES
[facility]    Medicine Progress Note - Hospitalist Service    Date of Admission:  7/28/2024    Assessment & Plan      Myrtle Vaz is a 78 year old female with history of dementia, hypertension, atrial fibrillation, hypothyroidism, GERD, depression, previously documented femoral neck fracture who was recently hospitalized at [facility] and discharged on 7/18/2024 after a fall and diagnosed with subarachnoid hemorrhage, admitted on 7/28/2024 after recurrent fall, and planned hip fracture repair with [facility] on 7/29/2024.  Patient was noted to be hypoxic needing 4 L of oxygen by nasal cannula on 7/31/2024.  A chest CT without contrast was done for further evaluation.  It showed prominent focal rounded consolidation versus atelectasis in the left lower lobe accounting for the chest x-ray abnormality.  Correlate with pneumonia.  There is concern for neoplasm as well the small airways leading to this region appears at least partially occluded and may represent mucus impaction.  Some aspirated material noted in the right lower lobe bronchus.  Mild nodular opacities noted in the periphery of the left lung may be infectious or inflammatory cause.  CT findings consistent with possible aspiration pneumonia.  Patient was started on IV Zosyn on 8/1/2024 and states changing it to oral Augmentin on 8-2-24 for total 7-day course.         Acute hypoxic respiratory failure needing oxygen by nasal cannula   Aspiration pneumonia  On CT chest done on 7/31/2024 left lower lobe consolidation with mucus impaction in the small airways and aspirated material noted in the right lower lobe bronchus.  Possible aspiration pneumonia versus neoplasm in the differential  Started on IV Zosyn to cover for aspiration pneumonia  Switch her to oral Augmentin in the next 24 hours if respiratory status remains stable plan 7-day course  Patient needs repeat CT chest in 6 weeks for documentation of resolution of  "these findings versus evaluation of possible underlying neoplasm  Speech path consultation  requested for evaluation of dysphagia  Encourage incentive spirometry, Acapella as tolerated  Wean off oxygen as tolerated    Left femoral neck fracture  Fall initial encounter  S/P OR 7/29  -Cares per orthopedics    Hyponatremia-Resolved  Sodium 134 on admission  -Normal saline 75 cc/h while n.p.o.    Chronic kidney disease stage III A  Baseline creatinine 1.0 mg/dL.  Currently 1.1 mg/dL.  - Avoid NSAIDs and nephrotoxins as able.    History of subarachnoid hemorrhage on 7/17/2024  Repeat CT head normal on 7/28/2024    Hypothyroidism  PTA medication: Levothyroxine 175 mcg daily    Essential hypertension  PTA medication: Losartan 50 mg daily with hold parameters.      GERD  History of esophagectomy  PTA medication: Omeprazole 20 mg twice daily    Insomnia, anxiety, depression  Senile dementia with history of behavioral disturbance.  Patient high risk for hospital-acquired delirium  PTA medication: Trazodone 50 mg at bedtime, venlafaxine 75 mg twice daily.  -Haldol 1 mg IV every 6 hours as needed for agitation.  -Encephalopathy preventative strategies    Moderate malnutrition  Cachexia  RD consultation    Raynaud's syndrome          Diet: Advance Diet as Tolerated: Regular Diet Adult  Diet  Snacks/Supplements Adult: Ensure Enlive; With Meals  Snacks/Supplements Adult: Magic Cup; With Meals  Diet    DVT Prophylaxis: Aspirin  Mora Catheter: Not present  Lines: None     Cardiac Monitoring: None  Code Status: No CPR- Do NOT Intubate      Clinically Significant Risk Factors              # Hypoalbuminemia: Lowest albumin = 3.1 g/dL at 8/1/2024  6:01 AM, will monitor as appropriate     # Hypertension: Noted on problem list            # Cachexia: Estimated body mass index is 17.03 kg/m  as calculated from the following:    Height as of this encounter: 1.727 m (5' 8\").    Weight as of this encounter: 50.8 kg (111 lb 15.9 oz)., PRESENT " ON ADMISSION  # Moderate Malnutrition: based on nutrition assessment, PRESENT ON ADMISSION     # Financial/Environmental Concerns: none         Disposition Plan     Medically Ready for Discharge: Anticipated in 2-4 Days if respiratory status remains stable.  Patient needs to discharge to TCU  Social work consulted and are following             Jessica Barrios MD  Hospitalist Service  [facility]  Securely message with [employer] (more info)  Text page via [employer] Paging/Directory   ______________________________________________________________________    Interval History   Patient is new to me today.  Chart reviewed.  Discussed with bedside RN and patient    Patient is resting comfortably in bed.  Complains of mild cough intermittently.  Denies any shortness of breath.  Patient is pleasantly confused.  Chest CT done for further evaluation of the hypoxia showed possible aspiration pneumonia of the both lungs.  Started on IV Zosyn.  No other acute issues    Physical Exam   Vital Signs: Temp: 98.8  F (37.1  C) Temp src: Oral BP: 128/65 Pulse: 68   Resp: 18 SpO2: 91 % O2 Device: None (Room air) Oxygen Delivery: 1/2 LPM  Weight: 111 lbs 15.9 oz    Gen: NAD, resting comfortably  Card:Warm well perfused, pulses present  Pulm: Mild bibasilar crackles heard on auscultation  Abd:Non distended  Skin:No obvious rashes or lesions on exposed areas of skin  Neuro:  S/S grossly intact, no obvious FND,   Psych: Pleasantly confused.  She does not know where she is or what month it is    Medical Decision Making       60 MINUTES SPENT BY ME on the date of service doing chart review, history, exam, documentation & further activities per the note.      Data   ------------------------- PAST 24 HR DATA REVIEWED -----------------------------------------------    I have personally reviewed the following data over the past 24 hrs:    9.5  \   11.9   / 340     136 100 27.6 (H) /  103 (H)   4.3 29 0.82 \     ALT: 6 AST: 20 AP:  109 TBILI: 0.3   ALB: 3.1 (L) TOT PROTEIN: 6.5 LIPASE: N/A       Imaging results reviewed over the past 24 hrs:   Recent Results (from the past 24 hour(s))   XR Chest Port 1 View    Narrative    EXAM: XR CHEST PORT 1 VIEW  LOCATION: [facility]  DATE: 7/31/2024    INDICATION: Hypoxia  COMPARISON: 7/17/2024      Impression    IMPRESSION: The patient is rotated. Normal-appearing triangular opacity adjacent the aortic knob, and new appearing mild left medial retrocardiac opacity. These could represent pseudolesions, or areas of atelectasis or pneumonia.    Heart size appears stable. No pulmonary vascular congestion. No significant pleural effusion.   CT Chest w/o Contrast    Narrative    EXAM: CT CHEST W/O CONTRAST  LOCATION: [facility]  DATE: 7/31/2024    INDICATION: Hypoxia, new retrocardiac opacity.  COMPARISON: Chest x-ray 7/17/2024.  TECHNIQUE: CT chest without IV contrast. Multiplanar reformats were obtained. Dose reduction techniques were used.  CONTRAST: None.    FINDINGS:   LUNGS AND PLEURA: Focal consolidation versus rounded atelectasis at the posterior medial left lower lobe measuring approximately 8.1 cm in transverse plane (series 5, image 136). This accounts for the chest x-ray abnormality. The small airways leading to   this region show occlusion and may relate to mucus impaction. Trace left base pleural fluid. Micronodular opacities primarily along the periphery of the mid to low lung involving the left upper and lower lobes. Right base areas of moderate atelectasis.   Small aspirated material leading to the right lower lobe bronchus.    MEDIASTINUM/AXILLAE: Anterior gastric pull-through. Scattered thoracic aortic calcifications. No visible suspicious enlarged lymph node.    CORONARY ARTERY CALCIFICATION: Severe.    UPPER ABDOMEN: No convincing acute upper abdominal abnormality.     MUSCULOSKELETAL: Subacute sternal fracture. Mild degenerative changes  of the spine.      Impression    IMPRESSION:   1.  Prominent focal rounded consolidation versus atelectasis at the left lower lobe accounting for the chest x-ray abnormality. Correlate with pneumonia. A neoplastic etiology cannot be excluded. The small airways leading to this region appear at least   partially occluded and may represent mucus impaction. Some aspirated material noted at the right lower lobe bronchus. Mitral nodular opacities noted at the periphery of the left lung that may be from an infectious or inflammatory cause. Recommend   follow-up CT chest in 3 months for surveillance of these findings.  2.  Diffuse coronary artery calcifications.

## 2024-08-01 NOTE — PROGRESS NOTES
Care Management Follow Up    Length of Stay (days): 4    Expected Discharge Date: 08/01/2024     Concerns to be Addressed: discharge planning, home safety     Patient plan of care discussed at interdisciplinary rounds: Yes    Anticipated Discharge Disposition: Skilled Nursing Facility     Anticipated Discharge Services: PCA (Private Pay PCA)  Anticipated Discharge DME: Other (see comment) (Pending clinical progress)    Patient/family educated on Medicare website which has current facility and service quality ratings:    Education Provided on the Discharge Plan:    Patient/Family in Agreement with the Plan: yes    Referrals Placed by CM/SW:    Private pay costs discussed: Not applicable    Additional Information:  11:08 AM  ANA LILIA informed pt is not medically ready to discharge to TCU today.  [facility] L[facility] updated [facility]    ALETHEA Byrne

## 2024-08-01 NOTE — SIGNIFICANT EVENT
was starting a PIV on pt. While she was eating her breakfast when all of a sudden she started choking on her scrambled eggs. At the moment, pt. Couldn't cough or pass any air so  started 1 Heimlich thrust & she got some up & enough to clear airway & start coughing on her own. SPO2 dropped as low as 74% during event, but returned to 90's% as obstruction was cleared. Bedside nurse, Nerissa MENDOZA Called to room & notified & will notify Dr. Barrios. Per nurse, swallow study has already been ordered.

## 2024-08-01 NOTE — PLAN OF CARE
Incentive spirometer encouraged. Infrequent cough. Pt is incontinent of urine. Loss of IV access. Pain managed with oral PRNs (see MAR). 1/2lo2nc continued. Regular diet. 2 assist.   On call provider notified of CT results.       Leila Diaz RN on 7/31/2024 at 10:27 PM

## 2024-08-01 NOTE — PROGRESS NOTES
"Orthopedic Progress Note      Assessment: 3 Days Post-Op  S/P Procedure(s):  INTERNAL FIXATION, FRACTURE, TROCHANTERIC, LEFT HIP, USING RODS     Plan:   - Continue PT/OT.   - Weightbearing status: WBAT  - Anticoagulation: ASA in addition to SCDs, sudeep stockings and early ambulation.  - O2 requirement, and likely aspiration pneumonia. Appreciate hospitalist management.   - Discharge planning: Discharge pending medical clearance for respiratory symptoms, likely TCU upon discharge       Subjective:  Pain: Well controlled on oral meds  Nausea, Vomiting:  No  Chest pain: No  Lightheadedness, Dizziness:  No  Neuro:  Patient denies new onset numbness or paresthesias     Patient doing OK on POD #3. Stayed due to pain and respiratory symptoms and inability to wean off of supplemental O2, workup concerning for pneumonia. Unfortunately this AM just prior to my exam, the patient aspirated part of her breakfast.  Ambulating, voiding & pain is controlled with oral medications.        Objective:  /65 (BP Location: Left arm)   Pulse 68   Temp 98.8  F (37.1  C) (Oral)   Resp 18   Ht 1.727 m (5' 8\")   Wt 50.8 kg (111 lb 15.9 oz)   SpO2 91%   BMI 17.03 kg/m    The patient is A&Ox3. Appears slightly comfortable, sitting up in bed.  Calves are soft and non-tender bilaterally  Brisk capillary refill in the toes.   Palpable left dorsalis pedis pulse. Foot warm & well-perfused.  Sensation is intact to light touch & equal bilaterally in the femoral, DP, SP & tibial nerve distributions.  ROM: Flexes at left hip. Appropriately flexes & extends all toes bilaterally.   Motor: +5/5 dorsiflexion, plantar flexion & EHL bilaterally. Fires quad.   Leg lengths equal.  Left hip dressing C/D/I without strikethrough, no surrounding erythema.       Pertinent Labs   Lab Results: personally reviewed.   Lab Results   Component Value Date    INR 0.98 07/28/2024    INR 1.09 10/10/2019    INR 1.25 (H) 04/30/2019     Lab Results   Component Value " Date    WBC 9.5 08/01/2024    HGB 11.9 08/01/2024    HCT 35.8 08/01/2024    MCV 92 08/01/2024     08/01/2024     Lab Results   Component Value Date     08/01/2024    CO2 29 08/01/2024         Report completed by:  Leidy Morel PA-C /[facility]    Date: 08/01/24   Time: 10:02 AM

## 2024-08-01 NOTE — PLAN OF CARE
"  Problem: Adult Inpatient Plan of Care  Goal: Patient-Specific Goal (Individualized)  Description: You can add care plan individualizations to a care plan. Examples of Individualization might be:  \"Parent requests to be called daily at 9am for status\", \"I have a hard time hearing out of my right ear\", or \"Do not touch me to wake me up as it startles  me\".  Outcome: Progressing     Problem: Fall Injury Risk  Goal: Absence of Fall and Fall-Related Injury  Outcome: Progressing  Intervention: Identify and Manage Contributors  Recent Flowsheet Documentation  Taken 8/1/2024 0152 by Lupe Nelson, RN  Medication Review/Management: medications reviewed  Intervention: Promote Injury-Free Environment  Recent Flowsheet Documentation  Taken 8/1/2024 0152 by Lupe Nelson, RN  Safety Promotion/Fall Prevention:   safety round/check completed   room near nurse's station   patient and family education   nonskid shoes/slippers when out of bed   lighting adjusted   clutter free environment maintained   increase visualization of patient     Problem: Adult Inpatient Plan of Care  Goal: Optimal Comfort and Wellbeing  Outcome: Progressing   Goal Outcome Evaluation:    Patient pleasantly confused overnight. Appeared to sleep well. Able to make needs known.   5mg oxycodone given for pain per the PAINAD scale.   Continuous to be on 0.5L NC with O2 Sats at 93-94%.    Q2 turns done to promote skin integrity.   Perwick in overnight.   Bed alarm on for patient safety.     "

## 2024-08-01 NOTE — SIGNIFICANT EVENT
Significant Event Note    Time of event: 2120 hrs.    Description of event:  Received message from nursing that I need to review the CT results as those were the orders for the on- to review the CT results    Plan:  Chest CT results reviewed, unfortunately no signout was provided to me as to what the anticipated plan of action was.  There is no evidence of pneumothorax, significant pleural effusion.  It seems that the daytime provider was somewhat concerned about patient's inability to wean off oxygen.  X-ray was inconclusive.  No labs were ordered.  Will order a.m. labs including CBC with differential and a CMP  Will need to have this matter be addressed by the daytime provider who is familiar with the patient.  This writer is remote cross cover and the patient's best interest as served by in person evaluation        Dayana Richey MD

## 2024-08-01 NOTE — PROGRESS NOTES
Speech-Language Pathology: Clinical Swallow Evaluation     08/01/24 1100   Appointment Info   Signing Clinician's Name / Credentials (SLP) FAM Lopez   General Information   Onset of Illness/Injury or Date of Surgery 08/01/24   Pertinent History of Current Problem Choked on scrambled eggs this morning with Heimlich Thrust performed for pt to clear airway. Pt has hx of esophageal dysphagia d/t esophagectomy with gastric pull through performed years ago. She had a VFSS 2/22/24 with penetration with thin liquids and mildly thick liquids and questionable trace aspirtation with mildly thick. At that time, TF was not an option and it was decided to continue her regular diet with thin liquids and to monitor for respiratory distress/pna. Pt has poor memory making learning swallow strategies ineffective. She was doing fine until her recent falls with hip fracture impacting her current mobility which increases her aspiration risk as she is been more immobile compared to when she was seen for VFSS in February. Her aspiration risk is also increased d/t decreased esophageal motiltiy with her recent decreased mobility.   General Observations in bed with her caregiver, pleasant and confused   Type of Evaluation   Type of Evaluation Swallow Evaluation   Oral Motor   Oral Musculature generally intact   Dentition (Oral Motor)   Dentition (Oral Motor) adequate dentition   Facial Symmetry (Oral Motor)   Facial Symmetry (Oral Motor) WNL   Lip Function (Oral Motor)   Lip Range of Motion (Oral Motor) WNL   Comment, Lip Function (Oral Motor) Generally intact   Tongue Function (Oral Motor)   Comment, Tongue Function (Oral Motor) Generally intact   Jaw Function (Oral Motor)   Jaw Function (Oral Motor) WNL   Vocal Quality/Secretion Management (Oral Motor)   Vocal Quality (Oral Motor) WNL   General Swallowing Observations   Past History of Dysphagia yes; significant esophageal dysphagia and mild/mod pharyngeal dysphagia. 2/22/24 VFSS  results: Modified Clinical Bedside Assessment along with Video Fluoroscopic Swallow Study completed today. No oral motor deficit observed that could cause an oral dysphagia. Oral Motor structures appear intact to support safe swallowing. VFSS results/Impressions showed pre-spill of oral bolus into pharynx prior swallow with all textures assessed with intact tongue base retraction. She had delayed swallow trigger with all textures assessed. This caused inconsistent transient, shallow penetration to deep penetration to the vocal cords (? An episode of trace aspiration x1 with mildly thick) with no cough elicited. Chin tuck did reduce penetration risk with mildly thick. Little to no pharyngeal stasis noted after the swallow with all textures analyzed. There was significant amount of air noted in the upper esophagus consistent with esophageal dysphagia following esophagectomy. Unfortunately, this will cause pharyngeal dysphagia which will and has placed her at an aspiration risk (this was noted during her esophagram in 2022). Pt has been hospitalized for pna 2x's in the last 18 months, though it was not clear if it was an aspiration pna. Per discussion with pt and caregiver, it is recommended that pt continue current diet. If she gets hospitalized again for pna, it is recommended to determine if aspiration could be culprit and to try and teach strategy of chin tuck. However, pt is a poor candidate for learning this strategy given her memory deficit.   Respiratory Support nasal cannula   Current Diet/Method of Nutritional Intake (General Swallowing Observations, NIS) thin liquids (level 0);pureed (dysphagia pureed) (level 4);other (see comments)  (she was downgraded to puree with thin following choking episode)   Swallowing Evaluation Clinical swallow evaluation   Clinical Swallow Evaluation   Feeding Assistance no assistance needed   Clinical Swallow Evaluation Textures Trialed thin liquids;solid foods;pureed   Clinical  "Swallow Eval: Thin Liquid Texture Trial   Mode of Presentation, Thin Liquids straw;self-fed   Volume of Liquid or Food Presented 4 oz   Oral Phase of Swallow WFL   Pharyngeal Phase of Swallow intact   Clinical Swallow Evaluation: Puree Solid Texture Trial   Mode of Presentation, Puree self-fed   Volume of Puree Presented applesauce   Oral Phase, Puree WFL   Pharyngeal Phase, Puree intact   Clinical Swallow Evaluation: Solid Food Texture Trial   Mode of Presentation self-fed   Volume Presented deanna crackers   Oral Phase WFL   Pharyngeal Phase intact   Esophageal Phase of Swallow   Patient reports or presents with symptoms of esophageal dysphagia Yes   Esophageal comments Pt had long hx esophageal dysphagia/decreased motility. Hx esphagectomy with gastric pull through   Swallowing Recommendations   Diet Consistency Recommendations NPO   Comment, Swallowing Recommendations minced and moist (to avoid dry foods) with thin liquids and VFSS to be scheduled tomorrow. ADDENDUM: now strict NPO until esophagram can be completed   Clinical Impression   Criteria for Skilled Therapeutic Interventions Met (SLP Eval) Yes, treatment indicated   SLP Diagnosis dysphagia   Clinical Impression Comments Pt seen at bedside to analyze for sx's aspiration. No oral dysphagia or overt sx's aspiration obvserved with self feeding and drinking thin liquids from straw, cracker and applesauce. Recommend further eval of swallow via VFSS given her hx dysphagia. ADDENDUM: while I was documenting this evaluation, nurse contacted me stating that patient was finishing eating her cracker and applesauce and then she started choking. Nurse pounded pt on back and she was able to clear her airway. She is now strict NPO per my request and will request an esophagram STAT as this appears to be more esophageal in nature given the fact that this choking event occurred about 15-20 minutes after I had analyzed her with po intake and while she was \"casually\" " finishing the crackers and applesauce. This presentation appears r/t more of a dysmotility issue than a pure pharyngeal problem.  I went back into pt room and explained this to patient and caregiver. Pt did not recall choking (about 10 minutes ago).   SLP Total Evaluation Time   Eval: oral/pharyngeal swallow function, clinical swallow Minutes ([id number]) 70

## 2024-08-01 NOTE — PROVIDER NOTIFICATION
Paged provider about pt choking on eggs while SWAT was in there attempting to put in IV. Asked provider to modify pt's diet until speech sees pt. Pt sats did drop into the 80s and a result pt is on O2 at 2 liters sating above 90%. RR 18. Pt head of bed all the way up. Pt still coughing frequently. Provider changed diet to puree until speech sees them. Breakfast tray taken away, cups of fluids also taken away. Notified NA about new change in pt's diet.   Nerissa Sharma RN on 8/1/2024 at 10:05 AM

## 2024-08-01 NOTE — PROGRESS NOTES
Reviewed patient findings with Dr. Mays- esophagram showed an esophagus and stomach filled with food. Plans for EGD tomorrow. NPO for now. Previous history of esophagectomy with gastric pull up.   Tana gilman  [facility]       Cancel swallow study please.

## 2024-08-02 NOTE — PROGRESS NOTES
"Orthopedic Progress Note      Assessment: Day of Surgery  S/P S/P Procedure(s):  INTERNAL FIXATION, FRACTURE, TROCHANTERIC, LEFT HIP, USING RODS     Plan:   - Unable to see patient today due to her being in OR for EGD. Will continue to follow.   - Continue PT/OT.   - Weightbearing status: WBAT  - Anticoagulation: ASA in addition to SCDs, sudeep stockings and early ambulation.  - Discharge planning: Discharge pending medical clearance likely TCU upon discharge      Subjective:  Unable to see patient today. Patient undergoing EGD with dilation in the OR today.     Objective:  BP (!) 147/73   Pulse 66   Temp 98.9  F (37.2  C) (Temporal)   Resp 20   Ht 1.727 m (5' 8\")   Wt 50.8 kg (111 lb 15.9 oz)   SpO2 94%   BMI 17.03 kg/m        Pertinent Labs   Lab Results: personally reviewed.   Lab Results   Component Value Date    INR 0.98 07/28/2024    INR 1.09 10/10/2019    INR 1.25 (H) 04/30/2019     Lab Results   Component Value Date    WBC 9.5 08/01/2024    HGB 11.9 08/01/2024    HCT 35.8 08/01/2024    MCV 92 08/01/2024     08/01/2024     Lab Results   Component Value Date     08/01/2024    CO2 29 08/01/2024         Report completed by:  Ivette Townsend PA-C/Dr. [facility]    Date: 8/2/2024  Time: 11:44 AM    "

## 2024-08-02 NOTE — ANESTHESIA PREPROCEDURE EVALUATION
Anesthesia Pre-Procedure Evaluation    Patient: Myrtle Vaz   MRN: [id number] : 1945        Procedure : Procedure(s):  ESOPHAGOGASTRODUODENOSCOPY          Past Medical History:   Diagnosis Date    Depression     History of atrial fibrillation     HLD (hyperlipidemia)     HTN (hypertension)     Hypothyroidism       Past Surgical History:   Procedure Laterality Date    BRONCHOSCOPY (RIGID OR FLEXIBLE), DIAGNOSTIC N/A 2019    Procedure: BRONCHOSCOPY, WITH BAL;  Surgeon: Ally Ybarra MD;  Location: [facility]    COMBINED ESOPHAGOSCOPY, GASTROSCOPY, DUODENOSCOPY (EGD), REPLACE ESOPHAGEAL STENT N/A 2019    Procedure: ESOPHAGOGASTRODUODENOSCOPY WITH ESOPHAGEAL STENT REPLACEMENT, IRRIGATION AND DEBRIDEMENT OF LEFT NECK, WOUND VAC PLACEMENT;  Surgeon: Harley Murguia MD;  Location: [facility]    COMBINED ESOPHAGOSCOPY, GASTROSCOPY, DUODENOSCOPY (EGD), REPLACE ESOPHAGEAL STENT N/A 6/10/2019    Procedure: Esophagogastroduodenoscopy and Stent Removal;  Surgeon: Ally Ybarra MD;  Location: [facility]    ESOPHAGOGASTRECTOMY N/A 2019    Procedure: Redo Laparotomy SUBSTERNAL Esophagogastrostomy, Pharyngostomy, Intraoperative EGD, PARTIAL STERNOTOMY, LEFT PHARYNGOSTOMY,;  Surgeon: Temitope Bullock MD;  Location: [facility]    ESOPHAGOSCOPY, GASTROSCOPY, DUODENOSCOPY (EGD), COMBINED N/A 2018    Procedure: COMBINED ESOPHAGOSCOPY, GASTROSCOPY, DUODENOSCOPY (EGD);  Surgeon: Temitope Bullock MD;  Location: [facility]    ESOPHAGOSCOPY, GASTROSCOPY, DUODENOSCOPY (EGD), DILATATION, COMBINED N/A 10/10/2019    Procedure: Esophagogastroduodenoscopy with Botox injection of the Pylorus;  Surgeon: Temitope Bullock MD;  Location: [facility]    ESOPHAGOSCOPY, GASTROSCOPY, DUODENOSCOPY (EGD), DILATATION, COMBINED N/A 5/10/2022    Procedure: ESOPHAGOGASTRODUODENOSCOPY, WITH DILATION;  Surgeon: Temitope Bullock MD;  Location: [facility]    ESOPHAGOSCOPY, GASTROSCOPY, DUODENOSCOPY (EGD), DILATATION, COMBINED N/A 2023     Procedure: ESOPHAGOGASTRODUODENOSCOPY, WITH DILATION;  Surgeon: Temitope Bullock MD;  Location: [facility]    HERNIORRHAPHY HIATAL  2018    HYSTERECTOMY      HYSTERECTOMY      IRRIGATION AND DEBRIDEMENT CHEST WASHOUT, COMBINED N/A 10/10/2019    Procedure: Chest wound exploration;  Surgeon: Temitope Bullock MD;  Location: [facility]    LAPAROSCOPIC HERNIORRHAPHY HIATAL N/A 2018    Procedure: Midline laparotomy, Trans-hiatal esophagogasterectomy, gastrostomy, J-tube placement, G-tube placement, bilateral pleural chest tube placement, mediastinal abcess drainage, spit fistula creation, Esophagastrodueodenoscopy;  Surgeon: Temitope Bullock MD;  Location: [facility]    OOPHORECTOMY Bilateral     OPEN REDUCTION INTERNAL FIXATION HIP NAILING Left 2024    Procedure: INTERNAL FIXATION, FRACTURE, TROCHANTERIC, LEFT HIP, USING RODS;  Surgeon: Janak Tate MD;  Location: [facility]    ROTATOR CUFF REPAIR RT/LT      THYROIDECTOMY       for hyperthroidism    ZZC TOTAL ABDOM HYSTERECTOMY      Description: Total Abdominal Hysterectomy;  Recorded: 2012;      Allergies   Allergen Reactions    Gabapentin Other (See Comments)     Tics      Social History     Tobacco Use    Smoking status: Former     Current packs/day: 0.00     Types: Cigarettes     Start date: 1958     Quit date: 2018     Years since quittin.7    Smokeless tobacco: Never   Substance Use Topics    Alcohol use: No      Wt Readings from Last 1 Encounters:   24 50.8 kg (111 lb 15.9 oz)        Anesthesia Evaluation   Pt has had prior anesthetic.     No history of anesthetic complications       ROS/MED HX  ENT/Pulmonary:  - neg pulmonary ROS   (+)                tobacco use, Past use,         COPD,    recent URI, unresolved (minimal oxygen requirements presently), aspiration:        Neurologic:  - neg neurologic ROS   (+)   dementia,       CVA (SA[facility]), date: 24,                     Cardiovascular:  -  neg cardiovascular ROS   (+) Dyslipidemia hypertension- -   -  - -                                      METS/Exercise Tolerance:     Hematologic:  - neg hematologic  ROS     Musculoskeletal: Comment: 7/29 hip fracture s/p repair - neg musculoskeletal ROS     GI/Hepatic: Comment: S/p esophagectomy, now with aspiration - neg GI/hepatic ROS     Renal/Genitourinary:  - neg Renal ROS   (+) renal disease, type: CRI,            Endo:  - neg endo ROS   (+)          thyroid problem, hypothyroidism,           Psychiatric/Substance Use:  - neg psychiatric ROS   (+) psychiatric history depression       Infectious Disease:  - neg infectious disease ROS     Malignancy:  - neg malignancy ROS     Other:  - neg other ROS          Physical Exam    Airway        Mallampati: II   TM distance: > 3 FB   Neck ROM: full   Mouth opening: > 3 cm    Respiratory Devices and Support         Dental     Comment: Upper dentures    (+) Removable bridges or other hardware      Cardiovascular   cardiovascular exam normal       Rhythm and rate: regular and normal     Pulmonary   pulmonary exam normal        breath sounds clear to auscultation           OUTSIDE LABS:  CBC:   Lab Results   Component Value Date    WBC 9.5 08/01/2024    WBC 10.7 07/30/2024    HGB 11.9 08/01/2024    HGB 12.2 07/31/2024    HCT 35.8 08/01/2024    HCT 38.7 07/30/2024     08/01/2024     07/30/2024     BMP:   Lab Results   Component Value Date     08/01/2024     07/30/2024    POTASSIUM 4.3 08/01/2024    POTASSIUM 4.1 07/30/2024    CHLORIDE 100 08/01/2024    CHLORIDE 99 07/30/2024    CO2 29 08/01/2024    CO2 25 07/30/2024    BUN 27.6 (H) 08/01/2024    BUN 22.2 07/30/2024    CR 0.82 08/01/2024    CR 0.82 07/30/2024     (H) 08/01/2024    GLC 95 07/31/2024     COAGS:   Lab Results   Component Value Date    PTT 47 (H) 04/28/2019    INR 0.98 07/28/2024     POC:   Lab Results   Component Value Date     (H) 10/11/2019     HEPATIC:   Lab Results    Component Value Date    ALBUMIN 3.1 (L) 08/01/2024    PROTTOTAL 6.5 08/01/2024    ALT 6 08/01/2024    AST 20 08/01/2024    ALKPHOS 109 08/01/2024    BILITOTAL 0.3 08/01/2024     OTHER:   Lab Results   Component Value Date    PH 7.35 04/28/2019    LACT 1.1 05/06/2019    JOSE 8.3 (L) 08/01/2024    PHOS 3.9 12/03/2020    MAG 2.3 05/21/2019    LIPASE 43 (L) 02/11/2019    TSH 1.80 07/28/2024    T4 1.08 04/04/2024    CRP 24.8 (H) 06/19/2019     (H) 06/19/2019       Anesthesia Plan    ASA Status:  4    NPO Status:  ELEVATED Aspiration Risk/Unknown    Anesthesia Type: General.     - Airway: ETT   Induction: RSI, Propofol.   Maintenance: Balanced.        Consents    Anesthesia Plan(s) and associated risks, benefits, and realistic alternatives discussed. Questions answered and patient/representative(s) expressed understanding.     - Discussed: Risks, Benefits and Alternatives for BOTH SEDATION and the PROCEDURE were discussed     - Discussed with:  Patient (spoke with daughter via telephone)       - Patient is DNR/DNI Status: Yes             Suspend during perioperative period? Yes.             Agree to: chemical resuscitation, chest compression/defibrillation.        Postoperative Care       PONV prophylaxis: Ondansetron (or other 5HT-3), Dexamethasone or Solumedrol     Comments:    Other Comments: GETA with RSI             David Hansen MD    I have reviewed the pertinent notes and labs in the chart from the past 30 days and (re)examined the patient.  Any updates or changes from those notes are reflected in this note.

## 2024-08-02 NOTE — BRIEF OP NOTE
[facility]    Brief Operative Note    Pre-operative diagnosis: Esophageal obstruction due to food impaction [T18.128A, [id number].[id number]]  Post-operative diagnosis Same as pre-operative diagnosis    Procedure: ESOPHAGOGASTRODUODENOSCOPY with DILATATION and BIOPSY, N/A - Esophagus    Surgeon: Surgeons and Role:     * Cholo Mays MD - Primary  Anesthesia: MAC   Estimated Blood Loss: None    Drains: None  Specimens:   ID Type Source Tests Collected by Time Destination   1 :  Biopsy Esophagus SURGICAL PATHOLOGY EXAM Cholo Mays MD 8/2/2024 10:45 AM      Findings:     1.  Esophagogastric anastomosis just below the cricopharyngeus, dilated.  2.  Esophagitis, biopsied  3.  Large amounts of food residue in gastric lumen  4.  Normal duodenum.    Complications: None.  Implants: * No implants in log *

## 2024-08-02 NOTE — CONSULTS
Gastroenterology Consultation    Patient: Myrtle Vaz   1945  Date of Consult:  8/2/2024  Admission Date/Time: 7/28/2024  4:07 PM  Primary Care Provider:  Leila Philip    Requesting Physician: Radha Barrios MD    CHIEF COMPLAINT:   Abnormal CT scan, dysphagia    REASON FOR THE CONSULT: Abnormal CT scan    HPI:   The patient is a 78 year old White female with a history of dementia, hypertension, atrial fibrillation, hypothyroidism, GERD, depression.  Recent hospitalization at [facility] on 7/18/2024, after a fall, and diagnosed with subarachnoid hemorrhage.    Hospitalized here on 7/28/2024, after recurrent fall, and femoral neck fracture, and underwent a hip fracture repair with [facility] on 7/29/2024.    Hospitalization complicated by hypoxemia and imaging studies concerning for pneumonia.  CT scan also concerning for large amount of food or fluid in the esophagus and stomach.    Patient herself has significant dementia, and is a poor historian.  Most history from chart as well as from her daughter [Haydee].    Apparently Myrtle has had a longstanding history of GERD dating back many years, and has also been documented to have a large paraesophageal hiatal hernia.  Due to this, she underwent surgery in 2018 for hiatal hernia repair.  Apparently, the surgery was complicated by injury to the esophagus, and she subsequently underwent an esophagectomy, with gastric pull-up.    Ever since then, according to her daughter, she has had episodes of choking with certain foods.  She also gets these choking episodes sometimes with liquids or pills, but mostly with solid foods.  Usually, these episodes settle down on their own, and have not required further medical interventions, outside of an upper endoscopy in 2020 at [facility] requiring an esophageal dilation according to the daughter.    It appears that she has had regular barium studies or esophagram's over the years, including 1 in  November 2022, revealing postsurgical changes of distal esophagectomy with retrosternal gastric pull-through.  Mild, smooth luminal narrowing of distal esophagus or EGD anastomosis with poor esophageal motility and slow passage of contrast was noted at that time.    No history of GI bleeding, nausea, vomiting or alteration of bowel habits.  No history of abdominal pain.  No anorexia, up until this recent fall and hip surgery.  No history of chest pain, shortness of breath, fever, chills or cough.    She is a non-smoker, and denies alcohol use.  She is not on any NSAIDs or anticoagulation currently.      REVIEW OF SYSTEMS:  Review of systems as per HPI, limited by her mental status.    PAST MEDICAL HISTORY:  Past Medical History:   Diagnosis Date    Depression     History of atrial fibrillation     HLD (hyperlipidemia)     HTN (hypertension)     Hypothyroidism        PAST SURGICAL HISTORY:  Past Surgical History:   Procedure Laterality Date    BRONCHOSCOPY (RIGID OR FLEXIBLE), DIAGNOSTIC N/A 5/2/2019    Procedure: BRONCHOSCOPY, WITH BAL;  Surgeon: Ally Ybarra MD;  Location: [facility]    COMBINED ESOPHAGOSCOPY, GASTROSCOPY, DUODENOSCOPY (EGD), REPLACE ESOPHAGEAL STENT N/A 5/6/2019    Procedure: ESOPHAGOGASTRODUODENOSCOPY WITH ESOPHAGEAL STENT REPLACEMENT, IRRIGATION AND DEBRIDEMENT OF LEFT NECK, WOUND VAC PLACEMENT;  Surgeon: Harley Murguia MD;  Location: [facility]    COMBINED ESOPHAGOSCOPY, GASTROSCOPY, DUODENOSCOPY (EGD), REPLACE ESOPHAGEAL STENT N/A 6/10/2019    Procedure: Esophagogastroduodenoscopy and Stent Removal;  Surgeon: Ally Ybarra MD;  Location: [facility]    ESOPHAGOGASTRECTOMY N/A 4/26/2019    Procedure: Redo Laparotomy SUBSTERNAL Esophagogastrostomy, Pharyngostomy, Intraoperative EGD, PARTIAL STERNOTOMY, LEFT PHARYNGOSTOMY,;  Surgeon: Temitope Bullock MD;  Location: [facility]    ESOPHAGOSCOPY, GASTROSCOPY, DUODENOSCOPY (EGD), COMBINED N/A 11/21/2018    Procedure: COMBINED ESOPHAGOSCOPY, GASTROSCOPY,  DUODENOSCOPY (EGD);  Surgeon: Temitope Bullock MD;  Location: [facility]    ESOPHAGOSCOPY, GASTROSCOPY, DUODENOSCOPY (EGD), DILATATION, COMBINED N/A 10/10/2019    Procedure: Esophagogastroduodenoscopy with Botox injection of the Pylorus;  Surgeon: Temitope Bullock MD;  Location: [facility]    ESOPHAGOSCOPY, GASTROSCOPY, DUODENOSCOPY (EGD), DILATATION, COMBINED N/A 5/10/2022    Procedure: ESOPHAGOGASTRODUODENOSCOPY, WITH DILATION;  Surgeon: Temitope Bullock MD;  Location: [facility]    ESOPHAGOSCOPY, GASTROSCOPY, DUODENOSCOPY (EGD), DILATATION, COMBINED N/A 1/17/2023    Procedure: ESOPHAGOGASTRODUODENOSCOPY, WITH DILATION;  Surgeon: Temitope Bullock MD;  Location: [facility]    HERNIORRHAPHY HIATAL  11/2018    HYSTERECTOMY      HYSTERECTOMY  1998    IRRIGATION AND DEBRIDEMENT CHEST WASHOUT, COMBINED N/A 10/10/2019    Procedure: Chest wound exploration;  Surgeon: Temitope Bullock MD;  Location: [facility]    LAPAROSCOPIC HERNIORRHAPHY HIATAL N/A 11/21/2018    Procedure: Midline laparotomy, Trans-hiatal esophagogasterectomy, gastrostomy, J-tube placement, G-tube placement, bilateral pleural chest tube placement, mediastinal abcess drainage, spit fistula creation, Esophagastrodueodenoscopy;  Surgeon: Temitope Bullock MD;  Location: [facility]    OOPHORECTOMY Bilateral 1998    OPEN REDUCTION INTERNAL FIXATION HIP NAILING Left 7/29/2024    Procedure: INTERNAL FIXATION, FRACTURE, TROCHANTERIC, LEFT HIP, USING RODS;  Surgeon: Janak Tate MD;  Location: [facility]    ROTATOR CUFF REPAIR RT/LT      THYROIDECTOMY       for hyperthroidism    ZZC TOTAL ABDOM HYSTERECTOMY      Description: Total Abdominal Hysterectomy;  Recorded: 08/31/2012;       MEDICATIONS:  Current Outpatient Medications   Medication Instructions    acetaminophen (TYLENOL) 650 mg, Oral, EVERY 4 HOURS PRN    aspirin 81 mg, Oral, 2 TIMES DAILY    levothyroxine (SYNTHROID/LEVOTHROID) 175 mcg, Oral, DAILY    losartan (COZAAR)  "50 mg, Oral, DAILY    Multiple Vitamins-Minerals (PRESERVISION/LUTEIN) CAPS 1 tablet, Oral, DAILY    omeprazole (PRILOSEC) 20 mg, Oral, 2 TIMES DAILY BEFORE MEALS    oxyCODONE (ROXICODONE) 5-10 mg, Oral, EVERY 4 HOURS PRN    traZODone (DESYREL) 50 mg, Oral, AT BEDTIME    traZODone (DESYREL) 25-50 mg, Oral, DAILY PRN    venlafaxine (EFFEXOR) 75 mg, Oral, 2 TIMES DAILY    vitamin D3 (CHOLECALCIFEROL) 125 mcg, Oral, DAILY       ALLERGIES:  Gabapentin    FAMILY HISTORY:  Family History   Problem Relation Age of Onset    Cerebrovascular Disease Father     Myocardial Infarction Father 49    Sudden Death Sister     Anesthesia Reaction No family hx of     Deep Vein Thrombosis No family hx of     Cancer Mother         lung       SOCIAL HISTORY:  Social History     Tobacco Use    Smoking status: Former     Current packs/day: 0.00     Types: Cigarettes     Start date: 1958     Quit date: 2018     Years since quittin.7    Smokeless tobacco: Never   Substance Use Topics    Alcohol use: No       PHYSICAL EXAM:  BP (!) 173/83 (BP Location: Left arm)   Pulse 68   Temp 97.5  F (36.4  C) (Oral)   Resp 16   Ht 1.727 m (5' 8\")   Wt 50.8 kg (111 lb 15.9 oz)   SpO2 92%   BMI 17.03 kg/m    Body mass index is 17.03 kg/m .  Constitutional: Awake, appropriately responsive, confused.  Cardiovascular: S1 and S2 normal  Respiratory: Clear to auscultation bilaterally  Abdomen: Abdomen soft, nontender, nondistended    LABS:  CBC:  Recent Labs   Lab Test 24  06   WBC 9.5   RBC 3.89   HGB 11.9   HCT 35.8   MCV 92   MCH 30.6   MCHC 33.2   RDW 14.9           BMP:  Recent Labs   Lab 24  0601 24  0611 24  0637 24  0611     --  136 136   POTASSIUM 4.3  --  4.1 3.5   CHLORIDE 100  --  99 100   CO2 29  --  25 30*   * 95 100* 110*   CR 0.82  --  0.82 1.14*   BUN 27.6*  --  22.2 37.9*       Liver/Pancreas:  Recent Labs   Lab 24  0601 24  1721   AST 20 21   ALT 6 6   ALKPHOS 109 " 137   BILITOTAL 0.3 0.2     Recent Labs   Lab Test 07/28/24 2051   INR 0.98       IMAGING:    XR Esophagram    Result Date: 8/1/2024  EXAM: XR ESOPHAGRAM SINGLE CONTRAST LOCATION: [facility] DATE: 8/1/2024 INDICATION: choking with increased oral intake. Hx esophagectomy and anterior gastric pull-up, with hx decreased motility. COMPARISON: CT chest 7/31/2024 TECHNIQUE: Routine. RADIATION DOSE: Total Air Kerma 34.2 mGy FINDINGS: ESOPHAGUS: There is a gastric pull-up with the EG junction just below the kelin. The esophagus and intrathoracic stomach are filled with food material. The thin liquid barium slowly permeates between the fluid material and eventually makes it into the proximal duodenum by 10 minutes post ingestion.     IMPRESSION: 1.  Esophageal resection with anterior gastric pull-up. 2.  Extensive esophageal and gastric food retention.    CT Chest w/o Contrast    Result Date: 7/31/2024  EXAM: CT CHEST W/O CONTRAST LOCATION: [facility] DATE: 7/31/2024 INDICATION: Hypoxia, new retrocardiac opacity. COMPARISON: Chest x-ray 7/17/2024. TECHNIQUE: CT chest without IV contrast. Multiplanar reformats were obtained. Dose reduction techniques were used. CONTRAST: None. FINDINGS: LUNGS AND PLEURA: Focal consolidation versus rounded atelectasis at the posterior medial left lower lobe measuring approximately 8.1 cm in transverse plane (series 5, image 136). This accounts for the chest x-ray abnormality. The small airways leading to  this region show occlusion and may relate to mucus impaction. Trace left base pleural fluid. Micronodular opacities primarily along the periphery of the mid to low lung involving the left upper and lower lobes. Right base areas of moderate atelectasis. Small aspirated material leading to the right lower lobe bronchus. MEDIASTINUM/AXILLAE: Anterior gastric pull-through. Scattered thoracic aortic calcifications. No visible suspicious  enlarged lymph node. CORONARY ARTERY CALCIFICATION: Severe. UPPER ABDOMEN: No convincing acute upper abdominal abnormality. MUSCULOSKELETAL: Subacute sternal fracture. Mild degenerative changes of the spine.     IMPRESSION: 1.  Prominent focal rounded consolidation versus atelectasis at the left lower lobe accounting for the chest x-ray abnormality. Correlate with pneumonia. A neoplastic etiology cannot be excluded. The small airways leading to this region appear at least partially occluded and may represent mucus impaction. Some aspirated material noted at the right lower lobe bronchus. Mitral nodular opacities noted at the periphery of the left lung that may be from an infectious or inflammatory cause. Recommend follow-up CT chest in 3 months for surveillance of these findings. 2.  Diffuse coronary artery calcifications.     XR Chest Port 1 View    Result Date: 7/31/2024  EXAM: XR CHEST PORT 1 VIEW LOCATION: [facility] DATE: 7/31/2024 INDICATION: Hypoxia COMPARISON: 7/17/2024     IMPRESSION: The patient is rotated. Normal-appearing triangular opacity adjacent the aortic knob, and new appearing mild left medial retrocardiac opacity. These could represent pseudolesions, or areas of atelectasis or pneumonia. Heart size appears stable. No pulmonary vascular congestion. No significant pleural effusion.    Barium esophagram done yesterday reveals a gastric polyp with the EG junction just below the kelin.  The esophagus and intrathoracic stomach are filled with food material.  The thin liquid barium slowly per minutes between the food material and eventually makes it into the proximal duodenum by 10 minutes postingestion.      ASSESSMENT:   Dysphagia.  Myrtle Vaz is a 78-year-old female with dementia and multiple other medical problems, with a prior history of esophagectomy and gastric pull-up done in 2018.  She does seem to have a history suggestive of some stenosis at the level of the  anastomosis, leading to choking episodes.    Currently hospitalized after a fall and femoral neck fracture, requiring hip surgery.  Hospitalization complicated by aspiration pneumonia.    At this point, the imaging studies are concerning for some food residue at the level of the surgical anastomosis in the chest.  There is likely some stenosis at the EG anastomosis.    She will certainly benefit from an EGD for further evaluation, and potential dilation.    I have explained the indication, procedure as well as potential risks of an upper endoscopy to her daughter [Haydee], including the potential for side effects or allergic reaction to medications, bleeding or perforation, which she understands, and is agreeable for center to go ahead with this endoscopy.  Due to the risk of further food bolus aspiration, we will plan on doing this EGD in the OR under general anesthesia as discussed with the anesthesia team.    PLAN:  -Keep n.p.o.  - Avoid any NSAIDs or anticoagulant therapy  - PPI twice a day  - We will proceed with EGD today    Further recommendations based upon the results of the endoscopy.    Thank you for this consultation, we will follow along with you            Cholo Mays MD  Thank you for the opportunity to participate in the care of this patient.   Please feel free to call with any questions or concerns.  [phone].       CC: [facility], Leila Philip

## 2024-08-02 NOTE — ANESTHESIA PROCEDURE NOTES
Airway         Procedure Start/Stop Times: 8/2/2024 10:31 AM  Staff -        Performed By: CRNAIndications and Patient Condition       Indications for airway management: airway protection and gonzalez-procedural       Induction type:intravenous       Mask difficulty assessment: 0 - not attempted    Final Airway Details       Final airway type: endotracheal airway       Successful airway: ETT - single  Endotracheal Airway Details        ETT size (mm): 7.0       Successful intubation technique: video laryngoscopy       VL Blade Size: Glidescope 3       Grade View of Cords: 1       Adjucts: stylet       Position: Right       Measured from: lips       Secured at (cm): 21       Bite block used: None    Post intubation assessment        Placement verified by: capnometry, equal breath sounds and chest rise        Number of attempts at approach: 1       Number of other approaches attempted: 0       Secured with: tape       Ease of procedure: easy       Dentition: Intact and Unchanged       Dental guard used and removed. Dental Guard Type: Standard White.    Medication(s) Administered   Medication Administration Time: 8/2/2024 10:31 AM

## 2024-08-02 NOTE — ANESTHESIA POSTPROCEDURE EVALUATION
Patient: Myrtle Vaz    Procedure: Procedure(s):  ESOPHAGOGASTRODUODENOSCOPY with DILATATION and BIOPSY       Anesthesia Type:  General    Note:  Disposition: Inpatient   Postop Pain Control: Uneventful            Sign Out: Well controlled pain   PONV: No   Neuro/Psych: Uneventful            Sign Out: Acceptable/Baseline neuro status   Airway/Respiratory: Uneventful            Sign Out: Acceptable/Baseline resp. status   CV/Hemodynamics: Uneventful            Sign Out: Acceptable CV status; No obvious hypovolemia; No obvious fluid overload   Other NRE: NONE   DID A NON-ROUTINE EVENT OCCUR? No           Last vitals:  Vitals Value Taken Time   /79 08/02/24 1110   Temp 37.2  C (98.9  F) 08/02/24 1100   Pulse 73 08/02/24 1116   Resp 21 08/02/24 1116   SpO2 93 % 08/02/24 1116   Vitals shown include unfiled device data.    Electronically Signed By: David Hansen MD  August 2, 2024  11:18 AM

## 2024-08-02 NOTE — OR NURSING
Spoke with daughter Haydee Gray who has been making decisions for pt.  Pt gave verbal consent for the procedure and anesthesia via phone to GUILLE THORNTON, Dr Mays and Dr YASH Hansen.

## 2024-08-02 NOTE — ANESTHESIA CARE TRANSFER NOTE
Patient: Myrtle Vaz    Procedure: Procedure(s):  ESOPHAGOGASTRODUODENOSCOPY with DILATATION and BIOPSY       Diagnosis: Esophageal obstruction due to food impaction [T18.128A, W44.F3XA]  Diagnosis Additional Information: No value filed.    Anesthesia Type:   General     Note:    Oropharynx: oropharynx clear of all foreign objects  Level of Consciousness: awake  Oxygen Supplementation: face mask  Level of Supplemental Oxygen (L/min / FiO2): 8  Independent Airway: airway patency satisfactory and stable  Dentition: dentition unchanged  Vital Signs Stable: post-procedure vital signs reviewed and stable  Report to RN Given: handoff report given  Patient transferred to: PACU    Handoff Report: Identifed the Patient, Identified the Reponsible Provider, Reviewed the pertinent medical history, Discussed the surgical course, Reviewed Intra-OP anesthesia mangement and issues during anesthesia, Set expectations for post-procedure period and Allowed opportunity for questions and acknowledgement of understanding      Vitals:  Vitals Value Taken Time   /63 08/02/24 1100   Temp 98.9    Pulse 66 08/02/24 1102   Resp 14 08/02/24 1102   SpO2 91 % 08/02/24 1102   Vitals shown include unfiled device data.    Electronically Signed By: NARCISO Gant CRNA  August 2, 2024  11:03 AM

## 2024-08-02 NOTE — PLAN OF CARE
Problem: Adult Inpatient Plan of Care  Goal: Optimal Comfort and Wellbeing  Outcome: Progressing     Problem: Fall Injury Risk  Goal: Absence of Fall and Fall-Related Injury  Outcome: Progressing     Problem: Orthopaedic Fracture  Goal: Effective Oxygenation and Ventilation  Outcome: Progressing   Goal Outcome Evaluation:       Patient is alert but only oriented to self. Denies pain throughout shift. Strict NPO with no exceptions due to aspirating (see previous notes). EGD planned for the AM. Dressing is CDI. Weaned down from 1L to 0.5L NC with sats ranging between 94-96%. Purewick is in place. Q2 turns.

## 2024-08-02 NOTE — PROGRESS NOTES
[facility] Medicine PROGRESS NOTE      Identification/Summary:   Assessment & Plan  Myrtle Vaz is a 78 year old female with history of dementia, hypertension, atrial fibrillation, hypothyroidism, GERD, depression, previously documented femoral neck fracture who was recently hospitalized at [facility] and discharged on 7/18/2024 after a fall and diagnosed with subarachnoid hemorrhage, admitted on 7/28/2024 after recurrent fall, and planned hip fracture repair with [facility] on 7/29/2024.  Patient was noted to be hypoxic needing 4 L of oxygen by nasal cannula on 7/31/2024.  A chest CT without contrast was done for further evaluation.  It showed prominent focal rounded consolidation versus atelectasis in the left lower lobe accounting for the chest x-ray abnormality.  Correlate with pneumonia.  There is concern for neoplasm as well the small airways leading to this region appears at least partially occluded and may represent mucus impaction.  Some aspirated material noted in the right lower lobe bronchus.  Mild nodular opacities noted in the periphery of the left lung may be infectious or inflammatory cause.  CT findings consistent with possible aspiration pneumonia.  Patient was started on IV Zosyn on 8/1/2024,   She was found to have dysphagia, esophagram with distended esophagus, GI consulted, had EGD 8/2 significant food residue in gastric lumen, esophagitis, esophagogastric anastomosis that was dilated.  GI recommending clear liquid diet today, advancing to soft foods and noted to be upright with all feeding.        Acute hypoxic respiratory failure needing oxygen by nasal cannula   Aspiration pneumonia  On CT chest done on 7/31/2024 left lower lobe consolidation with mucus impaction in the small airways and aspirated material noted in the right lower lobe bronchus.  Possible aspiration pneumonia versus neoplasm in the differential  Started on IV Zosyn to cover for aspiration pneumonia  8/1  Patient needs repeat CT chest in 6 weeks for documentation of resolution of these findings versus evaluation of possible underlying neoplasm  Speech path consultation  requested for evaluation of dysphagia  Encourage incentive spirometry, Acapella as tolerated  Wean off oxygen as tolerated  Currently on room air.    Dysphagia  Dilated esophagus  History of esophageal pull-up in the past for complicated hiatal hernia repair  S/p EGD 8/2 s/p dilation esophagogastric anastomosis.  Esophagitis, large amount of food residue in the gastric lumen concerning for gastroparesis  To be upright with all feeding  Soft diet  She has refused feeding tube     Left femoral neck fracture  Fall initial encounter  S/P OR 7/29  -Cares per orthopedics     Hyponatremia-Resolved  Sodium 134 on admission     Chronic kidney disease stage III A  Baseline creatinine 1.0 mg/dL.  Currently 1.1 mg/dL.  - Avoid NSAIDs and nephrotoxins as able.     History of subarachnoid hemorrhage on 7/17/2024  Repeat CT head normal on 7/28/2024     Hypothyroidism  PTA medication: Levothyroxine 175 mcg daily     Essential hypertension  PTA medication: Losartan 50 mg daily with hold parameters.        GERD  History of esophagectomy  PTA medication: Omeprazole 20 mg twice daily     Insomnia, anxiety, depression  Senile dementia with history of behavioral disturbance.  Patient high risk for hospital-acquired delirium  PTA medication: Trazodone 50 mg at bedtime, venlafaxine 75 mg twice daily.  -Haldol 1 mg IV every 6 hours as needed for agitation.  -Encephalopathy preventative strategies     Moderate malnutrition  Cachexia  RD consultation     Raynaud's syndrome    Diet: Diet  Snacks/Supplements Adult: Ensure Enlive; With Meals  Snacks/Supplements Adult: Magic Cup; With Meals  Diet  Clear Liquid Diet  DVT Prophylaxis:   SCDs with recent subarachnoid hemorrhage  Code Status: No CPR- Do NOT Intubate    Clinically Significant Risk Factors              #  "Hypoalbuminemia: Lowest albumin = 3.1 g/dL at 8/1/2024  6:01 AM, will monitor as appropriate     # Hypertension: Noted on problem list            # Cachexia: Estimated body mass index is 17.03 kg/m  as calculated from the following:    Height as of this encounter: 1.727 m (5' 8\").    Weight as of this encounter: 50.8 kg (111 lb 15.9 oz).   # Moderate Malnutrition: based on nutrition assessment    # Financial/Environmental Concerns: none            Medically Ready for Discharge: Anticipated Tomorrow       Interval History/Subjective:  Laying in bed, pleasantly confused.  Denies any shortness of breath, occasional cough.  No chest pain.  No abdominal pain, nausea vomiting.    Physical Exam/Objective:  Vitals I/O   Vital signs:  Temp: 97.9  F (36.6  C) Temp src: Oral BP: (!) 172/81 Pulse: 66   Resp: 16 SpO2: 97 % O2 Device: Nasal cannula Oxygen Delivery: 2 LPM Height: 172.7 cm (5' 8\") Weight: 50.8 kg (111 lb 15.9 oz)  Estimated body mass index is 17.03 kg/m  as calculated from the following:    Height as of this encounter: 1.727 m (5' 8\").    Weight as of this encounter: 50.8 kg (111 lb 15.9 oz).      I/O last 3 completed shifts:  In: -   Out: 400 [Urine:400]     Body mass index is 17.03 kg/m .    General Appearance:  Alert, cooperative, cachectic no distress   Head:  Normocephalic, atraumatic   Eyes:  PERRL    Throat:  mucosa; moist   Neck: No JVD, thyromegaly   Lungs:   Clear to auscultation bilaterally, respirations unlabored   Chest Wall:  Midline well-healed scar   Heart:  Regular rate and rhythm, S1, S2 normal,no murmur   Abdomen:   Soft, non tender, non distended, bowel sounds present, no guarding or rigidity   Extremities: No edema, no joint swelling   Skin: Skin color, texture, turgor normal, no rashes or lesions   Neurologic: Alert and oriented X 3, Moves all 4 extremities       Medications:   Personally Reviewed.  Current Facility-Administered Medications   Medication Dose Route Frequency Provider Last Rate " Last Admin    aspirin EC tablet 81 mg  81 mg Oral BID Leena Pena PA-C   81 mg at 08/01/24 0822    levothyroxine (SYNTHROID/LEVOTHROID) tablet 175 mcg  175 mcg Oral Daily Marcos Carter DO   175 mcg at 08/01/24 0822    losartan (COZAAR) tablet 50 mg  50 mg Oral Daily Marcos Carter DO   50 mg at 08/01/24 0822    pantoprazole (PROTONIX) EC tablet 40 mg  40 mg Oral BID AC Marcos Carter DO   40 mg at 08/01/24 0627    piperacillin-tazobactam (ZOSYN) 3.375 g vial to attach to  mL bag  3.375 g Intravenous Q8H Jessica Barrios MD   3.375 g at 08/02/24 1215    polyethylene glycol (MIRALAX) Packet 17 g  17 g Oral Daily Leena Pena PA-C   17 g at 08/01/24 0823    senna-docusate (SENOKOT-S/PERICOLACE) 8.6-50 MG per tablet 1 tablet  1 tablet Oral BID Leena Pena PA-C   1 tablet at 08/01/24 0823    sodium chloride (PF) 0.9% PF flush 3 mL  3 mL Intracatheter Q8H Leena Pena PA-C   3 mL at 07/31/24 1551    traZODone (DESYREL) tablet 50 mg  50 mg Oral At Bedtime Marcos Carter DO   50 mg at 07/31/24 2051    venlafaxine (EFFEXOR) tablet 75 mg  75 mg Oral BID Marcos Carter DO   75 mg at 08/01/24 0823       Data reviewed today: I personally reviewed all new medications, labs, imaging/diagnostics reports over the past 24 hours. Pertinent findings include    Labs:  Most Recent 3 CBC's:  Recent Labs   Lab Test 08/01/24  0601 07/31/24  0611 07/30/24  0637 07/29/24  0611   WBC 9.5  --  10.7 7.2   HGB 11.9 12.2 12.8 13.2   MCV 92  --  92 93     --  324 336     Most Recent 3 BMP's:  Recent Labs   Lab Test 08/01/24  0601 07/31/24  0611 07/30/24  0637 07/29/24  0611     --  136 136   POTASSIUM 4.3  --  4.1 3.5   CHLORIDE 100  --  99 100   CO2 29  --  25 30*   BUN 27.6*  --  22.2 37.9*   CR 0.82  --  0.82 1.14*   ANIONGAP 7  --  12 6*   JOSE 8.3*  --  8.2* 8.2*   * 95 100* 110*       Imaging:   No results found for this or any previous visit  (from the past 24 hour(s)).    52 MINUTES SPENT BY ME on the date of service doing chart review, history, exam, documentation & further activities per the note.    Radha Barrios MD  Hospitalist  [facility]

## 2024-08-03 NOTE — PROGRESS NOTES
"Orthopedic Progress Note      Assessment: 7/29/2024  S/P Procedure(s):  INTERNAL FIXATION, FRACTURE, TROCHANTERIC, LEFT HIP, USING RODS     Plan:   - Continue PT/OT.   - Weightbearing status: WBAT  - Anticoagulation: ASA in addition to SCDs, sudeep stockings and early ambulation.  - Discharge planning: Discharge pending medical clearance likely TCU upon discharge      Subjective:  Seen today. No new issues. No pain reported in left hip. Had EGD yesterday.    Objective:  /63 (BP Location: Left arm)   Pulse 65   Temp 97.5  F (36.4  C) (Oral)   Resp 16   Ht 1.727 m (5' 8\")   Wt 50.8 kg (111 lb 15.9 oz)   SpO2 (!) 88%   BMI 17.03 kg/m    General: resting in bed. Somewhat restless  Resp: NLB on RA  CV: skin wwp  LLE: dressing c/d/I. fires ehl, fhl, gsc, ta. SILT dorsal/plantar foot. Toes wwp with brisk cap refill.    Pertinent Labs   Lab Results: personally reviewed.   Lab Results   Component Value Date    INR 0.98 07/28/2024    INR 1.09 10/10/2019    INR 1.25 (H) 04/30/2019     Lab Results   Component Value Date    WBC 9.5 08/01/2024    HGB 11.9 08/01/2024    HCT 35.8 08/01/2024    MCV 92 08/01/2024     08/01/2024     Lab Results   Component Value Date     08/01/2024    CO2 29 08/01/2024         Report completed by:  Quinten Benitez MD  [facility]    "

## 2024-08-03 NOTE — PROGRESS NOTES
"GASTROENTEROLOGY PROGRESS NOTE        SUBJECTIVE:  No current dysphagia with mechanical soft diet. No abdominal pain , chest pain, no nausea.      OBJECTIVE:    BP (!) 141/72 (BP Location: Left arm, Patient Position: Chair)   Pulse 82   Temp 97.2  F (36.2  C) (Oral)   Resp 16   Ht 1.727 m (5' 8\")   Wt 50.8 kg (111 lb 15.9 oz)   SpO2 91%   BMI 17.03 kg/m    Temp (24hrs), Av  F (36.7  C), Min:97.2  F (36.2  C), Max:98.9  F (37.2  C)    No data found.    Intake/Output Summary (Last 24 hours) at 8/3/2024 0858  Last data filed at 8/3/2024 0840  Gross per 24 hour   Intake 420 ml   Output 650 ml   Net -230 ml        PHYSICAL EXAM     Constitutional: NAD    Abdomen: soft, NTTP         Additional Comments:  ROS, FH, SH: See initial GI consult for details.     I have reviewed the patient's new clinical lab results:     Recent Labs   Lab Test 24  0624  0611 24  0637 24  0611 24  20520  0850 10/10/19  1248 19  0320 19  0313   WBC 9.5  --  10.7 7.2  --    < >  --    < > 9.9   HGB 11.9 12.2 12.8 13.2  --    < > 11.3*   < > 8.1*   MCV 92  --  92 93  --    < >  --    < > 93     --  324 336  --    < >  --    < > 324   INR  --   --   --   --  0.98  --  1.09  --  1.25*    < > = values in this interval not displayed.     Recent Labs   Lab Test 24  0624  0637 24  0611   POTASSIUM 4.3 4.1 3.5   CHLORIDE 100 99 100   CO2 29 25 30*   BUN 27.6* 22.2 37.9*   ANIONGAP 7 12 6*     Recent Labs   Lab Test 24  0624  2246 24  1721 24  1658 19  1715 19  0819 19  1015 19  0619  1147   ALBUMIN 3.1*  --  3.9 4.0   < >  --    < >  --    < > 2.9*   BILITOTAL 0.3  --  0.2 0.2   < >  --    < >  --   --  0.3   ALT 6  --  6 24   < >  --    < >  --   --  17   AST 20  --  21 21   < >  --    < >  --   --  20   PROTEIN  --  50*  --   --   --  Negative  --  50 mg/dL*  --   --    LIPASE  --   --   " --   --   --   --   --   --   --  43*    < > = values in this interval not displayed.     8/2/2024 EGD    Impression:     - LA Grade B reflux esophagitis with no bleeding.                          Biopsied.                          - An esophago-gastric anastomosis was found. Dilated.                          - A large amount of food (residue) in the stomach.                          Concerning for underlying significant gastroparesis.                          No obvious gastric outlet obstruction noted.                          - Normal examined duodenum.   Recommendation:        - Return patient to hospital [facility] for ongoing care.                          - Full liquid diet today. Ok to advance to soft diet                          by tomorrow. Would not recommend advancing diet any                          further due to likely underlying gastoparesis                          promoting aspiration. Long term, may need to consider                          surgical placed jejunostomy tube for feeding if                          continues to have issues with even soft foods.                          - Continue present medications.                          - Await pathology results.                          - Use Prilosec (omeprazole) 20 mg PO BID.         ASSESSMENT/ PLAN  Myrtle Vaz is a 78-year-old female with dementia and multiple other medical problems, with a prior history of esophagectomy and gastric pull-up done in 2018. She does seem to have a history suggestive of some stenosis at the level of the anastomosis, leading to choking episodes.     1. Dysphagia : LA grade B reflux esophagitis, esophagogastric anastomosis was dilated large amount of food residue in the stomach concerning for gastroparesis which may be promoting aspiration.  No obvious gastric outlet obstruction was noted.  Patient has a history of narrowing at her anastomosis from previous esophagectomy.  She appears to be tolerating mechanical  soft diet currently.      -- Esophageal biopsy pending given esophagitis.  -- Would suggest outpatient clinic follow-up for ongoing evaluation/discussion of gastroparesis.  -- If patient continues to have issues even with soft foods, long-term, may need to consider jejunostomy tube.  This can be further discussed in clinic if she begins to struggle with soft foods.  Currently she appears to be doing well.  -- Continue omeprazole p.o. twice daily  -- Could consult dietitian if concerns regarding nutritional status  --  Will no longer follow. Please call with questions or change in condition.       Total time: 35 minutes of total time was spent providing patient care, including patient evaluation, reviewing documentation/test results, and .     Discussed with MOHINDER [facility]  [facility] ( [facility])

## 2024-08-03 NOTE — PLAN OF CARE
Problem: Orthopaedic Fracture  Goal: Optimal Pain Control and Function  Outcome: Progressing     Problem: Swallowing Impairment  Goal: Optimal Eating and Swallowing Without Aspiration  Outcome: Progressing     EGD today. Clear liquid diet after EGD. Tolerating clear liquids. Encouraging use of IS. Able to cough up small amounts of sputum. On 1L O2. Oriented to self only. Walked in [place] with PT today. Bed alarm on for safety.

## 2024-08-03 NOTE — PLAN OF CARE
Patient vital signs are at baseline: Yes  Patient able to ambulate as they were prior to admission or with assist devices provided by therapies during their stay:  Yes  Patient MUST void prior to discharge:  Yes  Patient able to tolerate oral intake:  Yes, pt is on clear liquids tolerating well.   Pain has adequate pain control using Oral analgesics:  Yes  Does patient have an identified :  Yes  Has goal D/C date and time been discussed with patient:  Yes    Pt is disoriented to time, place, and situation. Pt is confused.Tolerating clear liquids. Encouragement of IS use. Pt is on 1L O2. L hip dressing clean,dry, intact. CMS intact. Bed alarm on. IV Zosyn infusing. Denies SOB. Voiding. Does not tolerate pure wic.       Problem: Adult Inpatient Plan of Care  Goal: Absence of Hospital-Acquired Illness or Injury  Intervention: Prevent Skin Injury  Recent Flowsheet Documentation  Taken 8/2/2024 2015 by Ara Baez E  Skin Protection:   adhesive use limited   incontinence pads utilized  Device Skin Pressure Protection:   absorbent pad utilized/changed   adhesive use limited   tubing/devices free from skin contact       Problem: Adult Inpatient Plan of Care  Goal: Absence of Hospital-Acquired Illness or Injury  Intervention: Identify and Manage Fall Risk  Recent Flowsheet Documentation  Taken 8/2/2024 2021 by Ara Baez  Safety Promotion/Fall Prevention:   assistive device/personal items within reach   lighting adjusted   room door open   room near nurse's station   safety round/check completed  Taken 8/2/2024 2015 by Ara Baez  Safety Promotion/Fall Prevention:   assistive device/personal items within reach   lighting adjusted   room door open   room near nurse's station   safety round/check completed

## 2024-08-03 NOTE — DISCHARGE SUMMARY
[facility] MEDICINE  DISCHARGE SUMMARY     Primary Care Physician: Leila Philip  Admission Date: 7/28/2024   Discharge Provider: Radha Barrios MD Discharge Date: 8/3/2024   Diet: Orders Placed This Encounter      Diet      Combination Diet Mechanical/Dental Soft Diet      Diet      Code Status: No CPR- Do NOT Intubate   Activity: activity as tolerated   [facility]      Condition at Discharge: Stable      REASON FOR PRESENTATION(See Admission Note for Details)   Fall     PRINCIPAL & ACTIVE DISCHARGE DIAGNOSES     Active Problems:    Closed fracture of neck of left femur, initial encounter (H) s/p internal fixation with rods  Aspiration pneumonia with abnormal CT chest, will need repeat CT chest in 6 weeks to ensure resolution  Acute hypoxemic respiratory failure resolved  Esophageal dysphagia s/p EGD with dilation of esophageal anastomosis recommending soft diet with upright position with all eating  History of esophagectomy and esophageal pull up for complicated hernia repair, GERD.    Hyponatremia resolved  CKD stage III  History of subarachnoid hemorrhage  Hypothyroidism  Dementia  Moderate malnutrition, cachexia    SIGNIFICANT FINDINGS (Imaging, labs):     Most Recent 3 CBC's:  Recent Labs   Lab Test 08/01/24  0601 07/31/24  0611 07/30/24  0637 07/29/24  0611   WBC 9.5  --  10.7 7.2   HGB 11.9 12.2 12.8 13.2   MCV 92  --  92 93     --  324 336      Most Recent 3 BMP's:  Recent Labs   Lab Test 08/01/24  0601 07/31/24  0611 07/30/24  0637 07/29/24  0611     --  136 136   POTASSIUM 4.3  --  4.1 3.5   CHLORIDE 100  --  99 100   CO2 29  --  25 30*   BUN 27.6*  --  22.2 37.9*   CR 0.82  --  0.82 1.14*   ANIONGAP 7  --  12 6*   JOSE 8.3*  --  8.2* 8.2*   * 95 100* 110*     Most Recent 2 LFT's:  Recent Labs   Lab Test 08/01/24  0601 07/28/24  1721   AST 20 21   ALT 6 6   ALKPHOS 109 137   BILITOTAL 0.3 0.2       Most Recent Cholesterol Panel:  Recent Labs   Lab  Test 11/26/18  0346 06/27/17  0815   CHOL  --  183   LDL  --  100   HDL  --  54   TRIG 161* 144     Most Recent TSH, T4 and A1c Labs:  Recent Labs   Lab Test 07/28/24  1721 06/03/24  1020 04/04/24  1658   TSH 1.80   < > 17.20*   T4  --   --  1.08    < > = values in this interval not displayed.     Results for orders placed or performed during the hospital encounter of 07/28/24   Head CT w/o contrast    Narrative    EXAM: CT HEAD W/O CONTRAST  LOCATION: [facility]  DATE: 7/28/2024    INDICATION: fall  COMPARISON: CT head 7/17/2024  TECHNIQUE: Routine CT Head without IV contrast. Multiplanar reformats. Dose reduction techniques were used.    FINDINGS:  INTRACRANIAL CONTENTS: No intracranial hemorrhage, extraaxial collection, or mass effect.  No CT evidence of acute infarct. Moderate to advanced presumed chronic small vessel ischemic changes. Mild to moderate generalized volume loss. No hydrocephalus.     VISUALIZED ORBITS/SINUSES/MASTOIDS: Prior bilateral cataract surgery. Visualized portions of the orbits are otherwise unremarkable. Chronic appearing mucosal thickening the inferior left maxillary sinus. No middle ear or mastoid effusion.    BONES/SOFT TISSUES: No scalp hematoma. No skull fracture.      Impression    IMPRESSION:  1.  No CT evidence for acute intracranial process.  2.  Brain atrophy and presumed chronic microvascular ischemic changes as above.   XR Pelvis and Hip Left 2 Views    Narrative    EXAM: XR PELVIS AND HIP LEFT 2 VIEWS  LOCATION: [facility]  DATE: 7/28/2024    INDICATION: left hip pain  COMPARISON: None.      Impression    IMPRESSION:     There is an impacted fracture of the left femoral neck which has an acute or subacute appearance.    There is diffuse osseous demineralization. Visualized portion of the pelvis is negative for displaced fracture. Mild left hip degenerative changes. There are also degenerative changes at the sacroiliac  joints.    NOTE: ABNORMAL REPORT    THE DICTATION ABOVE DESCRIBES AN ABNORMALITY FOR WHICH FOLLOW-UP IS NEEDED.    CT Hip Left w/o Contrast    Narrative    EXAM: CT HIP LEFT W/O CONTRAST  LOCATION: [facility]  DATE: 7/29/2024    INDICATION: Surgical planning for left femoral neck fracture.  COMPARISON: Pelvis and left hip radiographic exam 7/28/2024.  TECHNIQUE: Noncontrast. Axial, sagittal and coronal thin-section reconstruction. Dose reduction techniques were used.     FINDINGS:     BONES:  -Redemonstrated is an impacted mildly displaced subcapital predominant left femoral neck fracture. Diffuse bone demineralization. Anatomic alignment left hip. Degenerative change with vacuum joint phenomenon left SI joint. Mild left hip osteoarthritis.   Moderate arthrosis at the symphysis pubis. No concerning destructive bone lesion.    SOFT TISSUES:  -Distended urinary bladder. Pessary device. Perivesicular surgical clips and material. Colonic diverticulosis. Arterial calcification. Probable hip joint effusion.      Impression    IMPRESSION:  1.  Mildly impacted and displaced subcapital predominant left femoral neck fracture.  2.  Mild left hip osteoarthritis.  3.  Diffuse bone demineralization.    NOTE: ABNORMAL REPORT    THE DICTATION ABOVE DESCRIBES AN ABNORMALITY FOR WHICH FOLLOW-UP IS NEEDED.      XR Surgery LEBRON  Fluoro G/T 5 Min    Narrative    This exam was marked as non-reportable because it will not be read by a   radiologist or a [place] non-radiologist provider.         XR Chest Port 1 View    Narrative    EXAM: XR CHEST PORT 1 VIEW  LOCATION: [facility]  DATE: 7/31/2024    INDICATION: Hypoxia  COMPARISON: 7/17/2024      Impression    IMPRESSION: The patient is rotated. Normal-appearing triangular opacity adjacent the aortic knob, and new appearing mild left medial retrocardiac opacity. These could represent pseudolesions, or areas of atelectasis or  pneumonia.    Heart size appears stable. No pulmonary vascular congestion. No significant pleural effusion.   CT Chest w/o Contrast    Narrative    EXAM: CT CHEST W/O CONTRAST  LOCATION: [facility]  DATE: 7/31/2024    INDICATION: Hypoxia, new retrocardiac opacity.  COMPARISON: Chest x-ray 7/17/2024.  TECHNIQUE: CT chest without IV contrast. Multiplanar reformats were obtained. Dose reduction techniques were used.  CONTRAST: None.    FINDINGS:   LUNGS AND PLEURA: Focal consolidation versus rounded atelectasis at the posterior medial left lower lobe measuring approximately 8.1 cm in transverse plane (series 5, image 136). This accounts for the chest x-ray abnormality. The small airways leading to   this region show occlusion and may relate to mucus impaction. Trace left base pleural fluid. Micronodular opacities primarily along the periphery of the mid to low lung involving the left upper and lower lobes. Right base areas of moderate atelectasis.   Small aspirated material leading to the right lower lobe bronchus.    MEDIASTINUM/AXILLAE: Anterior gastric pull-through. Scattered thoracic aortic calcifications. No visible suspicious enlarged lymph node.    CORONARY ARTERY CALCIFICATION: Severe.    UPPER ABDOMEN: No convincing acute upper abdominal abnormality.     MUSCULOSKELETAL: Subacute sternal fracture. Mild degenerative changes of the spine.      Impression    IMPRESSION:   1.  Prominent focal rounded consolidation versus atelectasis at the left lower lobe accounting for the chest x-ray abnormality. Correlate with pneumonia. A neoplastic etiology cannot be excluded. The small airways leading to this region appear at least   partially occluded and may represent mucus impaction. Some aspirated material noted at the right lower lobe bronchus. Mitral nodular opacities noted at the periphery of the left lung that may be from an infectious or inflammatory cause. Recommend   follow-up CT chest in  3 months for surveillance of these findings.  2.  Diffuse coronary artery calcifications.     XR Esophagram    Narrative    EXAM: XR ESOPHAGRAM SINGLE CONTRAST  LOCATION: [facility]  DATE: 8/1/2024    INDICATION: choking with increased oral intake. Hx esophagectomy and anterior gastric pull-up, with hx decreased motility.  COMPARISON: CT chest 7/31/2024  TECHNIQUE: Routine.    RADIATION DOSE: Total Air Kerma 34.2 mGy    FINDINGS:   ESOPHAGUS: There is a gastric pull-up with the EG junction just below the kelin. The esophagus and intrathoracic stomach are filled with food material. The thin liquid barium slowly permeates between the fluid material and eventually makes it into the   proximal duodenum by 10 minutes post ingestion.      Impression    IMPRESSION:   1.  Esophageal resection with anterior gastric pull-up.  2.  Extensive esophageal and gastric food retention.        PENDING LABS         PROCEDURES ( this hospitalization only)      Procedure(s):  ESOPHAGOGASTRODUODENOSCOPY with DILATATION and  BIOPSY    RECOMMENDATION FOR F/U VISIT     CT chest repeat in 6 weeks to ensure resolution of pneumonia    DISPOSITION     Skilled Nursing Facility    SUMMARY OF HOSPITAL COURSE:    78 year old female with history of dementia, hypertension, atrial fibrillation, hypothyroidism, GERD, depression, previously documented femoral neck fracture who was recently hospitalized at [facility] and discharged on 7/18/2024 after a fall and diagnosed with subarachnoid hemorrhage, admitted on 7/28/2024 after recurrent fall, and planned hip fracture repair with [facility] on 7/29/2024.  Patient was noted to be hypoxic needing 4 L of oxygen by nasal cannula on 7/31/2024.  A chest CT without contrast was done for further evaluation.  It showed prominent focal rounded consolidation versus atelectasis in the left lower lobe accounting for the chest x-ray abnormality.  Correlate with pneumonia.   There is concern for neoplasm as well the small airways leading to this region appears at least partially occluded and may represent mucus impaction.  Some aspirated material noted in the right lower lobe bronchus.  Mild nodular opacities noted in the periphery of the left lung may be infectious or inflammatory cause.  CT findings consistent with possible aspiration pneumonia.  Patient was started on IV Zosyn on 8/1/2024, changed to Augmentin at discharge.  Repeat CT chest in 8 weeks to ensure resolution given the abnormal appearance of CT chest.    She was found to have dysphagia, esophagram with distended esophagus, GI consulted, had EGD 8/2 significant food residue in gastric lumen, esophagitis, esophagogastric anastomosis that was dilated.  GI recommending soft foods and noted to be upright with all feeding.  These findings were discussed with daughter.  She is aware of ongoing risk of aspiration, patient and family declined feeding tube placement.     Discharge Medications with Med changes:        Review of your medicines        START taking      Augmen 875mg PO BID 6 days       Dose / Directions   acetaminophen 325 MG tablet  Commonly known as: TYLENOL      Dose: 650 mg  Take 2 tablets (650 mg) by mouth every 4 hours as needed for other (mild pain)  Quantity: 100 tablet  Refills: 0     aspirin 81 MG EC tablet      Dose: 81 mg  Take 1 tablet (81 mg) by mouth 2 times daily  Quantity: 60 tablet  Refills: 0     oxyCODONE 5 MG tablet  Commonly known as: ROXICODONE      Dose: 5-10 mg  Take 1-2 tablets (5-10 mg) by mouth every 4 hours as needed for moderate to severe pain  Quantity: 20 tablet  Refills: 0            CONTINUE these medicines which have NOT CHANGED        Dose / Directions   levothyroxine 175 MCG tablet  Commonly known as: SYNTHROID/LEVOTHROID      Dose: 175 mcg  Take 175 mcg by mouth daily  Refills: 0     losartan 50 MG tablet  Commonly known as: COZAAR      Dose: 50 mg  Take 50 mg by mouth  daily  Refills: 0     omeprazole 20 MG DR capsule  Commonly known as: PriLOSEC      Dose: 20 mg  Take 20 mg by mouth 2 times daily (before meals)  Refills: 0     PreserVision/Lutein Caps      Dose: 1 tablet  Take 1 tablet by mouth daily  Refills: 0     * traZODone 100 MG tablet  Commonly known as: DESYREL      Dose: 50 mg  Take 50 mg by mouth at bedtime  Refills: 0     * traZODone 50 MG tablet  Commonly known as: DESYREL      Dose: 25-50 mg  Take 25-50 mg by mouth daily as needed for other (Agitation/anxiety)  Refills: 0     venlafaxine 75 MG tablet  Commonly known as: EFFEXOR      Dose: 75 mg  Take 75 mg by mouth 2 times daily  Refills: 0     vitamin D3 125 MCG (5000 UT) tablet  Commonly known as: CHOLECALCIFEROL      Dose: 125 mcg  Take 125 mcg by mouth daily  Refills: 0           * This list has 2 medication(s) that are the same as other medications prescribed for you. Read the directions carefully, and ask your doctor or other care provider to review them with you.                STOP taking      HYDROcodone-acetaminophen 5-325 MG tablet  Commonly known as: NORCO                  Where to get your medicines        Some of these will need a paper prescription and others can be bought over the counter. Ask your nurse if you have questions.    Bring a paper prescription for each of these medications  acetaminophen 325 MG tablet  aspirin 81 MG EC tablet  oxyCODONE 5 MG tablet              Rationale for medication changes:      Augmentin for aspiration pneumonia  Tylenol, oxycodone for pain  Aspirin for DVT prevention        Consults   [facility], gastroenterology      Immunizations given this encounter     [unfilled]      Discharge Procedure Orders   Adult Nutrition  Referral   Standing Status: Future   Referral Priority: Routine: Next available opening Referral Type: Nutrition   Number of Visits Requested: 1     General info for SNF   Order Comments: Length of Stay Estimate: Short Term Care:  "Estimated # of Days <30 Condition at Discharge: Stable Level of care:skilled  Rehabilitation Potential: Good Admission H&P remains valid and up-to-date: Yes Recent Chemotherapy: N/A Use Nursing Home Standing Orders: N/A     Incentive Spirometry   Order Comments: Incentive Spirometry 10 times per hour, 4 times per day.     When to call - Contact Surgeon Team   Order Comments: You may experience symptoms that require follow-up before your scheduled appointment. Refer to the \"Stoplight Tool\" for instructions on when to contact your Surgeon Team if you are concerned about pain control, blood clots, constipation, or if you are unable to urinate.     When to call - Reach out to Urgent Care   Order Comments: If you are not able to reach your Surgeon Team and you need immediate care, go to the Orthopedic Walk-in Clinic or Urgent Care at your Surgeon's office.  Do NOT go to the Emergency Room unless you have shortness of breath, chest pain, or other signs of a medical emergency.     When to call - Reasons to Call 911   Order Comments: Call 911 immediately if you experience sudden-onset chest pain, arm weakness/numbness, slurred speech, or shortness of breath     Symptoms - Fever Management   Order Comments: A low grade fever can be expected after surgery.  Use acetaminophen (TYLENOL) as needed for fever management.  Contact your Surgeon Team if you have a fever greater than 101.5 F, chills, and/or night sweats.     Symptoms - Constipation management   Order Comments: Constipation (hard, dry bowel movements) is expected after surgery due to the combination of being less active, the anesthetic, and the opioid pain medication.  You can do the following to help reduce constipation:  ~  FLUIDS:  Drink clear liquids (water or Gatorade), or juice (apple/prune).  ~  DIET:  Eat a fiber rich diet.    ~  ACTIVITY:  Get up and move around several times a day.  Increase your activity as you are able.  MEDICATIONS:  Reduce the risk of " constipation by starting medications before you are constipated.  You can take Miralax   (1 packet as directed) and/or a stool softener (Senokot 1-2 tablets 1-2 times a day).  If you already have constipation and these medications are not working, you can get magnesium citrate and use as directed.  If you continue to have constipation you can try an over the counter suppository or enema.  Call your Surgeon Team if it has been greater than 3 days since your last bowel movement.     Symptoms - Reduced Urine Output   Order Comments: Changes in the amount of fluids you drank before and after surgery may result in problems urinating.  It is important to stay well-hydrated after surgery and drink plenty of water. If it has been greater than 8 hours since you have urinated despite drinking plenty of water, call your Surgeon Team.     Activity - Exercises to prevent blood clots   Order Comments: Unless otherwise directed by your Surgeon team, perform the following exercises at least three times per day for the first four weeks after surgery to prevent blood clots in your legs: 1) Point and flex your feet (Ankle Pumps), 2) Move your ankle around in big circles, 3) Wiggle your toes, 4) Walk, even for short distances, several times a day, will help decrease the risk of blood clots.     Order Specific Question Answer Comments   Is discharge order? Yes      Comfort and Pain Management - Pain after Surgery   Order Comments: Pain after surgery is normal and expected.  You will have some amount of pain for several weeks after surgery.  Your pain will improve with time.  There are several things you can do to help reduce your pain including: rest, ice, elevation, and using pain medications as needed. Contact your Surgeon Team if you have pain that persists or worsens after surgery despite rest, ice, elevation, and taking your medication(s) as prescribed. Contact your Surgeon Team if you have new numbness, tingling, or weakness in  your operative extremity.     Comfort and Pain Management - Swelling after Surgery   Order Comments: Swelling and/or bruising of the surgical extremity is common and may persist for several months after surgery. In addition to frequent icing and elevation, gentle compressive support with an ACE wrap or tubigrip may help with swelling. Apply compression regularly, removing at least twice daily to perform skin checks. Contact your Surgeon Team if your swelling increases and is NOT associated with an increase in your activity level, or if your swelling increases and is associated with redness and pain.     Comfort and Pain Management - Cold therapy   Order Comments: Ice can be used to control swelling and discomfort after surgery. Place a thin towel over your operative site and apply the ice pack overtop. Leave ice pack in place for 20 minutes, then remove for 20 minutes. Repeat this 20 minutes on/20 minutes off routine as often as tolerated.     Medication Instructions - Acetaminophen (TYLENOL) Instructions   Order Comments: You were discharged with acetaminophen (TYLENOL) for pain management after surgery. Acetaminophen most effectively manages pain symptoms when it is taken on a schedule without missing doses (every four, six, or eight hours). Your Provider will prescribe a safe daily dose between 3000 - 4000 mg.  Do NOT exceed this daily dose. Most patients use acetaminophen for pain control for the first four weeks after surgery.  You can wean from this medication as your pain decreases.     Shower with wound/dressing covered   Order Comments: You must COVER your dressing or incision with saran wrap (or any other non-permeable covering) to allow the incision to remain dry while showering.  You may shower 3 days after surgery as long as the surgical wound stays dry. Continue to cover your dressing or incision for showering until your first office visit.  You are strictly prohibited from soaking   or submerging the  "surgical wound underwater.     Medication instructions -  Anticoagulation - aspirin   Order Comments: Take the aspirin as prescribed for a total of four weeks after surgery.  This is given to help minimize your risk of blood clot.     Comfort and Pain Management - LOWER Extremity Elevation   Order Comments: Swelling is expected for several months after surgery. This type of swelling is usually associated with gravity and activity, and can be improved with elevation.   The best way to do this is to get your \"toes above your nose\" by laying down and placing several pillows lengthwise under your calf and heel. When elevating your leg keep your knee completely straight. Perform this elevation as often as possible especially for the first two weeks after surgery.     Mantoux Instructions   Order Comments: Give two-step Mantoux (PPD) Per Facility Policy {.:[id number]     Incentive Spirometry   Order Comments: Incentive Spirometry 10 times per hour, 4 times per day.     Shower with wound/dressing covered   Order Comments: You must COVER your dressing or incision with saran wrap (or any other non-permeable covering) to allow the incision to remain dry while showering.  You may shower 3 days after surgery as long as the surgical wound stays dry. Continue to cover your dressing or incision for showering until your first office visit.  You are strictly prohibited from soaking   or submerging the surgical wound underwater.     Reason for your hospital stay   Order Comments: Hip fracture s/p repair, aspiration pneumonia, esophageal dysphagia, dementia     Follow Up Care   Order Comments: Please follow-up with Dr. Tate/Leena Pena PA-C in 2 weeks at [facility]. Call our scheduling line at [phone] to make an appointment if you do not already have one scheduled.  Follow-up with nursing home physician  Follow-up with primary care physician in 2 to 3 weeks  Follow-up with gastroenterology in a month  Follow-up " with the dietitian to monitor caloric intake     Occupational Therapy Adult Consult   Order Comments: Evaluate and treat as clinically indicated.    Reason: Status Post Hip Surgery     Physical Therapy Adult Consult   Order Comments: Evaluate and treat as clinically indicated.    Reason: Status Post Hip Surgery     Occupational Therapy Adult Consult   Order Comments: Evaluate and treat as clinically indicated.    Reason: Status Post Hip Surgery     Crutches DME   Order Comments: DME Documentation: Describe the reason for need to support medical necessity: Impaired gait status post hip surgery. I, the undersigned, certify that the above prescribed supplies are medically necessary for this patient and is both reasonable and necessary in reference to accepted standards of medical practice in the treatment of this patient's condition and is not prescribed as a convenience.     Order Specific Question Answer Comments   Medical Equipment (DME) Supplier: [employer]    PATIENT INSTRUCTIONS: If you did not receive this ordered item today, please contact [employer] for availability ([facility] Locations: [phone], [place]: [phone]).    Crutch Type: Standard    Crutches Add On: NA    Length of Need: Lifetime      Cane DME   Order Comments: DME Documentation: Describe the reason for need to support medical necessity: Impaired gait status post hip surgery. I, the undersigned, certify that the above prescribed supplies are medically necessary for this patient and is both reasonable and necessary in reference to accepted standards of medical practice in the treatment of this patient's condition and is not prescribed as a convenience.     Order Specific Question Answer Comments   Medical Equipment (DME) Supplier: [employer]    PATIENT INSTRUCTIONS: If you did not receive this ordered item today, please contact [email or web address] for availability ([facility]  "Locations: [phone], [place]: [phone]).    Cane Type: Single Tip    Reminder: Patient can typically get 1 every 5 years      [facility]   Order Comments: DME Documentation: Describe the reason for need to support medical necessity: Impaired gait status post hip surgery. I, the undersigned, certify that the above prescribed supplies are medically necessary for this patient and is both reasonable and necessary in reference to accepted standards of medical practice in the treatment of this patient's condition and is not prescribed as a convenience.     Order Specific Question Answer Comments   Medical Equipment (DME) Supplier: [employer]    PATIENT INSTRUCTIONS: If you did not receive this ordered item today, please contact [facility] for availability ([place] Locations: [phone], [place]: [phone]).    Walker Type: Standard (2 Wheel)    Accessories: N/A      Diet   Order Comments: Follow this diet upon discharge: Regular     Order Specific Question Answer Comments   Is discharge order? Yes      Diet   Order Comments: Follow this diet upon discharge: Orders Placed This Encounter      Snacks/Supplements Adult: Ensure Enlive; With Meals      Snacks/Supplements Adult: Magic Cup; With Meals  Soft mechanical diet, upright with all feeding, multiple chews with each bite to prevent aspiration     Order Specific Question Answer Comments   Is discharge order? Yes      Examination     Vital Signs in last 24 hours:   Vital signs:  Temp: 97.2  F (36.2  C) Temp src: Oral BP: (!) 141/72 Pulse: 82   Resp: 16 SpO2: 95 % O2 Device: None (Room air) Oxygen Delivery: 1 LPM Height: 172.7 cm (5' 8\") Weight: 50.8 kg (111 lb 15.9 oz)  Estimated body mass index is 17.03 kg/m  as calculated from the following:    Height as of this encounter: 1.727 m (5' 8\").    Weight as of this encounter: 50.8 kg (111 lb 15.9 oz).       Pertinent positives on exam:  General: Cachectic  Heart S1-S2 heard " regular rate and rhythm  Lungs: no wheezing, rhonchi   Abdomen: soft, non tender, non distended, bowel sounds present  Extremities no edema left hip dressing clean     Please see EMR for more detailed significant labs, imaging, consultant notes etc.  Total time spent on discharge: > 35 minutes    Radha Barrios MD   [facility] Hospitalist Service: Ph:[phone]    CC:Leila Philip

## 2024-08-03 NOTE — PROGRESS NOTES
Physical Therapy Discharge Summary    Reason for therapy discharge:    Discharged to transitional care facility.    Progress towards therapy goal(s). See goals on Care Plan in [facility] electronic health record for goal details.  Goals not met.  Barriers to achieving goals:   discharge from facility.    Therapy recommendation(s):    Continued therapy is recommended.  Rationale/Recommendations:  patient not at baseline.

## 2024-08-03 NOTE — PROGRESS NOTES
Occupational Therapy Discharge Summary    Reason for therapy discharge:    Discharged to transitional care facility.    Progress towards therapy goal(s). See goals on Care Plan in [facility] electronic health record for goal details.  Goals not met.  Barriers to achieving goals:   discharge from facility.    Therapy recommendation(s):    Continued therapy is recommended.  Rationale/Recommendations:  Ensure safety/independence with ADL.

## 2024-08-03 NOTE — PLAN OF CARE
Discharged to [facility]. Transported by daughter. Belongings returned to patient and discharge paperwork faxed to TCU and copy sent with daughter to bring to TCU.

## 2024-08-03 NOTE — PROGRESS NOTES
Care Management Discharge Note    Discharge Date: 08/03/2024       Discharge Disposition: Transitional Care    Discharge Services: PCA    Discharge DME: None    Discharge Transportation: family or friend will provide    Private pay costs discussed: Not applicable    Does the patient's insurance plan have a 3 day qualifying hospital stay waiver?  No    PAS Confirmation Code: [id number]  Patient/family educated on Medicare website which has current facility and service quality ratings: yes    Education Provided on the Discharge Plan: Yes  Persons Notified of Discharge Plans: Pt, TCU- Nellie and Dtr   Patient/Family in Agreement with the Plan: yes    Handoff Referral Completed: Yes    Additional Information:    Pt may be ready for discharge today to [facility].  YARA has call out to [facility].  YARA met with Pt and asked that SW call her dtr- Haydee.  ANA LILIA talked with Haydee and she is currently driving here from [place].   PAS needed.     11:54 AM  Nellie with [facility] can take Pt today.  Would like Pt to leave hospital after 2:30 PM.     1:21 PM  PAS completed.  Dtr will transport.     SOLE Olguin

## (undated) DEVICE — DRAIN JACKSON PRATT RESERVOIR 100ML SU130-1305

## (undated) DEVICE — CLIP HORIZON SM RED WIDE SLOT 001201

## (undated) DEVICE — PREP POVIDONE IODINE SOLUTION 10% 4OZ

## (undated) DEVICE — DEFIB PRO-PADZ LVP LQD GEL ADULT 8900-2105-01

## (undated) DEVICE — JELLY LUBRICATING SURGILUBE 2OZ TUBE

## (undated) DEVICE — SOL NACL 0.9% IRRIG 1000ML BOTTLE 2F7124

## (undated) DEVICE — SOL WATER IRRIG 1000ML BOTTLE 2F7114

## (undated) DEVICE — KIT CONNECTOR FOR OLYMPUS ENDOSCOPES DEFENDO 100310

## (undated) DEVICE — SU STRATAFIX PDS PLUS 0 CT-1 30CM SXPP1B450

## (undated) DEVICE — SU SILK 3-0 SH CR 8X18" C013D

## (undated) DEVICE — SYR 30ML LL W/O NDL 302832

## (undated) DEVICE — Device

## (undated) DEVICE — ENDO SYSTEM WATER BOTTLE & TUBING W/CO2 FILTER 00711549

## (undated) DEVICE — CANISTER WOUND VAC W/GEL 1000ML M8275093/5

## (undated) DEVICE — SU VICRYL 4-0 PS-2 18" UND J496H

## (undated) DEVICE — CLIP HORIZON MED BLUE 002200

## (undated) DEVICE — ENDO BITE BLOCK ADULT OMNI-BLOC

## (undated) DEVICE — SU VICRYL 0 UR-6 27" J603H

## (undated) DEVICE — SPONGE RAY-TEC 4X8" 7318

## (undated) DEVICE — HOLDER LIMB VELCRO OR 0814-1533

## (undated) DEVICE — SPONGE TONSIL W/STRING MED 23275-680

## (undated) DEVICE — SU SILK 2-0 SH 30" K833H

## (undated) DEVICE — SUCTION DRY CHEST DRAIN OASIS 3600-100

## (undated) DEVICE — WIRE GUIDE 3.2X400MM  357.399

## (undated) DEVICE — KIT NEW IMAGE COLOSTOMY/ILEOSTOMY 2 3/4" LF 19354

## (undated) DEVICE — KIT ENDO FIRST STEP DISINFECTANT 200ML W/POUCH EP-4

## (undated) DEVICE — SPONGE LAP 18X18" X8435

## (undated) DEVICE — APPLICATOR COTTON TIP 6"X2 STERILE LF 6012

## (undated) DEVICE — SYR 30ML SLIP TIP W/O NDL 302833

## (undated) DEVICE — SU VICRYL 3-0 SH 27" J316H

## (undated) DEVICE — SU SILK 0 TIE 6X30" A306H

## (undated) DEVICE — PITCHER STERILE 1000ML  SSK9004A

## (undated) DEVICE — SU ETHIBOND 5 LRDA 30" B499T

## (undated) DEVICE — ESU PENCIL W/COATED BLADE E2450H

## (undated) DEVICE — DRAIN PENROSE 1/4X18" LATEX

## (undated) DEVICE — SUCTION MANIFOLD DORNOCH ULTRA CART UL-CL500

## (undated) DEVICE — LINEN GOWN XLG 5407

## (undated) DEVICE — SU SILK 0 SH 30" K834H

## (undated) DEVICE — TUBING SUCTION 10'X3/16" N510

## (undated) DEVICE — TUBE GASTROSTOMY MIC 20FR SIL 0100-20

## (undated) DEVICE — TAPE MEASURE PAPER 36" LF NI14-1300

## (undated) DEVICE — CONNECTOR BLAKE DRAIN SGL BCC1

## (undated) DEVICE — SU DERMABOND ADVANCED .7ML DNX12

## (undated) DEVICE — ESU LIGASURE MARYLAND LAPAROSCOPIC SLR/DVDR 5MMX37CM LF1937

## (undated) DEVICE — NEEDLE SUTURE ANCHOR 1824-5DC

## (undated) DEVICE — GLOVE BIOGEL PI ULTRATOUCH G SZ 7.5 42175

## (undated) DEVICE — BLADE KNIFE SURG 15 371115

## (undated) DEVICE — TUBE JEJUNOSTOMY 12FR  0200-12LV

## (undated) DEVICE — BLADE CLIPPER SGL USE 9680

## (undated) DEVICE — KIT ENDO TURNOVER/PROCEDURE CARRY-ON 101822

## (undated) DEVICE — DRAIN JACKSON PRATT ROUND SIL 19FR W/TROCAR LF JP-2232

## (undated) DEVICE — WIRE GUIDE AMPLATZ SUPER STIFF 0.035"X260CM M001465261

## (undated) DEVICE — BIT DRL 413MM 4.3MM

## (undated) DEVICE — PEN MARKING W/RULER DYNJSM04

## (undated) DEVICE — SPONGE DOUBLE 1.25" W/STRING 23275-690

## (undated) DEVICE — DRAIN PENROSE 0.50"X18" LATEX FREE GR203

## (undated) DEVICE — PREP SKIN SCRUB TRAY 4461A

## (undated) DEVICE — PREP CHLORAPREP 26ML TINTED ORANGE  260815

## (undated) DEVICE — BONE CLEANING TIP INTERPULSE  0210-010-000

## (undated) DEVICE — SUCTION MANIFOLD NEPTUNE 2 SYS 1 PORT 702-025-000

## (undated) DEVICE — SU PLEDGET SOFT TFE 13MMX7MMX1.5MM D7044

## (undated) DEVICE — WIRE GUIDE AMPLATZ SUPER STIFF 0.035"X260CM STR M001465090

## (undated) DEVICE — GLOVE BIOGEL PI SZ 6.5 40865

## (undated) DEVICE — STPL CONTOUR CUT CVD GREEN CS40G

## (undated) DEVICE — PREP TECHNI-CARE CHLOROXYLENOL 3% 4OZ BOTTLE C222-4ZWO

## (undated) DEVICE — SU PROLENE 4-0 RB-1DA 36" 8557H

## (undated) DEVICE — DRAPE C-ARM W/STRAPS 42X72" 07-CA104

## (undated) DEVICE — DRAPE IOBAN INCISE 23X17" 6650EZ

## (undated) DEVICE — DRAPE MAYO STAND 23X54 8337

## (undated) DEVICE — STPL RELOAD CONTOUR CUT CVD THICK  CR40G

## (undated) DEVICE — STPL SKIN 35W ROTATING HEAD PRW35

## (undated) DEVICE — SU SILK 2-0 TIE 12X30" A305H

## (undated) DEVICE — SOL NACL 0.9% INJ 1000ML BAG 2B1324X

## (undated) DEVICE — NDL SCLEROTHERAPY 25GA CARR-LOCK  00711811

## (undated) DEVICE — CONNECTOR CARDIO BLAKE DRAIN BCC2

## (undated) DEVICE — BIT DRL CMPLT OPN RMR ASMB

## (undated) DEVICE — SUCTION TIP YANKAUER STR K87

## (undated) DEVICE — TUBING SUCTION MEDI-VAC 1/4"X20' N620A

## (undated) DEVICE — ESU ELEC BLADE 2.75" COATED/INSULATED E1455

## (undated) DEVICE — POSITIONER ABDUCTION PILLOW FOAM MED FP-ABDUCTM

## (undated) DEVICE — SUCTION MANIFOLD NEPTUNE 2 SYS 4 PORT 0702-020-000

## (undated) DEVICE — STPL SKIN PROXIMATE 35 WIDE PMW35

## (undated) DEVICE — SU ETHIBOND 5 V-37 4X30" MB66G

## (undated) DEVICE — DRAIN JACKSON PRATT 10MM FLAT 4/4 PERF SU130-1311

## (undated) DEVICE — WIPES FOLEY CARE SURESTEP PROVON DFC100

## (undated) DEVICE — DRAIN CHEST TUBE 32FR STR 8032

## (undated) DEVICE — GOWN IMPERVIOUS BREATHABLE SMART XLG 89045

## (undated) DEVICE — SU ETHILON 2-0 FS 18" 664H

## (undated) DEVICE — SYR 10ML LL W/O NDL 302995

## (undated) DEVICE — SU TICRON 2 GS-21DA 36" 3146-81

## (undated) DEVICE — GLOVE BIOGEL INDICATOR 7.5 LF 41675

## (undated) DEVICE — SU VICRYL 3-0 SH 8X18" UND J864D

## (undated) DEVICE — SU PDS II 1 TP-1 48" Z880G

## (undated) DEVICE — LINEN TOWEL PACK X5 5464

## (undated) DEVICE — NDL COUNTER 20CT 31142493

## (undated) DEVICE — SUTURE VICRYL+ 1-0 36IN CT-1 UND VCP947H

## (undated) DEVICE — HOOD SURG T7 PLUS STRL LF DISP 0416-801-200

## (undated) DEVICE — TEST TUBE W/SCREW CAP 17361

## (undated) DEVICE — DRAPE BACK TABLE  44X90" 8377

## (undated) DEVICE — LINEN TOWEL PACK X30 5481

## (undated) DEVICE — SOL WATER IRRIG 500ML BOTTLE 2F7113

## (undated) DEVICE — GLOVE PROTEXIS MICRO 7.5  2D73PM75

## (undated) DEVICE — GLOVE UNDER INDICATOR PI SZ 7.0 LF 41670

## (undated) DEVICE — CATH TRAY FOLEY SURESTEP 16FR W/TMP PRB STLK LATEX A319416AM

## (undated) DEVICE — INFLATION DEVICE BIG 60 ENDO-AN6012

## (undated) DEVICE — BLADE KNIFE SURG 10 371110

## (undated) DEVICE — ELECTRODE PATIENT RETURN ADULT L10 FT 2 PLATE CORD 0855C

## (undated) DEVICE — LINEN TOWEL PACK X6 WHITE 5487

## (undated) DEVICE — GLOVE PROTEXIS MICRO 6.0  2D73PM60

## (undated) DEVICE — BLADE SAW STERNAL 20X30MM KM-32

## (undated) DEVICE — DRAIN CHEST TUBE 28FR STR 8028

## (undated) DEVICE — SUTURE VICRYL+ 0 27IN CT-1 UND VCP260H

## (undated) DEVICE — ESU ELEC BLADE 6" COATED/INSULATED E1455-6

## (undated) DEVICE — GLOVE BIOGEL PI ULTRATOUCH G SZ 6.5 42165

## (undated) DEVICE — TUBE JEJUNOSTOMY 16FR 0200-16

## (undated) DEVICE — DRSG ABDOMINAL 07 1/2X8" 7197D

## (undated) DEVICE — SU SILK 3-0 TIE 12X30" A304H

## (undated) DEVICE — LIGHT HANDLE X1 31140133

## (undated) DEVICE — SPONGE KITTNER 30-101

## (undated) DEVICE — DRAPE LAP W/ARMBOARD 29410

## (undated) DEVICE — ENDO TROCAR FIRST ENTRY KII FIOS Z-THRD 12X100MM CTF73

## (undated) DEVICE — SUTURE MONOCRYL 3-0 18 PS2 UND MCP497G

## (undated) DEVICE — ENDO SHEARS 5MM 176643

## (undated) DEVICE — PAD HIP POSITIONING MCGUIRE 707-CPM

## (undated) DEVICE — GLOVE PROTEXIS BLUE W/NEU-THERA 8.0  2D73EB80

## (undated) DEVICE — CUSTOM PACK TOTAL HIP SOP5BTHHEA

## (undated) DEVICE — SUCTION IRR SYSTEM W/O TIP INTERPULSE HANDPIECE 0210-100-000

## (undated) DEVICE — CLIP HORIZON LG ORANGE 004200

## (undated) DEVICE — DRSG PRIMAPORE 02X3" 7133

## (undated) DEVICE — STPL POWERED ECHELON 45MM PSEE45A

## (undated) DEVICE — ESU GROUND PAD ADULT W/CORD E7507

## (undated) DEVICE — BLADE SAGITTAL WIDE (SO-618) 2108-118

## (undated) DEVICE — ENSEAL X1 LARGE JAW TISSUE SEALER

## (undated) DEVICE — SU PDS II 0 TP-1 60" Z991G

## (undated) DEVICE — SUTURE VICRYL+ 2-0 27IN CT-1 UND VCP259H

## (undated) DEVICE — SOL ADH LIQUID BENZOIN SWAB 0.6ML C1544

## (undated) DEVICE — PREP POVIDONE IODINE SCRUB 7.5% 4OZ APL82212

## (undated) DEVICE — SPECIMEN CONTAINER 5OZ STERILE 2600SA

## (undated) RX ORDER — DEXAMETHASONE SODIUM PHOSPHATE 4 MG/ML
INJECTION, SOLUTION INTRA-ARTICULAR; INTRALESIONAL; INTRAMUSCULAR; INTRAVENOUS; SOFT TISSUE
Status: DISPENSED
Start: 2019-10-10

## (undated) RX ORDER — FENTANYL CITRATE 50 UG/ML
INJECTION, SOLUTION INTRAMUSCULAR; INTRAVENOUS
Status: DISPENSED
Start: 2019-05-06

## (undated) RX ORDER — FENTANYL CITRATE 50 UG/ML
INJECTION, SOLUTION INTRAMUSCULAR; INTRAVENOUS
Status: DISPENSED
Start: 2023-01-17

## (undated) RX ORDER — FENTANYL CITRATE 50 UG/ML
INJECTION, SOLUTION INTRAMUSCULAR; INTRAVENOUS
Status: DISPENSED
Start: 2024-01-01

## (undated) RX ORDER — KETAMINE HCL IN 0.9 % NACL 50 MG/5 ML
SYRINGE (ML) INTRAVENOUS
Status: DISPENSED
Start: 2019-04-26

## (undated) RX ORDER — FENTANYL CITRATE 50 UG/ML
INJECTION, SOLUTION INTRAMUSCULAR; INTRAVENOUS
Status: DISPENSED
Start: 2022-05-10

## (undated) RX ORDER — PHENYLEPHRINE HCL IN 0.9% NACL 1 MG/10 ML
SYRINGE (ML) INTRAVENOUS
Status: DISPENSED
Start: 2019-04-26

## (undated) RX ORDER — ALBUMIN, HUMAN INJ 5% 5 %
SOLUTION INTRAVENOUS
Status: DISPENSED
Start: 2019-04-26

## (undated) RX ORDER — CEFAZOLIN SODIUM 1 G/3ML
INJECTION, POWDER, FOR SOLUTION INTRAMUSCULAR; INTRAVENOUS
Status: DISPENSED
Start: 2019-04-26

## (undated) RX ORDER — FENTANYL CITRATE 50 UG/ML
INJECTION, SOLUTION INTRAMUSCULAR; INTRAVENOUS
Status: DISPENSED
Start: 2019-06-10

## (undated) RX ORDER — LIDOCAINE HYDROCHLORIDE 40 MG/ML
SOLUTION TOPICAL
Status: DISPENSED
Start: 2019-05-02

## (undated) RX ORDER — FENTANYL CITRATE 50 UG/ML
INJECTION, SOLUTION INTRAMUSCULAR; INTRAVENOUS
Status: DISPENSED
Start: 2019-04-26

## (undated) RX ORDER — IODIXANOL 320 MG/ML
INJECTION, SOLUTION INTRAVASCULAR
Status: DISPENSED
Start: 2022-05-10

## (undated) RX ORDER — ONDANSETRON 2 MG/ML
INJECTION INTRAMUSCULAR; INTRAVENOUS
Status: DISPENSED
Start: 2023-01-17

## (undated) RX ORDER — HEPARIN SODIUM 5000 [USP'U]/.5ML
INJECTION, SOLUTION INTRAVENOUS; SUBCUTANEOUS
Status: DISPENSED
Start: 2019-04-26

## (undated) RX ORDER — ONDANSETRON 2 MG/ML
INJECTION INTRAMUSCULAR; INTRAVENOUS
Status: DISPENSED
Start: 2024-01-01

## (undated) RX ORDER — FUROSEMIDE 10 MG/ML
INJECTION INTRAMUSCULAR; INTRAVENOUS
Status: DISPENSED
Start: 2019-10-10

## (undated) RX ORDER — PHENYLEPHRINE HCL IN 0.9% NACL 1 MG/10 ML
SYRINGE (ML) INTRAVENOUS
Status: DISPENSED
Start: 2019-10-10

## (undated) RX ORDER — HYDROMORPHONE HYDROCHLORIDE 1 MG/ML
INJECTION, SOLUTION INTRAMUSCULAR; INTRAVENOUS; SUBCUTANEOUS
Status: DISPENSED
Start: 2019-10-10

## (undated) RX ORDER — CEFAZOLIN SODIUM 2 G/100ML
INJECTION, SOLUTION INTRAVENOUS
Status: DISPENSED
Start: 2019-10-10

## (undated) RX ORDER — LIDOCAINE HYDROCHLORIDE 20 MG/ML
INJECTION, SOLUTION EPIDURAL; INFILTRATION; INTRACAUDAL; PERINEURAL
Status: DISPENSED
Start: 2019-06-10

## (undated) RX ORDER — PROPOFOL 10 MG/ML
INJECTION, EMULSION INTRAVENOUS
Status: DISPENSED
Start: 2018-11-21

## (undated) RX ORDER — EPHEDRINE SULFATE 50 MG/ML
INJECTION, SOLUTION INTRAMUSCULAR; INTRAVENOUS; SUBCUTANEOUS
Status: DISPENSED
Start: 2018-11-21

## (undated) RX ORDER — ACETAMINOPHEN 325 MG/1
TABLET ORAL
Status: DISPENSED
Start: 2022-05-10

## (undated) RX ORDER — LIDOCAINE HYDROCHLORIDE 20 MG/ML
INJECTION, SOLUTION EPIDURAL; INFILTRATION; INTRACAUDAL; PERINEURAL
Status: DISPENSED
Start: 2022-05-10

## (undated) RX ORDER — ONDANSETRON 2 MG/ML
INJECTION INTRAMUSCULAR; INTRAVENOUS
Status: DISPENSED
Start: 2022-05-10

## (undated) RX ORDER — LIDOCAINE HYDROCHLORIDE 20 MG/ML
INJECTION, SOLUTION EPIDURAL; INFILTRATION; INTRACAUDAL; PERINEURAL
Status: DISPENSED
Start: 2018-11-21

## (undated) RX ORDER — CEFAZOLIN SODIUM 2 G/100ML
INJECTION, SOLUTION INTRAVENOUS
Status: DISPENSED
Start: 2019-04-26

## (undated) RX ORDER — ALBUTEROL SULFATE 0.83 MG/ML
SOLUTION RESPIRATORY (INHALATION)
Status: DISPENSED
Start: 2019-05-02

## (undated) RX ORDER — ONDANSETRON 2 MG/ML
INJECTION INTRAMUSCULAR; INTRAVENOUS
Status: DISPENSED
Start: 2019-06-10

## (undated) RX ORDER — CEFAZOLIN SODIUM 2 G/50ML
SOLUTION INTRAVENOUS
Status: DISPENSED
Start: 2023-01-17

## (undated) RX ORDER — ALBUMIN, HUMAN INJ 5% 5 %
SOLUTION INTRAVENOUS
Status: DISPENSED
Start: 2018-11-21

## (undated) RX ORDER — LIDOCAINE HYDROCHLORIDE 10 MG/ML
INJECTION, SOLUTION EPIDURAL; INFILTRATION; INTRACAUDAL; PERINEURAL
Status: DISPENSED
Start: 2019-04-27

## (undated) RX ORDER — FENTANYL CITRATE 50 UG/ML
INJECTION, SOLUTION INTRAMUSCULAR; INTRAVENOUS
Status: DISPENSED
Start: 2019-05-02

## (undated) RX ORDER — FENTANYL CITRATE-0.9 % NACL/PF 10 MCG/ML
PLASTIC BAG, INJECTION (ML) INTRAVENOUS
Status: DISPENSED
Start: 2024-01-01

## (undated) RX ORDER — CALCIUM CHLORIDE 100 MG/ML
INJECTION INTRAVENOUS; INTRAVENTRICULAR
Status: DISPENSED
Start: 2018-11-21

## (undated) RX ORDER — FENTANYL CITRATE 50 UG/ML
INJECTION, SOLUTION INTRAMUSCULAR; INTRAVENOUS
Status: DISPENSED
Start: 2019-10-10

## (undated) RX ORDER — CEFAZOLIN SODIUM 2 G/100ML
INJECTION, SOLUTION INTRAVENOUS
Status: DISPENSED
Start: 2018-11-21

## (undated) RX ORDER — PROPOFOL 10 MG/ML
INJECTION, EMULSION INTRAVENOUS
Status: DISPENSED
Start: 2022-05-10

## (undated) RX ORDER — CEFAZOLIN SODIUM 1 G/3ML
INJECTION, POWDER, FOR SOLUTION INTRAMUSCULAR; INTRAVENOUS
Status: DISPENSED
Start: 2024-01-01

## (undated) RX ORDER — EPHEDRINE SULFATE 50 MG/ML
INJECTION, SOLUTION INTRAMUSCULAR; INTRAVENOUS; SUBCUTANEOUS
Status: DISPENSED
Start: 2019-05-06

## (undated) RX ORDER — FENTANYL CITRATE 50 UG/ML
INJECTION, SOLUTION INTRAMUSCULAR; INTRAVENOUS
Status: DISPENSED
Start: 2018-11-21

## (undated) RX ORDER — ONDANSETRON 2 MG/ML
INJECTION INTRAMUSCULAR; INTRAVENOUS
Status: DISPENSED
Start: 2019-10-10

## (undated) RX ORDER — GLYCOPYRROLATE 0.2 MG/ML
INJECTION INTRAMUSCULAR; INTRAVENOUS
Status: DISPENSED
Start: 2023-01-17

## (undated) RX ORDER — PROPOFOL 10 MG/ML
INJECTION, EMULSION INTRAVENOUS
Status: DISPENSED
Start: 2019-06-10

## (undated) RX ORDER — PHENYLEPHRINE HCL IN 0.9% NACL 1 MG/10 ML
SYRINGE (ML) INTRAVENOUS
Status: DISPENSED
Start: 2019-06-10

## (undated) RX ORDER — PROPOFOL 10 MG/ML
INJECTION, EMULSION INTRAVENOUS
Status: DISPENSED
Start: 2023-01-17

## (undated) RX ORDER — PROPOFOL 10 MG/ML
INJECTION, EMULSION INTRAVENOUS
Status: DISPENSED
Start: 2024-01-01

## (undated) RX ORDER — IOPAMIDOL 510 MG/ML
INJECTION, SOLUTION INTRAVASCULAR
Status: DISPENSED
Start: 2022-05-10

## (undated) RX ORDER — DEXAMETHASONE SODIUM PHOSPHATE 4 MG/ML
INJECTION, SOLUTION INTRA-ARTICULAR; INTRALESIONAL; INTRAMUSCULAR; INTRAVENOUS; SOFT TISSUE
Status: DISPENSED
Start: 2023-01-17

## (undated) RX ORDER — DEXAMETHASONE SODIUM PHOSPHATE 4 MG/ML
INJECTION, SOLUTION INTRA-ARTICULAR; INTRALESIONAL; INTRAMUSCULAR; INTRAVENOUS; SOFT TISSUE
Status: DISPENSED
Start: 2022-05-10

## (undated) RX ORDER — BUPIVACAINE HYDROCHLORIDE 2.5 MG/ML
INJECTION, SOLUTION EPIDURAL; INFILTRATION; INTRACAUDAL
Status: DISPENSED
Start: 2019-10-10

## (undated) RX ORDER — EPHEDRINE SULFATE 50 MG/ML
INJECTION, SOLUTION INTRAMUSCULAR; INTRAVENOUS; SUBCUTANEOUS
Status: DISPENSED
Start: 2019-10-10

## (undated) RX ORDER — IPRATROPIUM BROMIDE AND ALBUTEROL SULFATE 2.5; .5 MG/3ML; MG/3ML
SOLUTION RESPIRATORY (INHALATION)
Status: DISPENSED
Start: 2019-06-10

## (undated) RX ORDER — LIDOCAINE HYDROCHLORIDE 10 MG/ML
INJECTION, SOLUTION EPIDURAL; INFILTRATION; INTRACAUDAL; PERINEURAL
Status: DISPENSED
Start: 2019-05-02

## (undated) RX ORDER — PROPOFOL 10 MG/ML
INJECTION, EMULSION INTRAVENOUS
Status: DISPENSED
Start: 2019-04-26

## (undated) RX ORDER — DEXAMETHASONE SODIUM PHOSPHATE 4 MG/ML
INJECTION, SOLUTION INTRA-ARTICULAR; INTRALESIONAL; INTRAMUSCULAR; INTRAVENOUS; SOFT TISSUE
Status: DISPENSED
Start: 2019-06-10

## (undated) RX ORDER — IOPAMIDOL 755 MG/ML
INJECTION, SOLUTION INTRAVASCULAR
Status: DISPENSED
Start: 2019-06-10

## (undated) RX ORDER — LIDOCAINE HYDROCHLORIDE 10 MG/ML
INJECTION, SOLUTION EPIDURAL; INFILTRATION; INTRACAUDAL; PERINEURAL
Status: DISPENSED
Start: 2019-10-10

## (undated) RX ORDER — SODIUM CHLORIDE, SODIUM LACTATE, POTASSIUM CHLORIDE, CALCIUM CHLORIDE 600; 310; 30; 20 MG/100ML; MG/100ML; MG/100ML; MG/100ML
INJECTION, SOLUTION INTRAVENOUS
Status: DISPENSED
Start: 2019-10-10

## (undated) RX ORDER — PHENYLEPHRINE HCL IN 0.9% NACL 1 MG/10 ML
SYRINGE (ML) INTRAVENOUS
Status: DISPENSED
Start: 2019-05-06

## (undated) RX ORDER — PHENYLEPHRINE HCL IN 0.9% NACL 1 MG/10 ML
SYRINGE (ML) INTRAVENOUS
Status: DISPENSED
Start: 2018-11-21

## (undated) RX ORDER — BUPIVACAINE HYDROCHLORIDE 2.5 MG/ML
INJECTION, SOLUTION EPIDURAL; INFILTRATION; INTRACAUDAL
Status: DISPENSED
Start: 2018-11-21

## (undated) RX ORDER — ONDANSETRON 2 MG/ML
INJECTION INTRAMUSCULAR; INTRAVENOUS
Status: DISPENSED
Start: 2019-04-26

## (undated) RX ORDER — EPHEDRINE SULFATE 50 MG/ML
INJECTION, SOLUTION INTRAMUSCULAR; INTRAVENOUS; SUBCUTANEOUS
Status: DISPENSED
Start: 2024-01-01

## (undated) RX ORDER — TRANEXAMIC ACID 100 MG/ML
INJECTION, SOLUTION INTRAVENOUS
Status: DISPENSED
Start: 2024-01-01

## (undated) RX ORDER — EPHEDRINE SULFATE 50 MG/ML
INJECTION, SOLUTION INTRAMUSCULAR; INTRAVENOUS; SUBCUTANEOUS
Status: DISPENSED
Start: 2019-04-26

## (undated) RX ORDER — EPHEDRINE SULFATE 50 MG/ML
INJECTION, SOLUTION INTRAMUSCULAR; INTRAVENOUS; SUBCUTANEOUS
Status: DISPENSED
Start: 2019-06-10